# Patient Record
Sex: FEMALE | URBAN - METROPOLITAN AREA
[De-identification: names, ages, dates, MRNs, and addresses within clinical notes are randomized per-mention and may not be internally consistent; named-entity substitution may affect disease eponyms.]

---

## 2017-04-13 ENCOUNTER — INPATIENT (INPATIENT)
Facility: HOSPITAL | Age: 77
LOS: 4 days | Discharge: ANOTHER HOSPITAL | DRG: 291 | End: 2017-04-18
Attending: INTERNAL MEDICINE | Admitting: INTERNAL MEDICINE
Payer: MEDICARE

## 2017-04-13 VITALS — RESPIRATION RATE: 40 BRPM | WEIGHT: 145.95 LBS

## 2017-04-13 DIAGNOSIS — J44.1 CHRONIC OBSTRUCTIVE PULMONARY DISEASE WITH (ACUTE) EXACERBATION: ICD-10-CM

## 2017-04-13 DIAGNOSIS — Z41.8 ENCOUNTER FOR OTHER PROCEDURES FOR PURPOSES OTHER THAN REMEDYING HEALTH STATE: ICD-10-CM

## 2017-04-13 DIAGNOSIS — Z98.89 OTHER SPECIFIED POSTPROCEDURAL STATES: Chronic | ICD-10-CM

## 2017-04-13 DIAGNOSIS — G47.33 OBSTRUCTIVE SLEEP APNEA (ADULT) (PEDIATRIC): ICD-10-CM

## 2017-04-13 DIAGNOSIS — I73.9 PERIPHERAL VASCULAR DISEASE, UNSPECIFIED: ICD-10-CM

## 2017-04-13 DIAGNOSIS — I10 ESSENTIAL (PRIMARY) HYPERTENSION: ICD-10-CM

## 2017-04-13 DIAGNOSIS — R79.89 OTHER SPECIFIED ABNORMAL FINDINGS OF BLOOD CHEMISTRY: ICD-10-CM

## 2017-04-13 DIAGNOSIS — K44.9 DIAPHRAGMATIC HERNIA WITHOUT OBSTRUCTION OR GANGRENE: ICD-10-CM

## 2017-04-13 DIAGNOSIS — M06.9 RHEUMATOID ARTHRITIS, UNSPECIFIED: ICD-10-CM

## 2017-04-13 DIAGNOSIS — I73.9 PERIPHERAL VASCULAR DISEASE, UNSPECIFIED: Chronic | ICD-10-CM

## 2017-04-13 DIAGNOSIS — E78.5 HYPERLIPIDEMIA, UNSPECIFIED: ICD-10-CM

## 2017-04-13 DIAGNOSIS — I38 ENDOCARDITIS, VALVE UNSPECIFIED: ICD-10-CM

## 2017-04-13 DIAGNOSIS — J18.9 PNEUMONIA, UNSPECIFIED ORGANISM: ICD-10-CM

## 2017-04-13 DIAGNOSIS — F41.9 ANXIETY DISORDER, UNSPECIFIED: ICD-10-CM

## 2017-04-13 DIAGNOSIS — K44.9 DIAPHRAGMATIC HERNIA WITHOUT OBSTRUCTION OR GANGRENE: Chronic | ICD-10-CM

## 2017-04-13 LAB
ALBUMIN SERPL ELPH-MCNC: 3.7 G/DL — SIGNIFICANT CHANGE UP (ref 3.3–5)
ALP SERPL-CCNC: 122 U/L — HIGH (ref 40–120)
ALT FLD-CCNC: 93 U/L — HIGH (ref 12–78)
ANION GAP SERPL CALC-SCNC: 13 MMOL/L — SIGNIFICANT CHANGE UP (ref 5–17)
ANION GAP SERPL CALC-SCNC: 8 MMOL/L — SIGNIFICANT CHANGE UP (ref 5–17)
APPEARANCE UR: CLEAR — SIGNIFICANT CHANGE UP
AST SERPL-CCNC: 107 U/L — HIGH (ref 15–37)
BACTERIA # UR AUTO: ABNORMAL
BASE EXCESS BLDA CALC-SCNC: -3.4 MMOL/L — LOW (ref -2–2)
BASOPHILS # BLD AUTO: 0 K/UL — SIGNIFICANT CHANGE UP (ref 0–0.2)
BASOPHILS NFR BLD AUTO: 0.2 % — SIGNIFICANT CHANGE UP (ref 0–2)
BASOPHILS NFR BLD AUTO: 1 % — SIGNIFICANT CHANGE UP (ref 0–2)
BILIRUB SERPL-MCNC: 0.2 MG/DL — SIGNIFICANT CHANGE UP (ref 0.2–1.2)
BILIRUB UR-MCNC: NEGATIVE — SIGNIFICANT CHANGE UP
BLOOD GAS COMMENTS ARTERIAL: SIGNIFICANT CHANGE UP
BUN SERPL-MCNC: 14 MG/DL — SIGNIFICANT CHANGE UP (ref 7–23)
BUN SERPL-MCNC: 15 MG/DL — SIGNIFICANT CHANGE UP (ref 7–23)
CALCIUM SERPL-MCNC: 8.1 MG/DL — LOW (ref 8.5–10.1)
CALCIUM SERPL-MCNC: 8.5 MG/DL — SIGNIFICANT CHANGE UP (ref 8.5–10.1)
CHLORIDE SERPL-SCNC: 102 MMOL/L — SIGNIFICANT CHANGE UP (ref 96–108)
CHLORIDE SERPL-SCNC: 106 MMOL/L — SIGNIFICANT CHANGE UP (ref 96–108)
CK MB BLD-MCNC: 2.6 % — SIGNIFICANT CHANGE UP (ref 0–3.5)
CK MB CFR SERPL CALC: 5.8 NG/ML — HIGH (ref 0–3.6)
CK SERPL-CCNC: 222 U/L — HIGH (ref 26–192)
CO2 SERPL-SCNC: 22 MMOL/L — SIGNIFICANT CHANGE UP (ref 22–31)
CO2 SERPL-SCNC: 25 MMOL/L — SIGNIFICANT CHANGE UP (ref 22–31)
COLOR SPEC: YELLOW — SIGNIFICANT CHANGE UP
CREAT SERPL-MCNC: 0.6 MG/DL — SIGNIFICANT CHANGE UP (ref 0.5–1.3)
CREAT SERPL-MCNC: 0.87 MG/DL — SIGNIFICANT CHANGE UP (ref 0.5–1.3)
CRP SERPL-MCNC: 1.59 MG/DL — HIGH (ref 0–0.4)
DIFF PNL FLD: ABNORMAL
EOSINOPHIL # BLD AUTO: 0 K/UL — SIGNIFICANT CHANGE UP (ref 0–0.5)
EOSINOPHIL NFR BLD AUTO: 0 % — SIGNIFICANT CHANGE UP (ref 0–6)
EPI CELLS # UR: SIGNIFICANT CHANGE UP
GLUCOSE SERPL-MCNC: 197 MG/DL — HIGH (ref 70–99)
GLUCOSE SERPL-MCNC: 289 MG/DL — HIGH (ref 70–99)
GLUCOSE UR QL: 100 MG/DL
HBA1C BLD-MCNC: 6.5 % — HIGH (ref 4–5.6)
HCO3 BLDA-SCNC: 21 MMOL/L — LOW (ref 23–27)
HCO3 BLDA-SCNC: 24 MMOL/L — SIGNIFICANT CHANGE UP (ref 23–27)
HCT VFR BLD CALC: 40.6 % — SIGNIFICANT CHANGE UP (ref 34.5–45)
HCT VFR BLD CALC: 47.4 % — HIGH (ref 34.5–45)
HGB BLD-MCNC: 13.3 G/DL — SIGNIFICANT CHANGE UP (ref 11.5–15.5)
HGB BLD-MCNC: 15 G/DL — SIGNIFICANT CHANGE UP (ref 11.5–15.5)
HOROWITZ INDEX BLDA+IHG-RTO: 50 — SIGNIFICANT CHANGE UP
HOROWITZ INDEX BLDA+IHG-RTO: 50 — SIGNIFICANT CHANGE UP
INR BLD: 0.99 RATIO — SIGNIFICANT CHANGE UP (ref 0.88–1.16)
KETONES UR-MCNC: NEGATIVE — SIGNIFICANT CHANGE UP
LACTATE SERPL-SCNC: 1.9 MMOL/L — SIGNIFICANT CHANGE UP (ref 0.7–2)
LACTATE SERPL-SCNC: 7.3 MMOL/L — CRITICAL HIGH (ref 0.7–2)
LEUKOCYTE ESTERASE UR-ACNC: NEGATIVE — SIGNIFICANT CHANGE UP
LYMPHOCYTES # BLD AUTO: 0.6 K/UL — LOW (ref 1–3.3)
LYMPHOCYTES # BLD AUTO: 35 % — SIGNIFICANT CHANGE UP (ref 13–44)
LYMPHOCYTES # BLD AUTO: 4.2 % — LOW (ref 13–44)
MAGNESIUM SERPL-MCNC: 2.4 MG/DL — SIGNIFICANT CHANGE UP (ref 1.8–2.4)
MCHC RBC-ENTMCNC: 31.2 PG — SIGNIFICANT CHANGE UP (ref 27–34)
MCHC RBC-ENTMCNC: 31.5 GM/DL — LOW (ref 32–36)
MCHC RBC-ENTMCNC: 32.3 PG — SIGNIFICANT CHANGE UP (ref 27–34)
MCHC RBC-ENTMCNC: 32.7 GM/DL — SIGNIFICANT CHANGE UP (ref 32–36)
MCV RBC AUTO: 98.8 FL — SIGNIFICANT CHANGE UP (ref 80–100)
MCV RBC AUTO: 99 FL — SIGNIFICANT CHANGE UP (ref 80–100)
MONOCYTES # BLD AUTO: 0.5 K/UL — SIGNIFICANT CHANGE UP (ref 0–0.9)
MONOCYTES NFR BLD AUTO: 3.8 % — SIGNIFICANT CHANGE UP (ref 1–9)
MONOCYTES NFR BLD AUTO: 8 % — SIGNIFICANT CHANGE UP (ref 1–9)
NEUTROPHILS # BLD AUTO: 12.7 K/UL — HIGH (ref 1.8–7.4)
NEUTROPHILS NFR BLD AUTO: 54 % — SIGNIFICANT CHANGE UP (ref 43–77)
NEUTROPHILS NFR BLD AUTO: 91 % — HIGH (ref 43–77)
NEUTS BAND # BLD: 2 % — SIGNIFICANT CHANGE UP (ref 0–8)
NITRITE UR-MCNC: NEGATIVE — SIGNIFICANT CHANGE UP
NT-PROBNP SERPL-SCNC: 515 PG/ML — HIGH (ref 0–450)
PCO2 BLDA: 44 MMHG — SIGNIFICANT CHANGE UP (ref 32–46)
PCO2 BLDA: 52 MMHG — HIGH (ref 32–46)
PH BLDA: 7.26 — LOW (ref 7.35–7.45)
PH BLDA: 7.37 — SIGNIFICANT CHANGE UP (ref 7.35–7.45)
PH UR: 6.5 — SIGNIFICANT CHANGE UP (ref 4.8–8)
PHOSPHATE SERPL-MCNC: 2.2 MG/DL — LOW (ref 2.5–4.5)
PLAT MORPH BLD: NORMAL — SIGNIFICANT CHANGE UP
PLATELET # BLD AUTO: 213 K/UL — SIGNIFICANT CHANGE UP (ref 150–400)
PLATELET # BLD AUTO: 274 K/UL — SIGNIFICANT CHANGE UP (ref 150–400)
PO2 BLDA: 107 MMHG — SIGNIFICANT CHANGE UP (ref 74–108)
PO2 BLDA: 87 MMHG — SIGNIFICANT CHANGE UP (ref 74–108)
POTASSIUM SERPL-MCNC: 3.8 MMOL/L — SIGNIFICANT CHANGE UP (ref 3.5–5.3)
POTASSIUM SERPL-MCNC: 4.1 MMOL/L — SIGNIFICANT CHANGE UP (ref 3.5–5.3)
POTASSIUM SERPL-SCNC: 3.8 MMOL/L — SIGNIFICANT CHANGE UP (ref 3.5–5.3)
POTASSIUM SERPL-SCNC: 4.1 MMOL/L — SIGNIFICANT CHANGE UP (ref 3.5–5.3)
PROT SERPL-MCNC: 8 G/DL — SIGNIFICANT CHANGE UP (ref 6–8.3)
PROT UR-MCNC: 500 MG/DL
PROTHROM AB SERPL-ACNC: 10.8 SEC — SIGNIFICANT CHANGE UP (ref 9.8–12.7)
RBC # BLD: 4.11 M/UL — SIGNIFICANT CHANGE UP (ref 3.8–5.2)
RBC # BLD: 4.79 M/UL — SIGNIFICANT CHANGE UP (ref 3.8–5.2)
RBC # FLD: 13.2 % — SIGNIFICANT CHANGE UP (ref 10.3–14.5)
RBC # FLD: 13.2 % — SIGNIFICANT CHANGE UP (ref 10.3–14.5)
RBC BLD AUTO: SIGNIFICANT CHANGE UP
RBC CASTS # UR COMP ASSIST: SIGNIFICANT CHANGE UP /HPF (ref 0–4)
SAO2 % BLDA: 94 % — SIGNIFICANT CHANGE UP (ref 92–96)
SAO2 % BLDA: 97 % — HIGH (ref 92–96)
SODIUM SERPL-SCNC: 137 MMOL/L — SIGNIFICANT CHANGE UP (ref 135–145)
SODIUM SERPL-SCNC: 139 MMOL/L — SIGNIFICANT CHANGE UP (ref 135–145)
SP GR SPEC: 1.01 — SIGNIFICANT CHANGE UP (ref 1.01–1.02)
TROPONIN I SERPL-MCNC: 0.03 NG/ML — SIGNIFICANT CHANGE UP (ref 0.01–0.04)
UROBILINOGEN FLD QL: NEGATIVE — SIGNIFICANT CHANGE UP
WBC # BLD: 13.9 K/UL — HIGH (ref 3.8–10.5)
WBC # BLD: 19.5 K/UL — HIGH (ref 3.8–10.5)
WBC # FLD AUTO: 13.9 K/UL — HIGH (ref 3.8–10.5)
WBC # FLD AUTO: 19.5 K/UL — HIGH (ref 3.8–10.5)
WBC UR QL: NEGATIVE — SIGNIFICANT CHANGE UP

## 2017-04-13 PROCEDURE — 71010: CPT | Mod: 26

## 2017-04-13 PROCEDURE — 99223 1ST HOSP IP/OBS HIGH 75: CPT

## 2017-04-13 PROCEDURE — 93010 ELECTROCARDIOGRAM REPORT: CPT

## 2017-04-13 PROCEDURE — 71275 CT ANGIOGRAPHY CHEST: CPT | Mod: 26

## 2017-04-13 PROCEDURE — 99285 EMERGENCY DEPT VISIT HI MDM: CPT

## 2017-04-13 RX ORDER — ASPIRIN/CALCIUM CARB/MAGNESIUM 324 MG
81 TABLET ORAL DAILY
Qty: 0 | Refills: 0 | Status: DISCONTINUED | OUTPATIENT
Start: 2017-04-13 | End: 2017-04-18

## 2017-04-13 RX ORDER — ACETAMINOPHEN 500 MG
650 TABLET ORAL EVERY 6 HOURS
Qty: 0 | Refills: 0 | Status: DISCONTINUED | OUTPATIENT
Start: 2017-04-13 | End: 2017-04-18

## 2017-04-13 RX ORDER — ZOLPIDEM TARTRATE 10 MG/1
5 TABLET ORAL AT BEDTIME
Qty: 0 | Refills: 0 | Status: DISCONTINUED | OUTPATIENT
Start: 2017-04-13 | End: 2017-04-18

## 2017-04-13 RX ORDER — VANCOMYCIN HCL 1 G
1000 VIAL (EA) INTRAVENOUS ONCE
Qty: 0 | Refills: 0 | Status: COMPLETED | OUTPATIENT
Start: 2017-04-13 | End: 2017-04-13

## 2017-04-13 RX ORDER — IPRATROPIUM/ALBUTEROL SULFATE 18-103MCG
3 AEROSOL WITH ADAPTER (GRAM) INHALATION EVERY 6 HOURS
Qty: 0 | Refills: 0 | Status: DISCONTINUED | OUTPATIENT
Start: 2017-04-13 | End: 2017-04-13

## 2017-04-13 RX ORDER — SENNA PLUS 8.6 MG/1
2 TABLET ORAL AT BEDTIME
Qty: 0 | Refills: 0 | Status: DISCONTINUED | OUTPATIENT
Start: 2017-04-13 | End: 2017-04-18

## 2017-04-13 RX ORDER — SIMVASTATIN 20 MG/1
20 TABLET, FILM COATED ORAL AT BEDTIME
Qty: 0 | Refills: 0 | Status: DISCONTINUED | OUTPATIENT
Start: 2017-04-13 | End: 2017-04-18

## 2017-04-13 RX ORDER — SODIUM CHLORIDE 9 MG/ML
1000 INJECTION INTRAMUSCULAR; INTRAVENOUS; SUBCUTANEOUS
Qty: 0 | Refills: 0 | Status: DISCONTINUED | OUTPATIENT
Start: 2017-04-13 | End: 2017-04-15

## 2017-04-13 RX ORDER — MAGNESIUM SULFATE 500 MG/ML
2 VIAL (ML) INJECTION ONCE
Qty: 0 | Refills: 0 | Status: COMPLETED | OUTPATIENT
Start: 2017-04-13 | End: 2017-04-13

## 2017-04-13 RX ORDER — CLONAZEPAM 1 MG
0.25 TABLET ORAL ONCE
Qty: 0 | Refills: 0 | Status: DISCONTINUED | OUTPATIENT
Start: 2017-04-13 | End: 2017-04-13

## 2017-04-13 RX ORDER — VANCOMYCIN HCL 1 G
VIAL (EA) INTRAVENOUS
Qty: 0 | Refills: 0 | Status: DISCONTINUED | OUTPATIENT
Start: 2017-04-13 | End: 2017-04-13

## 2017-04-13 RX ORDER — ALBUTEROL 90 UG/1
2 AEROSOL, METERED ORAL EVERY 6 HOURS
Qty: 0 | Refills: 0 | Status: DISCONTINUED | OUTPATIENT
Start: 2017-04-13 | End: 2017-04-15

## 2017-04-13 RX ORDER — ARIPIPRAZOLE 15 MG/1
2 TABLET ORAL DAILY
Qty: 0 | Refills: 0 | Status: DISCONTINUED | OUTPATIENT
Start: 2017-04-13 | End: 2017-04-15

## 2017-04-13 RX ORDER — LEVALBUTEROL 1.25 MG/.5ML
0.63 SOLUTION, CONCENTRATE RESPIRATORY (INHALATION) EVERY 8 HOURS
Qty: 0 | Refills: 0 | Status: DISCONTINUED | OUTPATIENT
Start: 2017-04-13 | End: 2017-04-15

## 2017-04-13 RX ORDER — SODIUM CHLORIDE 9 MG/ML
1000 INJECTION INTRAMUSCULAR; INTRAVENOUS; SUBCUTANEOUS
Qty: 0 | Refills: 0 | Status: COMPLETED | OUTPATIENT
Start: 2017-04-13 | End: 2017-04-13

## 2017-04-13 RX ORDER — DOCUSATE SODIUM 100 MG
100 CAPSULE ORAL THREE TIMES A DAY
Qty: 0 | Refills: 0 | Status: DISCONTINUED | OUTPATIENT
Start: 2017-04-13 | End: 2017-04-18

## 2017-04-13 RX ORDER — CLONAZEPAM 1 MG
0.25 TABLET ORAL
Qty: 0 | Refills: 0 | Status: DISCONTINUED | OUTPATIENT
Start: 2017-04-13 | End: 2017-04-16

## 2017-04-13 RX ORDER — AZTREONAM 2 G
1000 VIAL (EA) INJECTION ONCE
Qty: 0 | Refills: 0 | Status: COMPLETED | OUTPATIENT
Start: 2017-04-13 | End: 2017-04-13

## 2017-04-13 RX ORDER — AZTREONAM 2 G
VIAL (EA) INJECTION
Qty: 0 | Refills: 0 | Status: DISCONTINUED | OUTPATIENT
Start: 2017-04-13 | End: 2017-04-15

## 2017-04-13 RX ORDER — VENLAFAXINE HCL 75 MG
150 CAPSULE, EXT RELEASE 24 HR ORAL DAILY
Qty: 0 | Refills: 0 | Status: DISCONTINUED | OUTPATIENT
Start: 2017-04-13 | End: 2017-04-13

## 2017-04-13 RX ORDER — VERAPAMIL HCL 240 MG
240 CAPSULE, EXTENDED RELEASE PELLETS 24 HR ORAL DAILY
Qty: 0 | Refills: 0 | Status: DISCONTINUED | OUTPATIENT
Start: 2017-04-13 | End: 2017-04-15

## 2017-04-13 RX ORDER — VENLAFAXINE HCL 75 MG
75 CAPSULE, EXT RELEASE 24 HR ORAL
Qty: 0 | Refills: 0 | Status: DISCONTINUED | OUTPATIENT
Start: 2017-04-13 | End: 2017-04-18

## 2017-04-13 RX ORDER — TIOTROPIUM BROMIDE 18 UG/1
1 CAPSULE ORAL; RESPIRATORY (INHALATION) DAILY
Qty: 0 | Refills: 0 | Status: DISCONTINUED | OUTPATIENT
Start: 2017-04-13 | End: 2017-04-18

## 2017-04-13 RX ORDER — PANTOPRAZOLE SODIUM 20 MG/1
40 TABLET, DELAYED RELEASE ORAL
Qty: 0 | Refills: 0 | Status: DISCONTINUED | OUTPATIENT
Start: 2017-04-13 | End: 2017-04-18

## 2017-04-13 RX ORDER — IPRATROPIUM/ALBUTEROL SULFATE 18-103MCG
3 AEROSOL WITH ADAPTER (GRAM) INHALATION ONCE
Qty: 0 | Refills: 0 | Status: COMPLETED | OUTPATIENT
Start: 2017-04-13 | End: 2017-04-13

## 2017-04-13 RX ORDER — SODIUM,POTASSIUM PHOSPHATES 278-250MG
1 POWDER IN PACKET (EA) ORAL
Qty: 0 | Refills: 0 | Status: COMPLETED | OUTPATIENT
Start: 2017-04-13 | End: 2017-04-14

## 2017-04-13 RX ORDER — FUROSEMIDE 40 MG
40 TABLET ORAL ONCE
Qty: 0 | Refills: 0 | Status: COMPLETED | OUTPATIENT
Start: 2017-04-13 | End: 2017-04-13

## 2017-04-13 RX ORDER — AZTREONAM 2 G
1000 VIAL (EA) INJECTION EVERY 8 HOURS
Qty: 0 | Refills: 0 | Status: DISCONTINUED | OUTPATIENT
Start: 2017-04-13 | End: 2017-04-15

## 2017-04-13 RX ORDER — ENOXAPARIN SODIUM 100 MG/ML
40 INJECTION SUBCUTANEOUS EVERY 24 HOURS
Qty: 0 | Refills: 0 | Status: DISCONTINUED | OUTPATIENT
Start: 2017-04-13 | End: 2017-04-17

## 2017-04-13 RX ORDER — LACTOBACILLUS ACIDOPHILUS 100MM CELL
1 CAPSULE ORAL
Qty: 0 | Refills: 0 | Status: DISCONTINUED | OUTPATIENT
Start: 2017-04-13 | End: 2017-04-18

## 2017-04-13 RX ORDER — VANCOMYCIN HCL 1 G
1000 VIAL (EA) INTRAVENOUS EVERY 12 HOURS
Qty: 0 | Refills: 0 | Status: DISCONTINUED | OUTPATIENT
Start: 2017-04-13 | End: 2017-04-15

## 2017-04-13 RX ADMIN — Medication 100 MILLIGRAM(S): at 06:10

## 2017-04-13 RX ADMIN — Medication 100 MILLIGRAM(S): at 21:57

## 2017-04-13 RX ADMIN — Medication 50 MILLIGRAM(S): at 21:58

## 2017-04-13 RX ADMIN — Medication 1 TABLET(S): at 08:47

## 2017-04-13 RX ADMIN — Medication 50 MILLIGRAM(S): at 07:47

## 2017-04-13 RX ADMIN — Medication 81 MILLIGRAM(S): at 12:00

## 2017-04-13 RX ADMIN — Medication 1 TABLET(S): at 12:00

## 2017-04-13 RX ADMIN — ZOLPIDEM TARTRATE 5 MILLIGRAM(S): 10 TABLET ORAL at 22:00

## 2017-04-13 RX ADMIN — Medication 40 MILLIGRAM(S): at 00:48

## 2017-04-13 RX ADMIN — Medication 50 MILLIGRAM(S): at 14:01

## 2017-04-13 RX ADMIN — Medication 100 MILLIGRAM(S): at 14:00

## 2017-04-13 RX ADMIN — PANTOPRAZOLE SODIUM 40 MILLIGRAM(S): 20 TABLET, DELAYED RELEASE ORAL at 08:47

## 2017-04-13 RX ADMIN — SODIUM CHLORIDE 1000 MILLILITER(S): 9 INJECTION INTRAMUSCULAR; INTRAVENOUS; SUBCUTANEOUS at 03:46

## 2017-04-13 RX ADMIN — SODIUM CHLORIDE 100 MILLILITER(S): 9 INJECTION INTRAMUSCULAR; INTRAVENOUS; SUBCUTANEOUS at 08:48

## 2017-04-13 RX ADMIN — Medication 75 MILLIGRAM(S): at 18:31

## 2017-04-13 RX ADMIN — Medication 250 MILLIGRAM(S): at 08:18

## 2017-04-13 RX ADMIN — Medication 0.25 MILLIGRAM(S): at 09:40

## 2017-04-13 RX ADMIN — SIMVASTATIN 20 MILLIGRAM(S): 20 TABLET, FILM COATED ORAL at 21:57

## 2017-04-13 RX ADMIN — Medication 40 MILLIGRAM(S): at 21:58

## 2017-04-13 RX ADMIN — Medication 50 GRAM(S): at 00:48

## 2017-04-13 RX ADMIN — Medication 40 MILLIGRAM(S): at 06:10

## 2017-04-13 RX ADMIN — Medication 240 MILLIGRAM(S): at 06:10

## 2017-04-13 RX ADMIN — Medication 40 MILLIGRAM(S): at 14:00

## 2017-04-13 RX ADMIN — Medication 30 MILLILITER(S): at 21:58

## 2017-04-13 RX ADMIN — SODIUM CHLORIDE 1000 MILLILITER(S): 9 INJECTION INTRAMUSCULAR; INTRAVENOUS; SUBCUTANEOUS at 01:57

## 2017-04-13 RX ADMIN — Medication 250 MILLIGRAM(S): at 22:13

## 2017-04-13 RX ADMIN — Medication 1 TABLET(S): at 17:48

## 2017-04-13 RX ADMIN — ARIPIPRAZOLE 2 MILLIGRAM(S): 15 TABLET ORAL at 12:07

## 2017-04-13 RX ADMIN — Medication 1 TABLET(S): at 18:31

## 2017-04-13 RX ADMIN — Medication 75 MILLIGRAM(S): at 08:47

## 2017-04-13 RX ADMIN — Medication 0.25 MILLIGRAM(S): at 17:48

## 2017-04-13 RX ADMIN — ENOXAPARIN SODIUM 40 MILLIGRAM(S): 100 INJECTION SUBCUTANEOUS at 06:10

## 2017-04-13 RX ADMIN — Medication 3 MILLILITER(S): at 00:30

## 2017-04-13 NOTE — CONSULT NOTE ADULT - SUBJECTIVE AND OBJECTIVE BOX
[facility] Consultation    CHIEF COMPLAINT: Patient is a 76y old  Female who presents with a chief complaint of SOB (2017 01:22)      HPI:  Patient is a 76-year-old female with a past medical history of COPD, hypertension, hyperlipidemia, diverticulitis, depression, breast cancer, status post lumpectomy and radiation , anxiety, hiatal hernia-status post surgical repair in , obstructive sleep apnea (not on BiPAP at home), PVD, rheumatoid arthritis, thrush, TIA (), TMJ, valvular heart disease, S/P AVR 3 yrs ago, borderline diabetes (not on medications), presents to ED via EMS from home with chief complaint of difficulty breathing. The past three days patient has been feeling increasing shortness of breath. Patient periodically (around monthly) goes to PMD–Dr. Benoit for steroids and antibiotics and symptoms resolve with taking meds.  Pt was seen in office this week and given z pack, steroids. Per patient she has been taking medications, however wheezing and cough have increased, cough is productive with yellowish sputum. Last night pt was diaphoretic and shortness of breath became so bad that daughter called EMS to bring to ED.   Pt denies CP, palpitations, change in vision, decreased PO intake, abd pain, n/v/d, dysuria, HA, numbness, tingling.  Denies sick contact/recent travel.  Similar symptoms in past with COPD exacerbation but none this severe.     Pt saw Dr. Jackson her normal cardiologist about 2 months ago and had an echocardiogram that was stable.    In the ED – patient received levaquin 500mg iv, Solu-Medrol 125 mg IV, NS 1L bolusx1 Combivent nebulizer, was placed on BiPAP 12/6/PEEP5, 50.  ABG-  pH – 7.26, PCO2 – 52, PO2 – 87, bicarb – 21, bases excess: –3.4, FiO2 50, O2 sat 94%   labs significant for – WBC – 19.5, hematocrit – 47.4, glucose – 289, alkaline phosphatase 122, AST – 107, AL T – 93, lactate – 7.3, CK – 222, CKMB – 5.8, pro BNP – 515,   UA – protein – 500, blood – small, bacteria – occasional, glucose – 100    patient still very dyspneic on BiPAP mask and having difficulty giving history because of her shortness of breath.        PAST MEDICAL & SURGICAL HISTORY:  TMJ (temporomandibular joint disorder)  Diverticulitis: hospitalized   SIMÓN (obstructive sleep apnea): category I severe pt does not use c/pap  Thrush: treated x 4 days  1 month ago  restarted medication  3-19-14 clortramazole 5x day  Anxiety  RA (rheumatoid arthritis): diagnosed   oxycodone prn  neck/ lower back  Depression  Borderline diabetes: no meds  COPD (chronic obstructive pulmonary disease): diagnosed   inhaler and nebulizer  HTN (hypertension)  PVD (peripheral vascular disease): left iliac  s/p surgical intervention   unsuccessful  Hiatal hernia: s/p surgical repair   Breast cancer: right s/p lumpectomy and radiation   TIA (transient ischemic attack): 2010  Hyperlipidemia  aortic valve replacement. no known coronary disease.  PVD (peripheral vascular disease): s/p left iliac bypass which was unsuccessful  open repair  S/P carpal tunnel release: right   S/P lumpectomy, right breast:   Hiatal hernia: s/p surgical repair per pt   S/P hernia surgery: left inguinal age 12  S/P rotator cuff surgery: left   S/P appendectomy:   S/P  section: x2 /       SOCIAL HISTORY: former smoker    FAMILY HISTORY: FAMILY HISTORY:  unknown    MEDICATIONS  (STANDING):  enoxaparin Injectable 40milliGRAM(s) SubCutaneous every 24 hours  aspirin enteric coated 81milliGRAM(s) Oral daily  simvastatin 20milliGRAM(s) Oral at bedtime  ARIPiprazole 2milliGRAM(s) Oral daily  docusate sodium 100milliGRAM(s) Oral three times a day  pantoprazole    Tablet 40milliGRAM(s) Oral before breakfast  verapamil SR 240milliGRAM(s) Oral daily  sodium chloride 0.9%. 1000milliLiter(s) IV Continuous <Continuous>  venlafaxine 75milliGRAM(s) Oral two times a day with meals  methylPREDNISolone sodium succinate Injectable 40milliGRAM(s) IV Push every 8 hours  doxycycline hyclate Capsule 100milliGRAM(s) Oral every 12 hours  lactobacillus acidophilus 1Tablet(s) Oral three times a day with meals  aztreonam  IVPB  IV Intermittent   vancomycin  IVPB  IV Intermittent   aztreonam  IVPB 1000milliGRAM(s) IV Intermittent every 8 hours    MEDICATIONS  (PRN):  acetaminophen   Tablet 650milliGRAM(s) Oral every 6 hours PRN For Temp greater than 38 C (100.4 F)  acetaminophen   Tablet. 650milliGRAM(s) Oral every 6 hours PRN Mild Pain (1 - 3)  senna 2Tablet(s) Oral at bedtime PRN Constipation  oxyCODONE  5 mG/acetaminophen 325 mG 2Tablet(s) Oral every 4 hours PRN Severe Pain (7 - 10)  zolpidem 5milliGRAM(s) Oral at bedtime PRN Insomnia  tiotropium 18 MICROgram(s) Capsule 1Capsule(s) Inhalation daily PRN sob  ALBUTerol    90 MICROgram(s) HFA Inhaler 2Puff(s) Inhalation every 6 hours PRN Shortness of Breath and/or Wheezing  levalbuterol Inhalation 0.63milliGRAM(s) Inhalation every 8 hours PRN shortness of breath      Allergies    penicillins (Hives)  quinolines (Other)    Intolerances        REVIEW OF SYSTEMS:    CONSTITUTIONAL: No weakness, fevers or chills  EYES: No visual changes, No diplopia  ENMT: No throat pain , No exudate  NECK: No pain or stiffness  RESPIRATORY: shortness of breath, cough, and wheezing  CARDIOVASCULAR: No chest pain or palpitations  GASTROINTESTINAL: No abdominal pain. No nausea, vomiting, or hematemesis; No diarrhea or constipation. No melena or hematochezia.  GENITOURINARY: No dysuria, frequency or hematuria  NEUROLOGICAL: fatigue. Arousable but lethargic  SKIN: No itching or rash  All other review of systems is negative unless indicated above    VITAL SIGNS:   Vital Signs Last 24 Hrs  T(C): 36.8, Max: 36.8 ( @ 07:25)  T(F): 98.2, Max: 98.2 ( @ 07:25)  HR: 114 (114 - 135)  BP: 114/60 (114/60 - 168/111)  BP(mean): --  RR: 22 (22 - 40)  SpO2: 99% (96% - 100%)    I&O's Summary    I & Os for current day (as of 2017 08:46)  =============================================  IN: 2000 ml / OUT: 1400 ml / NET: 600 ml      PHYSICAL EXAM:    Constitutional: acutely ill  Eyes:  EOMI,  Pupils round, no lesions  ENMT: no exudate or erythema  Pulmonary: labored breathing with bilateral rhonchi, wheezing, and coarse breath  Cardiovascular: PMI not palpable difficult to hear heart sounds or murmur because of respiratory noise  Gastrointestinal: Bowel Sounds present, soft, nontender.   Lymph: No peripheral edema. No cervical lymphadenopathy.  Neurological: lethargic but arousable, no focal deficits  Skin: No rashes. No changes of chronic venous stasis. No cyanosis.  Psych:  not able to evaluate.    LABS: All Labs Reviewed:                        13.3   13.9  )-----------( 213      ( 2017 06:35 )             40.6                         15.0   19.5  )-----------( 274      ( 2017 00:26 )             47.4     2017 06:35    139    |  106    |  14     ----------------------------<  197    3.8     |  25     |  0.60   2017 00:26    137    |  102    |  15     ----------------------------<  289    4.1     |  22     |  0.87     Ca    8.1        2017 06:35  Ca    8.5        2017 00:26  Phos  2.2       2017 06:35  Mg     2.4       2017 06:35    TPro  8.0    /  Alb  3.7    /  TBili  0.2    /  DBili  x      /  AST  107    /  ALT  93     /  AlkPhos  122    2017 00:26    PT/INR - ( 2017 00:26 )   PT: 10.8 sec;   INR: 0.99 ratio           CARDIAC MARKERS ( 2017 00:26 )  .030 ng/mL / x     / 222 U/L / x     / 5.8 ng/mL      Blood Culture:    @ 00:26  Pro Bnp 515        RADIOLOGY/EKG:      EXAM:  PORTABLE CHEST URGENT                            PROCEDURE DATE:  2017        INTERPRETATION:  PORTABLE CHEST URGENT dated 2017 1:05 AM    INDICATION: shortness of breath    COMPARISON: Chest radiograph dated 3/31/2014    FINDING:Single frontal view of the chest demonstrates bilateral   perihilar and lower lobe opacities. Although limited in AP view, the   heart appears mildly enlarged. There is a small right pleural effusion.   Calcification within the aortic knob. Aortic valve replacement   appreciated. Monitor leads overlie and partially obscure the chest. There   is calcification and uncoiling of the aorta. Degenerative change of the   spine. Sternotomy wires are unchanged.    IMPRESSION:   Bilateral lower lobe opacities with enlarged heart suggestive for   avascular congestion. Findings have slightly worsened compared to prior   study  Small right pleural effusion.    ECG sinus tachycardia, left bundle branch block.

## 2017-04-13 NOTE — PROGRESS NOTE ADULT - ASSESSMENT
76F with extensive PMHx as above, BIBA for respiratory distress. Pt found to be in hypercarbic respiratory failure and placed on Bipap support. Pt given Steroids/abx and lasix in ED. Pt seen and evaluated in ED at bedside. Remains tachypneic, mild distress. Pt states breathing improved since placed on Bipap. Pt states she has had yellow productive cough for several days and was treated with z-pack/ster[facility]. Pt admitted for 1. Hypercarbic Respiratory failure due to 2. AECOPD due to 3. bibasilar PNA 4. Lactic Acidosis    -Bipap settings increased to 18/6 50%. Repeat ABg in 1 hour  -Low threshold for intubation  -Continue Steroids/Nebs/Abx  -Pan Culture  -Repeat lactate in 2 hours post IVF resuscitation.  -Place sheriff  -NPO. PPI  -DVT ppx  -Pulm consult in am  -Supportive care

## 2017-04-13 NOTE — CONSULT NOTE ADULT - SUBJECTIVE AND OBJECTIVE BOX
HPI:  Patient is a 76-year-old female with a past medical history of COPD,and mult other med issues presents to ED via EMS from home with chief complaint of difficulty breathing. Patient has been feeling increasing shortness of breath.  Pt was seen in office this week and given z pack, steroids. Per patient she has been taking medications, however wheezing and cough have increased, cough is productive with yellowish sputum. Shortness of breath became so bad that daughter called EMS to bring to ED.    Similar symptoms in past with COPD exacerbation but none this severe.       In the ED – patient received levaquin 500mg iv, Solu-Medrol 125 mg IV, NS 1L bolusx1 Combivent nebulizer, was placed on BiPAP     Pt reports they feel much better but still very anxious and still with sig dyspnea      PAST MEDICAL & SURGICAL HISTORY:  TMJ (temporomandibular joint disorder)  Diverticulitis: hospitalized   SIMÓN (obstructive sleep apnea): category I severe pt does not use c/pap  Thrush: treated x 4 days  1 month ago  restarted medication  3-19-14 clortramazole 5x day  Anxiety  RA (rheumatoid arthritis): diagnosed   oxycodone prn  neck/ lower back  Depression  Borderline diabetes: no meds  COPD (chronic obstructive pulmonary disease): diagnosed   inhaler and nebulizer  HTN (hypertension)  PVD (peripheral vascular disease): left iliac  s/p surgical intervention   unsuccessful  Hiatal hernia: s/p surgical repair   Breast cancer: right s/p lumpectomy and radiation   TIA (transient ischemic attack): 2010  Hyperlipidemia  Valvular heart disease: aortic and mitral  PVD (peripheral vascular disease): s/p left iliac bypass which was unsuccessful  open repair  S/P carpal tunnel release: right 2002  S/P lumpectomy, right breast: 2008  Hiatal hernia: s/p surgical repair per pt   S/P hernia surgery: left inguinal age 12  S/P rotator cuff surgery: left   S/P appendectomy:   S/P  section: x2 /           MEDICATIONS  (STANDING):  enoxaparin Injectable 40milliGRAM(s) SubCutaneous every 24 hours  aspirin enteric coated 81milliGRAM(s) Oral daily  simvastatin 20milliGRAM(s) Oral at bedtime  ARIPiprazole 2milliGRAM(s) Oral daily  docusate sodium 100milliGRAM(s) Oral three times a day  pantoprazole    Tablet 40milliGRAM(s) Oral before breakfast  verapamil SR 240milliGRAM(s) Oral daily  sodium chloride 0.9%. 1000milliLiter(s) IV Continuous <Continuous>  venlafaxine 75milliGRAM(s) Oral two times a day with meals  methylPREDNISolone sodium succinate Injectable 40milliGRAM(s) IV Push every 8 hours  doxycycline hyclate Capsule 100milliGRAM(s) Oral every 12 hours  lactobacillus acidophilus 1Tablet(s) Oral three times a day with meals  aztreonam  IVPB  IV Intermittent   levalbuterol Inhalation 0.63milliGRAM(s) Inhalation every 8 hours  vancomycin  IVPB  IV Intermittent   aztreonam  IVPB 1000milliGRAM(s) IV Intermittent every 8 hours  clonazePAM Tablet 0.25milliGRAM(s) Oral two times a day    MEDICATIONS  (PRN):  acetaminophen   Tablet 650milliGRAM(s) Oral every 6 hours PRN For Temp greater than 38 C (100.4 F)  acetaminophen   Tablet. 650milliGRAM(s) Oral every 6 hours PRN Mild Pain (1 - 3)  senna 2Tablet(s) Oral at bedtime PRN Constipation  oxyCODONE  5 mG/acetaminophen 325 mG 2Tablet(s) Oral every 4 hours PRN Severe Pain (7 - 10)  zolpidem 5milliGRAM(s) Oral at bedtime PRN Insomnia  tiotropium 18 MICROgram(s) Capsule 1Capsule(s) Inhalation daily PRN sob  ALBUTerol    90 MICROgram(s) HFA Inhaler 2Puff(s) Inhalation every 6 hours PRN Shortness of Breath and/or Wheezing      Allergies    penicillins (Hives)  quinolines (Other)    Intolerances        SOCIAL HISTORY:noncontrib    FAMILY HISTORY:  No pertinent family history in first degree relatives      Vital Signs Last 24 Hrs  T(C): 37, Max: 37 (04-13 @ 09:32)  T(F): 98.6, Max: 98.6 (04-13 @ 09:32)  HR: 112 (112 - 135)  BP: 116/62 (114/60 - 168/111)  BP(mean): --  RR: 20 (20 - 40)  SpO2: 99% (96% - 100%)    PE:  WDWN in  distress  HEENT:  NC, PERRL, sclerae anicteric, conjunctivae clear, EOMI.  Sinuses nontender, no nasal exudate.  No buccal or pharyngeal lesions, erythema or exudate, pt with BIPAP on  Neck:  Supple, no adenopathy  Lungs:  Diffuse coarse ronchi, crackles and decreased BS in bases, noted acc muscle use  Cor:  RRR, S1, S2, no murmur appreciated  Abd:  Symmetric, normoactive BS.  Soft, nontender, no masses, guarding or rebound.  Liver and spleen not enlarged  Extrem:  No cyanosis or edema  Skin:  No rashes.  Musc-intact  Neuro-grossly normal    LABS:                        13.3   13.9  )-----------( 213      ( 2017 06:35 )             40.6     04-    139  |  106  |  14  ----------------------------<  197<H>  3.8   |  25  |  0.60    Ca    8.1<L>      2017 06:35  Phos  2.2       Mg     2.4         TPro  8.0  /  Alb  3.7  /  TBili  0.2  /  DBili  x   /  AST  107<H>  /  ALT  93<H>  /  AlkPhos  122<H>      Urinalysis Basic - ( 2017 01:49 )    Color: Yellow / Appearance: Clear / S.015 / pH: x  Gluc: x / Ketone: Negative  / Bili: Negative / Urobili: Negative   Blood: x / Protein: 500 mg/dL / Nitrite: Negative   Leuk Esterase: Negative / RBC: 0-2 /HPF / WBC Negative   Sq Epi: x / Non Sq Epi: Few / Bacteria: Occasional        MICROBIOLOGY:      RADIOLOGY & ADDITIONAL STUDIES:    --  EXAM:  PORTABLE CHEST URGENT                            PROCEDURE DATE:  2017        INTERPRETATION:  PORTABLE CHEST URGENT dated 2017 1:05 AM    INDICATION: shortness of breath    COMPARISON: Chest radiograph dated 3/31/2014    FINDING:Single frontal view of the chest demonstrates bilateral   perihilar and lower lobe opacities. Although limited in AP view, the   heart appears mildly enlarged. There is a small right pleural effusion.   Calcification within the aortic knob. Aortic valve replacement   appreciated. Monitor leads overlie and partially obscure the chest. There   is calcification and uncoiling of the aorta. Degenerative change of the   spine. Sternotomy wires are unchanged.    IMPRESSION:   Bilateral lower lobe opacities with enlarged heart suggestive for   avascular congestion. Findings have slightly worsened compared to prior   study  Small right pleural effusion.              BIJU CAT M.D., ATTENDING RADIOLOGIST  This document has been electronically signed. 2017  8:06AM

## 2017-04-13 NOTE — H&P ADULT - PROBLEM SELECTOR PLAN 3
not on AC  c/w ASA81  c/w verapamil c hold parameters not on AC  c/w ASA81  c/w verapamil with hold parameters, Cardio Dr García consult

## 2017-04-13 NOTE — ED ADULT TRIAGE NOTE - ARRIVAL INFO ADDITIONAL COMMENTS
has an IV #20 on the left antecubital inserted by ems, patient received INj solumedrol 125MG IV and a combivent treatment by ems

## 2017-04-13 NOTE — H&P ADULT - NSHPREVIEWOFSYSTEMS_GEN_ALL_CORE
CONSTITUTIONAL: no fever and no chills. no weakness  CARDIOVASCULAR: no chest pain and no edema.  RESPIRATORY: no chest pain, +SOB, +MOSQUEDA, +cough  GASTROINTESTINAL: no abdominal pain, no nausea, no vomiting, no diarrhea  GENITOURINARY-no dysuria, no hematuria  MUSCULOSKELETAL: no back pain, no musculoskeletal pain, no neck pain  NEURO: no loss of consciousness, no gait abnormality, no headache, no sensory deficits.  PSYCHIATRIC: no known mental health issues. CONSTITUTIONAL: +malaise, weakness, chills  CARDIOVASCULAR: no chest pain and no edema.  RESPIRATORY: no chest pain, +SOB, +MOSQUEDA, +productive cough-yellow sputum  GASTROINTESTINAL: no abdominal pain, no nausea, no vomiting, no diarrhea  GENITOURINARY-no dysuria, no hematuria  MUSCULOSKELETAL: no back pain, no musculoskeletal pain, no neck pain  NEURO: no loss of consciousness, no gait abnormality, no headache, no sensory deficits.  PSYCHIATRIC: no known mental health issues. CONSTITUTIONAL: +malaise, weakness, chills  CARDIOVASCULAR: no chest pain and no edema.  RESPIRATORY: no chest pain, +SOB, +MOSQUEDA, +productive cough-yellow sputum  GASTROINTESTINAL: no abdominal pain, no nausea, no vomiting, no diarrhea,   GENITOURINARY-no dysuria, no hematuria  MUSCULOSKELETAL: no back pain, no musculoskeletal pain, no neck pain  NEURO: no loss of consciousness, no gait abnormality, no headache, no sensory deficits.  PSYCHIATRIC: no known mental health issues.

## 2017-04-13 NOTE — H&P ADULT - PROBLEM SELECTOR PLAN 1
admit to   continue BiPAP  continuous pulse ox  duonebs q6, solumedrol IV  f/u AM CBC, cultures  follow up repeat lactate  Pulm Consult-Miguel BERMEO Consult-Man admit to [facility]  continue BiPAP  continuous pulse ox  duonebs q6, solumedrol IV  f/u AM CBC, cultures  follow up repeat lactate  Pulm Consult-Miguel  ID Consult-Man admit   continue BiPAP  continuous pulse ox  duonebs q6, solumedrol IV  f/u AM CBC, cultures  follow up repeat lactate  Pulm Consult-Miguel BERMEO Consult-Man admit to Tele  continue BiPAP  continuous pulse ox  duonebs, solumedrol IV  f/u AM CBC, cultures  follow up repeat lactate  Pulm Consult-Miguel  ID Consult-Man admit to Tele- with Ac hypercapnic, hypoxic respiratory Failure  continue BiPAP  continuous pulse ox  Duoneb, solumedrol IV  f/u AM CBC, cultures  follow up repeat lacta is Normal now, Leukocytosis  Pulm Consult-Miguel, CT Angio  ID Consult-Man, ON IV Vanco, Azactam

## 2017-04-13 NOTE — ED ADULT NURSE NOTE - PMH
Anxiety    Borderline diabetes  no meds  Breast cancer  right s/p lumpectomy and radiation 2008  COPD (chronic obstructive pulmonary disease)  diagnosed 2009  inhaler and nebulizer  Depression    Diverticulitis  hospitalized 2013  Hiatal hernia  s/p surgical repair 2005  HTN (hypertension)    Hyperlipidemia    SIMÓN (obstructive sleep apnea)  category I severe pt does not use c/pap  PVD (peripheral vascular disease)  left iliac  s/p surgical intervention 7-2013  unsuccessful  RA (rheumatoid arthritis)  diagnosed 2012  oxycodone prn  neck/ lower back  Thrush  treated x 4 days  1 month ago  restarted medication  3-19-14 clortramazole 5x day  TIA (transient ischemic attack)  2010  TMJ (temporomandibular joint disorder)    Valvular heart disease  aortic and mitral

## 2017-04-13 NOTE — H&P ADULT - PROBLEM SELECTOR PLAN 2
7.3 on admission  follow up repeat lactate  given zosyn IV x1 in ED  ID Consult-Man 7.3 on admission  follow up repeat lactate  given Levaquin 500mg IV x1 in ED  ID Consult-Man 7.3 on admission  follow up repeat lactate  given Levaquin 500mg IV x1 in ED  Per pts daughter-due to pts hx of RA, not supposed to receive Quinolone abx due to risk of tendon rupture, pharmacy stated not a contraindication  ID Consult-Man 7.3 on admission  follow up repeat lactate  given Levaquin 500mg IV x1 in ED  Per pts daughter-due to pts hx of RA, not supposed to receive Quinolone abx due to risk of tendon rupture, pharmacy stated not a contraindication  ID Consult-Man, ON IV Vanco & Azactam

## 2017-04-13 NOTE — CONSULT NOTE ADULT - SUBJECTIVE AND OBJECTIVE BOX
PULMONARY CONSULT NOTE      JASMIN HANSEN  MRN-[id number]    Patient is a 76y old  Female who presents with a chief complaint of SOB (2017 01:22)  in bed  on BIPAP  awake and alert  accurate historian  sob for about a week  treated with z randa and steroids by pmd, no improvement  ex smoker  lives alone  has a dtr  no fever, no chills, no chest pain, positive sputum production        HISTORY OF PRESENT ILLNESS:    MEDICATIONS  (STANDING):  enoxaparin Injectable 40milliGRAM(s) SubCutaneous every 24 hours  aspirin enteric coated 81milliGRAM(s) Oral daily  simvastatin 20milliGRAM(s) Oral at bedtime  ARIPiprazole 2milliGRAM(s) Oral daily  docusate sodium 100milliGRAM(s) Oral three times a day  pantoprazole    Tablet 40milliGRAM(s) Oral before breakfast  verapamil SR 240milliGRAM(s) Oral daily  sodium chloride 0.9%. 1000milliLiter(s) IV Continuous <Continuous>  ALBUTerol/ipratropium for Nebulization 3milliLiter(s) Nebulizer every 6 hours  venlafaxine 75milliGRAM(s) Oral two times a day with meals  methylPREDNISolone sodium succinate Injectable 40milliGRAM(s) IV Push every 8 hours  doxycycline hyclate Capsule 100milliGRAM(s) Oral every 12 hours      MEDICATIONS  (PRN):  acetaminophen   Tablet 650milliGRAM(s) Oral every 6 hours PRN For Temp greater than 38 C (100.4 F)  acetaminophen   Tablet. 650milliGRAM(s) Oral every 6 hours PRN Mild Pain (1 - 3)  senna 2Tablet(s) Oral at bedtime PRN Constipation  oxyCODONE  5 mG/acetaminophen 325 mG 2Tablet(s) Oral every 4 hours PRN Severe Pain (7 - 10)  zolpidem 5milliGRAM(s) Oral at bedtime PRN Insomnia  tiotropium 18 MICROgram(s) Capsule 1Capsule(s) Inhalation daily PRN sob  ALBUTerol    90 MICROgram(s) HFA Inhaler 2Puff(s) Inhalation every 6 hours PRN Shortness of Breath and/or Wheezing      Allergies    penicillins (Hives)  quinolines (Other)    Intolerances        PAST MEDICAL & SURGICAL HISTORY:  TMJ (temporomandibular joint disorder)  Diverticulitis: hospitalized   SIMÓN (obstructive sleep apnea): category I severe pt does not use c/pap  Thrush: treated x 4 days  1 month ago  restarted medication  3-19-14 clortramazole 5x day  Anxiety  RA (rheumatoid arthritis): diagnosed   oxycodone prn  neck/ lower back  Depression  Borderline diabetes: no meds  COPD (chronic obstructive pulmonary disease): diagnosed   inhaler and nebulizer  HTN (hypertension)  PVD (peripheral vascular disease): left iliac  s/p surgical intervention   unsuccessful  Hiatal hernia: s/p surgical repair   Breast cancer: right s/p lumpectomy and radiation   TIA (transient ischemic attack): 2010  Hyperlipidemia  Valvular heart disease: aortic and mitral  PVD (peripheral vascular disease): s/p left iliac bypass which was unsuccessful  open repair  S/P carpal tunnel release: right   S/P lumpectomy, right breast:   Hiatal hernia: s/p surgical repair per pt   S/P hernia surgery: left inguinal age 12  S/P rotator cuff surgery: left   S/P appendectomy:   S/P  section: x2 /       FAMILY HISTORY:  No pertinent family history in first degree relatives      SOCIAL HISTORY  Smoking History: ex smoker    REVIEW OF SYSTEMS:    REVIEW OF SYSTEMS      General:	  anxious      Skin/Breast:  	  Ophthalmologic:  	  ENMT:	    Respiratory and Thorax: dyspnea      	  Cardiovascular:	    Gastrointestinal:	 dec appetite      Genitourinary:	    Musculoskeletal:  weakness  	    Neurological:	    Psychiatric:	  anxious    Hematology/Lymphatics:	    Endocrine:	    Allergic/Immunologic:	    Vital Signs Last 24 Hrs  T(C): 36.3, Max: 36.3 ( @ 00:15)  T(F): 97.4, Max: 97.4 ( @ 00:15)  HR: 118 (118 - 135)  BP: 134/78 (134/78 - 168/111)  BP(mean): --  RR: 27 (27 - 40)  SpO2: 100% (96% - 100%)    PHYSICAL EXAMINATION:  PHYSICAL EXAM:      Constitutional: wd wn    Eyes:    ENMT: at    Neck:    Breasts:    Back:    Respiratory: dec BS, occ rhonchi      Cardiovascular: s1s2    Gastrointestinal: soft    Genitourinary:    Rectal:    Extremities: no gross edema      Vascular:    Neurological: cn grossly int      Skin:    Lymph Nodes:    Musculoskeletal:    Psychiatric:          LABS:                        15.0   19.5  )-----------( 274      ( 2017 00:26 )             47.4         137  |  102  |  15  ----------------------------<  289<H>  4.1   |  22  |  0.87    Ca    8.5      2017 00:26    TPro  8.0  /  Alb  3.7  /  TBili  0.2  /  DBili  x   /  AST  107<H>  /  ALT  93<H>  /  AlkPhos  122<H>      PT/INR - ( 2017 00:26 )   PT: 10.8 sec;   INR: 0.99 ratio           Urinalysis Basic - ( 2017 01:49 )    Color: Yellow / Appearance: Clear / S.015 / pH: x  Gluc: x / Ketone: Negative  / Bili: Negative / Urobili: Negative   Blood: x / Protein: 500 mg/dL / Nitrite: Negative   Leuk Esterase: Negative / RBC: 0-2 /HPF / WBC Negative   Sq Epi: x / Non Sq Epi: Few / Bacteria: Occasional      ABG - ( 2017 02:25 )  pH: 7.37  /  pCO2: 44    /  pO2: 107   / HCO3: 24    / Base Excess: x     /  SaO2: 97                CARDIAC MARKERS ( 2017 00:26 )  .030 ng/mL / x     / 222 U/L / x     / 5.8 ng/mL        Serum Pro-Brain Natriuretic Peptide: 515 pg/mL ( @ 00:26)    Lactate, Blood: 7.3 mmol/L ( @ 00:26)        MICROBIOLOGY:    RADIOLOGY & ADDITIONAL STUDIES:

## 2017-04-13 NOTE — PROGRESS NOTE ADULT - PROBLEM SELECTOR PLAN 2
7.3 on admission, repeat lactate normal. Likely secondary to respiratory distress on admission   given Levaquin 500mg IV x1 in ED; now on azacatam and vanco, doxycycline added by pulm   ID Consult-Man  f/u cultures 7.3 on admission, repeat lactate normal. Likely secondary to respiratory distress on admission   given Levaquin 500mg IV x1 in ED; now on Azactam and vanco, doxycycline added by pulm   ID Consult-Man  f/u cultures

## 2017-04-13 NOTE — ED ADULT NURSE REASSESSMENT NOTE - NS ED NURSE REASSESS COMMENT FT1
pt aox4, sob, lungs clear, pulse ox 99% on bi pap, abd soft, + sounds, c m s. tachycardia, awaiting for TEL bed, ivl patent

## 2017-04-13 NOTE — H&P ADULT - NSHPPHYSICALEXAM_GEN_ALL_CORE
CONSTITUTIONAL: Well appearing, well nourished,  AAOx3 and in no apparent distress.   HEENT: PERRLA, EOMI, conjunctival erythema.    CARDIAC: Normal rate, regular rhythm.  Heart sounds S1, S2.  RESPIRATORY: Breath sounds clear and equal bilaterally.  GASTROINTESTINAL: Abdomen soft, non-tender, no guarding, +BS  MUSCULOSKELETAL: Spine appears normal, range of motion is not limited, no muscle or joint tenderness  NEUROLOGICAL: Alert and oriented, no focal deficits, no motor or sensory deficits.  SKIN: warm and dry  EXT: No clubbing, cyanosis, Edema.    PSYCHIATRIC: Normal mood, affect. CONSTITUTIONAL: Well appearing, well nourished,  AAOx3.  moderate distress due to pain, on BiPAP  HEENT: PERRLA, EOMI, conjunctival erythema.    CARDIAC: tachycardic, regular rhythm.  Heart sounds S1, S2.  RESPIRATORY: +wheezes b/l, diminished BS b/l Lower Lung fields  GASTROINTESTINAL: Abdomen soft, non-tender, no guarding, +BS  MUSCULOSKELETAL: Spine appears normal, range of motion is not limited, no muscle or joint tenderness  NEUROLOGICAL: Alert and oriented, no focal deficits, no motor or sensory deficits.  SKIN: warm and dry  EXT: No clubbing, cyanosis, Edema.    PSYCHIATRIC: Normal mood, affect. CONSTITUTIONAL: Well appearing, well nourished,  AAOx3.  moderate distress due to pain, on BiPAP  HEENT: PERRLA, EOMI, No conjunctival erythema.    CARDIAC: tachycardic, regular rhythm.  Heart sounds S1, S2.  RESPIRATORY: +wheezes b/l, diminished BS b/l Lower Lung fields  GASTROINTESTINAL: Abdomen soft, non-tender, no guarding, +BS, Distended  MUSCULOSKELETAL: Spine appears normal, range of motion is not limited, no muscle or joint tenderness  NEUROLOGICAL: Alert and oriented, no focal deficits, no motor or sensory deficits.  SKIN: warm and dry  EXT: No clubbing, cyanosis, Edema.    PSYCHIATRIC: Normal mood, affect.

## 2017-04-13 NOTE — H&P ADULT - NSHPOUTPATIENTPROVIDERS_GEN_ALL_CORE
CLAIRE: Cy  Cardio: Manuel PMD: Cy  Cardio: Manuel  Pulm: Regina PMD: Cy  Cardio: Manuel Wilson  Pulm: Regina

## 2017-04-13 NOTE — H&P ADULT - NEGATIVE GASTROINTESTINAL SYMPTOMS
no constipation/no melena/no nausea/no change in bowel habits/no abdominal pain/no diarrhea/no vomiting

## 2017-04-13 NOTE — PROGRESS NOTE ADULT - PROBLEM SELECTOR PLAN 3
currently tachycardic, per pt baseline hr =100,   not on AC  c/w [id number]  c/w verapamil c hold parameters  monitor hr, uncontrolled likely secondary to copd exacerbation/sob currently tachycardic, per pt baseline hr =100,   Cardio -Dr García d/w  c/w ASA81  c/w verapamil

## 2017-04-13 NOTE — H&P ADULT - ATTENDING COMMENTS
Pt seen,examined in Er, case & care plan d/w residents, pt ,cardio Dr García & Pulmonary Dr Rivera at detail.

## 2017-04-13 NOTE — ED ADULT NURSE NOTE - OBJECTIVE STATEMENT
patient awake but lethargic, oriented x 4 c/o difficulty breathing.  patient cool to the touch and diaphoretic, placed immediately on CPAP, respiratory at bedside upon arrival to ED.  patient tachypneic, and tachycardic, placed on continuous cardiac monitoring.  patient tolerating CPAP well, RN will continue ot monitor patient closely.

## 2017-04-13 NOTE — H&P ADULT - NSHPSOCIALHISTORY_GEN_ALL_CORE
Social History:    Marital Status:  (   )    (   ) Single    (   )    (  )   Occupation:   Lives with: (  ) alone  (  ) children   (  ) spouse   (  ) parents  (  ) other    Substance Use (street drugs): (  ) never used  (  ) other:  Tobacco Usage:  (   ) never smoked   (   ) former smoker   (   ) current smoker  (     ) pack year  (        ) last cigarette date  Alcohol Usage:  Sexual History:     Health Management     For female:   Last Mammo: (     ) No  (    ) Yes  (    ) Normal  (    ) Date   Last Pap: (     ) No  (    ) Yes  (    ) Normal   (    ) Date       Immunization Hx:   (  ) flu shot                               (     ) date   (  ) pneumonia shot               (     ) date  (  ) tetanus                               (     ) date     (     ) Advanced Directives: (     ) None    (      ) DNR    (     ) DNI    (     ) Health Care Proxy: Social History:    Marital Status:  (   )    (   ) Single    (   )    ( x )   Occupation:   Lives with: (  x) alone  (  ) children   (  ) spouse   (  ) parents  (  ) other    Substance Use (street drugs): (x  ) never used  (  ) other:  Tobacco Usage:  (   ) never smoked   ( x  ) former smoker   (   ) current smoker  (  81   ) pack year  (  10 years ago      ) last cigarette date  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      Health Management     For female:   Last Mammo: (     ) No  (  ) Yes  (   ) Normal  (    ) Date   Last Pap: (     ) No  (    ) Yes  (    ) Normal   (    ) Date       Immunization Hx:   (  ) flu shot                               (     ) date   (  ) pneumonia shot               (     ) date  (  ) tetanus                               (     ) date     (     ) Advanced Directives: (   x  ) None    (      ) DNR    (     ) DNI    (     ) Health Care Proxy: Social History:    Marital Status:  (   )    (   ) Single    (   )    ( x )   Occupation:   Lives with: (  x) alone  (  ) children   (  ) spouse   (  ) parents  (  ) other    Substance Use (street drugs): (x  ) never used  (  ) other:  Tobacco Usage:  (   ) never smoked   ( x  ) former smoker   (   ) current smoker  (  81   ) pack year  (  10 years ago      ) last cigarette date  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

## 2017-04-13 NOTE — H&P ADULT - PROBLEM SELECTOR PLAN 8
continue abilify, effexor  hold pts home med-klonopin in setting of hypoxia continue Abilify, Effexor  - home med- Klonopin 2x day

## 2017-04-13 NOTE — PROGRESS NOTE ADULT - SUBJECTIVE AND OBJECTIVE BOX
76F with extensive PMHx as below, BIBA for respiratory distress. Pt found to be in hypercarbic respiratory failure and placed on Bipap support. Pt given Steroids/abx and lasix in ED. Pt seen and evaluated in ED at bedside. Remains tachypneic, mild distress. Pt states breathing improved since placed on Bipap. Pt states she has had yellow productive cough for several days and was [facility] with z-pack[facility]. ICU consulted for further evaluation and possible treatment (2017 01:22)      PAST MEDICAL & SURGICAL HISTORY:  TMJ (temporomandibular joint disorder)  Diverticulitis: hospitalized   SIMÓN (obstructive sleep apnea): category I severe pt does not use c/pap  Thrush: treated x 4 days  1 month ago  restarted medication  3-19-14 clortramazole 5x day  Anxiety  RA (rheumatoid arthritis): diagnosed   oxycodone prn  neck/ lower back  Depression  Borderline diabetes: no meds  COPD (chronic obstructive pulmonary disease): diagnosed   inhaler and nebulizer  HTN (hypertension)  PVD (peripheral vascular disease): left iliac  s/p surgical intervention   unsuccessful  Hiatal hernia: s/p surgical repair   Breast cancer: right s/p lumpectomy and radiation   TIA (transient ischemic attack): 2010  Hyperlipidemia  Valvular heart disease: aortic and mitral  PVD (peripheral vascular disease): s/p left iliac bypass which was unsuccessful  open repair  S/P carpal tunnel release: right 2002  S/P lumpectomy, right breast: 2008  Hiatal hernia: s/p surgical repair per pt   S/P hernia surgery: left inguinal age 12  S/P rotator cuff surgery: left   S/P appendectomy:   S/P  section: x2 /       Review of Systems:  +cough  +SOB  +Fever  No CP  No N/V/D    T(F): 97.4, Max: 97.4 (-13 @ 00:15)  HR: 135 (133 - 135)  BP: 168/111 (168/111 - 168/111)  RR: 36 (36 - 40)  SpO2: 96% (96% - 96%)  Wt(kg): --        CAPILLARY BLOOD GLUCOSE      I&O's Summary      Physical Exam:     Gen: distress  Neuro: A&Ox3, non-focal  HEENT: NC/AT  Resp: Rhonchorus bilateraly  CVS: nl S1/S2, tachy  Abd: soft, nt, nd, +bs  Ext: no edema, +pulses  Skin: well perfused, warm    Meds:  levoFLOXacin IVPB IV Intermittent  sodium chloride 0.9% Bolus IV Bolus  Steroids  Lasix                          15.0   19.5  )-----------( 274      ( 2017 00:26 )             47.4     Bands 2.0        137  |  102  |  15  ----------------------------<  289<H>  4.1   |  22  |  0.87    Ca    8.5      2017 00:26    TPro  8.0  /  Alb  3.7  /  TBili  0.2  /  DBili  x   /  AST  107<H>  /  ALT  93<H>  /  AlkPhos  122<H>      Lactate 7.3            @ 00:26      CARDIAC MARKERS ( 2017 00:26 )  .030 ng/mL / x     / 222 U/L / x     / 5.8 ng/mL      PT/INR - ( 2017 00:26 )   PT: 10.8 sec;   INR: 0.99 ratio                     Radiology: CXR: Hyperinflated lungs bilaterally. Bibasilar patchy infiltrates. No effusions/ptx    TORRES: Yes      GLOBAL ISSUE/BEST PRACTICE:  Analgesia:n/a  Sedation:n/a  HOB elevation: yes  Stress ulcer prophylaxis:yes  VTE prophylaxis:yes  Glycemic control:yes  Nutrition:npo      CODE STATUS: Full Code  GOC discussion: Y  Critical care time spent (mins): 105  (Reviewing data, imaging, discussing with multidisciplinary team, non inclusive of procedures, discussing goals of care with patient/family)

## 2017-04-13 NOTE — H&P ADULT - PROBLEM SELECTOR PLAN 7
continue with pain meds per home routine-hydrocodone home meds-oxycodone 10/325 TID prn home meds-oxycodone 10/325 TID prn hold for sedation/lethargy

## 2017-04-13 NOTE — PROGRESS NOTE ADULT - SUBJECTIVE AND OBJECTIVE BOX
Patient is a 76y old  Female who presents with a chief complaint of SOB (2017 01:22)      INTERVAL HPI:  Pt seen and examined. Pt sitting in bed on BIPAP. Pt admits to improved SOB from presentation to ED. However, pt still has sob on exertion and a dry cough. Denies chest pain, palpitation, headache, dizziness, abdominal pain, n/v/d/c.     OVERNIGHT EVENTS: none    MEDICATIONS  (STANDING):  enoxaparin Injectable 40milliGRAM(s) SubCutaneous every 24 hours  aspirin enteric coated 81milliGRAM(s) Oral daily  simvastatin 20milliGRAM(s) Oral at bedtime  ARIPiprazole 2milliGRAM(s) Oral daily  docusate sodium 100milliGRAM(s) Oral three times a day  pantoprazole    Tablet 40milliGRAM(s) Oral before breakfast  verapamil SR 240milliGRAM(s) Oral daily  sodium chloride 0.9%. 1000milliLiter(s) IV Continuous <Continuous>  venlafaxine 75milliGRAM(s) Oral two times a day with meals  methylPREDNISolone sodium succinate Injectable 40milliGRAM(s) IV Push every 8 hours  doxycycline hyclate Capsule 100milliGRAM(s) Oral every 12 hours  lactobacillus acidophilus 1Tablet(s) Oral three times a day with meals  aztreonam  IVPB  IV Intermittent   vancomycin  IVPB  IV Intermittent   aztreonam  IVPB 1000milliGRAM(s) IV Intermittent every 8 hours    MEDICATIONS  (PRN):  acetaminophen   Tablet 650milliGRAM(s) Oral every 6 hours PRN For Temp greater than 38 C (100.4 F)  acetaminophen   Tablet. 650milliGRAM(s) Oral every 6 hours PRN Mild Pain (1 - 3)  senna 2Tablet(s) Oral at bedtime PRN Constipation  oxyCODONE  5 mG/acetaminophen 325 mG 2Tablet(s) Oral every 4 hours PRN Severe Pain (7 - 10)  zolpidem 5milliGRAM(s) Oral at bedtime PRN Insomnia  tiotropium 18 MICROgram(s) Capsule 1Capsule(s) Inhalation daily PRN sob  ALBUTerol    90 MICROgram(s) HFA Inhaler 2Puff(s) Inhalation every 6 hours PRN Shortness of Breath and/or Wheezing  levalbuterol Inhalation 0.63milliGRAM(s) Inhalation every 8 hours PRN shortness of breath      Allergies    penicillins (Hives)  quinolines (Other)    Intolerances        REVIEW OF SYSTEMS:  CONSTITUTIONAL: No fever, No chills, No fatigue, No myalgia, No Body ache  EYES: No eye pain, visual disturbances, or discharge  ENMT:  No ear pain, No nose bleed, No vertigo; No sinus or throat pain,No Congestion  NECK: No pain, No stiffness  RESPIRATORY: Positive dry cough, and wheezing, and sob on exertion . No  hemoptysis;   CARDIOVASCULAR: No chest pain, palpitations  GASTROINTESTINAL: No abdominal or epigastric pain. No nausea, No vomiting; No diarrhea or constipation.   GENITOURINARY: No dysuria, No frequency, No urgency, No hematuria, or incontinence  NEUROLOGICAL: No headaches, No dizziness, No numbness, No tingling, No tremors, No weakness  EXT: No Swelling, No Pain, No Edema  SKIN:   No itching, burning, rashes, or lesions   MUSCULOSKELETAL: No joint pain or swelling; No muscle pain, No back pain, No extremity pain  PSYCHIATRIC: No depression, anxiety, mood swings, or difficulty sleeping  PAIN SCALE: [ ] None  [ ] Other-  ROS Unable to obtain due to - [ ] Dementia  [ ] Lethargy  REST OF REVIEW Of SYSTEM - [ ] Normal     Vital Signs Last 24 Hrs  T(C): 36.8, Max: 36.8 (- @ 07:25)  T(F): 98.2, Max: 98.2 (- @ 07:25)  HR: 114 (114 - 135)  BP: 114/60 (114/60 - 168/111)  BP(mean): --  RR: 22 (22 - 40)  SpO2: 99% (96% - 100%)    PHYSICAL EXAM:  GENERAL:  [x ]NAD , [x ] well appearing, [ ] Agitated, [ ] Lethargy, [ ] confused   HEAD:  [ x] Normal, [ ] Other  EYES:  [ x] EOMI, [x ] PERRLA, [x ] conjunctiva and sclera clear normal, [ ] Other,  [ ] Pallor,[ ] Discharge  ENMT:  [ ] Normal, [ ] Moist mucous membranes, [ ] Good dentition, [ ] No Thrush  NECK:  [ ] Supple, [ ] No JVD, [ ] Normal thyroid, [ ]LAD  NERVOUS SYSTEM:  [x ] Alert & Oriented X3, [ ]Confusion [ ] Nonfocal [ ] Encephalopathic [ ] Sedated [ ] Other-  CHEST/LUNG:  [ ] Clear to auscultation bilaterally, [x ] No rales,[x ] No rhonchi [x ]  positive wheezing  HEART:  [ ]Regular rate and rhythm [ ] irregular [x ] No murmurs, rubs, or gallops, [ ] PPM in place (Mfr:  ) [x] regular, tachycardic   ABDOMEN:  [x ] Soft, [x ] Nontender, [x ] Nondistended; [x ]No mass, [x ] Bowel sounds present, [ ] obese  EXTREMITIES: [x ] 2+ Peripheral Pulses, No clubbing,no cyanosis, no edema [ ] edema, [ ] PVD stasis skin changes  LYMPH: No lymphadenopathy noted  SKIN:  [x ] No rashes or lesions, [ ] Pressure Ulcers    DIET:     LABS:                        13.3   13.9  )-----------( 213      ( 2017 06:35 )             40.6     2017 06:35    139    |  106    |  14     ----------------------------<  197    3.8     |  25     |  0.60     Ca    8.1        2017 06:35  Phos  2.2       2017 06:35  Mg     2.4       2017 06:35    TPro  8.0    /  Alb  3.7    /  TBili  0.2    /  DBili  x      /  AST  107    /  ALT  93     /  AlkPhos  122    2017 00:26    PT/INR - ( 2017 00:26 )   PT: 10.8 sec;   INR: 0.99 ratio           Urinalysis Basic - ( 2017 01:49 )    Color: Yellow / Appearance: Clear / S.015 / pH: x  Gluc: x / Ketone: Negative  / Bili: Negative / Urobili: Negative   Blood: x / Protein: 500 mg/dL / Nitrite: Negative   Leuk Esterase: Negative / RBC: 0-2 /HPF / WBC Negative   Sq Epi: x / Non Sq Epi: Few / Bacteria: Occasional      CAPILLARY BLOOD GLUCOSE            RECENT CULTURES:        RESPIRATORY CULTURES:      cardiac Enzyme:   @ 00:26    CK:  222    CKMB:  --    CPK Mass Assay:  2.6    troponin i:  .030    BNP: 515        Anemia panel:          RADIOLOGY & ADDITIONAL TESTS:      HEALTH ISSUES - PROBLEM Dx:  Anxiety: Anxiety  Need for prophylactic measure: Need for prophylactic measure  Hiatal hernia: Hiatal hernia  RA (rheumatoid arthritis): RA (rheumatoid arthritis)  PVD (peripheral vascular disease): PVD (peripheral vascular disease)  SIMÓN (obstructive sleep apnea): SIMÓN (obstructive sleep apnea)  Hyperlipidemia: Hyperlipidemia  HTN (hypertension): HTN (hypertension)  Valvular heart disease: Valvular heart disease  Lactate blood increase: Lactate blood increase  COPD exacerbation: COPD exacerbation          Consultant(s) Notes Reviewed:  [x ] YES  [ ] NO    Care Discussed with [X] Consultants  [ ] Patient  [ ] Family  [ ]    [ ] Social Service  [ ] Other; RN  DVT PPX:  Advanced directive: [ ] None, [ ] DNR/DNI  Discussed with pt [ ] Patient is a 76y old  Female who presents with a chief complaint of SOB (2017 01:22)      INTERVAL HPI:Patient is a 76-year-old female with a past medical history of COPD, hypertension, hyperlipidemia, diverticulitis, depression, Rt breast cancer, status post lumpectomy and radiation , anxiety, hiatal hernia-status post surgical repair in , obstructive sleep apnea (not on BiPAP at home), PVD, rheumatoid arthritis, thrush, TIA (), TMJ, valvular heart disease (aortic and mitral),, H/O Tissu AVR , borderline diabetes (not on medications), presents to ED via EMS from home with chief complaint of difficulty breathing. Daughter at bedside providing additional history. Daughter states that for the past three days patient has been feeling increasing shortness of breath. Patient periodically (around monthly) goes to PMD–Dr. Benoit for steroids and antibiotics and symptoms resolve with taking meds.  Pt was seen in office this week and given z pack, steroids. Per patient she has been taking medications, however wheezing and cough have increased, cough is productive with yellowish sputum. Tonight pt was diaphoretic and shortness of breath became so bad that daughter called EMS to bring to ED.  Pt unsure if she had fever.  Pt denies CP, palpitations, change in vision, decreased PO intake, abd pain, n/v/d, dysuria, HA, numbness, tingling.  Denies sick contact/recent travel.  Similar symptoms in past with COPD exacerbation but none this severe. pt on BiPAP, IV Abx, CT Angio -NO PE      Pt seen and examined. Pt sitting in bed on BIPAP. Pt admits to improved SOB from presentation to ED. However, pt still has sob on exertion and a dry cough. Denies chest pain, palpitation, headache, dizziness, abdominal pain, n/v/d/c.     OVERNIGHT EVENTS: none    MEDICATIONS  (STANDING):  enoxaparin Injectable 40milliGRAM(s) SubCutaneous every 24 hours  aspirin enteric coated 81milliGRAM(s) Oral daily  simvastatin 20milliGRAM(s) Oral at bedtime  ARIPiprazole 2milliGRAM(s) Oral daily  docusate sodium 100milliGRAM(s) Oral three times a day  pantoprazole    Tablet 40milliGRAM(s) Oral before breakfast  verapamil SR 240milliGRAM(s) Oral daily  sodium chloride 0.9%. 1000milliLiter(s) IV Continuous <Continuous>  venlafaxine 75milliGRAM(s) Oral two times a day with meals  methylPREDNISolone sodium succinate Injectable 40milliGRAM(s) IV Push every 8 hours  doxycycline hyclate Capsule 100milliGRAM(s) Oral every 12 hours  lactobacillus acidophilus 1Tablet(s) Oral three times a day with meals  aztreonam  IVPB  IV Intermittent   vancomycin  IVPB  IV Intermittent   aztreonam  IVPB 1000milliGRAM(s) IV Intermittent every 8 hours    MEDICATIONS  (PRN):  acetaminophen   Tablet 650milliGRAM(s) Oral every 6 hours PRN For Temp greater than 38 C (100.4 F)  acetaminophen   Tablet. 650milliGRAM(s) Oral every 6 hours PRN Mild Pain (1 - 3)  senna 2Tablet(s) Oral at bedtime PRN Constipation  oxyCODONE  5 mG/acetaminophen 325 mG 2Tablet(s) Oral every 4 hours PRN Severe Pain (7 - 10)  zolpidem 5milliGRAM(s) Oral at bedtime PRN Insomnia  tiotropium 18 MICROgram(s) Capsule 1Capsule(s) Inhalation daily PRN sob  ALBUTerol    90 MICROgram(s) HFA Inhaler 2Puff(s) Inhalation every 6 hours PRN Shortness of Breath and/or Wheezing  levalbuterol Inhalation 0.63milliGRAM(s) Inhalation every 8 hours PRN shortness of breath      Allergies    penicillins (Hives)  quinolines (Other)          REVIEW OF SYSTEMS:  CONSTITUTIONAL: No fever, No chills, No fatigue, No myalgia, No Body ache  EYES: No eye pain, visual disturbances, or discharge  ENMT:  No ear pain, No nose bleed, No vertigo; No sinus or throat pain,No Congestion  NECK: No pain, No stiffness  RESPIRATORY: Positive dry cough, and wheezing, and sob on exertion . No  hemoptysis;   CARDIOVASCULAR: No chest pain, palpitations  GASTROINTESTINAL: No abdominal or epigastric pain. No nausea, No vomiting; No diarrhea or constipation.   GENITOURINARY: No dysuria, No frequency, No urgency, No hematuria, or incontinence  NEUROLOGICAL: No headaches, No dizziness, No numbness, No tingling, No tremors, No weakness  EXT: No Swelling, No Pain, No Edema  SKIN:   No itching, burning, rashes, or lesions   MUSCULOSKELETAL: No joint pain or swelling; No muscle pain, No back pain, No extremity pain  PSYCHIATRIC: No depression, anxiety, mood swings, or difficulty sleeping  PAIN SCALE: [x ] None  [ ] Other-  ROS Unable to obtain due to - [ ] Dementia  [ ] Lethargy  REST OF REVIEW Of SYSTEM - [x ] Normal     Vital Signs Last 24 Hrs  T(C): 36.8, Max: 36.8 ( @ 07:25)  T(F): 98.2, Max: 98.2 ( @ 07:25)  HR: 114 (114 - 135)  BP: 114/60 (114/60 - 168/111)  BP(mean): --  RR: 22 (22 - 40)  SpO2: 99% (96% - 100%)    PHYSICAL EXAM:  GENERAL:  [x ]NAD , [x ] well appearing, [ ] Agitated, [ ] Lethargy, [ ] confused   HEAD:  [ x] Normal, [ ] Other  EYES:  [ x] EOMI, [x ] PERRLA, [x ] conjunctiva and sclera clear normal, [ ] Other,  [ ] Pallor,[ ] Discharge  ENMT:  [x ] Normal, [ ] Moist mucous membranes, [ ] Good dentition, [ ] No Thrush, ON Bi PAP  NECK:  [ ] Supple, [ ] No JVD, [x ] Normal thyroid, [ ]LAD  NERVOUS SYSTEM:  [x ] Alert & Oriented X3, [ ]Confusion [ x] Nonfocal [ ] Encephalopathic [ ] Sedated [ ] Other-  CHEST/LUNG:  [ ] Clear to auscultation bilaterally, [x ] No rales,[x ] No rhonchi [x ]  positive wheezing, BS decreased b/l lungs  HEART:  [x ]Regular rate and rhythm [ ] irregular [x ] No murmurs, rubs, or gallops, [ ] PPM in place (Mfr:  ) [x] regular, tachycardic   ABDOMEN:  [x ] Soft, [x ] Nontender, [x ] Nondistended; [x ]No mass, [x ] Bowel sounds present, [ ] obese, + Distended abdomen  EXTREMITIES: [x ] 2+ Peripheral Pulses, No clubbing,no cyanosis, no edema [ ] edema, [ ] PVD stasis skin changes  LYMPH: No lymphadenopathy noted  SKIN:  [x ] No rashes or lesions, [ ] Pressure Ulcers    DIET: cardiac    LABS:                        13.3   13.9  )-----------( 213      ( 2017 06:35 )             40.6     2017 06:35    139    |  106    |  14     ----------------------------<  197    3.8     |  25     |  0.60     Ca    8.1        2017 06:35  Phos  2.2       2017 06:35  Mg     2.4       2017 06:35    TPro  8.0    /  Alb  3.7    /  TBili  0.2    /  DBili  x      /  AST  107    /  ALT  93     /  AlkPhos  122    2017 00:26    PT/INR - ( 2017 00:26 )   PT: 10.8 sec;   INR: 0.99 ratio           Urinalysis Basic - ( 2017 01:49 )    Color: Yellow / Appearance: Clear / S.015 / pH: x  Gluc: x / Ketone: Negative  / Bili: Negative / Urobili: Negative   Blood: x / Protein: 500 mg/dL / Nitrite: Negative   Leuk Esterase: Negative / RBC: 0-2 /HPF / WBC Negative   Sq Epi: x / Non Sq Epi: Few / Bacteria: Occasional          cardiac Enzyme:   @ 00:26    CK:  222    CKMB:  --    CPK Mass Assay:  2.6    troponin i:  .030    BNP: 515            RADIOLOGY & ADDITIONAL TESTS:CTA chest.    Axial images coronal sagittal reformats, MIP images.  63 cc Omnipaque 350 injected intravenously.  Satisfactory contrast bolus.  No pulmonary emboli. Atherosclerotic thoracic aorta with ectatic   ascending thoracic aorta up to 4.1 cm. Central airways patent. No   mediastinal adenopathy.  A few scattered blebs.  Bilateral predominantly basilar interlobular septal thickening consistent   with interstitial edema. Dependent airspace and groundglass opacities at   the lung bases may represent a combination of aspiration pneumonia and   dependent congestion. Correlate clinically.  Very small bilateral layering pleural effusions. Mild cardiomegaly. No   pericardial effusion. Status post median sternotomy cardiac valve surgery  Reflux of contrast into hepatic veins and IVC may reflect right heart   strain.  Visualized upper abdomen not remarkable.  Right breast calcifications and clips. There is a spiculated density   lateral right breast may represent scar or neoplasm.  Correlate clinically and with dedicated breast imaging.  No acute skeletal abnormality.    Impression:    Negative for pulmonary emboli.  Interstitial edema.  Very small bilateral effusions.  Dependent groundglass and airspace opacities at the lung bases may   represent combination of pneumonia and dependent congestion. Correlate   clinically.  Ectatic ascending thoracic aorta.  Spiculated opacity right breast, scar versus neoplasm. Correlate   clinically and with breast imaging.        HEALTH ISSUES - PROBLEM Dx:  Anxiety: Anxiety  Need for prophylactic measure: Need for prophylactic measure  Hiatal hernia: Hiatal hernia  RA (rheumatoid arthritis): RA (rheumatoid arthritis)  PVD (peripheral vascular disease): PVD (peripheral vascular disease)  SIMÓN (obstructive sleep apnea): SIMÓN (obstructive sleep apnea)  Hyperlipidemia: Hyperlipidemia  HTN (hypertension): HTN (hypertension)  Valvular heart disease: Valvular heart disease  Lactate blood increase: Lactate blood increase  COPD exacerbation: COPD exacerbation          Consultant(s) Notes Reviewed:  [x ] YES  [ ] NO    Care Discussed with [X] Consultants  [x ] Patient  [x ] Family  [ ]    [ ] Social Service  [ x] Other; RN  DVT PPX:As above  Advanced directive: [x ] None, [ ] DNR/DNI  Discussed with pt [ ]

## 2017-04-13 NOTE — H&P ADULT - HISTORY OF PRESENT ILLNESS
75 y/o F PMH Patient is a 76-year-old female with a past medical history of COPD, hypertension, hyperlipidemia, diverticulitis, depression, breast cancer, status post lumpectomy and radiation 2008, anxiety, hiatal hernia-status post surgical repair in 2005, obstructive sleep apnea (not on BiPAP at home), PVD, rheumatoid arthritis, thrush, TIA (2010), TMJ, valvular heart disease (aortic and mitral), borderline diabetes (not on medications), presents to ED via EMS from home with chief complaint of difficulty breathing. Daughter at bedside providing additional history. Daughter states that for the past three days patient has been feeling increasing shortness of breath. Patient periodically (around monthly) goes to PMD–Dr. Benoit for steroids and antibiotics and symptoms resolve with taking meds.  Pt was seen in office this week and given z pack, steroids. Per patient she has been taking medications, however wheezing and cough have increased, cough is productive with yellowish sputum. Tonight pt was diaphoretic and shortness of breath became so bad that daughter called EMS to bring to ED.  Pt unsure if she had fever.  Pt denies CP, palpitations, change in vision, decreased PO intake, abd pain, n/v/d, dysuria, HA, numbness, tingling.  Denies sick contact/recent travel.  Similar symptoms in past with COPD exacerbation but none this severe.       In the ED – patient received levaquin 500mg iv, Solu-Medrol 125 mg IV, NS 1L bolusx1 Combivent nebulizer, was placed on BiPAP 12/6/PEEP5, 50.  ABG-  pH – 7.26, PCO2 – 52, PO2 – 87, bicarb – 21, bases excess: –3.4, FiO2 50, O2 sat 94%   labs significant for – WBC – 19.5, hematocrit – 47.4, glucose – 289, alkaline phosphatase 122, AST – 107, AL T – 93, lactate – 7.3, CK – 222, CKMB – 5.8, pro BNP – 515,   UA – protein – 500, blood – small, bacteria – occasional, glucose – 100    ICU PA was consulted. Patient is a 76-year-old female with a past medical history of COPD, hypertension, hyperlipidemia, diverticulitis, depression, Rt breast cancer, status post lumpectomy and radiation 2008, anxiety, hiatal hernia-status post surgical repair in 2005, obstructive sleep apnea (not on BiPAP at home), PVD, rheumatoid arthritis, thrush, TIA (2010), TMJ, valvular heart disease (aortic and mitral),, H/O AVR, borderline diabetes (not on medications), presents to ED via EMS from home with chief complaint of difficulty breathing. Daughter at bedside providing additional history. Daughter states that for the past three days patient has been feeling increasing shortness of breath. Patient periodically (around monthly) goes to PMD–Dr. Benoit for steroids and antibiotics and symptoms resolve with taking meds.  Pt was seen in office this week and given z pack, steroids. Per patient she has been taking medications, however wheezing and cough have increased, cough is productive with yellowish sputum. Tonight pt was diaphoretic and shortness of breath became so bad that daughter called EMS to bring to ED.  Pt unsure if she had fever.  Pt denies CP, palpitations, change in vision, decreased PO intake, abd pain, n/v/d, dysuria, HA, numbness, tingling.  Denies sick contact/recent travel.  Similar symptoms in past with COPD exacerbation but none this severe.       In the ED – patient received levaquin 500mg iv, Solu-Medrol 125 mg IV, NS 1L bolusx1 Combivent nebulizer, was placed on BiPAP 12/6/PEEP5, 50.  ABG-  pH – 7.26, PCO2 – 52, PO2 – 87, bicarb – 21, bases excess: –3.4, FiO2 50, O2 sat 94% , pt was put on Bi PAP in ER.  labs significant for – WBC – 19.5, hematocrit – 47.4, glucose – 289, alkaline phosphatase 122, AST – 107, AL T – 93, lactate – 7.3, CK – 222, CKMB – 5.8, pro BNP – 515,   UA – protein – 500, blood – small, bacteria – occasional, glucose – 100    ICU PA was consulted who deemed pt stable for TELE.

## 2017-04-13 NOTE — PROGRESS NOTE ADULT - ASSESSMENT
76-year-old female with a past medical history of COPD, hypertension, hyperlipidemia, diverticulitis, depression, breast cancer, status post lumpectomy and radiation 2008, anxiety, hiatal hernia-status post surgical repair in 2005, obstructive sleep apnea (not on BiPAP at home), PVD, rheumatoid arthritis, thrush, TIA (2010), TMJ, valvular heart disease (aortic and mitral), borderline diabetes (not on medications), presents to ED via EMS from home with chief complaint of difficulty breathing admitted for acute on chronic COPD exacerbation, elevated lactate 76-year-old female with a past medical history of COPD, hypertension, hyperlipidemia, diverticulitis, depression, breast cancer, status post lumpectomy and radiation 2008, anxiety, hiatal hernia-status post surgical repair in 2005, obstructive sleep apnea (not on BiPAP at home), PVD, rheumatoid arthritis, thrush, TIA (2010), TMJ, valvular heart disease (aortic and mitral), tis acute Respiratory failure acute on chronic COPD exacerbation, elevated lactate

## 2017-04-13 NOTE — H&P ADULT - NSHPLABSRESULTS_GEN_ALL_CORE
15.0   19.5  )-----------( 274      ( 13 Apr 2017 00:26 )             47.4       04-13    137  |  102  |  15  ----------------------------<  289<H>  4.1   |  22  |  0.87    Ca    8.5      13 Apr 2017 00:26    TPro  8.0  /  Alb  3.7  /  TBili  0.2  /  DBili  x   /  AST  107<H>  /  ALT  93<H>  /  AlkPhos  122<H>  04-13      CAPILLARY BLOOD GLUCOSE      I&O's Summary      PT/INR - ( 13 Apr 2017 00:26 )   PT: 10.8 sec;   INR: 0.99 ratio

## 2017-04-13 NOTE — H&P ADULT - RS GEN PE MLT RESP DETAILS PC
no rales/respirations non-labored/no wheezes/no chest wall tenderness/diminished breath sounds, R/diminished breath sounds, L/no rhonchi

## 2017-04-13 NOTE — ED PROVIDER NOTE - MEDICAL DECISION MAKING DETAILS
76 female in respiratory distress, f/u sepsis labs, cardiac enzymes, ekg, chest xray, start BIBAP, already given steroids, start magnesium, antibitotics

## 2017-04-13 NOTE — CONSULT NOTE ADULT - PROBLEM SELECTOR RECOMMENDATION 9
on BIPAP now  will re assess for night time treatment once copd ex is better
consistent diagnosis with this clinical presentation and with abx intolerances would agree with Vanco/Aztreonam.  Consider additional imaging with bibasilar localization and concerning for confusion with cardiac etiologies I context of associated COPD exac

## 2017-04-13 NOTE — PROGRESS NOTE ADULT - PROBLEM SELECTOR PLAN 1
ABG reviewed - improved on BIPAP  Pulm added Doxy 100 mg po BID for 7 days  f/u CT Chest per pulm, CXR prelim review appears abnormal with bilateral lower lobe infiltrates / airspace disease and possible atelectasis  monitor WBC, current leukocytosis prob a combination of steroids effect and possible lower resp tract infection  continue BiPAP, monitor oxygen and keep >90, continuous pulse ox  xopenex standing, solumedrol IV, spiriva  f/u cultures, crp  ID Consult-Man ABG reviewed - improved on BIPAP, CTA - NEG PE  Pulm added Doxy 100 mg po BID for 7 days  f/u CT Chest per pulm, CXR prelim review appears abnormal with bilateral lower lobe infiltrates / airspace disease and possible atelectasis  monitor WBC, current leukocytosis prob a combination of steroids effect and possible lower resp tract infection  continue BiPAP, monitor oxygen and keep >90, continuous pulse ox  xopenex standing, solumedrol IV, spiriva, IV Steroids 40 mg q 8 hrs  f/u cultures,  ID Consult-Man, continue IV Vanco,Azactam,

## 2017-04-13 NOTE — CONSULT NOTE ADULT - PROBLEM SELECTOR RECOMMENDATION 2
nebs  spiriva  o2 support  keep sat > 90 pct  ABG reviewed - better  check CRP  check Sputum Cx  add Doxy 100 mg po BID for 7 days  check CT Chest, CXR prelim review appears abnormal with bilateral lower lobe infiltrates / airspace disease and possible atelectasis  monitor WBC, current leukocytosis prob a combination of steroids effect and possible lower resp tract infection  ID eval pending  will follow
-bronchodilators, steroids and antibiotics appropriate even if concerns for PNA were not present.       Thank you for consulting us and involving us in the management of this most interesting and challenging case.     We will follow along in the care of this patient.

## 2017-04-13 NOTE — H&P ADULT - PSH
Hiatal hernia  s/p surgical repair per pt   PVD (peripheral vascular disease)  s/p left iliac bypass which was unsuccessful  open repair  S/P appendectomy  2001  S/P carpal tunnel release  right 2002  S/P  section  x2 1965/   S/P hernia surgery  left inguinal age 12  S/P lumpectomy, right breast  2008  S/P rotator cuff surgery  left 2004

## 2017-04-13 NOTE — ED PROVIDER NOTE - OBJECTIVE STATEMENT
76 female presents to ER by ambulance with report of respiratory distress, from home, has been having cough and congestion for the past 3 days, was placed on z-pack, worsening today, increased wheezing and cough, difficulty breathing. Received combivent nebulizer, solumedrol 125mg IV, o2 at 92% on non rebreather.  Cardio- dr. Jackson  PMD- [place]

## 2017-04-13 NOTE — CONSULT NOTE ADULT - ASSESSMENT
76-year-old white female who is status post aortic valve replacement 3 years ago with peripheral vascular disease. She has COPD and presents with an exacerbation with cough, increasing shortness of breath, and probably pneumonia.    no evidence for acute cardiac decompensation.  with negative MB. Troponin 0.03. Pro BNP only 515. Chest x-ray is consistent with biphasic opacities and effusion. ECG shows sinus tachycardia with left bundle branch block. According to patient she always runs of tachycardia.trend cardiac enzymes. Lactate level has normalized.    Try to get records from her cardiologist Dr. Jackson in [place].her cardiologist. Had echocardiogram 2 months ago. He treated with antibiotics, steroids, and pulmonary support    Continue  outpatient medications including low-dose aspirin, Effexor, simvastatin, protonics, simvastatin,and  verapamil,    Will follow patient daily while in hospital

## 2017-04-13 NOTE — H&P ADULT - ASSESSMENT
76-year-old female with a past medical history of COPD, hypertension, hyperlipidemia, diverticulitis, depression, breast cancer, status post lumpectomy and radiation 2008, anxiety, hiatal hernia-status post surgical repair in 2005, obstructive sleep apnea (not on BiPAP at home), PVD, rheumatoid arthritis, thrush, TIA (2010), TMJ, valvular heart disease (aortic and mitral), borderline diabetes (not on medications), presents to ED via EMS from home with chief complaint of difficulty breathing admitted for acute on chronic COPD exacerbation, elevated lactate 76-year-old female with a past medical history of COPD, hypertension, hyperlipidemia, diverticulitis, depression,Rt breast cancer, status post lumpectomy and radiation 2008, anxiety, hiatal hernia-status post surgical repair in 2005, obstructive sleep apnea (not on BiPAP at home), PVD, rheumatoid arthritis, thrush, TIA (2010), TMJ, valvular heart disease (aortic and mitral), borderline diabetes (not on medications), presents to ED via EMS from home with chief complaint of difficulty breathing admitted for acute on chronic COPD exacerbation with Ac hypercapnic, mild hypoxic respiratory failure, elevated lactate likely 2 to respiratory failure. Leukocytosis ?reactive ?/PNA.on IV Abx-

## 2017-04-14 LAB
ANION GAP SERPL CALC-SCNC: 8 MMOL/L — SIGNIFICANT CHANGE UP (ref 5–17)
BASE EXCESS BLDA CALC-SCNC: 0.7 MMOL/L — SIGNIFICANT CHANGE UP (ref -2–2)
BASOPHILS # BLD AUTO: 0 K/UL — SIGNIFICANT CHANGE UP (ref 0–0.2)
BASOPHILS NFR BLD AUTO: 0.3 % — SIGNIFICANT CHANGE UP (ref 0–2)
BLOOD GAS COMMENTS ARTERIAL: SIGNIFICANT CHANGE UP
BUN SERPL-MCNC: 15 MG/DL — SIGNIFICANT CHANGE UP (ref 7–23)
CALCIUM SERPL-MCNC: 8.1 MG/DL — LOW (ref 8.5–10.1)
CHLORIDE SERPL-SCNC: 106 MMOL/L — SIGNIFICANT CHANGE UP (ref 96–108)
CO2 SERPL-SCNC: 30 MMOL/L — SIGNIFICANT CHANGE UP (ref 22–31)
CREAT SERPL-MCNC: 0.61 MG/DL — SIGNIFICANT CHANGE UP (ref 0.5–1.3)
CULTURE RESULTS: NO GROWTH — SIGNIFICANT CHANGE UP
EOSINOPHIL # BLD AUTO: 0 K/UL — SIGNIFICANT CHANGE UP (ref 0–0.5)
EOSINOPHIL NFR BLD AUTO: 0 % — SIGNIFICANT CHANGE UP (ref 0–6)
GLUCOSE SERPL-MCNC: 152 MG/DL — HIGH (ref 70–99)
HCO3 BLDA-SCNC: 24 MMOL/L — SIGNIFICANT CHANGE UP (ref 23–27)
HCT VFR BLD CALC: 39.1 % — SIGNIFICANT CHANGE UP (ref 34.5–45)
HGB BLD-MCNC: 12.9 G/DL — SIGNIFICANT CHANGE UP (ref 11.5–15.5)
HOROWITZ INDEX BLDA+IHG-RTO: 40 — SIGNIFICANT CHANGE UP
LYMPHOCYTES # BLD AUTO: 1.3 K/UL — SIGNIFICANT CHANGE UP (ref 1–3.3)
LYMPHOCYTES # BLD AUTO: 9.1 % — LOW (ref 13–44)
MCHC RBC-ENTMCNC: 32.9 PG — SIGNIFICANT CHANGE UP (ref 27–34)
MCHC RBC-ENTMCNC: 33 GM/DL — SIGNIFICANT CHANGE UP (ref 32–36)
MCV RBC AUTO: 99.4 FL — SIGNIFICANT CHANGE UP (ref 80–100)
MONOCYTES # BLD AUTO: 0.9 K/UL — SIGNIFICANT CHANGE UP (ref 0–0.9)
MONOCYTES NFR BLD AUTO: 6.3 % — SIGNIFICANT CHANGE UP (ref 1–9)
NEUTROPHILS # BLD AUTO: 11.6 K/UL — HIGH (ref 1.8–7.4)
NEUTROPHILS NFR BLD AUTO: 84.2 % — HIGH (ref 43–77)
PCO2 BLDA: 50 MMHG — HIGH (ref 32–46)
PH BLDA: 7.33 — LOW (ref 7.35–7.45)
PHOSPHATE SERPL-MCNC: 2.7 MG/DL — SIGNIFICANT CHANGE UP (ref 2.5–4.5)
PLATELET # BLD AUTO: 223 K/UL — SIGNIFICANT CHANGE UP (ref 150–400)
PO2 BLDA: 69 MMHG — LOW (ref 74–108)
POTASSIUM SERPL-MCNC: 3.8 MMOL/L — SIGNIFICANT CHANGE UP (ref 3.5–5.3)
POTASSIUM SERPL-SCNC: 3.8 MMOL/L — SIGNIFICANT CHANGE UP (ref 3.5–5.3)
RBC # BLD: 3.93 M/UL — SIGNIFICANT CHANGE UP (ref 3.8–5.2)
RBC # FLD: 13.3 % — SIGNIFICANT CHANGE UP (ref 10.3–14.5)
SAO2 % BLDA: 92 % — SIGNIFICANT CHANGE UP (ref 92–96)
SODIUM SERPL-SCNC: 144 MMOL/L — SIGNIFICANT CHANGE UP (ref 135–145)
SPECIMEN SOURCE: SIGNIFICANT CHANGE UP
WBC # BLD: 13.8 K/UL — HIGH (ref 3.8–10.5)
WBC # FLD AUTO: 13.8 K/UL — HIGH (ref 3.8–10.5)

## 2017-04-14 PROCEDURE — 93306 TTE W/DOPPLER COMPLETE: CPT | Mod: 26

## 2017-04-14 PROCEDURE — 99233 SBSQ HOSP IP/OBS HIGH 50: CPT

## 2017-04-14 PROCEDURE — 71010: CPT | Mod: 26

## 2017-04-14 RX ORDER — METRONIDAZOLE 500 MG
500 TABLET ORAL ONCE
Qty: 0 | Refills: 0 | Status: COMPLETED | OUTPATIENT
Start: 2017-04-14 | End: 2017-04-14

## 2017-04-14 RX ORDER — FUROSEMIDE 40 MG
40 TABLET ORAL ONCE
Qty: 0 | Refills: 0 | Status: COMPLETED | OUTPATIENT
Start: 2017-04-14 | End: 2017-04-14

## 2017-04-14 RX ORDER — METRONIDAZOLE 500 MG
500 TABLET ORAL EVERY 8 HOURS
Qty: 0 | Refills: 0 | Status: DISCONTINUED | OUTPATIENT
Start: 2017-04-15 | End: 2017-04-18

## 2017-04-14 RX ORDER — METRONIDAZOLE 500 MG
TABLET ORAL
Qty: 0 | Refills: 0 | Status: DISCONTINUED | OUTPATIENT
Start: 2017-04-14 | End: 2017-04-18

## 2017-04-14 RX ADMIN — Medication 1 TABLET(S): at 09:24

## 2017-04-14 RX ADMIN — Medication 1 TABLET(S): at 09:25

## 2017-04-14 RX ADMIN — Medication 75 MILLIGRAM(S): at 09:24

## 2017-04-14 RX ADMIN — Medication 100 MILLIGRAM(S): at 06:04

## 2017-04-14 RX ADMIN — Medication 81 MILLIGRAM(S): at 12:58

## 2017-04-14 RX ADMIN — Medication 1 TABLET(S): at 12:58

## 2017-04-14 RX ADMIN — Medication 250 MILLIGRAM(S): at 23:27

## 2017-04-14 RX ADMIN — Medication 40 MILLIGRAM(S): at 19:32

## 2017-04-14 RX ADMIN — Medication 0.25 MILLIGRAM(S): at 17:31

## 2017-04-14 RX ADMIN — SIMVASTATIN 20 MILLIGRAM(S): 20 TABLET, FILM COATED ORAL at 21:56

## 2017-04-14 RX ADMIN — Medication 50 MILLIGRAM(S): at 23:29

## 2017-04-14 RX ADMIN — Medication 30 MILLIGRAM(S): at 06:05

## 2017-04-14 RX ADMIN — Medication 75 MILLIGRAM(S): at 22:10

## 2017-04-14 RX ADMIN — Medication 50 MILLIGRAM(S): at 06:04

## 2017-04-14 RX ADMIN — Medication 50 MILLIGRAM(S): at 13:36

## 2017-04-14 RX ADMIN — Medication 100 MILLIGRAM(S): at 21:56

## 2017-04-14 RX ADMIN — LEVALBUTEROL 0.63 MILLIGRAM(S): 1.25 SOLUTION, CONCENTRATE RESPIRATORY (INHALATION) at 07:54

## 2017-04-14 RX ADMIN — Medication 240 MILLIGRAM(S): at 06:04

## 2017-04-14 RX ADMIN — ENOXAPARIN SODIUM 40 MILLIGRAM(S): 100 INJECTION SUBCUTANEOUS at 06:28

## 2017-04-14 RX ADMIN — Medication 100 MILLIGRAM(S): at 13:36

## 2017-04-14 RX ADMIN — Medication 250 MILLIGRAM(S): at 09:53

## 2017-04-14 RX ADMIN — PANTOPRAZOLE SODIUM 40 MILLIGRAM(S): 20 TABLET, DELAYED RELEASE ORAL at 09:24

## 2017-04-14 RX ADMIN — Medication 100 MILLIGRAM(S): at 21:57

## 2017-04-14 RX ADMIN — Medication 30 MILLIGRAM(S): at 17:34

## 2017-04-14 RX ADMIN — Medication 40 MILLIGRAM(S): at 21:56

## 2017-04-14 RX ADMIN — Medication 0.25 MILLIGRAM(S): at 06:09

## 2017-04-14 NOTE — CHART NOTE - NSCHARTNOTEFT_GEN_A_CORE
PGY1 SERVICE NOTE    Was called by CALLI Harper due to patient being severely short of breath. Immediately came to assess the patient who was on BiPap, and the IV in her left arm was completely out. Patients daughter was at the bedside who reported that her mother looked this bad upon admission. The patient was in respiratory distress with accessory muscle use and abdominal breathing. Blood pressure 201/30, pulse 128,  STAT ABG and STAT Chest X-Ray ordered, ICU Attending Dr. Ravi was called and asked to come to assess the patient. Dr. Ravi informed me he was on his way at that time. Dr. Ravi arrived to the room, assessed ABG and STAT chest x ray which looked worse than prior as per Dr. Ravi with increased congestion and overload. Dr. Ravi reported the patient needs to go to ICU for severe respiratory distress.     Plan:   -STAT RVP Panel  -STAT Lasix 40 IV Push x 1  -STAT Solumedrol 40 IV BID  -D/C diet, change to NPO  -D/C IVF  -STAT Flagyl 500 IV [id number] with first dose STAT  -Transfer to ICU  -Dr. JAM Morrell notified PGY1 SERVICE NOTE    Was called by CALLI Harper due to patient being severely short of breath. Immediately came to assess the patient who was on BiPap, and the IV in her left arm was completely out. Patients daughter was at the bedside who reported that her mother looked this bad upon admission. The patient was in respiratory distress with accessory muscle use and abdominal breathing. Blood pressure 201/30, pulse 128,  STAT ABG and STAT Chest X-Ray ordered, ICU Attending Dr. Ravi was called and asked to come to assess the patient. Dr. Ravi informed me he was on his way at that time. Dr. Ravi arrived to the room, assessed ABG and STAT chest x ray which looked worse than prior as per Dr. Ravi with increased congestion and overload. Dr. Ravi reported the patient needs to go to ICU for severe respiratory distress.         Plan:   -STAT RVP Panel  -STAT Lasix 40 IV Push x 1  -STAT Solumedrol 40 IV BID  -D/C diet, change to NPO  -D/C IVF  -STAT Flagyl 500 IV E6enart with first dose STAT  -Transfer to ICU  -Dr. JAM Morrell notified PGY1 SERVICE NOTE    Was called by CALLI Harper due to patient being severely short of breath. Immediately came to assess the patient who was on BiPap, and the IV in her left arm was completely out, patient reported she could not breath well. She denied any headache, dizziness, chest pain, palpitations, abdominal pain, nausea/vomiting/diarrhea. Patients daughter was at the bedside who reported that her mother looked this bad upon admission. The patient was in respiratory distress with accessory muscle use and abdominal breathing. Blood pressure 201/30, pulse 128, Spo2 80.  STAT ABG and STAT Chest X-Ray ordered, ICU Attending Dr. Ravi was called and asked to come to assess the patient. Dr. Ravi informed me he was on his way at that time. Dr. Ravi arrived to the room, assessed ABG and STAT chest x ray which looked worse than prior as per Dr. Ravi with increased congestion and overload. After adjustment of bipap settings by Dr. Ravi, vitals improved to /130, Spo2 93, Hr 100. Dr. Ravi reported the patient needs to go to ICU for severe respiratory distress. Note Dr. Morrell was called as well and reports CTA was completed yesterday which was negative for PE.     PHYSICAL EXAM:  General: Acute respiratory distress, on Bipap  HEENT: NCAT, PERRLA, EOMI bl, moist mucous membranes   Neck: Supple, + accessory muscle use  Neurology: A&Ox3, CN II-XII grossly intact  Respiratory: +rhonchi bilaterally  CV: tachycardic, +S1/S2, no murmurs  Abdominal: Soft, NT, +BS RLQ, abdominal breathing  Extremities: No C/C/E, + peripheral pulses  MSK: Normal ROM, no joint erythema or warmth, no joint swelling   Skin: warm, dry, normal color, no rash or abnormal lesions      Plan:   -STAT RVP Panel  -STAT Lasix 40 IV Push x 1  -STAT Solumedrol 40 IV BID  -D/C diet, change to NPO  -D/C IVF  -STAT Flagyl 500 IV R1yoskg with first dose STAT  -Transfer to ICU  -Dr. JAM Morrell notified

## 2017-04-14 NOTE — PROGRESS NOTE ADULT - ASSESSMENT
76-year-old female with a past medical history of COPD, hypertension, hyperlipidemia, diverticulitis, depression, breast cancer, status post lumpectomy and radiation 2008, anxiety, hiatal hernia-status post surgical repair in 2005, obstructive sleep apnea (not on BiPAP at home), PVD, rheumatoid arthritis, thrush, TIA (2010), TMJ, valvular heart disease (aortic and mitral), admitted with acute Respiratory failure due to acute on chronic COPD exacerbation, and elevated lactate 76-year-old female with a past medical history of COPD, hypertension, hyperlipidemia, diverticulitis, depression, breast cancer, status post lumpectomy and radiation 2008, anxiety, hiatal hernia-status post surgical repair in 2005, obstructive sleep apnea (not on BiPAP at home), PVD, rheumatoid arthritis, thrush, TIA (2010), TMJ, valvular heart disease (aortic and mitral), admitted with acute Respiratory failure due to acute on chronic COPD exacerbation, and elevated lactate which is Normal now. Pt is Off Bi PAP, On O2 NC.

## 2017-04-14 NOTE — PROGRESS NOTE ADULT - SUBJECTIVE AND OBJECTIVE BOX
Patient is a 76y old  Female who presents with a chief complaint of SOB (2017 01:22)      BRIEF HOSPITAL COURSE: 76 year old female PMHx. COPD, HTN, HLD, AVR, Diverticulitis, Depression, Breast Ca (s/p R lumpectomy and RT ), Anxiety, Hatal hernia s/p Mesh Rpr , SIMÓN (not on BiPAP at home), PVD, RA, thrush, TIA (), TMJ, presents to ED via EMS from home with chief complaint of difficulty breathing. RX with COPD / PNA.      Events last 24 hours: Worsening Hypoxia / Respiratory failure requiring Bipap and Off loading with Lasix.     PAST MEDICAL & SURGICAL HISTORY:  TMJ (temporomandibular joint disorder)  Diverticulitis: hospitalized   SIMÓN (obstructive sleep apnea): category I severe pt does not use c/pap  Thrush: treated x 4 days  1 month ago  restarted medication  3-19-14 clortramazole 5x day  Anxiety  RA (rheumatoid arthritis): diagnosed   oxycodone prn  neck/ lower back  Depression  Borderline diabetes: no meds  COPD (chronic obstructive pulmonary disease): diagnosed   inhaler and nebulizer  HTN (hypertension)  PVD (peripheral vascular disease): left iliac  s/p surgical intervention   unsuccessful  Hiatal hernia: s/p surgical repair   Breast cancer: right s/p lumpectomy and radiation   TIA (transient ischemic attack):   Hyperlipidemia  Valvular heart disease: aortic and mitral  PVD (peripheral vascular disease): s/p left iliac bypass which was unsuccessful  open repair  S/P carpal tunnel release: right 2002  S/P lumpectomy, right breast:   Hiatal hernia: s/p surgical repair per pt   S/P hernia surgery: left inguinal age 12  S/P rotator cuff surgery: left   S/P appendectomy:   S/P  section: x2 1965/       Review of Systems:  CONSTITUTIONAL: No fever, chills, or fatigue  EYES: No eye pain, visual disturbances, or discharge  ENMT:  No difficulty hearing, tinnitus, vertigo; No sinus or throat pain  NECK: No pain or stiffness  RESPIRATORY: No cough, wheezing, chills or hemoptysis; (+) shortness of breath  CARDIOVASCULAR: No chest pain, palpitations, dizziness, or leg swelling  GASTROINTESTINAL: No abdominal or epigastric pain. No nausea, vomiting, or hematemesis; No diarrhea or constipation. No melena or hematochezia.  GENITOURINARY: No dysuria, frequency, hematuria, or incontinence  NEUROLOGICAL: No headaches, memory loss, loss of strength, numbness, or tremors  SKIN: No itching, burning, rashes, or lesions   MUSCULOSKELETAL: No joint pain or swelling; No muscle, back, or extremity pain  PSYCHIATRIC: No depression, anxiety, mood swings, or difficulty sleeping      Medications:  aztreonam  IVPB  IV Intermittent   aztreonam  IVPB 1000milliGRAM(s) IV Intermittent every 8 hours  vancomycin  IVPB 1000milliGRAM(s) IV Intermittent every 12 hours  metroNIDAZOLE  IVPB  IV Intermittent   metroNIDAZOLE  IVPB 500milliGRAM(s) IV Intermittent every 8 hours  verapamil SR 240milliGRAM(s) Oral daily  tiotropium 18 MICROgram(s) Capsule 1Capsule(s) Inhalation daily PRN  ALBUTerol    90 MICROgram(s) HFA Inhaler 2Puff(s) Inhalation every 6 hours PRN  levalbuterol Inhalation 0.63milliGRAM(s) Inhalation every 8 hours  acetaminophen   Tablet 650milliGRAM(s) Oral every 6 hours PRN  acetaminophen   Tablet. 650milliGRAM(s) Oral every 6 hours PRN  oxyCODONE  5 mG/acetaminophen 325 mG 2Tablet(s) Oral every 4 hours PRN  ARIPiprazole 2milliGRAM(s) Oral daily  zolpidem 5milliGRAM(s) Oral at bedtime PRN  venlafaxine 75milliGRAM(s) Oral two times a day with meals  clonazePAM Tablet 0.25milliGRAM(s) Oral two times a day  enoxaparin Injectable 40milliGRAM(s) SubCutaneous every 24 hours  aspirin enteric coated 81milliGRAM(s) Oral daily  senna 2Tablet(s) Oral at bedtime PRN  docusate sodium 100milliGRAM(s) Oral three times a day  pantoprazole    Tablet 40milliGRAM(s) Oral before breakfast  simvastatin 20milliGRAM(s) Oral at bedtime  methylPREDNISolone sodium succinate Injectable 40milliGRAM(s) IV Push every 12 hours  sodium chloride 0.9%. 1000milliLiter(s) IV Continuous   lactobacillus acidophilus 1Tablet(s) Oral three times a day with meals          ICU Vital Signs Last 24 Hrs  T(C): 36.6, Max: 36.6 ( @ 16:07)  T(F): 97.8, Max: 97.9 ( @ 16:07)  HR: 96 (90 - 130)  BP: 131/80 (103/60 - 198/138)  BP(mean): 100 (75 - 152)  RR: 38 (21 - 38)  SpO2: 91% (90% - 98%)      ABG - ( 2017 19:07 )  pH: 7.33  /  pCO2: 50    /  pO2: 69    / HCO3: 24    / Base Excess: 0.7   /  SaO2: 92                  I&O's Detail  I & Os for 24h ending 2017 07:00  =============================================  IN:    Oral Fluid: 340 ml    Solution: 250 ml    Solution: 100 ml    Total IN: 690 ml  ---------------------------------------------  OUT:    Total OUT: 0 ml  ---------------------------------------------  Total NET: 690 ml    I & Os for current day (as of 15 Apr 2017 05:19)  =============================================  IN:    Solution: 250 ml    Solution: 200 ml    Solution: 100 ml    Oral Fluid: 60 ml    Total IN: 610 ml  ---------------------------------------------  OUT:    Indwelling Catheter - Urethral: 1115 ml    Total OUT: 1115 ml  ---------------------------------------------  Total NET: -505 ml        LABS:                        12.9   13.8  )-----------( 223      ( 2017 06:38 )             39.1         144  |  106  |  15  ----------------------------<  152<H>  3.8   |  30  |  0.61    Ca    8.1<L>      2017 06:38  Phos  2.7       Mg     2.4                 CAPILLARY BLOOD GLUCOSE        CULTURES:  Culture Results:   No growth ( @ 08:38)  Culture Results:   No growth to date. ( @ 08:33)  Culture Results:   No growth to date. ( @ 08:33)      Physical Examination:    General: Mild Resp Distress, tachypneic Tolerating Bipap well.  Alert, interactive, nonfocal    HEENT: Pupils equal, reactive to light.  Symmetric.    PULM: Coarse Rhonchi Anteriorly with diminished BS bibasilarly, no significant sputum production    CVS: Regular rate and rhythm, (+) murmurs, rubs, or gallops    ABD: Soft, distended, nontender,  normoactive bowel sounds,    EXT: No edema, nontender    SKIN: Mottled LE,  no rashes noted.    RADIOLOGY:    EXAM:  ECHO TTE W/O CON COMP W/DOPPLR         PROCEDURE DATE:  2017        INTERPRETATION:  Ordering Physician: PANCHO VELASQUEZ    Indication: Dyspnea    Technician: JAJA    Study Quality: Poor   A complete echocardiographic study was performed utilizing standard   protocol including spectral and color Doppler in all echocardiographic   windows.    Height: 160 cm  Weight: 66 kg  BSA: 1.69m2  Blood Pressure: 110/66    MEASUREMENTS  IVS: 0.9cm  PWT: 0.8cm  LA: 4.2cm  AO: 3.2cm  LVIDd: 5.0cm  LVIDs: 3.5cm  LVOT:    cm    LVEF: 30%  RVSP: 33mm/Hg  RA Pressure: 10mm/Hg  IVC:    cm  FINDINGS  Left Ventricle: There is moderate to severe segmental left ventricular   systolic dysfunction. There is dyskinesis of the septum and akinesis of   the distal anterior wall and apex  Right Ventricle: Normal  Left Atrium: Mild dilatation  Right Atrium: Normal  Mitral Valve: Mitral annular calcification with mild mitral regurgitation  Aortic Valve: Well seated, normally functioning aortic valve   bioprosthesis with a peak gradient of 19 mm in mean gradient of 9 mm  Tricuspid Valve: Mild tricuspid regurgitation  Pulmonic Valve: Not visualized  Diastolic Function:      Pericardium/Pleura: No significant effusions noted      CONCLUSIONS:  Technically limited study  1. Moderate to severe segmental left ventricular systolic dysfunction  2. Mitral annular calcification with mild mitral regurgitation   3 well seated normally functioning aortic valve bioprosthesis    EXAM:  PORTABLE CHEST URGENT                            PROCEDURE DATE:  2017        INTERPRETATION:  CHEST    Indication: shortness of breath    Technique:  A semi-upright portable chest radiograph is compared to a   previous study of 17.    FINDINGS:    Compared to the previous study, there is increased pulmonary vascular   congestion and interstitial edema.  Bibasilar opacities persists.  No   large pleural effusions.  Cardiac silhouette unchanged.    IMPRESSION:    Increasing interstitial pulmonary edema.  Persistent bibasilar opacities.    CRITICAL CARE TIME SPENT: 40 minutes

## 2017-04-14 NOTE — PROGRESS NOTE ADULT - SUBJECTIVE AND OBJECTIVE BOX
Date/Time Patient Seen:  		  Referring MD:   Data Reviewed	       Patient is a 76y old  Female who presents with a chief complaint of SOB (13 Apr 2017 01:22)  in bed  awake  on BIPAP overnight  ID cx noted  vs and meds reviewed      Subjective/HPI       Medication list         MEDICATIONS  (STANDING):  enoxaparin Injectable 40milliGRAM(s) SubCutaneous every 24 hours  aspirin enteric coated 81milliGRAM(s) Oral daily  simvastatin 20milliGRAM(s) Oral at bedtime  ARIPiprazole 2milliGRAM(s) Oral daily  docusate sodium 100milliGRAM(s) Oral three times a day  pantoprazole    Tablet 40milliGRAM(s) Oral before breakfast  verapamil SR 240milliGRAM(s) Oral daily  sodium chloride 0.9%. 1000milliLiter(s) IV Continuous <Continuous>  venlafaxine 75milliGRAM(s) Oral two times a day with meals  lactobacillus acidophilus 1Tablet(s) Oral three times a day with meals  aztreonam  IVPB  IV Intermittent   levalbuterol Inhalation 0.63milliGRAM(s) Inhalation every 8 hours  aztreonam  IVPB 1000milliGRAM(s) IV Intermittent every 8 hours  clonazePAM Tablet 0.25milliGRAM(s) Oral two times a day  vancomycin  IVPB 1000milliGRAM(s) IV Intermittent every 12 hours  potassium acid phosphate/sodium acid phosphate tablet (K-PHOS No. 2) 1Tablet(s) Oral three times a day with meals  methylPREDNISolone sodium succinate Injectable 30milliGRAM(s) IV Push two times a day    MEDICATIONS  (PRN):  acetaminophen   Tablet 650milliGRAM(s) Oral every 6 hours PRN For Temp greater than 38 C (100.4 F)  acetaminophen   Tablet. 650milliGRAM(s) Oral every 6 hours PRN Mild Pain (1 - 3)  senna 2Tablet(s) Oral at bedtime PRN Constipation  oxyCODONE  5 mG/acetaminophen 325 mG 2Tablet(s) Oral every 4 hours PRN Severe Pain (7 - 10)  zolpidem 5milliGRAM(s) Oral at bedtime PRN Insomnia  tiotropium 18 MICROgram(s) Capsule 1Capsule(s) Inhalation daily PRN sob  ALBUTerol    90 MICROgram(s) HFA Inhaler 2Puff(s) Inhalation every 6 hours PRN Shortness of Breath and/or Wheezing         Vitals log        ICU Vital Signs Last 24 Hrs  T(C): 36.4, Max: 37 (04-13 @ 09:32)  T(F): 97.6, Max: 98.6 (04-13 @ 09:32)  HR: 100 (97 - 123)  BP: 134/86 (112/60 - 154/100)  BP(mean): --  ABP: --  ABP(mean): --  RR: 23 (20 - 29)  SpO2: 99% (94% - 100%)           Input and Output:  I&O's Detail    I & Os for current day (as of 14 Apr 2017 05:40)  =============================================  IN:    Sodium Chloride 0.9% IV Bolus: 2000 ml    Total IN: 2000 ml  ---------------------------------------------  OUT:    Voided: 1400 ml    Total OUT: 1400 ml  ---------------------------------------------  Total NET: 600 ml      Lab Data                        13.3   13.9  )-----------( 213      ( 13 Apr 2017 06:35 )             40.6     04-13    139  |  106  |  14  ----------------------------<  197<H>  3.8   |  25  |  0.60    Ca    8.1<L>      13 Apr 2017 06:35  Phos  2.2     04-13  Mg     2.4     04-13    TPro  8.0  /  Alb  3.7  /  TBili  0.2  /  DBili  x   /  AST  107<H>  /  ALT  93<H>  /  AlkPhos  122<H>  04-13    ABG - ( 13 Apr 2017 02:25 )  pH: 7.37  /  pCO2: 44    /  pO2: 107   / HCO3: 24    / Base Excess: x     /  SaO2: 97                CARDIAC MARKERS ( 13 Apr 2017 00:26 )  .030 ng/mL / x     / 222 U/L / x     / 5.8 ng/mL        Review of Systems	      Objective     Physical Examination  head at  heart - s1s2  cn grossly int  lungs - dec BS, no gross wheezing  moves all extr  abd - soft        Pertinent Lab findings & Imaging      Ruben:  NO   Adequate UO     I&O's Detail    I & Os for current day (as of 14 Apr 2017 05:40)  =============================================  IN:    Sodium Chloride 0.9% IV Bolus: 2000 ml    Total IN: 2000 ml  ---------------------------------------------  OUT:    Voided: 1400 ml    Total OUT: 1400 ml  ---------------------------------------------  Total NET: 600 ml           Discussed with:     Cultures:	        Radiology

## 2017-04-14 NOTE — PROGRESS NOTE ADULT - SUBJECTIVE AND OBJECTIVE BOX
Follow up: SOB, AVR    HPI:  Patient is a 76-year-old female with a past medical history of COPD, hypertension, hyperlipidemia, diverticulitis, depression, Rt breast cancer, status post lumpectomy and radiation , anxiety, hiatal hernia-status post surgical repair in , obstructive sleep apnea (not on BiPAP at home), PVD, rheumatoid arthritis, thrush, TIA (), TMJ, valvular heart disease (aortic and mitral),, H/O AVR, borderline diabetes (not on medications), presents to ED via EMS from home with chief complaint of difficulty breathing.    She is now felt to have probable  COPD exacerbation and pneumonia and is being treated with antibiotics.    PAST MEDICAL & SURGICAL HISTORY:  TMJ (temporomandibular joint disorder)  Diverticulitis: hospitalized   SIMÓN (obstructive sleep apnea): category I severe pt does not use c/pap  Thrush: treated x 4 days  1 month ago  restarted medication  3-19-14 clortramazole 5x day  Anxiety  RA (rheumatoid arthritis): diagnosed   oxycodone prn  neck/ lower back  Depression  Borderline diabetes: no meds  COPD (chronic obstructive pulmonary disease): diagnosed   inhaler and nebulizer  HTN (hypertension)  PVD (peripheral vascular disease): left iliac  s/p surgical intervention   unsuccessful  Hiatal hernia: s/p surgical repair   Breast cancer: right s/p lumpectomy and radiation   TIA (transient ischemic attack):   Hyperlipidemia  Valvular heart disease: aortic and mitral  PVD (peripheral vascular disease): s/p left iliac bypass which was unsuccessful  open repair  S/P carpal tunnel release: right 2002  S/P lumpectomy, right breast:   Hiatal hernia: s/p surgical repair per pt   S/P hernia surgery: left inguinal age 12  S/P rotator cuff surgery: left   S/P appendectomy:   S/P  section: x2 /       MEDICATIONS  (STANDING):  enoxaparin Injectable 40milliGRAM(s) SubCutaneous every 24 hours  aspirin enteric coated 81milliGRAM(s) Oral daily  simvastatin 20milliGRAM(s) Oral at bedtime  ARIPiprazole 2milliGRAM(s) Oral daily  docusate sodium 100milliGRAM(s) Oral three times a day  pantoprazole    Tablet 40milliGRAM(s) Oral before breakfast  verapamil SR 240milliGRAM(s) Oral daily  sodium chloride 0.9%. 1000milliLiter(s) IV Continuous <Continuous>  venlafaxine 75milliGRAM(s) Oral two times a day with meals  lactobacillus acidophilus 1Tablet(s) Oral three times a day with meals  aztreonam  IVPB  IV Intermittent   levalbuterol Inhalation 0.63milliGRAM(s) Inhalation every 8 hours  aztreonam  IVPB 1000milliGRAM(s) IV Intermittent every 8 hours  clonazePAM Tablet 0.25milliGRAM(s) Oral two times a day  vancomycin  IVPB 1000milliGRAM(s) IV Intermittent every 12 hours  methylPREDNISolone sodium succinate Injectable 30milliGRAM(s) IV Push two times a day    MEDICATIONS  (PRN):  acetaminophen   Tablet 650milliGRAM(s) Oral every 6 hours PRN For Temp greater than 38 C (100.4 F)  acetaminophen   Tablet. 650milliGRAM(s) Oral every 6 hours PRN Mild Pain (1 - 3)  senna 2Tablet(s) Oral at bedtime PRN Constipation  oxyCODONE  5 mG/acetaminophen 325 mG 2Tablet(s) Oral every 4 hours PRN Severe Pain (7 - 10)  zolpidem 5milliGRAM(s) Oral at bedtime PRN Insomnia  tiotropium 18 MICROgram(s) Capsule 1Capsule(s) Inhalation daily PRN sob  ALBUTerol    90 MICROgram(s) HFA Inhaler 2Puff(s) Inhalation every 6 hours PRN Shortness of Breath and/or Wheezing      REVIEW OF SYSTEMS:    CONSTITUTIONAL: less weakness, fevers or chills  EYES: No visual changes, No diplopia  ENMT: No throat pain , No exudate  NECK: No pain or stiffness  RESPIRATORY: No cough, wheezing, hemoptysis; No shortness of breath  CARDIOVASCULAR: No chest pain or palpitations  GASTROINTESTINAL: No abdominal pain. No nausea, vomiting, or hematemesis; No diarrhea or constipation. No melena or hematochezia.  GENITOURINARY: No dysuria, frequency or hematuria  NEUROLOGICAL: No numbness or weakness  SKIN: No itching or rash  All other review of systems is negative unless indicated above    Vital Signs Last 24 Hrs  T(C): 36.4, Max: 36.9 (14 @ 00:41)  T(F): 97.6, Max: 98.4 (-14 @ 00:41)  HR: 96 (93 - 123)  BP: 110/66 (110/66 - 154/100)  BP(mean): --  RR: 22 (22 - 29)  SpO2: 97% (94% - 100%)    I&O's Summary    I & Os for current day (as of 2017 13:18)  =============================================  IN: 690 ml / OUT: 0 ml / NET: 690 ml      PHYSICAL EXAM:    Constitutional: NAD, awake and alert, well-developed  Eyes:  EOMI,  Pupils round, no lesions  ENMT: no exudate or erythema  Pulmonary: Non-labored, decreased breath sounds  No wheezing, rales or rhonchi  Cardiovascular: PMI not palpable non-displaced Regular S1 and S2, no murmurs, rubs, gallops or clicks  Gastrointestinal: Bowel Sounds present, soft, nontender.   Lymph: No peripheral edema. No cervical lymphadenopathy.  Neurological: Alert, no focal deficits  Skin: No rashes. No Changes of chronic venous stasis. No cyanosis.  Psych:  Mood & affect appropriate      CARDIAC MARKERS ( 2017 00:26 )  .030 ng/mL / x     / 222 U/L / x     / 5.8 ng/mL                            12.9   13.8  )-----------( 223      ( 2017 06:38 )             39.1     CBC Full  -  ( 2017 06:38 )  WBC Count : 13.8 K/uL  Hemoglobin : 12.9 g/dL  Hematocrit : 39.1 %  Platelet Count - Automated : 223 K/uL  Mean Cell Volume : 99.4 fl  Mean Cell Hemoglobin : 32.9 pg  Mean Cell Hemoglobin Concentration : 33.0 gm/dL  Auto Neutrophil # : 11.6 K/uL  Auto Lymphocyte # : 1.3 K/uL  Auto Monocyte # : 0.9 K/uL  Auto Eosinophil # : 0.0 K/uL  Auto Basophil # : 0.0 K/uL  Auto Neutrophil % : 84.2 %  Auto Lymphocyte % : 9.1 %  Auto Monocyte % : 6.3 %  Auto Eosinophil % : 0.0 %  Auto Basophil % : 0.3 %        144  |  106  |  15  ----------------------------<  152<H>  3.8   |  30  |  0.61    Ca    8.1<L>      2017 06:38  Phos  2.7       Mg     2.4         TPro  8.0  /  Alb  3.7  /  TBili  0.2  /  DBili  x   /  AST  107<H>  /  ALT  93<H>  /  AlkPhos  122<H>      RAD CTA:  EXAM:  CT ANGIO CHEST (W)AW IC                            PROCEDURE DATE:  2017        INTERPRETATION:  History: Short of breath.    CTA chest.    Axial images coronal sagittal reformats, MIP images.  63 cc Omnipaque 350 injected intravenously.  Satisfactory contrast bolus.  No pulmonary emboli. Atherosclerotic thoracic aorta with ectatic   ascending thoracic aorta up to 4.1 cm. Central airways patent. No   mediastinal adenopathy.  A few scattered blebs.  Bilateral predominantly basilar interlobular septal thickening consistent   with interstitial edema. Dependent airspace and groundglass opacities at   the lung bases may represent a combination of aspiration pneumonia and   dependent congestion. Correlate clinically.  Very small bilateral layering pleural effusions. Mild cardiomegaly. No   pericardial effusion. Status post median sternotomy cardiac valve surgery  Reflux of contrast into hepatic veins and IVC may reflect right heart   strain.  Visualized upper abdomen not remarkable.  Right breast calcifications and clips. There is a spiculated density   lateral right breast may represent scar or neoplasm.  Correlate clinically and with dedicated breast imaging.  No acute skeletal abnormality.    Impression:    Negative for pulmonary emboli.  Interstitial edema.  Very small bilateral effusions.  Dependent groundglass and airspace opacities at the lung bases may   represent combination of pneumonia and dependent congestion. Correlate   clinically.  Ectatic ascending thoracic aorta.  Spiculated opacity right breast, scar versus neoplasm. Correlate   clinically and with breast imaging.    ECG:SR, LBBB

## 2017-04-14 NOTE — PROGRESS NOTE ADULT - SUBJECTIVE AND OBJECTIVE BOX
infectious diseases progress note:  JASMIN HANSEN is a 76y y. o.Female patient        Patient reports: feeling better but wanting to get better quickly so she can go home      ROS:    EYES:  Negative  blurry vision or double vision  GASTROINTESTINAL:  Negative for nausea, vomiting, diarrhea  -otherwise negative except for subjective    Allergies    penicillins (Hives)  quinolines (Other)    Intolerances        ANTIBIOTICS/RELEVANT:  antimicrobials  aztreonam  IVPB  IV Intermittent   aztreonam  IVPB 1000milliGRAM(s) IV Intermittent every 8 hours  vancomycin  IVPB 1000milliGRAM(s) IV Intermittent every 12 hours    immunologic:    OTHER:  enoxaparin Injectable 40milliGRAM(s) SubCutaneous every 24 hours  acetaminophen   Tablet 650milliGRAM(s) Oral every 6 hours PRN  acetaminophen   Tablet. 650milliGRAM(s) Oral every 6 hours PRN  senna 2Tablet(s) Oral at bedtime PRN  aspirin enteric coated 81milliGRAM(s) Oral daily  oxyCODONE  5 mG/acetaminophen 325 mG 2Tablet(s) Oral every 4 hours PRN  simvastatin 20milliGRAM(s) Oral at bedtime  ARIPiprazole 2milliGRAM(s) Oral daily  zolpidem 5milliGRAM(s) Oral at bedtime PRN  tiotropium 18 MICROgram(s) Capsule 1Capsule(s) Inhalation daily PRN  ALBUTerol    90 MICROgram(s) HFA Inhaler 2Puff(s) Inhalation every 6 hours PRN  docusate sodium 100milliGRAM(s) Oral three times a day  pantoprazole    Tablet 40milliGRAM(s) Oral before breakfast  verapamil SR 240milliGRAM(s) Oral daily  sodium chloride 0.9%. 1000milliLiter(s) IV Continuous <Continuous>  venlafaxine 75milliGRAM(s) Oral two times a day with meals  lactobacillus acidophilus 1Tablet(s) Oral three times a day with meals  levalbuterol Inhalation 0.63milliGRAM(s) Inhalation every 8 hours  clonazePAM Tablet 0.25milliGRAM(s) Oral two times a day  potassium acid phosphate/sodium acid phosphate tablet (K-PHOS No. 2) 1Tablet(s) Oral three times a day with meals  methylPREDNISolone sodium succinate Injectable 30milliGRAM(s) IV Push two times a day      Objective:  Vital Signs Last 24 Hrs  T(C): 36.4, Max: 37 ( @ 11:13)  T(F): 97.5, Max: 98.6 ( @ 11:13)  HR: 109 (93 - 123)  BP: 129/81 (112/60 - 154/100)  BP(mean): --  RR: 22 (20 - 29)  SpO2: 98% (94% - 100%)    PHYSICAL EXAM:  Constitutional:Well-developed, well nourished  Eyes:PERRLA, EOMI  Ear/Nose/Throat: oropharynx normal	  Neck:no JVD, no lymphadenopathy, supple  Respiratory: decreased accessory muscle use, lung fields bilaterally with diffuse crackles  Cardiovascular:RRR, normal S1, S2 no m/r/g  Gastrointestinal:soft, NT, no HSM, BS-normal  Extremities:no clubbing, no cyanosis, edema absent  Neuro-patient alert, oriented and appropriate  Skin-no sig lesions      LABS:                        12.9   13.8  )-----------( 223      ( 2017 06:38 )             39.1         144  |  106  |  15  ----------------------------<  152<H>  3.8   |  30  |  0.61    Ca    8.1<L>      2017 06:38  Phos  2.7       Mg     2.4         TPro  8.0  /  Alb  3.7  /  TBili  0.2  /  DBili  x   /  AST  107<H>  /  ALT  93<H>  /  AlkPhos  122<H>      PT/INR - ( 2017 00:26 )   PT: 10.8 sec;   INR: 0.99 ratio           Urinalysis Basic - ( 2017 01:49 )    Color: Yellow / Appearance: Clear / S.015 / pH: x  Gluc: x / Ketone: Negative  / Bili: Negative / Urobili: Negative   Blood: x / Protein: 500 mg/dL / Nitrite: Negative   Leuk Esterase: Negative / RBC: 0-2 /HPF / WBC Negative   Sq Epi: x / Non Sq Epi: Few / Bacteria: Occasional          MICROBIOLOGY:        RADIOLOGY & ADDITIONAL STUDIES:

## 2017-04-14 NOTE — PROGRESS NOTE ADULT - PROBLEM SELECTOR PLAN 1
noted arirspace disease on CT angio as well as CXR and still with diffuse crackles.  Would continue current treatment paln. Somewhat limited options with antibiotic intolerances for future PO abx as pt improves.

## 2017-04-14 NOTE — PROGRESS NOTE ADULT - ASSESSMENT
76 year old female COPD prior AVR with Systolic disfunction Rx initially with PNA / COPD exacerbation  Tonight again with Respiratory distress more consistent with CHF exacerbation.

## 2017-04-14 NOTE — PHYSICAL THERAPY INITIAL EVALUATION ADULT - ADDITIONAL COMMENTS
Pt resides alone in private home, 3 steps to enter with B/L rails.  Pt (I) with straight cane and ADLs PTA.

## 2017-04-14 NOTE — PROGRESS NOTE ADULT - ASSESSMENT
Jovana continues to have dyspnea, with sinus tachycardia after returning from the bathroom. She has no evidence for an acute coronary syndrome or other acute cardiac process. Her proBNP level of 515 are used against any significant heart failure. She will undergo echocardiography to evaluate for any underlying structural heart disease and evaluate her aortic valve replacement. She will continue to be treated with aspirin and enoxaparin   for DVT prophylaxis. Antibiotics will be continued per medicine and she will be followed closely by medicine and pulmonology.  We will follow her closely with you

## 2017-04-14 NOTE — PROGRESS NOTE ADULT - SUBJECTIVE AND OBJECTIVE BOX
Patient is a 76y old  Female who presents with a chief complaint of SOB (2017 01:22)      INTERVAL HPI:  Pt seen and examined at bedside. Pt admits to improved sob from admission, however states she still has wheezing. Admits to anxiety making the sob worse. Denies chest pain, palpitation, dizziness, abdominal pain.     OVERNIGHT EVENTS:    MEDICATIONS  (STANDING):  enoxaparin Injectable 40milliGRAM(s) SubCutaneous every 24 hours  aspirin enteric coated 81milliGRAM(s) Oral daily  simvastatin 20milliGRAM(s) Oral at bedtime  ARIPiprazole 2milliGRAM(s) Oral daily  docusate sodium 100milliGRAM(s) Oral three times a day  pantoprazole    Tablet 40milliGRAM(s) Oral before breakfast  verapamil SR 240milliGRAM(s) Oral daily  sodium chloride 0.9%. 1000milliLiter(s) IV Continuous <Continuous>  venlafaxine 75milliGRAM(s) Oral two times a day with meals  lactobacillus acidophilus 1Tablet(s) Oral three times a day with meals  aztreonam  IVPB  IV Intermittent   levalbuterol Inhalation 0.63milliGRAM(s) Inhalation every 8 hours  aztreonam  IVPB 1000milliGRAM(s) IV Intermittent every 8 hours  clonazePAM Tablet 0.25milliGRAM(s) Oral two times a day  vancomycin  IVPB 1000milliGRAM(s) IV Intermittent every 12 hours  potassium acid phosphate/sodium acid phosphate tablet (K-PHOS No. 2) 1Tablet(s) Oral three times a day with meals  methylPREDNISolone sodium succinate Injectable 30milliGRAM(s) IV Push two times a day    MEDICATIONS  (PRN):  acetaminophen   Tablet 650milliGRAM(s) Oral every 6 hours PRN For Temp greater than 38 C (100.4 F)  acetaminophen   Tablet. 650milliGRAM(s) Oral every 6 hours PRN Mild Pain (1 - 3)  senna 2Tablet(s) Oral at bedtime PRN Constipation  oxyCODONE  5 mG/acetaminophen 325 mG 2Tablet(s) Oral every 4 hours PRN Severe Pain (7 - 10)  zolpidem 5milliGRAM(s) Oral at bedtime PRN Insomnia  tiotropium 18 MICROgram(s) Capsule 1Capsule(s) Inhalation daily PRN sob  ALBUTerol    90 MICROgram(s) HFA Inhaler 2Puff(s) Inhalation every 6 hours PRN Shortness of Breath and/or Wheezing      Allergies    penicillins (Hives)  quinolines (Other)    Intolerances        REVIEW OF SYSTEMS:  CONSTITUTIONAL: No fever, No chills, No fatigue, No myalgia, No Body ache  EYES: No eye pain, visual disturbances, or discharge  ENMT:  No ear pain, No nose bleed, No vertigo; No sinus or throat pain,No Congestion  NECK: No pain, No stiffness  RESPIRATORY:positive dry cough, positive wheezing, positive sob ,  No  hemoptysis;   CARDIOVASCULAR: No chest pain, palpitations  GASTROINTESTINAL: No abdominal or epigastric pain. No nausea, No vomiting; No diarrhea or constipation. [ ] BM  GENITOURINARY: No dysuria, No frequency, No urgency, No hematuria, or incontinence  NEUROLOGICAL: No headaches, No dizziness, No numbness, No tingling, No tremors, No weakness  EXT: No Swelling, No Pain, No Edema  SKIN:  [x ] No itching, burning, rashes, or lesions   MUSCULOSKELETAL: No joint pain or swelling; No muscle pain, No back pain, No extremity pain  PSYCHIATRIC: No depression, anxiety, mood swings, or difficulty sleeping  PAIN SCALE: [x ] None  [ ] Other-  ROS Unable to obtain due to - [ ] Dementia  [ ] Lethargy  REST OF REVIEW Of SYSTEM - [x ] Normal     Vital Signs Last 24 Hrs  T(C): 36.4, Max: 37 (-13 @ 09:32)  T(F): 97.6, Max: 98.6 ( @ 09:32)  HR: 93 (93 - 123)  BP: 134/86 (112/60 - 154/100)  BP(mean): --  RR: 23 (20 - 29)  SpO2: 100% (94% - 100%)    PHYSICAL EXAM:  GENERAL:  [ x]NAD , [x ] well appearing, [ ] Agitated, [ ] Lethargy, [ ] confused   HEAD:  [x ] Normal, [ ] Other  EYES:  [x ] EOMI, [ x] PERRLA, [x ] conjunctiva and sclera clear normal, [ ] Other,  [ ] Pallor,[ ] Discharge  ENMT:  [ ] Normal, [ ] Moist mucous membranes, [ ] Good dentition, [ ] No Thrush  NECK:  [ ] Supple, [x ] No JVD, [ ] Normal thyroid, [ ]LAD  NERVOUS SYSTEM:  [x ] Alert & Oriented X3, [ ]Confusion [ ] Nonfocal [ ] Encephalopathic [ ] Sedated [ ] Other-  CHEST/LUNG:  [ ] Clear to auscultation bilaterally, [ ] No rales,[ ] No rhonchi [x ]  scattered wheezing [x] decreased breath sounds b/l  HEART:  [ x]Regular rate and rhythm [ ] irregular [x ] No murmurs, rubs, or gallops, [ ] PPM in place (Mfr:  )  ABDOMEN:  [x ] Soft, [ x] Nontender, [x ] Nondistended; [x ]No mass, [x ] Bowel sounds present, [ ] obese  EXTREMITIES: [x ] 2+ Peripheral Pulses, No clubbing, cyanosis,  [ ] edema, [ ] PVD stasis skin changes  LYMPH: No lymphadenopathy noted  SKIN:  [x ] No rashes or lesions, [ ] Pressure Ulcers    DIET:     LABS:                        12.9   13.8  )-----------( 223      ( 2017 06:38 )             39.1     2017 06:38    144    |  106    |  15     ----------------------------<  152    3.8     |  30     |  0.61     Ca    8.1        2017 06:38      PT/INR - ( 2017 00:26 )   PT: 10.8 sec;   INR: 0.99 ratio           Urinalysis Basic - ( 2017 01:49 )    Color: Yellow / Appearance: Clear / S.015 / pH: x  Gluc: x / Ketone: Negative  / Bili: Negative / Urobili: Negative   Blood: x / Protein: 500 mg/dL / Nitrite: Negative   Leuk Esterase: Negative / RBC: 0-2 /HPF / WBC Negative   Sq Epi: x / Non Sq Epi: Few / Bacteria: Occasional      CAPILLARY BLOOD GLUCOSE            RECENT CULTURES:        RESPIRATORY CULTURES:      cardiac Enzyme:   @ 00:26    CK:  222    CKMB:  --    CPK Mass Assay:  2.6    troponin i:  .030    BNP: 515        Anemia panel:          RADIOLOGY & ADDITIONAL TESTS:      HEALTH ISSUES - PROBLEM Dx:  Pneumonia, unspecified organism: Pneumonia, unspecified organism  Anxiety: Anxiety  Need for prophylactic measure: Need for prophylactic measure  Hiatal hernia: Hiatal hernia  RA (rheumatoid arthritis): RA (rheumatoid arthritis)  PVD (peripheral vascular disease): PVD (peripheral vascular disease)  SIMÓN (obstructive sleep apnea): SIMÓN (obstructive sleep apnea)  Hyperlipidemia: Hyperlipidemia  HTN (hypertension): HTN (hypertension)  Valvular heart disease: Valvular heart disease  Lactate blood increase: Lactate blood increase  COPD exacerbation: COPD exacerbation          Consultant(s) Notes Reviewed:  [x ] YES  [ ] NO    Care Discussed with [X] Consultants  [ ] Patient  [ ] Family  [ ]    [ ] Social Service  [ ] Other; RN  DVT PPX:  Advanced directive: [ ] None, [ ] DNR/DNI  Discussed with pt [ ] Patient is a 76y old  Female who presents with a chief complaint of SOB (2017 01:22)      INTERVAL HPI:  Patient is a 76-year-old female with a past medical history of COPD, hypertension, hyperlipidemia, diverticulitis, depression, Rt breast cancer, status post lumpectomy and radiation , anxiety, hiatal hernia-status post surgical repair in , obstructive sleep apnea (not on BiPAP at home), PVD, rheumatoid arthritis, thrush, TIA (), TMJ, valvular heart disease (aortic and mitral),, H/O Tissu AVR , borderline diabetes (not on medications), presents to ED via EMS from home with chief complaint of difficulty breathing. Daughter at bedside providing additional history. Daughter states that for the past three days patient has been feeling increasing shortness of breath. Patient periodically (around monthly) goes to PMD–Dr. Benoit for steroids and antibiotics and symptoms resolve with taking meds.  Pt was seen in office this week and given z pack, steroids. Per patient she has been taking medications, however wheezing and cough have increased, cough is productive with yellowish sputum. Tonight pt was diaphoretic and shortness of breath became so bad that daughter called EMS to bring to ED.  Pt unsure if she had fever.  Pt denies CP, palpitations, change in vision, decreased PO intake, abd pain, n/v/d, dysuria, HA, numbness, tingling.  Denies sick contact/recent travel.  Similar symptoms in past with COPD exacerbation but none this severe. pt on BiPAP, IV Abx, CT Angio -NO PE    Pt seen and examined at bedside. Pt admits to improved sob from admission, however states she still has wheezing. Admits to anxiety making the sob worse. Denies chest pain, palpitation, dizziness, abdominal pain.     OVERNIGHT EVENTS:    MEDICATIONS  (STANDING):  enoxaparin Injectable 40milliGRAM(s) SubCutaneous every 24 hours  aspirin enteric coated 81milliGRAM(s) Oral daily  simvastatin 20milliGRAM(s) Oral at bedtime  ARIPiprazole 2milliGRAM(s) Oral daily  docusate sodium 100milliGRAM(s) Oral three times a day  pantoprazole    Tablet 40milliGRAM(s) Oral before breakfast  verapamil SR 240milliGRAM(s) Oral daily  sodium chloride 0.9%. 1000milliLiter(s) IV Continuous <Continuous>  venlafaxine 75milliGRAM(s) Oral two times a day with meals  lactobacillus acidophilus 1Tablet(s) Oral three times a day with meals  aztreonam  IVPB  IV Intermittent   levalbuterol Inhalation 0.63milliGRAM(s) Inhalation every 8 hours  aztreonam  IVPB 1000milliGRAM(s) IV Intermittent every 8 hours  clonazePAM Tablet 0.25milliGRAM(s) Oral two times a day  vancomycin  IVPB 1000milliGRAM(s) IV Intermittent every 12 hours  potassium acid phosphate/sodium acid phosphate tablet (K-PHOS No. 2) 1Tablet(s) Oral three times a day with meals  methylPREDNISolone sodium succinate Injectable 30milliGRAM(s) IV Push two times a day    MEDICATIONS  (PRN):  acetaminophen   Tablet 650milliGRAM(s) Oral every 6 hours PRN For Temp greater than 38 C (100.4 F)  acetaminophen   Tablet. 650milliGRAM(s) Oral every 6 hours PRN Mild Pain (1 - 3)  senna 2Tablet(s) Oral at bedtime PRN Constipation  oxyCODONE  5 mG/acetaminophen 325 mG 2Tablet(s) Oral every 4 hours PRN Severe Pain (7 - 10)  zolpidem 5milliGRAM(s) Oral at bedtime PRN Insomnia  tiotropium 18 MICROgram(s) Capsule 1Capsule(s) Inhalation daily PRN sob  ALBUTerol    90 MICROgram(s) HFA Inhaler 2Puff(s) Inhalation every 6 hours PRN Shortness of Breath and/or Wheezing      Allergies    penicillins (Hives)  quinolines (Other)    Intolerances        REVIEW OF SYSTEMS:  CONSTITUTIONAL: No fever, No chills, No fatigue, No myalgia, No Body ache  EYES: No eye pain, visual disturbances, or discharge  ENMT:  No ear pain, No nose bleed, No vertigo; No sinus or throat pain,No Congestion  NECK: No pain, No stiffness  RESPIRATORY:positive dry cough, positive wheezing, positive sob ,  No  hemoptysis;   CARDIOVASCULAR: No chest pain, palpitations  GASTROINTESTINAL: No abdominal or epigastric pain. No nausea, No vomiting; No diarrhea or constipation. [ ] BM  GENITOURINARY: No dysuria, No frequency, No urgency, No hematuria, or incontinence  NEUROLOGICAL: No headaches, No dizziness, No numbness, No tingling, No tremors, No weakness  EXT: No Swelling, No Pain, No Edema  SKIN:  [x ] No itching, burning, rashes, or lesions   MUSCULOSKELETAL: No joint pain or swelling; No muscle pain, No back pain, No extremity pain  PSYCHIATRIC: No depression, anxiety, mood swings, or difficulty sleeping  PAIN SCALE: [x ] None  [ ] Other-  ROS Unable to obtain due to - [ ] Dementia  [ ] Lethargy  REST OF REVIEW Of SYSTEM - [x ] Normal     Vital Signs Last 24 Hrs  T(C): 36.4, Max: 37 (- @ 09:32)  T(F): 97.6, Max: 98.6 ( @ 09:32)  HR: 93 (93 - 123)  BP: 134/86 (112/60 - 154/100)  BP(mean): --  RR: 23 (20 - 29)  SpO2: 100% (94% - 100%)    PHYSICAL EXAM:  GENERAL:  [ x]NAD , [x ] well appearing, [ ] Agitated, [ ] Lethargy, [ ] confused   HEAD:  [x ] Normal, [ ] Other  EYES:  [x ] EOMI, [ x] PERRLA, [x ] conjunctiva and sclera clear normal, [ ] Other,  [ ] Pallor,[ ] Discharge  ENMT:  [ ] Normal, [ ] Moist mucous membranes, [ ] Good dentition, [ ] No Thrush  NECK:  [ ] Supple, [x ] No JVD, [ ] Normal thyroid, [ ]LAD  NERVOUS SYSTEM:  [x ] Alert & Oriented X3, [ ]Confusion [ ] Nonfocal [ ] Encephalopathic [ ] Sedated [ ] Other-  CHEST/LUNG:  [ ] Clear to auscultation bilaterally, [ ] No rales,[ ] No rhonchi [x ]  scattered wheezing [x] decreased breath sounds b/l  HEART:  [ x]Regular rate and rhythm [ ] irregular [x ] No murmurs, rubs, or gallops, [ ] PPM in place (Mfr:  )  ABDOMEN:  [x ] Soft, [ x] Nontender, [x ] Nondistended; [x ]No mass, [x ] Bowel sounds present, [ ] obese  EXTREMITIES: [x ] 2+ Peripheral Pulses, No clubbing, cyanosis,  [ ] edema, [ ] PVD stasis skin changes  LYMPH: No lymphadenopathy noted  SKIN:  [x ] No rashes or lesions, [ ] Pressure Ulcers    DIET:     LABS:                        12.9   13.8  )-----------( 223      ( 2017 06:38 )             39.1     2017 06:38    144    |  106    |  15     ----------------------------<  152    3.8     |  30     |  0.61     Ca    8.1        2017 06:38      PT/INR - ( 2017 00:26 )   PT: 10.8 sec;   INR: 0.99 ratio           Urinalysis Basic - ( 2017 01:49 )    Color: Yellow / Appearance: Clear / S.015 / pH: x  Gluc: x / Ketone: Negative  / Bili: Negative / Urobili: Negative   Blood: x / Protein: 500 mg/dL / Nitrite: Negative   Leuk Esterase: Negative / RBC: 0-2 /HPF / WBC Negative   Sq Epi: x / Non Sq Epi: Few / Bacteria: Occasional      CAPILLARY BLOOD GLUCOSE            RECENT CULTURES:        RESPIRATORY CULTURES:      cardiac Enzyme:   @ 00:26    CK:  222    CKMB:  --    CPK Mass Assay:  2.6    troponin i:  .030    BNP: 515        Anemia panel:          RADIOLOGY & ADDITIONAL TESTS:      HEALTH ISSUES - PROBLEM Dx:  Pneumonia, unspecified organism: Pneumonia, unspecified organism  Anxiety: Anxiety  Need for prophylactic measure: Need for prophylactic measure  Hiatal hernia: Hiatal hernia  RA (rheumatoid arthritis): RA (rheumatoid arthritis)  PVD (peripheral vascular disease): PVD (peripheral vascular disease)  SIMÓN (obstructive sleep apnea): SIMÓN (obstructive sleep apnea)  Hyperlipidemia: Hyperlipidemia  HTN (hypertension): HTN (hypertension)  Valvular heart disease: Valvular heart disease  Lactate blood increase: Lactate blood increase  COPD exacerbation: COPD exacerbation          Consultant(s) Notes Reviewed:  [x ] YES  [ ] NO    Care Discussed with [X] Consultants  [ ] Patient  [ ] Family  [ ]    [ ] Social Service  [ ] Other; RN  DVT PPX:  Advanced directive: [ ] None, [ ] DNR/DNI  Discussed with pt [ ] Patient is a 76y old  Female who presents with a chief complaint of SOB (2017 01:22)      INTERVAL HPI:  Patient is a 76-year-old female with a past medical history of COPD, hypertension, hyperlipidemia, diverticulitis, depression, Rt breast cancer, status post lumpectomy and radiation , anxiety, hiatal hernia-status post surgical repair in , obstructive sleep apnea (not on BiPAP at home), PVD, rheumatoid arthritis, thrush, TIA (), TMJ, valvular heart disease (aortic and mitral),, H/O Tissu AVR , borderline diabetes (not on medications), presents to ED via EMS from home with chief complaint of difficulty breathing. Daughter at bedside providing additional history. Daughter states that for the past three days patient has been feeling increasing shortness of breath. Patient periodically (around monthly) goes to PMD–Dr. Benoit for steroids and antibiotics and symptoms resolve with taking meds.  Pt was seen in office this week and given z pack, steroids. Per patient she has been taking medications, however wheezing and cough have increased, cough is productive with yellowish sputum. Tonight pt was diaphoretic and shortness of breath became so bad that daughter called EMS to bring to ED.  Pt unsure if she had fever.  Pt denies CP, palpitations, change in vision, decreased PO intake, abd pain, n/v/d, dysuria, HA, numbness, tingling.  Denies sick contact/recent travel.  Similar symptoms in past with COPD exacerbation but none this severe. pt on BiPAP, IV Abx, CT Angio -NO PE, + PNA    Pt seen and examined at bedside. Pt admits to improved sob from admission, however states she still has wheezing. Admits to anxiety making the sob worse. Denies chest pain, palpitation, dizziness, abdominal pain. Off Bi PAP    OVERNIGHT EVENTS:NONE    MEDICATIONS  (STANDING):  enoxaparin Injectable 40milliGRAM(s) SubCutaneous every 24 hours  aspirin enteric coated 81milliGRAM(s) Oral daily  simvastatin 20milliGRAM(s) Oral at bedtime  ARIPiprazole 2milliGRAM(s) Oral daily  docusate sodium 100milliGRAM(s) Oral three times a day  pantoprazole    Tablet 40milliGRAM(s) Oral before breakfast  verapamil SR 240milliGRAM(s) Oral daily  sodium chloride 0.9%. 1000milliLiter(s) IV Continuous <Continuous>  venlafaxine 75milliGRAM(s) Oral two times a day with meals  lactobacillus acidophilus 1Tablet(s) Oral three times a day with meals  aztreonam  IVPB  IV Intermittent   levalbuterol Inhalation 0.63milliGRAM(s) Inhalation every 8 hours  aztreonam  IVPB 1000milliGRAM(s) IV Intermittent every 8 hours  clonazePAM Tablet 0.25milliGRAM(s) Oral two times a day  vancomycin  IVPB 1000milliGRAM(s) IV Intermittent every 12 hours  potassium acid phosphate/sodium acid phosphate tablet (K-PHOS No. 2) 1Tablet(s) Oral three times a day with meals  methylPREDNISolone sodium succinate Injectable 30milliGRAM(s) IV Push two times a day    MEDICATIONS  (PRN):  acetaminophen   Tablet 650milliGRAM(s) Oral every 6 hours PRN For Temp greater than 38 C (100.4 F)  acetaminophen   Tablet. 650milliGRAM(s) Oral every 6 hours PRN Mild Pain (1 - 3)  senna 2Tablet(s) Oral at bedtime PRN Constipation  oxyCODONE  5 mG/acetaminophen 325 mG 2Tablet(s) Oral every 4 hours PRN Severe Pain (7 - 10)  zolpidem 5milliGRAM(s) Oral at bedtime PRN Insomnia  tiotropium 18 MICROgram(s) Capsule 1Capsule(s) Inhalation daily PRN sob  ALBUTerol    90 MICROgram(s) HFA Inhaler 2Puff(s) Inhalation every 6 hours PRN Shortness of Breath and/or Wheezing      Allergies    penicillins (Hives)  quinolines (Other)    REVIEW OF SYSTEMS: Anxiety  CONSTITUTIONAL: No fever, No chills, No fatigue, No myalgia, No Body ache  EYES: No eye pain, visual disturbances, or discharge  ENMT:  No ear pain, No nose bleed, No vertigo; No sinus or throat pain,No Congestion  NECK: No pain, No stiffness  RESPIRATORY:positive dry cough, positive wheezing, positive sob ,  No  hemoptysis;   CARDIOVASCULAR: No chest pain, palpitations  GASTROINTESTINAL: No abdominal or epigastric pain. No nausea, No vomiting; No diarrhea or constipation. [ ] BM  GENITOURINARY: No dysuria, No frequency, No urgency, No hematuria, or incontinence  NEUROLOGICAL: No headaches, No dizziness, No numbness, No tingling, No tremors, No weakness  EXT: No Swelling, No Pain, No Edema  SKIN:  [x ] No itching, burning, rashes, or lesions   MUSCULOSKELETAL: No joint pain or swelling; No muscle pain, No back pain, No extremity pain  PSYCHIATRIC: No depression, anxiety, mood swings, or difficulty sleeping  PAIN SCALE: [x ] None  [ ] Other-  ROS Unable to obtain due to - [ ] Dementia  [ ] Lethargy  REST OF REVIEW Of SYSTEM - [x ] Normal     Vital Signs Last 24 Hrs  T(C): 36.4, Max: 37 (- @ 09:32)  T(F): 97.6, Max: 98.6 ( @ 09:32)  HR: 93 (93 - 123)  BP: 134/86 (112/60 - 154/100)  BP(mean): --  RR: 23 (20 - 29)  SpO2: 100% (94% - 100%)    PHYSICAL EXAM:  GENERAL:  [ x]NAD , [x ] well appearing, [ ] Agitated, [ ] Lethargy, [ ] confused   HEAD:  [x ] Normal, [ ] Other  EYES:  [x ] EOMI, [ x] PERRLA, [x ] conjunctiva and sclera clear normal, [ ] Other,  [ ] Pallor,[ ] Discharge  ENMT:  [x ] Normal, [ ] Moist mucous membranes, [ ] Good dentition, [x ] No Thrush, MM-dry  NECK:  [x ] Supple, [x ] No JVD, [x  ] Normal thyroid, [ ]LAD  NERVOUS SYSTEM:  [x ] Alert & Oriented X3, [ ]Confusion [x ] Nonfocal [ ] Encephalopathic [ ] Sedated [ ] Other-  CHEST/LUNG:  [ ] Clear to auscultation bilaterally, [x No rales,[x ] No rhonchi [x ]  scattered wheezing b/l [x] decreased breath sounds b/l  HEART:  [ x]Regular rate and rhythm [ ] irregular [x ] No murmurs, rubs, or gallops, [ ] PPM in place (Mfr:  )  ABDOMEN:  [x ] Soft, [ x] Nontender, [x ] Nondistended; [x ]No mass, [x ] Bowel sounds present, [ ] obese, Distended+  EXTREMITIES: [x ] 2+ Peripheral Pulses, No clubbing, cyanosis,  [ ] edema, [ ] PVD stasis skin changes  LYMPH: No lymphadenopathy noted  SKIN:  [x ] No rashes or lesions, [ ] Pressure Ulcers    DIET: Cardiac    LABS:                        12.9   13.8  )-----------( 223      ( 2017 06:38 )             39.1     2017 06:38    144    |  106    |  15     ----------------------------<  152    3.8     |  30     |  0.61     Ca    8.1        2017 06:38      PT/INR - ( 2017 00:26 )   PT: 10.8 sec;   INR: 0.99 ratio           Urinalysis Basic - ( 2017 01:49 )    Color: Yellow / Appearance: Clear / S.015 / pH: x  Gluc: x / Ketone: Negative  / Bili: Negative / Urobili: Negative   Blood: x / Protein: 500 mg/dL / Nitrite: Negative   Leuk Esterase: Negative / RBC: 0-2 /HPF / WBC Negative   Sq Epi: x / Non Sq Epi: Few / Bacteria: Occasional      cardiac Enzyme:  - @ 00:26    CK:  222    CKMB:  --    CPK Mass Assay:  2.6    troponin i:  .030  ECHO  MEASUREMENTS  IVS: 0.9cm  PWT: 0.8cm  LA: 4.2cm  AO: 3.2cm  LVIDd: 5.0cm  LVIDs: 3.5cm  LVOT:    cm    LVEF: 30%  RVSP: 33mm/Hg  RA Pressure: 10mm/Hg  IVC:    cm    FINDINGS  Left Ventricle: There is moderate to severe segmental left ventricular   systolic dysfunction. There is dyskinesis of the septum and akinesis of   the distal anterior wall and apex  Right Ventricle: Normal  Left Atrium: Mild dilatation  Right Atrium: Normal  Mitral Valve: Mitral annular calcification with mild mitral regurgitation  Aortic Valve: Well seated, normally functioning aortic valve   bioprosthesis with a peak gradient of 19 mm in mean gradient of 9 mm  Tricuspid Valve: Mild tricuspid regurgitation  Pulmonic Valve: Not visualized  Diastolic Function:      Pericardium/Pleura: No significant effusions noted      CONCLUSIONS:  Technically limited study  1. Moderate to severe segmental left ventricular systolic dysfunction  2. Mitral annular calcification with mild mitral regurgitation   3 well seated normally functioning aortic valve bioprosthesis      BNP: 515      HEALTH ISSUES - PROBLEM Dx:  Pneumonia, unspecified organism: Pneumonia, unspecified organism  Anxiety: Anxiety  Need for prophylactic measure: Need for prophylactic measure  Hiatal hernia: Hiatal hernia  RA (rheumatoid arthritis): RA (rheumatoid arthritis)  PVD (peripheral vascular disease): PVD (peripheral vascular disease)  SIMÓN (obstructive sleep apnea): SIMÓN (obstructive sleep apnea)  Hyperlipidemia: Hyperlipidemia  HTN (hypertension): HTN (hypertension)  Valvular heart disease: Valvular heart disease  Lactate blood increase: Lactate blood increase  COPD exacerbation: COPD exacerbation      Consultant(s) Notes Reviewed:  [x ] YES  [ ] NO    Care Discussed with [X] Consultants  [x] Patient  [ ] Family  [ ]    [ ] Social Service  [x ] Other; RN,PT+  DVT PPX:lovenox  Advanced directive: [x ] None, [ ] DNR/DNI  Discussed with pt [x ]

## 2017-04-14 NOTE — PROGRESS NOTE ADULT - PROBLEM SELECTOR PLAN 2
7.3 on admission, repeat lactate normal. Likely secondary to respiratory distress on admission   given Levaquin 500mg IV x1 in ED; doxycycline discontinue; now on Azactam and vanco  ID Consult-Man  f/u cultures

## 2017-04-14 NOTE — PROGRESS NOTE ADULT - PROBLEM SELECTOR PLAN 3
on xopenex, on spiriva, on solumedrol, will taper systemic steroids, monitor FS  monitor sat  monitor resp rate  monitor clinical status  off bipap in am, NC 2 L  pt is 02 dep COPD chronic  cont empiric ABX regimen

## 2017-04-14 NOTE — PROGRESS NOTE ADULT - PROBLEM SELECTOR PLAN 1
on empiric dual abx, broad spectr, cx pending, biomarkers reviewed  ID cx noted  monitor labs, vs, sats, and clinical improvement  may need ECHO to eval pulm edema and LV / RV performance in the setting of advanced COPD

## 2017-04-14 NOTE — PROGRESS NOTE ADULT - PROBLEM SELECTOR PLAN 1
ABG reviewed - improved on BIPAP, CTA - NEG PE  CXR Bilateral lower lobe opacities with enlarged heart suggestive for avascular congestion. Findings have slightly worsened compared to prior study .Small right pleural effusion.  monitor WBC, current leukocytosis prob a combination of steroids effect and possible lower resp tract infection  monitor oxygen and keep >90, continuous pulse ox  xopenex standing, spiriva,   taper IV Steroids 30mg bid  f/u cultures,  ID Consult-Man, continue IV Vanco,Azactam, ABG reviewed - improved on BIPAP, CTA - NEG PE  CXR Bilateral lower lobe opacities with enlarged heart suggestive for vascular congestion. Findings have slightly worsened compared to prior study .Small right pleural effusion.  monitor WBC, current leukocytosis prob a combination of steroids effect and possible lower resp tract infection/ PNA on CT Angio.  monitor oxygen and keep >90, continuous pulse ox  Xopenex standing, Spiriva,   taper IV Steroids 30mg bid  f/u cultures,  ID Consult-Man, continue IV Vanco,Azactam, For PNA

## 2017-04-14 NOTE — PROGRESS NOTE ADULT - PROBLEM SELECTOR PLAN 3
Bipap Overnight and PRN   Off loading with Lasix with good effect   May require further preload Reduction will monitor closely   Discussed with daughter - answered questions.  GI / DVT Ppx

## 2017-04-15 DIAGNOSIS — I50.20 UNSPECIFIED SYSTOLIC (CONGESTIVE) HEART FAILURE: ICD-10-CM

## 2017-04-15 LAB
ANION GAP SERPL CALC-SCNC: 7 MMOL/L — SIGNIFICANT CHANGE UP (ref 5–17)
BASOPHILS # BLD AUTO: 0.1 K/UL — SIGNIFICANT CHANGE UP (ref 0–0.2)
BASOPHILS NFR BLD AUTO: 0.8 % — SIGNIFICANT CHANGE UP (ref 0–2)
BUN SERPL-MCNC: 15 MG/DL — SIGNIFICANT CHANGE UP (ref 7–23)
CALCIUM SERPL-MCNC: 8.5 MG/DL — SIGNIFICANT CHANGE UP (ref 8.5–10.1)
CHLORIDE SERPL-SCNC: 101 MMOL/L — SIGNIFICANT CHANGE UP (ref 96–108)
CO2 SERPL-SCNC: 31 MMOL/L — SIGNIFICANT CHANGE UP (ref 22–31)
CREAT SERPL-MCNC: 0.52 MG/DL — SIGNIFICANT CHANGE UP (ref 0.5–1.3)
EOSINOPHIL # BLD AUTO: 0 K/UL — SIGNIFICANT CHANGE UP (ref 0–0.5)
EOSINOPHIL NFR BLD AUTO: 0 % — SIGNIFICANT CHANGE UP (ref 0–6)
GLUCOSE SERPL-MCNC: 153 MG/DL — HIGH (ref 70–99)
HCT VFR BLD CALC: 41.9 % — SIGNIFICANT CHANGE UP (ref 34.5–45)
HGB BLD-MCNC: 13.6 G/DL — SIGNIFICANT CHANGE UP (ref 11.5–15.5)
LYMPHOCYTES # BLD AUTO: 1.4 K/UL — SIGNIFICANT CHANGE UP (ref 1–3.3)
LYMPHOCYTES # BLD AUTO: 10.2 % — LOW (ref 13–44)
MCHC RBC-ENTMCNC: 31.8 PG — SIGNIFICANT CHANGE UP (ref 27–34)
MCHC RBC-ENTMCNC: 32.5 GM/DL — SIGNIFICANT CHANGE UP (ref 32–36)
MCV RBC AUTO: 98 FL — SIGNIFICANT CHANGE UP (ref 80–100)
MONOCYTES # BLD AUTO: 1 K/UL — HIGH (ref 0–0.9)
MONOCYTES NFR BLD AUTO: 7.4 % — SIGNIFICANT CHANGE UP (ref 1–9)
NEUTROPHILS # BLD AUTO: 11.2 K/UL — HIGH (ref 1.8–7.4)
NEUTROPHILS NFR BLD AUTO: 81.6 % — HIGH (ref 43–77)
PLATELET # BLD AUTO: 231 K/UL — SIGNIFICANT CHANGE UP (ref 150–400)
POTASSIUM SERPL-MCNC: 3.7 MMOL/L — SIGNIFICANT CHANGE UP (ref 3.5–5.3)
POTASSIUM SERPL-SCNC: 3.7 MMOL/L — SIGNIFICANT CHANGE UP (ref 3.5–5.3)
RAPID RVP RESULT: SIGNIFICANT CHANGE UP
RBC # BLD: 4.28 M/UL — SIGNIFICANT CHANGE UP (ref 3.8–5.2)
RBC # FLD: 13.2 % — SIGNIFICANT CHANGE UP (ref 10.3–14.5)
SODIUM SERPL-SCNC: 139 MMOL/L — SIGNIFICANT CHANGE UP (ref 135–145)
TROPONIN I SERPL-MCNC: 0.47 NG/ML — HIGH (ref 0.01–0.04)
TROPONIN I SERPL-MCNC: 0.67 NG/ML — HIGH (ref 0.01–0.04)
VANCOMYCIN TROUGH SERPL-MCNC: 9 UG/ML — LOW (ref 10–20)
WBC # BLD: 13.7 K/UL — HIGH (ref 3.8–10.5)
WBC # FLD AUTO: 13.7 K/UL — HIGH (ref 3.8–10.5)

## 2017-04-15 PROCEDURE — 99291 CRITICAL CARE FIRST HOUR: CPT

## 2017-04-15 RX ORDER — AZTREONAM 2 G
2000 VIAL (EA) INJECTION EVERY 8 HOURS
Qty: 0 | Refills: 0 | Status: DISCONTINUED | OUTPATIENT
Start: 2017-04-15 | End: 2017-04-18

## 2017-04-15 RX ORDER — TIOTROPIUM BROMIDE 18 UG/1
1 CAPSULE ORAL; RESPIRATORY (INHALATION) ONCE
Qty: 0 | Refills: 0 | Status: COMPLETED | OUTPATIENT
Start: 2017-04-15 | End: 2017-04-15

## 2017-04-15 RX ORDER — VANCOMYCIN HCL 1 G
1250 VIAL (EA) INTRAVENOUS EVERY 12 HOURS
Qty: 0 | Refills: 0 | Status: DISCONTINUED | OUTPATIENT
Start: 2017-04-15 | End: 2017-04-16

## 2017-04-15 RX ORDER — BUDESONIDE AND FORMOTEROL FUMARATE DIHYDRATE 160; 4.5 UG/1; UG/1
2 AEROSOL RESPIRATORY (INHALATION)
Qty: 0 | Refills: 0 | Status: DISCONTINUED | OUTPATIENT
Start: 2017-04-15 | End: 2017-04-18

## 2017-04-15 RX ORDER — ARIPIPRAZOLE 15 MG/1
2 TABLET ORAL AT BEDTIME
Qty: 0 | Refills: 0 | Status: DISCONTINUED | OUTPATIENT
Start: 2017-04-15 | End: 2017-04-18

## 2017-04-15 RX ORDER — FUROSEMIDE 40 MG
40 TABLET ORAL
Qty: 0 | Refills: 0 | Status: DISCONTINUED | OUTPATIENT
Start: 2017-04-15 | End: 2017-04-16

## 2017-04-15 RX ORDER — ALBUTEROL 90 UG/1
2.5 AEROSOL, METERED ORAL EVERY 6 HOURS
Qty: 0 | Refills: 0 | Status: DISCONTINUED | OUTPATIENT
Start: 2017-04-15 | End: 2017-04-18

## 2017-04-15 RX ADMIN — Medication 40 MILLIGRAM(S): at 05:48

## 2017-04-15 RX ADMIN — Medication 75 MILLIGRAM(S): at 07:54

## 2017-04-15 RX ADMIN — Medication 81 MILLIGRAM(S): at 11:54

## 2017-04-15 RX ADMIN — Medication 0.25 MILLIGRAM(S): at 05:49

## 2017-04-15 RX ADMIN — Medication 75 MILLIGRAM(S): at 17:08

## 2017-04-15 RX ADMIN — Medication 40 MILLIGRAM(S): at 11:54

## 2017-04-15 RX ADMIN — Medication 100 MILLIGRAM(S): at 14:41

## 2017-04-15 RX ADMIN — TIOTROPIUM BROMIDE 1 CAPSULE(S): 18 CAPSULE ORAL; RESPIRATORY (INHALATION) at 11:55

## 2017-04-15 RX ADMIN — ARIPIPRAZOLE 2 MILLIGRAM(S): 15 TABLET ORAL at 22:02

## 2017-04-15 RX ADMIN — SIMVASTATIN 20 MILLIGRAM(S): 20 TABLET, FILM COATED ORAL at 22:02

## 2017-04-15 RX ADMIN — Medication 100 MILLIGRAM(S): at 22:02

## 2017-04-15 RX ADMIN — Medication 100 MILLIGRAM(S): at 05:48

## 2017-04-15 RX ADMIN — Medication 1 TABLET(S): at 17:08

## 2017-04-15 RX ADMIN — Medication 166.67 MILLIGRAM(S): at 22:01

## 2017-04-15 RX ADMIN — Medication 100 MILLIGRAM(S): at 05:47

## 2017-04-15 RX ADMIN — PANTOPRAZOLE SODIUM 40 MILLIGRAM(S): 20 TABLET, DELAYED RELEASE ORAL at 07:54

## 2017-04-15 RX ADMIN — Medication 1 TABLET(S): at 11:54

## 2017-04-15 RX ADMIN — Medication 100 MILLIGRAM(S): at 16:07

## 2017-04-15 RX ADMIN — Medication 0.25 MILLIGRAM(S): at 17:08

## 2017-04-15 RX ADMIN — Medication 50 MILLIGRAM(S): at 05:47

## 2017-04-15 RX ADMIN — ALBUTEROL 2.5 MILLIGRAM(S): 90 AEROSOL, METERED ORAL at 13:56

## 2017-04-15 RX ADMIN — Medication 100 MILLIGRAM(S): at 22:01

## 2017-04-15 RX ADMIN — ENOXAPARIN SODIUM 40 MILLIGRAM(S): 100 INJECTION SUBCUTANEOUS at 05:48

## 2017-04-15 RX ADMIN — Medication 1 TABLET(S): at 07:54

## 2017-04-15 RX ADMIN — Medication 250 MILLIGRAM(S): at 09:16

## 2017-04-15 RX ADMIN — ALBUTEROL 2.5 MILLIGRAM(S): 90 AEROSOL, METERED ORAL at 20:05

## 2017-04-15 RX ADMIN — ZOLPIDEM TARTRATE 5 MILLIGRAM(S): 10 TABLET ORAL at 02:35

## 2017-04-15 RX ADMIN — LEVALBUTEROL 0.63 MILLIGRAM(S): 1.25 SOLUTION, CONCENTRATE RESPIRATORY (INHALATION) at 08:12

## 2017-04-15 RX ADMIN — Medication 240 MILLIGRAM(S): at 05:49

## 2017-04-15 RX ADMIN — Medication 40 MILLIGRAM(S): at 17:09

## 2017-04-15 NOTE — PROGRESS NOTE ADULT - PROBLEM SELECTOR PLAN 2
7.3 on admission, repeat lactate normal. Likely secondary to respiratory distress on admission   given Levaquin 500mg IV x1 in ED; doxycycline discontinue; now on Azactam and vanco  ID Consult-Man  f/u cultures S/P Ac pulmonary Edema  IV Lasix 40 mg q 12 hrs  I/O, 2D ECHO -EF ~30%  Cardio Dr Henderson

## 2017-04-15 NOTE — PROGRESS NOTE ADULT - ATTENDING COMMENTS
76 F PMHx COPD, HTN, HLD, depression, anxiety, breast ca, SIMÓN (does not use CPAP), PVD, AVR admitted with acute hypoxic resp failure due to acute pulmonary edema and acute systolic heart failure, suspect a component of aspiration pneumonia and 76 F PMHx COPD, HTN, HLD, depression, anxiety, breast ca, SIMÓN (does not use CPAP), PVD, AVR admitted with acute hypoxic resp failure due to acute pulmonary edema and acute systolic heart failure, suspect a component of aspiration pneumonia and COPD exacerbation too.     -improving  -HD stable  -ECHO reviewed with cardiology, will d/c verapamil  -lasix q12, keep I<O, fluid restriction  -continue vancomycin, aztreonam, flagyl, f/u cx  -aspiration precautions  -bipap qhs, prn  -change steroids to po prednisone  -continue spiriva, albuterol  -OOB  -po diet  -d/c jaziel  -will consider adding BB, ACEI over the next 1-2 days  -will eventually need ischemic w/u  -cc 33 mins

## 2017-04-15 NOTE — PROGRESS NOTE ADULT - PROBLEM SELECTOR PLAN 1
would continue current antibiotics.  With abx intolerances currently on broad coverage including gram pos, gram neg and anaerobes

## 2017-04-15 NOTE — PROGRESS NOTE ADULT - ASSESSMENT
76-year-old female with a past medical history of COPD, hypertension, hyperlipidemia, diverticulitis, depression, breast cancer, status post lumpectomy and radiation 2008, anxiety, hiatal hernia-status post surgical repair in 2005, obstructive sleep apnea (not on BiPAP at home), PVD, rheumatoid arthritis, thrush, TIA (2010), TMJ, valvular heart disease (aortic and mitral), admitted with acute Respiratory failure due to acute on chronic COPD exacerbation, and elevated lactate which is Normal now. Pt is Off Bi PAP, On O2 NC. 76-year-old female with a past medical history of COPD, hypertension, hyperlipidemia, diverticulitis, depression, breast cancer, status post lumpectomy and radiation 2008, anxiety, hiatal hernia-status post surgical repair in 2005, obstructive sleep apnea (not on HOME O2, NO BiPAP at home), PVD, rheumatoid arthritis, thrush, TIA (2010), TMJ, valvular heart disease (aortic and mitral),S/P Tissue AVR 2014 admitted with acute Respiratory failure due to acute on chronic COPD exacerbation, and elevated lactate which is Normal now.Pt had acute respiratory distress, 2 to Fluid over load, S/P IV lasix, Off IV Fluid,  Pt is Off Bi PAP, On O2 NC.in ICU stable, RVP -negative

## 2017-04-15 NOTE — PROGRESS NOTE ADULT - SUBJECTIVE AND OBJECTIVE BOX
infectious diseases progress note:  JASMIN HANSEN is a 76y y. o.Female patient        Patient reports: being moved to ICU with increased respiratory difficulty      ROS:    EYES:  Negative  blurry vision or double vision  GASTROINTESTINAL:  Negative for nausea, vomiting, diarrhea  -otherwise negative except for subjective    Allergies    penicillins (Hives)  quinolines (Other)    Intolerances        ANTIBIOTICS/RELEVANT:  antimicrobials  aztreonam  IVPB  IV Intermittent   aztreonam  IVPB 1000milliGRAM(s) IV Intermittent every 8 hours  metroNIDAZOLE  IVPB  IV Intermittent   metroNIDAZOLE  IVPB 500milliGRAM(s) IV Intermittent every 8 hours  vancomycin  IVPB 1250milliGRAM(s) IV Intermittent every 12 hours    immunologic:    OTHER:  enoxaparin Injectable 40milliGRAM(s) SubCutaneous every 24 hours  acetaminophen   Tablet 650milliGRAM(s) Oral every 6 hours PRN  acetaminophen   Tablet. 650milliGRAM(s) Oral every 6 hours PRN  senna 2Tablet(s) Oral at bedtime PRN  aspirin enteric coated 81milliGRAM(s) Oral daily  oxyCODONE  5 mG/acetaminophen 325 mG 2Tablet(s) Oral every 4 hours PRN  simvastatin 20milliGRAM(s) Oral at bedtime  ARIPiprazole 2milliGRAM(s) Oral daily  zolpidem 5milliGRAM(s) Oral at bedtime PRN  tiotropium 18 MICROgram(s) Capsule 1Capsule(s) Inhalation daily PRN  ALBUTerol    90 MICROgram(s) HFA Inhaler 2Puff(s) Inhalation every 6 hours PRN  docusate sodium 100milliGRAM(s) Oral three times a day  pantoprazole    Tablet 40milliGRAM(s) Oral before breakfast  verapamil SR 240milliGRAM(s) Oral daily  venlafaxine 75milliGRAM(s) Oral two times a day with meals  lactobacillus acidophilus 1Tablet(s) Oral three times a day with meals  levalbuterol Inhalation 0.63milliGRAM(s) Inhalation every 8 hours  clonazePAM Tablet 0.25milliGRAM(s) Oral two times a day  methylPREDNISolone sodium succinate Injectable 40milliGRAM(s) IV Push every 12 hours      Objective:  Vital Signs Last 24 Hrs  T(C): 36.8, Max: 36.8 (04-15 @ 07:43)  T(F): 98.2, Max: 98.2 (04-15 @ 07:43)  HR: 101 (90 - 130)  BP: 144/83 (103/60 - 198/138)  BP(mean): 108 (75 - 152)  RR: 28 (21 - 38)  SpO2: 92% (90% - 100%)    PHYSICAL EXAM:  Constitutional:Well-developed, well nourished  Eyes:PERRLA, EOMI  Ear/Nose/Throat: oropharynx normal	  Neck:noted JVD, no lymphadenopathy, supple  Respiratory: no accessory muscle use, lung fields with crackles and some exp ronchi  Cardiovascular:RRR, normal S1, S2 no m/r/g  Gastrointestinal:soft, NT, no HSM, BS-normal  Extremities:no clubbing, no cyanosis, edema absent  Neuro-patient alert, oriented and appropriate  Skin-no sig lesions      LABS:                        13.6   13.7  )-----------( 231      ( 15 Apr 2017 06:06 )             41.9     Complete Blood Count + Automated Diff in AM (04.14.17 @ 06:38)    WBC Count: 13.8 K/uL    Complete Blood Count + Automated Diff (04.13.17 @ 00:26)    WBC Count: 19.5 K/uL        04-15    139  |  101  |  15  ----------------------------<  153<H>  3.7   |  31  |  0.52    Ca    8.5      15 Apr 2017 06:06  Phos  2.7     04-14              MICROBIOLOGY:        RADIOLOGY & ADDITIONAL STUDIES:

## 2017-04-15 NOTE — PROGRESS NOTE ADULT - ATTENDING COMMENTS
Pt seen,examined, case & care plan d/w residents, pt & cardiology at detail. Pt seen,examined, case & care plan d/w Dr Ravi-ICU MD, pt & ID MD at detail.  AM labs.

## 2017-04-15 NOTE — PROGRESS NOTE ADULT - PROBLEM SELECTOR PLAN 1
ABG reviewed - improved on BIPAP, CTA - NEG PE  CXR Bilateral lower lobe opacities with enlarged heart suggestive for vascular congestion. Findings have slightly worsened compared to prior study .Small right pleural effusion.  monitor WBC, current leukocytosis prob a combination of steroids effect and possible lower resp tract infection/ PNA on CT Angio.  monitor oxygen and keep >90, continuous pulse ox  Xopenex standing, Spiriva,   taper IV Steroids 30mg bid  f/u cultures,  ID Consult-Man, continue IV Vanco,Azactam, For PNA S/p acute respiratory distress last night 2 to Ac Pulmonary Edema ,ABG reviewed - -S/P  BIPAP, q HS  CTA - NEG PE, B/L Effusions, PNA  - CXR suggestive for vascular congestion.    WBC, current leukocytosis prob a combination of steroids effect and possible  PNA on CT Angio.  -monitor oxygen and keep >90, continuous pulse ox  -Xopenex standing, Spiriva, Symbicort BID, PRN Albuterol  -On Prednisone 40mg bid  f/u cultures,Pulmonary DR Rivera follow up  ID Consult-Man, continue IV Vanco,Azactam, For PNA, Pt also on IV Flagyl in ICU

## 2017-04-15 NOTE — PROGRESS NOTE ADULT - SUBJECTIVE AND OBJECTIVE BOX
76F with extensive PMHx as below, BIBA for respiratory distress. Pt found to be in hypercarbic respiratory failure and placed on Bipap support. Pt given Steroids/abx and lasix in ED. Pt seen and evaluated in ED at bedside. Remains tachypneic, mild distress. Pt states breathing improved since placed on Bipap. Pt states she has had yellow productive cough for several days and was [facility]. ICU consulted for further evaluation and possible treatment (2017 01:22)    24 hour events: Pt admitted with hypoxemic respiratory failure requiring bipap support, acute systolic chf and Pulm edema responding to lasix. Pt transferred to ICU for respiratory distress. Pt treated with lasix, bipap support.     PAST MEDICAL & SURGICAL HISTORY:  TMJ (temporomandibular joint disorder)  Diverticulitis: hospitalized   SIMÓN (obstructive sleep apnea): category I severe pt does not use c/pap  Thrush: treated x 4 days  1 month ago  restarted medication  3-19-14 clortramazole 5x day  Anxiety  RA (rheumatoid arthritis): diagnosed   oxycodone prn  neck/ lower back  Depression  Borderline diabetes: no meds  COPD (chronic obstructive pulmonary disease): diagnosed   inhaler and nebulizer  HTN (hypertension)  PVD (peripheral vascular disease): left iliac  s/p surgical intervention   unsuccessful  Hiatal hernia: s/p surgical repair   Breast cancer: right s/p lumpectomy and radiation   TIA (transient ischemic attack): 2010  Hyperlipidemia  Valvular heart disease: aortic and mitral  PVD (peripheral vascular disease): s/p left iliac bypass which was unsuccessful  open repair  S/P carpal tunnel release: right 2002  S/P lumpectomy, right breast: 2008  Hiatal hernia: s/p surgical repair per pt   S/P hernia surgery: left inguinal age 12  S/P rotator cuff surgery: left   S/P appendectomy:   S/P  section: x2 /       Review of Systems:  Constitutional: No fever, chills, fatigue  Neuro: No headache, numbness, weakness  Resp: + sob, No cough, wheezing  CVS: No chest pain, palpitations, leg swelling  GI: No abdominal pain, nausea, vomiting, diarrhea   : No dysuria, frequency, incontinence  Skin: No itching, burning, rashes, or lesions   Msk: No joint pain or swelling  Psych: No depression, anxiety, mood swings    T(F): 98.2, Max: 98.2 (04-15 @ 07:43)  HR: 90 (88 - 109)  BP: 109/62 (103/59 - 166/76)  RR: 22 (18 - 38)  SpO2: 96% (91% - 100%)  Wt(kg): --        CAPILLARY BLOOD GLUCOSE      I&O's Summary  I & Os for 24h ending 15 Apr 2017 07:00  =============================================  IN: 730 ml / OUT: 1450 ml / NET: -720 ml    I & Os for current day (as of 15 Apr 2017 20:53)  =============================================  IN: 1075 ml / OUT: 1860 ml / NET: -785 ml      Physical Exam:     Gen:   Neuro: A&Ox3, non-focal  HEENT: NC/AT  Resp: wheezing bilateraly.   CVS: nl S1/S2, tachy  Abd: soft, nt, nd, +bs  Ext: no edema, +pulses  Skin: well perfused, warm    Meds:  metroNIDAZOLE  IVPB IV Intermittent  metroNIDAZOLE  IVPB IV Intermittent  vancomycin  IVPB IV Intermittent  aztreonam  IVPB IV Intermittent  furosemide   Injectable IV Push  simvastatin Oral  predniSONE   Tablet Oral  tiotropium 18 MICROgram(s) Capsule Inhalation  buDESOnide 160 MICROgram(s)/formoterol 4.5 MICROgram(s) Inhaler Inhalation  ALBUTerol    0.083% Nebulizer  oxyCODONE  5 mG/acetaminophen 325 mG Oral PRN  zolpidem Oral PRN  venlafaxine Oral  clonazePAM Tablet Oral  ARIPiprazole Oral  enoxaparin Injectable SubCutaneous  aspirin enteric coated Oral  senna Oral PRN  docusate sodium Oral  pantoprazole    Tablet Oral  lactobacillus acidophilus Oral                            13.6   13.7  )-----------( 231      ( 15 Apr 2017 06:06 )             41.9       04-15    139  |  101  |  15  ----------------------------<  153<H>  3.7   |  31  |  0.52    Ca    8.5      15 Apr 2017 06:06  Phos  2.7     04-14        CARDIAC MARKERS ( 15 Apr 2017 14:48 )  .665 ng/mL / x     / x     / x     / x              .Urine Clean Catch (Midstream)   No growth --  @ 08:38  .Blood Blood-Peripheral   No growth to date. --  @ 08:33      Rapid RVP Result: NotDetec (04-15 @ 08:11)      Bedside lung ultrasound: anterior a-lines with few scattered b-lines, diffuse b-lines bibasilar, no significant effusion    Bedside ECHO: severe LV systolic dysfunction, MR, TR, IVC 2 cm, no pericardial effusion    CENTRAL LINE: N      TORRES: Y                 REMOVE: Y  A-LINE: N    GLOBAL ISSUE/BEST PRACTICE:  Analgesia:yes  Sedation:no  HOB elevation: yes  Stress ulcer prophylaxis:protonix  VTE prophylaxis:lovenox  Glycemic control:yes  Nutrition: DASH diet      CODE STATUS: Full Code  GOC discussion: Y  Critical care time spent (mins): 35  (Reviewing data, imaging, discussing with multidisciplinary team, non inclusive of procedures, discussing goals of care with patient/family)

## 2017-04-15 NOTE — PROGRESS NOTE ADULT - PROBLEM SELECTOR PLAN 3
Cardio -Dr García   c/w ASA 81 mg daily  c/w verapamil H/O Tissue AVR  c/w ASA 81 mg daily  c/w verapamil

## 2017-04-15 NOTE — PROGRESS NOTE ADULT - ASSESSMENT
76F with extensive PMHx as above, including copd, htn, hld, breast ca, myla (no cpap use), AVR, initially admitted with hypoxemic respiratory failure, Pulm edema, systolic CHF exacerbation responsive to bipap/lasix therapies. Pt now transferred to ICU for treatment of acute hypoxemic respiratory failure, COPD exaerbation, acute systolic chf exacerbation and aspiration pna.    -pain management  -Off verapamil. HD's improved with diuresis. F/U echo report. Cardiology following. Start BB/ACE-I when tolerates.  -Bipap over night and prn. chest PT/IS/Nebs/steroids  -DASH diet. Fluid restriction. PPI  -Asp precautions  -Monitor UOP. Ruben ROCK'[place]. Check lytes  -Monitor wbc/temp. Continue abx. f/u cultures  -DVT ppc  -supportive care

## 2017-04-15 NOTE — PROGRESS NOTE ADULT - ASSESSMENT
Jovana has had an episode of acute pulmonary edema with characteristic chest x-ray findings and  a large elevation in BNP from baseline. She is noted to have significant segmental left ventricular systolic dysfunction although no clear episodes of acute coronary syndrome at present. She remains on oxygen support in the ICU.    Plan  Continue close ICU care  Pulmonary F/U  Cardiac enzymes  Aspirin  DVT prophylaxis  Antibiotics per medicine  Discontinue verapamil for now  Will need an ischemia evaluation  Beta blocker and ACE inhibitor therapy once stable    Time spent 35 minutes of critical care medicine. Discussed with ICU team

## 2017-04-15 NOTE — PROGRESS NOTE ADULT - SUBJECTIVE AND OBJECTIVE BOX
Patient is a 76y old  Female who presents with a chief complaint of SOB (13 Apr 2017 01:22)      INTERVAL HPI:Patient is a 76-year-old female with a past medical history of COPD, hypertension, hyperlipidemia, diverticulitis, depression, Rt breast cancer, status post lumpectomy and radiation 2008, anxiety, hiatal hernia-status post surgical repair in 2005, obstructive sleep apnea (not on BiPAP at home), PVD, rheumatoid arthritis, thrush, TIA (2010), TMJ, valvular heart disease (aortic and mitral),, H/O Tissu AVR 2014, borderline diabetes (not on medications), presents to ED via EMS from home with chief complaint of difficulty breathing. Daughter at bedside providing additional history. Daughter states that for the past three days patient has been feeling increasing shortness of breath. Patient periodically (around monthly) goes to PMD–Dr. Benoit for steroids and antibiotics and symptoms resolve with taking meds.  Pt was seen in office this week and given z pack, steroids. Per patient she has been taking medications, however wheezing and cough have increased, cough is productive with yellowish sputum. Tonight pt was diaphoretic and shortness of breath became so bad that daughter called EMS to bring to ED.  Pt unsure if she had fever.  Pt denies CP, palpitations, change in vision, decreased PO intake, abd pain, n/v/d, dysuria, HA, numbness, tingling.  Denies sick contact/recent travel.  Similar symptoms in past with COPD exacerbation but none this severe. pt on BiPAP, IV Abx, CT Angio -NO PE, + PNA    Pt seen and examined at bedside. Pt admits to improved sob from admission, however states she still has wheezing. Admits to anxiety making the sob worse. Denies chest pain, palpitation, dizziness, abdominal pain. Off Bi PAP, pt in ICU now s/p Acute respiratory distress last night      OVERNIGHT EVENTS:S/p acute respiratory distress, BiPAP, pt was transferred to ICU    MEDICATIONS  (STANDING):  enoxaparin Injectable 40milliGRAM(s) SubCutaneous every 24 hours  aspirin enteric coated 81milliGRAM(s) Oral daily  simvastatin 20milliGRAM(s) Oral at bedtime  ARIPiprazole 2milliGRAM(s) Oral daily  docusate sodium 100milliGRAM(s) Oral three times a day  pantoprazole    Tablet 40milliGRAM(s) Oral before breakfast  verapamil SR 240milliGRAM(s) Oral daily  venlafaxine 75milliGRAM(s) Oral two times a day with meals  lactobacillus acidophilus 1Tablet(s) Oral three times a day with meals  aztreonam  IVPB  IV Intermittent   levalbuterol Inhalation 0.63milliGRAM(s) Inhalation every 8 hours  aztreonam  IVPB 1000milliGRAM(s) IV Intermittent every 8 hours  clonazePAM Tablet 0.25milliGRAM(s) Oral two times a day  methylPREDNISolone sodium succinate Injectable 40milliGRAM(s) IV Push every 12 hours  metroNIDAZOLE  IVPB  IV Intermittent   metroNIDAZOLE  IVPB 500milliGRAM(s) IV Intermittent every 8 hours  vancomycin  IVPB 1250milliGRAM(s) IV Intermittent every 12 hours    MEDICATIONS  (PRN):  acetaminophen   Tablet 650milliGRAM(s) Oral every 6 hours PRN For Temp greater than 38 C (100.4 F)  acetaminophen   Tablet. 650milliGRAM(s) Oral every 6 hours PRN Mild Pain (1 - 3)  senna 2Tablet(s) Oral at bedtime PRN Constipation  oxyCODONE  5 mG/acetaminophen 325 mG 2Tablet(s) Oral every 4 hours PRN Severe Pain (7 - 10)  zolpidem 5milliGRAM(s) Oral at bedtime PRN Insomnia  tiotropium 18 MICROgram(s) Capsule 1Capsule(s) Inhalation daily PRN sob  ALBUTerol    90 MICROgram(s) HFA Inhaler 2Puff(s) Inhalation every 6 hours PRN Shortness of Breath and/or Wheezing      Allergies    penicillins (Hives)  quinolines (Other)          REVIEW OF SYSTEMS:  CONSTITUTIONAL: No fever, No chills, No fatigue, No myalgia, No Body ache  EYES: No eye pain, visual disturbances, or discharge  ENMT:  No ear pain, No nose bleed, No vertigo; No sinus or throat pain, No Congestion  NECK: No pain, No stiffness  RESPIRATORY: No cough, wheezing, No  hemoptysis, No shortness of breath  CARDIOVASCULAR: No chest pain, palpitations  GASTROINTESTINAL: No abdominal or epigastric pain. No nausea, No vomiting; No diarrhea or constipation. [  ] BM  GENITOURINARY: No dysuria, No frequency, No urgency, No hematuria, or incontinence  NEUROLOGICAL: No headaches, No dizziness, No numbness, No tingling, No tremors, No weakness  EXT: No Swelling, No Pain, No Edema  SKIN:  [  ] No itching, burning, rashes, or lesions   MUSCULOSKELETAL: No joint pain or swelling; No muscle pain, No back pain, No extremity pain  PSYCHIATRIC: No depression, anxiety, mood swings or difficulty sleeping at night  PAIN SCALE: [  ] None  [  ] Other-  ROS Unable to obtain due to - [  ] Dementia  [  ] Lethargy  [  ] Sedated   REST OF REVIEW Of SYSTEM - [  ] Normal     Vital Signs Last 24 Hrs  T(C): 36.8, Max: 36.8 (04-15 @ 07:43)  T(F): 98.2, Max: 98.2 (04-15 @ 07:43)  HR: 101 (90 - 130)  BP: 144/83 (103/60 - 198/138)  BP(mean): 108 (75 - 152)  RR: 28 (21 - 38)  SpO2: 92% (90% - 100%)  Finger Stick      I & Os for 24h ending 04-15 @ 07:00  =============================================  IN: 730 ml / OUT: 1450 ml / NET: -720 ml    I & Os for current day (as of 04-15 @ 11:10)  =============================================  IN: 350 ml / OUT: 75 ml / NET: 275 ml      PHYSICAL EXAM:  GENERAL:  [  ] NAD , [  ] well appearing, [  ] Agitated, [  ] Lethargy, [  ] confused   HEAD:  [  ] Normal, [  ] Other  EYES:  [  ] EOMI, [  ] PERRLA, [  ] conjunctiva and sclera clear normal, [  ] Other,  [  ] Pallor,[  ] Discharge  ENMT:  [  ] Normal, [  ] Moist mucous membranes, [  ] Good dentition, [  ] No Thrush  NECK:  [  ] Supple, [  ] No JVD, [  ] Normal thyroid, [  ] Lymphadenopathy [  ] Other  NERVOUS SYSTEM:  [  ] Alert & Oriented X3, [  ] Nonfocal   [  ] Confusion  [  ] Encephalopathic [  ] Sedated [  ] Other-  CHEST/LUNG:  [  ] Clear to auscultation bilaterally, [  ] No rales, [  ] No rhonchi  [  ]  No wheezing  HEART:  [  ] Regular rate and rhythm  [  ] irregular  [  ] No murmurs, rubs, or gallops, [  ] PPM in place (Mfr:  )  ABDOMEN:  [  ] Soft, [  ] Nontender, [  ] Nondistended, [  ]No mass, [  ] Bowel sounds present, [  ] obese  EXTREMITIES: [  ] 2+ Peripheral Pulses, No clubbing, cyanosis,  [  ] edema, [  ] PVD stasis skin changes  LYMPH: No lymphadenopathy noted  SKIN:  [  ] No rashes or lesions, [  ] Pressure Ulcers, [  ] echymosis, [  ] Other    DIET:     LABS:                        13.6   13.7  )-----------( 231      ( 15 Apr 2017 06:06 )             41.9     15 Apr 2017 06:06    139    |  101    |  15     ----------------------------<  153    3.7     |  31     |  0.52     Ca    8.5        15 Apr 2017 06:06            Culture Results:   No growth (04-13 @ 08:38)  Culture Results:   No growth to date. (04-13 @ 08:33)  Culture Results:   No growth to date. (04-13 @ 08:33)          RECENT CULTURES:  04-13 @ 08:38 .Urine Clean Catch (Midstream)                No growth    04-13 @ 08:33 .Blood Blood-Peripheral                No growth to date.          RESPIRATORY CULTURES:      cardiac Enzyme:  04-13 @ 00:26    CK:  222    CKMB:  --    CPK Mass Assay:  2.6    troponin i:  .030    BNP: 515        Anemia panel:          RADIOLOGY & ADDITIONAL TESTS:      HEALTH ISSUES - PROBLEM Dx:  Systolic CHF with reduced left ventricular function, NYHA class 2: Systolic CHF with reduced left ventricular function, NYHA class 2  Pneumonia, unspecified organism: Pneumonia, unspecified organism  Anxiety: Anxiety  Need for prophylactic measure: Need for prophylactic measure  Hiatal hernia: Hiatal hernia  RA (rheumatoid arthritis): RA (rheumatoid arthritis)  PVD (peripheral vascular disease): PVD (peripheral vascular disease)  SIMÓN (obstructive sleep apnea): SIMÓN (obstructive sleep apnea)  Hyperlipidemia: Hyperlipidemia  HTN (hypertension): HTN (hypertension)  Valvular heart disease: Valvular heart disease  Lactate blood increase: Lactate blood increase  COPD exacerbation: COPD exacerbation          Consultant(s) Notes Reviewed:  [  ] YES     Care Discussed with [X] Consultants  [  ] Patient  [  ] Family  [  ]   [  ] Social Service  [  ] RN, [  ] Physical Therapy  DVT PPX: [  ] Lovenox, [  ] S C Heparin, [  ] Coumadin, [  ] Xarelto, [  ] Eliquis, [  ] SCD   Advanced directive: [  ] None, [  ] DNR/DNI Patient is a 76y old  Female who presents with a chief complaint of SOB (13 Apr 2017 01:22)      INTERVAL HPI:Patient is a 76-year-old female with a past medical history of COPD, hypertension, hyperlipidemia, diverticulitis, depression, Rt breast cancer, status post lumpectomy and radiation 2008, anxiety, hiatal hernia-status post surgical repair in 2005, obstructive sleep apnea (not on BiPAP at home), PVD, rheumatoid arthritis, thrush, TIA (2010), TMJ, valvular heart disease (aortic and mitral),, H/O Tissu AVR 2014, borderline diabetes (not on medications), presents to ED via EMS from home with chief complaint of difficulty breathing. Daughter at bedside providing additional history. Daughter states that for the past three days patient has been feeling increasing shortness of breath. Patient periodically (around monthly) goes to PMD–Dr. Benoit for steroids and antibiotics and symptoms resolve with taking meds.  Pt was seen in office this week and given z pack, steroids. Per patient she has been taking medications, however wheezing and cough have increased, cough is productive with yellowish sputum. Tonight pt was diaphoretic and shortness of breath became so bad that daughter called EMS to bring to ED.  Pt unsure if she had fever.  Pt denies CP, palpitations, change in vision, decreased PO intake, abd pain, n/v/d, dysuria, HA, numbness, tingling.  Denies sick contact/recent travel.  Similar symptoms in past with COPD exacerbation but none this severe. pt on BiPAP, IV Abx, CT Angio -NO PE, + PNA    Pt seen and examined at bedside. Pt admits to improved sob from admission, however states she still has wheezing. Admits to anxiety making the sob worse. Denies chest pain, palpitation, dizziness, abdominal pain. Off Bi PAP, pt in ICU now s/p Acute respiratory distress last night, S/P IV Lasix give, Miranda cat for I/O. CXR showed Pulmonary congestion. RVP negative       OVERNIGHT EVENTS: S/p acute respiratory distress, BiPAP, pt was transferred to ICU, pt is off BiPAP, on o2 NC.    MEDICATIONS  (STANDING):  enoxaparin Injectable 40milliGRAM(s) SubCutaneous every 24 hours  aspirin enteric coated 81milliGRAM(s) Oral daily  simvastatin 20milliGRAM(s) Oral at bedtime  ARIPiprazole 2milliGRAM(s) Oral daily  docusate sodium 100milliGRAM(s) Oral three times a day  pantoprazole    Tablet 40milliGRAM(s) Oral before breakfast  verapamil SR 240milliGRAM(s) Oral daily  venlafaxine 75milliGRAM(s) Oral two times a day with meals  lactobacillus acidophilus 1Tablet(s) Oral three times a day with meals  aztreonam  IVPB  IV Intermittent   levalbuterol Inhalation 0.63milliGRAM(s) Inhalation every 8 hours  aztreonam  IVPB 1000milliGRAM(s) IV Intermittent every 8 hours  clonazePAM Tablet 0.25milliGRAM(s) Oral two times a day  methylPREDNISolone sodium succinate Injectable 40milliGRAM(s) IV Push every 12 hours  metroNIDAZOLE  IVPB  IV Intermittent   metroNIDAZOLE  IVPB 500milliGRAM(s) IV Intermittent every 8 hours  vancomycin  IVPB 1250milliGRAM(s) IV Intermittent every 12 hours    MEDICATIONS  (PRN):  acetaminophen   Tablet 650milliGRAM(s) Oral every 6 hours PRN For Temp greater than 38 C (100.4 F)  acetaminophen   Tablet. 650milliGRAM(s) Oral every 6 hours PRN Mild Pain (1 - 3)  senna 2Tablet(s) Oral at bedtime PRN Constipation  oxyCODONE  5 mG/acetaminophen 325 mG 2Tablet(s) Oral every 4 hours PRN Severe Pain (7 - 10)  zolpidem 5milliGRAM(s) Oral at bedtime PRN Insomnia  tiotropium 18 MICROgram(s) Capsule 1Capsule(s) Inhalation daily PRN sob  ALBUTerol    90 MICROgram(s) HFA Inhaler 2Puff(s) Inhalation every 6 hours PRN Shortness of Breath and/or Wheezing      Allergies    penicillins (Hives)  quinolines (Other)          REVIEW OF SYSTEMS:  CONSTITUTIONAL: No fever, No chills, No fatigue, No myalgia, No Body ache  EYES: No eye pain, visual disturbances, or discharge  ENMT:  No ear pain, No nose bleed, No vertigo; No sinus or throat pain, No Congestion  NECK: No pain, No stiffness  RESPIRATORY: + cough, No wheezing, No  hemoptysis, + shortness of breath- Improved  CARDIOVASCULAR: No chest pain, palpitations  GASTROINTESTINAL: No abdominal or epigastric pain. No nausea, No vomiting; No diarrhea or constipation. [x  ] BM-Yesterday  GENITOURINARY: No dysuria, No frequency, No urgency, No hematuria, or incontinence  NEUROLOGICAL: No headaches, No dizziness, No numbness, No tingling, No tremors, No weakness  EXT: No Swelling, No Pain, No Edema  SKIN:  [x  ] No itching, burning, rashes, or lesions   MUSCULOSKELETAL: No joint pain or swelling; No muscle pain, No back pain, No extremity pain  PSYCHIATRIC: No depression, anxiety, mood swings or difficulty sleeping at night  PAIN SCALE: [ x ] None  [  ] Other-  ROS Unable to obtain due to - [  ] Dementia  [  ] Lethargy  [  ] Sedated   REST OF REVIEW Of SYSTEM - [x  ] Normal     Vital Signs Last 24 Hrs  T(C): 36.8, Max: 36.8 (04-15 @ 07:43)  T(F): 98.2, Max: 98.2 (04-15 @ 07:43)  HR: 101 (90 - 130)  BP: 144/83 (103/60 - 198/138)  BP(mean): 108 (75 - 152)  RR: 28 (21 - 38)  SpO2: 92% (90% - 100%)  Finger Stick      I & Os for 24h ending 04-15 @ 07:00  =============================================  IN: 730 ml / OUT: 1450 ml / NET: -720 ml    I & Os for current day (as of 04-15 @ 11:10)  =============================================  IN: 350 ml / OUT: 75 ml / NET: 275 ml      PHYSICAL EXAM: pt is OOB to Chair  GENERAL:  [ x ] NAD , [ x ] well appearing, [  ] Agitated, [  ] Lethargy, [  ] confused   HEAD:  [ x ] Normal, [  ] Other  EYES:  [ x ] EOMI, [x  ] PERRLA, [ x ] conjunctiva and sclera clear normal, [  ] Other,  [  ] Pallor,[  ] Discharge  ENMT:  [ x ] Normal, [ x ] Moist mucous membranes--DRY, [  ] Good dentition, [x  ] No Thrush  NECK:  [x  ] Supple, [ x ] No JVD, [ x ] Normal thyroid, [  ] Lymphadenopathy [  ] Other  NERVOUS SYSTEM:  [ x ] Alert & Oriented X3, [x  ] Nonfocal   [  ] Confusion  [  ] Encephalopathic [  ] Sedated [  ] Other-  CHEST/LUNG:  [  ] Clear to auscultation bilaterally, [x  ]  B/L rales, [ x ]  rhonchi B/L [x  ]  No wheezing  HEART:  [ x ] Regular rate and rhythm , tachy [  ] irregular  [  ] No murmurs, rubs, or gallops, [  ] PPM in place (Mfr:  ) + 2/6 SM  ABDOMEN:  [ x ] Soft, [ x ] Nontender, [  ] Nondistended, [ x ]No mass, [x  ] Bowel sounds present, [  ] obese, + distended  EXTREMITIES: [ x ] 2+ Peripheral Pulses, No clubbing, cyanosis,  [  ] edema, [  ] PVD stasis skin changes  LYMPH: No lymphadenopathy noted  SKIN:  [x  ] No rashes or lesions, [  ] Pressure Ulcers, [  ] ecchymosis [  ] Other    DIET: Cardiac    LABS:                        13.6   13.7  )-----------( 231      ( 15 Apr 2017 06:06 )             41.9     15 Apr 2017 06:06    139    |  101    |  15     ----------------------------<  153    3.7     |  31     |  0.52     Ca    8.5        15 Apr 2017 06:06    Culture Results:   No growth (04-13 @ 08:38)  Culture Results:   No growth to date. (04-13 @ 08:33)  Culture Results:   No growth to date. (04-13 @ 08:33)    RECENT CULTURES:  04-13 @ 08:38 .Urine Clean Catch (Midstream)   No growth    04-13 @ 08:33 .Blood Blood-Peripheral    No growth to date.      cardiac Enzyme:  04-13 @ 00:26    CK:  222    CKMB:  --    CPK Mass Assay:  2.6    troponin i:  .030    BNP: 515            RADIOLOGY & ADDITIONAL TESTS:Indication: shortness of breath    Technique:  A semi-upright portable chest radiograph is compared to a   previous study of 4/13/17.    FINDINGS:    Compared to the previous study, there is increased pulmonary vascular   congestion and interstitial edema.  Bibasilar opacities persists.  No   large pleural effusions.  Cardiac silhouette unchanged.    IMPRESSION:    Increasing interstitial pulmonary edema.  Persistent bibasilar opacities.        HEALTH ISSUES - PROBLEM Dx:  Systolic CHF with reduced left ventricular function, NYHA class 2: Systolic CHF with reduced left ventricular function, NYHA class 2  Pneumonia, unspecified organism: Pneumonia, unspecified organism  Anxiety: Anxiety  Need for prophylactic measure: Need for prophylactic measure  Hiatal hernia: Hiatal hernia  RA (rheumatoid arthritis): RA (rheumatoid arthritis)  PVD (peripheral vascular disease): PVD (peripheral vascular disease)  SIMÓN (obstructive sleep apnea): SIMÓN (obstructive sleep apnea)  Hyperlipidemia: Hyperlipidemia  HTN (hypertension): HTN (hypertension)  Valvular heart disease: Valvular heart disease  Lactate blood increase: Lactate blood increase  COPD exacerbation: COPD exacerbation          Consultant(s) Notes Reviewed:  [ x ] YES     Care Discussed with [X] Consultants  [ x ] Patient  [  ] Family  [  ]   [  ] Social Service  [ x ] RN, [  ] Physical Therapy  DVT PPX: [ x ] Lovenox, [  ] S C Heparin, [  ] Coumadin, [  ] Xarelto, [  ]  Eliquis,  [  ] SCD   Advanced directive: [  ] None, [  ] DNR/DNI

## 2017-04-15 NOTE — PROGRESS NOTE ADULT - SUBJECTIVE AND OBJECTIVE BOX
Follow up: SOB, AVR, CM    HPI:  Jovana had an eventful night developing acute respiratory distress on the floor being transferred to the intensive care unit for acute pulmonary edema. Denies shortness of breath although is feeling somewhat tender. She relates her dyspnea to a fullness in her abdomen when bending over.  She remains on oxygen support and reports no chest discomfort.    PAST MEDICAL & SURGICAL HISTORY:  TMJ (temporomandibular joint disorder)  Diverticulitis: hospitalized   SIMÓN (obstructive sleep apnea): category I severe pt does not use c/pap  Thrush: treated x 4 days  1 month ago  restarted medication  3-19-14 clortramazole 5x day  Anxiety  RA (rheumatoid arthritis): diagnosed   oxycodone prn  neck/ lower back  Depression  Borderline diabetes: no meds  COPD (chronic obstructive pulmonary disease): diagnosed   inhaler and nebulizer  HTN (hypertension)  PVD (peripheral vascular disease): left iliac  s/p surgical intervention   unsuccessful  Hiatal hernia: s/p surgical repair   Breast cancer: right s/p lumpectomy and radiation   TIA (transient ischemic attack): 2010  Hyperlipidemia  Valvular heart disease: aortic and mitral  PVD (peripheral vascular disease): s/p left iliac bypass which was unsuccessful  open repair  S/P carpal tunnel release: right   S/P lumpectomy, right breast:   Hiatal hernia: s/p surgical repair per pt   S/P hernia surgery: left inguinal age 12  S/P rotator cuff surgery: left 2004  S/P appendectomy:   S/P  section: x2 /       MEDICATIONS  (STANDING):  enoxaparin Injectable 40milliGRAM(s) SubCutaneous every 24 hours  aspirin enteric coated 81milliGRAM(s) Oral daily  simvastatin 20milliGRAM(s) Oral at bedtime  tiotropium 18 MICROgram(s) Capsule 1Capsule(s) Inhalation daily  docusate sodium 100milliGRAM(s) Oral three times a day  pantoprazole    Tablet 40milliGRAM(s) Oral before breakfast  verapamil SR 240milliGRAM(s) Oral daily  venlafaxine 75milliGRAM(s) Oral two times a day with meals  lactobacillus acidophilus 1Tablet(s) Oral three times a day with meals  clonazePAM Tablet 0.25milliGRAM(s) Oral two times a day  metroNIDAZOLE  IVPB  IV Intermittent   metroNIDAZOLE  IVPB 500milliGRAM(s) IV Intermittent every 8 hours  furosemide   Injectable 40milliGRAM(s) IV Push two times a day  vancomycin  IVPB 1250milliGRAM(s) IV Intermittent every 12 hours  aztreonam  IVPB 2000milliGRAM(s) IV Intermittent every 8 hours  buDESOnide 160 MICROgram(s)/formoterol 4.5 MICROgram(s) Inhaler 2Puff(s) Inhalation two times a day  predniSONE   Tablet 40milliGRAM(s) Oral daily  ALBUTerol    0.083% 2.5milliGRAM(s) Nebulizer every 6 hours  ARIPiprazole 2milliGRAM(s) Oral at bedtime    MEDICATIONS  (PRN):  acetaminophen   Tablet 650milliGRAM(s) Oral every 6 hours PRN For Temp greater than 38 C (100.4 F)  acetaminophen   Tablet. 650milliGRAM(s) Oral every 6 hours PRN Mild Pain (1 - 3)  senna 2Tablet(s) Oral at bedtime PRN Constipation  oxyCODONE  5 mG/acetaminophen 325 mG 2Tablet(s) Oral every 4 hours PRN Severe Pain (7 - 10)  zolpidem 5milliGRAM(s) Oral at bedtime PRN Insomnia      REVIEW OF SYSTEMS:    CONSTITUTIONAL: pos weakness, No fevers or chills  EYES: No visual changes, No diplopia  ENMT: No throat pain , No exudate  NECK: No pain or stiffness  RESPIRATORY: No cough, wheezing, hemoptysis; pos shortness of breath  CARDIOVASCULAR: No chest pain or palpitations  GASTROINTESTINAL: No abdominal pain. No nausea, vomiting, or hematemesis; No diarrhea or constipation. No melena or hematochezia.  GENITOURINARY: No dysuria, frequency or hematuria  NEUROLOGICAL: No numbness or weakness  SKIN: No itching or rash  All other review of systems is negative unless indicated above    Vital Signs Last 24 Hrs  T(C): 36.7, Max: 36.8 (04-15 @ 07:43)  T(F): 98.1, Max: 98.2 (04-15 @ 07:43)  HR: 109 (90 - 130)  BP: 152/70 (103/60 - 198/138)  BP(mean): 101 (75 - 152)  RR: 28 (21 - 38)  SpO2: 97% (90% - 100%)    I&O's Summary  I & Os for 24h ending 15 Apr 2017 07:00  =============================================  IN: 730 ml / OUT: 1450 ml / NET: -720 ml    I & Os for current day (as of 15 Apr 2017 12:19)  =============================================  IN: 425 ml / OUT: 185 ml / NET: 240 ml      PHYSICAL EXAM:    Constitutional: some distress, awake and alert, well-developed  Eyes:  EOMI,  Pupils round, no lesions  ENMT: no exudate or erythema  Pulmonary: somewhat labored, scattered rhonchi, few wheezes  Cardiovascular: PMI not palpable non-displaced Regular S1 and S2, no murmurs, rubs, gallops or clicks  Gastrointestinal: Bowel Sounds present, soft, nontender.   Lymph: No peripheral edema. No cervical lymphadenopathy.  Neurological: Alert, no focal deficits  Skin: No rashes. Changes of chronic venous stasis. No cyanosis.  Psych:  Mood & affect appropriate                                 13.6   13.7  )-----------( 231      ( 15 Apr 2017 06:06 )             41.9     CBC Full  -  ( 15 Apr 2017 06:06 )  WBC Count : 13.7 K/uL  Hemoglobin : 13.6 g/dL  Hematocrit : 41.9 %  Platelet Count - Automated : 231 K/uL  Mean Cell Volume : 98.0 fl  Mean Cell Hemoglobin : 31.8 pg  Mean Cell Hemoglobin Concentration : 32.5 gm/dL  Auto Neutrophil # : 11.2 K/uL  Auto Lymphocyte # : 1.4 K/uL  Auto Monocyte # : 1.0 K/uL  Auto Eosinophil # : 0.0 K/uL  Auto Basophil # : 0.1 K/uL  Auto Neutrophil % : 81.6 %  Auto Lymphocyte % : 10.2 %  Auto Monocyte % : 7.4 %  Auto Eosinophil % : 0.0 %  Auto Basophil % : 0.8 %    04-15    139  |  101  |  15  ----------------------------<  153<H>  3.7   |  31  |  0.52    Ca    8.5      15 Apr 2017 06:06  Phos  2.7     04-14    Pro BNP 13,036 up from 515    CXR:    EXAM:  PORTABLE CHEST URGENT                            PROCEDURE DATE:  2017        INTERPRETATION:  CHEST    Indication: shortness of breath    Technique:  A semi-upright portable chest radiograph is compared to a   previous study of 17.    FINDINGS:    Compared to the previous study, there is increased pulmonary vascular   congestion and interstitial edema.  Bibasilar opacities persists.  No   large pleural effusions.  Cardiac silhouette unchanged.    IMPRESSION:    Increasing interstitial pulmonary edema.  Persistent bibasilar opacities.              ELISE TILLMAN M.D, ATTENDING RADIOLOGIST  This document has been electronically signed. 2017  7:34PM            Echo:       EXAM:  ECHO TTE W/O CON COMP W/DOPPLR         PROCEDURE DATE:  2017        INTERPRETATION:  Ordering Physician: PANCHO VELASQUEZ    Indication: Dyspnea    Technician: JAJA    Study Quality: Poor   A complete echocardiographic study was performed utilizing standard   protocol including spectral and color Doppler in all echocardiographic   windows.    Height: 160 cm  Weight: 66 kg  BSA: 1.69m2  Blood Pressure: 110/66    MEASUREMENTS  IVS: 0.9cm  PWT: 0.8cm  LA: 4.2cm  AO: 3.2cm  LVIDd: 5.0cm  LVIDs: 3.5cm  LVOT:    cm    LVEF: 30%  RVSP: 33mm/Hg  RA Pressure: 10mm/Hg  IVC:    cm    FINDINGS  Left Ventricle: There is moderate to severe segmental left ventricular   systolic dysfunction. There is dyskinesis of the septum and akinesis of   the distal anterior wall and apex  Right Ventricle: Normal  Left Atrium: Mild dilatation  Right Atrium: Normal  Mitral Valve: Mitral annular calcification with mild mitral regurgitation  Aortic Valve: Well seated, normally functioning aortic valve   bioprosthesis with a peak gradient of 19 mm in mean gradient of 9 mm  Tricuspid Valve: Mild tricuspid regurgitation  Pulmonic Valve: Not visualized  Diastolic Function:      Pericardium/Pleura: No significant effusions noted      CONCLUSIONS:  Technically limited study  1. Moderate to severe segmental left ventricular systolic dysfunction  2. Mitral annular calcification with mild mitral regurgitation   3 well seated normally functioning aortic valve bioprosthesis                  FELICIA MALAGON M.D., ATTENDING CARDIOLOGIST  This document has been electronically signed. 2017  3:31PM

## 2017-04-15 NOTE — PROGRESS NOTE ADULT - PROBLEM SELECTOR PLAN 5
on xopenex inhaler  on spiriva  on high dose solumedrol  consider tapering to 30 mg IV BID  monitor leukemoid response  monitor sat, resp rate  rpt ABG this am  keep sat > 88 pct  incentive moreno when off BIPAP  pt is home o2 dependent copd patient  chest PT may be appropriate  add Symbicort BID

## 2017-04-15 NOTE — PROGRESS NOTE ADULT - PROBLEM SELECTOR PLAN 6
continue with BiPAP  continuous pulse ox home meds-oxycodone 10/325 TID prn hold for sedation/lethargy

## 2017-04-15 NOTE — PROGRESS NOTE ADULT - PROBLEM SELECTOR PLAN 7
home meds-oxycodone 10/325 TID prn hold for sedation/lethargy continue Abilify, Effexor, Klonopin PRN

## 2017-04-15 NOTE — PROGRESS NOTE ADULT - SUBJECTIVE AND OBJECTIVE BOX
Date/Time Patient Seen:  		  Referring MD:   Data Reviewed	       Patient is a 76y old  Female who presents with a chief complaint of SOB (13 Apr 2017 01:22)  pt in bed  remains hypoxemic  am ABG pending  on BIPAP  awake and alert  on triple ABX therapy  vs and meds reviewed      Subjective/HPI       Medication list         MEDICATIONS  (STANDING):  enoxaparin Injectable 40milliGRAM(s) SubCutaneous every 24 hours  aspirin enteric coated 81milliGRAM(s) Oral daily  simvastatin 20milliGRAM(s) Oral at bedtime  ARIPiprazole 2milliGRAM(s) Oral daily  docusate sodium 100milliGRAM(s) Oral three times a day  pantoprazole    Tablet 40milliGRAM(s) Oral before breakfast  verapamil SR 240milliGRAM(s) Oral daily  sodium chloride 0.9%. 1000milliLiter(s) IV Continuous <Continuous>  venlafaxine 75milliGRAM(s) Oral two times a day with meals  lactobacillus acidophilus 1Tablet(s) Oral three times a day with meals  aztreonam  IVPB  IV Intermittent   levalbuterol Inhalation 0.63milliGRAM(s) Inhalation every 8 hours  aztreonam  IVPB 1000milliGRAM(s) IV Intermittent every 8 hours  clonazePAM Tablet 0.25milliGRAM(s) Oral two times a day  vancomycin  IVPB 1000milliGRAM(s) IV Intermittent every 12 hours  methylPREDNISolone sodium succinate Injectable 40milliGRAM(s) IV Push every 12 hours  metroNIDAZOLE  IVPB  IV Intermittent   metroNIDAZOLE  IVPB 500milliGRAM(s) IV Intermittent every 8 hours    MEDICATIONS  (PRN):  acetaminophen   Tablet 650milliGRAM(s) Oral every 6 hours PRN For Temp greater than 38 C (100.4 F)  acetaminophen   Tablet. 650milliGRAM(s) Oral every 6 hours PRN Mild Pain (1 - 3)  senna 2Tablet(s) Oral at bedtime PRN Constipation  oxyCODONE  5 mG/acetaminophen 325 mG 2Tablet(s) Oral every 4 hours PRN Severe Pain (7 - 10)  zolpidem 5milliGRAM(s) Oral at bedtime PRN Insomnia  tiotropium 18 MICROgram(s) Capsule 1Capsule(s) Inhalation daily PRN sob  ALBUTerol    90 MICROgram(s) HFA Inhaler 2Puff(s) Inhalation every 6 hours PRN Shortness of Breath and/or Wheezing         Vitals log        ICU Vital Signs Last 24 Hrs  T(C): 36.6, Max: 36.6 (04-14 @ 16:07)  T(F): 97.8, Max: 97.9 (04-14 @ 16:07)  HR: 100 (90 - 130)  BP: 131/80 (103/60 - 198/138)  BP(mean): 100 (75 - 152)  ABP: --  ABP(mean): --  RR: 38 (21 - 38)  SpO2: 99% (90% - 99%)           Input and Output:  I&O's Detail  I & Os for 24h ending 14 Apr 2017 07:00  =============================================  IN:    Oral Fluid: 340 ml    Solution: 250 ml    Solution: 100 ml    Total IN: 690 ml  ---------------------------------------------  OUT:    Total OUT: 0 ml  ---------------------------------------------  Total NET: 690 ml    I & Os for current day (as of 15 Apr 2017 06:02)  =============================================  IN:    Solution: 250 ml    Solution: 200 ml    Solution: 100 ml    Oral Fluid: 60 ml    Total IN: 610 ml  ---------------------------------------------  OUT:    Indwelling Catheter - Urethral: 1115 ml    Total OUT: 1115 ml  ---------------------------------------------  Total NET: -505 ml      Lab Data                        12.9   13.8  )-----------( 223      ( 14 Apr 2017 06:38 )             39.1     04-14    144  |  106  |  15  ----------------------------<  152<H>  3.8   |  30  |  0.61    Ca    8.1<L>      14 Apr 2017 06:38  Phos  2.7     04-14  Mg     2.4     04-13      ABG - ( 14 Apr 2017 19:07 )  pH: 7.33  /  pCO2: 50    /  pO2: 69    / HCO3: 24    / Base Excess: 0.7   /  SaO2: 92                      Review of Systems	      Objective     Physical Examination    extr - no edema  heart - s1s2  lungs - dec BS  occ rhonchi  head at  cn grossly int      Pertinent Lab findings & Imaging      Ruben:  NO   Adequate UO     I&O's Detail  I & Os for 24h ending 14 Apr 2017 07:00  =============================================  IN:    Oral Fluid: 340 ml    Solution: 250 ml    Solution: 100 ml    Total IN: 690 ml  ---------------------------------------------  OUT:    Total OUT: 0 ml  ---------------------------------------------  Total NET: 690 ml    I & Os for current day (as of 15 Apr 2017 06:02)  =============================================  IN:    Solution: 250 ml    Solution: 200 ml    Solution: 100 ml    Oral Fluid: 60 ml    Total IN: 610 ml  ---------------------------------------------  OUT:    Indwelling Catheter - Urethral: 1115 ml    Total OUT: 1115 ml  ---------------------------------------------  Total NET: -505 ml           Discussed with:     Cultures:	        Radiology

## 2017-04-15 NOTE — PROGRESS NOTE ADULT - SUBJECTIVE AND OBJECTIVE BOX
Patient is a 76y old  Female who presents with a chief complaint of SOB (13 Apr 2017 01:22)    24 hour events: ***    Review of Systems:  Constitutional: No fever, chills, fatigue  Neuro: No headache, numbness, weakness  Resp: No cough, wheezing, shortness of breath  CVS: No chest pain, palpitations, leg swelling  GI: No abdominal pain, nausea, vomiting, diarrhea   : No dysuria, frequency, incontinence  Skin: No itching, burning, rashes, or lesions   Msk: No joint pain or swelling  Psych: No depression, anxiety, mood swings    T(F): 98.2, Max: 98.2 (04-15 @ 07:43)  HR: 101 (90 - 130)  BP: 144/83 (103/60 - 198/138)  RR: 28 (21 - 38)  SpO2: 92% (90% - 100%)  Wt(kg): --        CAPILLARY BLOOD GLUCOSE      I&O's Summary  I & Os for 24h ending 15 Apr 2017 07:00  =============================================  IN: 730 ml / OUT: 1450 ml / NET: -720 ml    I & Os for current day (as of 15 Apr 2017 11:21)  =============================================  IN: 350 ml / OUT: 75 ml / NET: 275 ml      Physical Exam:     Gen: elderly female, mild resp distress  Neuro: AAO x 4, nonfocal  HEENT: pupils equal and reactive  Resp: diffuse b/l wheezing, good air entry  CVS: S1S2, regular, tachy, 3/6 SM LLSB  Abd: soft, NT, +BS  Ext: no edema  Skin: well perfused    Meds:  metroNIDAZOLE  IVPB IV Intermittent  metroNIDAZOLE  IVPB IV Intermittent  vancomycin  IVPB IV Intermittent  aztreonam  IVPB IV Intermittent    verapamil SR Oral  furosemide   Injectable IV Push    simvastatin Oral  predniSONE   Tablet Oral    tiotropium 18 MICROgram(s) Capsule Inhalation  tiotropium 18 MICROgram(s) Capsule Inhalation  ALBUTerol    0.083% Nebulizer    acetaminophen   Tablet Oral PRN  acetaminophen   Tablet. Oral PRN  oxyCODONE  5 mG/acetaminophen 325 mG Oral PRN  ARIPiprazole Oral  zolpidem Oral PRN  venlafaxine Oral  clonazePAM Tablet Oral      enoxaparin Injectable SubCutaneous  aspirin enteric coated Oral    senna Oral PRN  docusate sodium Oral  pantoprazole    Tablet Oral            lactobacillus acidophilus Oral                            13.6   13.7  )-----------( 231      ( 15 Apr 2017 06:06 )             41.9       04-15    139  |  101  |  15  ----------------------------<  153<H>  3.7   |  31  |  0.52    Ca    8.5      15 Apr 2017 06:06  Phos  2.7     04-14                .Urine Clean Catch (Midstream)   No growth -- 04-13 @ 08:38  .Blood Blood-Peripheral   No growth to date. -- 04-13 @ 08:33      Rapid RVP Result: NotDetec (04-15 @ 08:11)      Radiology: ***    Bedside lung ultrasound: anterior a-lines with few scattered b-lines, diffuse b-lines bibasilar, no significant effusion    Bedside ECHO: severe LV systolic dysfunction, MR, TR, IVC 2 cm, no pericardial effusion    CENTRAL LINE: Y/N          DATE INSERTED:              REMOVE: Y/N    TORRES: Y/N                        DATE INSERTED:              REMOVE: Y/N    A-LINE: Y/N                       DATE INSERTED:              REMOVE: Y/N    GLOBAL ISSUE/BEST PRACTICE:  Analgesia:  Sedation:  HOB elevation: yes  Stress ulcer prophylaxis:  VTE prophylaxis:  Glycemic control:  Nutrition:      CODE STATUS: ***  [facility] discussion: Y  Critical care time spent (mins): *** Patient is a 76y old  Female who presents with a chief complaint of SOB (13 Apr 2017 01:22)    24 hour events: much improved today, off bipap since am    Review of Systems:  Constitutional: No fever, chills, fatigue  Neuro: No headache, numbness, weakness  Resp: +SOB, worse with exertion, occasional cough, no wheezing  CVS: No chest pain, palpitations, leg swelling  GI: No abdominal pain, nausea, vomiting, diarrhea   : No dysuria, frequency, incontinence  Skin: No itching, burning, rashes, or lesions   Msk: No joint pain or swelling  Psych: No depression, anxiety, mood swings    T(F): 98.2, Max: 98.2 (04-15 @ 07:43)  HR: 101 (90 - 130)  BP: 144/83 (103/60 - 198/138)  RR: 28 (21 - 38)  SpO2: 92% (90% - 100%)    I&O's Summary  I & Os for 24h ending 15 Apr 2017 07:00  =============================================  IN: 730 ml / OUT: 1450 ml / NET: -720 ml    I & Os for current day (as of 15 Apr 2017 11:21)  =============================================  IN: 350 ml / OUT: 75 ml / NET: 275 ml    Physical Exam:     Gen: elderly female, mild resp distress  Neuro: AAO x 4, nonfocal  HEENT: pupils equal and reactive  Resp: diffuse b/l wheezing, good air entry  CVS: S1S2, regular, tachy, 3/6 SM LLSB  Abd: soft, NT, +BS  Ext: no edema  Skin: well perfused    Meds:  metroNIDAZOLE  IVPB IV Intermittent  vancomycin  IVPB IV Intermittent  aztreonam  IVPB IV Intermittent  furosemide   Injectable IV Push  simvastatin Oral  predniSONE   Tablet Oral  tiotropium 18 MICROgram(s) Capsule Inhalation  ALBUTerol    0.083% Nebulizer  oxyCODONE  5 mG/acetaminophen 325 mG Oral PRN  ARIPiprazole Oral  zolpidem Oral PRN  venlafaxine Oral  clonazePAM Tablet Oral  enoxaparin Injectable SubCutaneous  aspirin enteric coated Oral  pantoprazole    Tablet Oral  lactobacillus acidophilus Oral                        13.6   13.7  )-----------( 231      ( 15 Apr 2017 06:06 )             41.9     04-15    139  |  101  |  15  ----------------------------<  153<H>  3.7   |  31  |  0.52    Ca    8.5      15 Apr 2017 06:06  Phos  2.7     04-14    Urine Clean Catch (Midstream)   No growth -- 04-13 @ 08:38    Blood Blood-Peripheral   No growth to date. -- 04-13 @ 08:33    Rapid RVP Result: NotDetec (04-15 @ 08:11)    Bedside lung ultrasound: anterior a-lines with few scattered b-lines, diffuse b-lines bibasilar, no significant effusion    Bedside ECHO: severe LV systolic dysfunction, MR, TR, IVC 2 cm, no pericardial effusion    CENTRAL LINE: N        TORRES: Y                 REMOVE: Y    A-LINE: N    GLOBAL ISSUE/BEST PRACTICE:  Analgesia: Y  Sedation: NA  HOB elevation: yes  Stress ulcer prophylaxis: NA  VTE prophylaxis: Y  Glycemic control: NA  Nutrition: Y    CODE STATUS: full  [facility] discussion: Y  Critical care time spent (mins): 33

## 2017-04-16 DIAGNOSIS — F32.9 MAJOR DEPRESSIVE DISORDER, SINGLE EPISODE, UNSPECIFIED: ICD-10-CM

## 2017-04-16 LAB
ALBUMIN SERPL ELPH-MCNC: 2.8 G/DL — LOW (ref 3.3–5)
ALP SERPL-CCNC: 60 U/L — SIGNIFICANT CHANGE UP (ref 40–120)
ALT FLD-CCNC: 37 U/L — SIGNIFICANT CHANGE UP (ref 12–78)
ANION GAP SERPL CALC-SCNC: 8 MMOL/L — SIGNIFICANT CHANGE UP (ref 5–17)
AST SERPL-CCNC: 18 U/L — SIGNIFICANT CHANGE UP (ref 15–37)
BASOPHILS # BLD AUTO: 0.1 K/UL — SIGNIFICANT CHANGE UP (ref 0–0.2)
BASOPHILS NFR BLD AUTO: 0.5 % — SIGNIFICANT CHANGE UP (ref 0–2)
BILIRUB SERPL-MCNC: 0.4 MG/DL — SIGNIFICANT CHANGE UP (ref 0.2–1.2)
BUN SERPL-MCNC: 20 MG/DL — SIGNIFICANT CHANGE UP (ref 7–23)
CALCIUM SERPL-MCNC: 8.7 MG/DL — SIGNIFICANT CHANGE UP (ref 8.5–10.1)
CHLORIDE SERPL-SCNC: 98 MMOL/L — SIGNIFICANT CHANGE UP (ref 96–108)
CO2 SERPL-SCNC: 33 MMOL/L — HIGH (ref 22–31)
CREAT SERPL-MCNC: 0.58 MG/DL — SIGNIFICANT CHANGE UP (ref 0.5–1.3)
EOSINOPHIL # BLD AUTO: 0.1 K/UL — SIGNIFICANT CHANGE UP (ref 0–0.5)
EOSINOPHIL NFR BLD AUTO: 0.7 % — SIGNIFICANT CHANGE UP (ref 0–6)
GLUCOSE SERPL-MCNC: 102 MG/DL — HIGH (ref 70–99)
HCT VFR BLD CALC: 38.8 % — SIGNIFICANT CHANGE UP (ref 34.5–45)
HGB BLD-MCNC: 12.6 G/DL — SIGNIFICANT CHANGE UP (ref 11.5–15.5)
LYMPHOCYTES # BLD AUTO: 2.8 K/UL — SIGNIFICANT CHANGE UP (ref 1–3.3)
LYMPHOCYTES # BLD AUTO: 23 % — SIGNIFICANT CHANGE UP (ref 13–44)
MAGNESIUM SERPL-MCNC: 2.7 MG/DL — HIGH (ref 1.8–2.4)
MCHC RBC-ENTMCNC: 31.9 PG — SIGNIFICANT CHANGE UP (ref 27–34)
MCHC RBC-ENTMCNC: 32.5 GM/DL — SIGNIFICANT CHANGE UP (ref 32–36)
MCV RBC AUTO: 98.1 FL — SIGNIFICANT CHANGE UP (ref 80–100)
MONOCYTES # BLD AUTO: 1.4 K/UL — HIGH (ref 0–0.9)
MONOCYTES NFR BLD AUTO: 11.7 % — HIGH (ref 1–9)
NEUTROPHILS # BLD AUTO: 7.9 K/UL — HIGH (ref 1.8–7.4)
NEUTROPHILS NFR BLD AUTO: 64.2 % — SIGNIFICANT CHANGE UP (ref 43–77)
PHOSPHATE SERPL-MCNC: 2.5 MG/DL — SIGNIFICANT CHANGE UP (ref 2.5–4.5)
PLATELET # BLD AUTO: 205 K/UL — SIGNIFICANT CHANGE UP (ref 150–400)
POTASSIUM SERPL-MCNC: 3.3 MMOL/L — LOW (ref 3.5–5.3)
POTASSIUM SERPL-SCNC: 3.3 MMOL/L — LOW (ref 3.5–5.3)
PROT SERPL-MCNC: 6.3 G/DL — SIGNIFICANT CHANGE UP (ref 6–8.3)
RBC # BLD: 3.96 M/UL — SIGNIFICANT CHANGE UP (ref 3.8–5.2)
RBC # FLD: 13 % — SIGNIFICANT CHANGE UP (ref 10.3–14.5)
SODIUM SERPL-SCNC: 139 MMOL/L — SIGNIFICANT CHANGE UP (ref 135–145)
TROPONIN I SERPL-MCNC: 0.41 NG/ML — HIGH (ref 0.01–0.04)
WBC # BLD: 12.3 K/UL — HIGH (ref 3.8–10.5)
WBC # FLD AUTO: 12.3 K/UL — HIGH (ref 3.8–10.5)

## 2017-04-16 PROCEDURE — 99291 CRITICAL CARE FIRST HOUR: CPT

## 2017-04-16 PROCEDURE — 99233 SBSQ HOSP IP/OBS HIGH 50: CPT

## 2017-04-16 RX ORDER — POTASSIUM CHLORIDE 20 MEQ
40 PACKET (EA) ORAL ONCE
Qty: 0 | Refills: 0 | Status: COMPLETED | OUTPATIENT
Start: 2017-04-16 | End: 2017-04-16

## 2017-04-16 RX ORDER — SODIUM CHLORIDE 0.65 %
1 AEROSOL, SPRAY (ML) NASAL
Qty: 0 | Refills: 0 | Status: DISCONTINUED | OUTPATIENT
Start: 2017-04-16 | End: 2017-04-18

## 2017-04-16 RX ORDER — FUROSEMIDE 40 MG
40 TABLET ORAL
Qty: 0 | Refills: 0 | Status: DISCONTINUED | OUTPATIENT
Start: 2017-04-16 | End: 2017-04-18

## 2017-04-16 RX ORDER — CLONAZEPAM 1 MG
0.25 TABLET ORAL
Qty: 0 | Refills: 0 | Status: DISCONTINUED | OUTPATIENT
Start: 2017-04-16 | End: 2017-04-18

## 2017-04-16 RX ADMIN — Medication 100 MILLIGRAM(S): at 13:24

## 2017-04-16 RX ADMIN — Medication 100 MILLIGRAM(S): at 06:27

## 2017-04-16 RX ADMIN — Medication 40 MILLIGRAM(S): at 17:20

## 2017-04-16 RX ADMIN — ALBUTEROL 2.5 MILLIGRAM(S): 90 AEROSOL, METERED ORAL at 01:43

## 2017-04-16 RX ADMIN — ALBUTEROL 2.5 MILLIGRAM(S): 90 AEROSOL, METERED ORAL at 13:32

## 2017-04-16 RX ADMIN — Medication 40 MILLIEQUIVALENT(S): at 13:24

## 2017-04-16 RX ADMIN — ENOXAPARIN SODIUM 40 MILLIGRAM(S): 100 INJECTION SUBCUTANEOUS at 06:26

## 2017-04-16 RX ADMIN — ALBUTEROL 2.5 MILLIGRAM(S): 90 AEROSOL, METERED ORAL at 07:42

## 2017-04-16 RX ADMIN — Medication 0.25 MILLIGRAM(S): at 06:26

## 2017-04-16 RX ADMIN — ALBUTEROL 2.5 MILLIGRAM(S): 90 AEROSOL, METERED ORAL at 19:58

## 2017-04-16 RX ADMIN — Medication 100 MILLIGRAM(S): at 13:25

## 2017-04-16 RX ADMIN — Medication 100 MILLIGRAM(S): at 21:57

## 2017-04-16 RX ADMIN — Medication 75 MILLIGRAM(S): at 07:55

## 2017-04-16 RX ADMIN — SIMVASTATIN 20 MILLIGRAM(S): 20 TABLET, FILM COATED ORAL at 21:57

## 2017-04-16 RX ADMIN — Medication 100 MILLIGRAM(S): at 06:26

## 2017-04-16 RX ADMIN — Medication 1 TABLET(S): at 11:15

## 2017-04-16 RX ADMIN — PANTOPRAZOLE SODIUM 40 MILLIGRAM(S): 20 TABLET, DELAYED RELEASE ORAL at 07:55

## 2017-04-16 RX ADMIN — ZOLPIDEM TARTRATE 5 MILLIGRAM(S): 10 TABLET ORAL at 02:57

## 2017-04-16 RX ADMIN — Medication 100 MILLIGRAM(S): at 21:58

## 2017-04-16 RX ADMIN — Medication 1 TABLET(S): at 07:55

## 2017-04-16 RX ADMIN — Medication 0.25 MILLIGRAM(S): at 18:31

## 2017-04-16 RX ADMIN — Medication 1 SPRAY(S): at 17:21

## 2017-04-16 RX ADMIN — Medication 75 MILLIGRAM(S): at 17:20

## 2017-04-16 RX ADMIN — Medication 40 MILLIGRAM(S): at 06:26

## 2017-04-16 RX ADMIN — Medication 100 MILLIGRAM(S): at 14:40

## 2017-04-16 RX ADMIN — Medication 166.67 MILLIGRAM(S): at 08:00

## 2017-04-16 RX ADMIN — Medication 40 MILLIGRAM(S): at 06:27

## 2017-04-16 RX ADMIN — Medication 81 MILLIGRAM(S): at 11:14

## 2017-04-16 RX ADMIN — ARIPIPRAZOLE 2 MILLIGRAM(S): 15 TABLET ORAL at 21:57

## 2017-04-16 RX ADMIN — Medication 1 TABLET(S): at 17:21

## 2017-04-16 RX ADMIN — ZOLPIDEM TARTRATE 5 MILLIGRAM(S): 10 TABLET ORAL at 23:37

## 2017-04-16 NOTE — PROGRESS NOTE ADULT - PROBLEM SELECTOR PLAN 4
nebs  symbicort  spiriva  prednisone, would taper slowly  o2 during the day  keep sat > 88 pct  monitor vs and sat  increase activity

## 2017-04-16 NOTE — PROGRESS NOTE ADULT - SUBJECTIVE AND OBJECTIVE BOX
Follow up: SOB, AVR, CM    HPI:  Patient has had an uneventful night with improvement in her acute respiratory distress since transfer back to the intensive care unit. She was felt to have acute pulmonary edema and has no evidence for new active cardiac issues such as ischemia or dysrhythmias. She relates her episodic dyspnea to a fullness in her abdomen when bending over. She is currently resting comfortably.            PAST MEDICAL & SURGICAL HISTORY:  TMJ (temporomandibular joint disorder)  Diverticulitis: hospitalized   SIMÓN (obstructive sleep apnea): category I severe pt does not use c/pap  Thrush: treated x 4 days  1 month ago  restarted medication  3-19-14 clortramazole 5x day  Anxiety  RA (rheumatoid arthritis): diagnosed   oxycodone prn  neck/ lower back  Depression  Borderline diabetes: no meds  COPD (chronic obstructive pulmonary disease): diagnosed   inhaler and nebulizer  HTN (hypertension)  PVD (peripheral vascular disease): left iliac  s/p surgical intervention   unsuccessful  Hiatal hernia: s/p surgical repair   Breast cancer: right s/p lumpectomy and radiation   TIA (transient ischemic attack): 2010  Hyperlipidemia  Valvular heart disease: aortic and mitral  PVD (peripheral vascular disease): s/p left iliac bypass which was unsuccessful  open repair  S/P carpal tunnel release: right   S/P lumpectomy, right breast: 2008  Hiatal hernia: s/p surgical repair per pt   S/P hernia surgery: left inguinal age 12  S/P rotator cuff surgery: left   S/P appendectomy:   S/P  section: x2 /       MEDICATIONS  (STANDING):  enoxaparin Injectable 40milliGRAM(s) SubCutaneous every 24 hours  aspirin enteric coated 81milliGRAM(s) Oral daily  simvastatin 20milliGRAM(s) Oral at bedtime  tiotropium 18 MICROgram(s) Capsule 1Capsule(s) Inhalation daily  docusate sodium 100milliGRAM(s) Oral three times a day  pantoprazole    Tablet 40milliGRAM(s) Oral before breakfast  venlafaxine 75milliGRAM(s) Oral two times a day with meals  lactobacillus acidophilus 1Tablet(s) Oral three times a day with meals  clonazePAM Tablet 0.25milliGRAM(s) Oral two times a day  metroNIDAZOLE  IVPB  IV Intermittent   metroNIDAZOLE  IVPB 500milliGRAM(s) IV Intermittent every 8 hours  furosemide   Injectable 40milliGRAM(s) IV Push two times a day  vancomycin  IVPB 1250milliGRAM(s) IV Intermittent every 12 hours  aztreonam  IVPB 2000milliGRAM(s) IV Intermittent every 8 hours  buDESOnide 160 MICROgram(s)/formoterol 4.5 MICROgram(s) Inhaler 2Puff(s) Inhalation two times a day  predniSONE   Tablet 40milliGRAM(s) Oral daily  ALBUTerol    0.083% 2.5milliGRAM(s) Nebulizer every 6 hours  ARIPiprazole 2milliGRAM(s) Oral at bedtime    MEDICATIONS  (PRN):  acetaminophen   Tablet 650milliGRAM(s) Oral every 6 hours PRN For Temp greater than 38 C (100.4 F)  acetaminophen   Tablet. 650milliGRAM(s) Oral every 6 hours PRN Mild Pain (1 - 3)  senna 2Tablet(s) Oral at bedtime PRN Constipation  oxyCODONE  5 mG/acetaminophen 325 mG 2Tablet(s) Oral every 4 hours PRN Severe Pain (7 - 10)  zolpidem 5milliGRAM(s) Oral at bedtime PRN Insomnia    Vital Signs Last 24 Hrs  T(C): 36.8, Max: 36.8 (04-15 @ 20:00)  T(F): 98.3, Max: 98.3 (04-16 @ 11:16)  HR: 96 (72 - 104)  BP: 113/63 (92/50 - 128/61)  BP(mean): 82 (66 - 90)  RR: 25 (15 - 40)  SpO2: 98% (94% - 100%)    I&O's Summary  I & Os for 24h ending 2017 07:00  =============================================  IN: 1725 ml / OUT: 2935 ml / NET: -1210 ml    I & Os for current day (as of 2017 12:03)  =============================================  IN: 525 ml / OUT: 400 ml / NET: 125 ml      PHYSICAL EXAM:    Constitutional: less distress, awake and alert, well-developed  Eyes:  EOMI,  Pupils round, no lesions  ENMT: no exudate or erythema  Pulmonary: Scattered rhonchi,  where scattered wheezes  Cardiovascular: PMI not palpable Regular S1 and S2, no murmurs, rubs, gallops or clicks  Gastrointestinal: Bowel Sounds present, soft, nontender.   Lymph: No cervical lymphadenopathy.  Neurological: Alert, no focal deficits  Skin: No rashes. Changes of chronic venous stasis. No cyanosis.  Psych:  Mood & affect appropriate Confused.      CARDIAC MARKERS ( 2017 07:09 )  .415 ng/mL / x     / x     / x     / x      CARDIAC MARKERS ( 15 Apr 2017 20:42 )  .473 ng/mL / x     / x     / x     / x      CARDIAC MARKERS ( 15 Apr 2017 14:48 )  .665 ng/mL / x     / x     / x     / x                                12.6   12.3  )-----------( 205      ( 2017 07:09 )             38.8     CBC Full  -  ( 2017 07:09 )  WBC Count : 12.3 K/uL  Hemoglobin : 12.6 g/dL  Hematocrit : 38.8 %  Platelet Count - Automated : 205 K/uL  Mean Cell Volume : 98.1 fl  Mean Cell Hemoglobin : 31.9 pg  Mean Cell Hemoglobin Concentration : 32.5 gm/dL  Auto Neutrophil # : 7.9 K/uL  Auto Lymphocyte # : 2.8 K/uL  Auto Monocyte # : 1.4 K/uL  Auto Eosinophil # : 0.1 K/uL  Auto Basophil # : 0.1 K/uL  Auto Neutrophil % : 64.2 %  Auto Lymphocyte % : 23.0 %  Auto Monocyte % : 11.7 %  Auto Eosinophil % : 0.7 %  Auto Basophil % : 0.5 %    -16    139  |  98  |  20  ----------------------------<  102<H>  3.3<L>   |  33<H>  |  0.58    Ca    8.7      2017 07:09  Phos  2.5     04-16  Mg     2.7     04-16    TPro  6.3  /  Alb  2.8<L>  /  TBili  0.4  /  DBili  x   /  AST  18  /  ALT  37  /  AlkPhos  60  04-16    rad      EXAM:  PORTABLE CHEST URGENT                            PROCEDURE DATE:  2017        INTERPRETATION:  CHEST    Indication: shortness of breath    Technique:  A semi-upright portable chest radiograph is compared to a   previous study of 17.    FINDINGS:    Compared to the previous study, there is increased pulmonary vascular   congestion and interstitial edema.  Bibasilar opacities persists.  No   large pleural effusions.  Cardiac silhouette unchanged.    IMPRESSION:    Increasing interstitial pulmonary edema.  Persistent bibasilar opacities.              ELISE TILLMAN M.D, ATTENDING RADIOLOGIST  This document has been electronically signed. 2017  7:34PM            CT of the chest on  revealed no evidence of pulmonary emboli, some interstitial edema, ground glass and airspace opacities at the lung bases possibly due to pneumonia    Electrocardiography reveals sinus rhythm with a left bundle branch block    Echocardiography reveals moderate to severe segmental left ventricular systolic dysfunction and a normally functioning aortic valve bioprosthesis

## 2017-04-16 NOTE — PROGRESS NOTE ADULT - PROBLEM SELECTOR PLAN 1
S/p acute respiratory distress2 to Ac Pulmonary Edema, Ac on Chronic Systolic CHF  -S/P  BIPAP, q HS  CTA - NEG PE, B/L Effusions, PNA  - CXR suggestive for vascular congestion.    WBC, current leukocytosis prob a combination of steroids effect and possible  PNA on CT Angio.  -monitor oxygen and keep >90, continuous pulse ox  -Xopenex standing, Spiriva, Symbicort BID, PRN Albuterol  -On Prednisone 40mg daily  - cultures - neg Pulmonary  DR Rivera follow up  ID Consult-Man, continue IV Vanco ,Azactam, For PNA, Pt also on IV Flagyl in ICU S/p acute respiratory distress2 to Ac Pulmonary Edema, Ac on Chronic Systolic CHF  -S/P  BIPAP, q HS  CTA - NEG PE, B/L Effusions, PNA  - CXR suggestive for vascular congestion.    WBC mild leukocytosis 2 to combination of steroids effect and possible  PNA on CT Angio.  -monitor oxygen and keep >90, continuous pulse ox  -Xopenex standing, Spiriva, Symbicort BID, PRN Albuterol  -On Prednisone 40mg daily  - cultures - neg Pulmonary  DR Rivera follow up  ID Consult-Man, continue IV Vanco ,Azactam, For PNA, Pt also on IV Flagyl in ICU

## 2017-04-16 NOTE — PROGRESS NOTE ADULT - SUBJECTIVE AND OBJECTIVE BOX
Patient is a 76y old  Female who presents with a chief complaint of SOB (13 Apr 2017 01:22)      INTERVAL HPI:  :Patient is a 76-year-old female with a past medical history of COPD, hypertension, hyperlipidemia, diverticulitis, depression, Rt breast cancer, status post lumpectomy and radiation 2008, anxiety, hiatal hernia-status post surgical repair in 2005, obstructive sleep apnea (not on BiPAP at home), PVD, rheumatoid arthritis, thrush, TIA (2010), TMJ, valvular heart disease (aortic and mitral),, H/O [place]u AVR 2014, borderline diabetes (not on medications), presents to ED via EMS from home with chief complaint of difficulty breathing. Daughter at bedside providing additional history.  Pt seen and examined at bedside. Pt admits to improved sob from admission, however states she still has wheezing. Admits to anxiety making the sob worse. Denies chest pain, palpitation, dizziness, abdominal pain. Off Bi PAP, pt in ICU now s/p Acute respiratory distress, S/P IV Lasix give, Miranda cat for I/O. CXR showed Pulmonary congestion. RVP negative,On PO steroids .BiPAP at night          OVERNIGHT EVENTS:NONE    MEDICATIONS  (STANDING):  enoxaparin Injectable 40milliGRAM(s) SubCutaneous every 24 hours  aspirin enteric coated 81milliGRAM(s) Oral daily  simvastatin 20milliGRAM(s) Oral at bedtime  tiotropium 18 MICROgram(s) Capsule 1Capsule(s) Inhalation daily  docusate sodium 100milliGRAM(s) Oral three times a day  pantoprazole    Tablet 40milliGRAM(s) Oral before breakfast  venlafaxine 75milliGRAM(s) Oral two times a day with meals  lactobacillus acidophilus 1Tablet(s) Oral three times a day with meals  clonazePAM Tablet 0.25milliGRAM(s) Oral two times a day  metroNIDAZOLE  IVPB  IV Intermittent   metroNIDAZOLE  IVPB 500milliGRAM(s) IV Intermittent every 8 hours  furosemide   Injectable 40milliGRAM(s) IV Push two times a day  vancomycin  IVPB 1250milliGRAM(s) IV Intermittent every 12 hours  aztreonam  IVPB 2000milliGRAM(s) IV Intermittent every 8 hours  buDESOnide 160 MICROgram(s)/formoterol 4.5 MICROgram(s) Inhaler 2Puff(s) Inhalation two times a day  predniSONE   Tablet 40milliGRAM(s) Oral daily  ALBUTerol    0.083% 2.5milliGRAM(s) Nebulizer every 6 hours  ARIPiprazole 2milliGRAM(s) Oral at bedtime    MEDICATIONS  (PRN):  acetaminophen   Tablet 650milliGRAM(s) Oral every 6 hours PRN For Temp greater than 38 C (100.4 F)  acetaminophen   Tablet. 650milliGRAM(s) Oral every 6 hours PRN Mild Pain (1 - 3)  senna 2Tablet(s) Oral at bedtime PRN Constipation  oxyCODONE  5 mG/acetaminophen 325 mG 2Tablet(s) Oral every 4 hours PRN Severe Pain (7 - 10)  zolpidem 5milliGRAM(s) Oral at bedtime PRN Insomnia      Allergies    penicillins (Hives)  quinolines (Other)      REVIEW OF SYSTEMS:  CONSTITUTIONAL: No fever, No chills, No fatigue, No myalgia, No Body ache  EYES: No eye pain, visual disturbances, or discharge  ENMT:  No ear pain, No nose bleed, No vertigo; No sinus or throat pain, No Congestion  NECK: No pain, No stiffness  RESPIRATORY:+ cough, wheezing, No  hemoptysis, No shortness of breath  CARDIOVASCULAR: No chest pain, palpitations  GASTROINTESTINAL: No abdominal or epigastric pain. No nausea, No vomiting; No diarrhea or constipation. [ x ] BM  GENITOURINARY: No dysuria, No frequency, No urgency, No hematuria, or incontinence  NEUROLOGICAL: No headaches, No dizziness, No numbness, No tingling, No tremors, No weakness  EXT: No Swelling, No Pain, No Edema  SKIN:  [x  ] No itching, burning, rashes, or lesions   MUSCULOSKELETAL: No joint pain or swelling; No muscle pain, No back pain, No extremity pain  PSYCHIATRIC: No depression, anxiety, mood swings or difficulty sleeping at night  PAIN SCALE: [x  ] None  [  ] Other-  ROS Unable to obtain due to - [  ] Dementia  [  ] Lethargy  [  ] Sedated   REST OF REVIEW Of SYSTEM - [ x ] Normal     Vital Signs Last 24 Hrs  T(C): 36.7, Max: 36.8 (04-15 @ 20:00)  T(F): 98.1, Max: 98.2 (04-15 @ 20:00)  HR: 96 (72 - 109)  BP: 113/63 (92/50 - 152/70)  BP(mean): 82 (66 - 101)  RR: 25 (15 - 40)  SpO2: 98% (94% - 100%)  Finger Stick      I & Os for 24h ending 04-16 @ 07:00  =============================================  IN: 1725 ml / OUT: 2935 ml / NET: -1210 ml    I & Os for current day (as of 04-16 @ 11:12)  =============================================  IN: 450 ml / OUT: 400 ml / NET: 50 ml      PHYSICAL EXAM:  GENERAL:  [ x ] NAD , [ x ] well appearing, [  ] Agitated, [  ] Lethargy, [  ] confused   HEAD:  [x  ] Normal, [  ] Other  EYES:  [ x ] EOMI, [x  ] PERRLA, [x  ] conjunctiva and sclera clear normal, [  ] Other,  [  ] Pallor,[  ] Discharge  ENMT:  [ x ] Normal, [x  ] Dry mucous membranes, [ x ] Good dentition, [x  ] No Thrush  NECK:  [ x ] Supple, [ x ] No JVD, [ x ] Normal thyroid, [  ] Lymphadenopathy [  ] Other  NERVOUS SYSTEM:  [ x ] Alert & Oriented X3, [ x ] Nonfocal   [  ] Confusion  [  ] Encephalopathic [  ] Sedated [  ] Other-  CHEST/LUNG:  [  ] Clear to auscultation bilaterally, [x  ] + rales B/L decreased, [ x ] No rhonchi  [ x ]  No wheezing  HEART:  [x  ] Regular rate and rhythm  [  ] irregular  [x  ] 2/6 murmurs, NO rubs, or gallops, [  ] PPM in place (Mfr:  )  ABDOMEN:  [ x ] Soft, [ x ] Nontender, [ x ] distended, [x  ]No mass, [ x ] Bowel sounds present, [  ] obese  EXTREMITIES: [ x ] 2+ Peripheral Pulses, No clubbing, cyanosis,  [  ] edema, [  ] PVD stasis skin changes  LYMPH: No lymphadenopathy noted  SKIN:  [ x ] No rashes or lesions, [  ] Pressure Ulcers, [  ] echymosis, [  ] Other    DIET: Cardiac    LABS:                        12.6   12.3  )-----------( 205      ( 16 Apr 2017 07:09 )             38.8     16 Apr 2017 07:09    139    |  98     |  20     ----------------------------<  102    3.3     |  33     |  0.58     Ca    8.7        16 Apr 2017 07:09  Phos  2.5       16 Apr 2017 07:09  Mg     2.7       16 Apr 2017 07:09    TPro  6.3    /  Alb  2.8    /  TBili  0.4    /  DBili  x      /  AST  18     /  ALT  37     /  AlkPhos  60     16 Apr 2017 07:09          Culture Results:   No growth (04-13 @ 08:38)  Culture Results:   No growth to date. (04-13 @ 08:33)  Culture Results:   No growth to date. (04-13 @ 08:33)          RECENT CULTURES:  04-13 @ 08:38 .Urine Clean Catch (Midstream)                No growth    04-13 @ 08:33 .Blood Blood-Peripheral                No growth to date.      cardiac Enzyme:  04-16 @ 07:09    CK:  --    CKMB:  --    CPK Mass Assay:  --    troponin i:  .415    BNP: --  04-15 @ 20:42    CK:  --    CKMB:  --    CPK Mass Assay:  --    troponin i:  .473    BNP: --  04-15 @ 14:48    CK:  --    CKMB:  --    CPK Mass Assay:  --    troponin i:  .665    BNP: --  04-15 @ 06:06    CK:  --    CKMB:  --    CPK Mass Assay:  --    troponin i:  --    BNP: 60612  04-13 @ 00:26    CK:  222    CKMB:  --    CPK Mass Assay:  2.6    troponin i:  .030    BNP: 515    HEALTH ISSUES - PROBLEM Dx:  Systolic CHF with reduced left ventricular function, NYHA class 2: Systolic CHF with reduced left ventricular function, NYHA class 2  Pneumonia, unspecified organism: Pneumonia, unspecified organism  Anxiety: Anxiety  Need for prophylactic measure: Need for prophylactic measure  Hiatal hernia: Hiatal hernia  RA (rheumatoid arthritis): RA (rheumatoid arthritis)  PVD (peripheral vascular disease): PVD (peripheral vascular disease)  SIMÓN (obstructive sleep apnea): SIMÓN (obstructive sleep apnea)  Hyperlipidemia: Hyperlipidemia  HTN (hypertension): HTN (hypertension)  Valvular heart disease: Valvular heart disease  Lactate blood increase: Lactate blood increase  COPD exacerbation: COPD exacerbation          Consultant(s) Notes Reviewed:  [ x ] YES     Care Discussed with [X] Consultants  [ x ] Patient  [ x ] Family  [  ]   [  ] Social Service  [  ] RN, [  ] Physical Therapy  DVT PPX: [x  ] Lovenox, [  ] S C Heparin, [  ] Coumadin, [  ] Xarelto, [  ] Eliquis, [  ] SCD   Advanced directive: [  ] None, [  ] DNR/DNI

## 2017-04-16 NOTE — PROGRESS NOTE ADULT - ATTENDING COMMENTS
76 F PMHx COPD, HTN, HLD, depression, anxiety, breast ca, SIMÓN (does not use CPAP), PVD, AVR admitted with acute hypoxic resp failure due to acute pulmonary edema and acute systolic heart failure, suspect a component of aspiration pneumonia and COPD exacerbation too.     -clinically stable  -lasix 40 mg po q12  -cx neg, d/c vancomycin, continue aztreonam, flagyl, will change to po tomorrow  -aspiration precautions  -bipap qhs, prn  -taper off prednisone  -continue spiriva, albuterol  -OOB  -po diet  -eventual ischemic w/u per cardiology  -transfer to tele tomorrow if stable

## 2017-04-16 NOTE — PROGRESS NOTE ADULT - PROBLEM SELECTOR PLAN 2
S/P Ac pulmonary Edema  IV Lasix 40 mg q 12 hrs, Fluid restrictions  I/O, 2D ECHO -EF ~30%  Cardio Dr Henderson S/P Ac pulmonary Edema with elevated Troponin 2 to Demand ischemia, NO ACS  IV Lasix 40 mg q 12 hrs, Fluid restrictions  I/O, 2D ECHO -EF ~30%  Cardio Dr Henderson  Replace PO JORGE Lang, for hypokalemia

## 2017-04-16 NOTE — PROGRESS NOTE ADULT - SUBJECTIVE AND OBJECTIVE BOX
infectious diseases progress note:  JASMIN HANSEN is a 76y y. o.Female patient        Patient reports: breathing is much better today      ROS:    EYES:  Negative  blurry vision or double vision  GASTROINTESTINAL:  Negative for nausea, vomiting, diarrhea  -otherwise negative except for subjective    Allergies    penicillins (Hives)  quinolines (Other)    Intolerances        ANTIBIOTICS/RELEVANT:  antimicrobials  metroNIDAZOLE  IVPB  IV Intermittent   metroNIDAZOLE  IVPB 500milliGRAM(s) IV Intermittent every 8 hours  vancomycin  IVPB 1250milliGRAM(s) IV Intermittent every 12 hours  aztreonam  IVPB 2000milliGRAM(s) IV Intermittent every 8 hours    immunologic:    OTHER:  enoxaparin Injectable 40milliGRAM(s) SubCutaneous every 24 hours  acetaminophen   Tablet 650milliGRAM(s) Oral every 6 hours PRN  acetaminophen   Tablet. 650milliGRAM(s) Oral every 6 hours PRN  senna 2Tablet(s) Oral at bedtime PRN  aspirin enteric coated 81milliGRAM(s) Oral daily  oxyCODONE  5 mG/acetaminophen 325 mG 2Tablet(s) Oral every 4 hours PRN  simvastatin 20milliGRAM(s) Oral at bedtime  zolpidem 5milliGRAM(s) Oral at bedtime PRN  tiotropium 18 MICROgram(s) Capsule 1Capsule(s) Inhalation daily  docusate sodium 100milliGRAM(s) Oral three times a day  pantoprazole    Tablet 40milliGRAM(s) Oral before breakfast  venlafaxine 75milliGRAM(s) Oral two times a day with meals  lactobacillus acidophilus 1Tablet(s) Oral three times a day with meals  clonazePAM Tablet 0.25milliGRAM(s) Oral two times a day  furosemide   Injectable 40milliGRAM(s) IV Push two times a day  buDESOnide 160 MICROgram(s)/formoterol 4.5 MICROgram(s) Inhaler 2Puff(s) Inhalation two times a day  predniSONE   Tablet 40milliGRAM(s) Oral daily  ALBUTerol    0.083% 2.5milliGRAM(s) Nebulizer every 6 hours  ARIPiprazole 2milliGRAM(s) Oral at bedtime      Objective:  Vital Signs Last 24 Hrs  T(C): 36.7, Max: 36.8 (04-15 @ 20:00)  T(F): 98.1, Max: 98.2 (04-15 @ 20:00)  HR: 97 (72 - 109)  BP: 105/54 (92/50 - 160/88)  BP(mean): 75 (66 - 115)  RR: 21 (15 - 40)  SpO2: 98% (92% - 100%)    PHYSICAL EXAM:  Constitutional:Well-developed, well nourished  Eyes:PERRLA, EOMI  Ear/Nose/Throat: oropharynx normal	  Neck:no JVD, no lymphadenopathy, supple  Respiratory: no accessory muscle use, lung fields bilaterally with scattered crackles, improved air movement, rare ronchi  Cardiovascular:RRR, normal S1, S2 no m/r/g  Gastrointestinal:soft, NT, no HSM, BS-normal  Extremities:no clubbing, no cyanosis, edema trace  Neuro-patient alert, oriented and appropriate  Skin-no sig lesions      LABS:                        12.6   12.3  )-----------( 205      ( 16 Apr 2017 07:09 )             38.8     WBC Count: 13.7 K/uL (04.15.17 @ 06:06)    WBC Count: 19.5 K/uL (04.13.17 @ 00:26)        04-16    139  |  98  |  20  ----------------------------<  102<H>  3.3<L>   |  33<H>  |  0.58    Ca    8.7      16 Apr 2017 07:09  Phos  2.5     04-16  Mg     2.7     04-16    TPro  6.3  /  Alb  2.8<L>  /  TBili  0.4  /  DBili  x   /  AST  18  /  ALT  37  /  AlkPhos  60  04-16            MICROBIOLOGY:        RADIOLOGY & ADDITIONAL STUDIES:

## 2017-04-16 NOTE — PROGRESS NOTE ADULT - ASSESSMENT
76-year-old female with a past medical history of COPD, hypertension, hyperlipidemia, diverticulitis, depression, breast cancer, status post lumpectomy and radiation 2008, anxiety, hiatal hernia-status post surgical repair in 2005, obstructive sleep apnea (not on HOME O2, NO BiPAP at home), PVD, rheumatoid arthritis, thrush, TIA (2010), TMJ, valvular heart disease (aortic and mitral),S/P Tissue AVR 2014 admitted with acute Respiratory failure due to acute on chronic COPD exacerbation, and elevated lactate which is Normal now.Pt had acute respiratory distress, 2 to Fluid over load with Acute on chronic systolic CHF, S/P IV lasix, Off IV Fluid,  Pt is Off Bi PAP, On O2 NC.in ICU stable, RVP -negative .On PO Steroids.

## 2017-04-16 NOTE — PROGRESS NOTE ADULT - SUBJECTIVE AND OBJECTIVE BOX
Date/Time Patient Seen:  		  Referring MD:   Data Reviewed	       Patient is a 76y old  Female who presents with a chief complaint of SOB (13 Apr 2017 01:22)  in bed  on BIPAP overnight  on diuresis and on prednisone and on pulm regimen  doing better overall  alert  vs and meds reviewed      Subjective/HPI       Medication list         MEDICATIONS  (STANDING):  enoxaparin Injectable 40milliGRAM(s) SubCutaneous every 24 hours  aspirin enteric coated 81milliGRAM(s) Oral daily  simvastatin 20milliGRAM(s) Oral at bedtime  tiotropium 18 MICROgram(s) Capsule 1Capsule(s) Inhalation daily  docusate sodium 100milliGRAM(s) Oral three times a day  pantoprazole    Tablet 40milliGRAM(s) Oral before breakfast  venlafaxine 75milliGRAM(s) Oral two times a day with meals  lactobacillus acidophilus 1Tablet(s) Oral three times a day with meals  clonazePAM Tablet 0.25milliGRAM(s) Oral two times a day  metroNIDAZOLE  IVPB  IV Intermittent   metroNIDAZOLE  IVPB 500milliGRAM(s) IV Intermittent every 8 hours  furosemide   Injectable 40milliGRAM(s) IV Push two times a day  vancomycin  IVPB 1250milliGRAM(s) IV Intermittent every 12 hours  aztreonam  IVPB 2000milliGRAM(s) IV Intermittent every 8 hours  buDESOnide 160 MICROgram(s)/formoterol 4.5 MICROgram(s) Inhaler 2Puff(s) Inhalation two times a day  predniSONE   Tablet 40milliGRAM(s) Oral daily  ALBUTerol    0.083% 2.5milliGRAM(s) Nebulizer every 6 hours  ARIPiprazole 2milliGRAM(s) Oral at bedtime    MEDICATIONS  (PRN):  acetaminophen   Tablet 650milliGRAM(s) Oral every 6 hours PRN For Temp greater than 38 C (100.4 F)  acetaminophen   Tablet. 650milliGRAM(s) Oral every 6 hours PRN Mild Pain (1 - 3)  senna 2Tablet(s) Oral at bedtime PRN Constipation  oxyCODONE  5 mG/acetaminophen 325 mG 2Tablet(s) Oral every 4 hours PRN Severe Pain (7 - 10)  zolpidem 5milliGRAM(s) Oral at bedtime PRN Insomnia         Vitals log        ICU Vital Signs Last 24 Hrs  T(C): 36.8, Max: 36.8 (04-15 @ 07:43)  T(F): 98.2, Max: 98.2 (04-15 @ 07:43)  HR: 74 (72 - 109)  BP: 126/64 (92/50 - 166/76)  BP(mean): 89 (66 - 115)  ABP: --  ABP(mean): --  RR: 22 (15 - 40)  SpO2: 95% (92% - 100%)           Input and Output:  I&O's Detail  I & Os for 24h ending 15 Apr 2017 07:00  =============================================  IN:    Solution: 250 ml    Solution: 200 ml    Oral Fluid: 180 ml    Solution: 100 ml    Total IN: 730 ml  ---------------------------------------------  OUT:    Indwelling Catheter - Urethral: 1450 ml    Total OUT: 1450 ml  ---------------------------------------------  Total NET: -720 ml    I & Os for current day (as of 16 Apr 2017 06:21)  =============================================  IN:    Oral Fluid: 625 ml    Solution: 500 ml    Solution: 200 ml    Solution: 200 ml    Total IN: 1525 ml  ---------------------------------------------  OUT:    Indwelling Catheter - Urethral: 1860 ml    Voided: 625 ml    Total OUT: 2485 ml  ---------------------------------------------  Total NET: -960 ml      Lab Data                        13.6   13.7  )-----------( 231      ( 15 Apr 2017 06:06 )             41.9     04-15    139  |  101  |  15  ----------------------------<  153<H>  3.7   |  31  |  0.52    Ca    8.5      15 Apr 2017 06:06  Phos  2.7     04-14      ABG - ( 14 Apr 2017 19:07 )  pH: 7.33  /  pCO2: 50    /  pO2: 69    / HCO3: 24    / Base Excess: 0.7   /  SaO2: 92                CARDIAC MARKERS ( 15 Apr 2017 20:42 )  .473 ng/mL / x     / x     / x     / x      CARDIAC MARKERS ( 15 Apr 2017 14:48 )  .665 ng/mL / x     / x     / x     / x            Review of Systems	      Objective     Physical Examination    head at  heart - s1s2  lung - no wheeze  cn grossly in  moves all extr      Pertinent Lab findings & Imaging      Ruben:  NO   Adequate UO     I&O's Detail  I & Os for 24h ending 15 Apr 2017 07:00  =============================================  IN:    Solution: 250 ml    Solution: 200 ml    Oral Fluid: 180 ml    Solution: 100 ml    Total IN: 730 ml  ---------------------------------------------  OUT:    Indwelling Catheter - Urethral: 1450 ml    Total OUT: 1450 ml  ---------------------------------------------  Total NET: -720 ml    I & Os for current day (as of 16 Apr 2017 06:21)  =============================================  IN:    Oral Fluid: 625 ml    Solution: 500 ml    Solution: 200 ml    Solution: 200 ml    Total IN: 1525 ml  ---------------------------------------------  OUT:    Indwelling Catheter - Urethral: 1860 ml    Voided: 625 ml    Total OUT: 2485 ml  ---------------------------------------------  Total NET: -960 ml           Discussed with:     Cultures:	        Radiology

## 2017-04-16 NOTE — PROGRESS NOTE ADULT - ASSESSMENT
Jovana has had an episode of acute pulmonary edema with characteristic chest x-ray findings and  a large elevation in BNP from baseline. She is noted to have significant segmental left ventricular systolic dysfunction although no clear episodes of acute coronary syndrome at present. She remains on oxygen support in the ICU. Improved today. Cardiac enzymes suggest a small myocardial infarction probably not due to primary acute atherothrombosis.    Plan  Continue close ICU care  Pulmonary F/U  Aspirin  DVT prophylaxis  Antibiotics per medicine  Continue diuretics and keep fluid balance negative  Discontinue verapamil for now  Will need an ischemia evaluation  Beta blocker and ACE inhibitor therapy once stable    Time spent 35 minutes of critical care medicine. Discussed with ICU team

## 2017-04-16 NOTE — PROGRESS NOTE ADULT - SUBJECTIVE AND OBJECTIVE BOX
24 hour events: feels better, no complaints    Review of Systems:  Constitutional: No fever, chills, fatigue  Neuro: No headache, numbness, weakness  Resp: No cough, wheezing, shortness of breath  CVS: No chest pain, palpitations, leg swelling  GI: No abdominal pain, nausea, vomiting, diarrhea   : No dysuria, frequency, incontinence  Skin: No itching, burning, rashes, or lesions   Msk: No joint pain or swelling  Psych: No depression, anxiety, mood swings    T(F): 98.3, Max: 98.3 (04-16 @ 11:16)  HR: 89 (72 - 104)  BP: 127/65 (92/50 - 128/61)  RR: 21 (15 - 40)  SpO2: 97% (94% - 100%)    I&O's Summary  I & Os for 24h ending 16 Apr 2017 07:00  =============================================  IN: 1725 ml / OUT: 2935 ml / NET: -1210 ml    I & Os for current day (as of 16 Apr 2017 12:48)  =============================================  IN: 525 ml / OUT: 400 ml / NET: 125 ml    Physical Exam:     Gen: elderly white female, NAD  Neuro: AAO x 3, nonfocal  HEENT: pupils equal and reactive  Resp: improving b/l crackles, good AE  CVS: S1S2, regular  Abd: soft, NT, ND. +BS  Ext: no edema  Skin: warm, well perfused    Meds:  metroNIDAZOLE  IVPB IV Intermittent  aztreonam  IVPB IV Intermittent  furosemide    Tablet Oral  simvastatin Oral  tiotropium 18 MICROgram(s) Capsule Inhalation  buDESOnide 160 MICROgram(s)/formoterol 4.5 MICROgram(s) Inhaler Inhalation  ALBUTerol    0.083% Nebulizer  oxyCODONE  5 mG/acetaminophen 325 mG Oral PRN  zolpidem Oral PRN  venlafaxine Oral  clonazePAM Tablet Oral  ARIPiprazole Oral  enoxaparin Injectable SubCutaneous  aspirin enteric coated Oral  senna Oral PRN  docusate sodium Oral  pantoprazole    Tablet Oral  potassium chloride    Tablet ER Oral  lactobacillus acidophilus Oral                        12.6   12.3  )-----------( 205      ( 16 Apr 2017 07:09 )             38.8     04-16    139  |  98  |  20  ----------------------------<  102<H>  3.3<L>   |  33<H>  |  0.58    Ca    8.7      16 Apr 2017 07:09  Phos  2.5     04-16  Mg     2.7     04-16    TPro  6.3  /  Alb  2.8<L>  /  TBili  0.4  /  DBili  x   /  AST  18  /  ALT  37  /  AlkPhos  60  04-16    Urine Clean Catch (Midstream)   No growth -- 04-13 @ 08:38    Blood Blood-Peripheral   No growth to date. -- 04-13 @ 08:33    Rapid RVP Result: NotDetec (04-15 @ 08:11)    CENTRAL LINE: N            TORRES: N                          A-LINE: N                    GLOBAL ISSUE/BEST PRACTICE:  Analgesia: NA  Sedation: NA  HOB elevation: yes  Stress ulcer prophylaxis: NA  VTE prophylaxis: Y  Glycemic control: NA  Nutrition: Y    CODE STATUS: full  GOC discussion: Y

## 2017-04-17 ENCOUNTER — TRANSCRIPTION ENCOUNTER (OUTPATIENT)
Age: 77
End: 2017-04-17

## 2017-04-17 LAB
ANION GAP SERPL CALC-SCNC: 7 MMOL/L — SIGNIFICANT CHANGE UP (ref 5–17)
BASOPHILS # BLD AUTO: 0.1 K/UL — SIGNIFICANT CHANGE UP (ref 0–0.2)
BASOPHILS NFR BLD AUTO: 1 % — SIGNIFICANT CHANGE UP (ref 0–2)
BUN SERPL-MCNC: 18 MG/DL — SIGNIFICANT CHANGE UP (ref 7–23)
CALCIUM SERPL-MCNC: 8.3 MG/DL — LOW (ref 8.5–10.1)
CHLORIDE SERPL-SCNC: 103 MMOL/L — SIGNIFICANT CHANGE UP (ref 96–108)
CO2 SERPL-SCNC: 32 MMOL/L — HIGH (ref 22–31)
CREAT SERPL-MCNC: 0.6 MG/DL — SIGNIFICANT CHANGE UP (ref 0.5–1.3)
EOSINOPHIL # BLD AUTO: 0.2 K/UL — SIGNIFICANT CHANGE UP (ref 0–0.5)
EOSINOPHIL NFR BLD AUTO: 2.3 % — SIGNIFICANT CHANGE UP (ref 0–6)
GLUCOSE SERPL-MCNC: 100 MG/DL — HIGH (ref 70–99)
HCT VFR BLD CALC: 39.6 % — SIGNIFICANT CHANGE UP (ref 34.5–45)
HGB BLD-MCNC: 12.9 G/DL — SIGNIFICANT CHANGE UP (ref 11.5–15.5)
LYMPHOCYTES # BLD AUTO: 3.4 K/UL — HIGH (ref 1–3.3)
LYMPHOCYTES # BLD AUTO: 31.8 % — SIGNIFICANT CHANGE UP (ref 13–44)
MAGNESIUM SERPL-MCNC: 2.4 MG/DL — SIGNIFICANT CHANGE UP (ref 1.8–2.4)
MCHC RBC-ENTMCNC: 31.7 PG — SIGNIFICANT CHANGE UP (ref 27–34)
MCHC RBC-ENTMCNC: 32.6 GM/DL — SIGNIFICANT CHANGE UP (ref 32–36)
MCV RBC AUTO: 97.1 FL — SIGNIFICANT CHANGE UP (ref 80–100)
MONOCYTES # BLD AUTO: 1.4 K/UL — HIGH (ref 0–0.9)
MONOCYTES NFR BLD AUTO: 12.7 % — HIGH (ref 1–9)
NEUTROPHILS # BLD AUTO: 5.6 K/UL — SIGNIFICANT CHANGE UP (ref 1.8–7.4)
NEUTROPHILS NFR BLD AUTO: 52.2 % — SIGNIFICANT CHANGE UP (ref 43–77)
PHOSPHATE SERPL-MCNC: 2.9 MG/DL — SIGNIFICANT CHANGE UP (ref 2.5–4.5)
PLATELET # BLD AUTO: 245 K/UL — SIGNIFICANT CHANGE UP (ref 150–400)
POTASSIUM SERPL-MCNC: 3.4 MMOL/L — LOW (ref 3.5–5.3)
POTASSIUM SERPL-SCNC: 3.4 MMOL/L — LOW (ref 3.5–5.3)
RBC # BLD: 4.08 M/UL — SIGNIFICANT CHANGE UP (ref 3.8–5.2)
RBC # FLD: 13 % — SIGNIFICANT CHANGE UP (ref 10.3–14.5)
SODIUM SERPL-SCNC: 142 MMOL/L — SIGNIFICANT CHANGE UP (ref 135–145)
VANCOMYCIN TROUGH SERPL-MCNC: 5.6 UG/ML — LOW (ref 10–20)
WBC # BLD: 10.8 K/UL — HIGH (ref 3.8–10.5)
WBC # FLD AUTO: 10.8 K/UL — HIGH (ref 3.8–10.5)

## 2017-04-17 PROCEDURE — 99233 SBSQ HOSP IP/OBS HIGH 50: CPT | Mod: GC

## 2017-04-17 PROCEDURE — 99291 CRITICAL CARE FIRST HOUR: CPT

## 2017-04-17 RX ORDER — VENLAFAXINE HCL 75 MG
1 CAPSULE, EXT RELEASE 24 HR ORAL
Qty: 0 | Refills: 0 | COMMUNITY

## 2017-04-17 RX ORDER — POTASSIUM CHLORIDE 20 MEQ
40 PACKET (EA) ORAL ONCE
Qty: 0 | Refills: 0 | Status: COMPLETED | OUTPATIENT
Start: 2017-04-17 | End: 2017-04-17

## 2017-04-17 RX ORDER — SENNA PLUS 8.6 MG/1
2 TABLET ORAL
Qty: 0 | Refills: 0 | COMMUNITY
Start: 2017-04-17

## 2017-04-17 RX ORDER — ENOXAPARIN SODIUM 100 MG/ML
40 INJECTION SUBCUTANEOUS DAILY
Qty: 0 | Refills: 0 | Status: CANCELLED | OUTPATIENT
Start: 2017-04-19 | End: 2017-04-18

## 2017-04-17 RX ORDER — ACETAMINOPHEN 500 MG
2 TABLET ORAL
Qty: 0 | Refills: 0 | COMMUNITY
Start: 2017-04-17

## 2017-04-17 RX ORDER — BUDESONIDE AND FORMOTEROL FUMARATE DIHYDRATE 160; 4.5 UG/1; UG/1
2 AEROSOL RESPIRATORY (INHALATION)
Qty: 0 | Refills: 0 | COMMUNITY
Start: 2017-04-17

## 2017-04-17 RX ORDER — CLONAZEPAM 1 MG
0 TABLET ORAL
Qty: 0 | Refills: 0 | COMMUNITY

## 2017-04-17 RX ORDER — METRONIDAZOLE 500 MG
0 TABLET ORAL
Qty: 0 | Refills: 0 | COMMUNITY
Start: 2017-04-17

## 2017-04-17 RX ORDER — VERAPAMIL HCL 240 MG
1 CAPSULE, EXTENDED RELEASE PELLETS 24 HR ORAL
Qty: 0 | Refills: 0 | COMMUNITY

## 2017-04-17 RX ORDER — BUDESONIDE AND FORMOTEROL FUMARATE DIHYDRATE 160; 4.5 UG/1; UG/1
0 AEROSOL RESPIRATORY (INHALATION)
Qty: 0 | Refills: 0 | COMMUNITY
Start: 2017-04-17

## 2017-04-17 RX ORDER — FUROSEMIDE 40 MG
1 TABLET ORAL
Qty: 0 | Refills: 0 | COMMUNITY
Start: 2017-04-17

## 2017-04-17 RX ORDER — METOPROLOL TARTRATE 50 MG
25 TABLET ORAL EVERY 8 HOURS
Qty: 0 | Refills: 0 | Status: DISCONTINUED | OUTPATIENT
Start: 2017-04-17 | End: 2017-04-18

## 2017-04-17 RX ORDER — ENOXAPARIN SODIUM 100 MG/ML
0 INJECTION SUBCUTANEOUS
Qty: 0 | Refills: 0 | COMMUNITY
Start: 2017-04-17

## 2017-04-17 RX ORDER — SODIUM CHLORIDE 0.65 %
0 AEROSOL, SPRAY (ML) NASAL
Qty: 0 | Refills: 0 | COMMUNITY
Start: 2017-04-17

## 2017-04-17 RX ORDER — METOPROLOL TARTRATE 50 MG
1 TABLET ORAL
Qty: 0 | Refills: 0 | COMMUNITY
Start: 2017-04-17

## 2017-04-17 RX ORDER — AZTREONAM 2 G
2000 VIAL (EA) INJECTION
Qty: 0 | Refills: 0 | COMMUNITY
Start: 2017-04-17

## 2017-04-17 RX ORDER — LACTOBACILLUS ACIDOPHILUS 100MM CELL
0 CAPSULE ORAL
Qty: 0 | Refills: 0 | COMMUNITY
Start: 2017-04-17

## 2017-04-17 RX ADMIN — Medication 100 MILLIGRAM(S): at 06:21

## 2017-04-17 RX ADMIN — Medication 100 MILLIGRAM(S): at 22:05

## 2017-04-17 RX ADMIN — Medication 81 MILLIGRAM(S): at 11:54

## 2017-04-17 RX ADMIN — Medication 0.25 MILLIGRAM(S): at 11:52

## 2017-04-17 RX ADMIN — SIMVASTATIN 20 MILLIGRAM(S): 20 TABLET, FILM COATED ORAL at 22:05

## 2017-04-17 RX ADMIN — Medication 25 MILLIGRAM(S): at 22:05

## 2017-04-17 RX ADMIN — BUDESONIDE AND FORMOTEROL FUMARATE DIHYDRATE 2 PUFF(S): 160; 4.5 AEROSOL RESPIRATORY (INHALATION) at 18:49

## 2017-04-17 RX ADMIN — Medication 1 TABLET(S): at 11:54

## 2017-04-17 RX ADMIN — ALBUTEROL 2.5 MILLIGRAM(S): 90 AEROSOL, METERED ORAL at 01:39

## 2017-04-17 RX ADMIN — Medication 100 MILLIGRAM(S): at 13:03

## 2017-04-17 RX ADMIN — ARIPIPRAZOLE 2 MILLIGRAM(S): 15 TABLET ORAL at 22:05

## 2017-04-17 RX ADMIN — Medication 40 MILLIEQUIVALENT(S): at 07:36

## 2017-04-17 RX ADMIN — ALBUTEROL 2.5 MILLIGRAM(S): 90 AEROSOL, METERED ORAL at 08:15

## 2017-04-17 RX ADMIN — Medication 0.25 MILLIGRAM(S): at 22:06

## 2017-04-17 RX ADMIN — Medication 40 MILLIGRAM(S): at 06:21

## 2017-04-17 RX ADMIN — Medication 20 MILLIGRAM(S): at 06:21

## 2017-04-17 RX ADMIN — Medication 75 MILLIGRAM(S): at 17:39

## 2017-04-17 RX ADMIN — Medication 1 TABLET(S): at 07:42

## 2017-04-17 RX ADMIN — Medication 75 MILLIGRAM(S): at 07:36

## 2017-04-17 RX ADMIN — Medication 100 MILLIGRAM(S): at 13:02

## 2017-04-17 RX ADMIN — ALBUTEROL 2.5 MILLIGRAM(S): 90 AEROSOL, METERED ORAL at 13:43

## 2017-04-17 RX ADMIN — PANTOPRAZOLE SODIUM 40 MILLIGRAM(S): 20 TABLET, DELAYED RELEASE ORAL at 07:36

## 2017-04-17 RX ADMIN — Medication 25 MILLIGRAM(S): at 14:50

## 2017-04-17 RX ADMIN — Medication 40 MILLIGRAM(S): at 18:48

## 2017-04-17 RX ADMIN — ZOLPIDEM TARTRATE 5 MILLIGRAM(S): 10 TABLET ORAL at 23:21

## 2017-04-17 RX ADMIN — Medication 1 TABLET(S): at 17:39

## 2017-04-17 RX ADMIN — ALBUTEROL 2.5 MILLIGRAM(S): 90 AEROSOL, METERED ORAL at 19:18

## 2017-04-17 RX ADMIN — ENOXAPARIN SODIUM 40 MILLIGRAM(S): 100 INJECTION SUBCUTANEOUS at 06:21

## 2017-04-17 RX ADMIN — BUDESONIDE AND FORMOTEROL FUMARATE DIHYDRATE 2 PUFF(S): 160; 4.5 AEROSOL RESPIRATORY (INHALATION) at 06:32

## 2017-04-17 RX ADMIN — Medication 1 SPRAY(S): at 06:21

## 2017-04-17 RX ADMIN — TIOTROPIUM BROMIDE 1 CAPSULE(S): 18 CAPSULE ORAL; RESPIRATORY (INHALATION) at 06:32

## 2017-04-17 NOTE — DISCHARGE NOTE ADULT - MEDICATION SUMMARY - MEDICATIONS TO TAKE
I will START or STAY ON the medications listed below when I get home from the hospital:    predniSONE  -- 20 mg po x 2 days and then 10 mg po x 2 days then stop   -- Indication: For Chronic obstructive pulmonary disease with acute exacerbation    metroNIDAZOLE 500 mg/100 mL intravenous solution  --  intravenous every 8 hours  -- Indication: For Pneumonia, unspecified organism    Percocet 10/325 oral tablet  -- 1 tab(s) by mouth 3 times a day, As Needed  -- Indication: For Pain control    aspirin 81 mg oral tablet  -- 1 tab(s) by mouth once a day  -- Indication: For Cad    acetaminophen 325 mg oral tablet  -- 2 tab(s) by mouth every 6 hours, As needed, For Temp greater than 38 C (100.4 F)  -- Indication: For Pneumonia, unspecified organism    acetaminophen 325 mg oral tablet  -- 2 tab(s) by mouth every 6 hours, As needed, Mild Pain (1 - 3)  -- Indication: For Pain    enoxaparin  --   40 unit subq daily for dvt prophy.   -- Indication: For Dvt prophy    clonazepam 0.5 mg oral tablet  -- 0.25 milligram(s) by mouth 2 times a day pt takes 1/2 0.5 mg tablet 2x day  -- Indication: For Anxiety    venlafaxine 150 mg oral capsule, extended release  -- 1 cap(s) by mouth once a day  -- Indication: For Depression    simvastatin 20 mg oral tablet  -- 1 tab(s) by mouth once a day (at bedtime)  -- Indication: For Hld    Abilify 2 mg oral tablet  -- 1 tab(s) by mouth once a day  -- Indication: For Anxiety    zolpidem 5 mg oral tablet  -- 1 tab(s) by mouth once a day (at bedtime)  -- Indication: For insomnia    metoprolol tartrate 25 mg oral tablet  -- 1 tab(s) by mouth every 8 hours  -- Indication: For HTN (hypertension)    budesonide-formoterol 160 mcg-4.5 mcg/inh inhalation aerosol  --  inhaled   -- Indication: For Chronic obstructive pulmonary disease with acute exacerbation    Spiriva 18 mcg inhalation capsule  -- 1 cap(s) inhaled once a day  -- Indication: For COPD exacerbation    ProAir HFA CFC free 90 mcg/inh inhalation aerosol  -- 2 puff(s) inhaled , As Needed for sob  -- Indication: For COPD exacerbation    furosemide 40 mg oral tablet  -- 1 tab(s) by mouth 2 times a day  -- Indication: For HTN (hypertension)    senna oral tablet  -- 2 tab(s) by mouth once a day (at bedtime), As needed, Constipation  -- Indication: For COnstipation    docusate sodium 100 mg oral capsule  -- 1 cap(s) by mouth 3 times a day  -- Indication: For COnstipation    aztreonam  -- 2000 milligram(s) intravenous every 8 hours  -- Indication: For Pneumonia, unspecified organism    sodium chloride 0.65% nasal spray  --  into nose   -- Indication: For Nasal spray    lactobacillus acidophilus oral capsule  --  by mouth 3 times a day  -- Indication: For Pneumonia, unspecified organism    pantoprazole 40 mg oral delayed release tablet  -- 1 tab(s) by mouth once a day (before a meal)  -- Indication: For Ppi

## 2017-04-17 NOTE — DISCHARGE NOTE ADULT - CARE PLAN
Principal Discharge DX:	COPD exacerbation  Goal:	treat  Instructions for follow-up, activity and diet:	continue steroid taper, continue nebs, continue symbicort and oxygen  Secondary Diagnosis:	Anxiety  Instructions for follow-up, activity and diet:	continue clonezepam  Secondary Diagnosis:	Depression  Instructions for follow-up, activity and diet:	continue abilify and effexor  Secondary Diagnosis:	HTN (hypertension)  Instructions for follow-up, activity and diet:	continue metoprolol  Secondary Diagnosis:	Hyperlipidemia  Instructions for follow-up, activity and diet:	continue statin  Secondary Diagnosis:	Pneumonia, unspecified organism  Instructions for follow-up, activity and diet:	continue axactam and flagyl  Secondary Diagnosis:	CHF (congestive heart failure)  Instructions for follow-up, activity and diet:	acute systolic chf with ef of 30 %  continue BB, asa, statin and cardiac cath Principal Discharge DX:	COPD exacerbation  Goal:	treat  Instructions for follow-up, activity and diet:	continue  PO steroid taper, continue nebs, continue Symbicort and oxygen, Bi PAP at Bed time  Secondary Diagnosis:	Anxiety  Instructions for follow-up, activity and diet:	continue Clonazepam  Secondary Diagnosis:	Depression  Instructions for follow-up, activity and diet:	continue Abilify and Effexor  Secondary Diagnosis:	HTN (hypertension)  Instructions for follow-up, activity and diet:	continue metoprolol, ASA  Secondary Diagnosis:	Hyperlipidemia  Instructions for follow-up, activity and diet:	continue statin  Secondary Diagnosis:	Pneumonia, unspecified organism  Instructions for follow-up, activity and diet:	continue Azactam and flagyl, WBC Normal  Secondary Diagnosis:	CHF (congestive heart failure)  Instructions for follow-up, activity and diet:	acute systolic CHF with EF of 30 % with sever systolic dysfunction  continue BB, ASA, statin and cardiac cath tomorrow at [facility] Principal Discharge DX:	COPD exacerbation  Goal:	treat  Instructions for follow-up, activity and diet:	continue  PO steroid taper, continue nebs, continue Symbicort and oxygen, Bi PAP at Bed time  Secondary Diagnosis:	Anxiety  Instructions for follow-up, activity and diet:	continue Clonazepam  Secondary Diagnosis:	Depression  Instructions for follow-up, activity and diet:	continue Abilify and Effexor  Secondary Diagnosis:	HTN (hypertension)  Instructions for follow-up, activity and diet:	continue metoprolol, ASA  Secondary Diagnosis:	Hyperlipidemia  Instructions for follow-up, activity and diet:	continue statin  Secondary Diagnosis:	Pneumonia, unspecified organism  Instructions for follow-up, activity and diet:	continue Azactam and flagyl, WBC Normal  Secondary Diagnosis:	CHF (congestive heart failure)  Instructions for follow-up, activity and diet:	acute systolic CHF with EF of 30 % with sever systolic dysfunction  continue BB, ASA, statin and cardiac cath tomorrow at [facility] Principal Discharge DX:	COPD exacerbation  Goal:	treat  Instructions for follow-up, activity and diet:	continue  PO steroid taper, continue nebs, continue Symbicort and oxygen, Bi PAP at Bed time  Secondary Diagnosis:	Anxiety  Instructions for follow-up, activity and diet:	continue Clonazepam  Secondary Diagnosis:	Depression  Instructions for follow-up, activity and diet:	continue Abilify and Effexor  Secondary Diagnosis:	HTN (hypertension)  Instructions for follow-up, activity and diet:	continue metoprolol, ASA  Secondary Diagnosis:	Hyperlipidemia  Instructions for follow-up, activity and diet:	continue statin  Secondary Diagnosis:	Pneumonia, unspecified organism  Instructions for follow-up, activity and diet:	continue Azactam and flagyl, WBC Normal  Secondary Diagnosis:	CHF (congestive heart failure)  Instructions for follow-up, activity and diet:	acute systolic CHF with EF of 30 % with sever systolic dysfunction  continue BB, ASA, statin and cardiac cath tomorrow at [facility] Principal Discharge DX:	COPD exacerbation  Goal:	treat  Instructions for follow-up, activity and diet:	continue  PO steroid taper, continue nebs, continue Symbicort and oxygen, Bi PAP at Bed time   pt needs HOME O2, Pulmonary Eval at [facility]  Secondary Diagnosis:	Anxiety  Instructions for follow-up, activity and diet:	continue Clonazepam  Secondary Diagnosis:	Depression  Instructions for follow-up, activity and diet:	continue Abilify and Effexor  Secondary Diagnosis:	HTN (hypertension)  Instructions for follow-up, activity and diet:	continue metoprolol, ASA  Secondary Diagnosis:	Hyperlipidemia  Instructions for follow-up, activity and diet:	continue statin  Secondary Diagnosis:	Pneumonia, unspecified organism  Instructions for follow-up, activity and diet:	continue Azactam and flagyl  to be continued until 4/22/17, WBC Normal  Secondary Diagnosis:	CHF (congestive heart failure)  Instructions for follow-up, activity and diet:	acute systolic CHF with EF of 30 % with sever systolic dysfunction  continue BB, ASA, statin and cardiac cath today at [facility]

## 2017-04-17 NOTE — DISCHARGE NOTE ADULT - PLAN OF CARE
treat continue steroid taper, continue nebs, continue symbicort and oxygen continue clonezepam continue abilify and effexor continue metoprolol continue statin continue axactam and flagyl acute systolic chf with ef of 30 %  continue BB, asa, statin and cardiac cath continue  PO steroid taper, continue nebs, continue Symbicort and oxygen, Bi PAP at Bed time continue Clonazepam continue Abilify and Effexor continue metoprolol, ASA continue Azactam and flagyl, WBC Normal acute systolic CHF with EF of 30 % with sever systolic dysfunction  continue BB, ASA, statin and cardiac cath tomorrow at [facility] continue  PO steroid taper, continue nebs, continue Symbicort and oxygen, Bi PAP at Bed time   pt needs HOME O2, Pulmonary Eval at [facility] continue Azactam and flagyl  to be continued until 4/22/17, WBC Normal acute systolic CHF with EF of 30 % with sever systolic dysfunction  continue BB, ASA, statin and cardiac cath today at [facility]

## 2017-04-17 NOTE — DISCHARGE NOTE ADULT - PATIENT PORTAL LINK FT
“You can access the Follow[employer] Patient Portal, offered by [facility], by registering with the following website: [email or web address]”

## 2017-04-17 NOTE — PROGRESS NOTE ADULT - SUBJECTIVE AND OBJECTIVE BOX
[facility]    Tawny García, Beatriz, Mustapha, Trav Kunz      [phone]    CHIEF COMPLAINT: Patient is a 76y old  Female who presents with a chief complaint of SOB (13 Apr 2017 01:22)      Follow Up: COPD exacerbation, ischemic myopathy, status post aortic valve replacement    Interim history:feeling much better. No chest pain. Still with productive cough. Afebrile. Good appetite    MEDICATIONS  (STANDING):  enoxaparin Injectable 40milliGRAM(s) SubCutaneous every 24 hours  aspirin enteric coated 81milliGRAM(s) Oral daily  simvastatin 20milliGRAM(s) Oral at bedtime  tiotropium 18 MICROgram(s) Capsule 1Capsule(s) Inhalation daily  docusate sodium 100milliGRAM(s) Oral three times a day  pantoprazole    Tablet 40milliGRAM(s) Oral before breakfast  venlafaxine 75milliGRAM(s) Oral two times a day with meals  lactobacillus acidophilus 1Tablet(s) Oral three times a day with meals  metroNIDAZOLE  IVPB  IV Intermittent   metroNIDAZOLE  IVPB 500milliGRAM(s) IV Intermittent every 8 hours  aztreonam  IVPB 2000milliGRAM(s) IV Intermittent every 8 hours  buDESOnide 160 MICROgram(s)/formoterol 4.5 MICROgram(s) Inhaler 2Puff(s) Inhalation two times a day  ALBUTerol    0.083% 2.5milliGRAM(s) Nebulizer every 6 hours  ARIPiprazole 2milliGRAM(s) Oral at bedtime  predniSONE   Tablet 20milliGRAM(s) Oral daily  furosemide    Tablet 40milliGRAM(s) Oral two times a day    MEDICATIONS  (PRN):  acetaminophen   Tablet 650milliGRAM(s) Oral every 6 hours PRN For Temp greater than 38 C (100.4 F)  acetaminophen   Tablet. 650milliGRAM(s) Oral every 6 hours PRN Mild Pain (1 - 3)  senna 2Tablet(s) Oral at bedtime PRN Constipation  oxyCODONE  5 mG/acetaminophen 325 mG 2Tablet(s) Oral every 4 hours PRN Severe Pain (7 - 10)  zolpidem 5milliGRAM(s) Oral at bedtime PRN Insomnia  clonazePAM Tablet 0.25milliGRAM(s) Oral two times a day PRN anxiety  sodium chloride 0.65% Nasal 1Spray(s) Both Nostrils four times a day PRN Nasal Congestion      REVIEW OF SYSTEMS:  eye, ent, GI, , allergic, dermatologic, musculoskeletal and neurologic are negative except as described above    Vital Signs Last 24 Hrs  T(C): 36.6, Max: 37.1 (04-16 @ 15:51)  T(F): 97.8, Max: 98.7 (04-16 @ 15:51)  HR: 94 (85 - 108)  BP: 108/58 (99/54 - 141/70)  BP(mean): 77 (71 - 96)  RR: 21 (15 - 39)  SpO2: 100% (93% - 100%)    I&O's Summary  I & Os for 24h ending 16 Apr 2017 07:00  =============================================  IN: 1725 ml / OUT: 2935 ml / NET: -1210 ml    I & Os for current day (as of 17 Apr 2017 06:20)  =============================================  IN: 975 ml / OUT: 1100 ml / NET: -125 ml      Telemetry past 24h:RSR    PHYSICAL EXAM:    Constitutional: well-nourished, well-developed, NAD   HEENT:  MMM, sclerae anicteric, conjunctivae clear, no oral cyanosis.  Pulmonary: Mild labored breath sounds, expiratory wheezing and rhonchi  Cardiovascular: Regular, S1 and S2, RICKY, rubs, gallops or clicks  Gastrointestinal: Bowel Sounds present, soft, nontender.   Lymph: No peripheral edema. No lymphadenopathy.  Neurological: Alert, no focal deficits  Skin: No rashes.  Psych:  Mood & affect appropriate    LABS: All Labs Reviewed:                        12.9   10.8  )-----------( 245      ( 17 Apr 2017 05:33 )             39.6                         12.6   12.3  )-----------( 205      ( 16 Apr 2017 07:09 )             38.8                         13.6   13.7  )-----------( 231      ( 15 Apr 2017 06:06 )             41.9     17 Apr 2017 05:33    x      |  x      |  18     ----------------------------<  100    x       |  32     |  0.60   16 Apr 2017 07:09    139    |  98     |  20     ----------------------------<  102    3.3     |  33     |  0.58   15 Apr 2017 06:06    139    |  101    |  15     ----------------------------<  153    3.7     |  31     |  0.52     Ca    8.3        17 Apr 2017 05:33  Ca    8.7        16 Apr 2017 07:09  Ca    8.5        15 Apr 2017 06:06  Phos  2.5       16 Apr 2017 07:09  Phos  2.7       14 Apr 2017 06:38  Mg     2.7       16 Apr 2017 07:09    TPro  6.3    /  Alb  2.8    /  TBili  0.4    /  DBili  x      /  AST  18     /  ALT  37     /  AlkPhos  60     16 Apr 2017 07:09      CARDIAC MARKERS ( 16 Apr 2017 07:09 )  .415 ng/mL / x     / x     / x     / x      CARDIAC MARKERS ( 15 Apr 2017 20:42 )  .473 ng/mL / x     / x     / x     / x      CARDIAC MARKERS ( 15 Apr 2017 14:48 )  .665 ng/mL / x     / x     / x     / x          Blood Culture: Organism --  Gram Stain Blood -- Gram Stain --  Specimen Source .Urine Clean Catch (Midstream)  Culture-Blood --    Organism --  Gram Stain Blood -- Gram Stain --  Specimen Source .Blood Blood-Peripheral  Culture-Blood --      04-15 @ 06:06  Pro Bnp [id number]        RADIOLOGY:    EKG:

## 2017-04-17 NOTE — PROGRESS NOTE ADULT - SUBJECTIVE AND OBJECTIVE BOX
Patient is a 76y old  Female who presents with a chief complaint of SOB (17 Apr 2017 13:46)      INTERVAL HPI:  :Patient is a 76-year-old female with a past medical history of COPD, hypertension, hyperlipidemia, diverticulitis, depression, Rt breast cancer, status post lumpectomy and radiation 2008, anxiety, hiatal hernia-status post surgical repair in 2005, obstructive sleep apnea (not on BiPAP at home), PVD, rheumatoid arthritis, thrush, TIA (2010), TMJ, valvular heart disease (aortic and mitral),, H/O Tissu AVR 2014, borderline diabetes (not on medications), presents to ED via EMS from home with chief complaint of difficulty breathing. Daughter at bedside providing additional history.  Pt seen and examined at bedside. Pt admits to improved sob from admission, however states she still has wheezing. Admits to anxiety making the sob worse. Denies chest pain, palpitation, dizziness, abdominal pain. Off Bi PAP, pt in ICU now s/p Acute respiratory distress, S/P IV Lasix give, Miranda cat for I/O. CXR showed Pulmonary congestion. RVP negative,On PO steroids taper .BiPAP at night, pt with acute systolic CHF with elevated troponin 2 to demand ischemia, No ACS, pt OOB to chair.        OVERNIGHT EVENTS:NONE    MEDICATIONS  (STANDING):  aspirin enteric coated 81milliGRAM(s) Oral daily  simvastatin 20milliGRAM(s) Oral at bedtime  tiotropium 18 MICROgram(s) Capsule 1Capsule(s) Inhalation daily  docusate sodium 100milliGRAM(s) Oral three times a day  pantoprazole    Tablet 40milliGRAM(s) Oral before breakfast  venlafaxine 75milliGRAM(s) Oral two times a day with meals  lactobacillus acidophilus 1Tablet(s) Oral three times a day with meals  metroNIDAZOLE  IVPB  IV Intermittent   metroNIDAZOLE  IVPB 500milliGRAM(s) IV Intermittent every 8 hours  aztreonam  IVPB 2000milliGRAM(s) IV Intermittent every 8 hours  buDESOnide 160 MICROgram(s)/formoterol 4.5 MICROgram(s) Inhaler 2Puff(s) Inhalation two times a day  ALBUTerol    0.083% 2.5milliGRAM(s) Nebulizer every 6 hours  ARIPiprazole 2milliGRAM(s) Oral at bedtime  predniSONE   Tablet 20milliGRAM(s) Oral daily  furosemide    Tablet 40milliGRAM(s) Oral two times a day  metoprolol 25milliGRAM(s) Oral every 8 hours    MEDICATIONS  (PRN):  acetaminophen   Tablet 650milliGRAM(s) Oral every 6 hours PRN For Temp greater than 38 C (100.4 F)  acetaminophen   Tablet. 650milliGRAM(s) Oral every 6 hours PRN Mild Pain (1 - 3)  senna 2Tablet(s) Oral at bedtime PRN Constipation  oxyCODONE  5 mG/acetaminophen 325 mG 2Tablet(s) Oral every 4 hours PRN Severe Pain (7 - 10)  zolpidem 5milliGRAM(s) Oral at bedtime PRN Insomnia  clonazePAM Tablet 0.25milliGRAM(s) Oral two times a day PRN anxiety  sodium chloride 0.65% Nasal 1Spray(s) Both Nostrils four times a day PRN Nasal Congestion      Allergies    penicillins (Hives)  quinolines (Other)      REVIEW OF SYSTEMS: Feels better  CONSTITUTIONAL: No fever, No chills, No fatigue, No myalgia, No Body ache  EYES: No eye pain, visual disturbances, or discharge  ENMT:  No ear pain, No nose bleed, No vertigo; No sinus or throat pain, No Congestion  NECK: No pain, No stiffness  RESPIRATORY: mild cough, NO wheezing, No  hemoptysis, No shortness of breath  CARDIOVASCULAR: No chest pain, palpitations  GASTROINTESTINAL: No abdominal or epigastric pain. No nausea, No vomiting; No diarrhea or constipation. [x  ] BM  GENITOURINARY: No dysuria, No frequency, No urgency, No hematuria, or incontinence  NEUROLOGICAL: No headaches, No dizziness, No numbness, No tingling, No tremors, No weakness  EXT: No Swelling, No Pain, No Edema  SKIN:  [x  ] No itching, burning, rashes, or lesions   MUSCULOSKELETAL: No joint pain or swelling; No muscle pain, No back pain, No extremity pain  PSYCHIATRIC: No depression, anxiety, mood swings or difficulty sleeping at night  PAIN SCALE: [x  ] None  [  ] Other-  ROS Unable to obtain due to - [  ] Dementia  [  ] Lethargy  [  ] Sedated   REST OF REVIEW Of SYSTEM - [x  ] Normal     Vital Signs Last 24 Hrs  T(C): 36.5, Max: 36.7 (04-17 @ 08:01)  T(F): 97.7, Max: 98 (04-17 @ 08:01)  HR: 77 (77 - 108)  BP: 103/55 (92/52 - 141/70)  BP(mean): 76 (66 - 96)  RR: 16 (15 - 33)  SpO2: 94% (92% - 100%)  Finger Stick      I & Os for 24h ending 04-17 @ 07:00  =============================================  IN: 1275 ml / OUT: 2000 ml / NET: -725 ml    I & Os for current day (as of 04-17 @ 19:08)  =============================================  IN: 1160 ml / OUT: 1450 ml / NET: -290 ml      PHYSICAL EXAM:  GENERAL:  [ x ] NAD , [ x ] well appearing, [  ] Agitated, [  ] Lethargy, [  ] confused   HEAD:  [ x ] Normal, [  ] Other  EYES:  [x  ] EOMI, [x  ] PERRLA, [ x ] conjunctiva and sclera clear normal, [  ] Other,  [  ] Pallor,[  ] Discharge  ENMT:  [ x ] Normal, [ x ]dry mucous membranes, [ x ] Good dentition, [ x ] No Thrush  NECK:  [ x ] Supple, [x  ] No JVD, [ x] Normal thyroid, [  ] Lymphadenopathy [  ] Other  NERVOUS SYSTEM:  [  ] Alert & Oriented X3, [x  ] Nonfocal   [  ] Confusion  [  ] Encephalopathic [  ] Sedated [  ] Other-  CHEST/LUNG:  [  ] Clear to auscultation bilaterally, [x  ]  few b/l basal rales, [ x ] No rhonchi  [ x ]  No wheezing  HEART:  [ x ] Regular rate and rhythm  [  ] irregular  [  ] No murmurs, rubs, or gallops, [  ] PPM in place (Mfr:  )  ABDOMEN:  [x  ] Soft, [ x ] Nontender, [ x ] Nondistended, [ x ]No mass, [ x ] Bowel sounds present, [  ] obese  EXTREMITIES: [x  ] 2+ Peripheral Pulses, No clubbing, cyanosis,  [  ] edema, [  ] PVD stasis skin changes  LYMPH: No lymphadenopathy noted  SKIN:  [ x ] No rashes or lesions, [  ] Pressure Ulcers, [  ] ecchymosis [  ] Other    DIET: Cardiac    LABS:                        12.9   10.8  )-----------( 245      ( 17 Apr 2017 05:33 )             39.6     17 Apr 2017 05:33    142    |  103    |  18     ----------------------------<  100    3.4     |  32     |  0.60     Ca    8.3        17 Apr 2017 05:33  Phos  2.9       17 Apr 2017 05:33  Mg     2.4       17 Apr 2017 05:33            Culture Results:   No growth (04-13 @ 08:38)  Culture Results:   No growth to date. (04-13 @ 08:33)  Culture Results:   No growth to date. (04-13 @ 08:33)          RECENT CULTURES:  04-13 @ 08:38 .Urine Clean Catch (Midstream)                No growth    04-13 @ 08:33 .Blood Blood-Peripheral                No growth to date.          RESPIRATORY CULTURES:      cardiac Enzyme:  04-16 @ 07:09    CK:  --    CKMB:  --    CPK Mass Assay:  --    troponin i:  .415    BNP: --  04-15 @ 20:42    CK:  --    CKMB:  --    CPK Mass Assay:  --    troponin i:  .473    BNP: --  04-15 @ 14:48    CK:  --    CKMB:  --    CPK Mass Assay:  --    troponin i:  .665    BNP: --  04-15 @ 06:06    CK:  --    CKMB:  --    CPK Mass Assay:  --    troponin i:  --    BNP: 05905  04-13 @ 00:26    CK:  222    CKMB:  --    CPK Mass Assay:  2.6    troponin i:  .030    BNP: 515    HEALTH ISSUES - PROBLEM Dx:  Depression: Depression  Systolic CHF with reduced left ventricular function, NYHA class 2: Systolic CHF with reduced left ventricular function, NYHA class 2  Pneumonia, unspecified organism: Pneumonia, unspecified organism  Anxiety: Anxiety  Need for prophylactic measure: Need for prophylactic measure  Hiatal hernia: Hiatal hernia  RA (rheumatoid arthritis): RA (rheumatoid arthritis)  PVD (peripheral vascular disease): PVD (peripheral vascular disease)  SIMÓN (obstructive sleep apnea): SIMÓN (obstructive sleep apnea)  Hyperlipidemia: Hyperlipidemia  HTN (hypertension): HTN (hypertension)  Valvular heart disease: Valvular heart disease  Lactate blood increase: Lactate blood increase  COPD exacerbation: COPD exacerbation          Consultant(s) Notes Reviewed:  [ x ] YES     Care Discussed with [X] Consultants  [x  ] Patient  [x  ] Family  [  ]   [  ] Social Service  [x  ] RN, [  ] Physical Therapy  DVT PPX: [ x ] Lovenox, [  ] S C Heparin, [  ] Coumadin, [  ] Xarelto, [  ] Eliquis, [  ] SCD   Advanced directive: [ x ] None, [  ] DNR/DNI Patient is a 76y old  Female who presents with a chief complaint of SOB (17 Apr 2017 13:46)      INTERVAL HPI:  :Patient is a 76-year-old female with a past medical history of COPD, hypertension, hyperlipidemia, diverticulitis, depression, Rt breast cancer, status post lumpectomy and radiation 2008, anxiety, hiatal hernia-status post surgical repair in 2005, obstructive sleep apnea (not on BiPAP at home), PVD, rheumatoid arthritis, thrush, TIA (2010), TMJ, valvular heart disease (aortic and mitral),, H/O Tissu AVR 2014, borderline diabetes (not on medications), presents to ED via EMS from home with chief complaint of difficulty breathing. Daughter at bedside providing additional history.  Pt seen and examined at bedside. Pt admits to improved sob from admission, however states she still has wheezing. Admits to anxiety making the sob worse. Denies chest pain, palpitation, dizziness, abdominal pain. Off Bi PAP, pt in ICU now s/p Acute respiratory distress, S/P IV Lasix give, Miranda cat for I/O. CXR showed Pulmonary congestion. RVP negative,On PO steroids taper .BiPAP at night, pt with acute systolic CHF with elevated troponin 2 to demand ischemia, No ACS, pt OOB to chair.        OVERNIGHT EVENTS:NONE    MEDICATIONS  (STANDING):  aspirin enteric coated 81milliGRAM(s) Oral daily  simvastatin 20milliGRAM(s) Oral at bedtime  tiotropium 18 MICROgram(s) Capsule 1Capsule(s) Inhalation daily  docusate sodium 100milliGRAM(s) Oral three times a day  pantoprazole    Tablet 40milliGRAM(s) Oral before breakfast  venlafaxine 75milliGRAM(s) Oral two times a day with meals  lactobacillus acidophilus 1Tablet(s) Oral three times a day with meals  metroNIDAZOLE  IVPB  IV Intermittent   metroNIDAZOLE  IVPB 500milliGRAM(s) IV Intermittent every 8 hours  aztreonam  IVPB 2000milliGRAM(s) IV Intermittent every 8 hours  buDESOnide 160 MICROgram(s)/formoterol 4.5 MICROgram(s) Inhaler 2Puff(s) Inhalation two times a day  ALBUTerol    0.083% 2.5milliGRAM(s) Nebulizer every 6 hours  ARIPiprazole 2milliGRAM(s) Oral at bedtime  predniSONE   Tablet 20milliGRAM(s) Oral daily  furosemide    Tablet 40milliGRAM(s) Oral two times a day  metoprolol 25milliGRAM(s) Oral every 8 hours    MEDICATIONS  (PRN):  acetaminophen   Tablet 650milliGRAM(s) Oral every 6 hours PRN For Temp greater than 38 C (100.4 F)  acetaminophen   Tablet. 650milliGRAM(s) Oral every 6 hours PRN Mild Pain (1 - 3)  senna 2Tablet(s) Oral at bedtime PRN Constipation  oxyCODONE  5 mG/acetaminophen 325 mG 2Tablet(s) Oral every 4 hours PRN Severe Pain (7 - 10)  zolpidem 5milliGRAM(s) Oral at bedtime PRN Insomnia  clonazePAM Tablet 0.25milliGRAM(s) Oral two times a day PRN anxiety  sodium chloride 0.65% Nasal 1Spray(s) Both Nostrils four times a day PRN Nasal Congestion      Allergies    penicillins (Hives)  quinolines (Other)      REVIEW OF SYSTEMS: Feels better  CONSTITUTIONAL: No fever, No chills, No fatigue, No myalgia, No Body ache  EYES: No eye pain, visual disturbances, or discharge  ENMT:  No ear pain, No nose bleed, No vertigo; No sinus or throat pain, No Congestion  NECK: No pain, No stiffness  RESPIRATORY: mild cough, NO wheezing, No  hemoptysis, No shortness of breath  CARDIOVASCULAR: No chest pain, palpitations  GASTROINTESTINAL: No abdominal or epigastric pain. No nausea, No vomiting; No diarrhea or constipation. [x  ] BM  GENITOURINARY: No dysuria, No frequency, No urgency, No hematuria, or incontinence  NEUROLOGICAL: No headaches, No dizziness, No numbness, No tingling, No tremors, No weakness  EXT: No Swelling, No Pain, No Edema  SKIN:  [x  ] No itching, burning, rashes, or lesions   MUSCULOSKELETAL: No joint pain or swelling; No muscle pain, No back pain, No extremity pain  PSYCHIATRIC: No depression, anxiety, mood swings or difficulty sleeping at night  PAIN SCALE: [x  ] None  [  ] Other-  ROS Unable to obtain due to - [  ] Dementia  [  ] Lethargy  [  ] Sedated   REST OF REVIEW Of SYSTEM - [x  ] Normal     Vital Signs Last 24 Hrs  T(C): 36.5, Max: 36.7 (04-17 @ 08:01)  T(F): 97.7, Max: 98 (04-17 @ 08:01)  HR: 77 (77 - 108)  BP: 103/55 (92/52 - 141/70)  BP(mean): 76 (66 - 96)  RR: 16 (15 - 33)  SpO2: 94% (92% - 100%)  Finger Stick      I & Os for 24h ending 04-17 @ 07:00  =============================================  IN: 1275 ml / OUT: 2000 ml / NET: -725 ml    I & Os for current day (as of 04-17 @ 19:08)  =============================================  IN: 1160 ml / OUT: 1450 ml / NET: -290 ml      PHYSICAL EXAM:  GENERAL:  [ x ] NAD , [ x ] well appearing, [  ] Agitated, [  ] Lethargy, [  ] confused   HEAD:  [ x ] Normal, [  ] Other  EYES:  [x  ] EOMI, [x  ] PERRLA, [ x ] conjunctiva and sclera clear normal, [  ] Other,  [  ] Pallor,[  ] Discharge  ENMT:  [ x ] Normal, [ x ]dry mucous membranes, [ x ] Good dentition, [ x ] No Thrush  NECK:  [ x ] Supple, [x  ] No JVD, [ x] Normal thyroid, [  ] Lymphadenopathy [  ] Other  NERVOUS SYSTEM:  [  ] Alert & Oriented X3, [x  ] Nonfocal   [  ] Confusion  [  ] Encephalopathic [  ] Sedated [  ] Other-  CHEST/LUNG:  [  ] Clear to auscultation bilaterally, [x  ]  few b/l basal rales, [ x ] No rhonchi  [ x ]  No wheezing  HEART:  [ x ] Regular rate and rhythm  [  ] irregular  [  ] No murmurs, rubs, or gallops, [  ] PPM in place (Mfr:  )  ABDOMEN:  [x  ] Soft, [ x ] Nontender, [ x ] Nondistended, [ x ]No mass, [ x ] Bowel sounds present, [  ] obese  EXTREMITIES: [x  ] 2+ Peripheral Pulses, No clubbing, cyanosis,  [  ] edema, [  ] PVD stasis skin changes  LYMPH: No lymphadenopathy noted  SKIN:  [ x ] No rashes or lesions, [  ] Pressure Ulcers, [  ] ecchymosis [  ] Other    DIET: Cardiac    LABS:                        12.9   10.8  )-----------( 245      ( 17 Apr 2017 05:33 )             39.6     17 Apr 2017 05:33    142    |  103    |  18     ----------------------------<  100    3.4     |  32     |  0.60     Ca    8.3        17 Apr 2017 05:33  Phos  2.9       17 Apr 2017 05:33  Mg     2.4       17 Apr 2017 05:33            Culture Results:   No growth (04-13 @ 08:38)  Culture Results:   No growth to date. (04-13 @ 08:33)  Culture Results:   No growth to date. (04-13 @ 08:33)          RECENT CULTURES:  04-13 @ 08:38 .Urine Clean Catch (Midstream)                No growth    04-13 @ 08:33 .Blood Blood-Peripheral        No growth to date.    cardiac Enzyme:  04-16 @ 07:09    CK:  --    CKMB:  --    CPK Mass Assay:  --    troponin i:  .415    BNP: --  04-15 @ 20:42    CK:  --    CKMB:  --    CPK Mass Assay:  --    troponin i:  .473    BNP: --  04-15 @ 14:48    CK:  --    CKMB:  --    CPK Mass Assay:  --    troponin i:  .665    BNP: --  04-15 @ 06:06    CK:  --    CKMB:  --    CPK Mass Assay:  --    troponin i:  --    BNP: 58066  04-13 @ 00:26    CK:  222    CKMB:  --    CPK Mass Assay:  2.6    troponin i:  .030    BNP: 515    HEALTH ISSUES - PROBLEM Dx:  Depression: Depression  Systolic CHF with reduced left ventricular function, NYHA class 2: Systolic CHF with reduced left ventricular function, NYHA class 2  Pneumonia, unspecified organism: Pneumonia, unspecified organism  Anxiety: Anxiety  Need for prophylactic measure: Need for prophylactic measure  Hiatal hernia: Hiatal hernia  RA (rheumatoid arthritis): RA (rheumatoid arthritis)  PVD (peripheral vascular disease): PVD (peripheral vascular disease)  SIMÓN (obstructive sleep apnea): SIMÓN (obstructive sleep apnea)  Hyperlipidemia: Hyperlipidemia  HTN (hypertension): HTN (hypertension)  Valvular heart disease: Valvular heart disease  Lactate blood increase: Lactate blood increase  COPD exacerbation: COPD exacerbation          Consultant(s) Notes Reviewed:  [ x ] YES     Care Discussed with [X] Consultants  [x  ] Patient  [x  ] Family  [  ]   [  ] Social Service  [x  ] RN, [  ] Physical Therapy  DVT PPX: [ x ] Lovenox, [  ] S C Heparin, [  ] Coumadin, [  ] Xarelto, [  ] Eliquis, [  ] SCD   Advanced directive: [ x ] None, [  ] DNR/DNI

## 2017-04-17 NOTE — PROGRESS NOTE ADULT - PROBLEM SELECTOR PLAN 1
S/p acute respiratory distress2 to Ac Pulmonary Edema, Ac on Chronic Systolic CHF  -S/P  BIPAP, q HS  CTA - NEG PE, B/L Effusions, PNA  - CXR suggestive for vascular congestion.    WBC- mild leukocytosis 2 to combination of steroids effect and possible  PNA on CT Angio.  -monitor oxygen and keep >90, continuous pulse ox  -Xopenex standing, Spiriva, Symbicort BID, PRN Albuterol  -On Prednisone 20 mg daily, taper as per ICU  - cultures - neg Pulmonary  DR Rivera follow up  ID Consult-Man, continue Azactam,Flagyl  in ICU .Off Vanco S/p acute respiratory distress2 to Ac Pulmonary Edema, Ac on Chronic Systolic CHF  -S/P  BIPAP, q HS  CTA - NEG PE, B/L Effusions, PNA  - CXR suggestive for vascular congestion.    WBC- mild leukocytosis 2 to combination of steroids effect and possible  PNA on CT Angio.  -monitor oxygen and keep >90, continuous pulse ox  -Xopenex standing, Spiriva, Symbicort BID, PRN Albuterol  -On PO Prednisone 20 mg daily, taper as per ICU  - cultures - neg Pulmonary  DR Rivera follow up  ID Consult-Man, continue Azactam,Flagyl  in ICU .Off Vanco

## 2017-04-17 NOTE — PROGRESS NOTE ADULT - SUBJECTIVE AND OBJECTIVE BOX
infectious diseases progress note:  JASMIN HANSEN is a 76y y. o.Female patient        Patient reports: feeling much better and maybe going to floor today      ROS:    EYES:  Negative  blurry vision or double vision  GASTROINTESTINAL:  Negative for nausea, vomiting, diarrhea  -otherwise negative except for subjective    Allergies    penicillins (Hives)  quinolines (Other)    Intolerances        ANTIBIOTICS/RELEVANT:  antimicrobials  metroNIDAZOLE  IVPB  IV Intermittent   metroNIDAZOLE  IVPB 500milliGRAM(s) IV Intermittent every 8 hours  aztreonam  IVPB 2000milliGRAM(s) IV Intermittent every 8 hours    immunologic:    OTHER:  enoxaparin Injectable 40milliGRAM(s) SubCutaneous every 24 hours  acetaminophen   Tablet 650milliGRAM(s) Oral every 6 hours PRN  acetaminophen   Tablet. 650milliGRAM(s) Oral every 6 hours PRN  senna 2Tablet(s) Oral at bedtime PRN  aspirin enteric coated 81milliGRAM(s) Oral daily  oxyCODONE  5 mG/acetaminophen 325 mG 2Tablet(s) Oral every 4 hours PRN  simvastatin 20milliGRAM(s) Oral at bedtime  zolpidem 5milliGRAM(s) Oral at bedtime PRN  tiotropium 18 MICROgram(s) Capsule 1Capsule(s) Inhalation daily  docusate sodium 100milliGRAM(s) Oral three times a day  pantoprazole    Tablet 40milliGRAM(s) Oral before breakfast  venlafaxine 75milliGRAM(s) Oral two times a day with meals  lactobacillus acidophilus 1Tablet(s) Oral three times a day with meals  buDESOnide 160 MICROgram(s)/formoterol 4.5 MICROgram(s) Inhaler 2Puff(s) Inhalation two times a day  ALBUTerol    0.083% 2.5milliGRAM(s) Nebulizer every 6 hours  ARIPiprazole 2milliGRAM(s) Oral at bedtime  predniSONE   Tablet 20milliGRAM(s) Oral daily  furosemide    Tablet 40milliGRAM(s) Oral two times a day  clonazePAM Tablet 0.25milliGRAM(s) Oral two times a day PRN  sodium chloride 0.65% Nasal 1Spray(s) Both Nostrils four times a day PRN      Objective:  Vital Signs Last 24 Hrs  T(C): 36.4, Max: 37.1 (04-16 @ 15:51)  T(F): 97.5, Max: 98.7 (04-16 @ 15:51)  HR: 92 (85 - 108)  BP: 117/62 (94/55 - 141/70)  BP(mean): 84 (70 - 96)  RR: 20 (15 - 39)  SpO2: 97% (93% - 100%)    PHYSICAL EXAM:  Constitutional:Well-developed, well nourished  Eyes:PERRLA, EOMI  Ear/Nose/Throat: oropharynx normal	  Neck:no JVD, no lymphadenopathy, supple  Respiratory: no accessory muscle use, lung fields bilaterally with crackles but reduced ronchi  Cardiovascular:RRR, normal S1, S2 no m/r/g  Gastrointestinal:soft, NT, no HSM, BS-normal  Extremities:no clubbing, no cyanosis, edema trace  Neuro-patient alert, oriented and appropriate  Skin-no sig lesions      LABS:                        12.9   10.8  )-----------( 245      ( 17 Apr 2017 05:33 )             39.6     WBC Count: 12.3 K/uL (04.16.17 @ 07:09)    WBC Count: 19.5 K/uL (04.13.17 @ 00:26)        04-17    142  |  103  |  18  ----------------------------<  100<H>  3.4<L>   |  32<H>  |  0.60    Ca    8.3<L>      17 Apr 2017 05:33  Phos  2.9     04-17  Mg     2.4     04-17    TPro  6.3  /  Alb  2.8<L>  /  TBili  0.4  /  DBili  x   /  AST  18  /  ALT  37  /  AlkPhos  60  04-16            MICROBIOLOGY:        RADIOLOGY & ADDITIONAL STUDIES:

## 2017-04-17 NOTE — DISCHARGE NOTE ADULT - PROVIDER TOKENS
TOKEN:'[id number]',FREE:[LAST:[Dr. garner],PHONE:[(   )    -],FAX:[(   )    -],ADDRESS:[[address] [place] [address]  Phone: [phone]  Fax: [phone]]],TOKEN:'[id number]'

## 2017-04-17 NOTE — DISCHARGE NOTE ADULT - CARE PROVIDERS DIRECT ADDRESSES
,DirectAddress_Unknown,DirectAddress_Unknown,[email or web address],DirectAddress_Unknown

## 2017-04-17 NOTE — PROGRESS NOTE ADULT - SUBJECTIVE AND OBJECTIVE BOX
Date/Time Patient Seen:  		  Referring MD:   Data Reviewed	       Patient is a 76y old  Female who presents with a chief complaint of SOB (13 Apr 2017 01:22)  in bed  on BIPAP overnight  vs and meds reviewed      Subjective/HPI       Medication list         MEDICATIONS  (STANDING):  enoxaparin Injectable 40milliGRAM(s) SubCutaneous every 24 hours  aspirin enteric coated 81milliGRAM(s) Oral daily  simvastatin 20milliGRAM(s) Oral at bedtime  tiotropium 18 MICROgram(s) Capsule 1Capsule(s) Inhalation daily  docusate sodium 100milliGRAM(s) Oral three times a day  pantoprazole    Tablet 40milliGRAM(s) Oral before breakfast  venlafaxine 75milliGRAM(s) Oral two times a day with meals  lactobacillus acidophilus 1Tablet(s) Oral three times a day with meals  metroNIDAZOLE  IVPB  IV Intermittent   metroNIDAZOLE  IVPB 500milliGRAM(s) IV Intermittent every 8 hours  aztreonam  IVPB 2000milliGRAM(s) IV Intermittent every 8 hours  buDESOnide 160 MICROgram(s)/formoterol 4.5 MICROgram(s) Inhaler 2Puff(s) Inhalation two times a day  ALBUTerol    0.083% 2.5milliGRAM(s) Nebulizer every 6 hours  ARIPiprazole 2milliGRAM(s) Oral at bedtime  predniSONE   Tablet 20milliGRAM(s) Oral daily  furosemide    Tablet 40milliGRAM(s) Oral two times a day    MEDICATIONS  (PRN):  acetaminophen   Tablet 650milliGRAM(s) Oral every 6 hours PRN For Temp greater than 38 C (100.4 F)  acetaminophen   Tablet. 650milliGRAM(s) Oral every 6 hours PRN Mild Pain (1 - 3)  senna 2Tablet(s) Oral at bedtime PRN Constipation  oxyCODONE  5 mG/acetaminophen 325 mG 2Tablet(s) Oral every 4 hours PRN Severe Pain (7 - 10)  zolpidem 5milliGRAM(s) Oral at bedtime PRN Insomnia  clonazePAM Tablet 0.25milliGRAM(s) Oral two times a day PRN anxiety  sodium chloride 0.65% Nasal 1Spray(s) Both Nostrils four times a day PRN Nasal Congestion         Vitals log        ICU Vital Signs Last 24 Hrs  T(C): 36.6, Max: 37.1 (04-16 @ 15:51)  T(F): 97.8, Max: 98.7 (04-16 @ 15:51)  HR: 94 (85 - 108)  BP: 108/58 (99/54 - 141/70)  BP(mean): 77 (71 - 96)  ABP: --  ABP(mean): --  RR: 21 (15 - 39)  SpO2: 100% (93% - 100%)           Input and Output:  I&O's Detail  I & Os for 24h ending 16 Apr 2017 07:00  =============================================  IN:    Oral Fluid: 625 ml    Solution: 500 ml    Solution: 300 ml    Solution: 300 ml    Total IN: 1725 ml  ---------------------------------------------  OUT:    Indwelling Catheter - Urethral: 1860 ml    Voided: 1075 ml    Total OUT: 2935 ml  ---------------------------------------------  Total NET: -1210 ml    I & Os for current day (as of 17 Apr 2017 06:16)  =============================================  IN:    Oral Fluid: 525 ml    Solution: 250 ml    Solution: 100 ml    Solution: 100 ml    Total IN: 975 ml  ---------------------------------------------  OUT:    Voided: 1100 ml    Total OUT: 1100 ml  ---------------------------------------------  Total NET: -125 ml      Lab Data                        12.9   10.8  )-----------( 245      ( 17 Apr 2017 05:33 )             39.6     04-17    x   |  x   |  18  ----------------------------<  100<H>  x    |  32<H>  |  0.60    Ca    8.3<L>      17 Apr 2017 05:33  Phos  2.5     04-16  Mg     2.7     04-16    TPro  6.3  /  Alb  2.8<L>  /  TBili  0.4  /  DBili  x   /  AST  18  /  ALT  37  /  AlkPhos  60  04-16      CARDIAC MARKERS ( 16 Apr 2017 07:09 )  .415 ng/mL / x     / x     / x     / x      CARDIAC MARKERS ( 15 Apr 2017 20:42 )  .473 ng/mL / x     / x     / x     / x      CARDIAC MARKERS ( 15 Apr 2017 14:48 )  .665 ng/mL / x     / x     / x     / x            Review of Systems	      Objective     Physical Examination      head at  heart - s1s2  lungs - no wheeze  abd - soft  moves all extr  cn grossly int      Pertinent Lab findings & Imaging      Miranda:  NO   Adequate UO     I&O's Detail  I & Os for 24h ending 16 Apr 2017 07:00  =============================================  IN:    Oral Fluid: 625 ml    Solution: 500 ml    Solution: 300 ml    Solution: 300 ml    Total IN: 1725 ml  ---------------------------------------------  OUT:    Indwelling Catheter - Urethral: 1860 ml    Voided: 1075 ml    Total OUT: 2935 ml  ---------------------------------------------  Total NET: -1210 ml    I & Os for current day (as of 17 Apr 2017 06:16)  =============================================  IN:    Oral Fluid: 525 ml    Solution: 250 ml    Solution: 100 ml    Solution: 100 ml    Total IN: 975 ml  ---------------------------------------------  OUT:    Voided: 1100 ml    Total OUT: 1100 ml  ---------------------------------------------  Total NET: -125 ml           Discussed with:     Cultures:	        Radiology

## 2017-04-17 NOTE — PROGRESS NOTE ADULT - ASSESSMENT
Jovana has had an episode of acute pulmonary edema with characteristic chest x-ray findings and  a large elevation in BNP from baseline. She is noted to have significant segmental left ventricular systolic dysfunction mild elevation in troponins.cannot rule out small ischemic event.     Plan  ccardiac status stable for transfer out of the ICU  Pulmonary F/U  Aspirin  DVT prophylaxis  Antibiotics per medicine  Continue diuretics and keep fluid balance negative  Discontinue verapamil for now  Will need an ischemia evaluation  Beta blocker and ACE inhibitor therapy once stable    . Discussed with ICU team Jovana has had an episode of acute pulmonary edema with characteristic chest x-ray findings and  a large elevation in BNP from baseline. She is noted to have significant segmental left ventricular systolic dysfunction mild elevation in troponins.cannot rule out small ischemic event.     Plan  ccardiac status stable for transfer out of the ICU  Pulmonary F/U  Aspirin  DVT prophylaxis  Antibiotics per medicine  Continue diuretics and keep fluid balance negative  Discontinue verapamil for now  Will need an ischemia evaluation-discuss with family tranfer for cardiac cath  Beta blocker and ACE inhibitor therapy once stable    . Discussed with ICU team- Family decided to have pt transferred to [facility] time 35mins

## 2017-04-17 NOTE — DISCHARGE NOTE ADULT - SECONDARY DIAGNOSIS.
Anxiety Depression HTN (hypertension) Hyperlipidemia Pneumonia, unspecified organism CHF (congestive heart failure)

## 2017-04-17 NOTE — PROGRESS NOTE ADULT - SUBJECTIVE AND OBJECTIVE BOX
24 hour events: Feeling better, tolerated BiPap overnight.     Review of Systems:  Resp: No cough, no wheezing, feeling less shortness of breath  CVS: No chest pain, palpitations  GI: No nausea, vomiting     T(F): 97.6, Max: 98.7 (04-16 @ 15:51)  HR: 97 (85 - 108)  BP: 92/52 (92/52 - 141/70)  RR: 21 (15 - 39)  SpO2: 94% (92% - 100%) on RA    I&O's Summary  I & Os for 24h ending 17 Apr 2017 07:00  =============================================  IN: 1275 ml / OUT: 2000 ml / NET: -725 ml    I & Os for current day (as of 17 Apr 2017 14:51)  =============================================  IN: 200 ml / OUT: 750 ml / NET: -550 ml    Physical Exam:   Gen: Sitting comfortably in chair, no acute distress  Neuro: AAOx3  Resp: B/L basilar crackles, no wheezing, no use of accessory muscles of breathing  CVS: +S1S2, RRR  Abd: Soft, non-tender  Ext: No clubbing, cyanosis or edema  Skin: No rashes appreciated    Meds:  metroNIDAZOLE  IVPB IV Intermittent  aztreonam  IVPB IV Intermittent  furosemide    Tablet Oral  metoprolol Oral  simvastatin Oral  predniSONE   Tablet Oral  tiotropium 18 MICROgram(s) Capsule Inhalation  buDESOnide 160 MICROgram(s)/formoterol 4.5 MICROgram(s) Inhaler Inhalation  ALBUTerol    0.083% Nebulizer  acetaminophen   Tablet. Oral PRN  oxyCODONE  5 mG/acetaminophen 325 mG Oral PRN  zolpidem Oral PRN  venlafaxine Oral  ARIPiprazole Oral  clonazePAM Tablet Oral PRN  enoxaparin Injectable SubCutaneous  aspirin enteric coated Oral  senna Oral PRN  docusate sodium Oral  pantoprazole    Tablet Oral  sodium chloride 0.65% Nasal Both Nostrils PRN  lactobacillus acidophilus Oral                         12.9   10.8  )-----------( 245      ( 17 Apr 2017 05:33 )             39.6     04-17    142  |  103  |  18  ----------------------------<  100<H>  3.4<L>   |  32<H>  |  0.60    Ca    8.3<L>      17 Apr 2017 05:33  Phos  2.9     04-17  Mg     2.4     04-17    TPro  6.3  /  Alb  2.8<L>  /  TBili  0.4  /  DBili  x   /  AST  18  /  ALT  37  /  AlkPhos  60  04-16      CARDIAC MARKERS ( 16 Apr 2017 07:09 )  .415 ng/mL / x     / x     / x     / x      CARDIAC MARKERS ( 15 Apr 2017 20:42 )  .473 ng/mL / x     / x     / x     / x        Urine Clean Catch (Midstream) 4/13: No growth  Blood Blood-Peripheral 4/13: No growth to date.     Rapid RVP Result: NotDetec (04-15 @ 08:11)    Radiology: ***  CXR 4/14: Interstitial pulmonary edema.  Persistent bibasilar opacities.  Echo 4/14: EF 30%. Moderate to severe segmental left ventricular systolic dysfunction Mitral annular calcification with mild mitral regurgitation. Well seated normally functioning aortic valve bioprosthesis.     Bedside lung ultrasound: ***    Bedside ECHO: ***    CENTRAL LINE: N           TORRES: N                        A-LINE: N                          GLOBAL ISSUE/BEST PRACTICE:  Analgesia:  Sedation:  HOB elevation: yes  Stress ulcer prophylaxis:  VTE prophylaxis:  Glycemic control:  Nutrition:      CODE STATUS: *** 24 hour events: Feeling better, tolerated BiPap overnight.     Review of Systems:  Resp: No cough, no wheezing, feeling less shortness of breath  CVS: No chest pain, palpitations  GI: No nausea, vomiting     T(F): 97.6, Max: 98.7 (04-16 @ 15:51)  HR: 97 (85 - 108)  BP: 92/52 (92/52 - 141/70)  RR: 21 (15 - 39)  SpO2: 94% (92% - 100%) on RA    I&O's Summary  I & Os for 24h ending 17 Apr 2017 07:00  =============================================  IN: 1275 ml / OUT: 2000 ml / NET: -725 ml    I & Os for current day (as of 17 Apr 2017 14:51)  =============================================  IN: 200 ml / OUT: 750 ml / NET: -550 ml    Physical Exam:   Gen: Sitting comfortably in chair, no acute distress  Neuro: AAOx3  Resp: B/L basilar crackles, no wheezing, no use of accessory muscles of breathing  CVS: +S1S2, RRR  Abd: Soft, non-tender  Ext: No clubbing, cyanosis or edema  Skin: No rashes appreciated    Meds:  metroNIDAZOLE  IVPB IV Intermittent  aztreonam  IVPB IV Intermittent  furosemide    Tablet Oral  metoprolol Oral  simvastatin Oral  predniSONE   Tablet Oral  tiotropium 18 MICROgram(s) Capsule Inhalation  buDESOnide 160 MICROgram(s)/formoterol 4.5 MICROgram(s) Inhaler Inhalation  ALBUTerol    0.083% Nebulizer  acetaminophen   Tablet. Oral PRN  oxyCODONE  5 mG/acetaminophen 325 mG Oral PRN  zolpidem Oral PRN  venlafaxine Oral  ARIPiprazole Oral  clonazePAM Tablet Oral PRN  enoxaparin Injectable SubCutaneous  aspirin enteric coated Oral  senna Oral PRN  docusate sodium Oral  pantoprazole    Tablet Oral  sodium chloride 0.65% Nasal Both Nostrils PRN  lactobacillus acidophilus Oral                         12.9   10.8  )-----------( 245      ( 17 Apr 2017 05:33 )             39.6     04-17    142  |  103  |  18  ----------------------------<  100<H>  3.4<L>   |  32<H>  |  0.60    Ca    8.3<L>      17 Apr 2017 05:33  Phos  2.9     04-17  Mg     2.4     04-17    TPro  6.3  /  Alb  2.8<L>  /  TBili  0.4  /  DBili  x   /  AST  18  /  ALT  37  /  AlkPhos  60  04-16    CARDIAC MARKERS ( 16 Apr 2017 07:09 )  .415 ng/mL / x     / x     / x     / x      CARDIAC MARKERS ( 15 Apr 2017 20:42 )  .473 ng/mL / x     / x     / x     / x        Urine Clean Catch (Midstream) 4/13: No growth  Blood Blood-Peripheral 4/13: No growth to date.     Rapid RVP Result: NotDetec (04-15 @ 08:11)    Radiology: ***  CXR 4/14: Interstitial pulmonary edema.  Persistent bibasilar opacities.  Echo 4/14: EF 30%. Moderate to severe segmental left ventricular systolic dysfunction Mitral annular calcification with mild mitral regurgitation. Well seated normally functioning aortic valve bioprosthesis.     Bedside lung ultrasound: ***    Bedside ECHO: ***    CENTRAL LINE: N           TORRES: N                        A-LINE: N                          GLOBAL ISSUE/BEST PRACTICE:  Analgesia:  Sedation:  HOB elevation: yes  Stress ulcer prophylaxis:  VTE prophylaxis:  Glycemic control:  Nutrition:      CODE STATUS: *** 24 hour events: Feeling better, tolerated BiPap overnight.     Review of Systems:  Resp: No cough, no wheezing, feeling less shortness of breath  CVS: No chest pain, palpitations  GI: No nausea, vomiting     T(F): 97.6, Max: 98.7 (04-16 @ 15:51)  HR: 97 (85 - 108)  BP: 92/52 (92/52 - 141/70)  RR: 21 (15 - 39)  SpO2: 94% (92% - 100%) on RA    I&O's Summary  I & Os for 24h ending 17 Apr 2017 07:00  =============================================  IN: 1275 ml / OUT: 2000 ml / NET: -725 ml    I & Os for current day (as of 17 Apr 2017 14:51)  =============================================  IN: 200 ml / OUT: 750 ml / NET: -550 ml    Physical Exam:   Gen: Sitting comfortably in chair, no acute distress  Neuro: AAOx3  Resp: B/L basilar crackles, no wheezing, no use of accessory muscles of breathing  CVS: +S1S2, RRR  Abd: Soft, non-tender  Ext: No clubbing, cyanosis or edema  Skin: No rashes appreciated    Meds:  metroNIDAZOLE  IVPB IV Intermittent  aztreonam  IVPB IV Intermittent  furosemide    Tablet Oral  metoprolol Oral  simvastatin Oral  predniSONE   Tablet Oral  tiotropium 18 MICROgram(s) Capsule Inhalation  buDESOnide 160 MICROgram(s)/formoterol 4.5 MICROgram(s) Inhaler Inhalation  ALBUTerol    0.083% Nebulizer  acetaminophen   Tablet. Oral PRN  oxyCODONE  5 mG/acetaminophen 325 mG Oral PRN  zolpidem Oral PRN  venlafaxine Oral  ARIPiprazole Oral  clonazePAM Tablet Oral PRN  enoxaparin Injectable SubCutaneous  aspirin enteric coated Oral  senna Oral PRN  docusate sodium Oral  pantoprazole    Tablet Oral  sodium chloride 0.65% Nasal Both Nostrils PRN  lactobacillus acidophilus Oral                         12.9   10.8  )-----------( 245      ( 17 Apr 2017 05:33 )             39.6     04-17    142  |  103  |  18  ----------------------------<  100<H>  3.4<L>   |  32<H>  |  0.60    Ca    8.3<L>      17 Apr 2017 05:33  Phos  2.9     04-17  Mg     2.4     04-17    TPro  6.3  /  Alb  2.8<L>  /  TBili  0.4  /  DBili  x   /  AST  18  /  ALT  37  /  AlkPhos  60  04-16    CARDIAC MARKERS ( 16 Apr 2017 07:09 )  .415 ng/mL / x     / x     / x     / x      CARDIAC MARKERS ( 15 Apr 2017 20:42 )  .473 ng/mL / x     / x     / x     / x        Urine Clean Catch (Midstream) 4/13: No growth  Blood Blood-Peripheral 4/13: No growth to date.     Rapid RVP Result: NotDetec (04-15 @ 08:11)    CXR 4/14: Interstitial pulmonary edema.  Persistent bibasilar opacities.  Echo 4/14: EF 30%. Moderate to severe segmental left ventricular systolic dysfunction Mitral annular calcification with mild mitral regurgitation. Well seated normally functioning aortic valve bioprosthesis.     CENTRAL LINE: N           TORRES: N                        A-LINE: N                          GLOBAL ISSUE/BEST PRACTICE:  Analgesia: n/a  Sedation: n/a  HOB elevation: yes  Stress ulcer prophylaxis: n/a  VTE prophylaxis: y  Glycemic control: y  Nutrition: y      CODE STATUS: full

## 2017-04-17 NOTE — PROGRESS NOTE ADULT - PROBLEM SELECTOR PLAN 1
would continue current antibiotics.  With abx intolerances currently on broad coverage including gram pos, gram neg and anaerobes with likely duration of therapy until 4/22

## 2017-04-17 NOTE — PROGRESS NOTE ADULT - PROBLEM SELECTOR PLAN 2
on dual abx  cx data reviewed  cxr in 1 - 2 weeks unless has resp distress  monitor sat  overall better  increase activity  keep sat > 88 pct

## 2017-04-17 NOTE — PROGRESS NOTE ADULT - ATTENDING COMMENTS
76F PMH COPD, HTN, HLD, depression, anxiety, breast ca s/p lumpectomy/RT, DM (not on medication), SIMÓN (does not use CPAP), PVD, RA, & Bioprosthetic AVR presents with acute hypoxic resp failure due to acute pulmonary edema and acute systolic heart failure, likely superimposed aspiration pneumonia and COPD exacerbation.    - C/w Aripiprazole, venlafaxine, & zolpidem  - HD stable, diuresing well with furosemide 40 mg po q12h  - C/w ASA, statin, start metoprolol 25 q8h  - Transfer to [facility] Cath lab, scheduled for AM tomorrow  - Steroid taper, c/w ICS/LABA + LAMA for COPD, supplemental O2 prn with NC  - BiPAP qhs  - OOB with assistance  - Tolerating diet  - Stable kidney function and lytes  - Day 5/5-7 Aztreonam/Flagyl for possible superimposed aspiration pneumonia  - Transfer to Tele  - No lines or sheriff  - DVT ppx, hold Lovenox in AM pre-Cath

## 2017-04-17 NOTE — PROGRESS NOTE ADULT - ATTENDING COMMENTS
Pt seen,examined, case & care plan d/w Dtr & Pt at detail.D/W Dr Amin- ICU MD & Dr García at detail. Pt to go to [facility] for cardiac cath.in AM  AM labs.

## 2017-04-17 NOTE — PROGRESS NOTE ADULT - PROBLEM SELECTOR PLAN 2
S/P Ac pulmonary Edema with elevated Troponin 2 to Demand ischemia, NO ACS  IV Lasix 40 mg q 12 hrs, Fluid restrictions  I/O, 2D ECHO -EF ~30%  Cardio Dr García , d/w, plan for cardiac cath at [facility] for ischemia work up.on ASA & BB   Replace PO K Dur, for hypokalemia

## 2017-04-17 NOTE — PROGRESS NOTE ADULT - ASSESSMENT
76-year-old female with a past medical history of COPD, hypertension, hyperlipidemia, diverticulitis, depression, breast cancer, status post lumpectomy and radiation 2008, anxiety, hiatal hernia-status post surgical repair in 2005, obstructive sleep apnea (not on HOME O2, NO BiPAP at home), PVD, rheumatoid arthritis, thrush, TIA (2010), TMJ, valvular heart disease (aortic and mitral),S/P Tissue AVR 2014 admitted with acute Respiratory failure due to acute on chronic COPD exacerbation, and elevated lactate which is Normal now.Pt had acute respiratory distress, 2 to Fluid over load with Acute on chronic systolic CHF, S/P IV lasix,   Pt is Off Bi PAP, On O2 NC .in ICU stable, RVP -negative .On PO Steroids taper, plan for cardiac cath for ischemia work up with EF 30 % 76-year-old female with a past medical history of COPD, hypertension, hyperlipidemia, diverticulitis, depression, breast cancer, status post lumpectomy and radiation 2008, anxiety, hiatal hernia-status post surgical repair in 2005, obstructive sleep apnea (not on HOME O2, NO BiPAP at home), PVD, rheumatoid arthritis, thrush, TIA (2010), TMJ, valvular heart disease (aortic and mitral),S/P Tissue AVR 2014 admitted with acute Respiratory failure due to acute on chronic COPD exacerbation, and elevated lactate which is Normal now.Pt had acute respiratory distress, 2 to Fluid over load with Acute on chronic systolic CHF, S/P PO lasix,   Pt is Off Bi PAP, On O2 NC .in ICU stable, RVP -negative .On PO Steroids taper, plan for cardiac cath for ischemia work up with EF 30 %

## 2017-04-17 NOTE — DISCHARGE NOTE ADULT - HOSPITAL COURSE
Patient is a 76-year-old female with a past medical history of COPD, hypertension, hyperlipidemia, diverticulitis, depression, Rt breast cancer, status post lumpectomy and radiation 2008, anxiety, hiatal hernia-status post surgical repair in 2005, obstructive sleep apnea (not on BiPAP at home), PVD, rheumatoid arthritis, thrush, TIA (2010), TMJ, valvular heart disease (aortic and mitral),, H/O AVR, borderline diabetes (not on medications), presents to ED via EMS from home with chief complaint of difficulty breathing. Daughter at bedside providing additional history. Daughter states that for the past three days patient has been feeling increasing shortness of breath. Patient periodically (around monthly) goes to PMD–Dr. Benoit for steroids and antibiotics and symptoms resolve with taking meds.  Pt was seen in office this week and given z pack, steroids. Per patient she has been taking medications, however wheezing and cough have increased, cough is productive with yellowish sputum. Tonight pt was diaphoretic and shortness of breath became so bad that daughter called EMS to bring to ED.  Pt unsure if she had fever.  Pt denies CP, palpitations, change in vision, decreased PO intake, abd pain, n/v/d, dysuria, HA, numbness, tingling.  Denies sick contact/recent travel.  Similar symptoms in past with COPD exacerbation but none this severe.   In the ED – patient received levaquin 500mg iv, Solu-Medrol 125 mg IV, NS 1L bolusx1 Combivent nebulizer, was placed on BiPAP 12/6/PEEP5, 50. ABG-  pH – 7.26, PCO2 – 52, PO2 – 87, bicarb – 21, bases excess: –3.4, FiO2 50, O2 sat 94% , pt was put on Bi PAP in ER. labs significant for – WBC – 19.5, hematocrit – 47.4, glucose – 289, alkaline phosphatase 122, AST – 107, AL T – 93, lactate – 7.3, CK – 222, CKMB – 5.8, pro BNP – 515, UA – protein – 500, blood – small, bacteria – occasional, glucose – 100  ICU PA was consulted who deemed pt stable for TELE.    Admitted to telemetry and was followed by Pulmonology Dr. Rivera, ID Dr. villareal, cardiology Dr. García. Pt was continued on antibiotics and steroids were tapered slowly. Ct- angio performed to r/o PE and was negative. Pt developed respiratory distress on 4/14 and was transferred to the ICU. Pt found to be in acute pulm edema with characteristic chest x-ray findings and a large elevation in BNP from baseline. Stopped IVF and given lasix.  She is noted to have significant segmental left ventricular systolic dysfunction mild elevation in troponin Pt is for transfer for cardiac cath to [place]. Patient is a 76-year-old female with a past medical history of COPD, hypertension, hyperlipidemia, diverticulitis, depression, Rt breast cancer, status post lumpectomy and radiation 2008, anxiety, hiatal hernia-status post surgical repair in 2005, obstructive sleep apnea (not on BiPAP at home), PVD, rheumatoid arthritis, thrush, TIA (2010), TMJ, valvular heart disease (aortic and mitral),, H/O AVR, borderline diabetes (not on medications), presents to ED via EMS from home with chief complaint of difficulty breathing. Daughter at bedside providing additional history. Daughter states that for the past three days patient has been feeling increasing shortness of breath. Patient periodically (around monthly) goes to PMD–Dr. Benoit for steroids and antibiotics and symptoms resolve with taking meds.  Pt was seen in office this week and given z pack, steroids. Per patient she has been taking medications, however wheezing and cough have increased, cough is productive with yellowish sputum. Tonight pt was diaphoretic and shortness of breath became so bad that daughter called EMS to bring to ED.  Pt unsure if she had fever.  Pt denies CP, palpitations, change in vision, decreased PO intake, abd pain, n/v/d, dysuria, HA, numbness, tingling.  Denies sick contact/recent travel.  Similar symptoms in past with COPD exacerbation but none this severe.   In the ED – patient received levaquin 500mg iv, Solu-Medrol 125 mg IV, NS 1L bolusx1 Combivent nebulizer, was placed on BiPAP 12/6/PEEP5, 50. ABG-  pH – 7.26, PCO2 – 52, PO2 – 87, bicarb – 21, bases excess: –3.4, FiO2 50, O2 sat 94% , pt was put on Bi PAP in ER. labs significant for – WBC – 19.5, hematocrit – 47.4, glucose – 289, alkaline phosphatase 122, AST – 107, AL T – 93, lactate – 7.3, CK – 222, CKMB – 5.8, pro BNP – 515, UA – protein – 500, blood – small, bacteria – occasional, glucose – 100  ICU PA was consulted who deemed pt stable for TELE.    Admitted to telemetry and was followed by Pulmonology Dr. Rivera, ID Dr. villareal, cardiology Dr. García. Pt was continued on antibiotics and steroids were tapered slowly. Ct- angio performed to r/o PE and was negative. Pt developed respiratory distress on 4/14 and was transferred to the ICU. Pt found to be in acute pulm edema with characteristic chest x-ray findings and a large elevation in BNP from baseline. Stopped IVF and given lasix and flagyl added .  She is noted to have significant segmental left ventricular systolic dysfunction mild elevation in troponin secondary to demand ischemic with EF of 30 %. Pt is for transfer for cardiac cath to [place]. Patient is a 76-year-old female with a past medical history of COPD, hypertension, hyperlipidemia, diverticulitis, depression, Rt breast cancer, status post lumpectomy and radiation 2008, anxiety, hiatal hernia-status post surgical repair in 2005, obstructive sleep apnea (not on BiPAP at home), PVD, rheumatoid arthritis, thrush, TIA (2010), TMJ, valvular heart disease (aortic and mitral),, H/O AVR, borderline diabetes (not on medications), presents to ED via EMS from home with chief complaint of difficulty breathing. Daughter at bedside providing additional history. Daughter states that for the past three days patient has been feeling increasing shortness of breath. Patient periodically (around monthly) goes to PMD–Dr. Benoit for steroids and antibiotics and symptoms resolve with taking meds.  Pt was seen in office this week and given z pack, steroids. Per patient she has been taking medications, however wheezing and cough have increased, cough is productive with yellowish sputum. Tonight pt was diaphoretic and shortness of breath became so bad that daughter called EMS to bring to ED.  Pt unsure if she had fever.  Pt denies CP, palpitations, change in vision, decreased PO intake, abd pain, n/v/d, dysuria, HA, numbness, tingling.  Denies sick contact/recent travel.  Similar symptoms in past with COPD exacerbation but none this severe.   In the ED – patient received levaquin 500mg iv, Solu-Medrol 125 mg IV, NS 1L bolusx1 Combivent nebulizer, was placed on BiPAP 12/6/PEEP5, 50. ABG-  pH – 7.26, PCO2 – 52, PO2 – 87, bicarb – 21, bases excess: –3.4, FiO2 50, O2 sat 94% , pt was put on Bi PAP in ER. labs significant for – WBC – 19.5, hematocrit – 47.4, glucose – 289, alkaline phosphatase 122, AST – 107, AL T – 93, lactate – 7.3, CK – 222, CKMB – 5.8, pro BNP – 515, UA – protein – 500, blood – small, bacteria – occasional, glucose – 100  ICU PA was consulted who deemed pt stable for TELE.    Pt Admitted to telemetry and was followed by Pulmonology Dr. Rivera fro COPD exacerbation, on IV Steroids , ID Dr. villareal for  possible PNA on CTA, Neg PE, cardiology Dr. García. Pt was continued on IV antibiotics, Leukocytosis 2 to possible COPD and steroids .Ct- angio performed, PE and was negative. Pt  stable, BiPAP at Night. On IV Abx. Pt developed respiratory distress on 4/14 and was transferred to the ICU fro hypoxia. Pt found to be in acute pulm edema with chest x-ray findings of CHF and a elevation in BNP from baseline. Stopped IVF and given IV lasix and IV flagyl added ECHO done,.  She is noted to have significant left ventricular systolic dysfunction mild elevation in troponin secondary to demand ischemic with EF of 30 %.,NO ACS . Pt's WBC improved, on PO steroids taper, On PO Lasix,pt's anxiety meds continued,  Pt is for  transfer for cardiac cath to [place] for Ischemia work up.Pt is stable. Patient is a 76-year-old female with a past medical history of COPD, hypertension, hyperlipidemia, diverticulitis, depression, Rt breast cancer, status post lumpectomy and radiation 2008, anxiety, hiatal hernia-status post surgical repair in 2005, obstructive sleep apnea (not on BiPAP at home), PVD, rheumatoid arthritis, thrush, TIA (2010), TMJ, valvular heart disease (aortic and mitral),, H/O AVR, borderline diabetes (not on medications), presents to ED via EMS from home with chief complaint of difficulty breathing. Daughter at bedside providing additional history. Daughter states that for the past three days patient has been feeling increasing shortness of breath. Patient periodically (around monthly) goes to PMD–Dr. Benoit for steroids and antibiotics and symptoms resolve with taking meds.  Pt was seen in office this week and given z pack, steroids. Per patient she has been taking medications, however wheezing and cough have increased, cough is productive with yellowish sputum. Tonight pt was diaphoretic and shortness of breath became so bad that daughter called EMS to bring to ED.  Pt unsure if she had fever.  Pt denies CP, palpitations, change in vision, decreased PO intake, abd pain, n/v/d, dysuria, HA, numbness, tingling.  Denies sick contact/recent travel.  Similar symptoms in past with COPD exacerbation but none this severe.   In the ED – patient received levaquin 500mg iv, Solu-Medrol 125 mg IV, NS 1L bolusx1 Combivent nebulizer, was placed on BiPAP 12/6/PEEP5, 50. ABG-  pH – 7.26, PCO2 – 52, PO2 – 87, bicarb – 21, bases excess: –3.4, FiO2 50, O2 sat 94% , pt was put on Bi PAP in ER. labs significant for – WBC – 19.5, hematocrit – 47.4, glucose – 289, alkaline phosphatase 122, AST – 107, AL T – 93, lactate – 7.3, CK – 222, CKMB – 5.8, pro BNP – 515, UA – protein – 500, blood – small, bacteria – occasional, glucose – 100  ICU PA was consulted who deemed pt stable for TELE.    Pt Admitted to telemetry and was followed by Pulmonology Dr. Rivera fro COPD exacerbation, on IV Steroids , ID Dr. villareal for  possible PNA on CTA, Neg PE, cardiology Dr. García. Pt was continued on IV antibiotics, Leukocytosis 2 to possible COPD and steroids .Ct- angio performed, PE and was negative. Pt  stable, BiPAP at Night. On IV Abx. Pt developed respiratory distress on 4/14 and was transferred to the ICU fro hypoxia. Pt found to be in acute pulm edema with chest x-ray findings of CHF and a elevation in BNP from baseline. Stopped IVF and given IV lasix and IV flagyl added ECHO done,[place] de of Abx on 4/22/17 as per ID.  She is noted to have significant left ventricular systolic dysfunction mild elevation in troponin secondary to demand ischemic with EF of 30 %.,NO ACS . Pt's WBC improved, on PO steroids taper, On PO Lasix,pt's anxiety meds continued,  Pt is for  transfer for cardiac cath to [place] for Ischemia work up.Pt is stable. Pt will need Home O2.

## 2017-04-17 NOTE — DISCHARGE NOTE ADULT - CARE PROVIDER_API CALL
Guanaco Rivera), Critical Care Medicine; HospicePalliative Medicine; Internal Medicine; Pulmonary Disease  [address] [place] [address]  Phone: [phone]  Fax: [phone]    Dr. garner,   [address] [place] [address]  Phone: [phone]  Fax: [phone]  Phone: (   )    -  Fax: (   )    -    True García), Cardiovascular Disease; Internal Medicine  [address] [place] [address]  Phone: [phone]  Fax: [phone]

## 2017-04-17 NOTE — PROGRESS NOTE ADULT - PROBLEM SELECTOR PLAN 4
nebs atc  spiriva and symbicort  o2 support  sat > 88 pct  increase activity  PT  overall better  manage comorbidities

## 2017-04-17 NOTE — DISCHARGE NOTE ADULT - MEDICATION SUMMARY - MEDICATIONS TO STOP TAKING
I will STOP taking the medications listed below when I get home from the hospital:    albuterol-ipratropium 2.5 mg-0.5 mg/3 mL inhalation solution  -- nebulizer prn    verapamil 240 mg/24 hours oral capsule, extended release  -- 1 cap(s) by mouth once a day

## 2017-04-17 NOTE — PROGRESS NOTE ADULT - ASSESSMENT
76F PMHx COPD, HTN, HLD, depression, anxiety, breast ca, DM (not on medication) SIMÓN (does not use CPAP), PVD, AVR admitted with acute hypoxic resp failure due to acute pulmonary edema and acute systolic heart failure, suspect a component of aspiration pneumonia and COPD exacerbation too.     1. Neuro: Depression and Anxiety, stable, continue Klonopin, Abilify, Effexor and Ambien.  2. Cardio: CHF with EF 30%, net negative fluids, continue Lasix 40 BID. Ischemic event, Troponins trending down, in need of angiogram. Pt prefers [facility], discussed with her primary cardiologist Dr. Shanta Jackson, however pt does not want to see Dr. Jackson's partner in [facility] so will be transferred to [place] for angiogram. Continue aspirin, Zocor. Hemodynamically stable, start metoprolol 25 TID,   3. Resp:   4. ID:   5. Renal:   6. GI:   7. Endo:   8. Heme:   9. Derm:   10. Prophylaxis:  11. Dispo:   -aspiration precautions 76F PMHx COPD, HTN, HLD, depression, anxiety, breast ca, DM (not on medication) SIMÓN (does not use CPAP), PVD, AVR admitted with acute hypoxic resp failure due to acute pulmonary edema and acute systolic heart failure, suspect a component of aspiration pneumonia and COPD exacerbation too.     1. Neuro: Depression and Anxiety, stable, continue Klonopin, Abilify, Effexor and Ambien.  2. Cardio: CHF with EF 30%, net negative fluids, continue Lasix 40 BID. Ischemic event, Troponins trending down, in need of angiogram. Pt prefers [facility], discussed with her primary cardiologist Dr. Shanta Jackson, however pt does not want to see Dr. Jackson's partner in [facility] so will be transferred to [place] for angiogram. Continue aspirin, Zocor. Hemodynamically stable, start metoprolol 25 TID,   3. Resp: COPD, stable, saturating well on RA, continue prednisone taper. Continue Albuterol, Symbicort and Spiriva. SIMÓN (not on CPAP), continue BiPap qhs and PRN.  4. ID: PNA, continue Aztreonam and Flagyl (pt allergic to penicillin and fluoroquinolones). Consider de-escalation of antibiotics per ID.   5. Renal: K+ 3.4, repleted. Follow-up am BMP.   6. GI: Continue bowel regimin with senna, colace, bacid. DASH diet.   7. Endo: DM, WnC2h=5.5, stable off medications  8. Prophylaxis: Continue Lovenox and protonix  9. Dispo: Stable for transfer to telemetry. Transfer to [place] for angiogram.

## 2017-04-18 ENCOUNTER — INPATIENT (INPATIENT)
Facility: HOSPITAL | Age: 77
LOS: 2 days | Discharge: HOME | DRG: 286 | End: 2017-04-21
Attending: INTERNAL MEDICINE | Admitting: INTERNAL MEDICINE
Payer: MEDICARE

## 2017-04-18 VITALS
HEIGHT: 63 IN | DIASTOLIC BLOOD PRESSURE: 70 MMHG | WEIGHT: 141.1 LBS | OXYGEN SATURATION: 98 % | RESPIRATION RATE: 17 BRPM | TEMPERATURE: 98 F | HEART RATE: 97 BPM | SYSTOLIC BLOOD PRESSURE: 128 MMHG

## 2017-04-18 VITALS
HEART RATE: 97 BPM | SYSTOLIC BLOOD PRESSURE: 98 MMHG | RESPIRATION RATE: 19 BRPM | DIASTOLIC BLOOD PRESSURE: 54 MMHG | TEMPERATURE: 98 F | OXYGEN SATURATION: 94 %

## 2017-04-18 DIAGNOSIS — Z95.2 PRESENCE OF PROSTHETIC HEART VALVE: Chronic | ICD-10-CM

## 2017-04-18 DIAGNOSIS — K44.9 DIAPHRAGMATIC HERNIA WITHOUT OBSTRUCTION OR GANGRENE: Chronic | ICD-10-CM

## 2017-04-18 DIAGNOSIS — I50.9 HEART FAILURE, UNSPECIFIED: ICD-10-CM

## 2017-04-18 DIAGNOSIS — I73.9 PERIPHERAL VASCULAR DISEASE, UNSPECIFIED: Chronic | ICD-10-CM

## 2017-04-18 DIAGNOSIS — I50.1 LEFT VENTRICULAR FAILURE, UNSPECIFIED: ICD-10-CM

## 2017-04-18 DIAGNOSIS — Z98.89 OTHER SPECIFIED POSTPROCEDURAL STATES: Chronic | ICD-10-CM

## 2017-04-18 DIAGNOSIS — I50 HEART FAILURE: ICD-10-CM

## 2017-04-18 DIAGNOSIS — Z98.890 OTHER SPECIFIED POSTPROCEDURAL STATES: Chronic | ICD-10-CM

## 2017-04-18 DIAGNOSIS — C50.919 MALIGNANT NEOPLASM OF UNSPECIFIED SITE OF UNSPECIFIED FEMALE BREAST: ICD-10-CM

## 2017-04-18 LAB
ANION GAP SERPL CALC-SCNC: 7 MMOL/L — SIGNIFICANT CHANGE UP (ref 5–17)
APTT BLD: 26.6 SEC — LOW (ref 27.5–37.4)
BASOPHILS # BLD AUTO: 0.1 K/UL — SIGNIFICANT CHANGE UP (ref 0–0.2)
BASOPHILS NFR BLD AUTO: 1.3 % — SIGNIFICANT CHANGE UP (ref 0–2)
BUN SERPL-MCNC: 19 MG/DL — SIGNIFICANT CHANGE UP (ref 7–23)
CALCIUM SERPL-MCNC: 8.8 MG/DL — SIGNIFICANT CHANGE UP (ref 8.5–10.1)
CHLORIDE SERPL-SCNC: 105 MMOL/L — SIGNIFICANT CHANGE UP (ref 96–108)
CO2 SERPL-SCNC: 31 MMOL/L — SIGNIFICANT CHANGE UP (ref 22–31)
CREAT SERPL-MCNC: 0.55 MG/DL — SIGNIFICANT CHANGE UP (ref 0.5–1.3)
CULTURE RESULTS: SIGNIFICANT CHANGE UP
CULTURE RESULTS: SIGNIFICANT CHANGE UP
EOSINOPHIL # BLD AUTO: 0.5 K/UL — SIGNIFICANT CHANGE UP (ref 0–0.5)
EOSINOPHIL NFR BLD AUTO: 4.1 % — SIGNIFICANT CHANGE UP (ref 0–6)
GLUCOSE SERPL-MCNC: 96 MG/DL — SIGNIFICANT CHANGE UP (ref 70–99)
HCT VFR BLD CALC: 42.6 % — SIGNIFICANT CHANGE UP (ref 34.5–45)
HGB BLD-MCNC: 13.8 G/DL — SIGNIFICANT CHANGE UP (ref 11.5–15.5)
INR BLD: 1.16 RATIO — SIGNIFICANT CHANGE UP (ref 0.88–1.16)
LYMPHOCYTES # BLD AUTO: 3.5 K/UL — HIGH (ref 1–3.3)
LYMPHOCYTES # BLD AUTO: 31.5 % — SIGNIFICANT CHANGE UP (ref 13–44)
MAGNESIUM SERPL-MCNC: 2.3 MG/DL — SIGNIFICANT CHANGE UP (ref 1.8–2.4)
MCHC RBC-ENTMCNC: 31.7 PG — SIGNIFICANT CHANGE UP (ref 27–34)
MCHC RBC-ENTMCNC: 32.3 GM/DL — SIGNIFICANT CHANGE UP (ref 32–36)
MCV RBC AUTO: 98 FL — SIGNIFICANT CHANGE UP (ref 80–100)
MONOCYTES # BLD AUTO: 1.3 K/UL — HIGH (ref 0–0.9)
MONOCYTES NFR BLD AUTO: 11.4 % — HIGH (ref 1–9)
NEUTROPHILS # BLD AUTO: 5.7 K/UL — SIGNIFICANT CHANGE UP (ref 1.8–7.4)
NEUTROPHILS NFR BLD AUTO: 51.6 % — SIGNIFICANT CHANGE UP (ref 43–77)
PHOSPHATE SERPL-MCNC: 3.8 MG/DL — SIGNIFICANT CHANGE UP (ref 2.5–4.5)
PLATELET # BLD AUTO: 246 K/UL — SIGNIFICANT CHANGE UP (ref 150–400)
POTASSIUM SERPL-MCNC: 3.9 MMOL/L — SIGNIFICANT CHANGE UP (ref 3.5–5.3)
POTASSIUM SERPL-SCNC: 3.9 MMOL/L — SIGNIFICANT CHANGE UP (ref 3.5–5.3)
PROTHROM AB SERPL-ACNC: 12.7 SEC — SIGNIFICANT CHANGE UP (ref 9.8–12.7)
RBC # BLD: 4.35 M/UL — SIGNIFICANT CHANGE UP (ref 3.8–5.2)
RBC # FLD: 12.7 % — SIGNIFICANT CHANGE UP (ref 10.3–14.5)
SODIUM SERPL-SCNC: 143 MMOL/L — SIGNIFICANT CHANGE UP (ref 135–145)
SPECIMEN SOURCE: SIGNIFICANT CHANGE UP
SPECIMEN SOURCE: SIGNIFICANT CHANGE UP
WBC # BLD: 11 K/UL — HIGH (ref 3.8–10.5)
WBC # FLD AUTO: 11 K/UL — HIGH (ref 3.8–10.5)

## 2017-04-18 PROCEDURE — 94664 DEMO&/EVAL PT USE INHALER: CPT

## 2017-04-18 PROCEDURE — 87581 M.PNEUMON DNA AMP PROBE: CPT

## 2017-04-18 PROCEDURE — 96375 TX/PRO/DX INJ NEW DRUG ADDON: CPT

## 2017-04-18 PROCEDURE — 87086 URINE CULTURE/COLONY COUNT: CPT

## 2017-04-18 PROCEDURE — 93306 TTE W/DOPPLER COMPLETE: CPT

## 2017-04-18 PROCEDURE — 97162 PT EVAL MOD COMPLEX 30 MIN: CPT

## 2017-04-18 PROCEDURE — 87040 BLOOD CULTURE FOR BACTERIA: CPT

## 2017-04-18 PROCEDURE — 99285 EMERGENCY DEPT VISIT HI MDM: CPT | Mod: 25

## 2017-04-18 PROCEDURE — 71275 CT ANGIOGRAPHY CHEST: CPT

## 2017-04-18 PROCEDURE — 87486 CHLMYD PNEUM DNA AMP PROBE: CPT

## 2017-04-18 PROCEDURE — 87798 DETECT AGENT NOS DNA AMP: CPT

## 2017-04-18 PROCEDURE — 94660 CPAP INITIATION&MGMT: CPT

## 2017-04-18 PROCEDURE — 84484 ASSAY OF TROPONIN QUANT: CPT

## 2017-04-18 PROCEDURE — 80053 COMPREHEN METABOLIC PANEL: CPT

## 2017-04-18 PROCEDURE — 81001 URINALYSIS AUTO W/SCOPE: CPT

## 2017-04-18 PROCEDURE — 94640 AIRWAY INHALATION TREATMENT: CPT

## 2017-04-18 PROCEDURE — 94760 N-INVAS EAR/PLS OXIMETRY 1: CPT

## 2017-04-18 PROCEDURE — 82803 BLOOD GASES ANY COMBINATION: CPT

## 2017-04-18 PROCEDURE — 80048 BASIC METABOLIC PNL TOTAL CA: CPT

## 2017-04-18 PROCEDURE — 86140 C-REACTIVE PROTEIN: CPT

## 2017-04-18 PROCEDURE — 82550 ASSAY OF CK (CPK): CPT

## 2017-04-18 PROCEDURE — 93456 R HRT CORONARY ARTERY ANGIO: CPT | Mod: 26,GC

## 2017-04-18 PROCEDURE — 84100 ASSAY OF PHOSPHORUS: CPT

## 2017-04-18 PROCEDURE — 99231 SBSQ HOSP IP/OBS SF/LOW 25: CPT

## 2017-04-18 PROCEDURE — 99291 CRITICAL CARE FIRST HOUR: CPT

## 2017-04-18 PROCEDURE — 85730 THROMBOPLASTIN TIME PARTIAL: CPT

## 2017-04-18 PROCEDURE — 87633 RESP VIRUS 12-25 TARGETS: CPT

## 2017-04-18 PROCEDURE — 93010 ELECTROCARDIOGRAM REPORT: CPT

## 2017-04-18 PROCEDURE — 93005 ELECTROCARDIOGRAM TRACING: CPT

## 2017-04-18 PROCEDURE — 83036 HEMOGLOBIN GLYCOSYLATED A1C: CPT

## 2017-04-18 PROCEDURE — 85610 PROTHROMBIN TIME: CPT

## 2017-04-18 PROCEDURE — 82553 CREATINE MB FRACTION: CPT

## 2017-04-18 PROCEDURE — 80202 ASSAY OF VANCOMYCIN: CPT

## 2017-04-18 PROCEDURE — 36600 WITHDRAWAL OF ARTERIAL BLOOD: CPT

## 2017-04-18 PROCEDURE — 99221 1ST HOSP IP/OBS SF/LOW 40: CPT

## 2017-04-18 PROCEDURE — 83880 ASSAY OF NATRIURETIC PEPTIDE: CPT

## 2017-04-18 PROCEDURE — 85027 COMPLETE CBC AUTOMATED: CPT

## 2017-04-18 PROCEDURE — 71045 X-RAY EXAM CHEST 1 VIEW: CPT

## 2017-04-18 PROCEDURE — 83735 ASSAY OF MAGNESIUM: CPT

## 2017-04-18 PROCEDURE — 96365 THER/PROPH/DIAG IV INF INIT: CPT

## 2017-04-18 PROCEDURE — 83605 ASSAY OF LACTIC ACID: CPT

## 2017-04-18 RX ORDER — ARIPIPRAZOLE 15 MG/1
2 TABLET ORAL DAILY
Qty: 0 | Refills: 0 | Status: DISCONTINUED | OUTPATIENT
Start: 2017-04-18 | End: 2017-04-21

## 2017-04-18 RX ORDER — DEXTROSE 50 % IN WATER 50 %
1 SYRINGE (ML) INTRAVENOUS ONCE
Qty: 0 | Refills: 0 | Status: DISCONTINUED | OUTPATIENT
Start: 2017-04-18 | End: 2017-04-21

## 2017-04-18 RX ORDER — LACTOBACILLUS ACIDOPHILUS 100MM CELL
1 CAPSULE ORAL
Qty: 0 | Refills: 0 | Status: DISCONTINUED | OUTPATIENT
Start: 2017-04-18 | End: 2017-04-21

## 2017-04-18 RX ORDER — FUROSEMIDE 40 MG
40 TABLET ORAL DAILY
Qty: 0 | Refills: 0 | Status: DISCONTINUED | OUTPATIENT
Start: 2017-04-18 | End: 2017-04-21

## 2017-04-18 RX ORDER — METOPROLOL TARTRATE 50 MG
50 TABLET ORAL DAILY
Qty: 0 | Refills: 0 | Status: DISCONTINUED | OUTPATIENT
Start: 2017-04-18 | End: 2017-04-21

## 2017-04-18 RX ORDER — DEXTROSE 50 % IN WATER 50 %
25 SYRINGE (ML) INTRAVENOUS ONCE
Qty: 0 | Refills: 0 | Status: DISCONTINUED | OUTPATIENT
Start: 2017-04-18 | End: 2017-04-21

## 2017-04-18 RX ORDER — TIOTROPIUM BROMIDE 18 UG/1
1 CAPSULE ORAL; RESPIRATORY (INHALATION) DAILY
Qty: 0 | Refills: 0 | Status: DISCONTINUED | OUTPATIENT
Start: 2017-04-18 | End: 2017-04-21

## 2017-04-18 RX ORDER — INSULIN LISPRO 100/ML
VIAL (ML) SUBCUTANEOUS AT BEDTIME
Qty: 0 | Refills: 0 | Status: DISCONTINUED | OUTPATIENT
Start: 2017-04-18 | End: 2017-04-21

## 2017-04-18 RX ORDER — BUDESONIDE AND FORMOTEROL FUMARATE DIHYDRATE 160; 4.5 UG/1; UG/1
1 AEROSOL RESPIRATORY (INHALATION)
Qty: 0 | Refills: 0 | Status: DISCONTINUED | OUTPATIENT
Start: 2017-04-18 | End: 2017-04-21

## 2017-04-18 RX ORDER — INSULIN LISPRO 100/ML
VIAL (ML) SUBCUTANEOUS
Qty: 0 | Refills: 0 | Status: DISCONTINUED | OUTPATIENT
Start: 2017-04-18 | End: 2017-04-21

## 2017-04-18 RX ORDER — ALBUTEROL 90 UG/1
2 AEROSOL, METERED ORAL EVERY 6 HOURS
Qty: 0 | Refills: 0 | Status: DISCONTINUED | OUTPATIENT
Start: 2017-04-18 | End: 2017-04-21

## 2017-04-18 RX ORDER — VENLAFAXINE HCL 75 MG
75 CAPSULE, EXT RELEASE 24 HR ORAL
Qty: 0 | Refills: 0 | Status: DISCONTINUED | OUTPATIENT
Start: 2017-04-18 | End: 2017-04-21

## 2017-04-18 RX ORDER — PANTOPRAZOLE SODIUM 20 MG/1
40 TABLET, DELAYED RELEASE ORAL
Qty: 0 | Refills: 0 | Status: DISCONTINUED | OUTPATIENT
Start: 2017-04-18 | End: 2017-04-21

## 2017-04-18 RX ORDER — ZOLPIDEM TARTRATE 10 MG/1
1 TABLET ORAL
Qty: 0 | Refills: 0 | COMMUNITY

## 2017-04-18 RX ORDER — ZALEPLON 10 MG
5 CAPSULE ORAL ONCE
Qty: 0 | Refills: 0 | Status: DISCONTINUED | OUTPATIENT
Start: 2017-04-18 | End: 2017-04-18

## 2017-04-18 RX ORDER — AZTREONAM 2 G
2000 VIAL (EA) INJECTION EVERY 8 HOURS
Qty: 0 | Refills: 0 | Status: DISCONTINUED | OUTPATIENT
Start: 2017-04-18 | End: 2017-04-21

## 2017-04-18 RX ORDER — SENNA PLUS 8.6 MG/1
2 TABLET ORAL AT BEDTIME
Qty: 0 | Refills: 0 | Status: DISCONTINUED | OUTPATIENT
Start: 2017-04-18 | End: 2017-04-21

## 2017-04-18 RX ORDER — CLONAZEPAM 1 MG
0.25 TABLET ORAL
Qty: 0 | Refills: 0 | Status: DISCONTINUED | OUTPATIENT
Start: 2017-04-18 | End: 2017-04-21

## 2017-04-18 RX ORDER — ALBUTEROL 90 UG/1
3 AEROSOL, METERED ORAL
Qty: 0 | Refills: 0 | COMMUNITY

## 2017-04-18 RX ORDER — SODIUM CHLORIDE 0.65 %
1 AEROSOL, SPRAY (ML) NASAL
Qty: 0 | Refills: 0 | Status: DISCONTINUED | OUTPATIENT
Start: 2017-04-18 | End: 2017-04-21

## 2017-04-18 RX ORDER — SIMVASTATIN 20 MG/1
20 TABLET, FILM COATED ORAL AT BEDTIME
Qty: 0 | Refills: 0 | Status: DISCONTINUED | OUTPATIENT
Start: 2017-04-18 | End: 2017-04-21

## 2017-04-18 RX ORDER — DEXTROSE 50 % IN WATER 50 %
12.5 SYRINGE (ML) INTRAVENOUS ONCE
Qty: 0 | Refills: 0 | Status: DISCONTINUED | OUTPATIENT
Start: 2017-04-18 | End: 2017-04-21

## 2017-04-18 RX ORDER — DOCUSATE SODIUM 100 MG
100 CAPSULE ORAL THREE TIMES A DAY
Qty: 0 | Refills: 0 | Status: DISCONTINUED | OUTPATIENT
Start: 2017-04-18 | End: 2017-04-21

## 2017-04-18 RX ORDER — METRONIDAZOLE 500 MG
500 TABLET ORAL THREE TIMES A DAY
Qty: 0 | Refills: 0 | Status: DISCONTINUED | OUTPATIENT
Start: 2017-04-18 | End: 2017-04-18

## 2017-04-18 RX ORDER — METRONIDAZOLE 500 MG
500 TABLET ORAL EVERY 8 HOURS
Qty: 0 | Refills: 0 | Status: DISCONTINUED | OUTPATIENT
Start: 2017-04-18 | End: 2017-04-19

## 2017-04-18 RX ORDER — METOPROLOL TARTRATE 50 MG
25 TABLET ORAL EVERY 8 HOURS
Qty: 0 | Refills: 0 | Status: DISCONTINUED | OUTPATIENT
Start: 2017-04-18 | End: 2017-04-18

## 2017-04-18 RX ORDER — SODIUM CHLORIDE 9 MG/ML
1000 INJECTION, SOLUTION INTRAVENOUS
Qty: 0 | Refills: 0 | Status: DISCONTINUED | OUTPATIENT
Start: 2017-04-18 | End: 2017-04-21

## 2017-04-18 RX ORDER — ACETAMINOPHEN 500 MG
650 TABLET ORAL EVERY 6 HOURS
Qty: 0 | Refills: 0 | Status: DISCONTINUED | OUTPATIENT
Start: 2017-04-18 | End: 2017-04-21

## 2017-04-18 RX ORDER — FUROSEMIDE 40 MG
40 TABLET ORAL
Qty: 0 | Refills: 0 | Status: DISCONTINUED | OUTPATIENT
Start: 2017-04-18 | End: 2017-04-18

## 2017-04-18 RX ORDER — ASPIRIN/CALCIUM CARB/MAGNESIUM 324 MG
81 TABLET ORAL DAILY
Qty: 0 | Refills: 0 | Status: DISCONTINUED | OUTPATIENT
Start: 2017-04-18 | End: 2017-04-21

## 2017-04-18 RX ORDER — GLUCAGON INJECTION, SOLUTION 0.5 MG/.1ML
1 INJECTION, SOLUTION SUBCUTANEOUS ONCE
Qty: 0 | Refills: 0 | Status: DISCONTINUED | OUTPATIENT
Start: 2017-04-18 | End: 2017-04-21

## 2017-04-18 RX ADMIN — Medication 100 MILLIGRAM(S): at 05:22

## 2017-04-18 RX ADMIN — Medication 25 MILLIGRAM(S): at 05:23

## 2017-04-18 RX ADMIN — Medication 0.25 MILLIGRAM(S): at 21:41

## 2017-04-18 RX ADMIN — SIMVASTATIN 20 MILLIGRAM(S): 20 TABLET, FILM COATED ORAL at 21:22

## 2017-04-18 RX ADMIN — TIOTROPIUM BROMIDE 1 CAPSULE(S): 18 CAPSULE ORAL; RESPIRATORY (INHALATION) at 08:14

## 2017-04-18 RX ADMIN — Medication 50 MILLIGRAM(S): at 18:00

## 2017-04-18 RX ADMIN — SENNA PLUS 2 TABLET(S): 8.6 TABLET ORAL at 21:22

## 2017-04-18 RX ADMIN — Medication 100 MILLIGRAM(S): at 05:23

## 2017-04-18 RX ADMIN — PANTOPRAZOLE SODIUM 40 MILLIGRAM(S): 20 TABLET, DELAYED RELEASE ORAL at 17:02

## 2017-04-18 RX ADMIN — ALBUTEROL 2 PUFF(S): 90 AEROSOL, METERED ORAL at 18:00

## 2017-04-18 RX ADMIN — Medication 100 MILLIGRAM(S): at 22:27

## 2017-04-18 RX ADMIN — BUDESONIDE AND FORMOTEROL FUMARATE DIHYDRATE 1 PUFF(S): 160; 4.5 AEROSOL RESPIRATORY (INHALATION) at 21:23

## 2017-04-18 RX ADMIN — Medication 20 MILLIGRAM(S): at 05:23

## 2017-04-18 RX ADMIN — Medication 1 TABLET(S): at 21:21

## 2017-04-18 RX ADMIN — Medication 75 MILLIGRAM(S): at 08:14

## 2017-04-18 RX ADMIN — ARIPIPRAZOLE 2 MILLIGRAM(S): 15 TABLET ORAL at 17:59

## 2017-04-18 RX ADMIN — Medication 1 TABLET(S): at 08:14

## 2017-04-18 RX ADMIN — Medication 100 MILLIGRAM(S): at 21:22

## 2017-04-18 RX ADMIN — Medication 81 MILLIGRAM(S): at 17:59

## 2017-04-18 RX ADMIN — PANTOPRAZOLE SODIUM 40 MILLIGRAM(S): 20 TABLET, DELAYED RELEASE ORAL at 08:14

## 2017-04-18 RX ADMIN — Medication 5 MILLIGRAM(S): at 23:48

## 2017-04-18 RX ADMIN — ALBUTEROL 2.5 MILLIGRAM(S): 90 AEROSOL, METERED ORAL at 08:11

## 2017-04-18 RX ADMIN — Medication 100 MILLIGRAM(S): at 06:00

## 2017-04-18 RX ADMIN — BUDESONIDE AND FORMOTEROL FUMARATE DIHYDRATE 2 PUFF(S): 160; 4.5 AEROSOL RESPIRATORY (INHALATION) at 08:14

## 2017-04-18 RX ADMIN — Medication 0.25 MILLIGRAM(S): at 08:15

## 2017-04-18 RX ADMIN — Medication 75 MILLIGRAM(S): at 17:02

## 2017-04-18 RX ADMIN — Medication 40 MILLIGRAM(S): at 16:59

## 2017-04-18 RX ADMIN — Medication 40 MILLIGRAM(S): at 05:23

## 2017-04-18 RX ADMIN — TIOTROPIUM BROMIDE 1 CAPSULE(S): 18 CAPSULE ORAL; RESPIRATORY (INHALATION) at 17:59

## 2017-04-18 NOTE — H&P CARDIOLOGY - HISTORY OF PRESENT ILLNESS
Patient is a 76-year-old female with a past medical history of COPD, hypertension, hyperlipidemia, diverticulitis, depression, Rt breast cancer, status post lumpectomy and radiation 2008, anxiety, hiatal hernia-status post surgical repair in 2005, obstructive sleep apnea (not on BiPAP at home), PVD, rheumatoid arthritis, thrush, TIA (2010), TMJ, valvular heart disease (aortic and mitral),, H/O AVR (bovine [id number], borderline diabetes (not on medications), presents to ED on 4/12/17  via EMS from home with chief complaint of difficulty breathing. Daughter at bedside providing additional history. Daughter states that for  three days prior to admission  patient has been feeling increasing shortness of breath. Patient periodically (around monthly) goes to PMD–Dr. Benoit for steroids and antibiotics and symptoms resolve with taking meds.  Pt was seen in office this week and given z pack, steroids. Per patient she has been taking medications, however wheezing and cough have increased, cough is productive with yellowish sputum. on night of admission  pt was diaphoretic and shortness of breath -became so bad that daughter called EMS to bring to ED.  Pt unsure if she had fever.  Pt denies CP, palpitations, change in vision, decreased PO intake, abd pain, n/v/d, dysuria, HA, numbness, tingling.  Denies sick contact/recent travel.  Similar symptoms in past with COPD exacerbation but none this severe.   In the ED – patient received levaquin 500mg iv, Solu-Medrol 125 mg IV, NS 1L bolusx1 Combivent nebulizer, was placed on BiPAP 12/6/PEEP5, 50. ABG-  pH – 7.26, PCO2 – 52, PO2 – 87, bicarb – 21, bases excess: –3.4, FiO2 50, O2 sat 94% , pt was put on Bi PAP in ER. labs significant for – WBC – 19.5, hematocrit – 47.4, glucose – 289, alkaline phosphatase 122, AST – 107, AL T – 93, lactate – 7.3, CK – 222, CKMB – 5.8, pro BNP – 515, UA – protein – 500, blood – small, bacteria – occasional, glucose – 100  ICU PA was consulted who deemed pt stable for TELE.    Pt Admitted to telemetry and was followed by Pulmonology Dr. Rivera fro COPD exacerbation, on IV Steroids , ID Dr. villareal for  possible PNA on CTA, Neg PE, cardiology Dr. García. Pt was continued on IV antibiotics, Leukocytosis 2 to possible COPD and steroids .Ct- angio performed, PE was negative. Pt  stable, BiPAP at Night. On IV Abx. Pt developed respiratory distress on 4/14 and was transferred to the ICU for hypoxia. Pt found to be in acute pulm edema with chest x-ray findings of CHF and a elevation in BNP from baseline. Stopped IVF and given IV lasix and IV flagyl added ECHO done,Last dose of Abx on 4/22/17 as per ID.  She is noted to have significant left ventricular systolic dysfunction mild elevation in troponin secondary to demand ischemic with EF of 30 %.NO ACS . Pt's WBC improved, on PO steroids taper, On PO Lasix,  Pt is for  transfer for cardiac cath to [place] for Ischemia work up. Pt is stable. Pt will need Home O2.    On arrival to [facility], patient is in no acute distress and no chest pain. Patient is a 76-year-old female with a past medical history of COPD, former smoker,  hypertension, hyperlipidemia, diverticulitis, depression, Rt breast cancer, status post lumpectomy and radiation 2008, anxiety, hiatal hernia-status post surgical repair in 2005, obstructive sleep apnea (not on BiPAP at home), PVD- with iliac dissection during attempted intervention (followed by dr Johanna silva), rheumatoid arthritis, thrush, TIA (2010), TMJ, valvular heart disease (aortic and mitral),, H/O AVR (bovine 2014), borderline diabetes (not on medications), presents to ED on 4/12/17  via EMS from home with chief complaint of difficulty breathing. Daughter at bedside providing additional history. Daughter states that for  three days prior to admission  patient has been feeling increasing shortness of breath. Patient periodically (around monthly) goes to PMD–Dr. Benoit for steroids and antibiotics and symptoms resolve with taking meds.  Pt was seen in office this week and given z pack, steroids. Per patient she has been taking medications, however wheezing and cough have increased, cough is productive with yellowish sputum. on night of admission  pt was diaphoretic and shortness of breath -became so bad that daughter called EMS to bring to ED.  Pt unsure if she had fever.  Pt denies CP, palpitations, change in vision, decreased PO intake, abd pain, n/v/d, dysuria, HA, numbness, tingling.  Denies sick contact/recent travel.  Similar symptoms in past with COPD exacerbation but none this severe.   In the ED – patient received levaquin 500mg iv, Solu-Medrol 125 mg IV, NS 1L bolusx1 Combivent nebulizer, was placed on BiPAP 12/6/PEEP5, 50. ABG-  pH – 7.26, PCO2 – 52, PO2 – 87, bicarb – 21, bases excess: –3.4, FiO2 50, O2 sat 94% , pt was put on Bi PAP in ER. labs significant for – WBC – 19.5, hematocrit – 47.4, glucose – 289, alkaline phosphatase 122, AST – 107, AL T – 93, lactate – 7.3, CK – 222, CKMB – 5.8, pro BNP – 515, UA – protein – 500, blood – small, bacteria – occasional, glucose – 100  ICU PA was consulted who deemed pt stable for TELE.    Pt Admitted to telemetry and was followed by Pulmonology Dr. Rivera fro COPD exacerbation, on IV Steroids , ID Dr. villareal for  possible PNA on CTA, Neg PE, cardiology Dr. García. Pt was continued on IV antibiotics, Leukocytosis 2 to possible COPD and steroids .Ct- angio performed, PE was negative. Pt  stable, BiPAP at Night. On IV Abx. Pt developed respiratory distress on 4/14 and was transferred to the ICU for hypoxia. Pt found to be in acute pulm edema with chest x-ray findings of CHF and a elevation in BNP from baseline. Stopped IVF and given IV lasix and IV flagyl added ECHO done,Last dose of Abx on 4/22/17 as per ID.  She is noted to have significant left ventricular systolic dysfunction mild elevation in troponin secondary to demand ischemic with EF of 30 %.NO ACS . Pt's WBC improved, on PO steroids taper, On PO Lasix,  Pt is for  transfer for cardiac cath to [place] for Ischemia work up. Pt is stable. Pt will need Home O2.    On arrival to [facility], patient is in no acute distress and no chest pain.

## 2017-04-18 NOTE — PROGRESS NOTE ADULT - PROBLEM SELECTOR PLAN 1
S/p acute respiratory distress 2 Ac COPD exacerbation , PNA , Ac Pulmonary Edema, Ac on Chronic Systolic CHF  -S/P  BIPAP, q HS  CTA - NEG PE, B/L Effusions, PNA  - CXR suggestive for vascular congestion.    WBC- mild leukocytosis 2 to combination of steroids effect and possible  PNA on CT Angio.  -monitor oxygen and keep >90, continuous pulse ox  -Xopenex standing, Spiriva, Symbicort BID, PRN Albuterol  -On PO Prednisone 20 mg daily, taper as per ICU  - cultures - neg Pulmonary  DR Rivera follow up  ID Consult-Man, continue Azactam, Flagyl  until 4/22/17 as per ID .Off Vanco

## 2017-04-18 NOTE — PROGRESS NOTE ADULT - PROBLEM SELECTOR PROBLEM 3
Valvular heart disease
COPD exacerbation
SIMÓN (obstructive sleep apnea)
Systolic CHF with reduced left ventricular function, NYHA class 2

## 2017-04-18 NOTE — PROGRESS NOTE ADULT - PROBLEM SELECTOR PLAN 2
supportive care and focus on volume status    Thank you for consulting us and involving us in the management of this most interesting and challenging case.     Please Call with any further questions

## 2017-04-18 NOTE — PROGRESS NOTE ADULT - PROBLEM SELECTOR PROBLEM 4
COPD exacerbation
HTN (hypertension)
COPD exacerbation
COPD exacerbation
Valvular heart disease

## 2017-04-18 NOTE — PROGRESS NOTE ADULT - SUBJECTIVE AND OBJECTIVE BOX
infectious diseases progress note:  JASMIN HANSEN is a 76y y. o.Female patient        Patient reports: "I am done with this place and head to [place] today at 10 to get my heart checked."      ROS:    EYES:  Negative  blurry vision or double vision  GASTROINTESTINAL:  Negative for nausea, vomiting, diarrhea  -otherwise negative except for subjective    Allergies    penicillins (Hives)  quinolines (Other)    Intolerances        ANTIBIOTICS/RELEVANT:  antimicrobials  metroNIDAZOLE  IVPB  IV Intermittent   metroNIDAZOLE  IVPB 500milliGRAM(s) IV Intermittent every 8 hours  aztreonam  IVPB 2000milliGRAM(s) IV Intermittent every 8 hours    immunologic:    OTHER:  acetaminophen   Tablet 650milliGRAM(s) Oral every 6 hours PRN  acetaminophen   Tablet. 650milliGRAM(s) Oral every 6 hours PRN  senna 2Tablet(s) Oral at bedtime PRN  aspirin enteric coated 81milliGRAM(s) Oral daily  oxyCODONE  5 mG/acetaminophen 325 mG 2Tablet(s) Oral every 4 hours PRN  simvastatin 20milliGRAM(s) Oral at bedtime  zolpidem 5milliGRAM(s) Oral at bedtime PRN  tiotropium 18 MICROgram(s) Capsule 1Capsule(s) Inhalation daily  docusate sodium 100milliGRAM(s) Oral three times a day  pantoprazole    Tablet 40milliGRAM(s) Oral before breakfast  venlafaxine 75milliGRAM(s) Oral two times a day with meals  lactobacillus acidophilus 1Tablet(s) Oral three times a day with meals  buDESOnide 160 MICROgram(s)/formoterol 4.5 MICROgram(s) Inhaler 2Puff(s) Inhalation two times a day  ALBUTerol    0.083% 2.5milliGRAM(s) Nebulizer every 6 hours  ARIPiprazole 2milliGRAM(s) Oral at bedtime  furosemide    Tablet 40milliGRAM(s) Oral two times a day  clonazePAM Tablet 0.25milliGRAM(s) Oral two times a day PRN  sodium chloride 0.65% Nasal 1Spray(s) Both Nostrils four times a day PRN  metoprolol 25milliGRAM(s) Oral every 8 hours      Objective:  Vital Signs Last 24 Hrs  T(C): 36.7, Max: 37.1 (04-18 @ 04:01)  T(F): 98, Max: 98.7 (04-18 @ 04:01)  HR: 72 (72 - 101)  BP: 114/69 (77/56 - 139/77)  BP(mean): 86 (63 - 102)  RR: 22 (16 - 33)  SpO2: 93% (89% - 98%)    PHYSICAL EXAM:  Constitutional:Well-developed, well nourished  Eyes:PERRLA, EOMI  Ear/Nose/Throat: oropharynx normal	  Neck:no JVD, no lymphadenopathy, supple  Respiratory: no accessory muscle use, lung fields bilaterally clear except for a few ronchi on right  Cardiovascular:RRR, normal S1, S2 no m/r/g  Gastrointestinal:soft, NT, no HSM, BS-normal  Extremities:no clubbing, no cyanosis, edema absent  Neuro-patient alert, oriented and appropriate  Skin-no sig lesions      LABS:                        13.8   11.0  )-----------( 246      ( 18 Apr 2017 06:07 )             42.6     04-18    143  |  105  |  19  ----------------------------<  96  3.9   |  31  |  0.55    Ca    8.8      18 Apr 2017 06:07  Phos  3.8     04-18  Mg     2.3     04-18      PT/INR - ( 18 Apr 2017 06:07 )   PT: 12.7 sec;   INR: 1.16 ratio         PTT - ( 18 Apr 2017 06:07 )  PTT:26.6 sec        MICROBIOLOGY:        RADIOLOGY & ADDITIONAL STUDIES:

## 2017-04-18 NOTE — PROGRESS NOTE ADULT - PROBLEM SELECTOR PLAN 2
on broad spectrum abx regimen  doing better  monitor resp rate and sat and functional status  imaging shows a mixed picture of LRTI and HF  complete course of ABX - at least 7 days

## 2017-04-18 NOTE — PROGRESS NOTE ADULT - ASSESSMENT
76-year-old female with a past medical history of COPD, hypertension, hyperlipidemia, diverticulitis, depression, Rt  breast cancer, status post lumpectomy and radiation 2008, anxiety, hiatal hernia-status post surgical repair in 2005,Diastasis recti, obstructive sleep apnea (not on HOME O2, old BiPAP at home), PVD, rheumatoid arthritis, thrush, TIA (2010), TMJ, valvular heart disease (aortic and mitral),S/P Tissue AVR 2014 admitted with acute Respiratory failure due to acute on chronic COPD exacerbation, and elevated lactate which is Normal now.Pt had acute respiratory distress, 2 to Fluid over load with Acute on chronic systolic CHF, S/P PO Lasix   Pt is on Bi PAP q HS, On O2 NC .in ICU stable, RVP -negative .On PO Steroids taper, plan for cardiac cath for ischemia work up with EF 30 %, stable.

## 2017-04-18 NOTE — PROGRESS NOTE ADULT - PROBLEM SELECTOR PLAN 1
syst HF  valvular heart disease  on LASIX  cvs regimen  monitor BP  for ischemic work up at [place] today  cardio follow up noted

## 2017-04-18 NOTE — PROGRESS NOTE ADULT - PROBLEM SELECTOR PROBLEM 5
Hyperlipidemia
COPD exacerbation

## 2017-04-18 NOTE — PROGRESS NOTE ADULT - SUBJECTIVE AND OBJECTIVE BOX
Patient is a 76y old  Female who presents with a chief complaint of SOB x 1 day (17 Apr 2017 13:46)      INTERVAL HPI:      OVERNIGHT EVENTS:    MEDICATIONS  (STANDING):  aspirin enteric coated 81milliGRAM(s) Oral daily  simvastatin 20milliGRAM(s) Oral at bedtime  tiotropium 18 MICROgram(s) Capsule 1Capsule(s) Inhalation daily  docusate sodium 100milliGRAM(s) Oral three times a day  pantoprazole    Tablet 40milliGRAM(s) Oral before breakfast  venlafaxine 75milliGRAM(s) Oral two times a day with meals  lactobacillus acidophilus 1Tablet(s) Oral three times a day with meals  metroNIDAZOLE  IVPB  IV Intermittent   metroNIDAZOLE  IVPB 500milliGRAM(s) IV Intermittent every 8 hours  aztreonam  IVPB 2000milliGRAM(s) IV Intermittent every 8 hours  buDESOnide 160 MICROgram(s)/formoterol 4.5 MICROgram(s) Inhaler 2Puff(s) Inhalation two times a day  ALBUTerol    0.083% 2.5milliGRAM(s) Nebulizer every 6 hours  ARIPiprazole 2milliGRAM(s) Oral at bedtime  furosemide    Tablet 40milliGRAM(s) Oral two times a day  metoprolol 25milliGRAM(s) Oral every 8 hours    MEDICATIONS  (PRN):  acetaminophen   Tablet 650milliGRAM(s) Oral every 6 hours PRN For Temp greater than 38 C (100.4 F)  acetaminophen   Tablet. 650milliGRAM(s) Oral every 6 hours PRN Mild Pain (1 - 3)  senna 2Tablet(s) Oral at bedtime PRN Constipation  oxyCODONE  5 mG/acetaminophen 325 mG 2Tablet(s) Oral every 4 hours PRN Severe Pain (7 - 10)  zolpidem 5milliGRAM(s) Oral at bedtime PRN Insomnia  clonazePAM Tablet 0.25milliGRAM(s) Oral two times a day PRN anxiety  sodium chloride 0.65% Nasal 1Spray(s) Both Nostrils four times a day PRN Nasal Congestion      Allergies    penicillins (Hives)  quinolines (Other)    Intolerances        REVIEW OF SYSTEMS:  CONSTITUTIONAL: No fever, No chills, No fatigue, No myalgia, No Body ache  EYES: No eye pain, visual disturbances, or discharge  ENMT:  No ear pain, No nose bleed, No vertigo; No sinus or throat pain, No Congestion  NECK: No pain, No stiffness  RESPIRATORY: No cough, wheezing, No  hemoptysis, No shortness of breath  CARDIOVASCULAR: No chest pain, palpitations  GASTROINTESTINAL: No abdominal or epigastric pain. No nausea, No vomiting; No diarrhea or constipation. [  ] BM  GENITOURINARY: No dysuria, No frequency, No urgency, No hematuria, or incontinence  NEUROLOGICAL: No headaches, No dizziness, No numbness, No tingling, No tremors, No weakness  EXT: No Swelling, No Pain, No Edema  SKIN:  [  ] No itching, burning, rashes, or lesions   MUSCULOSKELETAL: No joint pain or swelling; No muscle pain, No back pain, No extremity pain  PSYCHIATRIC: No depression, anxiety, mood swings or difficulty sleeping at night  PAIN SCALE: [  ] None  [  ] Other-  ROS Unable to obtain due to - [  ] Dementia  [  ] Lethargy  [  ] Sedated   REST OF REVIEW Of SYSTEM - [  ] Normal     Vital Signs Last 24 Hrs  T(C): 36.7, Max: 37.1 (04-18 @ 04:01)  T(F): 98, Max: 98.7 (04-18 @ 04:01)  HR: 87 (72 - 101)  BP: 107/61 (77/56 - 139/77)  BP(mean): 78 (63 - 102)  RR: 17 (12 - 33)  SpO2: 94% (89% - 98%)  Finger Stick      I & Os for 24h ending 04-18 @ 07:00  =============================================  IN: 1980 ml / OUT: 3450 ml / NET: -1470 ml    I & Os for current day (as of 04-18 @ 09:46)  =============================================  IN: 240 ml / OUT: 400 ml / NET: -160 ml      PHYSICAL EXAM:  GENERAL:  [  ] NAD , [  ] well appearing, [  ] Agitated, [  ] Lethargy, [  ] confused   HEAD:  [  ] Normal, [  ] Other  EYES:  [  ] EOMI, [  ] PERRLA, [  ] conjunctiva and sclera clear normal, [  ] Other,  [  ] Pallor,[  ] Discharge  ENMT:  [  ] Normal, [  ] Moist mucous membranes, [  ] Good dentition, [  ] No Thrush  NECK:  [  ] Supple, [  ] No JVD, [  ] Normal thyroid, [  ] Lymphadenopathy [  ] Other  NERVOUS SYSTEM:  [  ] Alert & Oriented X3, [  ] Nonfocal   [  ] Confusion  [  ] Encephalopathic [  ] Sedated [  ] Other-  CHEST/LUNG:  [  ] Clear to auscultation bilaterally, [  ] No rales, [  ] No rhonchi  [  ]  No wheezing  HEART:  [  ] Regular rate and rhythm  [  ] irregular  [  ] No murmurs, rubs, or gallops, [  ] PPM in place (Mfr:  )  ABDOMEN:  [  ] Soft, [  ] Nontender, [  ] Nondistended, [  ]No mass, [  ] Bowel sounds present, [  ] obese  EXTREMITIES: [  ] 2+ Peripheral Pulses, No clubbing, cyanosis,  [  ] edema, [  ] PVD stasis skin changes  LYMPH: No lymphadenopathy noted  SKIN:  [  ] No rashes or lesions, [  ] Pressure Ulcers, [  ] echymosis, [  ] Other    DIET:     LABS:                        13.8   11.0  )-----------( 246      ( 18 Apr 2017 06:07 )             42.6     18 Apr 2017 06:07    143    |  105    |  19     ----------------------------<  96     3.9     |  31     |  0.55     Ca    8.8        18 Apr 2017 06:07  Phos  3.8       18 Apr 2017 06:07  Mg     2.3       18 Apr 2017 06:07      PT/INR - ( 18 Apr 2017 06:07 )   PT: 12.7 sec;   INR: 1.16 ratio         PTT - ( 18 Apr 2017 06:07 )  PTT:26.6 sec      Culture Results:   No growth (04-13 @ 08:38)  Culture Results:   No growth at 5 days. (04-13 @ 08:33)  Culture Results:   No growth at 5 days. (04-13 @ 08:33)          RECENT CULTURES:  04-13 @ 08:38 .Urine Clean Catch (Midstream)                No growth    04-13 @ 08:33 .Blood Blood-Peripheral                No growth at 5 days.          RESPIRATORY CULTURES:      cardiac Enzyme:  04-16 @ 07:09    CK:  --    CKMB:  --    CPK Mass Assay:  --    troponin i:  .415    BNP: --  04-15 @ 20:42    CK:  --    CKMB:  --    CPK Mass Assay:  --    troponin i:  .473    BNP: --  04-15 @ 14:48    CK:  --    CKMB:  --    CPK Mass Assay:  --    troponin i:  .665    BNP: --  04-15 @ 06:06    CK:  --    CKMB:  --    CPK Mass Assay:  --    troponin i:  --    BNP: 63482  04-13 @ 00:26    CK:  222    CKMB:  --    CPK Mass Assay:  2.6    troponin i:  .030    BNP: 515        Anemia panel:          RADIOLOGY & ADDITIONAL TESTS:      HEALTH ISSUES - PROBLEM Dx:  CHF (congestive heart failure): CHF (congestive heart failure)  Depression: Depression  Systolic CHF with reduced left ventricular function, NYHA class 2: Systolic CHF with reduced left ventricular function, NYHA class 2  Pneumonia, unspecified organism: Pneumonia, unspecified organism  Anxiety: Anxiety  Need for prophylactic measure: Need for prophylactic measure  Hiatal hernia: Hiatal hernia  RA (rheumatoid arthritis): RA (rheumatoid arthritis)  PVD (peripheral vascular disease): PVD (peripheral vascular disease)  SIMÓN (obstructive sleep apnea): SIMÓN (obstructive sleep apnea)  Hyperlipidemia: Hyperlipidemia  HTN (hypertension): HTN (hypertension)  Valvular heart disease: Valvular heart disease  Lactate blood increase: Lactate blood increase  COPD exacerbation: COPD exacerbation          Consultant(s) Notes Reviewed:  [  ] YES     Care Discussed with [X] Consultants  [  ] Patient  [  ] Family  [  ]   [  ] Social Service  [  ] RN, [  ] Physical Therapy  DVT PPX: [  ] Lovenox, [  ] S C Heparin, [  ] Coumadin, [  ] Xarelto, [  ] Eliquis, [  ] SCD   Advanced directive: [  ] None, [  ] DNR/DNI Patient is a 76y old  Female who presents with a chief complaint of SOB x 1 day (17 Apr 2017 13:46)      INTERVAL HPI:Patient is a 76-year-old female with a past medical history of COPD, hypertension, hyperlipidemia, diverticulitis, depression, Rt breast cancer, status post lumpectomy and radiation 2008, anxiety, hiatal hernia-status post surgical repair in 2005, obstructive sleep apnea (not on BiPAP at home), PVD, rheumatoid arthritis, thrush, TIA (2010), TMJ, valvular heart disease (aortic and mitral),, H/O Tissu AVR 2014 at [facility], borderline diabetes (not on medications), Diastasis recti ,presents to ED via EMS from home with chief complaint of difficulty breathing. Daughter at bedside providing additional history.  Pt seen and examined at bedside. Pt admits to improved sob from admission, however states she still has wheezing. Admits to anxiety making the sob worse. Denies chest pain, palpitation, dizziness, abdominal pain. Off Bi PAP, pt in ICU now s/p Acute respiratory distress, S/P IV Lasix give, Miranda cat for I/O. CXR showed Pulmonary congestion. RVP negative, On PO steroids taper .BiPAP at night, pt with acute systolic CHF with elevated troponin 2 to demand ischemia, No ACS, pt OOB to chair, feeling better, going for cardiac cath to [facility] today for ischemia work up.    OVERNIGHT EVENTS:NONE    MEDICATIONS  (STANDING):  aspirin enteric coated 81milliGRAM(s) Oral daily  simvastatin 20milliGRAM(s) Oral at bedtime  tiotropium 18 MICROgram(s) Capsule 1Capsule(s) Inhalation daily  docusate sodium 100milliGRAM(s) Oral three times a day  pantoprazole    Tablet 40milliGRAM(s) Oral before breakfast  venlafaxine 75milliGRAM(s) Oral two times a day with meals  lactobacillus acidophilus 1Tablet(s) Oral three times a day with meals  metroNIDAZOLE  IVPB  IV Intermittent   metroNIDAZOLE  IVPB 500milliGRAM(s) IV Intermittent every 8 hours  aztreonam  IVPB 2000milliGRAM(s) IV Intermittent every 8 hours  buDESOnide 160 MICROgram(s)/formoterol 4.5 MICROgram(s) Inhaler 2Puff(s) Inhalation two times a day  ALBUTerol    0.083% 2.5milliGRAM(s) Nebulizer every 6 hours  ARIPiprazole 2milliGRAM(s) Oral at bedtime  furosemide    Tablet 40milliGRAM(s) Oral two times a day  metoprolol 25milliGRAM(s) Oral every 8 hours    MEDICATIONS  (PRN):  acetaminophen   Tablet 650milliGRAM(s) Oral every 6 hours PRN For Temp greater than 38 C (100.4 F)  acetaminophen   Tablet. 650milliGRAM(s) Oral every 6 hours PRN Mild Pain (1 - 3)  senna 2Tablet(s) Oral at bedtime PRN Constipation  oxyCODONE  5 mG/acetaminophen 325 mG 2Tablet(s) Oral every 4 hours PRN Severe Pain (7 - 10)  zolpidem 5milliGRAM(s) Oral at bedtime PRN Insomnia  clonazePAM Tablet 0.25milliGRAM(s) Oral two times a day PRN anxiety  sodium chloride 0.65% Nasal 1Spray(s) Both Nostrils four times a day PRN Nasal Congestion      Allergies    penicillins (Hives)  quinolines (Other)        REVIEW OF SYSTEMS:  CONSTITUTIONAL: No fever, No chills, No fatigue, No myalgia, No Body ache  EYES: No eye pain, visual disturbances, or discharge  ENMT:  No ear pain, No nose bleed, No vertigo; No sinus or throat pain, No Congestion  NECK: No pain, No stiffness  RESPIRATORY: Mild cough, NO wheezing, No  hemoptysis, No shortness of breath  CARDIOVASCULAR: No chest pain, palpitations  GASTROINTESTINAL: No abdominal or epigastric pain. No nausea, No vomiting; No diarrhea or constipation. [  ] BM  GENITOURINARY: No dysuria, No frequency, No urgency, No hematuria, or incontinence  NEUROLOGICAL: No headaches, No dizziness, No numbness, No tingling, No tremors, No weakness  EXT: No Swelling, No Pain, No Edema  SKIN:  [ x ] No itching, burning, rashes, or lesions   MUSCULOSKELETAL: No joint pain or swelling; No muscle pain, No back pain, No extremity pain  PSYCHIATRIC: No depression, anxiety, mood swings or difficulty sleeping at night  PAIN SCALE: [ x ] None  [  ] Other-  ROS Unable to obtain due to - [  ] Dementia  [  ] Lethargy  [  ] Sedated   REST OF REVIEW Of SYSTEM - [x  ] Normal     Vital Signs Last 24 Hrs  T(C): 36.7, Max: 37.1 (04-18 @ 04:01)  T(F): 98, Max: 98.7 (04-18 @ 04:01)  HR: 87 (72 - 101)  BP: 107/61 (77/56 - 139/77)  BP(mean): 78 (63 - 102)  RR: 17 (12 - 33)  SpO2: 94% (89% - 98%)  Finger Stick      I & Os for 24h ending 04-18 @ 07:00  =============================================  IN: 1980 ml / OUT: 3450 ml / NET: -1470 ml    I & Os for current day (as of 04-18 @ 09:46)  =============================================  IN: 240 ml / OUT: 400 ml / NET: -160 ml      PHYSICAL EXAM:  GENERAL:  [ x ] NAD , [  x] well appearing, [  ] Agitated, [  ] Lethargy, [  ] confused   HEAD:  [x  ] Normal, [  ] Other  EYES:  [ x ] EOMI, [ x ] PERRLA, [x  ] conjunctiva and sclera clear normal, [  ] Other,  [  ] Pallor,[  ] Discharge  ENMT:  [x  ] Normal, [x  ] dry mucous membranes, [ x ] Good dentition, [ x ] No Thrush  NECK:  [x  ] Supple, [x  ] No JVD, [ x ] Normal thyroid, [  ] Lymphadenopathy [  ] Other  NERVOUS SYSTEM:  [ x ] Alert & Oriented X3, [ x ] Nonfocal   [  ] Confusion  [  ] Encephalopathic [  ] Sedated [  ] Other-  CHEST/LUNG:  [x  ] Clear to auscultation bilaterally, [ x ] No rales, [ x ] No rhonchi  [ x ]  No wheezing  HEART:  [  ] Regular rate and rhythm  [  ] irregular  [  ] No murmurs, rubs, or gallops, [  ] PPM in place (Mfr:  )  ABDOMEN:  [ x ] Soft, [ x ] Nontender, [ x ] distended, [x]No mass, [ x ] Bowel sounds present, [  ] obese  EXTREMITIES: [x  ] 2+ Peripheral Pulses, No clubbing, cyanosis,  [  ] edema, [  ] PVD stasis skin changes  LYMPH: No lymphadenopathy noted  SKIN:  [x  ] No rashes or lesions, [  ] Pressure Ulcers, [  ] ecchymoses [  ] Other    DIET:     LABS:                        13.8   11.0  )-----------( 246      ( 18 Apr 2017 06:07 )             42.6     18 Apr 2017 06:07    143    |  105    |  19     ----------------------------<  96     3.9     |  31     |  0.55     Ca    8.8        18 Apr 2017 06:07  Phos  3.8       18 Apr 2017 06:07  Mg     2.3       18 Apr 2017 06:07      PT/INR - ( 18 Apr 2017 06:07 )   PT: 12.7 sec;   INR: 1.16 ratio         PTT - ( 18 Apr 2017 06:07 )  PTT:26.6 sec      Culture Results:   No growth (04-13 @ 08:38)  Culture Results:   No growth at 5 days. (04-13 @ 08:33)  Culture Results:   No growth at 5 days. (04-13 @ 08:33)          RECENT CULTURES:  04-13 @ 08:38 .Urine Clean Catch (Midstream)                No growth    04-13 @ 08:33 .Blood Blood-Peripheral     No growth at 5 days.    cardiac Enzyme:  04-16 @ 07:09    CK:  --    CKMB:  --    CPK Mass Assay:  --    troponin i:  .415    BNP: --  04-15 @ 20:42    CK:  --    CKMB:  --    CPK Mass Assay:  --    troponin i:  .473    BNP: --  04-15 @ 14:48    CK:  --    CKMB:  --    CPK Mass Assay:  --    troponin i:  .665    BNP: --  04-15 @ 06:06    CK:  --    CKMB:  --    CPK Mass Assay:  --    troponin i:  --    BNP: 05605  04-13 @ 00:26    CK:  222    CKMB:  --    CPK Mass Assay:  2.6    troponin i:  .030    BNP: 515        HEALTH ISSUES - PROBLEM Dx:  CHF (congestive heart failure): CHF (congestive heart failure)  Depression: Depression  Systolic CHF with reduced left ventricular function, NYHA class 2: Systolic CHF with reduced left ventricular function, NYHA class 2  Pneumonia, unspecified organism: Pneumonia, unspecified organism  Anxiety: Anxiety  Need for prophylactic measure: Need for prophylactic measure  Hiatal hernia: Hiatal hernia  RA (rheumatoid arthritis): RA (rheumatoid arthritis)  PVD (peripheral vascular disease): PVD (peripheral vascular disease)  SIMÓN (obstructive sleep apnea): SIMÓN (obstructive sleep apnea)  Hyperlipidemia: Hyperlipidemia  HTN (hypertension): HTN (hypertension)  Valvular heart disease: Valvular heart disease  Lactate blood increase: Lactate blood increase  COPD exacerbation: COPD exacerbation          Consultant(s) Notes Reviewed:  [x  ] YES     Care Discussed with [X] Consultants  [ x ] Patient  [x  ] Family  [  ]   [  ] Social Service  [ x ] RN, [  ] Physical Therapy  DVT PPX: [ x ] Lovenox, [  ] S C Heparin, [  ] Coumadin, [  ] Xarelto, [  ] Eliquis, [  ] SCD   Advanced directive: [ x ] None, [  ] DNR/DNI

## 2017-04-18 NOTE — PROGRESS NOTE ADULT - PROBLEM SELECTOR PROBLEM 1
COPD exacerbation
Pneumonia, unspecified organism
CHF (congestive heart failure)
Pneumonia, unspecified organism
Systolic CHF with reduced left ventricular function, NYHA class 2
Pneumonia, unspecified organism

## 2017-04-18 NOTE — H&P CARDIOLOGY - PSH
Hiatal hernia  s/p surgical repair per pt   PVD (peripheral vascular disease)  s/p left iliac bypass which was unsuccessful  open repair  S/P appendectomy  2001  S/P carpal tunnel release  right 2002  S/P  section  x2 1965/   S/P hernia surgery  left inguinal age 12  S/P lumpectomy, right breast  2008  S/P rotator cuff surgery  left 2004 H/O sinus surgery    Hiatal hernia  s/p surgical repair per pt   PVD (peripheral vascular disease)  s/p left iliac bypass which was unsuccessful  open repair  S/P appendectomy    S/P AVR (aortic valve replacement)    S/P carpal tunnel release  right 2002  S/P  section  x2 /   S/P hernia surgery  left inguinal age 12  S/P lumpectomy, right breast    S/P rotator cuff surgery  left 2004

## 2017-04-18 NOTE — PROGRESS NOTE ADULT - PROBLEM SELECTOR PLAN 2
S/P Ac pulmonary Edema with elevated Troponin 2 to Demand ischemia, NO ACS  IV Lasix 40 mg q 12 hrs, Fluid restrictions  I/O, 2D ECHO -EF ~30%  Cardio Dr García , d/w, plan for cardiac cath at [facility] for ischemia work up.on ASA & BB   BMP stable

## 2017-04-18 NOTE — PROGRESS NOTE ADULT - PROBLEM SELECTOR PLAN 4
nebs  systemic steroid taper  inhalers  out of bed  monitor sat  monitor functional capacity and resp rate  uses o2 at home

## 2017-04-18 NOTE — PROGRESS NOTE ADULT - SUBJECTIVE AND OBJECTIVE BOX
Date/Time Patient Seen:  		  Referring MD:   Data Reviewed	       Patient is a 76y old  Female who presents with a chief complaint of SOB x 1 day (17 Apr 2017 13:46)  in bed  seen and examined  vs and meds reviewed  cardiology follow up appr  for txfer to [facility] today for Cath and ischemic work up      Subjective/HPI       Medication list         MEDICATIONS  (STANDING):  aspirin enteric coated 81milliGRAM(s) Oral daily  simvastatin 20milliGRAM(s) Oral at bedtime  tiotropium 18 MICROgram(s) Capsule 1Capsule(s) Inhalation daily  docusate sodium 100milliGRAM(s) Oral three times a day  pantoprazole    Tablet 40milliGRAM(s) Oral before breakfast  venlafaxine 75milliGRAM(s) Oral two times a day with meals  lactobacillus acidophilus 1Tablet(s) Oral three times a day with meals  metroNIDAZOLE  IVPB  IV Intermittent   metroNIDAZOLE  IVPB 500milliGRAM(s) IV Intermittent every 8 hours  aztreonam  IVPB 2000milliGRAM(s) IV Intermittent every 8 hours  buDESOnide 160 MICROgram(s)/formoterol 4.5 MICROgram(s) Inhaler 2Puff(s) Inhalation two times a day  ALBUTerol    0.083% 2.5milliGRAM(s) Nebulizer every 6 hours  ARIPiprazole 2milliGRAM(s) Oral at bedtime  furosemide    Tablet 40milliGRAM(s) Oral two times a day  metoprolol 25milliGRAM(s) Oral every 8 hours    MEDICATIONS  (PRN):  acetaminophen   Tablet 650milliGRAM(s) Oral every 6 hours PRN For Temp greater than 38 C (100.4 F)  acetaminophen   Tablet. 650milliGRAM(s) Oral every 6 hours PRN Mild Pain (1 - 3)  senna 2Tablet(s) Oral at bedtime PRN Constipation  oxyCODONE  5 mG/acetaminophen 325 mG 2Tablet(s) Oral every 4 hours PRN Severe Pain (7 - 10)  zolpidem 5milliGRAM(s) Oral at bedtime PRN Insomnia  clonazePAM Tablet 0.25milliGRAM(s) Oral two times a day PRN anxiety  sodium chloride 0.65% Nasal 1Spray(s) Both Nostrils four times a day PRN Nasal Congestion         Vitals log        ICU Vital Signs Last 24 Hrs  T(C): 37.1, Max: 37.1 (04-18 @ 04:01)  T(F): 98.7, Max: 98.7 (04-18 @ 04:01)  HR: 81 (74 - 101)  BP: 139/77 (77/56 - 139/77)  BP(mean): 102 (63 - 102)  ABP: --  ABP(mean): --  RR: 21 (16 - 33)  SpO2: 93% (89% - 98%)           Input and Output:  I&O's Detail  I & Os for 24h ending 17 Apr 2017 07:00  =============================================  IN:    Oral Fluid: 525 ml    Solution: 300 ml    Solution: 250 ml    Solution: 200 ml    Total IN: 1275 ml  ---------------------------------------------  OUT:    Voided: 2000 ml    Total OUT: 2000 ml  ---------------------------------------------  Total NET: -725 ml    I & Os for current day (as of 18 Apr 2017 06:55)  =============================================  IN:    Oral Fluid: 1380 ml    Solution: 300 ml    Solution: 300 ml    Total IN: 1980 ml  ---------------------------------------------  OUT:    Voided: 3450 ml    Total OUT: 3450 ml  ---------------------------------------------  Total NET: -1470 ml      Lab Data                        13.8   11.0  )-----------( 246      ( 18 Apr 2017 06:07 )             42.6     04-18    143  |  105  |  19  ----------------------------<  96  3.9   |  31  |  0.55    Ca    8.8      18 Apr 2017 06:07  Phos  3.8     04-18  Mg     2.3     04-18    TPro  6.3  /  Alb  2.8<L>  /  TBili  0.4  /  DBili  x   /  AST  18  /  ALT  37  /  AlkPhos  60  04-16      CARDIAC MARKERS ( 16 Apr 2017 07:09 )  .415 ng/mL / x     / x     / x     / x            Review of Systems	      Objective     Physical Examination    head at, heart - s1s2, lungs - dec BS, abd - soft, moves all extr, cn grossly int      Pertinent Lab findings & Imaging      Ruben:  NO   Adequate UO     I&O's Detail  I & Os for 24h ending 17 Apr 2017 07:00  =============================================  IN:    Oral Fluid: 525 ml    Solution: 300 ml    Solution: 250 ml    Solution: 200 ml    Total IN: 1275 ml  ---------------------------------------------  OUT:    Voided: 2000 ml    Total OUT: 2000 ml  ---------------------------------------------  Total NET: -725 ml    I & Os for current day (as of 18 Apr 2017 06:55)  =============================================  IN:    Oral Fluid: 1380 ml    Solution: 300 ml    Solution: 300 ml    Total IN: 1980 ml  ---------------------------------------------  OUT:    Voided: 3450 ml    Total OUT: 3450 ml  ---------------------------------------------  Total NET: -1470 ml           Discussed with:     Cultures:	        Radiology

## 2017-04-18 NOTE — PROGRESS NOTE ADULT - PROBLEM SELECTOR PLAN 10
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 I recommended follow up with Oncologist upon D/c from [facility] for further care.

## 2017-04-18 NOTE — PROGRESS NOTE ADULT - SUBJECTIVE AND OBJECTIVE BOX
[facility] - Tawny García, Beatriz, Mustapha, Trav Kunz    Patient resting comfortably in bed in NAD.  Laying flat with no respiratory distress.  No complaints of chest pain, dyspnea, palpitations, PND, or orthopnea.    Telemetry:  Sinus rhythm    MEDICATIONS  (STANDING):  aspirin enteric coated 81milliGRAM(s) Oral daily  simvastatin 20milliGRAM(s) Oral at bedtime  tiotropium 18 MICROgram(s) Capsule 1Capsule(s) Inhalation daily  docusate sodium 100milliGRAM(s) Oral three times a day  pantoprazole    Tablet 40milliGRAM(s) Oral before breakfast  venlafaxine 75milliGRAM(s) Oral two times a day with meals  lactobacillus acidophilus 1Tablet(s) Oral three times a day with meals  metroNIDAZOLE  IVPB  IV Intermittent   metroNIDAZOLE  IVPB 500milliGRAM(s) IV Intermittent every 8 hours  aztreonam  IVPB 2000milliGRAM(s) IV Intermittent every 8 hours  buDESOnide 160 MICROgram(s)/formoterol 4.5 MICROgram(s) Inhaler 2Puff(s) Inhalation two times a day  ALBUTerol    0.083% 2.5milliGRAM(s) Nebulizer every 6 hours  ARIPiprazole 2milliGRAM(s) Oral at bedtime  furosemide    Tablet 40milliGRAM(s) Oral two times a day  metoprolol 25milliGRAM(s) Oral every 8 hours    MEDICATIONS  (PRN):  acetaminophen   Tablet 650milliGRAM(s) Oral every 6 hours PRN For Temp greater than 38 C (100.4 F)  acetaminophen   Tablet. 650milliGRAM(s) Oral every 6 hours PRN Mild Pain (1 - 3)  senna 2Tablet(s) Oral at bedtime PRN Constipation  oxyCODONE  5 mG/acetaminophen 325 mG 2Tablet(s) Oral every 4 hours PRN Severe Pain (7 - 10)  zolpidem 5milliGRAM(s) Oral at bedtime PRN Insomnia  clonazePAM Tablet 0.25milliGRAM(s) Oral two times a day PRN anxiety  sodium chloride 0.65% Nasal 1Spray(s) Both Nostrils four times a day PRN Nasal Congestion      Allergies    penicillins (Hives)  quinolines (Other)        Vital Signs Last 24 Hrs  T(C): 37.1, Max: 37.1 (04-18 @ 04:01)  T(F): 98.7, Max: 98.7 (04-18 @ 04:01)  HR: 79 (74 - 101)  BP: 133/65 (77/56 - 133/65)  BP(mean): 92 (63 - 94)  RR: 20 (16 - 33)  SpO2: 92% (89% - 98%)    I&O's Summary  I & Os for 24h ending 17 Apr 2017 07:00  =============================================  IN: 1275 ml / OUT: 2000 ml / NET: -725 ml    I & Os for current day (as of 18 Apr 2017 06:05)  =============================================  IN: 1980 ml / OUT: 3450 ml / NET: -1470 ml      ON EXAM:    General: NAD, awake and alert, oriented x 3  HEENT: Mucous membranes are moist, anicteric  Lungs: Non-labored, breath sounds are clear bilaterally, No rales or rhonchi.  Bilateral end expiratory wheezes  Cardiovascular: Regular, S1 and S2, no rubs, or gallops.  3/6 systolic murmur  Gastrointestinal: Bowel Sounds present, soft, nontender.   Lymph: No peripheral edema. No lymphadenopathy.  Skin: No rashes or ulcers  Psych:  Mood & affect appropriate    LABS: All Labs Reviewed:                        12.9   10.8  )-----------( 245      ( 17 Apr 2017 05:33 )             39.6                         12.6   12.3  )-----------( 205      ( 16 Apr 2017 07:09 )             38.8                         13.6   13.7  )-----------( 231      ( 15 Apr 2017 06:06 )             41.9     17 Apr 2017 05:33    142    |  103    |  18     ----------------------------<  100    3.4     |  32     |  0.60   16 Apr 2017 07:09    139    |  98     |  20     ----------------------------<  102    3.3     |  33     |  0.58   15 Apr 2017 06:06    139    |  101    |  15     ----------------------------<  153    3.7     |  31     |  0.52     Ca    8.3        17 Apr 2017 05:33  Ca    8.7        16 Apr 2017 07:09  Ca    8.5        15 Apr 2017 06:06  Phos  2.9       17 Apr 2017 05:33  Phos  2.5       16 Apr 2017 07:09  Mg     2.4       17 Apr 2017 05:33  Mg     2.7       16 Apr 2017 07:09    TPro  6.3    /  Alb  2.8    /  TBili  0.4    /  DBili  x      /  AST  18     /  ALT  37     /  AlkPhos  60     16 Apr 2017 07:09      CARDIAC MARKERS ( 16 Apr 2017 07:09 )  .415 ng/mL / x     / x     / x     / x          04-15 @ 06:06  Pro Bnp [id number]        Assessment/Plan:  76y Female with COPD, SIMÓN, HTN, s/p bio AVR, new moderate to severe LV dysfunction, resolved acute systolic heart failure:    - Transfer to  today for cath for ischemia evaluation  - Continue aspirin 81 QD  - Continue metoprolol 25 Q8h  - Continue statin  - Lasix 40 PO BID. Maintaining negative balance  - Monitor and replete potassium to greater than 4.0 and magnesium to greater than 2.0  - Supplemental oxygen as needed  - To follow as needed.  Time greater than 35 minutes.

## 2017-04-18 NOTE — PROGRESS NOTE ADULT - ATTENDING COMMENTS
Pt seen ,examined, case & care plan d/w Dtr & Pt at detail. D/W Dr Amin- ICU MD .Pt to go to [facility] for cardiac cath today.  AM labs stable.  NPO..

## 2017-04-19 LAB
ANION GAP SERPL CALC-SCNC: 12 MMOL/L — SIGNIFICANT CHANGE UP (ref 5–17)
BUN SERPL-MCNC: 23 MG/DL — SIGNIFICANT CHANGE UP (ref 7–23)
CALCIUM SERPL-MCNC: 9.3 MG/DL — SIGNIFICANT CHANGE UP (ref 8.4–10.5)
CHLORIDE SERPL-SCNC: 102 MMOL/L — SIGNIFICANT CHANGE UP (ref 96–108)
CO2 SERPL-SCNC: 28 MMOL/L — SIGNIFICANT CHANGE UP (ref 22–31)
CREAT SERPL-MCNC: 0.54 MG/DL — SIGNIFICANT CHANGE UP (ref 0.5–1.3)
GLUCOSE SERPL-MCNC: 111 MG/DL — HIGH (ref 70–99)
HCT VFR BLD CALC: 40.4 % — SIGNIFICANT CHANGE UP (ref 34.5–45)
HGB BLD-MCNC: 13.1 G/DL — SIGNIFICANT CHANGE UP (ref 11.5–15.5)
MCHC RBC-ENTMCNC: 31.2 PG — SIGNIFICANT CHANGE UP (ref 27–34)
MCHC RBC-ENTMCNC: 32.4 GM/DL — SIGNIFICANT CHANGE UP (ref 32–36)
MCV RBC AUTO: 96.2 FL — SIGNIFICANT CHANGE UP (ref 80–100)
PLATELET # BLD AUTO: 234 K/UL — SIGNIFICANT CHANGE UP (ref 150–400)
POTASSIUM SERPL-MCNC: 4.3 MMOL/L — SIGNIFICANT CHANGE UP (ref 3.5–5.3)
POTASSIUM SERPL-SCNC: 4.3 MMOL/L — SIGNIFICANT CHANGE UP (ref 3.5–5.3)
RBC # BLD: 4.2 M/UL — SIGNIFICANT CHANGE UP (ref 3.8–5.2)
RBC # FLD: 13.2 % — SIGNIFICANT CHANGE UP (ref 10.3–14.5)
SODIUM SERPL-SCNC: 142 MMOL/L — SIGNIFICANT CHANGE UP (ref 135–145)
WBC # BLD: 14.8 K/UL — HIGH (ref 3.8–10.5)
WBC # FLD AUTO: 14.8 K/UL — HIGH (ref 3.8–10.5)

## 2017-04-19 PROCEDURE — 99223 1ST HOSP IP/OBS HIGH 75: CPT

## 2017-04-19 RX ORDER — LOSARTAN POTASSIUM 100 MG/1
25 TABLET, FILM COATED ORAL DAILY
Qty: 0 | Refills: 0 | Status: DISCONTINUED | OUTPATIENT
Start: 2017-04-19 | End: 2017-04-21

## 2017-04-19 RX ORDER — VANCOMYCIN HCL 1 G
VIAL (EA) INTRAVENOUS
Qty: 0 | Refills: 0 | Status: DISCONTINUED | OUTPATIENT
Start: 2017-04-19 | End: 2017-04-21

## 2017-04-19 RX ORDER — ZALEPLON 10 MG
5 CAPSULE ORAL ONCE
Qty: 0 | Refills: 0 | Status: DISCONTINUED | OUTPATIENT
Start: 2017-04-19 | End: 2017-04-21

## 2017-04-19 RX ORDER — VANCOMYCIN HCL 1 G
1000 VIAL (EA) INTRAVENOUS ONCE
Qty: 0 | Refills: 0 | Status: COMPLETED | OUTPATIENT
Start: 2017-04-19 | End: 2017-04-19

## 2017-04-19 RX ORDER — VANCOMYCIN HCL 1 G
1000 VIAL (EA) INTRAVENOUS EVERY 24 HOURS
Qty: 0 | Refills: 0 | Status: DISCONTINUED | OUTPATIENT
Start: 2017-04-20 | End: 2017-04-21

## 2017-04-19 RX ADMIN — Medication 1 TABLET(S): at 10:53

## 2017-04-19 RX ADMIN — Medication 100 MILLIGRAM(S): at 15:41

## 2017-04-19 RX ADMIN — Medication 100 MILLIGRAM(S): at 06:00

## 2017-04-19 RX ADMIN — BUDESONIDE AND FORMOTEROL FUMARATE DIHYDRATE 1 PUFF(S): 160; 4.5 AEROSOL RESPIRATORY (INHALATION) at 21:23

## 2017-04-19 RX ADMIN — Medication 10 MILLIGRAM(S): at 05:03

## 2017-04-19 RX ADMIN — ALBUTEROL 2 PUFF(S): 90 AEROSOL, METERED ORAL at 18:04

## 2017-04-19 RX ADMIN — Medication 250 MILLIGRAM(S): at 18:02

## 2017-04-19 RX ADMIN — Medication 1 SPRAY(S): at 11:03

## 2017-04-19 RX ADMIN — Medication 0.25 MILLIGRAM(S): at 11:02

## 2017-04-19 RX ADMIN — Medication 81 MILLIGRAM(S): at 05:03

## 2017-04-19 RX ADMIN — SIMVASTATIN 20 MILLIGRAM(S): 20 TABLET, FILM COATED ORAL at 21:22

## 2017-04-19 RX ADMIN — Medication 1 TABLET(S): at 11:05

## 2017-04-19 RX ADMIN — ARIPIPRAZOLE 2 MILLIGRAM(S): 15 TABLET ORAL at 18:03

## 2017-04-19 RX ADMIN — Medication 40 MILLIGRAM(S): at 05:03

## 2017-04-19 RX ADMIN — TIOTROPIUM BROMIDE 1 CAPSULE(S): 18 CAPSULE ORAL; RESPIRATORY (INHALATION) at 18:04

## 2017-04-19 RX ADMIN — Medication 100 MILLIGRAM(S): at 21:20

## 2017-04-19 RX ADMIN — PANTOPRAZOLE SODIUM 40 MILLIGRAM(S): 20 TABLET, DELAYED RELEASE ORAL at 05:03

## 2017-04-19 RX ADMIN — Medication 100 MILLIGRAM(S): at 05:03

## 2017-04-19 RX ADMIN — Medication 75 MILLIGRAM(S): at 11:03

## 2017-04-19 RX ADMIN — Medication 0.25 MILLIGRAM(S): at 20:31

## 2017-04-19 RX ADMIN — Medication 75 MILLIGRAM(S): at 20:32

## 2017-04-19 RX ADMIN — Medication 100 MILLIGRAM(S): at 05:00

## 2017-04-19 RX ADMIN — Medication 100 MILLIGRAM(S): at 13:12

## 2017-04-19 RX ADMIN — Medication 100 MILLIGRAM(S): at 21:44

## 2017-04-19 RX ADMIN — LOSARTAN POTASSIUM 25 MILLIGRAM(S): 100 TABLET, FILM COATED ORAL at 11:03

## 2017-04-19 RX ADMIN — Medication 1 TABLET(S): at 18:03

## 2017-04-19 RX ADMIN — Medication 50 MILLIGRAM(S): at 05:03

## 2017-04-20 DIAGNOSIS — R73.03 PREDIABETES: ICD-10-CM

## 2017-04-20 DIAGNOSIS — I11.0 HYPERTENSIVE HEART DISEASE WITH HEART FAILURE: ICD-10-CM

## 2017-04-20 DIAGNOSIS — J44.1 CHRONIC OBSTRUCTIVE PULMONARY DISEASE WITH (ACUTE) EXACERBATION: ICD-10-CM

## 2017-04-20 DIAGNOSIS — Z79.82 LONG TERM (CURRENT) USE OF ASPIRIN: ICD-10-CM

## 2017-04-20 DIAGNOSIS — I50.23 ACUTE ON CHRONIC SYSTOLIC (CONGESTIVE) HEART FAILURE: ICD-10-CM

## 2017-04-20 DIAGNOSIS — Z87.891 PERSONAL HISTORY OF NICOTINE DEPENDENCE: ICD-10-CM

## 2017-04-20 DIAGNOSIS — I73.9 PERIPHERAL VASCULAR DISEASE, UNSPECIFIED: ICD-10-CM

## 2017-04-20 DIAGNOSIS — G47.33 OBSTRUCTIVE SLEEP APNEA (ADULT) (PEDIATRIC): ICD-10-CM

## 2017-04-20 DIAGNOSIS — Z86.73 PERSONAL HISTORY OF TRANSIENT ISCHEMIC ATTACK (TIA), AND CEREBRAL INFARCTION WITHOUT RESIDUAL DEFICITS: ICD-10-CM

## 2017-04-20 DIAGNOSIS — K44.9 DIAPHRAGMATIC HERNIA WITHOUT OBSTRUCTION OR GANGRENE: ICD-10-CM

## 2017-04-20 DIAGNOSIS — E78.5 HYPERLIPIDEMIA, UNSPECIFIED: ICD-10-CM

## 2017-04-20 DIAGNOSIS — M26.609 UNSPECIFIED TEMPOROMANDIBULAR JOINT DISORDER, UNSPECIFIED SIDE: ICD-10-CM

## 2017-04-20 DIAGNOSIS — J44.0 CHRONIC OBSTRUCTIVE PULMONARY DISEASE WITH ACUTE LOWER RESPIRATORY INFECTION: ICD-10-CM

## 2017-04-20 DIAGNOSIS — F41.9 ANXIETY DISORDER, UNSPECIFIED: ICD-10-CM

## 2017-04-20 DIAGNOSIS — I24.8 OTHER FORMS OF ACUTE ISCHEMIC HEART DISEASE: ICD-10-CM

## 2017-04-20 DIAGNOSIS — J69.0 PNEUMONITIS DUE TO INHALATION OF FOOD AND VOMIT: ICD-10-CM

## 2017-04-20 DIAGNOSIS — M06.9 RHEUMATOID ARTHRITIS, UNSPECIFIED: ICD-10-CM

## 2017-04-20 DIAGNOSIS — Z95.2 PRESENCE OF PROSTHETIC HEART VALVE: ICD-10-CM

## 2017-04-20 DIAGNOSIS — J96.02 ACUTE RESPIRATORY FAILURE WITH HYPERCAPNIA: ICD-10-CM

## 2017-04-20 DIAGNOSIS — Z85.3 PERSONAL HISTORY OF MALIGNANT NEOPLASM OF BREAST: ICD-10-CM

## 2017-04-20 DIAGNOSIS — E87.2 ACIDOSIS: ICD-10-CM

## 2017-04-20 LAB
ANION GAP SERPL CALC-SCNC: 12 MMOL/L — SIGNIFICANT CHANGE UP (ref 5–17)
BUN SERPL-MCNC: 22 MG/DL — SIGNIFICANT CHANGE UP (ref 7–23)
CALCIUM SERPL-MCNC: 9.2 MG/DL — SIGNIFICANT CHANGE UP (ref 8.4–10.5)
CHLORIDE SERPL-SCNC: 102 MMOL/L — SIGNIFICANT CHANGE UP (ref 96–108)
CO2 SERPL-SCNC: 28 MMOL/L — SIGNIFICANT CHANGE UP (ref 22–31)
CREAT SERPL-MCNC: 0.64 MG/DL — SIGNIFICANT CHANGE UP (ref 0.5–1.3)
GLUCOSE SERPL-MCNC: 102 MG/DL — HIGH (ref 70–99)
HCT VFR BLD CALC: 38 % — SIGNIFICANT CHANGE UP (ref 34.5–45)
HGB BLD-MCNC: 12.8 G/DL — SIGNIFICANT CHANGE UP (ref 11.5–15.5)
MCHC RBC-ENTMCNC: 32.1 PG — SIGNIFICANT CHANGE UP (ref 27–34)
MCHC RBC-ENTMCNC: 33.7 GM/DL — SIGNIFICANT CHANGE UP (ref 32–36)
MCV RBC AUTO: 95.3 FL — SIGNIFICANT CHANGE UP (ref 80–100)
PLATELET # BLD AUTO: 221 K/UL — SIGNIFICANT CHANGE UP (ref 150–400)
POTASSIUM SERPL-MCNC: 4.2 MMOL/L — SIGNIFICANT CHANGE UP (ref 3.5–5.3)
POTASSIUM SERPL-SCNC: 4.2 MMOL/L — SIGNIFICANT CHANGE UP (ref 3.5–5.3)
RBC # BLD: 3.99 M/UL — SIGNIFICANT CHANGE UP (ref 3.8–5.2)
RBC # FLD: 12.9 % — SIGNIFICANT CHANGE UP (ref 10.3–14.5)
SODIUM SERPL-SCNC: 142 MMOL/L — SIGNIFICANT CHANGE UP (ref 135–145)
VANCOMYCIN TROUGH SERPL-MCNC: 6.2 UG/ML — LOW (ref 10–20)
WBC # BLD: 12.3 K/UL — HIGH (ref 3.8–10.5)
WBC # FLD AUTO: 12.3 K/UL — HIGH (ref 3.8–10.5)

## 2017-04-20 PROCEDURE — 99233 SBSQ HOSP IP/OBS HIGH 50: CPT

## 2017-04-20 RX ADMIN — Medication 10 MILLIGRAM(S): at 05:46

## 2017-04-20 RX ADMIN — Medication 100 MILLIGRAM(S): at 05:47

## 2017-04-20 RX ADMIN — Medication 75 MILLIGRAM(S): at 07:30

## 2017-04-20 RX ADMIN — Medication 0.25 MILLIGRAM(S): at 18:54

## 2017-04-20 RX ADMIN — PANTOPRAZOLE SODIUM 40 MILLIGRAM(S): 20 TABLET, DELAYED RELEASE ORAL at 05:46

## 2017-04-20 RX ADMIN — Medication 0.25 MILLIGRAM(S): at 11:32

## 2017-04-20 RX ADMIN — Medication 81 MILLIGRAM(S): at 05:46

## 2017-04-20 RX ADMIN — TIOTROPIUM BROMIDE 1 CAPSULE(S): 18 CAPSULE ORAL; RESPIRATORY (INHALATION) at 11:32

## 2017-04-20 RX ADMIN — Medication 1 TABLET(S): at 11:32

## 2017-04-20 RX ADMIN — Medication 40 MILLIGRAM(S): at 05:46

## 2017-04-20 RX ADMIN — Medication 100 MILLIGRAM(S): at 13:46

## 2017-04-20 RX ADMIN — Medication 75 MILLIGRAM(S): at 17:20

## 2017-04-20 RX ADMIN — ARIPIPRAZOLE 2 MILLIGRAM(S): 15 TABLET ORAL at 11:32

## 2017-04-20 RX ADMIN — Medication 1 TABLET(S): at 07:30

## 2017-04-20 RX ADMIN — SIMVASTATIN 20 MILLIGRAM(S): 20 TABLET, FILM COATED ORAL at 21:21

## 2017-04-20 RX ADMIN — Medication 1: at 17:19

## 2017-04-20 RX ADMIN — SENNA PLUS 2 TABLET(S): 8.6 TABLET ORAL at 21:21

## 2017-04-20 RX ADMIN — Medication 1 TABLET(S): at 17:19

## 2017-04-20 RX ADMIN — BUDESONIDE AND FORMOTEROL FUMARATE DIHYDRATE 1 PUFF(S): 160; 4.5 AEROSOL RESPIRATORY (INHALATION) at 05:47

## 2017-04-20 RX ADMIN — Medication 100 MILLIGRAM(S): at 21:21

## 2017-04-20 RX ADMIN — Medication 250 MILLIGRAM(S): at 15:35

## 2017-04-20 RX ADMIN — Medication 100 MILLIGRAM(S): at 05:46

## 2017-04-20 RX ADMIN — Medication 50 MILLIGRAM(S): at 05:46

## 2017-04-20 RX ADMIN — BUDESONIDE AND FORMOTEROL FUMARATE DIHYDRATE 1 PUFF(S): 160; 4.5 AEROSOL RESPIRATORY (INHALATION) at 17:19

## 2017-04-20 RX ADMIN — LOSARTAN POTASSIUM 25 MILLIGRAM(S): 100 TABLET, FILM COATED ORAL at 05:46

## 2017-04-21 ENCOUNTER — TRANSCRIPTION ENCOUNTER (OUTPATIENT)
Age: 77
End: 2017-04-21

## 2017-04-21 VITALS
RESPIRATION RATE: 17 BRPM | OXYGEN SATURATION: 98 % | HEART RATE: 85 BPM | TEMPERATURE: 98 F | SYSTOLIC BLOOD PRESSURE: 111 MMHG | DIASTOLIC BLOOD PRESSURE: 61 MMHG

## 2017-04-21 LAB
ANION GAP SERPL CALC-SCNC: 13 MMOL/L — SIGNIFICANT CHANGE UP (ref 5–17)
BUN SERPL-MCNC: 19 MG/DL — SIGNIFICANT CHANGE UP (ref 7–23)
CALCIUM SERPL-MCNC: 9.4 MG/DL — SIGNIFICANT CHANGE UP (ref 8.4–10.5)
CHLORIDE SERPL-SCNC: 103 MMOL/L — SIGNIFICANT CHANGE UP (ref 96–108)
CO2 SERPL-SCNC: 27 MMOL/L — SIGNIFICANT CHANGE UP (ref 22–31)
CREAT SERPL-MCNC: 0.65 MG/DL — SIGNIFICANT CHANGE UP (ref 0.5–1.3)
GLUCOSE SERPL-MCNC: 97 MG/DL — SIGNIFICANT CHANGE UP (ref 70–99)
HCT VFR BLD CALC: 38.5 % — SIGNIFICANT CHANGE UP (ref 34.5–45)
HGB BLD-MCNC: 12.8 G/DL — SIGNIFICANT CHANGE UP (ref 11.5–15.5)
MCHC RBC-ENTMCNC: 32.1 PG — SIGNIFICANT CHANGE UP (ref 27–34)
MCHC RBC-ENTMCNC: 33.2 GM/DL — SIGNIFICANT CHANGE UP (ref 32–36)
MCV RBC AUTO: 96.5 FL — SIGNIFICANT CHANGE UP (ref 80–100)
PLATELET # BLD AUTO: 222 K/UL — SIGNIFICANT CHANGE UP (ref 150–400)
POTASSIUM SERPL-MCNC: 4.4 MMOL/L — SIGNIFICANT CHANGE UP (ref 3.5–5.3)
POTASSIUM SERPL-SCNC: 4.4 MMOL/L — SIGNIFICANT CHANGE UP (ref 3.5–5.3)
RBC # BLD: 3.98 M/UL — SIGNIFICANT CHANGE UP (ref 3.8–5.2)
RBC # FLD: 12.6 % — SIGNIFICANT CHANGE UP (ref 10.3–14.5)
SODIUM SERPL-SCNC: 143 MMOL/L — SIGNIFICANT CHANGE UP (ref 135–145)
VANCOMYCIN TROUGH SERPL-MCNC: 5.4 UG/ML — LOW (ref 10–20)
WBC # BLD: 11.6 K/UL — HIGH (ref 3.8–10.5)
WBC # FLD AUTO: 11.6 K/UL — HIGH (ref 3.8–10.5)

## 2017-04-21 PROCEDURE — 93005 ELECTROCARDIOGRAM TRACING: CPT

## 2017-04-21 PROCEDURE — 99233 SBSQ HOSP IP/OBS HIGH 50: CPT

## 2017-04-21 PROCEDURE — 94640 AIRWAY INHALATION TREATMENT: CPT

## 2017-04-21 PROCEDURE — C1769: CPT

## 2017-04-21 PROCEDURE — 80202 ASSAY OF VANCOMYCIN: CPT

## 2017-04-21 PROCEDURE — C1894: CPT

## 2017-04-21 PROCEDURE — 93456 R HRT CORONARY ARTERY ANGIO: CPT

## 2017-04-21 PROCEDURE — C1887: CPT

## 2017-04-21 PROCEDURE — 80048 BASIC METABOLIC PNL TOTAL CA: CPT

## 2017-04-21 PROCEDURE — 85027 COMPLETE CBC AUTOMATED: CPT

## 2017-04-21 RX ORDER — LOSARTAN POTASSIUM 100 MG/1
1 TABLET, FILM COATED ORAL
Qty: 0 | Refills: 1 | DISCHARGE
Start: 2017-04-21 | End: 2017-06-19

## 2017-04-21 RX ORDER — TIOTROPIUM BROMIDE 18 UG/1
1 CAPSULE ORAL; RESPIRATORY (INHALATION)
Qty: 0 | Refills: 0 | COMMUNITY

## 2017-04-21 RX ORDER — METOPROLOL TARTRATE 50 MG
1 TABLET ORAL
Qty: 30 | Refills: 1 | OUTPATIENT
Start: 2017-04-21 | End: 2017-06-19

## 2017-04-21 RX ORDER — CLONAZEPAM 1 MG
0.5 TABLET ORAL
Qty: 0 | Refills: 0 | COMMUNITY

## 2017-04-21 RX ORDER — ZALEPLON 10 MG
1 CAPSULE ORAL
Qty: 30 | Refills: 0 | OUTPATIENT
Start: 2017-04-21 | End: 2017-05-21

## 2017-04-21 RX ORDER — VERAPAMIL HCL 240 MG
1 CAPSULE, EXTENDED RELEASE PELLETS 24 HR ORAL
Qty: 0 | Refills: 0 | COMMUNITY

## 2017-04-21 RX ORDER — ALBUTEROL 90 UG/1
3 AEROSOL, METERED ORAL
Qty: 0 | Refills: 0 | COMMUNITY

## 2017-04-21 RX ORDER — SENNA PLUS 8.6 MG/1
2 TABLET ORAL
Qty: 60 | Refills: 1 | OUTPATIENT
Start: 2017-04-21 | End: 2017-06-19

## 2017-04-21 RX ORDER — ZOLPIDEM TARTRATE 10 MG/1
1 TABLET ORAL
Qty: 0 | Refills: 0 | COMMUNITY

## 2017-04-21 RX ORDER — FUROSEMIDE 40 MG
1 TABLET ORAL
Qty: 0 | Refills: 1 | COMMUNITY
Start: 2017-04-21 | End: 2017-06-19

## 2017-04-21 RX ORDER — VENLAFAXINE HCL 75 MG
1 CAPSULE, EXT RELEASE 24 HR ORAL
Qty: 0 | Refills: 0 | COMMUNITY

## 2017-04-21 RX ORDER — FUROSEMIDE 40 MG
1 TABLET ORAL
Qty: 60 | Refills: 1 | OUTPATIENT
Start: 2017-04-21 | End: 2017-06-19

## 2017-04-21 RX ORDER — LOSARTAN POTASSIUM 100 MG/1
1 TABLET, FILM COATED ORAL
Qty: 30 | Refills: 1 | OUTPATIENT
Start: 2017-04-21 | End: 2017-06-19

## 2017-04-21 RX ORDER — LACTOBACILLUS ACIDOPHILUS 100MM CELL
1 CAPSULE ORAL
Qty: 90 | Refills: 1 | OUTPATIENT
Start: 2017-04-21 | End: 2017-06-19

## 2017-04-21 RX ORDER — TIOTROPIUM BROMIDE 18 UG/1
1 CAPSULE ORAL; RESPIRATORY (INHALATION)
Qty: 30 | Refills: 0 | OUTPATIENT
Start: 2017-04-21 | End: 2017-05-21

## 2017-04-21 RX ORDER — OMEPRAZOLE 10 MG/1
1 CAPSULE, DELAYED RELEASE ORAL
Qty: 0 | Refills: 0 | COMMUNITY

## 2017-04-21 RX ADMIN — Medication 40 MILLIGRAM(S): at 05:05

## 2017-04-21 RX ADMIN — Medication 81 MILLIGRAM(S): at 05:06

## 2017-04-21 RX ADMIN — PANTOPRAZOLE SODIUM 40 MILLIGRAM(S): 20 TABLET, DELAYED RELEASE ORAL at 05:05

## 2017-04-21 RX ADMIN — ARIPIPRAZOLE 2 MILLIGRAM(S): 15 TABLET ORAL at 11:50

## 2017-04-21 RX ADMIN — Medication 100 MILLIGRAM(S): at 11:47

## 2017-04-21 RX ADMIN — Medication 100 MILLIGRAM(S): at 05:06

## 2017-04-21 RX ADMIN — BUDESONIDE AND FORMOTEROL FUMARATE DIHYDRATE 1 PUFF(S): 160; 4.5 AEROSOL RESPIRATORY (INHALATION) at 05:09

## 2017-04-21 RX ADMIN — Medication 50 MILLIGRAM(S): at 05:05

## 2017-04-21 RX ADMIN — Medication 100 MILLIGRAM(S): at 06:36

## 2017-04-21 RX ADMIN — TIOTROPIUM BROMIDE 1 CAPSULE(S): 18 CAPSULE ORAL; RESPIRATORY (INHALATION) at 11:47

## 2017-04-21 RX ADMIN — Medication 0.25 MILLIGRAM(S): at 09:53

## 2017-04-21 RX ADMIN — Medication 100 MILLIGRAM(S): at 13:12

## 2017-04-21 RX ADMIN — Medication 250 MILLIGRAM(S): at 11:48

## 2017-04-21 RX ADMIN — Medication 1 DROP(S): at 11:48

## 2017-04-21 RX ADMIN — LOSARTAN POTASSIUM 25 MILLIGRAM(S): 100 TABLET, FILM COATED ORAL at 05:05

## 2017-04-21 RX ADMIN — Medication 1 TABLET(S): at 07:41

## 2017-04-21 RX ADMIN — Medication 1 TABLET(S): at 11:48

## 2017-04-21 RX ADMIN — Medication 1 SPRAY(S): at 07:43

## 2017-04-21 RX ADMIN — Medication 75 MILLIGRAM(S): at 07:42

## 2017-04-21 NOTE — DISCHARGE NOTE ADULT - CARE PLAN
Principal Discharge DX:	CHF (congestive heart failure)  Goal:	You will not be short of breath.  Instructions for follow-up, activity and diet:	Take your medications as prescribed. Follow a low-salt, low salt, low cholesterol heart healthy diet. Weigh yourself every day and keep a record; call your doctor if you gain 2 pounds over one to two days or 5 pounds over three days. Get to or maintain a healthy weight; ask your heart failure team for referrals to a registered dietitian if needed. Avoid alcohol. Be active (check with your physician or cardiologist first). Find healthy ways to deal with stress, such as deep breathing, meditation, exercise, and doing hobbies that you enjoy. If you smoke, quit. (A resource to help you stop smoking is the [facility] [employer] – phone number [phone].).  Secondary Diagnosis:	HTN (hypertension)  Goal:	Your blood pressure will be controlled.  Instructions for follow-up, activity and diet:	Continue with your blood pressure medications; eat a heart healthy diet with low salt diet; exercise regularly (consult with your physician or cardiologist first); maintain a heart healthy weight; if you smoke - quit (A resource to help you stop smoking is the [facility] [employer] – phone number [phone].); include healthy ways to manage stress. Continue to follow with your primary care physician or cardiologist.  Secondary Diagnosis:	Hyperlipidemia  Goal:	Your LDL cholesterol will be less than 70mg/dL  Instructions for follow-up, activity and diet:	Continue with your cholesterol medications. Eat a heart healthy diet that is low in saturated fats and salt, and includes whole grains, fruits, vegetables and lean protein; exercise regularly (consult with your physician or cardiologist first); maintain a heart healthy weight; if you smoke - quit (A resource to help you stop smoking is the [facility] [employer] – phone number [phone].). Continue to follow with your primary physician or cardiologist.  Secondary Diagnosis:	Borderline diabetes  Goal:	Your hemoglobin A1C will be between 7-8.  Instructions for follow-up, activity and diet:	Continue to follow with your primary care MD or your endocrinologist.  Follow a heart healthy diabetic diet. If you check your fingerstick glucose at home, call your MD if it is greater than 250mg/dL on 2 occasions or less than 100mg/dL on 2 occasions. Know signs of low blood sugar, such as: dizziness, shakiness, sweating, confusion, hunger, nervousness-drink 4 ounces apple juice if occurs and call your doctor. Know early signs of high blood sugar, such as: frequent urination, increased thirst, blurry vision, fatigue, headache - call your doctor if this occurs. Follow with other practitioners to care for your diabetes, such as ophthalmologist and podiatrist. Principal Discharge DX:	CHF (congestive heart failure)  Goal:	You will not be short of breath.  Instructions for follow-up, activity and diet:	Take your medications as prescribed. Follow a low-salt, low salt, low cholesterol heart healthy diet. Weigh yourself every day and keep a record; call your doctor if you gain 2 pounds over one to two days or 5 pounds over three days. Get to or maintain a healthy weight; ask your heart failure team for referrals to a registered dietitian if needed. Avoid alcohol. Be active (check with your physician or cardiologist first). Find healthy ways to deal with stress, such as deep breathing, meditation, exercise, and doing hobbies that you enjoy. If you smoke, quit. (A resource to help you stop smoking is the [facility] [employer] – phone number [phone].).  Secondary Diagnosis:	HTN (hypertension)  Goal:	Your blood pressure will be controlled.  Instructions for follow-up, activity and diet:	Continue with your blood pressure medications; eat a heart healthy diet with low salt diet; exercise regularly (consult with your physician or cardiologist first); maintain a heart healthy weight; if you smoke - quit (A resource to help you stop smoking is the [facility] [employer] – phone number [phone].); include healthy ways to manage stress. Continue to follow with your primary care physician or cardiologist.  Secondary Diagnosis:	Hyperlipidemia  Goal:	Your LDL cholesterol will be less than 70mg/dL  Instructions for follow-up, activity and diet:	Continue with your cholesterol medications. Eat a heart healthy diet that is low in saturated fats and salt, and includes whole grains, fruits, vegetables and lean protein; exercise regularly (consult with your physician or cardiologist first); maintain a heart healthy weight; if you smoke - quit (A resource to help you stop smoking is the [facility] [employer] – phone number [phone].). Continue to follow with your primary physician or cardiologist.  Secondary Diagnosis:	Borderline diabetes  Goal:	Your hemoglobin A1C will be between 7-8.  Instructions for follow-up, activity and diet:	Continue to follow with your primary care MD or your endocrinologist.  Follow a heart healthy diabetic diet. If you check your fingerstick glucose at home, call your MD if it is greater than 250mg/dL on 2 occasions or less than 100mg/dL on 2 occasions. Know signs of low blood sugar, such as: dizziness, shakiness, sweating, confusion, hunger, nervousness-drink 4 ounces apple juice if occurs and call your doctor. Know early signs of high blood sugar, such as: frequent urination, increased thirst, blurry vision, fatigue, headache - call your doctor if this occurs. Follow with other practitioners to care for your diabetes, such as ophthalmologist and podiatrist. Principal Discharge DX:	CHF (congestive heart failure)  Goal:	You will not be short of breath.  Instructions for follow-up, activity and diet:	Take your medications as prescribed. Follow a low-salt, low salt, low cholesterol heart healthy diet. Weigh yourself every day and keep a record; call your doctor if you gain 2 pounds over one to two days or 5 pounds over three days. Get to or maintain a healthy weight; ask your heart failure team for referrals to a registered dietitian if needed. Avoid alcohol. Be active (check with your physician or cardiologist first). Find healthy ways to deal with stress, such as deep breathing, meditation, exercise, and doing hobbies that you enjoy. If you smoke, quit. (A resource to help you stop smoking is the [facility] [employer] – phone number [phone].).  Secondary Diagnosis:	HTN (hypertension)  Goal:	Your blood pressure will be controlled.  Instructions for follow-up, activity and diet:	Continue with your blood pressure medications; eat a heart healthy diet with low salt diet; exercise regularly (consult with your physician or cardiologist first); maintain a heart healthy weight; if you smoke - quit (A resource to help you stop smoking is the [facility] [employer] – phone number [phone].); include healthy ways to manage stress. Continue to follow with your primary care physician or cardiologist.  Secondary Diagnosis:	Hyperlipidemia  Goal:	Your LDL cholesterol will be less than 70mg/dL  Instructions for follow-up, activity and diet:	Continue with your cholesterol medications. Eat a heart healthy diet that is low in saturated fats and salt, and includes whole grains, fruits, vegetables and lean protein; exercise regularly (consult with your physician or cardiologist first); maintain a heart healthy weight; if you smoke - quit (A resource to help you stop smoking is the [facility] [employer] – phone number [phone].). Continue to follow with your primary physician or cardiologist.  Secondary Diagnosis:	Borderline diabetes  Goal:	Your hemoglobin A1C will be between 7-8.  Instructions for follow-up, activity and diet:	Continue to follow with your primary care MD or your endocrinologist.  Follow a heart healthy diabetic diet. If you check your fingerstick glucose at home, call your MD if it is greater than 250mg/dL on 2 occasions or less than 100mg/dL on 2 occasions. Know signs of low blood sugar, such as: dizziness, shakiness, sweating, confusion, hunger, nervousness-drink 4 ounces apple juice if occurs and call your doctor. Know early signs of high blood sugar, such as: frequent urination, increased thirst, blurry vision, fatigue, headache - call your doctor if this occurs. Follow with other practitioners to care for your diabetes, such as ophthalmologist and podiatrist. Principal Discharge DX:	CHF (congestive heart failure)  Goal:	You will not be short of breath.  Instructions for follow-up, activity and diet:	Take your medications as prescribed. Follow a low-salt, low salt, low cholesterol heart healthy diet. Weigh yourself every day and keep a record; call your doctor if you gain 2 pounds over one to two days or 5 pounds over three days. Get to or maintain a healthy weight; ask your heart failure team for referrals to a registered dietitian if needed. Avoid alcohol. Be active (check with your physician or cardiologist first). Find healthy ways to deal with stress, such as deep breathing, meditation, exercise, and doing hobbies that you enjoy. If you smoke, quit. (A resource to help you stop smoking is the [facility] [employer] – phone number [phone].).  Secondary Diagnosis:	HTN (hypertension)  Goal:	Your blood pressure will be controlled.  Instructions for follow-up, activity and diet:	Continue with your blood pressure medications; eat a heart healthy diet with low salt diet; exercise regularly (consult with your physician or cardiologist first); maintain a heart healthy weight; if you smoke - quit (A resource to help you stop smoking is the [facility] [employer] ([employer] – phone number [phone].); include healthy ways to manage stress. Continue to follow with your primary care physician or cardiologist.  Secondary Diagnosis:	Hyperlipidemia  Goal:	Your LDL cholesterol will be less than 70mg/dL  Instructions for follow-up, activity and diet:	Continue with your cholesterol medications. Eat a heart healthy diet that is low in saturated fats and salt, and includes whole grains, fruits, vegetables and lean protein; exercise regularly (consult with your physician or cardiologist first); maintain a heart healthy weight; if you smoke - quit (A resource to help you stop smoking is the [facility] [employer] ([employer] – phone number [phone].). Continue to follow with your primary physician or cardiologist.  Secondary Diagnosis:	Borderline diabetes  Goal:	Your hemoglobin A1C will be between 7-8.  Instructions for follow-up, activity and diet:	Continue to follow with your primary care MD or your endocrinologist.  Follow a heart healthy diabetic diet. If you check your fingerstick glucose at home, call your MD if it is greater than 250mg/dL on 2 occasions or less than 100mg/dL on 2 occasions. Know signs of low blood sugar, such as: dizziness, shakiness, sweating, confusion, hunger, nervousness-drink 4 ounces apple juice if occurs and call your doctor. Know early signs of high blood sugar, such as: frequent urination, increased thirst, blurry vision, fatigue, headache - call your doctor if this occurs. Follow with other practitioners to care for your diabetes, such as ophthalmologist and podiatrist.

## 2017-04-21 NOTE — DISCHARGE NOTE ADULT - PLAN OF CARE
You will not be short of breath. Take your medications as prescribed. Follow a low-salt, low salt, low cholesterol heart healthy diet. Weigh yourself every day and keep a record; call your doctor if you gain 2 pounds over one to two days or 5 pounds over three days. Get to or maintain a healthy weight; ask your heart failure team for referrals to a registered dietitian if needed. Avoid alcohol. Be active (check with your physician or cardiologist first). Find healthy ways to deal with stress, such as deep breathing, meditation, exercise, and doing hobbies that you enjoy. If you smoke, quit. (A resource to help you stop smoking is the [facility] – phone number [phone].). Your blood pressure will be controlled. Continue with your blood pressure medications; eat a heart healthy diet with low salt diet; exercise regularly (consult with your physician or cardiologist first); maintain a heart healthy weight; if you smoke - quit (A resource to help you stop smoking is the [facility] – phone number [phone].); include healthy ways to manage stress. Continue to follow with your primary care physician or cardiologist. Your LDL cholesterol will be less than 70mg/dL Continue with your cholesterol medications. Eat a heart healthy diet that is low in saturated fats and salt, and includes whole grains, fruits, vegetables and lean protein; exercise regularly (consult with your physician or cardiologist first); maintain a heart healthy weight; if you smoke - quit (A resource to help you stop smoking is the [facility] – phone number [phone].). Continue to follow with your primary physician or cardiologist. Your hemoglobin A1C will be between 7-8. Continue to follow with your primary care MD or your endocrinologist.  Follow a heart healthy diabetic diet. If you check your fingerstick glucose at home, call your MD if it is greater than 250mg/dL on 2 occasions or less than 100mg/dL on 2 occasions. Know signs of low blood sugar, such as: dizziness, shakiness, sweating, confusion, hunger, nervousness-drink 4 ounces apple juice if occurs and call your doctor. Know early signs of high blood sugar, such as: frequent urination, increased thirst, blurry vision, fatigue, headache - call your doctor if this occurs. Follow with other practitioners to care for your diabetes, such as ophthalmologist and podiatrist.

## 2017-04-21 NOTE — DISCHARGE NOTE ADULT - CARE PROVIDERS DIRECT ADDRESSES
[email or web address],DirectAddress_Unknown,DirectAddress_Unknown

## 2017-04-21 NOTE — DISCHARGE NOTE ADULT - PROVIDER TOKENS
TOKEN:'[id number]',FREE:[LAST:[Miguel],FIRST:[[place]],PHONE:[phone]:[phone]   [phone]    -],ADDRESS:[[facility] [address]]]

## 2017-04-21 NOTE — DISCHARGE NOTE ADULT - PATIENT PORTAL LINK FT
“You can access the [facility] Patient Portal, offered by [facility], by registering with the following website: [email or web address]”

## 2017-04-21 NOTE — DISCHARGE NOTE ADULT - MEDICATION SUMMARY - MEDICATIONS TO STOP TAKING
I will STOP taking the medications listed below when I get home from the hospital:    predniSONE  -- 20 mg po x 2 days (17TH, 18TH)and then 10 mg po x 2 days (19TH, 20TH) then stop   HOSP    enoxaparin  --   40 unit subq daily for dvt prophy.   LAST DOSE 4/17 06:00    metroNIDAZOLE 500 mg/100 mL intravenous solution  --  intravenous every 8 hours HOSP    aztreonam  -- 2000 milligram(s) intravenous every 8 hours HOSP    verapamil 240 mg/24 hours oral capsule, extended release  -- 1 cap(s) by mouth once a day HOME    PriLOSEC 20 mg oral delayed release capsule  -- 1 cap(s) by mouth 2 times a day HOME    budesonide-formoterol 160 mcg-4.5 mcg/inh inhalation aerosol  -- 2 PUFF inhaled 2 times a day HOSP    KlonoPIN 0.5 mg oral tablet  -- 0.5 tab(s) by mouth 2 times a day, As Needed anxiety  HOSP    albuterol 2.5 mg/3 mL (0.083%) inhalation solution  -- 3 milliliter(s) inhaled every 6 hours HOSP I will STOP taking the medications listed below when I get home from the hospital:    zolpidem 5 mg oral tablet  -- 1 tab(s) by mouth once a day (at bedtime)  HOSP    predniSONE  -- 20 mg po x 2 days (17TH, 18TH)and then 10 mg po x 2 days (19TH, 20TH) then stop   HOSP    enoxaparin  --   40 unit subq daily for dvt prophy.   LAST DOSE 4/17 06:00    metroNIDAZOLE 500 mg/100 mL intravenous solution  --  intravenous every 8 hours HOSP    aztreonam  -- 2000 milligram(s) intravenous every 8 hours HOSP    verapamil 240 mg/24 hours oral capsule, extended release  -- 1 cap(s) by mouth once a day HOME    PriLOSEC 20 mg oral delayed release capsule  -- 1 cap(s) by mouth 2 times a day HOME    budesonide-formoterol 160 mcg-4.5 mcg/inh inhalation aerosol  -- 2 PUFF inhaled 2 times a day HOSP    KlonoPIN 0.5 mg oral tablet  -- 0.5 tab(s) by mouth 2 times a day, As Needed anxiety  HOSP    albuterol 2.5 mg/3 mL (0.083%) inhalation solution  -- 3 milliliter(s) inhaled every 6 hours HOSP

## 2017-04-21 NOTE — DISCHARGE NOTE ADULT - HOSPITAL COURSE
Patient is a 76-year-old female with a past medical history of COPD, former smoker,  hypertension, hyperlipidemia, diverticulitis, depression, Rt breast cancer, status post lumpectomy and radiation 2008, anxiety, hiatal hernia-status post surgical repair in 2005, obstructive sleep apnea (not on BiPAP at home), PVD- with iliac dissection during attempted intervention (followed by dr Johanna silva), rheumatoid arthritis, thrush, TIA (2010), TMJ, valvular heart disease (aortic and mitral),, H/O AVR (bovine 2014), borderline diabetes (not on medications), presents to ED on 4/12/17  via EMS from home with chief complaint of difficulty breathing. Daughter at bedside providing additional history. Daughter states that for  three days prior to admission  patient has been feeling increasing shortness of breath. Patient periodically (around monthly) goes to PMD–Dr. Benoit for steroids and antibiotics and symptoms resolve with taking meds.  Pt was seen in office this week and given z pack, steroids. Per patient she has been taking medications, however wheezing and cough have increased, cough is productive with yellowish sputum. on night of admission  pt was diaphoretic and shortness of breath -became so bad that daughter called EMS to bring to ED.  Pt unsure if she had fever.  Pt denies CP, palpitations, change in vision, decreased PO intake, abd pain, n/v/d, dysuria, HA, numbness, tingling.  Denies sick contact/recent travel.  Similar symptoms in past with COPD exacerbation but none this severe.   In the ED – patient received levaquin 500mg iv, Solu-Medrol 125 mg IV, NS 1L bolusx1 Combivent nebulizer, was placed on BiPAP 12/6/PEEP5, 50. ABG-  pH – 7.26, PCO2 – 52, PO2 – 87, bicarb – 21, bases excess: –3.4, FiO2 50, O2 sat 94% , pt was put on Bi PAP in ER. labs significant for – WBC – 19.5, hematocrit – 47.4, glucose – 289, alkaline phosphatase 122, AST – 107, AL T – 93, lactate – 7.3, CK – 222, CKMB – 5.8, pro BNP – 515, UA – protein – 500, blood – small, bacteria – occasional, glucose – 100  ICU PA was consulted who deemed pt stable for TELE.    Pt Admitted to telemetry and was followed by Pulmonology Dr. Rivera fro COPD exacerbation, on IV Steroids , ID Dr. villareal for  possible PNA on CTA, Neg PE, cardiology Dr. García. Pt was continued on IV antibiotics, Leukocytosis 2 to possible COPD and steroids .Ct- angio performed, PE was negative. Pt  stable, BiPAP at Night. On IV Abx. Pt developed respiratory distress on 4/14 and was transferred to the ICU for hypoxia. Pt found to be in acute pulm edema with chest x-ray findings of CHF and a elevation in BNP from baseline. Stopped IVF and given IV lasix and IV flagyl added ECHO done,Last dose of Abx on 4/22/17 as per ID.  She is noted to have significant left ventricular systolic dysfunction mild elevation in troponin secondary to demand ischemic with EF of 30 %.NO ACS . Pt's WBC improved, on PO steroids taper, On PO Lasix,  Pt is for  transfer for cardiac cath to [place] for Ischemia work up. Pt is stable. Pt will need Home O2.    On arrival to [facility], patient is in no acute distress and no chest pain. Patient is a 76-year-old female with PMHx of COPD, former smoker, HTN, HLD, diverticulitis, depression, Rt breast cancer, s/p lumpectomy and radiation(2008), anxiety, hiatal hernia, SIMÓN(not on BiPAP at home), PVD- with iliac dissection during attempted intervention (followed by dr Johanna silva), rheumatoid arthritis, TIA (2010), TMJ, valvular heart disease (aortic and mitral) with AVR (bovine 2014), borderline diabetes (not on medications), c/o SOB on 4/12/17 went to [facility] ED. Pt has been treated with ABX and prednisone in PCP's(Dr. Benoit) for few months, worsening symptoms with productive cough. Had WBC 19.5, , mildly elevated Troponin, admitted to ICU on BIPAP, treated for acute COPD, CHF, pulm edema, and pneumonia, transferred to [facility] on 4/18 for further cardiac work ups. CTA negative for PE, IV ABX (Azetronam) and Vancomycin.   Echo:  left ventricular systolic dysfunction, EF 30%, Cath showed: minor luminal irregularities with no flow limiting lesions in LCX, LPDA and RCA.   Symptoms were improved with IV ABX, stable to discharge home with ASA, Toprol, Losartan and Lasix, follow up with Pulmonology Dr. Rivera, ID Dr. Conway, and Cardiology Dr. García. Patient is a 76-year-old female with PMHx of COPD, former smoker, HTN, HLD, diverticulitis, depression, Rt breast cancer, s/p lumpectomy and radiation(2008), anxiety, hiatal hernia, SIMÓN(not on BiPAP at home), PVD- with iliac dissection during attempted intervention (followed by dr Johanna silva), rheumatoid arthritis, TIA (2010), TMJ, valvular heart disease (aortic and mitral) with AVR (bovine 2014), borderline diabetes (not on medications), c/o SOB on 4/12/17 went to [facility] ED. Pt has been treated with ABX and prednisone in PCP's(Dr. Benoit) for few months, worsening symptoms with productive cough. Had WBC 19.5, , mildly elevated Troponin, admitted to ICU on BIPAP, treated for acute COPD, CHF, pulm edema, and pneumonia, transferred to [facility] on 4/18 for further cardiac work ups. CTA negative for PE, IV ABX (Azetronam) and Vancomycin.   Echo:  left ventricular systolic dysfunction, EF 30%, Cath showed: minor luminal irregularities with no flow limiting lesions in LCX, LPDA and RCA.   Symptoms were improved with IV ABX, stable to discharge home with ASA, Toprol, Losartan and Lasix, follow up with Pulmonology Dr. Rivera, ID Dr. Conway, and Cardiology Dr. García. .

## 2017-04-21 NOTE — DISCHARGE NOTE ADULT - MEDICATION SUMMARY - MEDICATIONS TO CHANGE
I will SWITCH the dose or number of times a day I take the medications listed below when I get home from the hospital:    metoprolol tartrate 25 mg oral tablet  -- 1 tab(s) by mouth every 8 hours HOSP    oxyCODONE-acetaminophen 5mg-325mg oral tablet  -- 1 tab(s) by mouth every 6 hours, As Needed  HOME

## 2017-04-21 NOTE — DISCHARGE NOTE ADULT - MEDICATION SUMMARY - MEDICATIONS TO TAKE
I will START or STAY ON the medications listed below when I get home from the hospital:    glucometer  -- 1 unit(s) intradermal 2 times a day  -- Indication: For diabetes     aspirin 81 mg oral tablet  -- 1 tab(s) by mouth once a day HOME/HOSP  -- Indication: For Heart    acetaminophen 325 mg oral tablet  -- 2 tab(s) by mouth every 6 hours, As needed, Mild Pain (1 - 3) HOSP  -- Indication: For mild pain    Percocet 10/325 oral tablet  -- 1 tab(s) by mouth every 6 hours, As Needed  HOSP  -- Indication: For mod to severe pain    losartan 25 mg oral tablet  -- 1 tab(s) by mouth once a day  -- Indication: For Left heart failure    clonazepam 0.5 mg oral tablet  -- 0.25 milligram(s) by mouth 2 times a day pt takes 1/2 0.5 mg tablet 2x day HOME  AS NEEDED  -- Indication: For anxiety    venlafaxine 150 mg oral capsule, extended release  -- 1 cap(s) by mouth once a day HOME/  -- Indication: For anxiety/depression    simvastatin 20 mg oral tablet  -- 1 tab(s) by mouth once a day (at bedtime)  HOME/HOSP  -- Indication: For cholesterol    Abilify 2 mg oral tablet  -- 1 tab(s) by mouth once a day HOME/HOSP  -- Indication: For depression    zolpidem 5 mg oral tablet  -- 1 tab(s) by mouth once a day (at bedtime)  HOSP  -- Indication: For insomnia    metoprolol succinate 50 mg oral tablet, extended release  -- 1 tab(s) by mouth once a day  -- Indication: For Left heart failure    Spiriva 18 mcg inhalation capsule  -- 1 cap(s) inhaled once a day HOSP  -- Indication: For COPD    ProAir HFA CFC free 90 mcg/inh inhalation aerosol  -- 2 puff(s) inhaled , As Needed for sob  -- Indication: For COPD    furosemide 40 mg oral tablet  -- 1 tab(s) by mouth 2 times a day HOSP  -- Indication: For CHF    senna oral tablet  -- 2 tab(s) by mouth once a day (at bedtime), As needed, Constipation HOSP  -- Indication: For constipation    docusate sodium 100 mg oral capsule  -- 1 cap(s) by mouth 3 times a day HOSP  -- Indication: For constipation    Ocean  -- 1 spray(s) into nose 4 times a day, As Needed  HOSP  -- Indication: For nasay stuffiness    ocular lubricant ophthalmic solution  -- 1 drop(s) to each affected eye 4 times a day, As needed, Dry Eyes  -- Indication: For dry eye    lactobacillus acidophilus oral capsule  -- 1 cap(s) by mouth 3 times a day HOSP  -- Indication: For Supplement    pantoprazole 40 mg oral delayed release tablet  -- 1 tab(s) by mouth once a day (before a meal)  HOSP  -- Indication: For acid reflux I will START or STAY ON the medications listed below when I get home from the hospital:    glucometer  -- 1 unit(s) intradermal 2 times a day  -- Indication: For diabetes     aspirin 81 mg oral tablet  -- 1 tab(s) by mouth once a day HOME/HOSP  -- Indication: For Heart    acetaminophen 325 mg oral tablet  -- 2 tab(s) by mouth every 6 hours, As needed, Mild Pain (1 - 3) HOSP  -- Indication: For mild pain    Percocet 10/325 oral tablet  -- 1 tab(s) by mouth every 6 hours, As Needed  HOSP  -- Indication: For mod to severe pain    losartan 25 mg oral tablet  -- 1 tab(s) by mouth once a day  -- Indication: For Left heart failure    clonazepam 0.5 mg oral tablet  -- 0.25 milligram(s) by mouth 2 times a day pt takes 1/2 0.5 mg tablet 2x day HOME  AS NEEDED  -- Indication: For anxiety    venlafaxine 150 mg oral capsule, extended release  -- 1 cap(s) by mouth once a day HOME/  -- Indication: For anxiety/depression    simvastatin 20 mg oral tablet  -- 1 tab(s) by mouth once a day (at bedtime)  HOME/HOSP  -- Indication: For cholesterol    Abilify 2 mg oral tablet  -- 1 tab(s) by mouth once a day HOME/HOSP  -- Indication: For depression    Sonata 5 mg oral capsule  -- 1 cap(s) by mouth once a day (at bedtime), As Needed, Insomnia MDD:1 caps  -- Indication: For insomnia    metoprolol succinate 50 mg oral tablet, extended release  -- 1 tab(s) by mouth once a day  -- Indication: For Left heart failure    Spiriva 18 mcg inhalation capsule  -- 1 cap(s) inhaled once a day HOSP  -- Indication: For COPD    ProAir HFA CFC free 90 mcg/inh inhalation aerosol  -- 2 puff(s) inhaled , As Needed for sob  -- Indication: For COPD    furosemide 40 mg oral tablet  -- 1 tab(s) by mouth 2 times a day HOSP  -- Indication: For CHF    senna oral tablet  -- 2 tab(s) by mouth once a day (at bedtime), As needed, Constipation HOSP  -- Indication: For constipation    docusate sodium 100 mg oral capsule  -- 1 cap(s) by mouth 3 times a day HOSP  -- Indication: For constipation    Ocean  -- 1 spray(s) into nose 4 times a day, As Needed  HOSP  -- Indication: For nasay stuffiness    ocular lubricant ophthalmic solution  -- 1 drop(s) to each affected eye 4 times a day, As needed, Dry Eyes  -- Indication: For dry eye    lactobacillus acidophilus oral capsule  -- 1 cap(s) by mouth 3 times a day HOSP  -- Indication: For Supplement    pantoprazole 40 mg oral delayed release tablet  -- 1 tab(s) by mouth once a day (before a meal)  HOSP  -- Indication: For acid reflux

## 2017-05-01 ENCOUNTER — APPOINTMENT (OUTPATIENT)
Dept: RHEUMATOLOGY | Facility: CLINIC | Age: 77
End: 2017-05-01

## 2017-05-01 VITALS
WEIGHT: 138 LBS | BODY MASS INDEX: 24.45 KG/M2 | DIASTOLIC BLOOD PRESSURE: 78 MMHG | HEIGHT: 63 IN | SYSTOLIC BLOOD PRESSURE: 118 MMHG

## 2017-05-19 ENCOUNTER — LABORATORY RESULT (OUTPATIENT)
Age: 77
End: 2017-05-19

## 2017-05-22 LAB
ALBUMIN MFR SERPL ELPH: 57.2 %
ALBUMIN SERPL ELPH-MCNC: 4.4 G/DL
ALBUMIN SERPL-MCNC: 4.1 G/DL
ALBUMIN/GLOB SERPL: 1.3 RATIO
ALP BLD-CCNC: 78 U/L
ALPHA1 GLOB MFR SERPL ELPH: 4.1 %
ALPHA1 GLOB SERPL ELPH-MCNC: 0.3 G/DL
ALPHA2 GLOB MFR SERPL ELPH: 11.9 %
ALPHA2 GLOB SERPL ELPH-MCNC: 0.9 G/DL
ALT SERPL-CCNC: 31 U/L
ANA PAT FLD IF-IMP: ABNORMAL
ANA SER IF-ACNC: ABNORMAL
ANION GAP SERPL CALC-SCNC: 16 MMOL/L
AST SERPL-CCNC: 25 U/L
B-GLOBULIN MFR SERPL ELPH: 12.7 %
B-GLOBULIN SERPL ELPH-MCNC: 0.9 G/DL
BILIRUB SERPL-MCNC: 0.2 MG/DL
BUN SERPL-MCNC: 10 MG/DL
CALCIUM SERPL-MCNC: 9.5 MG/DL
CARDIOLIPIN IGM SER-MCNC: 5 MPL
CARDIOLIPIN IGM SER-MCNC: 8.2 GPL
CCP AB SER IA-ACNC: <8 UNITS
CHLORIDE SERPL-SCNC: 103 MMOL/L
CK SERPL-CCNC: 186 U/L
CO2 SERPL-SCNC: 24 MMOL/L
CREAT SERPL-MCNC: 0.73 MG/DL
CREAT SPEC-SCNC: 28 MG/DL
CREAT/PROT UR: 0.3 RATIO
CRP SERPL-MCNC: 0.5 MG/DL
ENA RNP AB SER IA-ACNC: 0.2 AL
ENA SM AB SER IA-ACNC: <0.2 AL
ENA SS-A AB SER IA-ACNC: <0.2 AL
ENA SS-B AB SER IA-ACNC: <0.2 AL
ERYTHROCYTE [SEDIMENTATION RATE] IN BLOOD BY WESTERGREN METHOD: 26 MM/HR
GAMMA GLOB FLD ELPH-MCNC: 1 G/DL
GAMMA GLOB MFR SERPL ELPH: 14.1 %
GLUCOSE SERPL-MCNC: 165 MG/DL
INTERPRETATION SERPL IEP-IMP: NORMAL
POTASSIUM SERPL-SCNC: 4.2 MMOL/L
PROT SERPL-MCNC: 7.2 G/DL
PROT UR-MCNC: 9 MG/DL
RF+CCP IGG SER-IMP: NEGATIVE
RHEUMATOID FACT SER QL: <7 IU/ML
SODIUM SERPL-SCNC: 143 MMOL/L
THYROGLOB AB SERPL-ACNC: <20 IU/ML
THYROPEROXIDASE AB SERPL IA-ACNC: <10 IU/ML

## 2017-05-23 LAB — DSDNA AB SER-ACNC: 34 IU/ML

## 2017-09-06 ENCOUNTER — APPOINTMENT (OUTPATIENT)
Dept: RHEUMATOLOGY | Facility: CLINIC | Age: 77
End: 2017-09-06
Payer: MEDICARE

## 2017-09-06 VITALS
OXYGEN SATURATION: 97 % | BODY MASS INDEX: 24.63 KG/M2 | HEART RATE: 92 BPM | WEIGHT: 139 LBS | DIASTOLIC BLOOD PRESSURE: 71 MMHG | SYSTOLIC BLOOD PRESSURE: 136 MMHG | HEIGHT: 63 IN

## 2017-09-06 DIAGNOSIS — M35.3 POLYMYALGIA RHEUMATICA: ICD-10-CM

## 2017-09-06 PROCEDURE — 99214 OFFICE O/P EST MOD 30 MIN: CPT

## 2017-09-06 RX ORDER — CLINDAMYCIN HYDROCHLORIDE 150 MG/1
150 CAPSULE ORAL
Qty: 12 | Refills: 0 | Status: DISCONTINUED | COMMUNITY
Start: 2017-07-22 | End: 2017-09-06

## 2017-09-06 RX ORDER — SPIRONOLACTONE 25 MG/1
25 TABLET ORAL
Qty: 30 | Refills: 0 | Status: ACTIVE | COMMUNITY
Start: 2017-06-06

## 2017-09-06 RX ORDER — METRONIDAZOLE 500 MG/1
500 TABLET ORAL
Qty: 30 | Refills: 0 | Status: DISCONTINUED | COMMUNITY
Start: 2017-05-08 | End: 2017-09-06

## 2017-09-06 RX ORDER — VENLAFAXINE HYDROCHLORIDE 150 MG/1
150 CAPSULE, EXTENDED RELEASE ORAL
Qty: 30 | Refills: 0 | Status: ACTIVE | COMMUNITY
Start: 2017-05-04

## 2017-09-06 RX ORDER — LOSARTAN POTASSIUM 25 MG/1
25 TABLET, FILM COATED ORAL
Qty: 30 | Refills: 0 | Status: ACTIVE | COMMUNITY
Start: 2017-04-21

## 2017-09-06 RX ORDER — CIPROFLOXACIN HYDROCHLORIDE 500 MG/1
500 TABLET, FILM COATED ORAL
Qty: 14 | Refills: 0 | Status: DISCONTINUED | COMMUNITY
Start: 2017-05-08 | End: 2017-09-06

## 2017-09-06 RX ORDER — BUDESONIDE AND FORMOTEROL FUMARATE DIHYDRATE 160; 4.5 UG/1; UG/1
160-4.5 AEROSOL RESPIRATORY (INHALATION)
Qty: 10 | Refills: 0 | Status: ACTIVE | COMMUNITY
Start: 2016-11-15

## 2017-09-06 RX ORDER — TIOTROPIUM BROMIDE 18 UG/1
18 CAPSULE ORAL; RESPIRATORY (INHALATION)
Qty: 30 | Refills: 0 | Status: ACTIVE | COMMUNITY
Start: 2016-11-15

## 2017-09-06 RX ORDER — PREDNISONE 5 MG/1
5 TABLET ORAL
Qty: 30 | Refills: 0 | Status: DISCONTINUED | COMMUNITY
Start: 2017-05-23 | End: 2017-09-06

## 2017-09-06 RX ORDER — METFORMIN HYDROCHLORIDE 500 MG/1
500 TABLET, COATED ORAL
Qty: 30 | Refills: 0 | Status: ACTIVE | COMMUNITY
Start: 2017-05-01 | End: 1900-01-01

## 2017-09-06 RX ORDER — PREDNISONE 10 MG/1
10 TABLET ORAL
Qty: 20 | Refills: 0 | Status: DISCONTINUED | COMMUNITY
Start: 2017-07-28 | End: 2017-09-06

## 2017-09-06 RX ORDER — OMEPRAZOLE 20 MG/1
20 CAPSULE, DELAYED RELEASE ORAL
Qty: 60 | Refills: 0 | Status: ACTIVE | COMMUNITY
Start: 2017-01-20

## 2017-09-06 RX ORDER — FUROSEMIDE 40 MG/1
40 TABLET ORAL
Qty: 60 | Refills: 0 | Status: ACTIVE | COMMUNITY
Start: 2017-04-21

## 2017-09-06 RX ORDER — AZITHROMYCIN 250 MG/1
250 TABLET, FILM COATED ORAL
Qty: 6 | Refills: 0 | Status: DISCONTINUED | COMMUNITY
Start: 2017-06-09 | End: 2017-09-06

## 2017-09-06 RX ORDER — SENNOSIDES 8.6 MG
8.6 TABLET ORAL
Qty: 60 | Refills: 0 | Status: ACTIVE | COMMUNITY
Start: 2017-04-21

## 2017-09-06 RX ORDER — CLOTRIMAZOLE 10 MG/1
10 LOZENGE ORAL
Qty: 60 | Refills: 0 | Status: DISCONTINUED | COMMUNITY
Start: 2017-06-09 | End: 2017-09-06

## 2017-09-06 RX ORDER — LATANOPROST/PF 0.005 %
0.01 DROPS OPHTHALMIC (EYE)
Qty: 8 | Refills: 0 | Status: ACTIVE | COMMUNITY
Start: 2017-08-09

## 2017-09-07 PROBLEM — M35.3 POLYMYALGIA RHEUMATICA: Status: ACTIVE | Noted: 2017-05-01

## 2017-12-04 ENCOUNTER — EMERGENCY (EMERGENCY)
Facility: HOSPITAL | Age: 77
LOS: 1 days | Discharge: HOME | End: 2017-12-04
Attending: EMERGENCY MEDICINE | Admitting: EMERGENCY MEDICINE
Payer: MEDICARE

## 2017-12-04 VITALS
HEART RATE: 92 BPM | SYSTOLIC BLOOD PRESSURE: 151 MMHG | TEMPERATURE: 99 F | OXYGEN SATURATION: 98 % | DIASTOLIC BLOOD PRESSURE: 72 MMHG | RESPIRATION RATE: 16 BRPM

## 2017-12-04 VITALS
OXYGEN SATURATION: 98 % | DIASTOLIC BLOOD PRESSURE: 83 MMHG | TEMPERATURE: 98 F | HEART RATE: 88 BPM | RESPIRATION RATE: 16 BRPM | SYSTOLIC BLOOD PRESSURE: 151 MMHG

## 2017-12-04 DIAGNOSIS — Z98.89 OTHER SPECIFIED POSTPROCEDURAL STATES: Chronic | ICD-10-CM

## 2017-12-04 DIAGNOSIS — Z95.2 PRESENCE OF PROSTHETIC HEART VALVE: Chronic | ICD-10-CM

## 2017-12-04 DIAGNOSIS — Z98.890 OTHER SPECIFIED POSTPROCEDURAL STATES: Chronic | ICD-10-CM

## 2017-12-04 DIAGNOSIS — K44.9 DIAPHRAGMATIC HERNIA WITHOUT OBSTRUCTION OR GANGRENE: Chronic | ICD-10-CM

## 2017-12-04 DIAGNOSIS — I73.9 PERIPHERAL VASCULAR DISEASE, UNSPECIFIED: Chronic | ICD-10-CM

## 2017-12-04 PROCEDURE — 99283 EMERGENCY DEPT VISIT LOW MDM: CPT | Mod: 25

## 2017-12-04 PROCEDURE — 93005 ELECTROCARDIOGRAM TRACING: CPT

## 2017-12-04 NOTE — ED ADULT TRIAGE NOTE - CHIEF COMPLAINT QUOTE
patient c/o high blood pressure at home, took home meds at around 6:30 pm and now improved. C/O lightheadedness at time she checked pressure

## 2017-12-04 NOTE — ED PROVIDER NOTE - PROGRESS NOTE DETAILS
pt and daughter declining iv/labs/treatment in er, requesting to be d/c home to f.u as outpt with pmd

## 2017-12-04 NOTE — ED ADULT NURSE NOTE - CHPI ED SYMPTOMS NEG
no fever/no vomiting/no syncope/no shortness of breath/no chest pain/no nausea/no chills/no back pain/no diaphoresis/no cough/no dizziness

## 2017-12-04 NOTE — ED ADULT NURSE NOTE - OBJECTIVE STATEMENT
77 year old female presents to the ED with c/i hypertension. A+O x 4. Daughter at the bedside. States she had a steroid shot in her back on friday ( 4 days ago.) and now she is hypertensive. denies headache, CP, sob, back pain, or palpitations. Pt slightly hypertensive. EKG completed upon admission.

## 2017-12-04 NOTE — ED PROVIDER NOTE - OBJECTIVE STATEMENT
pt c/o feeling dizzy/lightheaded today at home and at work and noting elevated BP this evening. no fevers, chills, ha, cp, palp, sob, abd pain, weakness, numbness, cough, n/v. pt now asymptomatic. patient declining any labs or testing or treatment in er, requesting to be d/c home now to f/u as outpt.  pmd - digna becerril

## 2017-12-04 NOTE — ED PROVIDER NOTE - PMH
Anxiety    Borderline diabetes  no meds  Breast cancer  right s/p lumpectomy and radiation 2008  COPD (chronic obstructive pulmonary disease)  diagnosed 2009  inhaler and nebulizer  Depression    Diverticulitis  hospitalized 2013  Hiatal hernia  s/p surgical repair 2005  HTN (hypertension)    Hyperlipidemia    LBBB (left bundle branch block)    SIMÓN (obstructive sleep apnea)  category I severe pt does not use c/pap  PVD (peripheral vascular disease)  left iliac  s/p surgical intervention 7-2013  unsuccessful  RA (rheumatoid arthritis)  diagnosed 2012  oxycodone prn  neck/ lower back  Thrush  treated x 4 days  1 month ago  restarted medication  3-19-14 clortramazole 5x day  TIA (transient ischemic attack)  2010  TMJ (temporomandibular joint disorder)    Valvular heart disease  aortic and mitral

## 2017-12-04 NOTE — ED PROVIDER NOTE - CHPI ED SYMPTOMS NEG
no fever/no vomiting/no shortness of breath/no chest pain/no nausea/no chills/no diaphoresis/no cough/no syncope

## 2017-12-04 NOTE — ED PROVIDER NOTE - PSH
H/O sinus surgery    Hiatal hernia  s/p surgical repair per pt   PVD (peripheral vascular disease)  s/p left iliac bypass which was unsuccessful  open repair  S/P appendectomy    S/P AVR (aortic valve replacement)    S/P carpal tunnel release  right 2002  S/P  section  x2 /   S/P hernia surgery  left inguinal age 12  S/P lumpectomy, right breast    S/P rotator cuff surgery  left 2004

## 2018-03-23 ENCOUNTER — APPOINTMENT (OUTPATIENT)
Dept: RHEUMATOLOGY | Facility: CLINIC | Age: 78
End: 2018-03-23
Payer: MEDICARE

## 2018-03-23 VITALS
BODY MASS INDEX: 23.74 KG/M2 | HEART RATE: 96 BPM | WEIGHT: 134 LBS | SYSTOLIC BLOOD PRESSURE: 143 MMHG | HEIGHT: 63 IN | DIASTOLIC BLOOD PRESSURE: 84 MMHG | OXYGEN SATURATION: 97 %

## 2018-03-23 PROCEDURE — 99214 OFFICE O/P EST MOD 30 MIN: CPT

## 2018-03-23 RX ORDER — HYDROCODONE BITARTRATE AND ACETAMINOPHEN 10; 325 MG/1; MG/1
10-325 TABLET ORAL
Qty: 90 | Refills: 0 | Status: DISCONTINUED | COMMUNITY
Start: 2017-07-12 | End: 2018-03-23

## 2018-03-23 RX ORDER — CLOBETASOL PROPIONATE 0.5 MG/ML
0.05 SOLUTION TOPICAL
Qty: 50 | Refills: 0 | Status: DISCONTINUED | COMMUNITY
Start: 2017-10-18 | End: 2018-03-23

## 2018-03-23 RX ORDER — GABAPENTIN 300 MG/1
300 CAPSULE ORAL
Qty: 60 | Refills: 0 | Status: DISCONTINUED | COMMUNITY
Start: 2017-10-30 | End: 2018-03-23

## 2018-03-23 RX ORDER — ARIPIPRAZOLE 10 MG/1
10 TABLET ORAL
Qty: 30 | Refills: 0 | Status: DISCONTINUED | COMMUNITY
Start: 2017-09-28 | End: 2018-03-23

## 2018-03-23 RX ORDER — CLARITHROMYCIN 500 MG/1
500 TABLET, FILM COATED ORAL
Qty: 20 | Refills: 0 | Status: DISCONTINUED | COMMUNITY
Start: 2018-03-08 | End: 2018-03-23

## 2018-03-23 RX ORDER — L. ACIDOPHILUS/PECTIN, CITRUS 25MM-100MG
TABLET ORAL
Qty: 90 | Refills: 0 | Status: DISCONTINUED | COMMUNITY
Start: 2017-04-21 | End: 2018-03-23

## 2018-03-23 RX ORDER — CHLORHEXIDINE GLUCONATE, 0.12% ORAL RINSE 1.2 MG/ML
0.12 SOLUTION DENTAL
Qty: 473 | Refills: 0 | Status: DISCONTINUED | COMMUNITY
Start: 2017-08-02 | End: 2018-03-23

## 2018-03-23 RX ORDER — BETAMETHASONE DIPROPIONATE 0.5 MG/G
0.05 CREAM TOPICAL
Qty: 45 | Refills: 0 | Status: DISCONTINUED | COMMUNITY
Start: 2016-10-04 | End: 2018-03-23

## 2018-03-23 RX ORDER — OXYCODONE AND ACETAMINOPHEN 10; 325 MG/1; MG/1
10-325 TABLET ORAL
Qty: 120 | Refills: 0 | Status: DISCONTINUED | COMMUNITY
Start: 2017-05-31 | End: 2018-03-23

## 2018-04-25 LAB — CK SERPL-CCNC: 245 U/L

## 2018-04-27 ENCOUNTER — APPOINTMENT (OUTPATIENT)
Dept: RHEUMATOLOGY | Facility: CLINIC | Age: 78
End: 2018-04-27
Payer: MEDICARE

## 2018-04-27 VITALS
HEIGHT: 63 IN | DIASTOLIC BLOOD PRESSURE: 77 MMHG | SYSTOLIC BLOOD PRESSURE: 153 MMHG | BODY MASS INDEX: 23.74 KG/M2 | WEIGHT: 134 LBS | HEART RATE: 91 BPM | OXYGEN SATURATION: 93 %

## 2018-04-27 DIAGNOSIS — M25.50 PAIN IN UNSPECIFIED JOINT: ICD-10-CM

## 2018-04-27 PROCEDURE — 99214 OFFICE O/P EST MOD 30 MIN: CPT

## 2018-06-07 ENCOUNTER — OUTPATIENT (OUTPATIENT)
Dept: OUTPATIENT SERVICES | Facility: HOSPITAL | Age: 78
LOS: 1 days | Discharge: HOME | End: 2018-06-07
Payer: MEDICARE

## 2018-06-07 VITALS
HEART RATE: 98 BPM | DIASTOLIC BLOOD PRESSURE: 73 MMHG | RESPIRATION RATE: 18 BRPM | OXYGEN SATURATION: 98 % | TEMPERATURE: 98 F | HEIGHT: 62.5 IN | WEIGHT: 134.26 LBS | SYSTOLIC BLOOD PRESSURE: 133 MMHG

## 2018-06-07 DIAGNOSIS — Z98.89 OTHER SPECIFIED POSTPROCEDURAL STATES: Chronic | ICD-10-CM

## 2018-06-07 DIAGNOSIS — Z98.890 OTHER SPECIFIED POSTPROCEDURAL STATES: Chronic | ICD-10-CM

## 2018-06-07 DIAGNOSIS — Z95.2 PRESENCE OF PROSTHETIC HEART VALVE: Chronic | ICD-10-CM

## 2018-06-07 DIAGNOSIS — K44.9 DIAPHRAGMATIC HERNIA WITHOUT OBSTRUCTION OR GANGRENE: Chronic | ICD-10-CM

## 2018-06-07 DIAGNOSIS — I73.9 PERIPHERAL VASCULAR DISEASE, UNSPECIFIED: Chronic | ICD-10-CM

## 2018-06-07 DIAGNOSIS — M54.12 RADICULOPATHY, CERVICAL REGION: ICD-10-CM

## 2018-06-07 DIAGNOSIS — Z01.818 ENCOUNTER FOR OTHER PREPROCEDURAL EXAMINATION: ICD-10-CM

## 2018-06-07 LAB
ANION GAP SERPL CALC-SCNC: 5 MMOL/L — SIGNIFICANT CHANGE UP (ref 5–17)
APTT BLD: 30.1 SEC — SIGNIFICANT CHANGE UP (ref 27.5–37.4)
BASOPHILS # BLD AUTO: 0.04 K/UL — SIGNIFICANT CHANGE UP (ref 0–0.2)
BASOPHILS NFR BLD AUTO: 0.4 % — SIGNIFICANT CHANGE UP (ref 0–2)
BLD GP AB SCN SERPL QL: SIGNIFICANT CHANGE UP
BUN SERPL-MCNC: 20 MG/DL — SIGNIFICANT CHANGE UP (ref 7–23)
CALCIUM SERPL-MCNC: 8.9 MG/DL — SIGNIFICANT CHANGE UP (ref 8.5–10.1)
CHLORIDE SERPL-SCNC: 95 MMOL/L — LOW (ref 96–108)
CO2 SERPL-SCNC: 30 MMOL/L — SIGNIFICANT CHANGE UP (ref 22–31)
CREAT SERPL-MCNC: 0.7 MG/DL — SIGNIFICANT CHANGE UP (ref 0.5–1.3)
EOSINOPHIL # BLD AUTO: 0.11 K/UL — SIGNIFICANT CHANGE UP (ref 0–0.5)
EOSINOPHIL NFR BLD AUTO: 1.2 % — SIGNIFICANT CHANGE UP (ref 0–6)
GLUCOSE SERPL-MCNC: 106 MG/DL — HIGH (ref 70–99)
HCT VFR BLD CALC: 41.6 % — SIGNIFICANT CHANGE UP (ref 34.5–45)
HGB BLD-MCNC: 14.1 G/DL — SIGNIFICANT CHANGE UP (ref 11.5–15.5)
IMM GRANULOCYTES NFR BLD AUTO: 0.7 % — SIGNIFICANT CHANGE UP (ref 0–1.5)
INR BLD: 1.04 RATIO — SIGNIFICANT CHANGE UP (ref 0.88–1.16)
LYMPHOCYTES # BLD AUTO: 1.52 K/UL — SIGNIFICANT CHANGE UP (ref 1–3.3)
LYMPHOCYTES # BLD AUTO: 16.8 % — SIGNIFICANT CHANGE UP (ref 13–44)
MCHC RBC-ENTMCNC: 32.6 PG — SIGNIFICANT CHANGE UP (ref 27–34)
MCHC RBC-ENTMCNC: 33.9 GM/DL — SIGNIFICANT CHANGE UP (ref 32–36)
MCV RBC AUTO: 96.3 FL — SIGNIFICANT CHANGE UP (ref 80–100)
MONOCYTES # BLD AUTO: 1.03 K/UL — HIGH (ref 0–0.9)
MONOCYTES NFR BLD AUTO: 11.4 % — SIGNIFICANT CHANGE UP (ref 2–14)
MRSA PCR RESULT.: SIGNIFICANT CHANGE UP
NEUTROPHILS # BLD AUTO: 6.28 K/UL — SIGNIFICANT CHANGE UP (ref 1.8–7.4)
NEUTROPHILS NFR BLD AUTO: 69.5 % — SIGNIFICANT CHANGE UP (ref 43–77)
NRBC # BLD: 0 /100 WBCS — SIGNIFICANT CHANGE UP (ref 0–0)
PLATELET # BLD AUTO: 221 K/UL — SIGNIFICANT CHANGE UP (ref 150–400)
POTASSIUM SERPL-MCNC: 3.9 MMOL/L — SIGNIFICANT CHANGE UP (ref 3.5–5.3)
POTASSIUM SERPL-SCNC: 3.9 MMOL/L — SIGNIFICANT CHANGE UP (ref 3.5–5.3)
PROTHROM AB SERPL-ACNC: 11.2 SEC — SIGNIFICANT CHANGE UP (ref 9.8–12.7)
RBC # BLD: 4.32 M/UL — SIGNIFICANT CHANGE UP (ref 3.8–5.2)
RBC # FLD: 13.9 % — SIGNIFICANT CHANGE UP (ref 10.3–14.5)
S AUREUS DNA NOSE QL NAA+PROBE: SIGNIFICANT CHANGE UP
SODIUM SERPL-SCNC: 130 MMOL/L — LOW (ref 135–145)
TYPE + AB SCN PNL BLD: SIGNIFICANT CHANGE UP
WBC # BLD: 9.04 K/UL — SIGNIFICANT CHANGE UP (ref 3.8–10.5)
WBC # FLD AUTO: 9.04 K/UL — SIGNIFICANT CHANGE UP (ref 3.8–10.5)

## 2018-06-07 PROCEDURE — 71046 X-RAY EXAM CHEST 2 VIEWS: CPT | Mod: 26

## 2018-06-07 PROCEDURE — 93010 ELECTROCARDIOGRAM REPORT: CPT

## 2018-06-07 RX ORDER — SODIUM CHLORIDE 0.65 %
1 AEROSOL, SPRAY (ML) NASAL
Qty: 0 | Refills: 0 | COMMUNITY

## 2018-06-07 NOTE — H&P PST ADULT - ASSESSMENT
This is a 78 y/o female with spinal stenosis who is scheduled for an insertion of a spinal cord stimulator     Patient instructed on     1. NPO post midnight of surgery  2. On the use of EZ sponges  3. Mupirocin use  4. Aware that she needs medical clearance  5. May take Losartan, Effexor, Verapamil  with a sip of water on day of procedure This is a 78 y/o female with spinal stenosis who is scheduled for an insertion of a spinal cord stimulator     Patient instructed on     1. NPO post midnight of surgery  2. On the use of EZ sponges  3. Mupirocin use  4. Aware that she needs medical clearance (has an appointment with Dr. Benoit later on today (6/7/18)  5. May take Losartan, Effexor, Verapamil, Spiriva and Symbicort with a sip of water on day of procedure

## 2018-06-07 NOTE — H&P PST ADULT - HISTORY OF PRESENT ILLNESS
This is a 78 y/o female with a significant PMH (CHF, HTN, HLD, aorta valve replacement, COPD, emphysema depression, and anxiety who reports lower back pain for "many years." Denies any injury to back. Pain radiates to B/L LEs with Right worse than Left. Pain is described as a stabbing pain and is 10/10 at its worst. She takes Percocet for pain relief and it is not helping her pain relief like it used to. She is scheduled for a spinal cord stimulator.

## 2018-06-07 NOTE — H&P PST ADULT - PMH
Anxiety    Borderline diabetes  no meds  Breast cancer  right s/p lumpectomy and radiation 2008  CHF (congestive heart failure)    COPD (chronic obstructive pulmonary disease)  diagnosed 2009  inhaler and nebulizer  Depression    Diverticulitis  hospitalized 2013  Hiatal hernia  s/p surgical repair 2005  HTN (hypertension)    Hyperlipidemia    LBBB (left bundle branch block)    SIMÓN (obstructive sleep apnea)  category I severe pt does not use c/pap  PVD (peripheral vascular disease)  left iliac  s/p surgical intervention 7-2013  unsuccessful  RA (rheumatoid arthritis)  diagnosed 2012  oxycodone prn  neck/ lower back  Spinal stenosis    Thrush  treated x 4 days  1 month ago  restarted medication  3-19-14 clortramazole 5x day  TIA (transient ischemic attack)  2010  TMJ (temporomandibular joint disorder)    Valvular heart disease  aortic and mitral Anxiety    Borderline diabetes  no meds  Breast cancer  right s/p lumpectomy and radiation 2008  CHF (congestive heart failure)    COPD (chronic obstructive pulmonary disease)  diagnosed 2009  inhaler and nebulizer  Depression    Diverticulitis  hospitalized 2013  Emphysema lung    Hiatal hernia  s/p surgical repair 2005  HTN (hypertension)    Hyperlipidemia    LBBB (left bundle branch block)    SIMÓN (obstructive sleep apnea)  category I severe pt does not use c/pap  PVD (peripheral vascular disease)  left iliac  s/p surgical intervention 7-2013  unsuccessful  RA (rheumatoid arthritis)  diagnosed 2012  oxycodone prn  neck/ lower back  Spinal stenosis    Thrush  treated x 4 days  1 month ago  restarted medication  3-19-14 clortramazole 5x day  TIA (transient ischemic attack)  2010  TMJ (temporomandibular joint disorder)    Valvular heart disease  aortic and mitral

## 2018-06-11 ENCOUNTER — APPOINTMENT (OUTPATIENT)
Dept: RHEUMATOLOGY | Facility: CLINIC | Age: 78
End: 2018-06-11
Payer: MEDICARE

## 2018-06-11 VITALS
HEART RATE: 90 BPM | SYSTOLIC BLOOD PRESSURE: 160 MMHG | WEIGHT: 136 LBS | HEIGHT: 63 IN | OXYGEN SATURATION: 94 % | DIASTOLIC BLOOD PRESSURE: 80 MMHG | BODY MASS INDEX: 24.1 KG/M2

## 2018-06-11 DIAGNOSIS — R74.8 ABNORMAL LEVELS OF OTHER SERUM ENZYMES: ICD-10-CM

## 2018-06-11 PROCEDURE — 99214 OFFICE O/P EST MOD 30 MIN: CPT

## 2018-06-12 PROBLEM — R74.8 ELEVATED CPK: Status: ACTIVE | Noted: 2018-04-27

## 2018-07-03 ENCOUNTER — OUTPATIENT (OUTPATIENT)
Dept: OUTPATIENT SERVICES | Facility: HOSPITAL | Age: 78
LOS: 1 days | End: 2018-07-03
Payer: MEDICARE

## 2018-07-03 ENCOUNTER — APPOINTMENT (OUTPATIENT)
Dept: CV DIAGNOSITCS | Facility: HOSPITAL | Age: 78
End: 2018-07-03

## 2018-07-03 DIAGNOSIS — Z98.89 OTHER SPECIFIED POSTPROCEDURAL STATES: Chronic | ICD-10-CM

## 2018-07-03 DIAGNOSIS — I25.10 ATHEROSCLEROTIC HEART DISEASE OF NATIVE CORONARY ARTERY WITHOUT ANGINA PECTORIS: ICD-10-CM

## 2018-07-03 DIAGNOSIS — Z95.2 PRESENCE OF PROSTHETIC HEART VALVE: Chronic | ICD-10-CM

## 2018-07-03 DIAGNOSIS — K44.9 DIAPHRAGMATIC HERNIA WITHOUT OBSTRUCTION OR GANGRENE: Chronic | ICD-10-CM

## 2018-07-03 DIAGNOSIS — I73.9 PERIPHERAL VASCULAR DISEASE, UNSPECIFIED: Chronic | ICD-10-CM

## 2018-07-03 DIAGNOSIS — Z98.890 OTHER SPECIFIED POSTPROCEDURAL STATES: Chronic | ICD-10-CM

## 2018-07-03 PROCEDURE — 76376 3D RENDER W/INTRP POSTPROCES: CPT

## 2018-07-03 PROCEDURE — 93306 TTE W/DOPPLER COMPLETE: CPT | Mod: 26

## 2018-07-03 PROCEDURE — 93306 TTE W/DOPPLER COMPLETE: CPT

## 2018-07-03 PROCEDURE — 93312 ECHO TRANSESOPHAGEAL: CPT | Mod: 26

## 2018-07-03 PROCEDURE — 93312 ECHO TRANSESOPHAGEAL: CPT

## 2018-07-03 PROCEDURE — 76376 3D RENDER W/INTRP POSTPROCES: CPT | Mod: 26

## 2018-07-17 ENCOUNTER — OUTPATIENT (OUTPATIENT)
Dept: OUTPATIENT SERVICES | Facility: HOSPITAL | Age: 78
LOS: 1 days | Discharge: HOME | End: 2018-07-17

## 2018-07-17 VITALS
WEIGHT: 138.01 LBS | HEIGHT: 62.5 IN | SYSTOLIC BLOOD PRESSURE: 136 MMHG | DIASTOLIC BLOOD PRESSURE: 69 MMHG | HEART RATE: 88 BPM | TEMPERATURE: 97 F | RESPIRATION RATE: 20 BRPM | OXYGEN SATURATION: 97 %

## 2018-07-17 DIAGNOSIS — K44.9 DIAPHRAGMATIC HERNIA WITHOUT OBSTRUCTION OR GANGRENE: Chronic | ICD-10-CM

## 2018-07-17 DIAGNOSIS — Z98.890 OTHER SPECIFIED POSTPROCEDURAL STATES: Chronic | ICD-10-CM

## 2018-07-17 DIAGNOSIS — Z98.89 OTHER SPECIFIED POSTPROCEDURAL STATES: Chronic | ICD-10-CM

## 2018-07-17 DIAGNOSIS — I73.9 PERIPHERAL VASCULAR DISEASE, UNSPECIFIED: Chronic | ICD-10-CM

## 2018-07-17 DIAGNOSIS — Z95.2 PRESENCE OF PROSTHETIC HEART VALVE: Chronic | ICD-10-CM

## 2018-07-17 DIAGNOSIS — Z98.891 HISTORY OF UTERINE SCAR FROM PREVIOUS SURGERY: Chronic | ICD-10-CM

## 2018-07-17 DIAGNOSIS — M54.12 RADICULOPATHY, CERVICAL REGION: ICD-10-CM

## 2018-07-17 LAB
ANION GAP SERPL CALC-SCNC: 7 MMOL/L — SIGNIFICANT CHANGE UP (ref 5–17)
APTT BLD: 32.3 SEC — SIGNIFICANT CHANGE UP (ref 27.5–37.4)
BASOPHILS # BLD AUTO: 0.06 K/UL — SIGNIFICANT CHANGE UP (ref 0–0.2)
BASOPHILS NFR BLD AUTO: 0.9 % — SIGNIFICANT CHANGE UP (ref 0–2)
BLD GP AB SCN SERPL QL: SIGNIFICANT CHANGE UP
BUN SERPL-MCNC: 12 MG/DL — SIGNIFICANT CHANGE UP (ref 7–23)
CALCIUM SERPL-MCNC: 8.6 MG/DL — SIGNIFICANT CHANGE UP (ref 8.5–10.1)
CHLORIDE SERPL-SCNC: 99 MMOL/L — SIGNIFICANT CHANGE UP (ref 96–108)
CO2 SERPL-SCNC: 29 MMOL/L — SIGNIFICANT CHANGE UP (ref 22–31)
CREAT SERPL-MCNC: 0.7 MG/DL — SIGNIFICANT CHANGE UP (ref 0.5–1.3)
EOSINOPHIL # BLD AUTO: 0.24 K/UL — SIGNIFICANT CHANGE UP (ref 0–0.5)
EOSINOPHIL NFR BLD AUTO: 3.8 % — SIGNIFICANT CHANGE UP (ref 0–6)
GLUCOSE SERPL-MCNC: 131 MG/DL — HIGH (ref 70–99)
HCT VFR BLD CALC: 40.1 % — SIGNIFICANT CHANGE UP (ref 34.5–45)
HGB BLD-MCNC: 13.5 G/DL — SIGNIFICANT CHANGE UP (ref 11.5–15.5)
IMM GRANULOCYTES NFR BLD AUTO: 0.5 % — SIGNIFICANT CHANGE UP (ref 0–1.5)
INR BLD: 1.11 RATIO — SIGNIFICANT CHANGE UP (ref 0.88–1.16)
LYMPHOCYTES # BLD AUTO: 1.16 K/UL — SIGNIFICANT CHANGE UP (ref 1–3.3)
LYMPHOCYTES # BLD AUTO: 18.2 % — SIGNIFICANT CHANGE UP (ref 13–44)
MCHC RBC-ENTMCNC: 33.2 PG — SIGNIFICANT CHANGE UP (ref 27–34)
MCHC RBC-ENTMCNC: 33.7 GM/DL — SIGNIFICANT CHANGE UP (ref 32–36)
MCV RBC AUTO: 98.5 FL — SIGNIFICANT CHANGE UP (ref 80–100)
MONOCYTES # BLD AUTO: 0.7 K/UL — SIGNIFICANT CHANGE UP (ref 0–0.9)
MONOCYTES NFR BLD AUTO: 11 % — SIGNIFICANT CHANGE UP (ref 2–14)
MRSA PCR RESULT.: SIGNIFICANT CHANGE UP
NEUTROPHILS # BLD AUTO: 4.17 K/UL — SIGNIFICANT CHANGE UP (ref 1.8–7.4)
NEUTROPHILS NFR BLD AUTO: 65.6 % — SIGNIFICANT CHANGE UP (ref 43–77)
NRBC # BLD: 0 /100 WBCS — SIGNIFICANT CHANGE UP (ref 0–0)
PLATELET # BLD AUTO: 212 K/UL — SIGNIFICANT CHANGE UP (ref 150–400)
POTASSIUM SERPL-MCNC: 3.9 MMOL/L — SIGNIFICANT CHANGE UP (ref 3.5–5.3)
POTASSIUM SERPL-SCNC: 3.9 MMOL/L — SIGNIFICANT CHANGE UP (ref 3.5–5.3)
PROTHROM AB SERPL-ACNC: 12 SEC — SIGNIFICANT CHANGE UP (ref 9.8–12.7)
RBC # BLD: 4.07 M/UL — SIGNIFICANT CHANGE UP (ref 3.8–5.2)
RBC # FLD: 13.1 % — SIGNIFICANT CHANGE UP (ref 10.3–14.5)
S AUREUS DNA NOSE QL NAA+PROBE: SIGNIFICANT CHANGE UP
SODIUM SERPL-SCNC: 135 MMOL/L — SIGNIFICANT CHANGE UP (ref 135–145)
TYPE + AB SCN PNL BLD: SIGNIFICANT CHANGE UP
WBC # BLD: 6.36 K/UL — SIGNIFICANT CHANGE UP (ref 3.8–10.5)
WBC # FLD AUTO: 6.36 K/UL — SIGNIFICANT CHANGE UP (ref 3.8–10.5)

## 2018-07-17 RX ORDER — VENLAFAXINE HCL 75 MG
1 CAPSULE, EXT RELEASE 24 HR ORAL
Qty: 0 | Refills: 0 | COMMUNITY

## 2018-07-17 RX ORDER — METFORMIN HYDROCHLORIDE 850 MG/1
1 TABLET ORAL
Qty: 0 | Refills: 0 | COMMUNITY

## 2018-07-17 NOTE — H&P PST ADULT - PSH
H/O sinus surgery    H/O:  section  2x  Hiatal hernia  s/p surgical repair per pt   PVD (peripheral vascular disease)  s/p left iliac bypass which was unsuccessful  open repair  S/P appendectomy    S/P AVR (aortic valve replacement)  about 5 yrs ago (?)  S/P carpal tunnel release  right 2002  S/P  section  x2 1965/ 1969  S/P hernia surgery  left inguinal age 12  S/P lumpectomy, right breast    S/P rotator cuff surgery  left 2004

## 2018-07-17 NOTE — H&P PST ADULT - ASSESSMENT
76 y/o female with spinal stenosis. Complain of lower back pain for about 4 yrs now. Takes percocet with mild pain relief. Pt has difficulty walking distances due to lower back pain. Scheduled for dorsal column stimulator trial with Dr. Guerra.    Plan  1. Stop all NSAIDS, herbal supplements and vitamins for 7 days.  2. NPO at midnight.  3. Take the following medications ( verapamil, losartan, effexor, prilosec, clonazepam, and use the inhalers ) with small sips of water on the morning of your procedure/surgery.  4. Use EZ sponges as directed  5. Use mupirocin as directed

## 2018-07-17 NOTE — H&P PST ADULT - HISTORY OF PRESENT ILLNESS
78 y/o female with spinal stenosis. Complain of lower back pain for about 4 yrs now. Takes percocet with mild pain relief. Pt has difficulty walking distances due to lower back pain. Here today for PST for dorsal column stimulator trial.

## 2018-07-17 NOTE — H&P PST ADULT - FAMILY HISTORY
Father  Still living? Unknown  Family history of colon cancer in father, Age at diagnosis: Age Unknown     Aunt  Still living? Unknown  Family history of breast cancer, Age at diagnosis: Age Unknown

## 2018-07-17 NOTE — H&P PST ADULT - PMH
Alcoholism  last drink 1988  Anxiety    Aortic valve replaced  with bovine heart valve  Borderline diabetes  no meds  Breast cancer  right s/p lumpectomy and radiation 2008  CHF (congestive heart failure)    COPD (chronic obstructive pulmonary disease)  diagnosed 2009  inhaler and nebulizer  Depression    Diabetes mellitus    Diverticulitis  hospitalized 2013  Dry eyes    Emphysema lung    GERD (gastroesophageal reflux disease)    Glaucoma    Hiatal hernia  s/p surgical repair 2005  HTN (hypertension)    Hyperlipidemia    LBBB (left bundle branch block)    Meniere disease    SIMÓN (obstructive sleep apnea)  category I severe pt does not use c/pap  PVD (peripheral vascular disease)  left iliac  s/p surgical intervention 7-2013  unsuccessful  RA (rheumatoid arthritis)  diagnosed 2012  oxycodone prn  neck/ lower back  Skin cancer  not melanoma  Spinal stenosis    Thrush  treated x 4 days  1 month ago  restarted medication  3-19-14 clortramazole 5x day  TIA (transient ischemic attack)  2010  TMJ (temporomandibular joint disorder)    Valvular heart disease  aortic and mitral

## 2018-07-27 ENCOUNTER — OUTPATIENT (OUTPATIENT)
Dept: OUTPATIENT SERVICES | Facility: HOSPITAL | Age: 78
LOS: 1 days | Discharge: HOME | End: 2018-07-27

## 2018-07-27 VITALS
TEMPERATURE: 97 F | HEART RATE: 92 BPM | SYSTOLIC BLOOD PRESSURE: 116 MMHG | RESPIRATION RATE: 14 BRPM | OXYGEN SATURATION: 98 % | DIASTOLIC BLOOD PRESSURE: 91 MMHG | WEIGHT: 137.35 LBS | HEIGHT: 62 IN

## 2018-07-27 VITALS
OXYGEN SATURATION: 96 % | HEART RATE: 84 BPM | RESPIRATION RATE: 96 BRPM | SYSTOLIC BLOOD PRESSURE: 118 MMHG | DIASTOLIC BLOOD PRESSURE: 64 MMHG

## 2018-07-27 DIAGNOSIS — K44.9 DIAPHRAGMATIC HERNIA WITHOUT OBSTRUCTION OR GANGRENE: Chronic | ICD-10-CM

## 2018-07-27 DIAGNOSIS — Z98.89 OTHER SPECIFIED POSTPROCEDURAL STATES: Chronic | ICD-10-CM

## 2018-07-27 DIAGNOSIS — I73.9 PERIPHERAL VASCULAR DISEASE, UNSPECIFIED: Chronic | ICD-10-CM

## 2018-07-27 DIAGNOSIS — Z95.2 PRESENCE OF PROSTHETIC HEART VALVE: Chronic | ICD-10-CM

## 2018-07-27 DIAGNOSIS — Z98.891 HISTORY OF UTERINE SCAR FROM PREVIOUS SURGERY: Chronic | ICD-10-CM

## 2018-07-27 DIAGNOSIS — Z98.890 OTHER SPECIFIED POSTPROCEDURAL STATES: Chronic | ICD-10-CM

## 2018-07-27 RX ORDER — OXYCODONE HYDROCHLORIDE 5 MG/1
10 TABLET ORAL ONCE
Qty: 0 | Refills: 0 | Status: DISCONTINUED | OUTPATIENT
Start: 2018-07-27 | End: 2018-07-27

## 2018-07-27 RX ORDER — FAMOTIDINE 10 MG/ML
20 INJECTION INTRAVENOUS ONCE
Qty: 0 | Refills: 0 | Status: COMPLETED | OUTPATIENT
Start: 2018-07-27 | End: 2018-07-27

## 2018-07-27 RX ORDER — DIMENHYDRINATE 50 MG
2 TABLET ORAL
Qty: 0 | Refills: 0 | COMMUNITY

## 2018-07-27 RX ORDER — ACETAMINOPHEN 500 MG
975 TABLET ORAL ONCE
Qty: 0 | Refills: 0 | Status: COMPLETED | OUTPATIENT
Start: 2018-07-27 | End: 2018-07-27

## 2018-07-27 RX ADMIN — FAMOTIDINE 20 MILLIGRAM(S): 10 INJECTION INTRAVENOUS at 07:15

## 2018-07-27 RX ADMIN — Medication 975 MILLIGRAM(S): at 07:14

## 2018-07-27 RX ADMIN — OXYCODONE HYDROCHLORIDE 10 MILLIGRAM(S): 5 TABLET ORAL at 07:15

## 2018-07-27 RX ADMIN — Medication 975 MILLIGRAM(S): at 07:15

## 2018-07-27 NOTE — BRIEF OPERATIVE NOTE - PROCEDURE
<<-----Click on this checkbox to enter Procedure Insertion, stimulator, spinal cord, percutaneous  07/27/2018    Active  TAHIR

## 2018-07-27 NOTE — ASU DISCHARGE PLAN (ADULT/PEDIATRIC). - NURSING INSTRUCTIONS
Begin with liquids and light food ( tea, toast, Jello, soups). Advance to what you normally eat. Liquids should taken in adequate amounts today.Refer to multi color post op discharge instruction sheet     CALL the DOCTOR:    -Fever greater than  101F  - Signs  of infection such as : increase pain,swelling,redness,or a bad  smell coming from the wound.  -Excessive amount of bleeding.  - Any pain that appears to be getting worse.  - Vomiting  -  If you have  not urinated 8 hours after surgery or have any difficulty urinating.     A responsible adult should be with you for the rest of the day and night for your safety and to help you if you needed.    Review attached FACT SHEET if applicable. For any problems or concerns,contact your doctor. [facility] patients should call the [facility]. If you cannot reach the doctor or clinic, call [facility] Emergency Department at [phone] or go to your local Emergency Department.  A responsible adult should be with you for the rest of the day and night for your safety and to help you if you needed. Resume your medications as listed on the attached Medication Record. home care info sheet and d/c inst given

## 2018-07-27 NOTE — ASU PREOP CHECKLIST - ALLERGIES REVIEWED
done no loss of consciousness, no gait abnormality, no headache, no sensory deficits, and no weakness.

## 2018-07-31 DIAGNOSIS — I50.9 HEART FAILURE, UNSPECIFIED: ICD-10-CM

## 2018-07-31 DIAGNOSIS — F41.8 OTHER SPECIFIED ANXIETY DISORDERS: ICD-10-CM

## 2018-07-31 DIAGNOSIS — M54.16 RADICULOPATHY, LUMBAR REGION: ICD-10-CM

## 2018-07-31 DIAGNOSIS — M06.9 RHEUMATOID ARTHRITIS, UNSPECIFIED: ICD-10-CM

## 2018-07-31 DIAGNOSIS — I10 ESSENTIAL (PRIMARY) HYPERTENSION: ICD-10-CM

## 2018-07-31 DIAGNOSIS — I73.9 PERIPHERAL VASCULAR DISEASE, UNSPECIFIED: ICD-10-CM

## 2018-07-31 DIAGNOSIS — K21.9 GASTRO-ESOPHAGEAL REFLUX DISEASE WITHOUT ESOPHAGITIS: ICD-10-CM

## 2018-07-31 DIAGNOSIS — Z88.0 ALLERGY STATUS TO PENICILLIN: ICD-10-CM

## 2018-08-06 PROBLEM — E11.9 TYPE 2 DIABETES MELLITUS WITHOUT COMPLICATIONS: Chronic | Status: ACTIVE | Noted: 2018-07-17

## 2018-08-06 PROBLEM — I44.7 LEFT BUNDLE-BRANCH BLOCK, UNSPECIFIED: Chronic | Status: ACTIVE | Noted: 2017-12-04

## 2018-08-06 PROBLEM — H40.9 UNSPECIFIED GLAUCOMA: Chronic | Status: ACTIVE | Noted: 2018-07-17

## 2018-08-06 PROBLEM — J43.9 EMPHYSEMA, UNSPECIFIED: Chronic | Status: ACTIVE | Noted: 2018-06-07

## 2018-08-06 PROBLEM — I50.9 HEART FAILURE, UNSPECIFIED: Chronic | Status: ACTIVE | Noted: 2018-06-07

## 2018-08-06 PROBLEM — K21.9 GASTRO-ESOPHAGEAL REFLUX DISEASE WITHOUT ESOPHAGITIS: Chronic | Status: ACTIVE | Noted: 2018-07-17

## 2018-08-06 PROBLEM — C44.90 UNSPECIFIED MALIGNANT NEOPLASM OF SKIN, UNSPECIFIED: Chronic | Status: ACTIVE | Noted: 2018-07-17

## 2018-08-06 PROBLEM — M48.00 SPINAL STENOSIS, SITE UNSPECIFIED: Chronic | Status: ACTIVE | Noted: 2018-06-07

## 2018-08-06 PROBLEM — H04.123 DRY EYE SYNDROME OF BILATERAL LACRIMAL GLANDS: Chronic | Status: ACTIVE | Noted: 2018-07-17

## 2018-08-06 PROBLEM — F10.20 ALCOHOL DEPENDENCE, UNCOMPLICATED: Chronic | Status: ACTIVE | Noted: 2018-07-17

## 2018-08-06 PROBLEM — Z95.2 PRESENCE OF PROSTHETIC HEART VALVE: Chronic | Status: ACTIVE | Noted: 2018-07-17

## 2018-08-06 PROBLEM — H81.09 MENIERE'S DISEASE, UNSPECIFIED EAR: Chronic | Status: ACTIVE | Noted: 2018-07-17

## 2018-08-17 ENCOUNTER — OUTPATIENT (OUTPATIENT)
Dept: OUTPATIENT SERVICES | Facility: HOSPITAL | Age: 78
LOS: 1 days | Discharge: HOME | End: 2018-08-17

## 2018-08-17 VITALS
SYSTOLIC BLOOD PRESSURE: 131 MMHG | HEART RATE: 87 BPM | RESPIRATION RATE: 16 BRPM | TEMPERATURE: 98 F | OXYGEN SATURATION: 97 % | DIASTOLIC BLOOD PRESSURE: 72 MMHG

## 2018-08-17 VITALS
SYSTOLIC BLOOD PRESSURE: 129 MMHG | DIASTOLIC BLOOD PRESSURE: 74 MMHG | RESPIRATION RATE: 14 BRPM | OXYGEN SATURATION: 95 % | TEMPERATURE: 97 F | HEART RATE: 94 BPM | WEIGHT: 138.01 LBS | HEIGHT: 62 IN

## 2018-08-17 DIAGNOSIS — Z98.89 OTHER SPECIFIED POSTPROCEDURAL STATES: Chronic | ICD-10-CM

## 2018-08-17 DIAGNOSIS — Z95.2 PRESENCE OF PROSTHETIC HEART VALVE: Chronic | ICD-10-CM

## 2018-08-17 DIAGNOSIS — Z98.890 OTHER SPECIFIED POSTPROCEDURAL STATES: Chronic | ICD-10-CM

## 2018-08-17 DIAGNOSIS — K44.9 DIAPHRAGMATIC HERNIA WITHOUT OBSTRUCTION OR GANGRENE: Chronic | ICD-10-CM

## 2018-08-17 DIAGNOSIS — I73.9 PERIPHERAL VASCULAR DISEASE, UNSPECIFIED: Chronic | ICD-10-CM

## 2018-08-17 DIAGNOSIS — Z98.891 HISTORY OF UTERINE SCAR FROM PREVIOUS SURGERY: Chronic | ICD-10-CM

## 2018-08-17 RX ORDER — SODIUM CHLORIDE 9 MG/ML
1000 INJECTION, SOLUTION INTRAVENOUS
Qty: 0 | Refills: 0 | Status: DISCONTINUED | OUTPATIENT
Start: 2018-08-17 | End: 2018-08-17

## 2018-08-17 RX ORDER — ONDANSETRON 8 MG/1
4 TABLET, FILM COATED ORAL ONCE
Qty: 0 | Refills: 0 | Status: DISCONTINUED | OUTPATIENT
Start: 2018-08-17 | End: 2018-08-17

## 2018-08-17 RX ORDER — OXYCODONE HYDROCHLORIDE 5 MG/1
5 TABLET ORAL ONCE
Qty: 0 | Refills: 0 | Status: DISCONTINUED | OUTPATIENT
Start: 2018-08-17 | End: 2018-08-17

## 2018-08-17 RX ORDER — FENTANYL CITRATE 50 UG/ML
5 INJECTION INTRAVENOUS
Qty: 0 | Refills: 0 | COMMUNITY
Start: 2018-08-17

## 2018-08-17 RX ORDER — OXYCODONE HYDROCHLORIDE 5 MG/1
10 TABLET ORAL ONCE
Qty: 0 | Refills: 0 | Status: DISCONTINUED | OUTPATIENT
Start: 2018-08-17 | End: 2018-08-17

## 2018-08-17 RX ORDER — FENTANYL CITRATE 50 UG/ML
50 INJECTION INTRAVENOUS
Qty: 0 | Refills: 0 | Status: DISCONTINUED | OUTPATIENT
Start: 2018-08-17 | End: 2018-08-17

## 2018-08-17 NOTE — BRIEF OPERATIVE NOTE - PROCEDURE
<<-----Click on this checkbox to enter Procedure Insertion, spinal cord stimulator electrode leads and generator  08/17/2018  perc leads x 2  IPG with tunnelling  Active  TAHIR

## 2018-08-17 NOTE — ASU PREOP CHECKLIST - BP NONINVASIVE SYSTOLIC (MM HG)
Final Anesthesia Post-op Assessment    Patient: Martita Queen  Procedure(s) Performed:  SECTION   Anesthesia type: Spinal    Vital Last Value   Temperature 36.6 °C (97.8 °F) (17 1254)   Pulse 73 (17 1806)   Respiratory Rate 18 (17 180)   Non-Invasive   Blood Pressure 106/58 (17 180)   Arterial  Blood Pressure     Pulse Oximetry       Last 24 I/O:   Intake/Output Summary (Last 24 hours) at 17  Last data filed at 17 1246   Gross per 24 hour   Intake             1600 ml   Output              800 ml   Net              800 ml       PATIENT LOCATION: Labor and Delivery  LEVEL OF CONSCIOUSNESS: participates in exam, awake, oriented, answers questions appropriately and alert  RESPIRATORY STATUS: spontaneous ventilation and room air  CARDIOVASCULAR: blood pressure returned to baseline  HYDRATION: euvolemic    PAIN MANAGEMENT: adequately controlled  NAUSEA: None  AIRWAY PATENCY: patent  POST-OP ASSESSMENT: no complications, patient tolerated procedure well with no complications and sufficiently recovered from acute administration of anesthesia effects and able to participate in evaluation  COMPLICATIONS: none  HANDOFF:  Handoff to receiving nurse was performed and questions were answered       129

## 2018-08-17 NOTE — ASU DISCHARGE PLAN (ADULT/PEDIATRIC). - NOTIFY
Fever greater than 101/Numbness, tingling/Unable to Urinate/Bleeding that does not stop/Persistent Nausea and Vomiting

## 2018-08-23 DIAGNOSIS — E78.5 HYPERLIPIDEMIA, UNSPECIFIED: ICD-10-CM

## 2018-08-23 DIAGNOSIS — Z85.3 PERSONAL HISTORY OF MALIGNANT NEOPLASM OF BREAST: ICD-10-CM

## 2018-08-23 DIAGNOSIS — Z86.73 PERSONAL HISTORY OF TRANSIENT ISCHEMIC ATTACK (TIA), AND CEREBRAL INFARCTION WITHOUT RESIDUAL DEFICITS: ICD-10-CM

## 2018-08-23 DIAGNOSIS — I50.9 HEART FAILURE, UNSPECIFIED: ICD-10-CM

## 2018-08-23 DIAGNOSIS — F32.9 MAJOR DEPRESSIVE DISORDER, SINGLE EPISODE, UNSPECIFIED: ICD-10-CM

## 2018-08-23 DIAGNOSIS — H81.09 MENIERE'S DISEASE, UNSPECIFIED EAR: ICD-10-CM

## 2018-08-23 DIAGNOSIS — F41.9 ANXIETY DISORDER, UNSPECIFIED: ICD-10-CM

## 2018-08-23 DIAGNOSIS — M06.9 RHEUMATOID ARTHRITIS, UNSPECIFIED: ICD-10-CM

## 2018-08-23 DIAGNOSIS — E11.9 TYPE 2 DIABETES MELLITUS WITHOUT COMPLICATIONS: ICD-10-CM

## 2018-08-23 DIAGNOSIS — Z80.3 FAMILY HISTORY OF MALIGNANT NEOPLASM OF BREAST: ICD-10-CM

## 2018-08-23 DIAGNOSIS — Z79.82 LONG TERM (CURRENT) USE OF ASPIRIN: ICD-10-CM

## 2018-08-23 DIAGNOSIS — Z79.84 LONG TERM (CURRENT) USE OF ORAL HYPOGLYCEMIC DRUGS: ICD-10-CM

## 2018-08-23 DIAGNOSIS — Z95.2 PRESENCE OF PROSTHETIC HEART VALVE: ICD-10-CM

## 2018-08-23 DIAGNOSIS — K21.9 GASTRO-ESOPHAGEAL REFLUX DISEASE WITHOUT ESOPHAGITIS: ICD-10-CM

## 2018-08-23 DIAGNOSIS — Z80.0 FAMILY HISTORY OF MALIGNANT NEOPLASM OF DIGESTIVE ORGANS: ICD-10-CM

## 2018-08-23 DIAGNOSIS — I10 ESSENTIAL (PRIMARY) HYPERTENSION: ICD-10-CM

## 2018-08-23 DIAGNOSIS — G47.33 OBSTRUCTIVE SLEEP APNEA (ADULT) (PEDIATRIC): ICD-10-CM

## 2018-08-23 DIAGNOSIS — Z79.02 LONG TERM (CURRENT) USE OF ANTITHROMBOTICS/ANTIPLATELETS: ICD-10-CM

## 2018-08-23 DIAGNOSIS — Z92.3 PERSONAL HISTORY OF IRRADIATION: ICD-10-CM

## 2018-08-23 DIAGNOSIS — I73.9 PERIPHERAL VASCULAR DISEASE, UNSPECIFIED: ICD-10-CM

## 2018-08-23 DIAGNOSIS — F10.21 ALCOHOL DEPENDENCE, IN REMISSION: ICD-10-CM

## 2018-08-23 DIAGNOSIS — I08.0 RHEUMATIC DISORDERS OF BOTH MITRAL AND AORTIC VALVES: ICD-10-CM

## 2018-08-23 DIAGNOSIS — J44.9 CHRONIC OBSTRUCTIVE PULMONARY DISEASE, UNSPECIFIED: ICD-10-CM

## 2018-08-23 DIAGNOSIS — M54.16 RADICULOPATHY, LUMBAR REGION: ICD-10-CM

## 2018-08-23 DIAGNOSIS — Z88.0 ALLERGY STATUS TO PENICILLIN: ICD-10-CM

## 2018-08-23 DIAGNOSIS — H04.129 DRY EYE SYNDROME OF UNSPECIFIED LACRIMAL GLAND: ICD-10-CM

## 2018-08-23 DIAGNOSIS — Z85.820 PERSONAL HISTORY OF MALIGNANT MELANOMA OF SKIN: ICD-10-CM

## 2018-08-23 DIAGNOSIS — I44.7 LEFT BUNDLE-BRANCH BLOCK, UNSPECIFIED: ICD-10-CM

## 2018-08-23 DIAGNOSIS — I25.10 ATHEROSCLEROTIC HEART DISEASE OF NATIVE CORONARY ARTERY WITHOUT ANGINA PECTORIS: ICD-10-CM

## 2018-08-23 DIAGNOSIS — Z87.891 PERSONAL HISTORY OF NICOTINE DEPENDENCE: ICD-10-CM

## 2018-10-15 ENCOUNTER — APPOINTMENT (OUTPATIENT)
Dept: RHEUMATOLOGY | Facility: CLINIC | Age: 78
End: 2018-10-15
Payer: MEDICARE

## 2018-10-15 VITALS
HEIGHT: 63 IN | SYSTOLIC BLOOD PRESSURE: 119 MMHG | WEIGHT: 142 LBS | DIASTOLIC BLOOD PRESSURE: 72 MMHG | BODY MASS INDEX: 25.16 KG/M2 | OXYGEN SATURATION: 98 % | HEART RATE: 92 BPM

## 2018-10-15 PROCEDURE — 99214 OFFICE O/P EST MOD 30 MIN: CPT

## 2018-10-15 RX ORDER — SIMVASTATIN 40 MG/1
40 TABLET, FILM COATED ORAL
Qty: 90 | Refills: 0 | Status: DISCONTINUED | COMMUNITY
Start: 2018-03-07 | End: 2018-10-15

## 2018-10-15 RX ORDER — METHYLPREDNISOLONE 4 MG/1
4 TABLET ORAL
Qty: 21 | Refills: 0 | Status: DISCONTINUED | COMMUNITY
Start: 2018-05-14 | End: 2018-10-15

## 2018-10-15 RX ORDER — CYCLOBENZAPRINE HYDROCHLORIDE 5 MG/1
5 TABLET, FILM COATED ORAL
Qty: 30 | Refills: 0 | Status: DISCONTINUED | COMMUNITY
Start: 2018-06-04 | End: 2018-10-15

## 2018-10-15 RX ORDER — BACLOFEN 10 MG/1
10 TABLET ORAL
Qty: 40 | Refills: 0 | Status: DISCONTINUED | COMMUNITY
Start: 2018-05-29 | End: 2018-10-15

## 2018-11-28 ENCOUNTER — INPATIENT (INPATIENT)
Facility: HOSPITAL | Age: 78
LOS: 1 days | Discharge: HOME | DRG: 291 | End: 2018-11-30
Attending: INTERNAL MEDICINE | Admitting: INTERNAL MEDICINE
Payer: MEDICARE

## 2018-11-28 VITALS
HEART RATE: 120 BPM | RESPIRATION RATE: 26 BRPM | OXYGEN SATURATION: 94 % | DIASTOLIC BLOOD PRESSURE: 88 MMHG | SYSTOLIC BLOOD PRESSURE: 140 MMHG

## 2018-11-28 DIAGNOSIS — Z95.2 PRESENCE OF PROSTHETIC HEART VALVE: Chronic | ICD-10-CM

## 2018-11-28 DIAGNOSIS — I38 ENDOCARDITIS, VALVE UNSPECIFIED: ICD-10-CM

## 2018-11-28 DIAGNOSIS — E11.9 TYPE 2 DIABETES MELLITUS WITHOUT COMPLICATIONS: ICD-10-CM

## 2018-11-28 DIAGNOSIS — K44.9 DIAPHRAGMATIC HERNIA WITHOUT OBSTRUCTION OR GANGRENE: Chronic | ICD-10-CM

## 2018-11-28 DIAGNOSIS — Z98.891 HISTORY OF UTERINE SCAR FROM PREVIOUS SURGERY: Chronic | ICD-10-CM

## 2018-11-28 DIAGNOSIS — M06.9 RHEUMATOID ARTHRITIS, UNSPECIFIED: ICD-10-CM

## 2018-11-28 DIAGNOSIS — Z98.89 OTHER SPECIFIED POSTPROCEDURAL STATES: Chronic | ICD-10-CM

## 2018-11-28 DIAGNOSIS — I50.9 HEART FAILURE, UNSPECIFIED: ICD-10-CM

## 2018-11-28 DIAGNOSIS — J44.9 CHRONIC OBSTRUCTIVE PULMONARY DISEASE, UNSPECIFIED: ICD-10-CM

## 2018-11-28 DIAGNOSIS — I73.9 PERIPHERAL VASCULAR DISEASE, UNSPECIFIED: Chronic | ICD-10-CM

## 2018-11-28 DIAGNOSIS — J96.00 ACUTE RESPIRATORY FAILURE, UNSPECIFIED WHETHER WITH HYPOXIA OR HYPERCAPNIA: ICD-10-CM

## 2018-11-28 DIAGNOSIS — J81.0 ACUTE PULMONARY EDEMA: ICD-10-CM

## 2018-11-28 DIAGNOSIS — Z98.890 OTHER SPECIFIED POSTPROCEDURAL STATES: Chronic | ICD-10-CM

## 2018-11-28 DIAGNOSIS — Z29.9 ENCOUNTER FOR PROPHYLACTIC MEASURES, UNSPECIFIED: ICD-10-CM

## 2018-11-28 DIAGNOSIS — R79.89 OTHER SPECIFIED ABNORMAL FINDINGS OF BLOOD CHEMISTRY: ICD-10-CM

## 2018-11-28 DIAGNOSIS — E87.1 HYPO-OSMOLALITY AND HYPONATREMIA: ICD-10-CM

## 2018-11-28 DIAGNOSIS — F41.9 ANXIETY DISORDER, UNSPECIFIED: ICD-10-CM

## 2018-11-28 DIAGNOSIS — I10 ESSENTIAL (PRIMARY) HYPERTENSION: ICD-10-CM

## 2018-11-28 LAB
ALBUMIN SERPL ELPH-MCNC: 3.8 G/DL — SIGNIFICANT CHANGE UP (ref 3.3–5)
ALP SERPL-CCNC: 153 U/L — HIGH (ref 40–120)
ALT FLD-CCNC: 68 U/L — SIGNIFICANT CHANGE UP (ref 12–78)
ANION GAP SERPL CALC-SCNC: 10 MMOL/L — SIGNIFICANT CHANGE UP (ref 5–17)
APTT BLD: 29.2 SEC — SIGNIFICANT CHANGE UP (ref 28.5–37)
AST SERPL-CCNC: 62 U/L — HIGH (ref 15–37)
BASE EXCESS BLDA CALC-SCNC: 0.6 MMOL/L — SIGNIFICANT CHANGE UP (ref -2–2)
BASOPHILS # BLD AUTO: 0.11 K/UL — SIGNIFICANT CHANGE UP (ref 0–0.2)
BASOPHILS NFR BLD AUTO: 1 % — SIGNIFICANT CHANGE UP (ref 0–2)
BILIRUB SERPL-MCNC: 0.3 MG/DL — SIGNIFICANT CHANGE UP (ref 0.2–1.2)
BLOOD GAS COMMENTS ARTERIAL: SIGNIFICANT CHANGE UP
BUN SERPL-MCNC: 15 MG/DL — SIGNIFICANT CHANGE UP (ref 7–23)
CALCIUM SERPL-MCNC: 8.1 MG/DL — LOW (ref 8.5–10.1)
CHLORIDE SERPL-SCNC: 97 MMOL/L — SIGNIFICANT CHANGE UP (ref 96–108)
CK MB BLD-MCNC: 3 % — SIGNIFICANT CHANGE UP (ref 0–3.5)
CK MB CFR SERPL CALC: 6.2 NG/ML — HIGH (ref 0–3.6)
CK SERPL-CCNC: 206 U/L — HIGH (ref 26–192)
CK SERPL-CCNC: 246 U/L — HIGH (ref 26–192)
CO2 SERPL-SCNC: 26 MMOL/L — SIGNIFICANT CHANGE UP (ref 22–31)
CREAT SERPL-MCNC: 0.76 MG/DL — SIGNIFICANT CHANGE UP (ref 0.5–1.3)
EOSINOPHIL # BLD AUTO: 0.4 K/UL — SIGNIFICANT CHANGE UP (ref 0–0.5)
EOSINOPHIL NFR BLD AUTO: 3.5 % — SIGNIFICANT CHANGE UP (ref 0–6)
GLUCOSE SERPL-MCNC: 151 MG/DL — HIGH (ref 70–99)
HCO3 BLDA-SCNC: 25 MMOL/L — SIGNIFICANT CHANGE UP (ref 23–27)
HCT VFR BLD CALC: 39.4 % — SIGNIFICANT CHANGE UP (ref 34.5–45)
HGB BLD-MCNC: 13 G/DL — SIGNIFICANT CHANGE UP (ref 11.5–15.5)
HOROWITZ INDEX BLDA+IHG-RTO: 50 — SIGNIFICANT CHANGE UP
IMM GRANULOCYTES NFR BLD AUTO: 0.4 % — SIGNIFICANT CHANGE UP (ref 0–1.5)
INR BLD: 1.08 RATIO — SIGNIFICANT CHANGE UP (ref 0.88–1.16)
LACTATE SERPL-SCNC: 3.2 MMOL/L — HIGH (ref 0.7–2)
LACTATE SERPL-SCNC: 3.5 MMOL/L — HIGH (ref 0.7–2)
LACTATE SERPL-SCNC: 3.8 MMOL/L — HIGH (ref 0.7–2)
LYMPHOCYTES # BLD AUTO: 1.69 K/UL — SIGNIFICANT CHANGE UP (ref 1–3.3)
LYMPHOCYTES # BLD AUTO: 14.8 % — SIGNIFICANT CHANGE UP (ref 13–44)
MCHC RBC-ENTMCNC: 32.2 PG — SIGNIFICANT CHANGE UP (ref 27–34)
MCHC RBC-ENTMCNC: 33 GM/DL — SIGNIFICANT CHANGE UP (ref 32–36)
MCV RBC AUTO: 97.5 FL — SIGNIFICANT CHANGE UP (ref 80–100)
MONOCYTES # BLD AUTO: 0.86 K/UL — SIGNIFICANT CHANGE UP (ref 0–0.9)
MONOCYTES NFR BLD AUTO: 7.6 % — SIGNIFICANT CHANGE UP (ref 2–14)
NEUTROPHILS # BLD AUTO: 8.28 K/UL — HIGH (ref 1.8–7.4)
NEUTROPHILS NFR BLD AUTO: 72.7 % — SIGNIFICANT CHANGE UP (ref 43–77)
NT-PROBNP SERPL-SCNC: 879 PG/ML — HIGH (ref 0–450)
PCO2 BLDA: 45 MMHG — SIGNIFICANT CHANGE UP (ref 32–46)
PH BLDA: 7.37 — SIGNIFICANT CHANGE UP (ref 7.35–7.45)
PLATELET # BLD AUTO: 252 K/UL — SIGNIFICANT CHANGE UP (ref 150–400)
PO2 BLDA: 97 MMHG — SIGNIFICANT CHANGE UP (ref 74–108)
POTASSIUM SERPL-MCNC: 4.3 MMOL/L — SIGNIFICANT CHANGE UP (ref 3.5–5.3)
POTASSIUM SERPL-SCNC: 4.3 MMOL/L — SIGNIFICANT CHANGE UP (ref 3.5–5.3)
PROT SERPL-MCNC: 7.8 G/DL — SIGNIFICANT CHANGE UP (ref 6–8.3)
PROTHROM AB SERPL-ACNC: 12.3 SEC — SIGNIFICANT CHANGE UP (ref 10–12.9)
RBC # BLD: 4.04 M/UL — SIGNIFICANT CHANGE UP (ref 3.8–5.2)
RBC # FLD: 13.3 % — SIGNIFICANT CHANGE UP (ref 10.3–14.5)
SAO2 % BLDA: 96 % — SIGNIFICANT CHANGE UP (ref 92–96)
SODIUM SERPL-SCNC: 133 MMOL/L — LOW (ref 135–145)
TROPONIN I SERPL-MCNC: 0.11 NG/ML — HIGH (ref 0.01–0.04)
TROPONIN I SERPL-MCNC: 0.2 NG/ML — HIGH (ref 0.01–0.04)
WBC # BLD: 11.39 K/UL — HIGH (ref 3.8–10.5)
WBC # FLD AUTO: 11.39 K/UL — HIGH (ref 3.8–10.5)

## 2018-11-28 PROCEDURE — 71045 X-RAY EXAM CHEST 1 VIEW: CPT | Mod: 26

## 2018-11-28 PROCEDURE — 99291 CRITICAL CARE FIRST HOUR: CPT

## 2018-11-28 PROCEDURE — 99285 EMERGENCY DEPT VISIT HI MDM: CPT

## 2018-11-28 PROCEDURE — 93010 ELECTROCARDIOGRAM REPORT: CPT

## 2018-11-28 PROCEDURE — 99223 1ST HOSP IP/OBS HIGH 75: CPT | Mod: GC

## 2018-11-28 RX ORDER — SPIRONOLACTONE 25 MG/1
1 TABLET, FILM COATED ORAL
Qty: 0 | Refills: 0 | COMMUNITY

## 2018-11-28 RX ORDER — LOSARTAN POTASSIUM 100 MG/1
25 TABLET, FILM COATED ORAL
Qty: 0 | Refills: 0 | Status: DISCONTINUED | OUTPATIENT
Start: 2018-11-28 | End: 2018-11-30

## 2018-11-28 RX ORDER — VENLAFAXINE HCL 75 MG
150 CAPSULE, EXT RELEASE 24 HR ORAL DAILY
Qty: 0 | Refills: 0 | Status: DISCONTINUED | OUTPATIENT
Start: 2018-11-28 | End: 2018-11-30

## 2018-11-28 RX ORDER — DEXTROSE 50 % IN WATER 50 %
12.5 SYRINGE (ML) INTRAVENOUS ONCE
Qty: 0 | Refills: 0 | Status: DISCONTINUED | OUTPATIENT
Start: 2018-11-28 | End: 2018-11-30

## 2018-11-28 RX ORDER — CLOTRIMAZOLE 10 MG
1 TROCHE MUCOUS MEMBRANE EVERY 8 HOURS
Qty: 0 | Refills: 0 | Status: DISCONTINUED | OUTPATIENT
Start: 2018-11-28 | End: 2018-11-30

## 2018-11-28 RX ORDER — OXYCODONE AND ACETAMINOPHEN 5; 325 MG/1; MG/1
1 TABLET ORAL EVERY 6 HOURS
Qty: 0 | Refills: 0 | Status: DISCONTINUED | OUTPATIENT
Start: 2018-11-28 | End: 2018-11-28

## 2018-11-28 RX ORDER — CLONAZEPAM 1 MG
0.25 TABLET ORAL
Qty: 0 | Refills: 0 | Status: DISCONTINUED | OUTPATIENT
Start: 2018-11-28 | End: 2018-11-30

## 2018-11-28 RX ORDER — SODIUM CHLORIDE 0.65 %
2 AEROSOL, SPRAY (ML) NASAL
Qty: 0 | Refills: 0 | COMMUNITY

## 2018-11-28 RX ORDER — PANTOPRAZOLE SODIUM 20 MG/1
40 TABLET, DELAYED RELEASE ORAL
Qty: 0 | Refills: 0 | Status: DISCONTINUED | OUTPATIENT
Start: 2018-11-28 | End: 2018-11-30

## 2018-11-28 RX ORDER — ACETAMINOPHEN 500 MG
1000 TABLET ORAL ONCE
Qty: 0 | Refills: 0 | Status: COMPLETED | OUTPATIENT
Start: 2018-11-28 | End: 2018-11-28

## 2018-11-28 RX ORDER — ALBUTEROL 90 UG/1
2.5 AEROSOL, METERED ORAL EVERY 6 HOURS
Qty: 0 | Refills: 0 | Status: DISCONTINUED | OUTPATIENT
Start: 2018-11-28 | End: 2018-11-30

## 2018-11-28 RX ORDER — TRAMADOL HYDROCHLORIDE 50 MG/1
50 TABLET ORAL EVERY 6 HOURS
Qty: 0 | Refills: 0 | Status: DISCONTINUED | OUTPATIENT
Start: 2018-11-28 | End: 2018-11-28

## 2018-11-28 RX ORDER — ASPIRIN/CALCIUM CARB/MAGNESIUM 324 MG
81 TABLET ORAL DAILY
Qty: 0 | Refills: 0 | Status: DISCONTINUED | OUTPATIENT
Start: 2018-11-28 | End: 2018-11-30

## 2018-11-28 RX ORDER — CLOTRIMAZOLE 10 MG
1 TROCHE MUCOUS MEMBRANE EVERY 6 HOURS
Qty: 0 | Refills: 0 | Status: DISCONTINUED | OUTPATIENT
Start: 2018-11-28 | End: 2018-11-28

## 2018-11-28 RX ORDER — VENLAFAXINE HCL 75 MG
1 CAPSULE, EXT RELEASE 24 HR ORAL
Qty: 0 | Refills: 0 | COMMUNITY

## 2018-11-28 RX ORDER — INSULIN LISPRO 100/ML
VIAL (ML) SUBCUTANEOUS
Qty: 0 | Refills: 0 | Status: DISCONTINUED | OUTPATIENT
Start: 2018-11-28 | End: 2018-11-30

## 2018-11-28 RX ORDER — TIOTROPIUM BROMIDE 18 UG/1
1 CAPSULE ORAL; RESPIRATORY (INHALATION) DAILY
Qty: 0 | Refills: 0 | Status: DISCONTINUED | OUTPATIENT
Start: 2018-11-28 | End: 2018-11-30

## 2018-11-28 RX ORDER — GLUCAGON INJECTION, SOLUTION 0.5 MG/.1ML
1 INJECTION, SOLUTION SUBCUTANEOUS ONCE
Qty: 0 | Refills: 0 | Status: DISCONTINUED | OUTPATIENT
Start: 2018-11-28 | End: 2018-11-30

## 2018-11-28 RX ORDER — ACETAMINOPHEN 500 MG
650 TABLET ORAL EVERY 6 HOURS
Qty: 0 | Refills: 0 | Status: DISCONTINUED | OUTPATIENT
Start: 2018-11-28 | End: 2018-11-28

## 2018-11-28 RX ORDER — DEXTROSE 50 % IN WATER 50 %
25 SYRINGE (ML) INTRAVENOUS ONCE
Qty: 0 | Refills: 0 | Status: DISCONTINUED | OUTPATIENT
Start: 2018-11-28 | End: 2018-11-30

## 2018-11-28 RX ORDER — FUROSEMIDE 40 MG
40 TABLET ORAL ONCE
Qty: 0 | Refills: 0 | Status: COMPLETED | OUTPATIENT
Start: 2018-11-28 | End: 2018-11-28

## 2018-11-28 RX ORDER — DIMENHYDRINATE 50 MG
50 TABLET ORAL DAILY
Qty: 0 | Refills: 0 | Status: DISCONTINUED | OUTPATIENT
Start: 2018-11-28 | End: 2018-11-28

## 2018-11-28 RX ORDER — VERAPAMIL HCL 240 MG
240 CAPSULE, EXTENDED RELEASE PELLETS 24 HR ORAL DAILY
Qty: 0 | Refills: 0 | Status: DISCONTINUED | OUTPATIENT
Start: 2018-11-28 | End: 2018-11-30

## 2018-11-28 RX ORDER — SENNA PLUS 8.6 MG/1
2 TABLET ORAL AT BEDTIME
Qty: 0 | Refills: 0 | Status: DISCONTINUED | OUTPATIENT
Start: 2018-11-28 | End: 2018-11-30

## 2018-11-28 RX ORDER — DOCUSATE SODIUM 100 MG
1 CAPSULE ORAL
Qty: 0 | Refills: 0 | COMMUNITY

## 2018-11-28 RX ORDER — FUROSEMIDE 40 MG
40 TABLET ORAL
Qty: 0 | Refills: 0 | Status: DISCONTINUED | OUTPATIENT
Start: 2018-11-28 | End: 2018-11-28

## 2018-11-28 RX ORDER — ENOXAPARIN SODIUM 100 MG/ML
40 INJECTION SUBCUTANEOUS EVERY 24 HOURS
Qty: 0 | Refills: 0 | Status: DISCONTINUED | OUTPATIENT
Start: 2018-11-28 | End: 2018-11-29

## 2018-11-28 RX ORDER — CLONAZEPAM 1 MG
0.25 TABLET ORAL
Qty: 0 | Refills: 0 | Status: DISCONTINUED | OUTPATIENT
Start: 2018-11-28 | End: 2018-11-28

## 2018-11-28 RX ORDER — SPIRONOLACTONE 25 MG/1
12.5 TABLET, FILM COATED ORAL DAILY
Qty: 0 | Refills: 0 | Status: DISCONTINUED | OUTPATIENT
Start: 2018-11-28 | End: 2018-11-30

## 2018-11-28 RX ORDER — ARIPIPRAZOLE 15 MG/1
2 TABLET ORAL DAILY
Qty: 0 | Refills: 0 | Status: DISCONTINUED | OUTPATIENT
Start: 2018-11-28 | End: 2018-11-30

## 2018-11-28 RX ORDER — LORATADINE 10 MG/1
10 TABLET ORAL DAILY
Qty: 0 | Refills: 0 | Status: DISCONTINUED | OUTPATIENT
Start: 2018-11-28 | End: 2018-11-30

## 2018-11-28 RX ORDER — DEXTROSE 50 % IN WATER 50 %
15 SYRINGE (ML) INTRAVENOUS ONCE
Qty: 0 | Refills: 0 | Status: DISCONTINUED | OUTPATIENT
Start: 2018-11-28 | End: 2018-11-30

## 2018-11-28 RX ORDER — SULFASALAZINE 500 MG
500 TABLET ORAL
Qty: 0 | Refills: 0 | Status: DISCONTINUED | OUTPATIENT
Start: 2018-11-28 | End: 2018-11-28

## 2018-11-28 RX ORDER — LATANOPROST 0.05 MG/ML
1 SOLUTION/ DROPS OPHTHALMIC; TOPICAL AT BEDTIME
Qty: 0 | Refills: 0 | Status: DISCONTINUED | OUTPATIENT
Start: 2018-11-28 | End: 2018-11-30

## 2018-11-28 RX ORDER — POLYETHYLENE GLYCOL 3350 17 G/17G
17 POWDER, FOR SOLUTION ORAL DAILY
Qty: 0 | Refills: 0 | Status: DISCONTINUED | OUTPATIENT
Start: 2018-11-28 | End: 2018-11-30

## 2018-11-28 RX ORDER — LACTOBACILLUS ACIDOPHILUS 100MM CELL
1 CAPSULE ORAL
Qty: 0 | Refills: 0 | COMMUNITY

## 2018-11-28 RX ORDER — LATANOPROST 0.05 MG/ML
1 SOLUTION/ DROPS OPHTHALMIC; TOPICAL
Qty: 0 | Refills: 0 | COMMUNITY

## 2018-11-28 RX ORDER — FUROSEMIDE 40 MG
60 TABLET ORAL
Qty: 0 | Refills: 0 | Status: DISCONTINUED | OUTPATIENT
Start: 2018-11-28 | End: 2018-11-29

## 2018-11-28 RX ORDER — OXYCODONE AND ACETAMINOPHEN 5; 325 MG/1; MG/1
2 TABLET ORAL EVERY 6 HOURS
Qty: 0 | Refills: 0 | Status: DISCONTINUED | OUTPATIENT
Start: 2018-11-28 | End: 2018-11-29

## 2018-11-28 RX ORDER — SIMVASTATIN 20 MG/1
1 TABLET, FILM COATED ORAL
Qty: 0 | Refills: 0 | COMMUNITY

## 2018-11-28 RX ORDER — VERAPAMIL HCL 240 MG
240 CAPSULE, EXTENDED RELEASE PELLETS 24 HR ORAL DAILY
Qty: 0 | Refills: 0 | Status: DISCONTINUED | OUTPATIENT
Start: 2018-11-28 | End: 2018-11-28

## 2018-11-28 RX ORDER — CALCIUM POLYCARBOPHIL 625 MG/1
1 TABLET, FILM COATED ORAL
Qty: 0 | Refills: 0 | COMMUNITY

## 2018-11-28 RX ORDER — SULFASALAZINE 500 MG
500 TABLET ORAL THREE TIMES A DAY
Qty: 0 | Refills: 0 | Status: DISCONTINUED | OUTPATIENT
Start: 2018-11-28 | End: 2018-11-30

## 2018-11-28 RX ORDER — TRAMADOL HYDROCHLORIDE 50 MG/1
50 TABLET ORAL EVERY 6 HOURS
Qty: 0 | Refills: 0 | Status: DISCONTINUED | OUTPATIENT
Start: 2018-11-28 | End: 2018-11-30

## 2018-11-28 RX ORDER — BUDESONIDE AND FORMOTEROL FUMARATE DIHYDRATE 160; 4.5 UG/1; UG/1
2 AEROSOL RESPIRATORY (INHALATION)
Qty: 0 | Refills: 0 | Status: DISCONTINUED | OUTPATIENT
Start: 2018-11-28 | End: 2018-11-30

## 2018-11-28 RX ORDER — DOCUSATE SODIUM 100 MG
100 CAPSULE ORAL THREE TIMES A DAY
Qty: 0 | Refills: 0 | Status: DISCONTINUED | OUTPATIENT
Start: 2018-11-28 | End: 2018-11-30

## 2018-11-28 RX ORDER — BACLOFEN 100 %
1 POWDER (GRAM) MISCELLANEOUS
Qty: 0 | Refills: 0 | COMMUNITY

## 2018-11-28 RX ORDER — CLONAZEPAM 1 MG
0.5 TABLET ORAL
Qty: 0 | Refills: 0 | COMMUNITY

## 2018-11-28 RX ORDER — SODIUM CHLORIDE 9 MG/ML
1000 INJECTION, SOLUTION INTRAVENOUS
Qty: 0 | Refills: 0 | Status: DISCONTINUED | OUTPATIENT
Start: 2018-11-28 | End: 2018-11-30

## 2018-11-28 RX ORDER — SPIRONOLACTONE 25 MG/1
0.5 TABLET, FILM COATED ORAL
Qty: 0 | Refills: 0 | COMMUNITY

## 2018-11-28 RX ADMIN — OXYCODONE AND ACETAMINOPHEN 1 TABLET(S): 5; 325 TABLET ORAL at 16:11

## 2018-11-28 RX ADMIN — OXYCODONE AND ACETAMINOPHEN 1 TABLET(S): 5; 325 TABLET ORAL at 21:21

## 2018-11-28 RX ADMIN — LOSARTAN POTASSIUM 25 MILLIGRAM(S): 100 TABLET, FILM COATED ORAL at 17:48

## 2018-11-28 RX ADMIN — ALBUTEROL 2.5 MILLIGRAM(S): 90 AEROSOL, METERED ORAL at 19:59

## 2018-11-28 RX ADMIN — Medication 2: at 22:17

## 2018-11-28 RX ADMIN — ENOXAPARIN SODIUM 40 MILLIGRAM(S): 100 INJECTION SUBCUTANEOUS at 16:47

## 2018-11-28 RX ADMIN — BUDESONIDE AND FORMOTEROL FUMARATE DIHYDRATE 2 PUFF(S): 160; 4.5 AEROSOL RESPIRATORY (INHALATION) at 22:16

## 2018-11-28 RX ADMIN — Medication 60 MILLIGRAM(S): at 17:48

## 2018-11-28 RX ADMIN — Medication 81 MILLIGRAM(S): at 16:47

## 2018-11-28 RX ADMIN — Medication 100 MILLIGRAM(S): at 22:16

## 2018-11-28 RX ADMIN — Medication 40 MILLIGRAM(S): at 13:48

## 2018-11-28 RX ADMIN — Medication 500 MILLIGRAM(S): at 22:16

## 2018-11-28 RX ADMIN — OXYCODONE AND ACETAMINOPHEN 1 TABLET(S): 5; 325 TABLET ORAL at 14:21

## 2018-11-28 RX ADMIN — LATANOPROST 1 DROP(S): 0.05 SOLUTION/ DROPS OPHTHALMIC; TOPICAL at 22:19

## 2018-11-28 RX ADMIN — Medication 500 MILLIGRAM(S): at 17:48

## 2018-11-28 RX ADMIN — PANTOPRAZOLE SODIUM 40 MILLIGRAM(S): 20 TABLET, DELAYED RELEASE ORAL at 16:47

## 2018-11-28 RX ADMIN — Medication 1 LOZENGE: at 22:18

## 2018-11-28 RX ADMIN — TIOTROPIUM BROMIDE 1 CAPSULE(S): 18 CAPSULE ORAL; RESPIRATORY (INHALATION) at 16:48

## 2018-11-28 RX ADMIN — Medication 40 MILLIGRAM(S): at 12:14

## 2018-11-28 RX ADMIN — Medication 0.25 MILLIGRAM(S): at 17:47

## 2018-11-28 NOTE — CONSULT NOTE ADULT - SUBJECTIVE AND OBJECTIVE BOX
CHIEF COMPLAINT:    HPI:    PAST MEDICAL & SURGICAL HISTORY:  Alcoholism: last drink   Skin cancer: not melanoma  Aortic valve replaced: with bovine heart valve  Meniere disease  Diabetes mellitus  Glaucoma  Dry eyes  GERD (gastroesophageal reflux disease)  Emphysema lung  Spinal stenosis  CHF (congestive heart failure)  LBBB (left bundle branch block)  TMJ (temporomandibular joint disorder)  Diverticulitis: hospitalized   SIMÓN (obstructive sleep apnea): category I severe pt does not use c/pap  Thrush: treated x 4 days  1 month ago  restarted medication  3-19-14 clortramazole 5x day  Anxiety  RA (rheumatoid arthritis): diagnosed   oxycodone prn  neck/ lower back  Depression  Borderline diabetes: no meds  COPD (chronic obstructive pulmonary disease): diagnosed   inhaler and nebulizer  HTN (hypertension)  PVD (peripheral vascular disease): left iliac  s/p surgical intervention   unsuccessful  Hiatal hernia: s/p surgical repair   Breast cancer: right s/p lumpectomy and radiation   TIA (transient ischemic attack): 2010  Hyperlipidemia  Valvular heart disease: aortic and mitral  H/O:  section: 2x  H/O sinus surgery  S/P AVR (aortic valve replacement): about 5 yrs ago (2013?)  PVD (peripheral vascular disease): s/p left iliac bypass which was unsuccessful  open repair  S/P carpal tunnel release: right   S/P lumpectomy, right breast:   Hiatal hernia: s/p surgical repair per pt   S/P hernia surgery: left inguinal age 12  S/P rotator cuff surgery: left   S/P appendectomy:   S/P  section: x2 1965/       FAMILY HISTORY:  Family history of breast cancer (Aunt): aunt  Family history of colon cancer in father: also prostate ca, skin ca,      SOCIAL HISTORY:  Smoking: [ ] Never Smoked [ ] Former Smoker (__ packs x ___ years) [ ] Current Smoker  (__ packs x ___ years)  Substance Use: [ ] Never Used [ ] Used ____  EtOH Use:  Marital Status: [ ] Single [ ]  [ ]  [ ]   Sexual History:   Occupation:  Recent Travel:  Country of Birth:  Advance Directives:    Allergies    penicillins (Hives)  Plavix (Short breath)  quinolines (Other)    Intolerances        HOME MEDICATIONS:    REVIEW OF SYSTEMS:  CONSTITUTIONAL: No weakness, fevers or chills  EYES/ENT: No visual changes;  No vertigo or throat pain   NECK: No pain or stiffness  RESPIRATORY: No cough, wheezing, hemoptysis; No shortness of breath  CARDIOVASCULAR: No chest pain or palpitations  GASTROINTESTINAL: No abdominal or epigastric pain. No nausea, vomiting, or hematemesis; No diarrhea or constipation. No melena or hematochezia.  GENITOURINARY: No dysuria, frequency or hematuria  NEUROLOGICAL: No numbness or weakness  SKIN: No itching, rashes      OBJECTIVE:  ICU Vital Signs Last 24 Hrs  T(C): 37.6 (2018 12:17), Max: 37.6 (2018 12:00)  T(F): 99.7 (2018 12:17), Max: 99.7 (2018 12:00)  HR: 109 (2018 12:24) (101 - 120)  BP: 153/79 (2018 12:24) (96/43 - 153/79)  BP(mean): --  ABP: --  ABP(mean): --  RR: 30 (2018 12:24) (26 - 30)  SpO2: 100% (2018 12:24) (94% - 100%)        CAPILLARY BLOOD GLUCOSE          PHYSICAL EXAM:    PHYSICAL EXAM:  Neuro:  awake alert oriented x 3, speech clear coherent. makes needs known well and appropriately, follows commands well and appropriately. CN intact. TOBAR well 5/5. Pupils 3 mm reactive equal    Pulm:  utilizing 2 liters N/C breath sounds bilat, diminished in lower lung fields bases to 1/4 up. Clear throughout, able to take deep breaths spontaneously and upon command. SPO2 98%.      CV:  cardiac monitor sinus tach without ectopy, s1/s2 I/VI sys murmur appreciated , peripheral pulses palpable with radial 2+ bilat, dp/pt 1+/1+ bilat, digits warm to touch with good cap refill < 3 secs      GI/:  abd  soft  non distended non tender , + hypoactive bowel sounds. sheriff patent to bsd bladder non distended non palpable    Skin:   warm dry intact. without palpable nodes. without JVD appreciated    HOSPITAL MEDICATIONS:  MEDICATIONS  (STANDING):    MEDICATIONS  (PRN):      LABS:                        13.0   11.39 )-----------( 252      ( 2018 11:54 )             39.4     Hgb Trend: 13.0<--      133<L>  |  97  |  15  ----------------------------<  151<H>  4.3   |  26  |  0.76    Ca    8.1<L>      2018 11:54    TPro  7.8  /  Alb  3.8  /  TBili  0.3  /  DBili  x   /  AST  62<H>  /  ALT  68  /  AlkPhos  153<H>      Creatinine Trend: 0.76<--  PT/INR - ( 2018 11:54 )   PT: 12.3 sec;   INR: 1.08 ratio         PTT - ( 2018 11:54 )  PTT:29.2 sec    Arterial Blood Gas:   @ 11:50  7.37/45/97/25/96/0.6  ABG lactate: --        MICROBIOLOGY:     RADIOLOGY:  [ ] Reviewed and interpreted by me    EKG:        Jasmyne ANP-BC (ext. [id number]) CHIEF COMPLAINT:    HPI:    PAST MEDICAL & SURGICAL HISTORY:  Alcoholism: last drink   Skin cancer: not melanoma  Aortic valve replaced: with bovine heart valve  Meniere disease  Diabetes mellitus  Glaucoma  Dry eyes  GERD (gastroesophageal reflux disease)  Emphysema lung  Spinal stenosis  CHF (congestive heart failure)  LBBB (left bundle branch block)  TMJ (temporomandibular joint disorder)  Diverticulitis: hospitalized   SIMÓN (obstructive sleep apnea): category I severe pt does not use c/pap  Thrush: treated x 4 days  1 month ago  restarted medication  3-19-14 clortramazole 5x day  Anxiety  RA (rheumatoid arthritis): diagnosed   oxycodone prn  neck/ lower back  Depression  Borderline diabetes: no meds  COPD (chronic obstructive pulmonary disease): diagnosed   inhaler and nebulizer  HTN (hypertension)  PVD (peripheral vascular disease): left iliac  s/p surgical intervention   unsuccessful  Hiatal hernia: s/p surgical repair   Breast cancer: right s/p lumpectomy and radiation   TIA (transient ischemic attack): 2010  Hyperlipidemia  Valvular heart disease: aortic and mitral  H/O:  section: 2x  H/O sinus surgery  S/P AVR (aortic valve replacement): about 5 yrs ago (2013?)  PVD (peripheral vascular disease): s/p left iliac bypass which was unsuccessful  open repair  S/P carpal tunnel release: right   S/P lumpectomy, right breast:   Hiatal hernia: s/p surgical repair per pt   S/P hernia surgery: left inguinal age 12  S/P rotator cuff surgery: left   S/P appendectomy:   S/P  section: x2 1965/       FAMILY HISTORY:  Family history of breast cancer (Aunt): aunt  Family history of colon cancer in father: also prostate ca, skin ca,      SOCIAL HISTORY:  Smoking: [ ] Never Smoked [ ] Former Smoker (__ packs x ___ years) [ ] Current Smoker  (__ packs x ___ years)  Substance Use: [ ] Never Used [ ] Used ____  EtOH Use:  Marital Status: [ ] Single [ ]  [ ]  [ ]   Sexual History:   Occupation:  Recent Travel:  Country of Birth:  Advance Directives:    Allergies    penicillins (Hives)  Plavix (Short breath)  quinolines (Other)    Intolerances        HOME MEDICATIONS:    REVIEW OF SYSTEMS:  CONSTITUTIONAL: No weakness, fevers or chills  EYES/ENT: No visual changes;  No vertigo or throat pain   NECK: No pain or stiffness  RESPIRATORY: No cough, wheezing, hemoptysis; No shortness of breath  CARDIOVASCULAR: No chest pain or palpitations  GASTROINTESTINAL: No abdominal or epigastric pain. No nausea, vomiting, or hematemesis; No diarrhea or constipation. No melena or hematochezia.  GENITOURINARY: No dysuria, frequency or hematuria  NEUROLOGICAL: No numbness or weakness  SKIN: No itching, rashes      OBJECTIVE:  ICU Vital Signs Last 24 Hrs  T(C): 37.6 (2018 12:17), Max: 37.6 (2018 12:00)  T(F): 99.7 (2018 12:17), Max: 99.7 (2018 12:00)  HR: 109 (2018 12:24) (101 - 120)  BP: 153/79 (2018 12:24) (96/43 - 153/79)  BP(mean): --  ABP: --  ABP(mean): --  RR: 30 (2018 12:24) (26 - 30)  SpO2: 100% (2018 12:24) (94% - 100%)        CAPILLARY BLOOD GLUCOSE          PHYSICAL EXAM:    PHYSICAL EXAM:  Neuro:  awake alert oriented x 3, speech clear coherent. makes needs known well and appropriately, follows commands well and appropriately. CN intact. TOBAR well 5/5. Pupils 3 mm reactive equal    Pulm:  utilizing Bipap therapy 14/5 RR 28 to 32, SpVt 600 to 770, breath sounds bilat, diminished in lower lung fields bases to 1/4 up. crackles in lower lung fields to 1/3 up bilaterally, able to take deep breaths spontaneously and upon command. SPO2 98%.      CV:  cardiac monitor sinus tach without ectopy, s1/s2 II/VI sys murmur appreciated , peripheral pulses palpable with radial 2+ bilat, dp/pt 1+/1+ bilat, digits warm to touch with good cap refill < 3 secs , without peripheral edema appreciated    GI/:  abd  soft  distended non tender , +  bowel sounds. bladder non distended non palpable    Skin:   warm dry intact. without palpable nodes. without JVD appreciated, slight deformities of hands 2/2 to RA. joints without erythema or swelling at present    HOSPITAL MEDICATIONS:  MEDICATIONS  (STANDING):    MEDICATIONS  (PRN):      LABS:                        13.0   11.39 )-----------( 252      ( 2018 11:54 )             39.4     Hgb Trend: 13.0<--      133<L>  |  97  |  15  ----------------------------<  151<H>  4.3   |  26  |  0.76    Ca    8.1<L>      2018 11:54    TPro  7.8  /  Alb  3.8  /  TBili  0.3  /  DBili  x   /  AST  62<H>  /  ALT  68  /  AlkPhos  153<H>      Creatinine Trend: 0.76<--  PT/INR - ( 2018 11:54 )   PT: 12.3 sec;   INR: 1.08 ratio         PTT - ( 2018 11:54 )  PTT:29.2 sec    Arterial Blood Gas:   @ 11:50  7.37/45/97/25/96/0.6  ABG lactate: --        MICROBIOLOGY:     RADIOLOGY:  [ ] Reviewed and interpreted by me    EKG:        Jasmyne ANP-BC (ext. [id number]) CHIEF COMPLAINT:    HPI:    PAST MEDICAL & SURGICAL HISTORY:  Alcoholism: last drink   Skin cancer: not melanoma  Aortic valve replaced: with bovine heart valve  Meniere disease  Diabetes mellitus  Glaucoma  Dry eyes  GERD (gastroesophageal reflux disease)  Emphysema lung  Spinal stenosis  CHF (congestive heart failure)  LBBB (left bundle branch block)  TMJ (temporomandibular joint disorder)  Diverticulitis: hospitalized   SIÓMN (obstructive sleep apnea): category I severe pt does not use c/pap  Thrush: treated x 4 days  1 month ago  restarted medication  3-19-14 clortramazole 5x day  Anxiety  RA (rheumatoid arthritis): diagnosed   oxycodone prn  neck/ lower back  Depression  Borderline diabetes: no meds  COPD (chronic obstructive pulmonary disease): diagnosed   inhaler and nebulizer  HTN (hypertension)  PVD (peripheral vascular disease): left iliac  s/p surgical intervention   unsuccessful  Hiatal hernia: s/p surgical repair   Breast cancer: right s/p lumpectomy and radiation   TIA (transient ischemic attack): 2010  Hyperlipidemia  Valvular heart disease: aortic and mitral  H/O:  section: 2x  H/O sinus surgery  S/P AVR (aortic valve replacement): about 5 yrs ago (2013?)  PVD (peripheral vascular disease): s/p left iliac bypass which was unsuccessful  open repair  S/P carpal tunnel release: right   S/P lumpectomy, right breast:   Hiatal hernia: s/p surgical repair per pt   S/P hernia surgery: left inguinal age 12  S/P rotator cuff surgery: left   S/P appendectomy:   S/P  section: x2 1965/       FAMILY HISTORY:  Family history of breast cancer (Aunt): aunt  Family history of colon cancer in father: also prostate ca, skin ca,      SOCIAL HISTORY:  Smoking: [xxxx ] Former Smoker (_2_ packs x __40_ years)   Substance Use: [xx ] Never Used [ ] Used ____  EtOH Use:  former drinker  Marital Status:  [xxx ]   Sexual History:   Occupation: retired teacher, 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 No nausea, vomiting, or hematemesis; No diarrhea or constipation. No melena or hematochezia.  GENITOURINARY: No dysuria, frequency or hematuria  NEUROLOGICAL: No numbness or weakness  SKIN: No itching, rashes      OBJECTIVE:  ICU Vital Signs Last 24 Hrs  T(C): 37.6 (2018 12:17), Max: 37.6 (2018 12:00)  T(F): 99.7 (2018 12:17), Max: 99.7 (2018 12:00)  HR: 109 (2018 12:24) (101 - 120)  BP: 153/79 (2018 12:24) (96/43 - 153/79)  BP(mean): --  ABP: --  ABP(mean): --  RR: 30 (2018 12:24) (26 - 30)  SpO2: 100% (2018 12:24) (94% - 100%)        CAPILLARY BLOOD GLUCOSE          PHYSICAL EXAM:    PHYSICAL EXAM:  Neuro:  awake alert oriented x 3, speech clear coherent. makes needs known well and appropriately, follows commands well and appropriately. CN intact. TOBAR well /5. Pupils 3 mm reactive equal    Pulm:  utilizing Bipap therapy  RR 28 to 32, SpVt 600 to 770, breath sounds bilat, diminished in lower lung fields bases to 1/4 up. crackles in lower lung fields to 1/3 up bilaterally, able to take deep breaths spontaneously and upon command. SPO2 98%.      CV:  cardiac monitor sinus tach without ectopy, s1/s2 II/VI sys murmur appreciated , peripheral pulses palpable with radial 2+ bilat, dp/pt 1+/1+ bilat, digits warm to touch with good cap refill < 3 secs , without peripheral edema appreciated    GI/:  abd  soft  distended non tender , +  bowel sounds. bladder non distended non palpable    Skin:   warm dry intact. without palpable nodes. without JVD appreciated, slight deformities of hands 2/2 to RA. joints without erythema or swelling at present    HOSPITAL MEDICATIONS:  MEDICATIONS  (STANDING):    MEDICATIONS  (PRN):      LABS:                        13.0   11.39 )-----------( 252      ( 2018 11:54 )             39.4     Hgb Trend: 13.0<--      133<L>  |  97  |  15  ----------------------------<  151<H>  4.3   |  26  |  0.76    Ca    8.1<L>      2018 11:54    TPro  7.8  /  Alb  3.8  /  TBili  0.3  /  DBili  x   /  AST  62<H>  /  ALT  68  /  AlkPhos  153<H>      Creatinine Trend: 0.76<--  PT/INR - ( 2018 11:54 )   PT: 12.3 sec;   INR: 1.08 ratio         PTT - ( 2018 11:54 )  PTT:29.2 sec    Arterial Blood Gas:   @ 11:50  7.37/45/97/25/96/0.6  ABG lactate: --        MICROBIOLOGY:     RADIOLOGY:  [ ] Reviewed and interpreted by me    EKG:        Jasmyne ANP-BC (ext. 1523) CHIEF COMPLAINT:    HPI:  78 yr old female with PMHx of COPD/Emphysema on Home O2 (2-3 liters) qhs, HTN, AoVR (bovine),HFpEF 60% (),Rt breast Ca s/p lumpectomy /Rtx , RA on sulfasalazine, spinal stenosis s/p dorsal column stimulator (2018), DM who presents this morning from home via EMS after experiencing acute sob while ambulating to her car. Denied C/P, lightheadedness/dizziness, N/V. States has chronic sob and was at baseline upon awakening this morning. Pt called EMS who found her to be SOB, SPO2 90% with crackles throughout lung field, E.D. endorsed they also found her to be hypertensive. EMS administered 1 sublingual NTG, placed on CPAP. Pt stated she had similar episode  evening, self treated with nebulizer therapy and as EMS arrived she had improved and refused transfer to E.D.      In E.D. pt found to have SPO2 94% with increased WOB, RR 26 to 30,tachycardic at 126, SBP 96/43- 152/79. Placed on Bipap therapy, received lasix 40 mg IVP. Consult called for ADHF vs COPD exacerbation      PAST MEDICAL & SURGICAL HISTORY:  Alcoholism: last drink   Skin cancer: not melanoma  Aortic valve replaced: with bovine heart valve  Meniere disease  Diabetes mellitus  Glaucoma  Dry eyes  GERD (gastroesophageal reflux disease)  Emphysema lung  Spinal stenosis  CHF (congestive heart failure)  LBBB (left bundle branch block)  TMJ (temporomandibular joint disorder)  Diverticulitis: hospitalized   SIMÓN (obstructive sleep apnea): category I severe pt does not use c/pap  Thrush: treated x 4 days  1 month ago  restarted medication  3-19-14 clortramazole 5x day  Anxiety  RA (rheumatoid arthritis): diagnosed   oxycodone prn  neck/ lower back  Depression  Borderline diabetes: no meds  COPD (chronic obstructive pulmonary disease): diagnosed   inhaler and nebulizer  HTN (hypertension)  PVD (peripheral vascular disease): left iliac  s/p surgical intervention   unsuccessful  Hiatal hernia: s/p surgical repair 2005  Breast cancer: right s/p lumpectomy and radiation   TIA (transient ischemic attack): 2010  Hyperlipidemia  Valvular heart disease: aortic and mitral  H/O:  section: 2x  H/O sinus surgery  S/P AVR (aortic valve replacement): about 5 yrs ago (2013?)  PVD (peripheral vascular disease): s/p left iliac bypass which was unsuccessful  open repair  S/P carpal tunnel release: right   S/P lumpectomy, right breast:   Hiatal hernia: s/p surgical repair per pt   S/P hernia surgery: left inguinal age 12  S/P rotator cuff surgery: left   S/P appendectomy:   S/P  section: x2 /       FAMILY HISTORY:  Family history of breast cancer (Aunt): aunt  Family history of colon cancer in father: also prostate ca, skin ca,      SOCIAL HISTORY:  Smoking: [xxxx ] Former Smoker (_2_ packs x __40_ years)   Substance Use: [xx ] Never Used [ ] Used ____  EtOH Use:  former drinker  Marital Status:  [xxx ]   Sexual History:   Occupation: retired teacher, 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 No nausea, vomiting, or hematemesis; No diarrhea or constipation. No melena or hematochezia.  GENITOURINARY: No dysuria, frequency or hematuria  NEUROLOGICAL: No numbness or weakness  SKIN: No itching, rashes      OBJECTIVE:  ICU Vital Signs Last 24 Hrs  T(C): 37.6 (2018 12:17), Max: 37.6 (2018 12:00)  T(F): 99.7 (2018 12:17), Max: 99.7 (2018 12:00)  HR: 109 (2018 12:24) (101 - 120)  BP: 153/79 (2018 12:24) (96/43 - 153/79)  BP(mean): --  ABP: --  ABP(mean): --  RR: 30 (2018 12:24) (26 - 30)  SpO2: 100% (2018 12:24) (94% - 100%)        CAPILLARY BLOOD GLUCOSE          PHYSICAL EXAM:    PHYSICAL EXAM:  Neuro:  awake alert oriented x 3, speech clear coherent. makes needs known well and appropriately, follows commands well and appropriately. CN intact. TOBAR well 5/5. Pupils 3 mm reactive equal    Pulm:  utilizing Bipap therapy  RR 28 to 32, SpVt 600 to 770, breath sounds bilat, diminished in lower lung fields bases to 1/4 up. crackles in lower lung fields to 1/3 up bilaterally, able to take deep breaths spontaneously and upon command. SPO2 98%.      CV:  cardiac monitor sinus tach without ectopy, s1/s2 II/VI sys murmur appreciated , peripheral pulses palpable with radial 2+ bilat, dp/pt 1+/1+ bilat, digits warm to touch with good cap refill < 3 secs , without peripheral edema appreciated    GI/:  abd  soft  distended non tender , +  bowel sounds. bladder non distended non palpable    Skin:   warm dry intact. without palpable nodes. without JVD appreciated, slight deformities of hands 2/2 to RA. joints without erythema or swelling at present    HOSPITAL MEDICATIONS:  MEDICATIONS  (STANDING):    MEDICATIONS  (PRN):      LABS:                        13.0   11.39 )-----------( 252      ( 2018 11:54 )             39.4     Hgb Trend: 13.0<--      133<L>  |  97  |  15  ----------------------------<  151<H>  4.3   |  26  |  0.76    Ca    8.1<L>      2018 11:54    TPro  7.8  /  Alb  3.8  /  TBili  0.3  /  DBili  x   /  AST  62<H>  /  ALT  68  /  AlkPhos  153<H>      Creatinine Trend: 0.76<--  PT/INR - ( 2018 11:54 )   PT: 12.3 sec;   INR: 1.08 ratio         PTT - ( 2018 11:54 )  PTT:29.2 sec    Arterial Blood Gas:   @ 11:50  7.37/45/97/25/96/0.6  ABG lactate: --        MICROBIOLOGY:     RADIOLOGY:  [ ] Reviewed and interpreted by me    EKG:        Jasmyne ANP-BC (ext. 0950) CHIEF COMPLAINT:    HPI:  78 yr old female with PMHx of COPD/Emphysema on Home O2 (2-3 liters) qhs, HTN, AoVR (bovine),HFpEF 60% (),Rt breast Ca s/p lumpectomy /Rtx , RA on sulfasalazine, spinal stenosis s/p dorsal column stimulator (2018), DM who presents this morning from home via EMS after experiencing acute sob while ambulating to her car. Denied C/P, lightheadedness/dizziness, N/V. States has chronic sob and was at baseline upon awakening this morning. Pt called EMS who found her to be SOB, SPO2 90% with crackles throughout lung field, E.D. endorsed they also found her to be hypertensive. EMS administered 1 sublingual NTG, placed on CPAP. Pt stated she had similar episode  evening, self treated with nebulizer therapy and as EMS arrived she had improved and refused transfer to E.D.      In E.D. pt found to have SPO2 94% with increased WOB, RR 26 to 30,tachycardic at 126, SBP 96/43- 152/79. Placed on Bipap therapy, received lasix 40 mg IVP. Consult called for ADHF vs COPD exacerbation      Denies N/V/F/C/D or any recent sick contacts. Denies recent steroid therapy      PAST MEDICAL & SURGICAL HISTORY:  Alcoholism: last drink   Skin cancer: not melanoma  Aortic valve replaced: with bovine heart valve  Meniere disease  Diabetes mellitus  Glaucoma  Dry eyes  GERD (gastroesophageal reflux disease)  Emphysema lung  Spinal stenosis  CHF (congestive heart failure)  LBBB (left bundle branch block)  TMJ (temporomandibular joint disorder)  Diverticulitis: hospitalized   SIMÓN (obstructive sleep apnea): category I severe pt does not use c/pap  Thrush: treated x 4 days  1 month ago  restarted medication  3-19-14 clortramazole 5x day  Anxiety  RA (rheumatoid arthritis): diagnosed   oxycodone prn  neck/ lower back  Depression  Borderline diabetes: no meds  COPD (chronic obstructive pulmonary disease): diagnosed   inhaler and nebulizer  HTN (hypertension)  PVD (peripheral vascular disease): left iliac  s/p surgical intervention   unsuccessful  Hiatal hernia: s/p surgical repair   Breast cancer: right s/p lumpectomy and radiation   TIA (transient ischemic attack): 2010  Hyperlipidemia  Valvular heart disease: aortic and mitral  H/O:  section: 2x  H/O sinus surgery  S/P AVR (aortic valve replacement): about 5 yrs ago (2013?)  PVD (peripheral vascular disease): s/p left iliac bypass which was unsuccessful - open repair  S/P carpal tunnel release: right   S/P lumpectomy, right breast: 2008  Hiatal hernia: s/p surgical repair per pt 2005  S/P hernia surgery: left inguinal age 12  S/P rotator cuff surgery: left   S/P appendectomy:   S/P  section: x2 /       FAMILY HISTORY:  Family history of breast cancer (Aunt): aunt  Family history of colon cancer in father: also prostate ca, skin ca,      SOCIAL HISTORY:  Smoking: [xxxx ] Former Smoker (_2_ packs x __40_ years)   Substance Use: [xx ] Never Used [ ] Used ____  EtOH Use:  former drinker  Marital Status:  [xxx ]   Sexual History:   Occupation: retired teacher, 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 No nausea, vomiting, or hematemesis; No diarrhea or constipation. No melena or hematochezia.  GENITOURINARY: No dysuria, frequency or hematuria  NEUROLOGICAL: No numbness or weakness  SKIN: No itching, rashes      OBJECTIVE:  ICU Vital Signs Last 24 Hrs  T(C): 37.6 (2018 12:17), Max: 37.6 (2018 12:00)  T(F): 99.7 (2018 12:17), Max: 99.7 (2018 12:00)  HR: 109 (2018 12:24) (101 - 120)  BP: 153/79 (2018 12:24) (96/43 - 153/79)  BP(mean): --  ABP: --  ABP(mean): --  RR: 30 (2018 12:24) (26 - 30)  SpO2: 100% (2018 12:24) (94% - 100%)        CAPILLARY BLOOD GLUCOSE          PHYSICAL EXAM:    PHYSICAL EXAM:  Neuro:  awake alert oriented x 3, speech clear coherent. makes needs known well and appropriately, follows commands well and appropriately. CN intact. TOBAR well 5/5. Pupils 3 mm reactive equal    Pulm:  utilizing Bipap therapy  RR 28 to 32, SpVt 600 to 770, breath sounds bilat, diminished in lower lung fields bases to 1/4 up. crackles in lower lung fields to 1/3 up bilaterally, able to take deep breaths spontaneously and upon command. SPO2 98%.      CV:  cardiac monitor sinus tach without ectopy, s1/s2 II/VI sys murmur appreciated , peripheral pulses palpable with radial 2+ bilat, dp/pt 1+/1+ bilat, digits warm to touch with good cap refill < 3 secs , without peripheral edema appreciated    GI/:  abd  soft  distended non tender , +  bowel sounds. bladder non distended non palpable    Skin:   warm dry intact. without palpable nodes. without JVD appreciated, slight deformities of hands 2/2 to RA. joints without erythema or swelling at present    HOSPITAL MEDICATIONS:  MEDICATIONS  (STANDING):    MEDICATIONS  (PRN):      LABS:                        13.0   11.39 )-----------( 252      ( 2018 11:54 )             39.4     Hgb Trend: 13.0<--      133<L>  |  97  |  15  ----------------------------<  151<H>  4.3   |  26  |  0.76    Ca    8.1<L>      2018 11:54    TPro  7.8  /  Alb  3.8  /  TBili  0.3  /  DBili  x   /  AST  62<H>  /  ALT  68  /  AlkPhos  153<H>      Creatinine Trend: 0.76<--  PT/INR - ( 2018 11:54 )   PT: 12.3 sec;   INR: 1.08 ratio         PTT - ( 2018 11:54 )  PTT:29.2 sec    Arterial Blood Gas:   @ 11:50  7.37/45/97/25/96/0.6  ABG lactate: --        MICROBIOLOGY:     RADIOLOGY:  [ ] Reviewed and interpreted by me    EKG:        Jasmyne [facility]-BC (ext. [id number]) CHIEF COMPLAINT:    HPI:  78 yr old female with PMHx of COPD/Emphysema on Home O2 (2-3 liters) qhs, HTN, AoVR (bovine),HFpEF 60% (),Rt breast Ca s/p lumpectomy /Rtx , RA on sulfasalazine, spinal stenosis s/p dorsal column stimulator (2018), DM who presents this morning from home via EMS after experiencing acute sob while ambulating to her car. Denied C/P, lightheadedness/dizziness, N/V. States has chronic sob and was at baseline upon awakening this morning. Pt called EMS who found her to be SOB, SPO2 90% with crackles throughout lung field, E.D. endorsed they also found her to be hypertensive. EMS administered 1 sublingual NTG, placed on CPAP. Pt stated she had similar episode  evening, self treated with nebulizer therapy and as EMS arrived she had improved and refused transfer to E.D.      In E.D. pt found to have SPO2 94% with increased WOB, RR 26 to 30,tachycardic at 126, SBP 96/43- 152/79. Placed on Bipap therapy, received lasix 40 mg IVP. Consult called for ADHF vs COPD exacerbation      Denies N/V/F/C/D or any recent sick contacts. Denies recent steroid therapy    As pt is currently with stable V/S's, improved resp status on bipap and lasix therapy, pt is currently not candidate for ICU admission at this time. Please call if we can be of further assisstance. Thank you for consult      PAST MEDICAL & SURGICAL HISTORY:  Alcoholism: last drink   Skin cancer: not melanoma  Aortic valve replaced: with bovine heart valve  Meniere disease  Diabetes mellitus  Glaucoma  Dry eyes  GERD (gastroesophageal reflux disease)  Emphysema lung  Spinal stenosis  CHF (congestive heart failure)  LBBB (left bundle branch block)  TMJ (temporomandibular joint disorder)  Diverticulitis: hospitalized   SIMÓN (obstructive sleep apnea): category I severe pt does not use c/pap  Thrush: treated x 4 days  1 month ago  restarted medication  3-19-14 clortramazole 5x day  Anxiety  RA (rheumatoid arthritis): diagnosed   oxycodone prn  neck/ lower back  Depression  Borderline diabetes: no meds  COPD (chronic obstructive pulmonary disease): diagnosed   inhaler and nebulizer  HTN (hypertension)  PVD (peripheral vascular disease): left iliac  s/p surgical intervention -  unsuccessful  Hiatal hernia: s/p surgical repair   Breast cancer: right s/p lumpectomy and radiation   TIA (transient ischemic attack): 2010  Hyperlipidemia  Valvular heart disease: aortic and mitral  H/O:  section: 2x  H/O sinus surgery  S/P AVR (aortic valve replacement): about 5 yrs ago (2013?)  PVD (peripheral vascular disease): s/p left iliac bypass which was unsuccessful  open repair  S/P carpal tunnel release: right   S/P lumpectomy, right breast: 2008  Hiatal hernia: s/p surgical repair per pt   S/P hernia surgery: left inguinal age 12  S/P rotator cuff surgery: left   S/P appendectomy:   S/P  section: x2 /       FAMILY HISTORY:  Family history of breast cancer (Aunt): aunt  Family history of colon cancer in father: also prostate ca, skin ca,      SOCIAL HISTORY:  Smoking: [xxxx ] Former Smoker (_2_ packs x __40_ years)   Substance Use: [xx ] Never Used [ ] Used ____  EtOH Use:  former drinker  Marital Status:  [xxx ]   Sexual History:   Occupation: retired teacher, 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 No nausea, vomiting, or hematemesis; No diarrhea or constipation. No melena or hematochezia.  GENITOURINARY: No dysuria, frequency or hematuria  NEUROLOGICAL: No numbness or weakness  SKIN: No itching, rashes      OBJECTIVE:  ICU Vital Signs Last 24 Hrs  T(C): 37.6 (2018 12:17), Max: 37.6 (2018 12:00)  T(F): 99.7 (2018 12:17), Max: 99.7 (2018 12:00)  HR: 109 (2018 12:24) (101 - 120)  BP: 153/79 (2018 12:24) (96/43 - 153/79)  BP(mean): --  ABP: --  ABP(mean): --  RR: 30 (2018 12:24) (26 - 30)  SpO2: 100% (2018 12:24) (94% - 100%)        CAPILLARY BLOOD GLUCOSE          PHYSICAL EXAM:    PHYSICAL EXAM:  Neuro:  awake alert oriented x 3, speech clear coherent. makes needs known well and appropriately, follows commands well and appropriately. CN intact. TOBAR well . Pupils 3 mm reactive equal    Pulm:  utilizing Bipap therapy  RR 28 to 32, SpVt 600 to 770, breath sounds bilat, diminished in lower lung fields bases to 1/4 up. crackles in lower lung fields to 1/3 up bilaterally, able to take deep breaths spontaneously and upon command. SPO2 98%.      CV:  cardiac monitor sinus tach without ectopy, s1/s2 II/VI sys murmur appreciated , peripheral pulses palpable with radial 2+ bilat, dp/pt 1+/1+ bilat, digits warm to touch with good cap refill < 3 secs , without peripheral edema appreciated    GI/:  abd  soft  distended non tender , +  bowel sounds. bladder non distended non palpable    Skin:   warm dry intact. without palpable nodes. without JVD appreciated, slight deformities of hands 2/2 to RA. joints without erythema or swelling at present    HOSPITAL MEDICATIONS:  MEDICATIONS  (STANDING):    MEDICATIONS  (PRN):      LABS:                        13.0   11.39 )-----------( 252      ( 2018 11:54 )             39.4     Hgb Trend: 13.0<--  11    133<L>  |  97  |  15  ----------------------------<  151<H>  4.3   |  26  |  0.76    Ca    8.1<L>      2018 11:54    TPro  7.8  /  Alb  3.8  /  TBili  0.3  /  DBili  x   /  AST  62<H>  /  ALT  68  /  AlkPhos  153<H>      Creatinine Trend: 0.76<--  PT/INR - ( 2018 11:54 )   PT: 12.3 sec;   INR: 1.08 ratio         PTT - ( 2018 11:54 )  PTT:29.2 sec    Arterial Blood Gas:   @ 11:50  7.37/45/97/25/96/0.6  ABG lactate: --        MICROBIOLOGY:     RADIOLOGY:  [ ] Reviewed and interpreted by me    EKG:        Jasmyne [facility] [place]-BC (ext. [id number])

## 2018-11-28 NOTE — ED ADULT NURSE NOTE - ED STAT RN HANDOFF DETAILS
Pt stable and resting in bed. Pt denies any pain or discomfort as of this time. Pt admitted and handoff report giver to CALLI Montalvo for continuum of care. No sign of distress noted.

## 2018-11-28 NOTE — ED ADULT NURSE NOTE - OBJECTIVE STATEMENT
Pt received in bed alert and oriented with the c/o waking this morning and having call 911 from home because of SOB and difficulty breathing. pt placed on CPAP by EMS and given SL nitro. Pt c/o of shortness of breath, history of COPD and CHF on home O2. As per Md's orders IV cesia placed blood specimen obtained and sent to the lab. Med given and pt placed on BIPAP and tolerated well. Nursing care ongoing and safety maintained.

## 2018-11-28 NOTE — ED PROVIDER NOTE - PROGRESS NOTE DETAILS
Pt doing well , tolerating bipap well. pt with no acute changes. Dw Dr JAM Morrell., will see pt to admit.

## 2018-11-28 NOTE — H&P ADULT - PROBLEM SELECTOR PLAN 1
acute hypoxic resp failure likely 2/2 to flash pulmonary edema due to acute CHF exacerbation  - Admit to tele  - patient sating 90% on EMS arrival  - ABG wnl in ED  - continue BiPAP  - continuous pulse ox, monitor 02 Sat  - Continue albuterol, Symbicort, Spiriva  - Solumedrol 40mg IV qd  - Consult Dr. Rivera, will follow recs acute hypoxic resp failure likely 2/2 to flash pulmonary edema due to acute CHF exacerbation  - Admit to tele  - patient sating 90% on EMS arrival  - ABG wnl in ED  - continue BiPAP  - continuous pulse ox, monitor 02 Sat  - Continue albuterol, Symbicort, Spiriva  - Solumedrol 40mg IV qd  - although no peripheral edema, will do doppler for DVT to r/o PE  - Consult Dr. Rivera, will follow recs

## 2018-11-28 NOTE — H&P ADULT - PROBLEM SELECTOR PLAN 5
Likely 2/2 lasix use  - continue to monitor  - no fluids for now as patient fluid overloaded On home 02 2L NC  - will continue with albuterol, spirivia, symicort  - solumedrol 40mg qd On home 02 2L NC Q HS, Nebs 4x day  - will continue with albuterol, Spiriva, Symbicort  - solumedrol 40mg qd  Pulmonary DR Rivera

## 2018-11-28 NOTE — CONSULT NOTE ADULT - ASSESSMENT
78y F with PMHx of COPD, hypertension, hyperlipidemia, diverticulitis, LBBB, TIA,  depression, Rt breast cancer, status post lumpectomy and radiation 2008, anxiety, hiatal hernia-status post surgical repair in 2005, obstructive sleep apnea (not on BiPAP at home), PVD, rheumatoid arthritis, thrush, TIA (2010), TMJ, valvular heart disease (aortic and mitral), H/O AVR with bioprosthetic,, presents to ED with sudden onset SOB this morning that began while she was bringing her garbage can back from the curb and called EMS at that point. Patient denies any otc medications. Denies any chest pain, fever, chills n/v/d. Patient has had hospitalization for similar symptoms in april 2017. Patient's SOB likely 2/2 COPD and chronic lung disease, No evidence of volume overloaded status.     Recommendations    - Patient is not complaining of any cardiac symptoms at this time.  - Trops mildly elevated at 0.112, elevated at baseline on previous admissions. , serum BNP mildly elevated at 879.   -  Monitor closely for the development of anginal symptoms or clinical signs of ischemia.   - Sinus tach at 106 BPM, LBBB, acute changes on EKG compared to previous.  - No meaningful evidence of volume overload.  - Previous TTE shows (4/2017) Mod-severe LV systolic dysfunction, mitral annular calcification, MR, normally function aortic valve bioprosthesis  - DORIS performed 2018 - bioprosthetic aortic valve, LVOT with freely mobile echodensity attached to aorto-mitral curtain  - BP well controlled, monitor routine hemodynamics.  - Continue Losartan, ASA 81, Verapamil, Spironolactone, Simvastatin  - Monitor and replete lytes, keep K>4, Mg>2.  - Strict I/Os, daily weights.  - Other cardiovascular workup will depend on clinical course.  - All other workup per primary team.  - Will continue to follow. 78y F with PMHx of COPD, hypertension, hyperlipidemia, diverticulitis, LBBB, TIA,  depression, Rt breast cancer, status post lumpectomy and radiation 2008, anxiety, hiatal hernia-status post surgical repair in 2005, obstructive sleep apnea (not on BiPAP at home), PVD, rheumatoid arthritis, thrush, TIA (2010), TMJ, valvular heart disease (aortic and mitral), H/O AVR with bioprosthetic,, presents to ED with sudden onset SOB this morning that began while she was bringing her garbage can back from the curb and called EMS at that point. Patient denies any otc medications. Denies any chest pain, fever, chills n/v/d. Patient has had hospitalization for similar symptoms in april 2017. Patient's SOB likely 2/2 CHF exacerbation given moderate pulmonary edema on CXR    Recommendations    - Trops mildly elevated at 0.112, likely secondary to pulmonary edema, tachycardia. , serum BNP mildly elevated at 879 noted  -  Monitor closely for the development of anginal symptoms or clinical signs of ischemia.   - Sinus tach at 106 BPM, LBBB, acute changes on EKG compared to previous.  - abdominal edema noted although no pedal edema.  - Previous TTE shows (4/2017) Mod-severe LV systolic dysfunction, mitral annular calcification, MR, normally function aortic valve bioprosthesis  - DORIS performed 2018 - bioprosthetic aortic valve, LVOT with freely mobile echodensity attached to aorto-mitral curtain, being monitored, handled conservatively  - BP well controlled, monitor routine hemodynamics.  - Continue Losartan, ASA 81, Verapamil, Spironolactone, Simvastatin.  -Increase lasix to 60mg IV BID, discontinue PO, monitor BMP  - Monitor and replete lytes, keep K>4, Mg>2.  - Strict I/Os, daily weights.  - Other cardiovascular workup will depend on clinical course.  - All other workup per primary team.  - Will continue to follow. 78y F with PMHx of COPD, hypertension, hyperlipidemia, diverticulitis, LBBB, TIA,  depression, Rt breast cancer, status post lumpectomy and radiation 2008, anxiety, hiatal hernia-status post surgical repair in 2005, obstructive sleep apnea (not on BiPAP at home), PVD, rheumatoid arthritis, thrush, TIA (2010), TMJ, valvular heart disease (aortic and mitral), H/O AVR with bioprosthetic,, presents to ED with sudden onset SOB this morning that began while she was bringing her garbage can back from the curb and called EMS at that point. Patient denies any otc medications. Denies any chest pain, fever, chills n/v/d. Patient has had hospitalization for similar symptoms in april 2017. Patient's SOB likely 2/2 CHF exacerbation given moderate pulmonary edema on CXR    Recommendations    - Admit to telemetry  - Patient with acute respiratory failure and requiring BIPAP for respiratory support.  Continue BIPAP and wean as tolerated.  - Trops mildly elevated at 0.112, likely secondary to pulmonary edema, tachycardia. , serum BNP mildly elevated at 879 noted.  Continue to trend cardiac enzymes to rule out aCS  -  Monitor closely for the development of anginal symptoms or clinical signs of ischemia.   - DORIS performed 2018 - bioprosthetic aortic valve, LVOT with freely mobile echodensity attached to aorto-mitral curtain, being monitored, handled conservatively.  This lesion is of unclear etiology, and is being followed for now  - Repeat echocardiogram here  - Continue Losartan, ASA 81, Verapamil, Spironolactone, Simvastatin.  -Increase lasix to 60mg IV BID, discontinue PO  - Monitor creatinine daily  - Monitor and replete lytes, keep K>4, Mg>2.  - Strict I/Os, daily weights.  - Other cardiovascular workup will depend on clinical course.  - All other workup per primary team.  - Will continue to follow.

## 2018-11-28 NOTE — H&P ADULT - NEUROLOGICAL DETAILS
alert and oriented x 3 sensation intact/cranial nerves intact/normal strength/alert and oriented x 3/Normal

## 2018-11-28 NOTE — CONSULT NOTE ADULT - ASSESSMENT
Assessment:    Plan:    #Neuro:  -neuro checks q 4 hrs and prn for changes  -bedrest at present  -pt with history of RA on SSZ - continue 500 mg po q 12 hr  -pt with hx of anxiety - continue effexor  mg po qd, abilify 2mg po qhs  #Pulm:    #CV:    #GI/:    #ID:    #FEN/ENDO/HEME:          Critical Care Time: 40minutes   Reviewing data, imaging, discussing with multidisciplinary team, not inclusive of procedures, discussing goals of care with family Assessment:    Plan:    #Neuro:  -neuro checks q 4 hrs and prn for changes  -bedrest at present  -pt with history of RA on SSZ - continue 500 mg po q 12 hr  -pt with hx of anxiety - continue effexor  mg po qd, abilify 2mg po qhs  -physical therapy consult when stable    #Pulm:  -supplemental O2 to maintain SPO2 > 88  -Bipap therapy to maintain ph 7.35-7.45; PCO2 45-55; SPO2 as above  -duoneb therapy q 6 hrs (pt on spirva as home meds)  -Pulmicort 0.5 mg via Nebulizer q 12 hrs  -chest Pt q 2 hrs    #CV:  -ECG now and qam x 3   -cardiac enzymes now and q 8 hrs x 3  -obtain TTE to eval LVFx/RVFx and assess for progression of dz/ Eval for RV strain  -Pt on home meds of verapamil ER 240mg qhs -continue  -as pt presented with acute SOB with similar episode on sunday - obtain duplex of lower extremities to r/o DVT  -CXR with pulm edema presents with ADHF vs COPD - lasix 40 mg IVP now and qd  -ASA 81 mg po qd    #GI/:  -currently NPO while on bipap therapy  -increase diet as tolerated  -strict I & O's - keep negative 1 liter  -protonix 40 mg IV qd    #ID:  -pan culture  -obtain RVP  -obtain urine for legionella  -as pt is afebrile would hold antibx at present    #FEN/ENDO/HEME:  -obtain cmp/mg++/po--4/coags/cbc with diff now and qam  -maintain K+ 4.0-4.5; Mg++ 1.5-2.5  -Pt with Hx of DM on metformin continue with 500 mg po qhs  -POC glucose q 6 hrs with ISS - maintain glucose 140 - 160          Critical Care Time: 40minutes   Reviewing data, imaging, discussing with multidisciplinary team, not inclusive of procedures, discussing goals of care with family and pt Assessment:  78 yr old female with stated PMHx significant for COPD/Emphysema on Home O2 (2-3 liters) qhs, HTN, AoVR (bovine)'14,HFpEF 60% (7/'18),Rt breast Ca s/p lumpectomy /Rtx '08, RA on sulfasalazine, who presents from home via EMS with acute sob HTN requiring NTG sublingual, CPAP/Bipap therapy. received lasix in E.D. Consult called for ADHF vs COPD exacerbation    Plan:    #Neuro:  -neuro checks q 4 hrs and prn for changes  -bedrest at present  -pt with history of RA on SSZ - continue 500 mg po q 12 hr  -pt with hx of anxiety - continue effexor  mg po qd, abilify 2mg po qhs  -physical therapy consult when stable    #Pulm:  -supplemental O2 to maintain SPO2 > 88  -Bipap therapy to maintain ph 7.35-7.45; PCO2 45-55; SPO2 as above  -duoneb therapy q 6 hrs (pt on spirva as home meds)  -Pulmicort 0.5 mg via Nebulizer q 12 hrs  -chest Pt q 2 hrs    #CV:  -ECG now and qam x 3   -cardiac enzymes now and q 8 hrs x 3  -obtain TTE to eval LVFx/RVFx and assess for progression of dz/ Eval for RV strain  -Pt on home meds of verapamil ER 240mg qhs -continue  -as pt presented with acute SOB with similar episode on sunday - obtain duplex of lower extremities to r/o DVT  -CXR with pulm edema presents with ADHF vs COPD - lasix 40 mg IVP now and qd  -ASA 81 mg po qd    #GI/:  -currently NPO while on bipap therapy  -increase diet as tolerated  -strict I & O's - keep negative 1 liter  -protonix 40 mg IV qd    #ID:  -pan culture  -obtain RVP  -obtain urine for legionella  -as pt is afebrile would hold antibx at present    #FEN/ENDO/HEME:  -obtain cmp/mg++/po--4/coags/cbc with diff now and qam  -maintain K+ 4.0-4.5; Mg++ 1.5-2.5  -Pt with Hx of DM on metformin continue with 500 mg po qhs  -POC glucose q 6 hrs with ISS - maintain glucose 140 - 160          Critical Care Time: 40minutes   Reviewing data, imaging, discussing with multidisciplinary team, not inclusive of procedures, discussing goals of care with family and pt Assessment:  78 yr old female with stated PMHx significant for COPD/Emphysema on Home O2 (2-3 liters) qhs, HTN, AoVR (bovine)'14,HFpEF 60% (7/'18),Rt breast Ca s/p lumpectomy /Rtx '08, RA on sulfasalazine, who presents from home via EMS with acute sob HTN requiring NTG sublingual, CPAP/Bipap therapy. received lasix in E.D. Consult called for ADHF vs COPD exacerbation    Plan:    #Neuro:  -neuro checks q 4 hrs and prn for changes  -bedrest at present  -pt with history of RA on SSZ - continue 500 mg po q 12 hr  -pt with hx of anxiety - continue effexor  mg po qd, abilify 2mg po qhs  -physical therapy consult when stable    #Pulm:  -supplemental O2 to maintain SPO2 > 88  -Bipap therapy to maintain ph 7.35-7.45; PCO2 45-55; SPO2 as above  -duoneb therapy q 6 hrs (pt on spirva as home meds)  -Pulmicort 0.5 mg via Nebulizer q 12 hrs  -chest Pt q 2 hrs    #CV:  -ECG now and qam x 3   -cardiac enzymes now and q 8 hrs x 3  -obtain TTE to eval LVFx/RVFx and assess for progression of dz/ Eval for RV strain  -Pt on home meds of verapamil ER 240mg qhs -continue  -as pt presented with acute SOB with similar episode on sunday - obtain duplex of lower extremities to r/o DVT  -CXR with pulm edema presents with ADHF vs COPD - lasix 40 mg IVP now and qd  -ASA 81 mg po qd    #GI/:  -currently NPO while on bipap therapy  -increase diet as tolerated  -strict I & O's - keep negative 1 liter  -protonix 40 mg IV qd    #ID:  -pan culture  -obtain RVP  -obtain urine for legionella  -as pt is afebrile would hold antibx at present    #FEN/ENDO/HEME:  -obtain cmp/mg++/po--4/coags/cbc with diff now and qam  -maintain K+ 4.0-4.5; Mg++ 1.5-2.5  -Pt with Hx of DM on metformin continue with 500 mg po qhs  -POC glucose q 6 hrs with ISS - maintain glucose 140 - 160      -add: Lactate 3.5 - would d/c metformin as possible cause  -pt with stable vital signs - may be a B type lactate  -trend lactate levele        Critical Care Time: 40minutes   Reviewing data, imaging, discussing with multidisciplinary team, not inclusive of procedures, discussing goals of care with family and pt

## 2018-11-28 NOTE — H&P ADULT - HISTORY OF PRESENT ILLNESS
78y F with pmhx of COPD, hypertension, hyperlipidemia, diverticulitis, depression, Rt breast cancer, status post lumpectomy and radiation 2008, anxiety, hiatal hernia-status post surgical repair in 2005, obstructive sleep apnea (not on BiPAP at home), PVD, rheumatoid arthritis, thrush, TIA (2010), TMJ, valvular heart disease (aortic and mitral),, H/O AVR, borderline diabetes (not on medications), presents to ED    Given nitroglycerin x1 with EMS, started on Bipap    IN PROGRESS    In the ED, vitals are T 99.7, , /88, RR 26 Spo2 94% on supplemental oxygen. Repeat BP 96/43. Labs significant for mild leukocytosis (11.39), hyponatremia (133), hyperglycemia (151), elevated alk phos (153), elevated AST (62). ABG unimpressive, wnl. CXR with moderate pulmonary edema. EKG: Sinus Tachy 106 with LBBB. Received Lasix 40mg IV, blood cultures, lactate pending. 78y F with pmhx of COPD (on home o2, 2L NC), hypertension, hyperlipidemia, diverticulitis, depression, Rt breast cancer, status post lumpectomy and radiation 2008, anxiety, hiatal hernia-status post surgical repair in 2005, obstructive sleep apnea (not on CPAP at home), PVD, rheumatoid arthritis, thrush, TIA (2010), TMJ, valvular heart disease (aortic and mitral), H/O AVR, Type 2 diabetes (on metformin), presents to ED by EMS for SOB. As per patient, she was on her way to work this morning and while walking to her car, stopped to  garbage cans. Suddenly felt short of breath with increased work of breathing. Reports felt similar to previous episodes of COPD exacerbation and immediately called EMS. EMS arrived, noted rhonchi on lung exam and gave patient subinguinal nitroglycerin, started on CPAP. Patient has hx of chronic nonproductive cough, denies worsening of cough over last months. Daughter at bedside reports patient was diaphoretic with EMS. Recently visited pulmonologist and cardiologist last week, was in normal state of health. Patient denies fevers, chills, nausea, vomiting, CP, palpitations, abdominal pain, diarrhea, constipation, peripheral edema.      In the ED, vitals are T 99.7, , /88, RR 26 Spo2 94% on supplemental oxygen. Repeat BP 96/43 after Lasix 40mg IV given. Labs significant for mild leukocytosis (11.39), hyponatremia (133), hyperglycemia (151), elevated alk phos (153), elevated AST (62), elevated lactate (3.2), elevated creatine kinase (246), elevated trop (0.112), elevated proBNP (879). ABG unimpressive, wnl. CXR There is new moderate pulmonary edema. The heart size is at the upper limits of normal. The patient is status post median sternotomy. The patient is status post aortic valve replacement. Spinal stimulator leads are in place. Multilevel degenerative changes are noted within the imaged potions of the spine. Impression: moderate pulmonary edema. EKG: Sinus Tachy 106 with LBBB. Received Lasix 40mg IVx1, blood cultures pending.     ICU examined patient in ED, stable for admission to [facility]. 78y F with pmhx of COPD (on home o2, 2L NC), hypertension, hyperlipidemia, diverticulitis, depression, Rt breast cancer, status post lumpectomy and radiation 2008, anxiety, hiatal hernia-status post surgical repair in 2005, obstructive sleep apnea (not on CPAP at home), PVD, rheumatoid arthritis, thrush, TIA (2010), TMJ, valvular heart disease (aortic and mitral), H/O AVR, Type 2 diabetes (on metformin), presents to ED by EMS for SOB. As per patient, she was on her way to work this morning and while walking to her car, stopped to  garbage cans. Suddenly felt short of breath with increased work of breathing. Reports felt similar to previous episodes of COPD exacerbation and immediately called EMS. EMS arrived, noted rhonchi on lung exam and gave patient subinguinal nitroglycerin, started on CPAP. Patient has hx of chronic nonproductive cough, denies worsening of cough over last months. Daughter at bedside reports patient was diaphoretic with EMS. Recently visited pulmonologist and cardiologist last week, was in normal state of health. Patient denies fevers, chills, nausea, vomiting, CP, palpitations, abdominal pain, diarrhea, constipation, peripheral edema.  Of note, patient admitted in April 2017 for COPD exacerbation.     In the ED, vitals are T 99.7, , /88, RR 26 Spo2 94% on supplemental oxygen. Repeat BP 96/43 after Lasix 40mg IV given. Labs significant for mild leukocytosis (11.39), hyponatremia (133), hyperglycemia (151), elevated alk phos (153), elevated AST (62), elevated lactate (3.2), elevated creatine kinase (246), elevated trop (0.112), elevated proBNP (879). ABG unimpressive, wnl. CXR There is new moderate pulmonary edema. The heart size is at the upper limits of normal. The patient is status post median sternotomy. The patient is status post aortic valve replacement. Spinal stimulator leads are in place. Multilevel degenerative changes are noted within the imaged potions of the spine. Impression: moderate pulmonary edema. EKG: Sinus Tachy 106 with LBBB. Received Lasix 40mg IVx1, blood cultures pending.     ICU examined patient in ED, stable for admission to [facility]. 78y F with pmhx of COPD (on home o2, 2L NC), hypertension, hyperlipidemia, diverticulitis, depression, Rt breast cancer, status post lumpectomy and radiation 2008, anxiety, hiatal hernia-status post surgical repair in 2005, obstructive sleep apnea (not on CPAP at home), PVD, rheumatoid arthritis, thrush, TIA (2010), TMJ, valvular heart disease (aortic and mitral), H/O AVR, Type 2 diabetes (on metformin), presents to ED by EMS for SOB. As per patient, she was on her way to work this morning and while walking to her car, stopped to  garbage cans. Suddenly felt short of breath with increased work of breathing. Reports felt similar to previous episodes of COPD exacerbation and immediately called EMS. EMS arrived, noted rhonchi on lung exam and gave patient subinguinal nitroglycerin, started on CPAP. Patient has hx of chronic nonproductive cough, denies worsening of cough over last months. Daughter at bedside reports patient was diaphoretic with EMS. Recently visited pulmonologist and cardiologist last week, was in normal state of health. Patient denies fevers, chills, nausea, vomiting, CP, palpitations, abdominal pain, diarrhea, constipation, peripheral edema.  Of note, patient admitted in April 2017 for COPD exacerbation.     In the ED, vitals are T 99.7, , /88, RR 26 Spo2 94% on supplemental oxygen. Repeat BP 96/43 after Lasix 40mg IV given. Labs significant for mild leukocytosis (11.39), hyponatremia (133), hyperglycemia (151), elevated alk phos (153), elevated AST (62), elevated lactate (3.2), elevated creatine kinase (246), elevated trop (0.112), elevated proBNP (879). ABG unimpressive, wnl. CXR There is new moderate pulmonary edema. The heart size is at the upper limits of normal. The patient is status post median sternotomy. The patient is status post aortic valve replacement. Spinal stimulator leads are in place. Multilevel degenerative changes are noted within the imaged potions of the spine. Impression: moderate pulmonary edema. EKG: Sinus Tachy 106 with LBBB. Received Lasix 40mg IVx1, blood cultures pending. Pt was placed on BI PAP in ER.  Pt seen by Cardiology DR Kunz in ER, IV Lasix to continue.     ICU eval done, as per ICU pt does NOT need ICU care ,DR Rivera saw patient in ED, stable for admission to tele.

## 2018-11-28 NOTE — H&P ADULT - GASTROINTESTINAL DETAILS
no masses palpable/bowel sounds normal/nontender/soft no bruit/no rebound tenderness/no rigidity/no guarding/soft/nontender/no masses palpable/bowel sounds normal/no organomegaly

## 2018-11-28 NOTE — H&P ADULT - PROBLEM SELECTOR PLAN 10
IMPROVE VTE Individual Risk Assessment  RISK                                                                Points  [  ] Previous VTE                                                  3  [  ] Thrombophilia                                               2  [  ] Lower limb paralysis                                      2        (unable to hold up >15 seconds)    [  ] Current Cancer                                              2         (within 6 months)  [  ] Immobilization > 24 hrs                                1  [  ] ICU/CCU stay > 24 hours                              1  [ x ] Age > 60                                                      1  IMPROVE VTE Score ____1_____    DVT ppx: Lovenox 11 Valvar hx disease: Not on AC  - continue with aspirin, verapamil with hold parameters    IMPROVE VTE Individual Risk Assessment  RISK                                                                Points  [  ] Previous VTE                                                  3  [  ] Thrombophilia                                               2  [  ] Lower limb paralysis                                      2        (unable to hold up >15 seconds)    [  ] Current Cancer                                              2         (within 6 months)  [  ] Immobilization > 24 hrs                                1  [  ] ICU/CCU stay > 24 hours                              1  [ x ] Age > 60                                                      1  IMPROVE VTE Score ____1_____    DVT ppx: Lovenox  GI ppx: continue Protonix as therapeutic interchange of home Prilosec 11 Valvar hx disease: Not on AC  - continue with aspirin, verapamil with hold parameters  12 SIMÓN non compliant with CPAP    IMPROVE VTE Individual Risk Assessment  RISK                                                                Points  [  ] Previous VTE                                                  3  [  ] Thrombophilia                                               2  [  ] Lower limb paralysis                                      2        (unable to hold up >15 seconds)    [  ] Current Cancer                                              2         (within 6 months)  [  ] Immobilization > 24 hrs                                1  [  ] ICU/CCU stay > 24 hours                              1  [ x ] Age > 60                                                      1  IMPROVE VTE Score ____1_____    DVT ppx: Lovenox  GI ppx: continue Protonix as therapeutic interchange of home Prilosec

## 2018-11-28 NOTE — H&P ADULT - PROBLEM SELECTOR PLAN 7
On home metformin 500mgqd  - Will hold metformin  - start low dose sliding scale  - hypoglycemic protocol On home metformin 500mgqd  - Will hold metformin  - start low dose sliding scale Accu check  - hypoglycemic protocol

## 2018-11-28 NOTE — H&P ADULT - PROBLEM SELECTOR PLAN 3
likely 2/2 acute respiratory failure  - 3.2 on arrival  - monitor fluid status as pt with CHF  - f/u repeat lactate likely 2/2 metformin use vs acute respiratory failure  - 3.2 on arrival  - monitor fluid status as pt with CHF  - f/u repeat lactate likely 2/2 metformin use with AC pulmonary edema with  acute respiratory failure  - 3.2 on arrival, follow with repeat lactate  - monitor fluid status as pt with CHF  - f/u repeat lactate  -D/W ICU PA

## 2018-11-28 NOTE — H&P ADULT - NSHPOUTPATIENTPROVIDERS_GEN_ALL_CORE
PMD: Cy  Cardio: Manuel Wilson  Pulm: Regina PMD: Cy  Cardio: Katie Jackson   Pulm: Regina  Psych: Dr. Blancas

## 2018-11-28 NOTE — ED ADULT NURSE NOTE - NSIMPLEMENTINTERV_GEN_ALL_ED
Implemented All Universal Safety Interventions:  [place] to call system. Call bell, personal items and telephone within reach. Instruct patient to call for assistance. Room bathroom lighting operational. Non-slip footwear when patient is off stretcher. Physically safe environment: no spills, clutter or unnecessary equipment. Stretcher in lowest position, wheels locked, appropriate side rails in place.

## 2018-11-28 NOTE — H&P ADULT - PROBLEM SELECTOR PLAN 6
Chronic  - on percocet 10/325 q6hrs prn  - continue sulfasalazine 500mg BID Chronic  - pain scale: mild- APAP, mod- tramadol 50mg q6, severe- percocet 10/325 q6hrs prn  - continue sulfasalazine 500mg BID Chronic  - pain scale: mild- APAP, mod- tramadol 50mg q6, severe- percocet 10/325 q6hrs prn  - continue sulfasalazine 500mg TID

## 2018-11-28 NOTE — CONSULT NOTE ADULT - SUBJECTIVE AND OBJECTIVE BOX
[facility]         Tawny García, Beatriz, Mustapha, Joaquina, Zully Ravi        [phone] (office)    Reason for Consult: SOB, CHF    Interval HPI: Patient seen and examined at bedside. No acute events overnight.     HPI:  78y F with PMHx of COPD, hypertension, hyperlipidemia, diverticulitis, LBBB, TIA,  depression, Rt breast cancer, status post lumpectomy and radiation , anxiety, hiatal hernia-status post surgical repair in , obstructive sleep apnea (not on BiPAP at home), PVD, rheumatoid arthritis, thrush, TIA (), TMJ, valvular heart disease (aortic and mitral), H/O AVR with bioprosthetic,, presents to ED with sudden onset SOB this morning that began while she was bringing her garbage can back from the curb and called EMS at that point. Patient denies any otc medications. Denies any chest pain, fever, chills n/v/d. Patient has had hospitalization for similar symptoms in 2017. Follows with Cardio Dr Katie Jackson, Echo DORIS and TTE in 2018.     Given nitroglycerin x1 with EMS, started on Bipap    In the ED, vitals are T 99.7, , /88, RR 26 Spo2 94% on supplemental oxygen. Repeat BP 96/43. Labs significant for mild leukocytosis (11.39), hyponatremia (133), hyperglycemia (151), elevated alk phos (153), elevated AST (62). ABG unimpressive, wnl. CXR with moderate pulmonary edema. EKG: Sinus Tachy 106 with LBBB. Received Lasix 40mg IV, blood cultures, lactate pending. (2018 12:19)    PAtient seen and examined at the bedside, in some discomfort, BiPAP in place. Patient currently complaining of back pain from her spinal stenosis, requesting narcotic pain medication. Reports her breathing has improved since being started on BiPAP. No fever, chills n/v/d. VS improved, still tachypneic, hemodynamically stable.    PAST MEDICAL & SURGICAL HISTORY:  Alcoholism: last drink   Skin cancer: not melanoma  Aortic valve replaced: with bovine heart valve  Meniere disease  Diabetes mellitus  Glaucoma  Dry eyes  GERD (gastroesophageal reflux disease)  Emphysema lung  Spinal stenosis  CHF (congestive heart failure)  LBBB (left bundle branch block)  TMJ (temporomandibular joint disorder)  Diverticulitis: hospitalized   SIMÓN (obstructive sleep apnea): category I severe pt does not use c/pap  Thrush: treated x 4 days  1 month ago  restarted medication  3-19-14 clortramazole 5x day  Anxiety  RA (rheumatoid arthritis): diagnosed   oxycodone prn  neck/ lower back  Depression  Borderline diabetes: no meds  COPD (chronic obstructive pulmonary disease): diagnosed   inhaler and nebulizer  HTN (hypertension)  PVD (peripheral vascular disease): left iliac  s/p surgical intervention   unsuccessful  Hiatal hernia: s/p surgical repair   Breast cancer: right s/p lumpectomy and radiation   TIA (transient ischemic attack): 2010  Hyperlipidemia  Valvular heart disease: aortic and mitral  H/O:  section: 2x  H/O sinus surgery  S/P AVR (aortic valve replacement): about 5 yrs ago (?)  PVD (peripheral vascular disease): s/p left iliac bypass which was unsuccessful  open repair  S/P carpal tunnel release: right   S/P lumpectomy, right breast: 2008  Hiatal hernia: s/p surgical repair per pt   S/P hernia surgery: left inguinal age 12  S/P rotator cuff surgery: left   S/P appendectomy:   S/P  section: x2 /       SOCIAL HISTORY:   EtOH: hx of alcoholism, last drink   Tobacco: Soumya  Drugs: Soumya  Lives: Alone  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s  Ambulates: indendently without assistive devices    FAMILY HISTORY:  Family history of breast cancer (Aunt): aunt  Family history of colon cancer in father: also prostate ca, skin ca,      Home Medications:  Abilify 2 mg oral tablet: 1 tab(s) orally once a day (at bedtime) (2018 13:40)  aspirin 81 mg oral tablet: 1 tab(s) orally once a day HOME/  follow preop instructions as per surgeon and cardiologist (2018 13:40)  budesonide:  (2018 13:40)  clonazePAM 0.25 mg oral tablet: 1 tab(s) orally 2 times a day (2018 13:40)  clotrimazole: 4 lozenges daily (2018 13:40)  Dramamine:  (2018 13:40)  Effexor  mg oral capsule, extended release: 1 cap(s) orally once a day (2018 13:40)  furosemide 40 mg oral tablet: 1 tab(s) orally once a day (2018 13:40)  losartan 25 mg oral tablet: 1 tab(s) orally 2 times a day (2018 13:40)  metFORMIN 500 mg oral tablet: 1 tab(s) orally once a day in PM (2018 13:40)  ocular lubricant ophthalmic solution: 1 drop(s) to each affected eye 4 times a day, As needed, Dry Eyes (2018 13:40)  Percocet 10/325 oral tablet: 1 tab(s) orally every 6 hours, As Needed  HOSP (2018 13:40)  PriLOSEC 20 mg oral delayed release capsule: 1 cap(s) orally 2 times a day (2018 13:40)  ProAir HFA 90 mcg/inh inhalation aerosol: 2 puff(s) inhaled 4 times a day, As Needed (2018 13:40)  Spiriva Respimat 1.25 mcg/inh inhalation aerosol: 2 puff(s) inhaled once a day (2018 13:40)  spironolactone 25 mg oral tablet: 0.5 tab(s) orally once a day (2018 13:40)  sulfaSALAzine 500 mg oral tablet: 1 tab(s) orally 2 times a day (2018 13:40)  Symbicort 160 mcg-4.5 mcg/inh inhalation aerosol: 2 puff(s) inhaled 2 times a day (2018 13:40)  verapamil 240 mg/24 hours oral capsule, extended release: 1 cap(s) orally once a day (2018 13:40)  ZyrTEC 10 mg oral tablet: 1 tab(s) orally once a day (2018 13:40)      MEDICATIONS  (STANDING):  ALBUTerol    0.083% 2.5 milliGRAM(s) Nebulizer every 6 hours  buDESOnide 160 MICROgram(s)/formoterol 4.5 MICROgram(s) Inhaler 2 Puff(s) Inhalation two times a day  methylPREDNISolone sodium succinate Injectable 40 milliGRAM(s) IV Push every 24 hours  tiotropium 18 MICROgram(s) Capsule 1 Capsule(s) Inhalation daily    MEDICATIONS  (PRN):      Allergies    penicillins (Hives)  Plavix (Short breath)  quinolines (Other)    Intolerances      REVIEW OF SYSTEMS: Negative except as per HPI.    VITAL SIGNS:   Vital Signs Last 24 Hrs  T(C): 37.6 (2018 12:17), Max: 37.6 (2018 12:00)  T(F): 99.7 (2018 12:17), Max: 99.7 (2018 12:00)  HR: 109 (2018 12:24) (101 - 120)  BP: 153/79 (2018 12:24) (96/43 - 153/79)  BP(mean): --  RR: 30 (2018 12:24) (26 - 30)  SpO2: 100% (2018 12:24) (94% - 100%)    I&O's Summary      PHYSICAL EXAM:  Constitutional: elderly  female, bipap in place, mild distress  HEENT NC/AT, moist mucous membranes  Pulmonary: Coarse breath sounds bilaterally, no wheezing, rales or rhonchi  Cardiovascular: +S1, S2, RRR, no murmur  Gastrointestinal: Soft, nontender, nondistended, normoactive bowel sounds  Extremities: No peripheral edema   Neurological: Alert, strength and sensitivity are grossly intact  Skin: No obvious lesions/rashes  Psych: Mood & affect appropriate    LABS: All Labs Reviewed:                        13.0   11.39 )-----------( 252      ( 2018 11:54 )             39.4     2018 11:54    133    |  97     |  15     ----------------------------<  151    4.3     |  26     |  0.76     Ca    8.1        2018 11:54    TPro  7.8    /  Alb  3.8    /  TBili  0.3    /  DBili  x      /  AST  62     /  ALT  68     /  AlkPhos  153    2018 11:54    PT/INR - ( 2018 11:54 )   PT: 12.3 sec;   INR: 1.08 ratio         PTT - ( 2018 11:54 )  PTT:29.2 sec  CARDIAC MARKERS ( 2018 11:59 )  x     / x     / 246 U/L / x     / x      CARDIAC MARKERS ( 2018 11:54 )  .112 ng/mL / x     / x     / x     / x          Blood Culture:    @ 11:54  Pro Bnp 879        EKG: Sinus tachycardia at 106 BPM, LBBB, no significant change from previous    RADIOLOGY:    CXR:  < from: Xray Chest 1 View- PORTABLE-Urgent (18 @ 11:53) >  EXAM:  XR CHEST PORTABLE URGENT 1V                            PROCEDURE DATE:  2018          INTERPRETATION:  Clinical information: Shortness of breath.    Technique: Frontal view of the chest.    Comparison: Prior chest x-ray examination from 2018.    Findings: There is new moderate pulmonary edema. The heart size is at the   upper limits of normal. The patient is status post median sternotomy. The   patient is status post aortic valve replacement. Spinal stimulator leads   are in place. Multilevel degenerative changes are noted within the imaged   potions of the spine.    IMPRESSION: Moderate pulmonary edema.        KATINA THOMAS M.D., ATTENDING RADIOLOGIST  This document has been electronically signed. 2018 12:06PM    < end of copied text > [facility]         Tawny García, Beatriz, Mustapha, Joaquina, Zully Ravi        [phone] (office)    Reason for Consult: SOB, CHF    Interval HPI: Patient seen and examined at bedside. No acute events overnight.     HPI:  78y F with PMHx of COPD, hypertension, hyperlipidemia, diverticulitis, LBBB, TIA,  depression, Rt breast cancer, status post lumpectomy and radiation , anxiety, hiatal hernia-status post surgical repair in , obstructive sleep apnea (not on BiPAP at home), PVD, rheumatoid arthritis, thrush, TIA (), TMJ, valvular heart disease (aortic and mitral), H/O AVR with bioprosthetic,, presents to ED with sudden onset SOB this morning that began while she was bringing her garbage can back from the curb and called EMS at that point. Patient denies any otc medications. Denies any chest pain, fever, chills n/v/d. Patient has had hospitalization for similar symptoms in 2017. Follows with Cardio Dr Katie Jackson, Echo DORIS and TTE in 2018.     Given nitroglycerin x1 with EMS, started on Bipap    In the ED, vitals are T 99.7, , /88, RR 26 Spo2 94% on supplemental oxygen. Repeat BP 96/43. Labs significant for mild leukocytosis (11.39), hyponatremia (133), hyperglycemia (151), elevated alk phos (153), elevated AST (62). ABG unimpressive, wnl. CXR with moderate pulmonary edema. EKG: Sinus Tachy 106 with LBBB. Received Lasix 40mg IV, blood cultures, lactate pending. (2018 12:19)    PAtient seen and examined at the bedside, in some discomfort, BiPAP in place. Patient currently complaining of back pain from her spinal stenosis, requesting narcotic pain medication. Reports her breathing has improved since being started on BiPAP. No fever, chills n/v/d. VS improved, still tachypneic, hemodynamically stable.    PAST MEDICAL & SURGICAL HISTORY:  Alcoholism: last drink   Skin cancer: not melanoma  Aortic valve replaced: with bovine heart valve  Meniere disease  Diabetes mellitus  Glaucoma  Dry eyes  GERD (gastroesophageal reflux disease)  Emphysema lung  Spinal stenosis  CHF (congestive heart failure)  LBBB (left bundle branch block)  TMJ (temporomandibular joint disorder)  Diverticulitis: hospitalized   SIMÓN (obstructive sleep apnea): category I severe pt does not use c/pap  Thrush: treated x 4 days  1 month ago  restarted medication  3-19-14 clortramazole 5x day  Anxiety  RA (rheumatoid arthritis): diagnosed   oxycodone prn  neck/ lower back  Depression  Borderline diabetes: no meds  COPD (chronic obstructive pulmonary disease): diagnosed   inhaler and nebulizer  HTN (hypertension)  PVD (peripheral vascular disease): left iliac  s/p surgical intervention   unsuccessful  Hiatal hernia: s/p surgical repair   Breast cancer: right s/p lumpectomy and radiation   TIA (transient ischemic attack): 2010  Hyperlipidemia  Valvular heart disease: aortic and mitral  H/O:  section: 2x  H/O sinus surgery  S/P AVR (aortic valve replacement): about 5 yrs ago (?)  PVD (peripheral vascular disease): s/p left iliac bypass which was unsuccessful  open repair  S/P carpal tunnel release: right   S/P lumpectomy, right breast: 2008  Hiatal hernia: s/p surgical repair per pt   S/P hernia surgery: left inguinal age 12  S/P rotator cuff surgery: left   S/P appendectomy:   S/P  section: x2 /       SOCIAL HISTORY:   EtOH: hx of alcoholism, last drink   Tobacco: Soumya  Drugs: Soumya  Lives: Alone  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s  Ambulates: indendently without assistive devices    FAMILY HISTORY:  Family history of breast cancer (Aunt): aunt  Family history of colon cancer in father: also prostate ca, skin ca,      Home Medications:  Abilify 2 mg oral tablet: 1 tab(s) orally once a day (at bedtime) (2018 13:40)  aspirin 81 mg oral tablet: 1 tab(s) orally once a day HOME/  follow preop instructions as per surgeon and cardiologist (2018 13:40)  budesonide:  (2018 13:40)  clonazePAM 0.25 mg oral tablet: 1 tab(s) orally 2 times a day (2018 13:40)  clotrimazole: 4 lozenges daily (2018 13:40)  Dramamine:  (2018 13:40)  Effexor  mg oral capsule, extended release: 1 cap(s) orally once a day (2018 13:40)  furosemide 40 mg oral tablet: 1 tab(s) orally once a day (2018 13:40)  losartan 25 mg oral tablet: 1 tab(s) orally 2 times a day (2018 13:40)  metFORMIN 500 mg oral tablet: 1 tab(s) orally once a day in PM (2018 13:40)  ocular lubricant ophthalmic solution: 1 drop(s) to each affected eye 4 times a day, As needed, Dry Eyes (2018 13:40)  Percocet 10/325 oral tablet: 1 tab(s) orally every 6 hours, As Needed  HOSP (2018 13:40)  PriLOSEC 20 mg oral delayed release capsule: 1 cap(s) orally 2 times a day (2018 13:40)  ProAir HFA 90 mcg/inh inhalation aerosol: 2 puff(s) inhaled 4 times a day, As Needed (2018 13:40)  Spiriva Respimat 1.25 mcg/inh inhalation aerosol: 2 puff(s) inhaled once a day (2018 13:40)  spironolactone 25 mg oral tablet: 0.5 tab(s) orally once a day (2018 13:40)  sulfaSALAzine 500 mg oral tablet: 1 tab(s) orally 2 times a day (2018 13:40)  Symbicort 160 mcg-4.5 mcg/inh inhalation aerosol: 2 puff(s) inhaled 2 times a day (2018 13:40)  verapamil 240 mg/24 hours oral capsule, extended release: 1 cap(s) orally once a day (2018 13:40)  ZyrTEC 10 mg oral tablet: 1 tab(s) orally once a day (2018 13:40)      MEDICATIONS  (STANDING):  ALBUTerol    0.083% 2.5 milliGRAM(s) Nebulizer every 6 hours  buDESOnide 160 MICROgram(s)/formoterol 4.5 MICROgram(s) Inhaler 2 Puff(s) Inhalation two times a day  methylPREDNISolone sodium succinate Injectable 40 milliGRAM(s) IV Push every 24 hours  tiotropium 18 MICROgram(s) Capsule 1 Capsule(s) Inhalation daily    MEDICATIONS  (PRN):      Allergies    penicillins (Hives)  Plavix (Short breath)  quinolines (Other)    Intolerances      REVIEW OF SYSTEMS: Negative except as per HPI.    VITAL SIGNS:   Vital Signs Last 24 Hrs  T(C): 37.6 (2018 12:17), Max: 37.6 (2018 12:00)  T(F): 99.7 (2018 12:17), Max: 99.7 (2018 12:00)  HR: 109 (2018 12:24) (101 - 120)  BP: 153/79 (2018 12:24) (96/43 - 153/79)  BP(mean): --  RR: 30 (2018 12:24) (26 - 30)  SpO2: 100% (2018 12:24) (94% - 100%)    I&O's Summary      PHYSICAL EXAM:  Constitutional: elderly  female, bipap in place, mild distress  HEENT NC/AT, moist mucous membranes  Pulmonary: Coarse breath sounds bilaterally, no wheezing or rhonchi  Cardiovascular: +S1, S2, RRR, no murmurs, rubs, gallops  Gastrointestinal: Soft, nontender, moderate swelling of the abdomen  Extremities: No peripheral edema   Neurological: Alert, strength and sensitivity are grossly intact  Skin: No obvious lesions/rashes  Psych: Mood & affect appropriate    LABS: All Labs Reviewed:                        13.0   11.39 )-----------( 252      ( 2018 11:54 )             39.4     2018 11:54    133    |  97     |  15     ----------------------------<  151    4.3     |  26     |  0.76     Ca    8.1        2018 11:54    TPro  7.8    /  Alb  3.8    /  TBili  0.3    /  DBili  x      /  AST  62     /  ALT  68     /  AlkPhos  153    2018 11:54    PT/INR - ( 2018 11:54 )   PT: 12.3 sec;   INR: 1.08 ratio         PTT - ( 2018 11:54 )  PTT:29.2 sec  CARDIAC MARKERS ( 2018 11:59 )  x     / x     / 246 U/L / x     / x      CARDIAC MARKERS ( 2018 11:54 )  .112 ng/mL / x     / x     / x     / x          Blood Culture:    @ 11:54  Pro Bnp 879        EKG: Sinus tachycardia at 106 BPM, LBBB, no significant change from previous    RADIOLOGY:    CXR:  < from: Xray Chest 1 View- PORTABLE-Urgent (18 @ 11:53) >  EXAM:  XR CHEST PORTABLE URGENT 1V                            PROCEDURE DATE:  2018          INTERPRETATION:  Clinical information: Shortness of breath.    Technique: Frontal view of the chest.    Comparison: Prior chest x-ray examination from 2018.    Findings: There is new moderate pulmonary edema. The heart size is at the   upper limits of normal. The patient is status post median sternotomy. The   patient is status post aortic valve replacement. Spinal stimulator leads   are in place. Multilevel degenerative changes are noted within the imaged   potions of the spine.    IMPRESSION: Moderate pulmonary edema.        KATINA THOMAS M.D., ATTENDING RADIOLOGIST  This document has been electronically signed. 2018 12:06PM    < end of copied text >

## 2018-11-28 NOTE — CONSULT NOTE ADULT - REASON FOR ADMISSION
Acute CHF, Acute Respiratory failure

## 2018-11-28 NOTE — ED PROVIDER NOTE - CARE PLAN
Principal Discharge DX:	Acute congestive heart failure, unspecified heart failure type  Secondary Diagnosis:	Acute respiratory failure, unspecified whether with hypoxia or hypercapnia

## 2018-11-28 NOTE — CONSULT NOTE ADULT - PROBLEM SELECTOR RECOMMENDATION 9
resp distress, on NIPPV, cxr shows pulm edema, has hx of valv heart disease, COPD  cxr and labs reviewed  past medical hx reviewed  I and O  admit to tele  NIPPV - assess off / o2 support / night time NIPPV for SIMÓN / keep sat > 88 pct  spiriva, symbicort, nebs and systemic steroids for COPD / resp distress  s/p LASIX in ER / may need daily Diuresis Dose, I and O, pulse ox and card monitoring  cardio eval  will give one dose of Tylenol IV now for pain management  pt is on opioids as outpatient, caution with opioids in pt with simón, copd, resp distress  prognosis guarded - discussed with patient at the bedside  will follow  TTE and CTA chest in the EMR from prior visits

## 2018-11-28 NOTE — ED PROVIDER NOTE - CONSTITUTIONAL, MLM
normal... Well appearing, well nourished, awake, alert, oriented to person, place, time/situation and in no apparent distress. ill appearing, well nourished, awake, alert, oriented to person, place, time/situation and mild distress
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
## 2018-11-28 NOTE — CONSULT NOTE ADULT - ATTENDING COMMENTS
Patient seen and examined, agree with above note    79 y/o female with hx CHF, bioprosthetic AVR, LVOT mobile density, COPD on home O2, RA on sulfasalazine, presents with acute pulmonary edema and resp distress - improved with sublingual NTG, lasix and NIV. Denies CP, palpitations, fever, chills, cough, body aches, N/V/D.     VSS  No resp distress currently  Lung exam with diffuse crackles  S1S2 regular  Abd soft, NT, +BS  Ext w/o edema/cyanosis/mottling    EKG with (old) LBBB    Imp:  Acute resp distress due to pulmonary edema - unclear     Recs:  Increase lasix, maintain I<O  Continue other cardiac meds  Telemonitoring  Trend Vika  Change bipap to prn  No evidence of active infection    No current need for ICU, reconsult prn Patient seen and examined, agree with above note    77 y/o female with hx CHF, bioprosthetic AVR, LVOT mobile density, COPD on home O2, RA on sulfasalazine, presents with acute pulmonary edema and resp distress - improved with sublingual NTG, lasix and NIV. Denies CP, palpitations, fever, chills, cough, body aches, N/V/D.     VSS  No resp distress currently  Lung exam with diffuse crackles  S1S2 regular  Abd soft, NT, +BS  Ext w/o edema/cyanosis/mottling    EKG with (old) LBBB    Imp:  Acute resp distress due to pulmonary edema - unclear trigger    Recs:  Increase lasix, maintain I<O  Continue other cardiac meds  Telemonitoring  Trend Vika  Change bipap to prn  No evidence of active infection    No current need for ICU, reconsult prn

## 2018-11-28 NOTE — ED PROVIDER NOTE - OBJECTIVE STATEMENT
77 yo F p/w co sudden onset SOB this morning. Pt called ems, who found pt with diffuse rales. Pt given NTG x 1, placed on cpap. Pt states she is feeling much improved. No abd pain. No n/v/d. no neck / back pain. No chest pain. no recent illness. no new cough/sputum. no agg/allev factors. no other inj or co.

## 2018-11-28 NOTE — H&P ADULT - PROBLEM SELECTOR PLAN 8
Chronic  - continue losartan, verapamil, lasix with hold parameters Chronic  - continue losartan, verapamil, spironolactone with hold parameters

## 2018-11-28 NOTE — CONSULT NOTE ADULT - SUBJECTIVE AND OBJECTIVE BOX
Date/Time Patient Seen:  		  Referring MD:   Data Reviewed	       Patient is a 78y old  Female who presents with a chief complaint of Acute CHF, Acute Respiratory failure (2018 12:57)      Subjective/HPI      in bed  seen and examined  vs and meds reviewed  H and P reviewed  ER provider note reviewed  labs and imaging reviewed    seen in ER  reviewed records with ICU and ER MD    on BIPAP    old records reviewed - known to me from prior admissions    curr followed by Dr. Davis Pulmonary     H&P Adult [Charted Location: \[facility]\"" ED] [Authored: 2018 12:19]- for Visit: [id number], Incomplete, Entered, Signed w/additional Signatures Pending, General    History and Physical:   Outpatient Providers	PMD: Cy  Cardio: Manuel Wilson  Pulm: Regina       History of Present Illness:  Reason for Admission: Acute CHF, Acute Respiratory failure  History of Present Illness:    78y F with pmhx of COPD, hypertension, hyperlipidemia, diverticulitis, depression, Rt breast cancer, status post lumpectomy and radiation , anxiety, hiatal hernia-status post surgical repair in , obstructive sleep apnea (not on BiPAP at home), PVD, rheumatoid arthritis, thrush, TIA (), TMJ, valvular heart disease (aortic and mitral),, H/O AVR, borderline diabetes (not on medications), presents to ED    Given nitroglycerin x1 with EMS, started on Bipap    IN PROGRESS    In the ED, vitals are T 99.7, , /88, RR 26 Spo2 94% on supplemental oxygen. Repeat BP 96/43. Labs significant for mild leukocytosis (11.39), hyponatremia (133), hyperglycemia (151), elevated alk phos (153), elevated AST (62). ABG unimpressive, wnl. CXR with moderate pulmonary edema. EKG: Sinus Tachy 106 with LBBB. Received Lasix 40mg IV, blood cultures, lactate pending.      PAST MEDICAL & SURGICAL HISTORY:  Alcoholism: last drink   Skin cancer: not melanoma  Aortic valve replaced: with bovine heart valve  Meniere disease  Diabetes mellitus  Glaucoma  Dry eyes  GERD (gastroesophageal reflux disease)  Emphysema lung  Spinal stenosis  CHF (congestive heart failure)  LBBB (left bundle branch block)  TMJ (temporomandibular joint disorder)  Diverticulitis: hospitalized   SIMÓN (obstructive sleep apnea): category I severe pt does not use c/pap  Thrush: treated x 4 days  1 month ago  restarted medication  3-19-14 clortramazole 5x day  Anxiety  RA (rheumatoid arthritis): diagnosed   oxycodone prn  neck/ lower back  Depression  Borderline diabetes: no meds  COPD (chronic obstructive pulmonary disease): diagnosed   inhaler and nebulizer  HTN (hypertension)  PVD (peripheral vascular disease): left iliac  s/p surgical intervention   unsuccessful  Hiatal hernia: s/p surgical repair   Breast cancer: right s/p lumpectomy and radiation   TIA (transient ischemic attack): 2010  Hyperlipidemia  Valvular heart disease: aortic and mitral  H/O:  section: 2x  H/O sinus surgery  S/P AVR (aortic valve replacement): about 5 yrs ago (2013?)  PVD (peripheral vascular disease): s/p left iliac bypass which was unsuccessful  open repair  S/P carpal tunnel release: right   S/P lumpectomy, right breast:   Hiatal hernia: s/p surgical repair per pt   S/P hernia surgery: left inguinal age 12  S/P rotator cuff surgery: left   S/P appendectomy:   S/P  section: x2 /         Medication list         MEDICATIONS  (STANDING):  acetaminophen  IVPB .. 1000 milliGRAM(s) IV Intermittent once    MEDICATIONS  (PRN):         Vitals log        ICU Vital Signs Last 24 Hrs  T(C): 37.6 (2018 12:17), Max: 37.6 (2018 12:00)  T(F): 99.7 (2018 12:17), Max: 99.7 (2018 12:00)  HR: 109 (2018 12:24) (101 - 120)  BP: 153/79 (2018 12:24) (96/43 - 153/79)  BP(mean): --  ABP: --  ABP(mean): --  RR: 30 (2018 12:24) (26 - 30)  SpO2: 100% (2018 12:24) (94% - 100%)           Input and Output:  I&O's Detail      Lab Data                        13.0   11.39 )-----------( 252      ( 2018 11:54 )             39.4         133<L>  |  97  |  15  ----------------------------<  151<H>  4.3   |  26  |  0.76    Ca    8.1<L>      2018 11:54    TPro  7.8  /  Alb  3.8  /  TBili  0.3  /  DBili  x   /  AST  62<H>  /  ALT  68  /  AlkPhos  153<H>      ABG - ( 2018 11:50 )  pH, Arterial: 7.37  pH, Blood: x     /  pCO2: 45    /  pO2: 97    / HCO3: 25    / Base Excess: 0.6   /  SaO2: 96                CARDIAC MARKERS ( 2018 11:59 )  x     / x     / 246 U/L / x     / x      CARDIAC MARKERS ( 2018 11:54 )  .112 ng/mL / x     / x     / x     / x        former smoker  lives at home      Review of Systems	      Objective     Physical Examination    heart s1s2  lung dec BS  abd soft  cn grossly int      Pertinent Lab findings & Imaging      Miranda:  NO   Adequate UO     I&O's Detail           Discussed with:     Cultures:	        Radiology

## 2018-11-28 NOTE — H&P ADULT - RS GEN PE MLT RESP DETAILS PC
rhonchi no rhonchi/normal/rhonchi/breath sounds equal/clear to auscultation bilaterally/rales/wheezes

## 2018-11-28 NOTE — H&P ADULT - CONSTITUTIONAL DETAILS
respiratory distress/well-developed well-groomed/respiratory distress/well-developed/no distress/well-nourished

## 2018-11-28 NOTE — ED ADULT TRIAGE NOTE - CHIEF COMPLAINT QUOTE
brought in by EMS from home on CPAP. complaints of shortness of breath, history of COPD and CHF on home O2. found 90% O2 sat at home. denies chest pain. given 1 nitro by EMS prior to arrival

## 2018-11-28 NOTE — H&P ADULT - ASSESSMENT
78y F with pmhx of COPD (on home o2, 2L NC), hypertension, hyperlipidemia, diverticulitis, depression, Rt breast cancer, status post lumpectomy and radiation 2008, anxiety, hiatal hernia-status post surgical repair in 2005, obstructive sleep apnea (not on CPAP at home), PVD, rheumatoid arthritis, thrush, TIA (2010), TMJ, valvular heart disease (aortic and mitral), H/O AVR, Type 2 diabetes (on metformin), presents to ED by EMS for SOB, admitted to tele for acute hypoxic respiratory failures 2/2 acute on chronic CHF exacerbation in the setting of elevated lactate and hyponatremia.

## 2018-11-28 NOTE — H&P ADULT - NSHPPHYSICALEXAM_GEN_ALL_CORE
Vital Signs Last 24 Hrs  T(C): 37.6 (28 Nov 2018 12:17), Max: 37.6 (28 Nov 2018 12:00)  T(F): 99.7 (28 Nov 2018 12:17), Max: 99.7 (28 Nov 2018 12:00)  HR: 109 (28 Nov 2018 12:24) (101 - 120)  BP: 153/79 (28 Nov 2018 12:24) (96/43 - 153/79)  BP(mean): --  RR: 30 (28 Nov 2018 12:24) (26 - 30)  SpO2: 100% (28 Nov 2018 12:24) (94% - 100%)

## 2018-11-28 NOTE — H&P ADULT - PROBLEM SELECTOR PLAN 4
Not on AC  - continue with asprin Likely 2/2 lasix use  - continue to monitor  - no fluids for now as patient fluid overloaded Likely 2/2 Fluid over load  - continue Lasix 60 mg q 12 hrs   - no fluids for now as patient fluid overloaded  BMP in AM

## 2018-11-29 LAB
ANION GAP SERPL CALC-SCNC: 8 MMOL/L — SIGNIFICANT CHANGE UP (ref 5–17)
APPEARANCE UR: CLEAR — SIGNIFICANT CHANGE UP
BASOPHILS # BLD AUTO: 0.04 K/UL — SIGNIFICANT CHANGE UP (ref 0–0.2)
BASOPHILS NFR BLD AUTO: 0.4 % — SIGNIFICANT CHANGE UP (ref 0–2)
BILIRUB UR-MCNC: NEGATIVE — SIGNIFICANT CHANGE UP
BUN SERPL-MCNC: 15 MG/DL — SIGNIFICANT CHANGE UP (ref 7–23)
CALCIUM SERPL-MCNC: 8.2 MG/DL — LOW (ref 8.5–10.1)
CHLORIDE SERPL-SCNC: 99 MMOL/L — SIGNIFICANT CHANGE UP (ref 96–108)
CK MB BLD-MCNC: 1.6 % — SIGNIFICANT CHANGE UP (ref 0–3.5)
CK MB BLD-MCNC: 1.9 % — SIGNIFICANT CHANGE UP (ref 0–3.5)
CK MB BLD-MCNC: 2.2 % — SIGNIFICANT CHANGE UP (ref 0–3.5)
CK MB BLD-MCNC: 2.6 % — SIGNIFICANT CHANGE UP (ref 0–3.5)
CK MB CFR SERPL CALC: 12.1 NG/ML — HIGH (ref 0–3.6)
CK MB CFR SERPL CALC: 16.3 NG/ML — HIGH (ref 0–3.6)
CK MB CFR SERPL CALC: 17.5 NG/ML — HIGH (ref 0–3.6)
CK MB CFR SERPL CALC: 9.2 NG/ML — HIGH (ref 0–3.6)
CK SERPL-CCNC: 360 U/L — HIGH (ref 26–192)
CK SERPL-CCNC: 741 U/L — HIGH (ref 26–192)
CK SERPL-CCNC: 758 U/L — HIGH (ref 26–192)
CK SERPL-CCNC: 921 U/L — HIGH (ref 26–192)
CO2 SERPL-SCNC: 31 MMOL/L — SIGNIFICANT CHANGE UP (ref 22–31)
COLOR SPEC: YELLOW — SIGNIFICANT CHANGE UP
CREAT SERPL-MCNC: 0.59 MG/DL — SIGNIFICANT CHANGE UP (ref 0.5–1.3)
DIFF PNL FLD: NEGATIVE — SIGNIFICANT CHANGE UP
EOSINOPHIL # BLD AUTO: 0.05 K/UL — SIGNIFICANT CHANGE UP (ref 0–0.5)
EOSINOPHIL NFR BLD AUTO: 0.5 % — SIGNIFICANT CHANGE UP (ref 0–6)
EPI CELLS # UR: SIGNIFICANT CHANGE UP
GLUCOSE SERPL-MCNC: 96 MG/DL — SIGNIFICANT CHANGE UP (ref 70–99)
GLUCOSE UR QL: NEGATIVE — SIGNIFICANT CHANGE UP
HBA1C BLD-MCNC: 5 % — SIGNIFICANT CHANGE UP (ref 4–5.6)
HCT VFR BLD CALC: 32.4 % — LOW (ref 34.5–45)
HGB BLD-MCNC: 10.9 G/DL — LOW (ref 11.5–15.5)
IMM GRANULOCYTES NFR BLD AUTO: 0.4 % — SIGNIFICANT CHANGE UP (ref 0–1.5)
KETONES UR-MCNC: NEGATIVE — SIGNIFICANT CHANGE UP
LACTATE SERPL-SCNC: 1.9 MMOL/L — SIGNIFICANT CHANGE UP (ref 0.7–2)
LEUKOCYTE ESTERASE UR-ACNC: NEGATIVE — SIGNIFICANT CHANGE UP
LYMPHOCYTES # BLD AUTO: 1.83 K/UL — SIGNIFICANT CHANGE UP (ref 1–3.3)
LYMPHOCYTES # BLD AUTO: 18.1 % — SIGNIFICANT CHANGE UP (ref 13–44)
MCHC RBC-ENTMCNC: 32.1 PG — SIGNIFICANT CHANGE UP (ref 27–34)
MCHC RBC-ENTMCNC: 33.6 GM/DL — SIGNIFICANT CHANGE UP (ref 32–36)
MCV RBC AUTO: 95.3 FL — SIGNIFICANT CHANGE UP (ref 80–100)
MONOCYTES # BLD AUTO: 1.03 K/UL — HIGH (ref 0–0.9)
MONOCYTES NFR BLD AUTO: 10.2 % — SIGNIFICANT CHANGE UP (ref 2–14)
NEUTROPHILS # BLD AUTO: 7.1 K/UL — SIGNIFICANT CHANGE UP (ref 1.8–7.4)
NEUTROPHILS NFR BLD AUTO: 70.4 % — SIGNIFICANT CHANGE UP (ref 43–77)
NITRITE UR-MCNC: NEGATIVE — SIGNIFICANT CHANGE UP
PH UR: 7 — SIGNIFICANT CHANGE UP (ref 5–8)
PLATELET # BLD AUTO: 201 K/UL — SIGNIFICANT CHANGE UP (ref 150–400)
POTASSIUM SERPL-MCNC: 3.3 MMOL/L — LOW (ref 3.5–5.3)
POTASSIUM SERPL-SCNC: 3.3 MMOL/L — LOW (ref 3.5–5.3)
PROT UR-MCNC: NEGATIVE — SIGNIFICANT CHANGE UP
RBC # BLD: 3.4 M/UL — LOW (ref 3.8–5.2)
RBC # FLD: 13.3 % — SIGNIFICANT CHANGE UP (ref 10.3–14.5)
SODIUM SERPL-SCNC: 138 MMOL/L — SIGNIFICANT CHANGE UP (ref 135–145)
SP GR SPEC: 1 — LOW (ref 1.01–1.02)
TROPONIN I SERPL-MCNC: 0.09 NG/ML — HIGH (ref 0.01–0.04)
TROPONIN I SERPL-MCNC: 0.17 NG/ML — HIGH (ref 0.01–0.04)
TROPONIN I SERPL-MCNC: 0.18 NG/ML — HIGH (ref 0.01–0.04)
TROPONIN I SERPL-MCNC: 0.21 NG/ML — HIGH (ref 0.01–0.04)
UROBILINOGEN FLD QL: NEGATIVE — SIGNIFICANT CHANGE UP
WBC # BLD: 10.09 K/UL — SIGNIFICANT CHANGE UP (ref 3.8–10.5)
WBC # FLD AUTO: 10.09 K/UL — SIGNIFICANT CHANGE UP (ref 3.8–10.5)

## 2018-11-29 PROCEDURE — 93970 EXTREMITY STUDY: CPT | Mod: 26

## 2018-11-29 PROCEDURE — 93010 ELECTROCARDIOGRAM REPORT: CPT

## 2018-11-29 PROCEDURE — 99232 SBSQ HOSP IP/OBS MODERATE 35: CPT

## 2018-11-29 PROCEDURE — 93306 TTE W/DOPPLER COMPLETE: CPT | Mod: 26

## 2018-11-29 RX ORDER — POTASSIUM CHLORIDE 20 MEQ
40 PACKET (EA) ORAL EVERY 4 HOURS
Qty: 0 | Refills: 0 | Status: COMPLETED | OUTPATIENT
Start: 2018-11-29 | End: 2018-11-29

## 2018-11-29 RX ORDER — OXYCODONE AND ACETAMINOPHEN 5; 325 MG/1; MG/1
2 TABLET ORAL
Qty: 0 | Refills: 0 | Status: DISCONTINUED | OUTPATIENT
Start: 2018-11-29 | End: 2018-11-30

## 2018-11-29 RX ORDER — FUROSEMIDE 40 MG
60 TABLET ORAL DAILY
Qty: 0 | Refills: 0 | Status: DISCONTINUED | OUTPATIENT
Start: 2018-11-29 | End: 2018-11-30

## 2018-11-29 RX ORDER — ENOXAPARIN SODIUM 100 MG/ML
60 INJECTION SUBCUTANEOUS EVERY 12 HOURS
Qty: 0 | Refills: 0 | Status: DISCONTINUED | OUTPATIENT
Start: 2018-11-29 | End: 2018-11-30

## 2018-11-29 RX ADMIN — ALBUTEROL 2.5 MILLIGRAM(S): 90 AEROSOL, METERED ORAL at 14:14

## 2018-11-29 RX ADMIN — OXYCODONE AND ACETAMINOPHEN 2 TABLET(S): 5; 325 TABLET ORAL at 12:49

## 2018-11-29 RX ADMIN — Medication 40 MILLIEQUIVALENT(S): at 09:43

## 2018-11-29 RX ADMIN — Medication 10 MILLIGRAM(S): at 16:39

## 2018-11-29 RX ADMIN — ALBUTEROL 2.5 MILLIGRAM(S): 90 AEROSOL, METERED ORAL at 00:46

## 2018-11-29 RX ADMIN — Medication 1 LOZENGE: at 13:01

## 2018-11-29 RX ADMIN — POLYETHYLENE GLYCOL 3350 17 GRAM(S): 17 POWDER, FOR SOLUTION ORAL at 12:05

## 2018-11-29 RX ADMIN — BUDESONIDE AND FORMOTEROL FUMARATE DIHYDRATE 2 PUFF(S): 160; 4.5 AEROSOL RESPIRATORY (INHALATION) at 06:42

## 2018-11-29 RX ADMIN — Medication 500 MILLIGRAM(S): at 23:43

## 2018-11-29 RX ADMIN — Medication 100 MILLIGRAM(S): at 13:00

## 2018-11-29 RX ADMIN — Medication 1 LOZENGE: at 06:40

## 2018-11-29 RX ADMIN — Medication 0.25 MILLIGRAM(S): at 17:36

## 2018-11-29 RX ADMIN — BUDESONIDE AND FORMOTEROL FUMARATE DIHYDRATE 2 PUFF(S): 160; 4.5 AEROSOL RESPIRATORY (INHALATION) at 17:21

## 2018-11-29 RX ADMIN — OXYCODONE AND ACETAMINOPHEN 2 TABLET(S): 5; 325 TABLET ORAL at 23:43

## 2018-11-29 RX ADMIN — Medication 150 MILLIGRAM(S): at 12:07

## 2018-11-29 RX ADMIN — LATANOPROST 1 DROP(S): 0.05 SOLUTION/ DROPS OPHTHALMIC; TOPICAL at 23:43

## 2018-11-29 RX ADMIN — ARIPIPRAZOLE 2 MILLIGRAM(S): 15 TABLET ORAL at 12:07

## 2018-11-29 RX ADMIN — SPIRONOLACTONE 12.5 MILLIGRAM(S): 25 TABLET, FILM COATED ORAL at 06:40

## 2018-11-29 RX ADMIN — Medication 81 MILLIGRAM(S): at 12:05

## 2018-11-29 RX ADMIN — Medication 100 MILLIGRAM(S): at 06:41

## 2018-11-29 RX ADMIN — LORATADINE 10 MILLIGRAM(S): 10 TABLET ORAL at 12:05

## 2018-11-29 RX ADMIN — LOSARTAN POTASSIUM 25 MILLIGRAM(S): 100 TABLET, FILM COATED ORAL at 17:21

## 2018-11-29 RX ADMIN — Medication 240 MILLIGRAM(S): at 06:40

## 2018-11-29 RX ADMIN — ALBUTEROL 2.5 MILLIGRAM(S): 90 AEROSOL, METERED ORAL at 19:28

## 2018-11-29 RX ADMIN — Medication 1 LOZENGE: at 23:43

## 2018-11-29 RX ADMIN — PANTOPRAZOLE SODIUM 40 MILLIGRAM(S): 20 TABLET, DELAYED RELEASE ORAL at 06:44

## 2018-11-29 RX ADMIN — Medication 500 MILLIGRAM(S): at 13:00

## 2018-11-29 RX ADMIN — Medication 60 MILLIGRAM(S): at 06:39

## 2018-11-29 RX ADMIN — Medication 40 MILLIEQUIVALENT(S): at 06:35

## 2018-11-29 RX ADMIN — ENOXAPARIN SODIUM 60 MILLIGRAM(S): 100 INJECTION SUBCUTANEOUS at 17:21

## 2018-11-29 RX ADMIN — LOSARTAN POTASSIUM 25 MILLIGRAM(S): 100 TABLET, FILM COATED ORAL at 06:40

## 2018-11-29 RX ADMIN — OXYCODONE AND ACETAMINOPHEN 2 TABLET(S): 5; 325 TABLET ORAL at 12:05

## 2018-11-29 RX ADMIN — ALBUTEROL 2.5 MILLIGRAM(S): 90 AEROSOL, METERED ORAL at 07:26

## 2018-11-29 RX ADMIN — Medication 100 MILLIGRAM(S): at 23:43

## 2018-11-29 RX ADMIN — OXYCODONE AND ACETAMINOPHEN 2 TABLET(S): 5; 325 TABLET ORAL at 06:35

## 2018-11-29 RX ADMIN — Medication 40 MILLIGRAM(S): at 13:00

## 2018-11-29 RX ADMIN — TIOTROPIUM BROMIDE 1 CAPSULE(S): 18 CAPSULE ORAL; RESPIRATORY (INHALATION) at 06:40

## 2018-11-29 RX ADMIN — Medication 0.25 MILLIGRAM(S): at 06:39

## 2018-11-29 RX ADMIN — OXYCODONE AND ACETAMINOPHEN 2 TABLET(S): 5; 325 TABLET ORAL at 18:09

## 2018-11-29 RX ADMIN — OXYCODONE AND ACETAMINOPHEN 2 TABLET(S): 5; 325 TABLET ORAL at 17:37

## 2018-11-29 RX ADMIN — Medication 500 MILLIGRAM(S): at 06:35

## 2018-11-29 NOTE — PROGRESS NOTE ADULT - SUBJECTIVE AND OBJECTIVE BOX
Date/Time Patient Seen:  		  Referring MD:   Data Reviewed	       Patient is a 78y old  Female who presents with a chief complaint of Acute CHF, Acute Respiratory failure (2018 13:44)  looks and feels better  on o2 support  vs and meds reviewed        Subjective/HPI     PAST MEDICAL & SURGICAL HISTORY:  Alcoholism: last drink   Skin cancer: not melanoma  Aortic valve replaced: with bovine heart valve  Meniere disease  Diabetes mellitus  Glaucoma  Dry eyes  GERD (gastroesophageal reflux disease)  Emphysema lung  Spinal stenosis  CHF (congestive heart failure)  LBBB (left bundle branch block)  TMJ (temporomandibular joint disorder)  Diverticulitis: hospitalized   SIMÓN (obstructive sleep apnea): category I severe pt does not use c/pap  Thrush: treated x 4 days  1 month ago  restarted medication  3-19-14 clortramazole 5x day  Anxiety  RA (rheumatoid arthritis): diagnosed   oxycodone prn  neck/ lower back  Depression  Borderline diabetes: no meds  COPD (chronic obstructive pulmonary disease): diagnosed   inhaler and nebulizer  HTN (hypertension)  PVD (peripheral vascular disease): left iliac  s/p surgical intervention   unsuccessful  Hiatal hernia: s/p surgical repair 2005  Breast cancer: right s/p lumpectomy and radiation 2008  TIA (transient ischemic attack): 2010  Hyperlipidemia  Valvular heart disease: aortic and mitral  H/O:  section: 2x  H/O sinus surgery  S/P AVR (aortic valve replacement): about 5 yrs ago (?)  PVD (peripheral vascular disease): s/p left iliac bypass which was unsuccessful  open repair  S/P carpal tunnel release: right   S/P lumpectomy, right breast: 2008  Hiatal hernia: s/p surgical repair per pt   S/P hernia surgery: left inguinal age 12  S/P rotator cuff surgery: left   S/P appendectomy:   S/P  section: x2 /         Medication list         MEDICATIONS  (STANDING):  ALBUTerol    0.083% 2.5 milliGRAM(s) Nebulizer every 6 hours  ARIPiprazole 2 milliGRAM(s) Oral daily  aspirin  chewable 81 milliGRAM(s) Oral daily  buDESOnide 160 MICROgram(s)/formoterol 4.5 MICROgram(s) Inhaler 2 Puff(s) Inhalation two times a day  clonazePAM Tablet 0.25 milliGRAM(s) Oral two times a day  clotrimazole Lozenge 1 Lozenge Oral every 8 hours  dextrose 5%. 1000 milliLiter(s) (50 mL/Hr) IV Continuous <Continuous>  dextrose 50% Injectable 12.5 Gram(s) IV Push once  dextrose 50% Injectable 25 Gram(s) IV Push once  dextrose 50% Injectable 25 Gram(s) IV Push once  docusate sodium 100 milliGRAM(s) Oral three times a day  enoxaparin Injectable 40 milliGRAM(s) SubCutaneous every 24 hours  furosemide   Injectable 60 milliGRAM(s) IV Push two times a day  insulin lispro (HumaLOG) corrective regimen sliding scale   SubCutaneous Before meals and at bedtime  latanoprost 0.005% Ophthalmic Solution 1 Drop(s) Both EYES at bedtime  loratadine 10 milliGRAM(s) Oral daily  losartan 25 milliGRAM(s) Oral two times a day  methylPREDNISolone sodium succinate Injectable 40 milliGRAM(s) IV Push every 24 hours  oxyCODONE    5 mG/acetaminophen 325 mG 2 Tablet(s) Oral every 6 hours  pantoprazole    Tablet 40 milliGRAM(s) Oral before breakfast  polyethylene glycol 3350 17 Gram(s) Oral daily  potassium chloride    Tablet ER 40 milliEquivalent(s) Oral every 4 hours  spironolactone 12.5 milliGRAM(s) Oral daily  sulfaSALAzine 500 milliGRAM(s) Oral three times a day  tiotropium 18 MICROgram(s) Capsule 1 Capsule(s) Inhalation daily  venlafaxine XR. 150 milliGRAM(s) Oral daily  verapamil  milliGRAM(s) Oral daily    MEDICATIONS  (PRN):  dextrose 40% Gel 15 Gram(s) Oral once PRN Blood Glucose LESS THAN 70 milliGRAM(s)/deciliter  glucagon  Injectable 1 milliGRAM(s) IntraMuscular once PRN Glucose LESS THAN 70 milligrams/deciliter  senna 2 Tablet(s) Oral at bedtime PRN Constipation  traMADol 50 milliGRAM(s) Oral every 6 hours PRN Moderate Pain (4 - 6)         Vitals log        ICU Vital Signs Last 24 Hrs  T(C): 36.8 (2018 04:09), Max: 37.6 (2018 12:00)  T(F): 98.2 (2018 04:09), Max: 99.7 (2018 12:00)  HR: 102 (2018 04:09) (90 - 120)  BP: 107/65 (2018 04:09) (96/43 - 153/79)  BP(mean): 75 (2018 19:26) (75 - 75)  ABP: --  ABP(mean): --  RR: 18 (2018 04:09) (16 - 30)  SpO2: 97% (2018 04:09) (93% - 100%)           Input and Output:  I&O's Detail      Lab Data                        10.9   10.09 )-----------( 201      ( 2018 05:39 )             32.4         138  |  99  |  15  ----------------------------<  96  3.3<L>   |  31  |  0.59    Ca    8.2<L>      2018 05:39    TPro  7.8  /  Alb  3.8  /  TBili  0.3  /  DBili  x   /  AST  62<H>  /  ALT  68  /  AlkPhos  153<H>  11    ABG - ( 2018 11:50 )  pH, Arterial: 7.37  pH, Blood: x     /  pCO2: 45    /  pO2: 97    / HCO3: 25    / Base Excess: 0.6   /  SaO2: 96                CARDIAC MARKERS ( 2018 05:39 )  .213 ng/mL / x     / 758 U/L / x     / 16.3 ng/mL  CARDIAC MARKERS ( 2018 23:42 )  .171 ng/mL / x     / 360 U/L / x     / 9.2 ng/mL  CARDIAC MARKERS ( 2018 18:53 )  .203 ng/mL / x     / 206 U/L / x     / 6.2 ng/mL  CARDIAC MARKERS ( 2018 11:59 )  x     / x     / 246 U/L / x     / x      CARDIAC MARKERS ( 2018 11:54 )  .112 ng/mL / x     / x     / x     / x            Review of Systems	      Objective     Physical Examination    heart s1s2  lung dec BS  abd soft  cn grossly int      Pertinent Lab findings & Imaging      Miranda:  NO   Adequate UO     I&O's Detail           Discussed with:     Cultures:	        Radiology

## 2018-11-29 NOTE — PROGRESS NOTE ADULT - PROBLEM SELECTOR PLAN 6
Chronic  - pain scale: mild- APAP, mod- tramadol 50mg q6, severe- percocet 10/325 q6hrs prn  - continue sulfasalazine 500mg TID Chronic  - pain scale: mild- APAP, mod- tramadol 50mg q6, severe- percocet 10/325 q6hrs prn  - continue sulfasalazine 500mg TID  - PT consulted, recs home with outpt PT Chronic  - pain scale: mild- APAP, mod- tramadol 50mg q6, severe- percocet 10/325 4 x day  - continue sulfasalazine 500mg TID  - PT consulted, recs home with outpt PT

## 2018-11-29 NOTE — PROGRESS NOTE ADULT - PROBLEM SELECTOR PLAN 8
Chronic  - continue losartan, verapamil, spironolactone with hold parameters Chronic- as per Pt & Dtr NO BB 2/2 COPD  - continue losartan, verapamil, spironolactone, Lasix  with hold parameters

## 2018-11-29 NOTE — PHYSICAL THERAPY INITIAL EVALUATION ADULT - PERTINENT HX OF CURRENT PROBLEM, REHAB EVAL
78y F presents to ED by EMS for SOB. CXR There is new moderate pulmonary edema. Admitted to tele for acute hypoxic respiratory failures 2/2 acute on chronic CHF exacerbation in the setting of elevated lactate and hyponatremia.

## 2018-11-29 NOTE — PROGRESS NOTE ADULT - PROBLEM SELECTOR PLAN 4
Likely 2/2 Fluid over load  - continue Lasix 60 mg q 12 hrs   - no fluids for now as patient fluid overloaded  BMP in AM resolved, Likely 2/2 Fluid over load  - continue Lasix 60 mg q 12 hrs   - no fluids for now as patient fluid overloaded  - f/u AM BMP resolved, Likely 2/2 Fluid over load  - continue Lasix 60 mg q 12 hrs today, change to Lasix 60 mg IV Daily from AM  - f/u AM BMP

## 2018-11-29 NOTE — PROGRESS NOTE ADULT - ASSESSMENT
78y F with PMHx of COPD, hypertension, hyperlipidemia, diverticulitis, LBBB, TIA,  depression, Rt breast cancer, status post lumpectomy and radiation 2008, anxiety, hiatal hernia-status post surgical repair in 2005, obstructive sleep apnea (not on BiPAP at home), PVD, rheumatoid arthritis, thrush, TIA (2010), TMJ, valvular heart disease (aortic and mitral), H/O AVR with bioprosthetic,, presents to ED with sudden onset SOB this morning that began while she was bringing her garbage can back from the curb and called EMS at that point. Patient denies any otc medications. Denies any chest pain, fever, chills n/v/d. Patient has had hospitalization for similar symptoms in april 2017. Patient's SOB likely 2/2 CHF exacerbation given moderate pulmonary edema on CXR. Pulmonary Shalshin following, BiPAP discontinued at present, Satting well on room air. Hemoglobin 13 - 10.9 drop noted.     Recommendations    - Patient admitted with acute respiratory failure and requiring BIPAP for respiratory support.  BiPAP d/c, breathing comfortably on room air, O2 sat in mid 90s  - Trops mildly elevated at 0.112, likely secondary to pulmonary edema, tachycardia. , serum BNP mildly elevated at 879 noted. Small rise in 3rd troponin noted, likely 2/2 the aforementioned and s/p diuresis with lasix  -  Monitor closely for the development of anginal symptoms or clinical signs of ischemia.   - DORIS performed 2018 - bioprosthetic aortic valve, LVOT with freely mobile echodensity attached to aorto-mitral curtain, being monitored, handled conservatively.  This lesion is of unclear etiology, and is being followed for now  - Repeat echocardiogram here  - Continue Losartan, ASA 81, Verapamil, Spironolactone, Simvastatin.  - Continue lasix to 60mg IV BID  - Monitor creatinine daily  - Monitor and replete lytes, keep K>4, Mg>2.  - Strict I/Os, daily weights.  - Other cardiovascular workup will depend on clinical course.  - All other workup per primary team.  - Will continue to follow. 78y F with PMHx of COPD, hypertension, hyperlipidemia, diverticulitis, LBBB, TIA,  depression, Rt breast cancer, status post lumpectomy and radiation 2008, anxiety, hiatal hernia-status post surgical repair in 2005, obstructive sleep apnea (not on BiPAP at home), PVD, rheumatoid arthritis, thrush, TIA (2010), TMJ, valvular heart disease (aortic and mitral), H/O AVR with bioprosthetic,, presents to ED with sudden onset SOB this morning that began while she was bringing her garbage can back from the curb and called EMS at that point. Patient denies any otc medications. Denies any chest pain, fever, chills n/v/d. Patient has had hospitalization for similar symptoms in april 2017. Patient's SOB likely 2/2 CHF exacerbation given moderate pulmonary edema on CXR. Pulmonary Shalshin following, BiPAP discontinued at present, Satting well on room air. Hemoglobin 13 - 10.9 drop noted.     Recommendations    - Patient admitted with acute respiratory failure and requiring BIPAP for respiratory support.  BiPAP d/c, breathing comfortably on room air, O2 sat in mid 90s  - Third troponin of 0.213 trending up along with CK up to 758, cannot r/o NSTEMI, will continue to trend CE  - Will contact office of patient's cardiologist Dr Katie Jackson and attempt to obtain records of her last ischemia workup  -  Monitor closely for the development of anginal symptoms or clinical signs of ischemia.   - DORIS performed 2018 - bioprosthetic aortic valve, LVOT with freely mobile echodensity attached to aorto-mitral curtain, being monitored, handled conservatively.  This lesion is of unclear etiology, and is being followed for now  - Repeat echocardiogram here  - Continue Losartan, ASA 81, Verapamil, Spironolactone, Simvastatin.  - Continue lasix to 60mg IV BID  - Monitor creatinine daily  - Monitor and replete lytes, keep K>4, Mg>2.  - Strict I/Os, daily weights.  - Other cardiovascular workup will depend on clinical course.  - All other workup per primary team.  - Will continue to follow. 78y F with PMHx of COPD, hypertension, hyperlipidemia, diverticulitis, LBBB, TIA,  depression, Rt breast cancer, status post lumpectomy and radiation 2008, anxiety, hiatal hernia-status post surgical repair in 2005, obstructive sleep apnea (not on BiPAP at home), PVD, rheumatoid arthritis, thrush, TIA (2010), TMJ, valvular heart disease (aortic and mitral), H/O AVR with bioprosthetic,, presents to ED with sudden onset SOB this morning that began while she was bringing her garbage can back from the curb and called EMS at that point. Patient denies any otc medications. Denies any chest pain, fever, chills n/v/d. Patient has had hospitalization for similar symptoms in april 2017. Patient's SOB likely 2/2 CHF exacerbation given moderate pulmonary edema on CXR. Pulmonary Shalshin following, BiPAP discontinued at present, Satting well on room air. Hemoglobin 13 - 10.9 drop noted.     Recommendations    - Patient admitted with acute respiratory failure and requiring BIPAP for respiratory support.  BiPAP d/c, breathing comfortably on room air, O2 sat in mid 90s    - Third troponin of 0.213 trending up along with CK up to 758, cannot r/o NSTEMI, will continue to trend CE  - Will contact office of patient's cardiologist Dr Katie Jackson and attempt to obtain records of her last ischemia workup  - Start on full dose lovenox for now.     -  Monitor closely for the development of anginal symptoms or clinical signs of ischemia.   - DORIS performed 2018 - bioprosthetic aortic valve, LVOT with freely mobile echodensity attached to aorto-mitral curtain, being monitored, handled conservatively.  This lesion is of unclear etiology, and is being followed for now  - Repeat echocardiogram here    - Continue Losartan, ASA 81, Verapamil, Spironolactone, Simvastatin.    - Volume status has improved.   - Continue lasix to 60mg IV BID  -  Please continue to maintain strict I/Os, monitor daily weights, Cr, and K.   - Monitor and replete lytes, keep K>4, Mg>2.    - Other cardiovascular workup will depend on clinical course.  - All other workup per primary team.  - Will continue to follow.

## 2018-11-29 NOTE — PROGRESS NOTE ADULT - PROBLEM SELECTOR PLAN 5
On home 02 2L NC Q HS, Nebs 4x day  - will continue with albuterol, Spiriva, Symbicort  - solumedrol 40mg qd  Pulmonary DR Rivera On home 02 2L NC Q HS, Nebs 4x day  - will continue with albuterol, Spiriva, Symbicort  - solumedrol 40mg qd  - Pulmonary Dr. Rivera following On home 02 2L NC Q HS for SIMÓN, Nebs 4x day  - will continue with albuterol, Spiriva, Symbicort  - solumedrol 40mg qd  - Pulmonary Dr. Rivera following

## 2018-11-29 NOTE — PROGRESS NOTE ADULT - PROBLEM SELECTOR PLAN 3
likely 2/2 metformin use with AC pulmonary edema with  acute respiratory failure  - 3.2 on arrival, follow with repeat lactate  - monitor fluid status as pt with CHF  - f/u repeat lactate  -D/W ICU PA resolved, likely 2/2 metformin use with AC pulmonary edema with  acute respiratory failure  - 3.2 on arrival, rpt lactate 1.9 WNL  - monitor fluid status as pt with CHF resolved, likely 2/2 metformin use with AC pulmonary edema with  acute respiratory failure- Resolved  - 3.2 on arrival, rpt lactate 1.9 WNL  - monitor fluid status as pt with Pulmonary Edema

## 2018-11-29 NOTE — PROVIDER CONTACT NOTE (CRITICAL VALUE NOTIFICATION) - ACTION/TREATMENT ORDERED:
CHF, MONITOR
awaiting further orders
"I already spoke with the resident, no further interventions at this time"

## 2018-11-29 NOTE — PROGRESS NOTE ADULT - PROBLEM SELECTOR PLAN 2
Patient coming in on 01/03/2018 for a yearly please refill medication .  Please call patient with any questions    Positive troponin 2/2 Demand ischemia  with Ac Pulmonary edema, Doubt ACS, EKG, On Tele   - Trend CK/MB/Troponin x 3 q 8 hrs  -S/P BiPAP, IV Lasix 60 mg IV q 12 hrs   CXR, PBNP  -I/O daily weight  Cardiology Dr Kunz D/W, On Losartan daily elevated troponin, cannot r/o ACS  - EKG 11/29 showing T wave abnormalities in lateral leads  - cardio following, rec ECHO today, Start on full dose lovenox for now.   - cardio recs continue Losartan, ASA 81, Verapamil, Spironolactone, Simvastatin, however will d/w cardio as pt hypotensive at 92/57  - Trend CK/MB/Troponin x 3 q 8 hrs  - off BiPAP, c/w IV Lasix 60 mg IV q 12 hrs   - strict I/Os, daily weights, keep net negative -elevated troponin, cannot r/o ACS-elevated Troponin/ CK   - EKG 11/29 showing T wave abnormalities in lateral leads  - cardio following, rec ECHO today, Start on full dose Lovenox 60 mg Sc q 12 hrs  as per Dr SHARON osorio  - cardio  continue Losartan, ASA 81, Verapamil, Spironolactone, Simvastatin,   - Trend CK/MB/Troponin x 3 q 8 hrs  - off BiPAP, c/w IV Lasix 60 mg IV q 12 hrs, change to Lasix 60 mg daily IV  - strict I/Os, daily weights, keep net negative

## 2018-11-29 NOTE — PROGRESS NOTE ADULT - PROBLEM SELECTOR PLAN 1
acute hypoxic resp failure likely 2/2 to flash pulmonary edema due to acute CHF exacerbation  - Admit to tele  - patient sating 90% on EMS arrival  - ABG wnl in ED  - continue BiPAP  - continuous pulse ox, monitor 02 Sat  - Continue albuterol, Symbicort, Spiriva  - Solumedrol 40mg IV qd  - although no peripheral edema, will do doppler for DVT to r/o PE  - Consult Dr. Rivera, will follow recs resolved, likely 2/2 to flash pulmonary edema due to acute CHF exacerbation  - pt feels well without SOB today  - patient saturation at 97% RA today off BiPAP  - ABG wnl in ED  - continuous pulse ox, monitor 02 Sat  - continue albuterol, Symbicort, Spiriva  - Solumedrol 40mg IV qd  - US LE neg for DVT  - pulm consulted (Dr. Rivera), recs mobilize pt, check O2 sat on exertion resolved, likely 2/2 to flash pulmonary edema - Resolved , S/P IV Lasix   - Pt has SIMÓN on Home 2 Lit NC at bed time   - pt feels well without O2 , NO SOB today  - patient saturation at 97% RA today off BiPAP  - ABG wnl in ED  - continuous pulse ox, monitor 02 Sat  - continue albuterol, Symbicort, Spiriva  - Solumedrol 40mg IV qd  - US LE neg for DVT  - pulm consulted (Dr. Rivera), recs mobilize pt, check O2 sat on exertion

## 2018-11-29 NOTE — PATIENT PROFILE ADULT - NSPROCHRONICPAINRELIEVE_GEN_A_NUR
repositioning/pt has implanted left hip device to control pain. pt does not know what it is called/medications

## 2018-11-29 NOTE — PROGRESS NOTE ADULT - SUBJECTIVE AND OBJECTIVE BOX
[facility]         Tawny García, Beatriz, Mustapha, Joaquina, Zully Ravi        [phone] (office)      Interval HPI: 78y F with pmhx of COPD (on home o2, 2L NC at night), hypertension, hyperlipidemia, diverticulitis, depression, Rt breast cancer, status post lumpectomy and radiation , anxiety, hiatal hernia-status post surgical repair in , obstructive sleep apnea (not on CPAP at home), PVD, rheumatoid arthritis, thrush, TIA (), TMJ, valvular heart disease (aortic and mitral), H/O bio AVR, Type 2 diabetes (on metformin), presents to ED by EMS for SOB, admitted for flash pulmonary edema. she was started on BiPAP in ED yesterday, diuresed with lasix and weaned off BiPAP onto supplemental O2 which was been stopped this morning. Patient was seen and examined at the bedside, NAD, seen sitting up, pleasant and responding appropriately. She shows significant clinical improvement from previous day, is able to walk, speak in complete sentences. Denies any further SOB at present, denies chest pain, n/v/d, fever, chills, lightheadedness. VS stable, O2 sat mid 90s on room air. Tele NSR 96 BPM, no acute events overnight. Hgb noted to have dropped from 13 to 10.9      PAST MEDICAL & SURGICAL HISTORY:  Alcoholism: last drink   Skin cancer: not melanoma  Aortic valve replaced: with bovine heart valve  Meniere disease  Diabetes mellitus  Glaucoma  Dry eyes  GERD (gastroesophageal reflux disease)  Emphysema lung  Spinal stenosis  CHF (congestive heart failure)  LBBB (left bundle branch block)  TMJ (temporomandibular joint disorder)  Diverticulitis: hospitalized   SIMÓN (obstructive sleep apnea): category I severe pt does not use c/pap  Thrush: treated x 4 days  1 month ago  restarted medication  3-19-14 clortramazole 5x day  Anxiety  RA (rheumatoid arthritis): diagnosed   oxycodone prn  neck/ lower back  Depression  Borderline diabetes: no meds  COPD (chronic obstructive pulmonary disease): diagnosed   inhaler and nebulizer  HTN (hypertension)  PVD (peripheral vascular disease): left iliac  s/p surgical intervention   unsuccessful  Hiatal hernia: s/p surgical repair   Breast cancer: right s/p lumpectomy and radiation   TIA (transient ischemic attack): 2010  Hyperlipidemia  Valvular heart disease: aortic and mitral  H/O:  section: 2x  H/O sinus surgery  S/P AVR (aortic valve replacement): about 5 yrs ago (?)  PVD (peripheral vascular disease): s/p left iliac bypass which was unsuccessful  open repair  S/P carpal tunnel release: right   S/P lumpectomy, right breast:   Hiatal hernia: s/p surgical repair per pt   S/P hernia surgery: left inguinal age 12  S/P rotator cuff surgery: left   S/P appendectomy:   S/P  section: x2 /       SOCIAL HISTORY:   EtOH: hx of alcoholism, last drink   Tobacco: Denies  Drugs: Denies  Lives: Alone  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  Ambulates: independently without assistive devices    FAMILY HISTORY:  Family history of breast cancer (Aunt): aunt  Family history of colon cancer in father: also prostate ca, skin ca,      Home Medications:  Abilify 2 mg oral tablet: 1 tab(s) orally once a day (at bedtime) (2018 13:40)  aspirin 81 mg oral tablet: 1 tab(s) orally once a day HOME/  follow preop instructions as per surgeon and cardiologist (2018 13:40)  budesonide:  (2018 13:40)  clonazePAM 0.25 mg oral tablet: 1 tab(s) orally 2 times a day (2018 13:40)  clotrimazole: 4 lozenges daily (2018 13:40)  Dramamine:  (2018 13:40)  Effexor  mg oral capsule, extended release: 1 cap(s) orally once a day (2018 13:40)  furosemide 40 mg oral tablet: 1 tab(s) orally once a day (2018 13:40)  losartan 25 mg oral tablet: 1 tab(s) orally 2 times a day (2018 13:40)  metFORMIN 500 mg oral tablet: 1 tab(s) orally once a day in PM (2018 13:40)  ocular lubricant ophthalmic solution: 1 drop(s) to each affected eye 4 times a day, As needed, Dry Eyes (2018 13:40)  Percocet 10/325 oral tablet: 1 tab(s) orally every 6 hours, As Needed  HOSP (2018 13:40)  PriLOSEC 20 mg oral delayed release capsule: 1 cap(s) orally 2 times a day (2018 13:40)  ProAir HFA 90 mcg/inh inhalation aerosol: 2 puff(s) inhaled 4 times a day, As Needed (2018 13:40)  Spiriva Respimat 1.25 mcg/inh inhalation aerosol: 2 puff(s) inhaled once a day (2018 13:40)  spironolactone 25 mg oral tablet: 0.5 tab(s) orally once a day (2018 13:40)  sulfaSALAzine 500 mg oral tablet: 1 tab(s) orally 2 times a day (2018 13:40)  Symbicort 160 mcg-4.5 mcg/inh inhalation aerosol: 2 puff(s) inhaled 2 times a day (2018 13:40)  verapamil 240 mg/24 hours oral capsule, extended release: 1 cap(s) orally once a day (2018 13:40)  ZyrTEC 10 mg oral tablet: 1 tab(s) orally once a day (2018 13:40)      MEDICATIONS  (STANDING):  ALBUTerol    0.083% 2.5 milliGRAM(s) Nebulizer every 6 hours  ARIPiprazole 2 milliGRAM(s) Oral daily  aspirin  chewable 81 milliGRAM(s) Oral daily  buDESOnide 160 MICROgram(s)/formoterol 4.5 MICROgram(s) Inhaler 2 Puff(s) Inhalation two times a day  clonazePAM Tablet 0.25 milliGRAM(s) Oral two times a day  clotrimazole Lozenge 1 Lozenge Oral every 8 hours  dextrose 5%. 1000 milliLiter(s) (50 mL/Hr) IV Continuous <Continuous>  dextrose 50% Injectable 12.5 Gram(s) IV Push once  dextrose 50% Injectable 25 Gram(s) IV Push once  dextrose 50% Injectable 25 Gram(s) IV Push once  docusate sodium 100 milliGRAM(s) Oral three times a day  enoxaparin Injectable 40 milliGRAM(s) SubCutaneous every 24 hours  furosemide   Injectable 60 milliGRAM(s) IV Push two times a day  insulin lispro (HumaLOG) corrective regimen sliding scale   SubCutaneous Before meals and at bedtime  latanoprost 0.005% Ophthalmic Solution 1 Drop(s) Both EYES at bedtime  loratadine 10 milliGRAM(s) Oral daily  losartan 25 milliGRAM(s) Oral two times a day  methylPREDNISolone sodium succinate Injectable 40 milliGRAM(s) IV Push every 24 hours  oxyCODONE    5 mG/acetaminophen 325 mG 2 Tablet(s) Oral every 6 hours  pantoprazole    Tablet 40 milliGRAM(s) Oral before breakfast  polyethylene glycol 3350 17 Gram(s) Oral daily  potassium chloride    Tablet ER 40 milliEquivalent(s) Oral every 4 hours  spironolactone 12.5 milliGRAM(s) Oral daily  sulfaSALAzine 500 milliGRAM(s) Oral three times a day  tiotropium 18 MICROgram(s) Capsule 1 Capsule(s) Inhalation daily  venlafaxine XR. 150 milliGRAM(s) Oral daily  verapamil  milliGRAM(s) Oral daily    MEDICATIONS  (PRN):  dextrose 40% Gel 15 Gram(s) Oral once PRN Blood Glucose LESS THAN 70 milliGRAM(s)/deciliter  glucagon  Injectable 1 milliGRAM(s) IntraMuscular once PRN Glucose LESS THAN 70 milligrams/deciliter  senna 2 Tablet(s) Oral at bedtime PRN Constipation  traMADol 50 milliGRAM(s) Oral every 6 hours PRN Moderate Pain (4 - 6)      Allergies    penicillins (Hives)  Plavix (Short breath)  quinolines (Other)    Intolerances        REVIEW OF SYSTEMS: Negative except as per HPI.    VITAL SIGNS:   Vital Signs Last 24 Hrs  T(C): 36.5 (2018 07:36), Max: 37.6 (2018 12:00)  T(F): 97.7 (2018 07:36), Max: 99.7 (2018 12:00)  HR: 107 (2018 07:36) (90 - 120)  BP: 112/70 (2018 07:36) (96/43 - 153/79)  BP(mean): 75 (2018 19:26) (75 - 75)  RR: 18 (2018 07:36) (16 - 30)  SpO2: 94% (2018 07:36) (93% - 100%)    I&O's Summary      PHYSICAL EXAM:  Constitutional: NAD, well-developed elderly  female  HEENT NC/AT, moist mucous membranes  Pulmonary: Non-labored, breath sounds, mildly coarse sounds at b/l bases, no wheezing, rales or rhonchi  Cardiovascular: +S1, S2, RRR, no murmurs, rubs, gallops  Gastrointestinal: Soft, nontender, mildly distended with interval improvement, normoactive bowel sounds  Extremities: No peripheral edema, peripheral pulses intact, extremities warm and well-perfused  Neurological: Alert, strength and sensitivity are grossly intact  Skin: No obvious lesions/rashes  Psych: Mood & affect appropriate    LABS: All Labs Reviewed:                        10.9   10.09 )-----------( 201      ( 2018 05:39 )             32.4                         13.0   11.39 )-----------( 252      ( 2018 11:54 )             39.4     2018 05:39    138    |  99     |  15     ----------------------------<  96     3.3     |  31     |  0.59   2018 11:54    133    |  97     |  15     ----------------------------<  151    4.3     |  26     |  0.76     Ca    8.2        2018 05:39  Ca    8.1        2018 11:54  Phos  3.3       2018 05:39  Mg     2.3       2018 05:39    TPro  7.8    /  Alb  3.8    /  TBili  0.3    /  DBili  x      /  AST  62     /  ALT  68     /  AlkPhos  153    2018 11:54    PT/INR - ( 2018 11:54 )   PT: 12.3 sec;   INR: 1.08 ratio         PTT - ( 2018 11:54 )  PTT:29.2 sec  CARDIAC MARKERS ( 2018 05:39 )  .213 ng/mL / x     / 758 U/L / x     / 16.3 ng/mL  CARDIAC MARKERS ( 2018 23:42 )  .171 ng/mL / x     / 360 U/L / x     / 9.2 ng/mL  CARDIAC MARKERS ( 2018 18:53 )  .203 ng/mL / x     / 206 U/L / x     / 6.2 ng/mL  CARDIAC MARKERS ( 2018 11:59 )  x     / x     / 246 U/L / x     / x      CARDIAC MARKERS ( 2018 11:54 )  .112 ng/mL / x     / x     / x     / x          Blood Culture:    @ 11:54  Pro Bnp 879        EKG: Sinus tachycardia at 106 BPM, LBBB, no significant change from previous    RADIOLOGY:  < from: US Duplex Venous Lower Ext Complete, Bilateral (18 @ 00:01) >    EXAM:  US DPLX LWR EXT VEINS COMPL BI                            PROCEDURE DATE:  2018          INTERPRETATION:  [facility] RADIOLOGIST PRELIMINARY REPORT    EXAM:    US Bilateral Duplex Lower Extremity Veins     EXAM DATE/TIME:    2018 11:40PM     CLINICAL HISTORY:    78 years old, female; Signs and symptoms; Other: SOB     TECHNIQUE:    Real-time duplex ultrasound of the Bilateral Lower Extremities with 2-D   gray   scale, color Doppler flow and spectral waveform analysis. Complete exam   focused   on the bilateral lower extremity veins.     COMPARISON:    No relevant prior studies available.     FINDINGS:    Right deep veins: Unremarkable. The common femoral, femoral and   popliteal   veins are patent without thrombus. Normal compressibility, augmentation   response and Doppler waveforms.    Right superficial veins: Saphenofemoral junction is patent without   thrombus.      Left deep veins: Unremarkable. The common femoral, femoral and popliteal   veins   are patent without thrombus. Normal compressibility, augmentation   response and   Doppler waveforms.    Left superficial veins: Saphenofemoral junction is patent without   thrombus.    Soft tissues:  Rounded right 2 x 0.8 x 1.6 cm popliteal cyst.     IMPRESSION:   1. No evidence of deep venous thrombosis in bilateral lower extremities   on   current study.   2. Rounded right 2 x 0.8 x 1.6 cm popliteal cyst.    Official report:    CLINICAL STATEMENT: Swelling leg.    TECHNIQUE: Ultrasound of bilateral lower extremity deep venous system.    COMPARISON: None.    FINDINGS:  There is color and spectral flow, compression and augmentation of the   common femoral, superficial femoral and popliteal veins.    There is flow in the posterior tibial vein.    Right popliteal cyst noted measuring 2.0 x 0.7 x 1.6 cm.    IMPRESSION:  No evidence of DVT.     Right Baker's cyst      NEETA ALDANA M.D., ATTENDING RADIOLOGIST    < end of copied text >    CXR:  < from: Xray Chest 1 View- PORTABLE-Urgent (18 @ 11:53) >  EXAM:  XR CHEST PORTABLE URGENT 1V                            PROCEDURE DATE:  2018          INTERPRETATION:  Clinical information: Shortness of breath.    Technique: Frontal view of the chest.    Comparison: Prior chest x-ray examination from 2018.    Findings: There is new moderate pulmonary edema. The heart size is at the   upper limits of normal. The patient is status post median sternotomy. The   patient is status post aortic valve replacement. Spinal stimulator leads   are in place. Multilevel degenerative changes are noted within the imaged   potions of the spine.    IMPRESSION: Moderate pulmonary edema.        KATINA THOMAS M.D., ATTENDING RADIOLOGIST  This document has been electronically signed. 2018 12:06PM    < end of copied text > [facility]         Tawny García, Beatriz, Mustapha, Joaquina, Zully Ravi        [phone] (office)      Interval HPI: 78y F with pmhx of COPD (on home o2, 2L NC at night), hypertension, hyperlipidemia, diverticulitis, depression, Rt breast cancer, status post lumpectomy and radiation , anxiety, hiatal hernia-status post surgical repair in , obstructive sleep apnea (not on CPAP at home), PVD, rheumatoid arthritis, thrush, TIA (), TMJ, valvular heart disease (aortic and mitral), H/O bio AVR, Type 2 diabetes (on metformin), presents to ED by EMS for SOB, admitted for flash pulmonary edema. she was started on BiPAP in ED yesterday, diuresed with lasix and weaned off BiPAP onto supplemental O2 which was been stopped this morning. Patient was seen and examined at the bedside, NAD, seen sitting up, pleasant and responding appropriately. She shows significant clinical improvement from previous day, is able to walk, speak in complete sentences. Denies any further SOB at present, denies chest pain, n/v/d, fever, chills, lightheadedness. VS stable, O2 sat mid 90s on room air. Tele NSR 96 BPM, no acute events overnight. Hgb noted to have dropped from 13 to 10.9      PAST MEDICAL & SURGICAL HISTORY:  Alcoholism: last drink   Skin cancer: not melanoma  Aortic valve replaced: with bovine heart valve  Meniere disease  Diabetes mellitus  Glaucoma  Dry eyes  GERD (gastroesophageal reflux disease)  Emphysema lung  Spinal stenosis  CHF (congestive heart failure)  LBBB (left bundle branch block)  TMJ (temporomandibular joint disorder)  Diverticulitis: hospitalized   SIMÓN (obstructive sleep apnea): category I severe pt does not use c/pap  Thrush: treated x 4 days  1 month ago  restarted medication  3-19-14 clortramazole 5x day  Anxiety  RA (rheumatoid arthritis): diagnosed   oxycodone prn  neck/ lower back  Depression  Borderline diabetes: no meds  COPD (chronic obstructive pulmonary disease): diagnosed   inhaler and nebulizer  HTN (hypertension)  PVD (peripheral vascular disease): left iliac  s/p surgical intervention   unsuccessful  Hiatal hernia: s/p surgical repair   Breast cancer: right s/p lumpectomy and radiation   TIA (transient ischemic attack): 2010  Hyperlipidemia  Valvular heart disease: aortic and mitral  H/O:  section: 2x  H/O sinus surgery  S/P AVR (aortic valve replacement): about 5 yrs ago (?)  PVD (peripheral vascular disease): s/p left iliac bypass which was unsuccessful  open repair  S/P carpal tunnel release: right   S/P lumpectomy, right breast:   Hiatal hernia: s/p surgical repair per pt   S/P hernia surgery: left inguinal age 12  S/P rotator cuff surgery: left   S/P appendectomy:   S/P  section: x2 /       SOCIAL HISTORY:   EtOH: hx of alcoholism, last drink   Tobacco: Denies  Drugs: Denies  Lives: Alone  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  Ambulates: independently without assistive devices    FAMILY HISTORY:  Family history of breast cancer (Aunt): aunt  Family history of colon cancer in father: also prostate ca, skin ca,      Home Medications:  Abilify 2 mg oral tablet: 1 tab(s) orally once a day (at bedtime) (2018 13:40)  aspirin 81 mg oral tablet: 1 tab(s) orally once a day HOME/  follow preop instructions as per surgeon and cardiologist (2018 13:40)  budesonide:  (2018 13:40)  clonazePAM 0.25 mg oral tablet: 1 tab(s) orally 2 times a day (2018 13:40)  clotrimazole: 4 lozenges daily (2018 13:40)  Dramamine:  (2018 13:40)  Effexor  mg oral capsule, extended release: 1 cap(s) orally once a day (2018 13:40)  furosemide 40 mg oral tablet: 1 tab(s) orally once a day (2018 13:40)  losartan 25 mg oral tablet: 1 tab(s) orally 2 times a day (2018 13:40)  metFORMIN 500 mg oral tablet: 1 tab(s) orally once a day in PM (2018 13:40)  ocular lubricant ophthalmic solution: 1 drop(s) to each affected eye 4 times a day, As needed, Dry Eyes (2018 13:40)  Percocet 10/325 oral tablet: 1 tab(s) orally every 6 hours, As Needed  HOSP (2018 13:40)  PriLOSEC 20 mg oral delayed release capsule: 1 cap(s) orally 2 times a day (2018 13:40)  ProAir HFA 90 mcg/inh inhalation aerosol: 2 puff(s) inhaled 4 times a day, As Needed (2018 13:40)  Spiriva Respimat 1.25 mcg/inh inhalation aerosol: 2 puff(s) inhaled once a day (2018 13:40)  spironolactone 25 mg oral tablet: 0.5 tab(s) orally once a day (2018 13:40)  sulfaSALAzine 500 mg oral tablet: 1 tab(s) orally 2 times a day (2018 13:40)  Symbicort 160 mcg-4.5 mcg/inh inhalation aerosol: 2 puff(s) inhaled 2 times a day (2018 13:40)  verapamil 240 mg/24 hours oral capsule, extended release: 1 cap(s) orally once a day (2018 13:40)  ZyrTEC 10 mg oral tablet: 1 tab(s) orally once a day (2018 13:40)      MEDICATIONS  (STANDING):  ALBUTerol    0.083% 2.5 milliGRAM(s) Nebulizer every 6 hours  ARIPiprazole 2 milliGRAM(s) Oral daily  aspirin  chewable 81 milliGRAM(s) Oral daily  buDESOnide 160 MICROgram(s)/formoterol 4.5 MICROgram(s) Inhaler 2 Puff(s) Inhalation two times a day  clonazePAM Tablet 0.25 milliGRAM(s) Oral two times a day  clotrimazole Lozenge 1 Lozenge Oral every 8 hours  dextrose 5%. 1000 milliLiter(s) (50 mL/Hr) IV Continuous <Continuous>  dextrose 50% Injectable 12.5 Gram(s) IV Push once  dextrose 50% Injectable 25 Gram(s) IV Push once  dextrose 50% Injectable 25 Gram(s) IV Push once  docusate sodium 100 milliGRAM(s) Oral three times a day  enoxaparin Injectable 40 milliGRAM(s) SubCutaneous every 24 hours  furosemide   Injectable 60 milliGRAM(s) IV Push two times a day  insulin lispro (HumaLOG) corrective regimen sliding scale   SubCutaneous Before meals and at bedtime  latanoprost 0.005% Ophthalmic Solution 1 Drop(s) Both EYES at bedtime  loratadine 10 milliGRAM(s) Oral daily  losartan 25 milliGRAM(s) Oral two times a day  methylPREDNISolone sodium succinate Injectable 40 milliGRAM(s) IV Push every 24 hours  oxyCODONE    5 mG/acetaminophen 325 mG 2 Tablet(s) Oral every 6 hours  pantoprazole    Tablet 40 milliGRAM(s) Oral before breakfast  polyethylene glycol 3350 17 Gram(s) Oral daily  potassium chloride    Tablet ER 40 milliEquivalent(s) Oral every 4 hours  spironolactone 12.5 milliGRAM(s) Oral daily  sulfaSALAzine 500 milliGRAM(s) Oral three times a day  tiotropium 18 MICROgram(s) Capsule 1 Capsule(s) Inhalation daily  venlafaxine XR. 150 milliGRAM(s) Oral daily  verapamil  milliGRAM(s) Oral daily    MEDICATIONS  (PRN):  dextrose 40% Gel 15 Gram(s) Oral once PRN Blood Glucose LESS THAN 70 milliGRAM(s)/deciliter  glucagon  Injectable 1 milliGRAM(s) IntraMuscular once PRN Glucose LESS THAN 70 milligrams/deciliter  senna 2 Tablet(s) Oral at bedtime PRN Constipation  traMADol 50 milliGRAM(s) Oral every 6 hours PRN Moderate Pain (4 - 6)      Allergies    penicillins (Hives)  Plavix (Short breath)  quinolines (Other)    Intolerances        REVIEW OF SYSTEMS: Negative except as per HPI.    VITAL SIGNS:   Vital Signs Last 24 Hrs  T(C): 36.5 (2018 07:36), Max: 37.6 (2018 12:00)  T(F): 97.7 (2018 07:36), Max: 99.7 (2018 12:00)  HR: 107 (2018 07:36) (90 - 120)  BP: 112/70 (2018 07:36) (96/43 - 153/79)  BP(mean): 75 (2018 19:26) (75 - 75)  RR: 18 (2018 07:36) (16 - 30)  SpO2: 94% (2018 07:36) (93% - 100%)    I&O's Summary      PHYSICAL EXAM:  Constitutional: NAD, well-developed    HEENT NC/AT, moist mucous membranes  Pulmonary: Decreased breath sounds b/l. No rales, crackles or wheeze appreciated.   Cardiovascular: +S1, S2, RRR, 2/6 SM  Gastrointestinal: Soft, nontender, mildly distended with interval improvement, normoactive bowel sounds  Extremities: No peripheral edema, peripheral pulses intact, extremities warm and well-perfused  Neurological: Alert, strength and sensitivity are grossly intact  Skin: No obvious lesions/rashes  Psych: Mood & affect appropriate    LABS: All Labs Reviewed:                        10.9   10.09 )-----------( 201      ( 2018 05:39 )             32.4                         13.0   11.39 )-----------( 252      ( 2018 11:54 )             39.4     2018 05:39    138    |  99     |  15     ----------------------------<  96     3.3     |  31     |  0.59   2018 11:54    133    |  97     |  15     ----------------------------<  151    4.3     |  26     |  0.76     Ca    8.2        2018 05:39  Ca    8.1        2018 11:54  Phos  3.3       2018 05:39  Mg     2.3       2018 05:39    TPro  7.8    /  Alb  3.8    /  TBili  0.3    /  DBili  x      /  AST  62     /  ALT  68     /  AlkPhos  153    2018 11:54    PT/INR - ( 2018 11:54 )   PT: 12.3 sec;   INR: 1.08 ratio         PTT - ( 2018 11:54 )  PTT:29.2 sec  CARDIAC MARKERS ( 2018 05:39 )  .213 ng/mL / x     / 758 U/L / x     / 16.3 ng/mL  CARDIAC MARKERS ( 2018 23:42 )  .171 ng/mL / x     / 360 U/L / x     / 9.2 ng/mL  CARDIAC MARKERS ( 2018 18:53 )  .203 ng/mL / x     / 206 U/L / x     / 6.2 ng/mL  CARDIAC MARKERS ( 2018 11:59 )  x     / x     / 246 U/L / x     / x      CARDIAC MARKERS ( 2018 11:54 )  .112 ng/mL / x     / x     / x     / x          Blood Culture:    @ 11:54  Pro Bnp 879        EKG: Sinus tachycardia at 106 BPM, LBBB, no significant change from previous    RADIOLOGY:  < from: US Duplex Venous Lower Ext Complete, Bilateral (18 @ 00:01) >    EXAM:  US DPLX LWR EXT VEINS COMPL BI                            PROCEDURE DATE:  2018          INTERPRETATION:  [facility] RADIOLOGIST PRELIMINARY REPORT    EXAM:    US Bilateral Duplex Lower Extremity Veins     EXAM DATE/TIME:    2018 11:40PM     CLINICAL HISTORY:    78 years old, female; Signs and symptoms; Other: SOB     TECHNIQUE:    Real-time duplex ultrasound of the Bilateral Lower Extremities with 2-D   gray   scale, color Doppler flow and spectral waveform analysis. Complete exam   focused   on the bilateral lower extremity veins.     COMPARISON:    No relevant prior studies available.     FINDINGS:    Right deep veins: Unremarkable. The common femoral, femoral and   popliteal   veins are patent without thrombus. Normal compressibility, augmentation   response and Doppler waveforms.    Right superficial veins: Saphenofemoral junction is patent without   thrombus.      Left deep veins: Unremarkable. The common femoral, femoral and popliteal   veins   are patent without thrombus. Normal compressibility, augmentation   response and   Doppler waveforms.    Left superficial veins: Saphenofemoral junction is patent without   thrombus.    Soft tissues:  Rounded right 2 x 0.8 x 1.6 cm popliteal cyst.     IMPRESSION:   1. No evidence of deep venous thrombosis in bilateral lower extremities   on   current study.   2. Rounded right 2 x 0.8 x 1.6 cm popliteal cyst.    Official report:    CLINICAL STATEMENT: Swelling leg.    TECHNIQUE: Ultrasound of bilateral lower extremity deep venous system.    COMPARISON: None.    FINDINGS:  There is color and spectral flow, compression and augmentation of the   common femoral, superficial femoral and popliteal veins.    There is flow in the posterior tibial vein.    Right popliteal cyst noted measuring 2.0 x 0.7 x 1.6 cm.    IMPRESSION:  No evidence of DVT.     Right Baker's cyst      NEETA ALDANA M.D., ATTENDING RADIOLOGIST    < end of copied text >    CXR:  < from: Xray Chest 1 View- PORTABLE-Urgent (18 @ 11:53) >  EXAM:  XR CHEST PORTABLE URGENT 1V                            PROCEDURE DATE:  2018          INTERPRETATION:  Clinical information: Shortness of breath.    Technique: Frontal view of the chest.    Comparison: Prior chest x-ray examination from 2018.    Findings: There is new moderate pulmonary edema. The heart size is at the   upper limits of normal. The patient is status post median sternotomy. The   patient is status post aortic valve replacement. Spinal stimulator leads   are in place. Multilevel degenerative changes are noted within the imaged   potions of the spine.    IMPRESSION: Moderate pulmonary edema.        KATINA THOMAS M.D., ATTENDING RADIOLOGIST  This document has been electronically signed. 2018 12:06PM    < end of copied text >

## 2018-11-29 NOTE — PROGRESS NOTE ADULT - PROBLEM SELECTOR PLAN 1
resp distress  pulm edema  copd and valv heart disease,   s/p BIPAP, on o2 support now, keep sat > 88 pct  COPD regimen - inhaler and nebs and systemic steroids  on IV lasix at present  education and counseling  imaging and labs reviewed  replete lytes as needed  old TTE and CT in the chart  overall better  mobilize  check sat on exertion  will follow

## 2018-11-29 NOTE — PROGRESS NOTE ADULT - PROBLEM SELECTOR PLAN 7
On home metformin 500mgqd  - Will hold metformin  - start low dose sliding scale Accu check  - hypoglycemic protocol On home metformin 500mgqd  - Will hold metformin now  - start low dose sliding scale Accu check  - hypoglycemic protocol

## 2018-11-29 NOTE — PROGRESS NOTE ADULT - SUBJECTIVE AND OBJECTIVE BOX
Patient is a 78y old  Female who presents with a chief complaint of Acute Respiratory failure (29 Nov 2018 09:28)      INTERVAL HPI:      OVERNIGHT EVENTS:    Home Medications:  Abilify 2 mg oral tablet: 1 tab(s) orally once a day (at bedtime) (28 Nov 2018 13:40)  aspirin 81 mg oral tablet: 1 tab(s) orally once a day HOME/  follow preop instructions as per surgeon and cardiologist (28 Nov 2018 13:40)  budesonide:  (28 Nov 2018 13:40)  clonazePAM 0.25 mg oral tablet: 1 tab(s) orally 2 times a day (28 Nov 2018 13:40)  clotrimazole: 4 lozenges daily (28 Nov 2018 13:40)  Dramamine:  (28 Nov 2018 13:40)  Effexor  mg oral capsule, extended release: 1 cap(s) orally once a day (28 Nov 2018 13:40)  furosemide 40 mg oral tablet: 1 tab(s) orally once a day (28 Nov 2018 13:40)  losartan 25 mg oral tablet: 1 tab(s) orally 2 times a day (28 Nov 2018 13:40)  metFORMIN 500 mg oral tablet: 1 tab(s) orally once a day in PM (28 Nov 2018 13:40)  ocular lubricant ophthalmic solution: 1 drop(s) to each affected eye 4 times a day, As needed, Dry Eyes (28 Nov 2018 13:40)  Percocet 10/325 oral tablet: 1 tab(s) orally every 6 hours, As Needed  HOSP (28 Nov 2018 13:40)  PriLOSEC 20 mg oral delayed release capsule: 1 cap(s) orally 2 times a day (28 Nov 2018 13:40)  ProAir HFA 90 mcg/inh inhalation aerosol: 2 puff(s) inhaled 4 times a day, As Needed (28 Nov 2018 13:40)  Spiriva Respimat 1.25 mcg/inh inhalation aerosol: 2 puff(s) inhaled once a day (28 Nov 2018 13:40)  spironolactone 25 mg oral tablet: 0.5 tab(s) orally once a day (28 Nov 2018 13:40)  sulfaSALAzine 500 mg oral tablet: 1 tab(s) orally 2 times a day (28 Nov 2018 13:40)  Symbicort 160 mcg-4.5 mcg/inh inhalation aerosol: 2 puff(s) inhaled 2 times a day (28 Nov 2018 13:40)  verapamil 240 mg/24 hours oral capsule, extended release: 1 cap(s) orally once a day (28 Nov 2018 13:40)  ZyrTEC 10 mg oral tablet: 1 tab(s) orally once a day (28 Nov 2018 13:40)      MEDICATIONS  (STANDING):  ALBUTerol    0.083% 2.5 milliGRAM(s) Nebulizer every 6 hours  ARIPiprazole 2 milliGRAM(s) Oral daily  aspirin  chewable 81 milliGRAM(s) Oral daily  buDESOnide 160 MICROgram(s)/formoterol 4.5 MICROgram(s) Inhaler 2 Puff(s) Inhalation two times a day  clonazePAM Tablet 0.25 milliGRAM(s) Oral two times a day  clotrimazole Lozenge 1 Lozenge Oral every 8 hours  dextrose 5%. 1000 milliLiter(s) (50 mL/Hr) IV Continuous <Continuous>  dextrose 50% Injectable 12.5 Gram(s) IV Push once  dextrose 50% Injectable 25 Gram(s) IV Push once  dextrose 50% Injectable 25 Gram(s) IV Push once  docusate sodium 100 milliGRAM(s) Oral three times a day  enoxaparin Injectable 40 milliGRAM(s) SubCutaneous every 24 hours  furosemide   Injectable 60 milliGRAM(s) IV Push two times a day  insulin lispro (HumaLOG) corrective regimen sliding scale   SubCutaneous Before meals and at bedtime  latanoprost 0.005% Ophthalmic Solution 1 Drop(s) Both EYES at bedtime  loratadine 10 milliGRAM(s) Oral daily  losartan 25 milliGRAM(s) Oral two times a day  methylPREDNISolone sodium succinate Injectable 40 milliGRAM(s) IV Push every 24 hours  oxyCODONE    5 mG/acetaminophen 325 mG 2 Tablet(s) Oral every 6 hours  pantoprazole    Tablet 40 milliGRAM(s) Oral before breakfast  polyethylene glycol 3350 17 Gram(s) Oral daily  spironolactone 12.5 milliGRAM(s) Oral daily  sulfaSALAzine 500 milliGRAM(s) Oral three times a day  tiotropium 18 MICROgram(s) Capsule 1 Capsule(s) Inhalation daily  venlafaxine XR. 150 milliGRAM(s) Oral daily  verapamil  milliGRAM(s) Oral daily    MEDICATIONS  (PRN):  dextrose 40% Gel 15 Gram(s) Oral once PRN Blood Glucose LESS THAN 70 milliGRAM(s)/deciliter  glucagon  Injectable 1 milliGRAM(s) IntraMuscular once PRN Glucose LESS THAN 70 milligrams/deciliter  senna 2 Tablet(s) Oral at bedtime PRN Constipation  traMADol 50 milliGRAM(s) Oral every 6 hours PRN Moderate Pain (4 - 6)      Allergies    penicillins (Hives)  Plavix (Short breath)  quinolines (Other)    Intolerances        REVIEW OF SYSTEMS:  CONSTITUTIONAL: No fever, No chills, No fatigue, No myalgia, No Body ache, No Weakness  EYES: No eye pain,  No visual disturbances, No discharge, NO Redness  ENMT:  No ear pain, No nose bleed, No vertigo; No sinus or throat pain, No Congestion  NECK: No pain, No stiffness  RESPIRATORY: No cough, wheezing, No  hemoptysis, No shortness of breath  CARDIOVASCULAR: No chest pain, palpitations  GASTROINTESTINAL: No abdominal or epigastric pain. No nausea, No vomiting; No diarrhea or constipation. [  ] BM  GENITOURINARY: No dysuria, No frequency, No urgency, No hematuria, or incontinence  NEUROLOGICAL: No headaches, No dizziness, No numbness, No tingling, No tremors, No weakness  EXT: No Swelling, No Pain, No Edema  SKIN:  [  ] No itching, burning, rashes, or lesions   MUSCULOSKELETAL: No joint pain or swelling; No muscle pain, No back pain, No extremity pain  PSYCHIATRIC: No depression, anxiety, mood swings or difficulty sleeping at night  PAIN SCALE: [  ] None  [  ] Other-  ROS Unable to obtain due to - [  ] Dementia  [  ] Lethargy  [  ] Sedated [  ] non verbal  REST OF REVIEW Of SYSTEM - [  ] Normal     Vital Signs Last 24 Hrs  T(C): 36.5 (29 Nov 2018 07:36), Max: 37.6 (28 Nov 2018 12:00)  T(F): 97.7 (29 Nov 2018 07:36), Max: 99.7 (28 Nov 2018 12:00)  HR: 107 (29 Nov 2018 07:36) (90 - 120)  BP: 112/70 (29 Nov 2018 07:36) (96/43 - 153/79)  BP(mean): 75 (28 Nov 2018 19:26) (75 - 75)  RR: 18 (29 Nov 2018 07:36) (16 - 30)  SpO2: 94% (29 Nov 2018 07:36) (93% - 100%)  Finger Stick          PHYSICAL EXAM:  GENERAL:  [  ] NAD , [  ] well appearing, [  ] Agitated, [  ] Drowsy,  [  ] Lethargy, [  ] confused   HEAD:  [  ] Normal, [  ] Other  EYES:  [  ] EOMI, [  ] PERRLA, [  ] conjunctiva and sclera clear normal, [  ] Other,  [  ] Pallor,[  ] Discharge  ENMT:  [  ] Normal, [  ] Moist mucous membranes, [  ] Good dentition, [  ] No Thrush  NECK:  [  ] Supple, [  ] No JVD, [  ] Normal thyroid, [  ] Lymphadenopathy [  ] Other  CHEST/LUNG:  [  ] Clear to auscultation bilaterally, [  ] Breath Sounds equal OR Decrease,  [  ] No rales, [  ] No rhonchi  [  ]  No wheezing  HEART:  [  ] Regular rate and rhythm, [  ] tachycardia, [  ] Bradycardia,  [  ] irregular  [  ] No murmurs, No rubs, No gallops, [  ] PPM in place (Mfr:  )  ABDOMEN:  [  ] Soft, [  ] Nontender, [  ] Nondistended, [  ] No mass, [  ] Bowel sounds present, [  ] obese  NERVOUS SYSTEM:  [  ] Alert & Oriented X3, [  ] Nonfocal  [  ] Confusion  [  ] Encephalopathic [  ] Sedated [  ] Unable to assess, [  ] Other-  EXTREMITIES: [  ] 2+ Peripheral Pulses, No clubbing, No cyanosis,  [  ] edema B/L lower EXT. [  ] PVD stasis skin changes B/L Lower EXT  LYMPH: No lymphadenopathy noted  SKIN:  [  ] No rashes or lesions, [  ] Pressure Ulcers, [  ] ecchymosis, [  ] Skin Tears, [  ] Other    DIET:     LABS:                        10.9   10.09 )-----------( 201      ( 29 Nov 2018 05:39 )             32.4     29 Nov 2018 05:39    138    |  99     |  15     ----------------------------<  96     3.3     |  31     |  0.59     Ca    8.2        29 Nov 2018 05:39  Phos  3.3       29 Nov 2018 05:39  Mg     2.3       29 Nov 2018 05:39    TPro  7.8    /  Alb  3.8    /  TBili  0.3    /  DBili  x      /  AST  62     /  ALT  68     /  AlkPhos  153    28 Nov 2018 11:54    PT/INR - ( 28 Nov 2018 11:54 )   PT: 12.3 sec;   INR: 1.08 ratio         PTT - ( 28 Nov 2018 11:54 )  PTT:29.2 sec              CARDIAC MARKERS ( 29 Nov 2018 05:39 )  .213 ng/mL / x     / 758 U/L / x     / 16.3 ng/mL  CARDIAC MARKERS ( 28 Nov 2018 23:42 )  .171 ng/mL / x     / 360 U/L / x     / 9.2 ng/mL  CARDIAC MARKERS ( 28 Nov 2018 18:53 )  .203 ng/mL / x     / 206 U/L / x     / 6.2 ng/mL  CARDIAC MARKERS ( 28 Nov 2018 11:59 )  x     / x     / 246 U/L / x     / x      CARDIAC MARKERS ( 28 Nov 2018 11:54 )  .112 ng/mL / x     / x     / x     / x                 Anemia Panel:      Thyroid Panel:            Serum Pro-Brain Natriuretic Peptide: 879 pg/mL (11-28-18 @ 11:54)      RADIOLOGY & ADDITIONAL TESTS:      HEALTH ISSUES - PROBLEM Dx:  Pulmonary edema, acute: Pulmonary edema, acute  COPD (chronic obstructive pulmonary disease): COPD (chronic obstructive pulmonary disease)  Prophylactic measure: Prophylactic measure  Anxiety: Anxiety  HTN (hypertension): HTN (hypertension)  Diabetes mellitus: Diabetes mellitus  RA (rheumatoid arthritis): RA (rheumatoid arthritis)  Hyponatremia: Hyponatremia  Valvular heart disease: Valvular heart disease  Lactate blood increase: Lactate blood increase  Acute congestive heart failure, unspecified heart failure type: Acute congestive heart failure, unspecified heart failure type  Acute respiratory failure, unspecified whether with hypoxia or hypercapnia: Acute respiratory failure, unspecified whether with hypoxia or hypercapnia          Consultant(s) Notes Reviewed:  [  ] YES     Care Discussed with [X] Consultants  [  ] Patient  [  ] Family  [  ]   [  ] Social Service  [  ] RN, [  ] Physical Therapy  DVT PPX: [  ] Lovenox, [  ] S C Heparin, [  ] Coumadin, [  ] Xarelto, [  ] Eliquis, [  ] Pradaxa, [  ] IV Heparin drip, [  ] SCD [  ] Contraindication 2 to GI Bleed,[  ] Ambulation  Advanced directive: [  ] None, [  ] DNR/DNI Patient is a 78y old  Female who presents with a chief complaint of Acute Respiratory failure (29 Nov 2018 09:28)      INTERVAL HPI: Patient was seen and examined this morning at bedside. Patient was sitting in chair, eating her breakfast. Patient has no acute complaints and states "I am feeling much better today". Patient denies headache, SOB, chest pain, palpitations, weakness, numbness, or tingling.       OVERNIGHT EVENTS: None    Home Medications:  Abilify 2 mg oral tablet: 1 tab(s) orally once a day (at bedtime) (28 Nov 2018 13:40)  aspirin 81 mg oral tablet: 1 tab(s) orally once a day HOME/  follow preop instructions as per surgeon and cardiologist (28 Nov 2018 13:40)  budesonide:  (28 Nov 2018 13:40)  clonazePAM 0.25 mg oral tablet: 1 tab(s) orally 2 times a day (28 Nov 2018 13:40)  clotrimazole: 4 lozenges daily (28 Nov 2018 13:40)  Dramamine:  (28 Nov 2018 13:40)  Effexor  mg oral capsule, extended release: 1 cap(s) orally once a day (28 Nov 2018 13:40)  furosemide 40 mg oral tablet: 1 tab(s) orally once a day (28 Nov 2018 13:40)  losartan 25 mg oral tablet: 1 tab(s) orally 2 times a day (28 Nov 2018 13:40)  metFORMIN 500 mg oral tablet: 1 tab(s) orally once a day in PM (28 Nov 2018 13:40)  ocular lubricant ophthalmic solution: 1 drop(s) to each affected eye 4 times a day, As needed, Dry Eyes (28 Nov 2018 13:40)  Percocet 10/325 oral tablet: 1 tab(s) orally every 6 hours, As Needed  HOSP (28 Nov 2018 13:40)  PriLOSEC 20 mg oral delayed release capsule: 1 cap(s) orally 2 times a day (28 Nov 2018 13:40)  ProAir HFA 90 mcg/inh inhalation aerosol: 2 puff(s) inhaled 4 times a day, As Needed (28 Nov 2018 13:40)  Spiriva Respimat 1.25 mcg/inh inhalation aerosol: 2 puff(s) inhaled once a day (28 Nov 2018 13:40)  spironolactone 25 mg oral tablet: 0.5 tab(s) orally once a day (28 Nov 2018 13:40)  sulfaSALAzine 500 mg oral tablet: 1 tab(s) orally 2 times a day (28 Nov 2018 13:40)  Symbicort 160 mcg-4.5 mcg/inh inhalation aerosol: 2 puff(s) inhaled 2 times a day (28 Nov 2018 13:40)  verapamil 240 mg/24 hours oral capsule, extended release: 1 cap(s) orally once a day (28 Nov 2018 13:40)  ZyrTEC 10 mg oral tablet: 1 tab(s) orally once a day (28 Nov 2018 13:40)      MEDICATIONS  (STANDING):  ALBUTerol    0.083% 2.5 milliGRAM(s) Nebulizer every 6 hours  ARIPiprazole 2 milliGRAM(s) Oral daily  aspirin  chewable 81 milliGRAM(s) Oral daily  buDESOnide 160 MICROgram(s)/formoterol 4.5 MICROgram(s) Inhaler 2 Puff(s) Inhalation two times a day  clonazePAM Tablet 0.25 milliGRAM(s) Oral two times a day  clotrimazole Lozenge 1 Lozenge Oral every 8 hours  dextrose 5%. 1000 milliLiter(s) (50 mL/Hr) IV Continuous <Continuous>  dextrose 50% Injectable 12.5 Gram(s) IV Push once  dextrose 50% Injectable 25 Gram(s) IV Push once  dextrose 50% Injectable 25 Gram(s) IV Push once  docusate sodium 100 milliGRAM(s) Oral three times a day  enoxaparin Injectable 40 milliGRAM(s) SubCutaneous every 24 hours  furosemide   Injectable 60 milliGRAM(s) IV Push two times a day  insulin lispro (HumaLOG) corrective regimen sliding scale   SubCutaneous Before meals and at bedtime  latanoprost 0.005% Ophthalmic Solution 1 Drop(s) Both EYES at bedtime  loratadine 10 milliGRAM(s) Oral daily  losartan 25 milliGRAM(s) Oral two times a day  methylPREDNISolone sodium succinate Injectable 40 milliGRAM(s) IV Push every 24 hours  oxyCODONE    5 mG/acetaminophen 325 mG 2 Tablet(s) Oral every 6 hours  pantoprazole    Tablet 40 milliGRAM(s) Oral before breakfast  polyethylene glycol 3350 17 Gram(s) Oral daily  spironolactone 12.5 milliGRAM(s) Oral daily  sulfaSALAzine 500 milliGRAM(s) Oral three times a day  tiotropium 18 MICROgram(s) Capsule 1 Capsule(s) Inhalation daily  venlafaxine XR. 150 milliGRAM(s) Oral daily  verapamil  milliGRAM(s) Oral daily    MEDICATIONS  (PRN):  dextrose 40% Gel 15 Gram(s) Oral once PRN Blood Glucose LESS THAN 70 milliGRAM(s)/deciliter  glucagon  Injectable 1 milliGRAM(s) IntraMuscular once PRN Glucose LESS THAN 70 milligrams/deciliter  senna 2 Tablet(s) Oral at bedtime PRN Constipation  traMADol 50 milliGRAM(s) Oral every 6 hours PRN Moderate Pain (4 - 6)      Allergies    penicillins (Hives)  Plavix (Short breath)  quinolines (Other)    Intolerances        REVIEW OF SYSTEMS: All ROS negative except for that noted in HPI.  CONSTITUTIONAL: No fever, No chills, No fatigue, No myalgia, No Body ache, No Weakness  EYES: No eye pain,  No visual disturbances, No discharge, NO Redness  ENMT:  No ear pain, No nose bleed, No vertigo; No sinus or throat pain, No Congestion  NECK: No pain, No stiffness  RESPIRATORY: No cough, wheezing, No  hemoptysis, No shortness of breath  CARDIOVASCULAR: No chest pain, palpitations  GASTROINTESTINAL: No abdominal or epigastric pain. No nausea, No vomiting; No diarrhea or constipation. [  ] BM  GENITOURINARY: No dysuria, No frequency, No urgency, No hematuria, or incontinence  NEUROLOGICAL: No headaches, No dizziness, No numbness, No tingling, No tremors, No weakness  EXT: No Swelling, No Pain, No Edema  SKIN:  [  ] No itching, burning, rashes, or lesions   MUSCULOSKELETAL: No joint pain or swelling; No muscle pain, No back pain, No extremity pain  PSYCHIATRIC: No depression, anxiety, mood swings or difficulty sleeping at night  PAIN SCALE: [  ] None  [  ] Other-  ROS Unable to obtain due to - [  ] Dementia  [  ] Lethargy  [  ] Sedated [  ] non verbal  REST OF REVIEW Of SYSTEM - [  ] Normal     Vital Signs Last 24 Hrs  T(C): 36.5 (29 Nov 2018 07:36), Max: 37.6 (28 Nov 2018 12:00)  T(F): 97.7 (29 Nov 2018 07:36), Max: 99.7 (28 Nov 2018 12:00)  HR: 107 (29 Nov 2018 07:36) (90 - 120)  BP: 112/70 (29 Nov 2018 07:36) (96/43 - 153/79)  BP(mean): 75 (28 Nov 2018 19:26) (75 - 75)  RR: 18 (29 Nov 2018 07:36) (16 - 30)  SpO2: 94% (29 Nov 2018 07:36) (93% - 100%)  Finger Stick          PHYSICAL EXAM:  GENERAL:  [ X ] NAD , [ X ] well appearing, [  ] Agitated, [  ] Drowsy,  [  ] Lethargy, [  ] confused   HEAD:  [ X ] Normal, [  ] Other  EYES:  [ X ] EOMI, [ X ] PERRLA, [ X ] conjunctiva and sclera clear normal, [  ] Other,  [  ] Pallor,[  ] Discharge  ENMT:  [ X ] Normal, [ X ] Moist mucous membranes, [ X ] Good dentition, [  ] No Thrush  NECK:  [ X ] Supple, [ X ] No JVD, [ X ] Normal thyroid, [  ] Lymphadenopathy [  ] Other  CHEST/LUNG:  [  ] Clear to auscultation bilaterally, [  ] Breath Sounds equal OR Decrease,  [ X ] No rales, [ X ] No rhonchi  [ + ] wheezing in right lower lobe.  HEART:  [ X ] Regular rate and rhythm, [  ] tachycardia, [  ] Bradycardia,  [  ] irregular  [ + ] aortic murmur, No rubs, No gallops, [  ] PPM in place (Mfr:  )  ABDOMEN:  [ X ] Soft, [ X ] Nontender, [ X ] Nondistended, [ X ] No mass, [ X ] Bowel sounds present, [  ] obese  NERVOUS SYSTEM:  [ X ] Alert & Oriented X3, [  ] Nonfocal  [  ] Confusion  [  ] Encephalopathic [  ] Sedated [  ] Unable to assess, [  ] Other-  EXTREMITIES: [ X ] 2+ Peripheral Pulses, No clubbing, No cyanosis,  [  ] edema B/L lower EXT. [  ] PVD stasis skin changes B/L Lower EXT  LYMPH: No lymphadenopathy noted  SKIN:  [ X ] No rashes or lesions, [  ] Pressure Ulcers, [  ] ecchymosis, [  ] Skin Tears, [  ] Other    DIET:     LABS:                        10.9   10.09 )-----------( 201      ( 29 Nov 2018 05:39 )             32.4     29 Nov 2018 05:39    138    |  99     |  15     ----------------------------<  96     3.3     |  31     |  0.59     Ca    8.2        29 Nov 2018 05:39  Phos  3.3       29 Nov 2018 05:39  Mg     2.3       29 Nov 2018 05:39    TPro  7.8    /  Alb  3.8    /  TBili  0.3    /  DBili  x      /  AST  62     /  ALT  68     /  AlkPhos  153    28 Nov 2018 11:54    PT/INR - ( 28 Nov 2018 11:54 )   PT: 12.3 sec;   INR: 1.08 ratio         PTT - ( 28 Nov 2018 11:54 )  PTT:29.2 sec              CARDIAC MARKERS ( 29 Nov 2018 05:39 )  .213 ng/mL / x     / 758 U/L / x     / 16.3 ng/mL  CARDIAC MARKERS ( 28 Nov 2018 23:42 )  .171 ng/mL / x     / 360 U/L / x     / 9.2 ng/mL  CARDIAC MARKERS ( 28 Nov 2018 18:53 )  .203 ng/mL / x     / 206 U/L / x     / 6.2 ng/mL  CARDIAC MARKERS ( 28 Nov 2018 11:59 )  x     / x     / 246 U/L / x     / x      CARDIAC MARKERS ( 28 Nov 2018 11:54 )  .112 ng/mL / x     / x     / x     / x                 Anemia Panel:      Thyroid Panel:            Serum Pro-Brain Natriuretic Peptide: 879 pg/mL (11-28-18 @ 11:54)      RADIOLOGY & ADDITIONAL TESTS:  ECHO Today: 11/29/2018    HEALTH ISSUES - PROBLEM Dx:  Pulmonary edema, acute: Pulmonary edema, acute  COPD (chronic obstructive pulmonary disease): COPD (chronic obstructive pulmonary disease)  Prophylactic measure: Prophylactic measure  Anxiety: Anxiety  HTN (hypertension): HTN (hypertension)  Diabetes mellitus: Diabetes mellitus  RA (rheumatoid arthritis): RA (rheumatoid arthritis)  Hyponatremia: Hyponatremia  Valvular heart disease: Valvular heart disease  Lactate blood increase: Lactate blood increase  Acute congestive heart failure, unspecified heart failure type: Acute congestive heart failure, unspecified heart failure type  Acute respiratory failure, unspecified whether with hypoxia or hypercapnia: Acute respiratory failure, unspecified whether with hypoxia or hypercapnia          Consultant(s) Notes Reviewed:  [  ] YES     Care Discussed with [X] Consultants  [  ] Patient  [  ] Family  [  ]   [  ] Social Service  [  ] RN, [  ] Physical Therapy  DVT PPX: [  ] Lovenox, [  ] S C Heparin, [  ] Coumadin, [  ] Xarelto, [  ] Eliquis, [  ] Pradaxa, [  ] IV Heparin drip, [  ] SCD [  ] Contraindication 2 to GI Bleed,[  ] Ambulation  Advanced directive: [  ] None, [  ] DNR/DNI Patient is a 78y old  Female who presents with a chief complaint of Acute Respiratory failure (29 Nov 2018 09:28)      INTERVAL HPI: 78y F with pmhx of COPD (on home o2, 2L NC), hypertension, hyperlipidemia, diverticulitis, depression, Rt breast cancer, status post lumpectomy and radiation 2008, anxiety, hiatal hernia-status post surgical repair in 2005, obstructive sleep apnea (not on CPAP at home), PVD, rheumatoid arthritis, thrush, TIA (2010), TMJ, valvular heart disease (aortic and mitral), H/O AVR, Type 2 diabetes (on metformin), presents to ED by EMS for SOB. As per patient, she was on her way to work this morning and while walking to her car, stopped to  garbage cans. Suddenly felt short of breath with increased work of breathing. Reports felt similar to previous episodes of COPD exacerbation and immediately called EMS. EMS arrived, noted rhonchi on lung exam and gave patient subinguinal nitroglycerin, started on CPAP. Patient has hx of chronic nonproductive cough, denies worsening of cough over last months. Daughter at bedside reports patient was diaphoretic with EMS. Recently visited pulmonologist and cardiologist last week, was in normal state of health. Patient denies fevers, chills, nausea, vomiting, CP, palpitations, abdominal pain, diarrhea, constipation, peripheral edema.  Of note, patient admitted in April 2017 for COPD exacerbation.     In the ED, vitals are T 99.7, , /88, RR 26 Spo2 94% on supplemental oxygen. Repeat BP 96/43 after Lasix 40mg IV given. Labs significant for mild leukocytosis (11.39), hyponatremia (133), hyperglycemia (151), elevated alk phos (153), elevated AST (62), elevated lactate (3.2), elevated creatine kinase (246), elevated trop (0.112), elevated proBNP (879). ABG unimpressive, wnl. CXR There is new moderate pulmonary edema. The heart size is at the upper limits of normal. The patient is status post median sternotomy. The patient is status post aortic valve replacement. Spinal stimulator leads are in place. Multilevel degenerative changes are noted within the imaged potions of the spine. Impression: moderate pulmonary edema. EKG: Sinus Tachy 106 with LBBB. Received Lasix 40mg IVx1, blood cultures pending. Pt was placed on BI PAP in ER.  Pt seen by Cardiology DR Kunz in ER, IV Lasix to continue.     ICU eval done, as per ICU pt does NOT need ICU care ,Dr. Rivera saw patient in ED, stable for admission to [facility].     11/29/18: Patient was seen and examined this morning at bedside. Patient was sitting in chair, eating her breakfast. Patient has no acute complaints and states "I am feeling much better today". Patient denies headache, SOB, chest pain, palpitations, weakness, numbness, or tingling. Admits 1 BM overnight and is urinating well. US LE neg. Trops resolving, EKG showing T wave changes. ECHO pending.       OVERNIGHT EVENTS: None    Home Medications:  Abilify 2 mg oral tablet: 1 tab(s) orally once a day (at bedtime) (28 Nov 2018 13:40)  aspirin 81 mg oral tablet: 1 tab(s) orally once a day HOME/  follow preop instructions as per surgeon and cardiologist (28 Nov 2018 13:40)  budesonide:  (28 Nov 2018 13:40)  clonazePAM 0.25 mg oral tablet: 1 tab(s) orally 2 times a day (28 Nov 2018 13:40)  clotrimazole: 4 lozenges daily (28 Nov 2018 13:40)  Dramamine:  (28 Nov 2018 13:40)  Effexor  mg oral capsule, extended release: 1 cap(s) orally once a day (28 Nov 2018 13:40)  furosemide 40 mg oral tablet: 1 tab(s) orally once a day (28 Nov 2018 13:40)  losartan 25 mg oral tablet: 1 tab(s) orally 2 times a day (28 Nov 2018 13:40)  metFORMIN 500 mg oral tablet: 1 tab(s) orally once a day in PM (28 Nov 2018 13:40)  ocular lubricant ophthalmic solution: 1 drop(s) to each affected eye 4 times a day, As needed, Dry Eyes (28 Nov 2018 13:40)  Percocet 10/325 oral tablet: 1 tab(s) orally every 6 hours, As Needed  HOSP (28 Nov 2018 13:40)  PriLOSEC 20 mg oral delayed release capsule: 1 cap(s) orally 2 times a day (28 Nov 2018 13:40)  ProAir HFA 90 mcg/inh inhalation aerosol: 2 puff(s) inhaled 4 times a day, As Needed (28 Nov 2018 13:40)  Spiriva Respimat 1.25 mcg/inh inhalation aerosol: 2 puff(s) inhaled once a day (28 Nov 2018 13:40)  spironolactone 25 mg oral tablet: 0.5 tab(s) orally once a day (28 Nov 2018 13:40)  sulfaSALAzine 500 mg oral tablet: 1 tab(s) orally 2 times a day (28 Nov 2018 13:40)  Symbicort 160 mcg-4.5 mcg/inh inhalation aerosol: 2 puff(s) inhaled 2 times a day (28 Nov 2018 13:40)  verapamil 240 mg/24 hours oral capsule, extended release: 1 cap(s) orally once a day (28 Nov 2018 13:40)  ZyrTEC 10 mg oral tablet: 1 tab(s) orally once a day (28 Nov 2018 13:40)      MEDICATIONS  (STANDING):  ALBUTerol    0.083% 2.5 milliGRAM(s) Nebulizer every 6 hours  ARIPiprazole 2 milliGRAM(s) Oral daily  aspirin  chewable 81 milliGRAM(s) Oral daily  buDESOnide 160 MICROgram(s)/formoterol 4.5 MICROgram(s) Inhaler 2 Puff(s) Inhalation two times a day  clonazePAM Tablet 0.25 milliGRAM(s) Oral two times a day  clotrimazole Lozenge 1 Lozenge Oral every 8 hours  dextrose 5%. 1000 milliLiter(s) (50 mL/Hr) IV Continuous <Continuous>  dextrose 50% Injectable 12.5 Gram(s) IV Push once  dextrose 50% Injectable 25 Gram(s) IV Push once  dextrose 50% Injectable 25 Gram(s) IV Push once  docusate sodium 100 milliGRAM(s) Oral three times a day  enoxaparin Injectable 40 milliGRAM(s) SubCutaneous every 24 hours  furosemide   Injectable 60 milliGRAM(s) IV Push two times a day  insulin lispro (HumaLOG) corrective regimen sliding scale   SubCutaneous Before meals and at bedtime  latanoprost 0.005% Ophthalmic Solution 1 Drop(s) Both EYES at bedtime  loratadine 10 milliGRAM(s) Oral daily  losartan 25 milliGRAM(s) Oral two times a day  methylPREDNISolone sodium succinate Injectable 40 milliGRAM(s) IV Push every 24 hours  oxyCODONE    5 mG/acetaminophen 325 mG 2 Tablet(s) Oral every 6 hours  pantoprazole    Tablet 40 milliGRAM(s) Oral before breakfast  polyethylene glycol 3350 17 Gram(s) Oral daily  spironolactone 12.5 milliGRAM(s) Oral daily  sulfaSALAzine 500 milliGRAM(s) Oral three times a day  tiotropium 18 MICROgram(s) Capsule 1 Capsule(s) Inhalation daily  venlafaxine XR. 150 milliGRAM(s) Oral daily  verapamil  milliGRAM(s) Oral daily    MEDICATIONS  (PRN):  dextrose 40% Gel 15 Gram(s) Oral once PRN Blood Glucose LESS THAN 70 milliGRAM(s)/deciliter  glucagon  Injectable 1 milliGRAM(s) IntraMuscular once PRN Glucose LESS THAN 70 milligrams/deciliter  senna 2 Tablet(s) Oral at bedtime PRN Constipation  traMADol 50 milliGRAM(s) Oral every 6 hours PRN Moderate Pain (4 - 6)      Allergies    penicillins (Hives)  Plavix (Short breath)  quinolines (Other)    Intolerances        REVIEW OF SYSTEMS:  CONSTITUTIONAL: No fever, No chills, No fatigue, No myalgia, No Body ache, No Weakness  EYES: No eye pain,  No visual disturbances, No discharge, NO Redness  ENMT:  No ear pain, No nose bleed, No vertigo; No sinus or throat pain, No Congestion  NECK: No pain, No stiffness  RESPIRATORY: No cough, wheezing, No  hemoptysis, No shortness of breath  CARDIOVASCULAR: No chest pain, palpitations  GASTROINTESTINAL: No abdominal or epigastric pain. No nausea, No vomiting; No diarrhea or constipation. [ x ] BM  GENITOURINARY: No dysuria, No frequency, No urgency, No hematuria, or incontinence  NEUROLOGICAL: No headaches, No dizziness, No numbness, No tingling, No tremors, No weakness  EXT: No Swelling, No Pain, No Edema  SKIN:  [ x ] No itching, burning, rashes, or lesions   MUSCULOSKELETAL: No joint pain or swelling; No muscle pain, No back pain, No extremity pain  PSYCHIATRIC: No depression, anxiety, mood swings or difficulty sleeping at night  PAIN SCALE: [ x ] None  [  ] Other-  ROS Unable to obtain due to - [  ] Dementia  [  ] Lethargy  [  ] Sedated [  ] non verbal  REST OF REVIEW Of SYSTEM - [ x ] Normal     Vital Signs Last 24 Hrs  T(C): 36.5 (29 Nov 2018 07:36), Max: 37.6 (28 Nov 2018 12:00)  T(F): 97.7 (29 Nov 2018 07:36), Max: 99.7 (28 Nov 2018 12:00)  HR: 107 (29 Nov 2018 07:36) (90 - 120)  BP: 112/70 (29 Nov 2018 07:36) (96/43 - 153/79)  BP(mean): 75 (28 Nov 2018 19:26) (75 - 75)  RR: 18 (29 Nov 2018 07:36) (16 - 30)  SpO2: 94% (29 Nov 2018 07:36) (93% - 100%)  Finger Stick          PHYSICAL EXAM:  GENERAL:  [ X ] NAD , [ X ] well appearing, [  ] Agitated, [  ] Drowsy,  [  ] Lethargy, [  ] confused   HEAD:  [ X ] Normal, [  ] Other  EYES:  [ X ] EOMI, [ X ] PERRLA, [ X ] conjunctiva and sclera clear normal, [  ] Other,  [  ] Pallor,[  ] Discharge  ENMT:  [ X ] Normal, [ X ] Moist mucous membranes, [  ] Good dentition, [  ] No Thrush  NECK:  [ X ] Supple, [ X ] No JVD, [ X ] Normal thyroid, [  ] Lymphadenopathy [  ] Other  CHEST/LUNG:  [  ] Clear to auscultation bilaterally, [ x ] Breath Sounds equal  [ X ] No rales, [ X ] No rhonchi  [ x ] wheezing in right lower lobe.  HEART:  [ X ] Regular rate and rhythm, [  ] tachycardia, [  ] Bradycardia,  [  ] irregular  [ x ] 3/6 aortic murmur, No rubs, No gallops, [  ] PPM in place (Mfr:  )  ABDOMEN:  [ X ] Soft, [ X ] Nontender, [ X ] Nondistended, [ X ] No mass, [ X ] Bowel sounds present, [  ] obese  NERVOUS SYSTEM:  [ X ] Alert & Oriented X3, [  ] Nonfocal  [  ] Confusion  [  ] Encephalopathic [  ] Sedated [  ] Unable to assess, [  ] Other-  EXTREMITIES: [ X ] 2+ Peripheral Pulses, No clubbing, No cyanosis,  [  ] edema B/L lower EXT. [  ] PVD stasis skin changes B/L Lower EXT  LYMPH: No lymphadenopathy noted  SKIN:  [ X ] No rashes or lesions, [  ] Pressure Ulcers, [  ] ecchymosis, [  ] Skin Tears, [  ] Other    DIET:     LABS:                        10.9   10.09 )-----------( 201      ( 29 Nov 2018 05:39 )             32.4     29 Nov 2018 05:39    138    |  99     |  15     ----------------------------<  96     3.3     |  31     |  0.59     Ca    8.2        29 Nov 2018 05:39  Phos  3.3       29 Nov 2018 05:39  Mg     2.3       29 Nov 2018 05:39    TPro  7.8    /  Alb  3.8    /  TBili  0.3    /  DBili  x      /  AST  62     /  ALT  68     /  AlkPhos  153    28 Nov 2018 11:54    PT/INR - ( 28 Nov 2018 11:54 )   PT: 12.3 sec;   INR: 1.08 ratio         PTT - ( 28 Nov 2018 11:54 )  PTT:29.2 sec              CARDIAC MARKERS ( 29 Nov 2018 05:39 )  .213 ng/mL / x     / 758 U/L / x     / 16.3 ng/mL  CARDIAC MARKERS ( 28 Nov 2018 23:42 )  .171 ng/mL / x     / 360 U/L / x     / 9.2 ng/mL  CARDIAC MARKERS ( 28 Nov 2018 18:53 )  .203 ng/mL / x     / 206 U/L / x     / 6.2 ng/mL  CARDIAC MARKERS ( 28 Nov 2018 11:59 )  x     / x     / 246 U/L / x     / x      CARDIAC MARKERS ( 28 Nov 2018 11:54 )  .112 ng/mL / x     / x     / x     / x                 Anemia Panel:      Thyroid Panel:            Serum Pro-Brain Natriuretic Peptide: 879 pg/mL (11-28-18 @ 11:54)      RADIOLOGY & ADDITIONAL TESTS:  ECHO Today: 11/29/2018    HEALTH ISSUES - PROBLEM Dx:  Pulmonary edema, acute: Pulmonary edema, acute  COPD (chronic obstructive pulmonary disease): COPD (chronic obstructive pulmonary disease)  Prophylactic measure: Prophylactic measure  Anxiety: Anxiety  HTN (hypertension): HTN (hypertension)  Diabetes mellitus: Diabetes mellitus  RA (rheumatoid arthritis): RA (rheumatoid arthritis)  Hyponatremia: Hyponatremia  Valvular heart disease: Valvular heart disease  Lactate blood increase: Lactate blood increase  Acute congestive heart failure, unspecified heart failure type: Acute congestive heart failure, unspecified heart failure type  Acute respiratory failure, unspecified whether with hypoxia or hypercapnia: Acute respiratory failure, unspecified whether with hypoxia or hypercapnia          Consultant(s) Notes Reviewed:  [ x ] YES     Care Discussed with [X] Consultants  [ x ] Patient  [  ] Family  [  ]   [  ] Social Service  [  ] RN, [  ] Physical Therapy  DVT PPX: [ x ] Lovenox, [  ] S C Heparin, [  ] Coumadin, [  ] Xarelto, [  ] Eliquis, [  ] Pradaxa, [  ] IV Heparin drip, [  ] SCD [  ] Contraindication 2 to GI Bleed,[  ] Ambulation  Advanced directive: [  ] None, [  ] DNR/DNI Patient is a 78y old  Female who presents with a chief complaint of Acute Respiratory failure (29 Nov 2018 09:28)      INTERVAL HPI: 78y F with pmhx of COPD (on home o2, 2L NC), hypertension, hyperlipidemia, diverticulitis, depression, Rt breast cancer, status post lumpectomy and radiation 2008, anxiety, hiatal hernia-status post surgical repair in 2005, obstructive sleep apnea (not on CPAP at home), PVD, rheumatoid arthritis, thrush, TIA (2010), TMJ, valvular heart disease (aortic and mitral), H/O AVR, Type 2 diabetes (on metformin), presents to ED by EMS for SOB. As per patient, she was on her way to work this morning and while walking to her car, stopped to  garbage cans. Suddenly felt short of breath with increased work of breathing. Reports felt similar to previous episodes of COPD exacerbation and immediately called EMS. EMS arrived, noted rhonchi on lung exam and gave patient subinguinal nitroglycerin, started on CPAP. Patient has hx of chronic nonproductive cough, denies worsening of cough over last months. Daughter at bedside reports patient was diaphoretic with EMS. Recently visited pulmonologist and cardiologist last week, was in normal state of health. Patient denies fevers, chills, nausea, vomiting, CP, palpitations, abdominal pain, diarrhea, constipation, peripheral edema.  Of note, patient admitted in April 2017 for COPD exacerbation.     In the ED, vitals are T 99.7, , /88, RR 26 Spo2 94% on supplemental oxygen. Repeat BP 96/43 after Lasix 40mg IV given. Labs significant for mild leukocytosis (11.39), hyponatremia (133), hyperglycemia (151), elevated alk phos (153), elevated AST (62), elevated lactate (3.2), elevated creatine kinase (246), elevated trop (0.112), elevated proBNP (879). ABG unimpressive, wnl. CXR There is new moderate pulmonary edema. The heart size is at the upper limits of normal. The patient is status post median sternotomy. The patient is status post aortic valve replacement. Spinal stimulator leads are in place. Multilevel degenerative changes are noted within the imaged potions of the spine. Impression: moderate pulmonary edema. EKG: Sinus Tachy 106 with LBBB. Received Lasix 40mg IVx1, blood cultures pending. Pt was placed on BI PAP in ER.  Pt seen by Cardiology Dr. Kunz in ER, IV Lasix to continue.     ICU eval done, as per ICU pt does NOT need ICU care, Dr. Rivera saw patient in ED, stable for admission to [facility].     11/29/18: Patient was seen and examined this morning at bedside. Patient was sitting in chair, eating her breakfast. Patient has no acute complaints and states "I am feeling much better today". Patient denies headache, SOB, chest pain, palpitations, weakness, numbness, or tingling. Admits 1 BM overnight and is urinating well. US LE neg. Trops resolving, EKG showing T wave changes. ECHO pending.       OVERNIGHT EVENTS: None    Home Medications:  Abilify 2 mg oral tablet: 1 tab(s) orally once a day (at bedtime) (28 Nov 2018 13:40)  aspirin 81 mg oral tablet: 1 tab(s) orally once a day HOME/  follow preop instructions as per surgeon and cardiologist (28 Nov 2018 13:40)  budesonide:  (28 Nov 2018 13:40)  clonazePAM 0.25 mg oral tablet: 1 tab(s) orally 2 times a day (28 Nov 2018 13:40)  clotrimazole: 4 lozenges daily (28 Nov 2018 13:40)  Dramamine:  (28 Nov 2018 13:40)  Effexor  mg oral capsule, extended release: 1 cap(s) orally once a day (28 Nov 2018 13:40)  furosemide 40 mg oral tablet: 1 tab(s) orally once a day (28 Nov 2018 13:40)  losartan 25 mg oral tablet: 1 tab(s) orally 2 times a day (28 Nov 2018 13:40)  metFORMIN 500 mg oral tablet: 1 tab(s) orally once a day in PM (28 Nov 2018 13:40)  ocular lubricant ophthalmic solution: 1 drop(s) to each affected eye 4 times a day, As needed, Dry Eyes (28 Nov 2018 13:40)  Percocet 10/325 oral tablet: 1 tab(s) orally every 6 hours, As Needed  HOSP (28 Nov 2018 13:40)  PriLOSEC 20 mg oral delayed release capsule: 1 cap(s) orally 2 times a day (28 Nov 2018 13:40)  ProAir HFA 90 mcg/inh inhalation aerosol: 2 puff(s) inhaled 4 times a day, As Needed (28 Nov 2018 13:40)  Spiriva Respimat 1.25 mcg/inh inhalation aerosol: 2 puff(s) inhaled once a day (28 Nov 2018 13:40)  spironolactone 25 mg oral tablet: 0.5 tab(s) orally once a day (28 Nov 2018 13:40)  sulfaSALAzine 500 mg oral tablet: 1 tab(s) orally 2 times a day (28 Nov 2018 13:40)  Symbicort 160 mcg-4.5 mcg/inh inhalation aerosol: 2 puff(s) inhaled 2 times a day (28 Nov 2018 13:40)  verapamil 240 mg/24 hours oral capsule, extended release: 1 cap(s) orally once a day (28 Nov 2018 13:40)  ZyrTEC 10 mg oral tablet: 1 tab(s) orally once a day (28 Nov 2018 13:40)      MEDICATIONS  (STANDING):  ALBUTerol    0.083% 2.5 milliGRAM(s) Nebulizer every 6 hours  ARIPiprazole 2 milliGRAM(s) Oral daily  aspirin  chewable 81 milliGRAM(s) Oral daily  buDESOnide 160 MICROgram(s)/formoterol 4.5 MICROgram(s) Inhaler 2 Puff(s) Inhalation two times a day  clonazePAM Tablet 0.25 milliGRAM(s) Oral two times a day  clotrimazole Lozenge 1 Lozenge Oral every 8 hours  dextrose 5%. 1000 milliLiter(s) (50 mL/Hr) IV Continuous <Continuous>  dextrose 50% Injectable 12.5 Gram(s) IV Push once  dextrose 50% Injectable 25 Gram(s) IV Push once  dextrose 50% Injectable 25 Gram(s) IV Push once  docusate sodium 100 milliGRAM(s) Oral three times a day  enoxaparin Injectable 40 milliGRAM(s) SubCutaneous every 24 hours  furosemide   Injectable 60 milliGRAM(s) IV Push two times a day  insulin lispro (HumaLOG) corrective regimen sliding scale   SubCutaneous Before meals and at bedtime  latanoprost 0.005% Ophthalmic Solution 1 Drop(s) Both EYES at bedtime  loratadine 10 milliGRAM(s) Oral daily  losartan 25 milliGRAM(s) Oral two times a day  methylPREDNISolone sodium succinate Injectable 40 milliGRAM(s) IV Push every 24 hours  oxyCODONE    5 mG/acetaminophen 325 mG 2 Tablet(s) Oral every 6 hours  pantoprazole    Tablet 40 milliGRAM(s) Oral before breakfast  polyethylene glycol 3350 17 Gram(s) Oral daily  spironolactone 12.5 milliGRAM(s) Oral daily  sulfaSALAzine 500 milliGRAM(s) Oral three times a day  tiotropium 18 MICROgram(s) Capsule 1 Capsule(s) Inhalation daily  venlafaxine XR. 150 milliGRAM(s) Oral daily  verapamil  milliGRAM(s) Oral daily    MEDICATIONS  (PRN):  dextrose 40% Gel 15 Gram(s) Oral once PRN Blood Glucose LESS THAN 70 milliGRAM(s)/deciliter  glucagon  Injectable 1 milliGRAM(s) IntraMuscular once PRN Glucose LESS THAN 70 milligrams/deciliter  senna 2 Tablet(s) Oral at bedtime PRN Constipation  traMADol 50 milliGRAM(s) Oral every 6 hours PRN Moderate Pain (4 - 6)      Allergies    penicillins (Hives)  Plavix (Short breath)  quinolines (Other)    Intolerances        REVIEW OF SYSTEMS:  CONSTITUTIONAL: No fever, No chills, No fatigue, No myalgia, No Body ache, No Weakness  EYES: No eye pain,  No visual disturbances, No discharge, NO Redness  ENMT:  No ear pain, No nose bleed, No vertigo; No sinus or throat pain, No Congestion  NECK: No pain, No stiffness  RESPIRATORY: No cough, wheezing, No  hemoptysis, No shortness of breath  CARDIOVASCULAR: No chest pain, palpitations  GASTROINTESTINAL: No abdominal or epigastric pain. No nausea, No vomiting; No diarrhea or constipation. [ x ] BM  GENITOURINARY: No dysuria, No frequency, No urgency, No hematuria, or incontinence  NEUROLOGICAL: No headaches, No dizziness, No numbness, No tingling, No tremors, No weakness  EXT: No Swelling, No Pain, No Edema  SKIN:  [ x ] No itching, burning, rashes, or lesions   MUSCULOSKELETAL: No joint pain or swelling; No muscle pain, No back pain, No extremity pain  PSYCHIATRIC: No depression, anxiety, mood swings or difficulty sleeping at night  PAIN SCALE: [ x ] None  [  ] Other-  ROS Unable to obtain due to - [  ] Dementia  [  ] Lethargy  [  ] Sedated [  ] non verbal  REST OF REVIEW Of SYSTEM - [ x ] Normal     Vital Signs Last 24 Hrs  T(C): 36.5 (29 Nov 2018 07:36), Max: 37.6 (28 Nov 2018 12:00)  T(F): 97.7 (29 Nov 2018 07:36), Max: 99.7 (28 Nov 2018 12:00)  HR: 107 (29 Nov 2018 07:36) (90 - 120)  BP: 112/70 (29 Nov 2018 07:36) (96/43 - 153/79)  BP(mean): 75 (28 Nov 2018 19:26) (75 - 75)  RR: 18 (29 Nov 2018 07:36) (16 - 30)  SpO2: 94% (29 Nov 2018 07:36) (93% - 100%)  Finger Stick          PHYSICAL EXAM:  GENERAL:  [ X ] NAD , [ X ] well appearing, [  ] Agitated, [  ] Drowsy,  [  ] Lethargy, [  ] confused   HEAD:  [ X ] Normal, [  ] Other  EYES:  [ X ] EOMI, [ X ] PERRLA, [ X ] conjunctiva and sclera clear normal, [  ] Other,  [  ] Pallor,[  ] Discharge  ENMT:  [ X ] Normal, [ X ] Moist mucous membranes, [  ] Good dentition, [  ] No Thrush  NECK:  [ X ] Supple, [ X ] No JVD, [ X ] Normal thyroid, [  ] Lymphadenopathy [  ] Other  CHEST/LUNG:  [  ] Clear to auscultation bilaterally, [ x ] Breath Sounds equal  [ X ] No rales, [ X ] No rhonchi  [ x ] wheezing in right lower lobe.  HEART:  [ X ] Regular rate and rhythm, [  ] tachycardia, [  ] Bradycardia,  [  ] irregular  [ x ] 3/6 aortic murmur, No rubs, No gallops, [  ] PPM in place (Mfr:  )  ABDOMEN:  [ X ] Soft, [ X ] Nontender, [ X ] Nondistended, [ X ] No mass, [ X ] Bowel sounds present, [  ] obese  NERVOUS SYSTEM:  [ X ] Alert & Oriented X3, [  ] Nonfocal  [  ] Confusion  [  ] Encephalopathic [  ] Sedated [  ] Unable to assess, [  ] Other-  EXTREMITIES: [ X ] 2+ Peripheral Pulses, No clubbing, No cyanosis,  [  ] edema B/L lower EXT. [  ] PVD stasis skin changes B/L Lower EXT  LYMPH: No lymphadenopathy noted  SKIN:  [ X ] No rashes or lesions, [  ] Pressure Ulcers, [  ] ecchymosis, [  ] Skin Tears, [  ] Other    DIET:     LABS:                        10.9   10.09 )-----------( 201      ( 29 Nov 2018 05:39 )             32.4     29 Nov 2018 05:39    138    |  99     |  15     ----------------------------<  96     3.3     |  31     |  0.59     Ca    8.2        29 Nov 2018 05:39  Phos  3.3       29 Nov 2018 05:39  Mg     2.3       29 Nov 2018 05:39    TPro  7.8    /  Alb  3.8    /  TBili  0.3    /  DBili  x      /  AST  62     /  ALT  68     /  AlkPhos  153    28 Nov 2018 11:54    PT/INR - ( 28 Nov 2018 11:54 )   PT: 12.3 sec;   INR: 1.08 ratio         PTT - ( 28 Nov 2018 11:54 )  PTT:29.2 sec              CARDIAC MARKERS ( 29 Nov 2018 05:39 )  .213 ng/mL / x     / 758 U/L / x     / 16.3 ng/mL  CARDIAC MARKERS ( 28 Nov 2018 23:42 )  .171 ng/mL / x     / 360 U/L / x     / 9.2 ng/mL  CARDIAC MARKERS ( 28 Nov 2018 18:53 )  .203 ng/mL / x     / 206 U/L / x     / 6.2 ng/mL  CARDIAC MARKERS ( 28 Nov 2018 11:59 )  x     / x     / 246 U/L / x     / x      CARDIAC MARKERS ( 28 Nov 2018 11:54 )  .112 ng/mL / x     / x     / x     / x                 Anemia Panel:      Thyroid Panel:            Serum Pro-Brain Natriuretic Peptide: 879 pg/mL (11-28-18 @ 11:54)      RADIOLOGY & ADDITIONAL TESTS:  ECHO Today: 11/29/2018    HEALTH ISSUES - PROBLEM Dx:  Pulmonary edema, acute: Pulmonary edema, acute  COPD (chronic obstructive pulmonary disease): COPD (chronic obstructive pulmonary disease)  Prophylactic measure: Prophylactic measure  Anxiety: Anxiety  HTN (hypertension): HTN (hypertension)  Diabetes mellitus: Diabetes mellitus  RA (rheumatoid arthritis): RA (rheumatoid arthritis)  Hyponatremia: Hyponatremia  Valvular heart disease: Valvular heart disease  Lactate blood increase: Lactate blood increase  Acute congestive heart failure, unspecified heart failure type: Acute congestive heart failure, unspecified heart failure type  Acute respiratory failure, unspecified whether with hypoxia or hypercapnia: Acute respiratory failure, unspecified whether with hypoxia or hypercapnia          Consultant(s) Notes Reviewed:  [ x ] YES     Care Discussed with [X] Consultants  [ x ] Patient  [  ] Family  [  ]   [  ] Social Service  [  ] RN, [  ] Physical Therapy  DVT PPX: [ x ] Lovenox, [  ] S C Heparin, [  ] Coumadin, [  ] Xarelto, [  ] Eliquis, [  ] Pradaxa, [  ] IV Heparin drip, [  ] SCD [  ] Contraindication 2 to GI Bleed,[  ] Ambulation  Advanced directive: [  ] None, [  ] DNR/DNI Patient is a 78y old  Female who presents with a chief complaint of Acute Respiratory failure (29 Nov 2018 09:28)      INTERVAL HPI: 78y F with pmhx of COPD (on home o2, 2L NC), hypertension, hyperlipidemia, diverticulitis, depression, Rt breast cancer, status post lumpectomy and radiation 2008, anxiety, hiatal hernia-status post surgical repair in 2005, obstructive sleep apnea (not on CPAP at home), PVD, rheumatoid arthritis, thrush, TIA (2010), TMJ, valvular heart disease (aortic and mitral), H/O AVR, Type 2 diabetes (on metformin), presents to ED by EMS for SOB. As per patient, she was on her way to work this morning and while walking to her car, stopped to  garbage cans. Suddenly felt short of breath with increased work of breathing. Reports felt similar to previous episodes of COPD exacerbation and immediately called EMS. EMS arrived, noted rhonchi on lung exam and gave patient subinguinal nitroglycerin, started on CPAP. Patient has hx of chronic nonproductive cough, denies worsening of cough over last months. Daughter at bedside reports patient was diaphoretic with EMS. Recently visited pulmonologist and cardiologist last week, was in normal state of health. Patient denies fevers, chills, nausea, vomiting, CP, palpitations, abdominal pain, diarrhea, constipation, peripheral edema.  Of note, patient admitted in April 2017 for COPD exacerbation.     In the ED, vitals are T 99.7, , /88, RR 26 Spo2 94% on supplemental oxygen. Repeat BP 96/43 after Lasix 40mg IV given. Labs significant for mild leukocytosis (11.39), hyponatremia (133), hyperglycemia (151), elevated alk phos (153), elevated AST (62), elevated lactate (3.2), elevated creatine kinase (246), elevated trop (0.112), elevated proBNP (879). ABG unimpressive, wnl. CXR There is new moderate pulmonary edema. The heart size is at the upper limits of normal. The patient is status post median sternotomy. The patient is status post aortic valve replacement. Spinal stimulator leads are in place. Multilevel degenerative changes are noted within the imaged potions of the spine. Impression: moderate pulmonary edema. EKG: Sinus Tachy 106 with LBBB. Received Lasix 40mg IVx1, blood cultures pending. Pt was placed on BI PAP in ER.  Pt seen by Cardiology Dr. Kunz in ER, IV Lasix to continue.     ICU eval done, as per ICU pt does NOT need ICU care, Dr. Rivera saw patient in ED, stable for admission to [facility].     11/29/18: Patient was seen and examined this morning at bedside. Patient was sitting in chair, eating her breakfast. Patient has no acute complaints and states "I am feeling much better today". Patient denies headache, SOB, chest pain, palpitations, weakness, numbness, or tingling. Admits 1 BM overnight and is urinating well. US LE neg. Trops resolving, EKG showing T wave changes. ECHO Done, Dr Ravi -Cardiology wants to start Lovenox 60 mg SC q 12 hrs, , Change Lasix 60 mg IV Once a day. Trend CK/troponin q 8 hrs.      OVERNIGHT EVENTS: None    Home Medications:  Abilify 2 mg oral tablet: 1 tab(s) orally once a day (at bedtime) (28 Nov 2018 13:40)  aspirin 81 mg oral tablet: 1 tab(s) orally once a day HOME/  follow preop instructions as per surgeon and cardiologist (28 Nov 2018 13:40)  budesonide:  (28 Nov 2018 13:40)  clonazePAM 0.25 mg oral tablet: 1 tab(s) orally 2 times a day (28 Nov 2018 13:40)  clotrimazole: 4 lozenges daily (28 Nov 2018 13:40)  Dramamine:  (28 Nov 2018 13:40)  Effexor  mg oral capsule, extended release: 1 cap(s) orally once a day (28 Nov 2018 13:40)  furosemide 40 mg oral tablet: 1 tab(s) orally once a day (28 Nov 2018 13:40)  losartan 25 mg oral tablet: 1 tab(s) orally 2 times a day (28 Nov 2018 13:40)  metFORMIN 500 mg oral tablet: 1 tab(s) orally once a day in PM (28 Nov 2018 13:40)  ocular lubricant ophthalmic solution: 1 drop(s) to each affected eye 4 times a day, As needed, Dry Eyes (28 Nov 2018 13:40)  Percocet 10/325 oral tablet: 1 tab(s) orally every 6 hours, As Needed  HOSP (28 Nov 2018 13:40)  PriLOSEC 20 mg oral delayed release capsule: 1 cap(s) orally 2 times a day (28 Nov 2018 13:40)  ProAir HFA 90 mcg/inh inhalation aerosol: 2 puff(s) inhaled 4 times a day, As Needed (28 Nov 2018 13:40)  Spiriva Respimat 1.25 mcg/inh inhalation aerosol: 2 puff(s) inhaled once a day (28 Nov 2018 13:40)  spironolactone 25 mg oral tablet: 0.5 tab(s) orally once a day (28 Nov 2018 13:40)  sulfaSALAzine 500 mg oral tablet: 1 tab(s) orally 2 times a day (28 Nov 2018 13:40)  Symbicort 160 mcg-4.5 mcg/inh inhalation aerosol: 2 puff(s) inhaled 2 times a day (28 Nov 2018 13:40)  verapamil 240 mg/24 hours oral capsule, extended release: 1 cap(s) orally once a day (28 Nov 2018 13:40)  ZyrTEC 10 mg oral tablet: 1 tab(s) orally once a day (28 Nov 2018 13:40)      MEDICATIONS  (STANDING):  ALBUTerol    0.083% 2.5 milliGRAM(s) Nebulizer every 6 hours  ARIPiprazole 2 milliGRAM(s) Oral daily  aspirin  chewable 81 milliGRAM(s) Oral daily  buDESOnide 160 MICROgram(s)/formoterol 4.5 MICROgram(s) Inhaler 2 Puff(s) Inhalation two times a day  clonazePAM Tablet 0.25 milliGRAM(s) Oral two times a day  clotrimazole Lozenge 1 Lozenge Oral every 8 hours  dextrose 5%. 1000 milliLiter(s) (50 mL/Hr) IV Continuous <Continuous>  dextrose 50% Injectable 12.5 Gram(s) IV Push once  dextrose 50% Injectable 25 Gram(s) IV Push once  dextrose 50% Injectable 25 Gram(s) IV Push once  docusate sodium 100 milliGRAM(s) Oral three times a day  enoxaparin Injectable 40 milliGRAM(s) SubCutaneous every 24 hours  furosemide   Injectable 60 milliGRAM(s) IV Push two times a day  insulin lispro (HumaLOG) corrective regimen sliding scale   SubCutaneous Before meals and at bedtime  latanoprost 0.005% Ophthalmic Solution 1 Drop(s) Both EYES at bedtime  loratadine 10 milliGRAM(s) Oral daily  losartan 25 milliGRAM(s) Oral two times a day  methylPREDNISolone sodium succinate Injectable 40 milliGRAM(s) IV Push every 24 hours  oxyCODONE    5 mG/acetaminophen 325 mG 2 Tablet(s) Oral every 6 hours  pantoprazole    Tablet 40 milliGRAM(s) Oral before breakfast  polyethylene glycol 3350 17 Gram(s) Oral daily  spironolactone 12.5 milliGRAM(s) Oral daily  sulfaSALAzine 500 milliGRAM(s) Oral three times a day  tiotropium 18 MICROgram(s) Capsule 1 Capsule(s) Inhalation daily  venlafaxine XR. 150 milliGRAM(s) Oral daily  verapamil  milliGRAM(s) Oral daily    MEDICATIONS  (PRN):  dextrose 40% Gel 15 Gram(s) Oral once PRN Blood Glucose LESS THAN 70 milliGRAM(s)/deciliter  glucagon  Injectable 1 milliGRAM(s) IntraMuscular once PRN Glucose LESS THAN 70 milligrams/deciliter  senna 2 Tablet(s) Oral at bedtime PRN Constipation  traMADol 50 milliGRAM(s) Oral every 6 hours PRN Moderate Pain (4 - 6)      Allergies    penicillins (Hives)  Plavix (Short breath)  quinolines (Other)    Intolerances        REVIEW OF SYSTEMS: i feel fine, wants to go home.  CONSTITUTIONAL: No fever, No chills, No fatigue, No myalgia, No Body ache, No Weakness  EYES: No eye pain,  No visual disturbances, No discharge, NO Redness  ENMT:  No ear pain, No nose bleed, No vertigo; No sinus or throat pain, No Congestion  NECK: No pain, No stiffness  RESPIRATORY: No cough, wheezing, No  hemoptysis, No shortness of breath  CARDIOVASCULAR: No chest pain, palpitations  GASTROINTESTINAL: No abdominal or epigastric pain. No nausea, No vomiting; No diarrhea or constipation. [ x ] BM  GENITOURINARY: No dysuria, No frequency, No urgency, No hematuria, or incontinence  NEUROLOGICAL: No headaches, No dizziness, No numbness, No tingling, No tremors, No weakness  EXT: No Swelling, No Pain, No Edema  SKIN:  [ x ] No itching, burning, rashes, or lesions   MUSCULOSKELETAL: No joint pain or swelling; No muscle pain, No back pain, No extremity pain  PSYCHIATRIC: No depression, anxiety, mood swings or difficulty sleeping at night  PAIN SCALE: [ x ] None  [  ] Other-  ROS Unable to obtain due to - [  ] Dementia  [  ] Lethargy  [  ] Sedated [  ] non verbal  REST OF REVIEW Of SYSTEM - [ x ] Normal     Vital Signs Last 24 Hrs  T(C): 36.5 (29 Nov 2018 07:36), Max: 37.6 (28 Nov 2018 12:00)  T(F): 97.7 (29 Nov 2018 07:36), Max: 99.7 (28 Nov 2018 12:00)  HR: 107 (29 Nov 2018 07:36) (90 - 120)  BP: 112/70 (29 Nov 2018 07:36) (96/43 - 153/79)  BP(mean): 75 (28 Nov 2018 19:26) (75 - 75)  RR: 18 (29 Nov 2018 07:36) (16 - 30)  SpO2: 94% (29 Nov 2018 07:36) (93% - 100%)  Finger Stick          PHYSICAL EXAM:  GENERAL:  [ X ] NAD , [ X ] well appearing, [  ] Agitated, [  ] Drowsy,  [  ] Lethargy, [  ] confused   HEAD:  [ X ] Normal, [  ] Other  EYES:  [ X ] EOMI, [ X ] PERRLA, [ X ] conjunctiva and sclera clear normal, [  ] Other,  [  ] Pallor,[  ] Discharge  ENMT:  [ X ] Normal, [ X ] Moist mucous membranes, [  ] Good dentition, [  ] No Thrush  NECK:  [ X ] Supple, [ X ] No JVD, [ X ] Normal thyroid, [  ] Lymphadenopathy [  ] Other  CHEST/LUNG:  [  ] Clear to auscultation bilaterally, [ x ] Breath Sounds equal  [ X ] No rales, [ X ] No rhonchi  [ x ] wheezing in right lower lobe.  HEART:  [ X ] Regular rate and rhythm, [  ] tachycardia, [  ] Bradycardia,  [  ] irregular  [ x ] 2/6 aortic murmur, No rubs, No gallops, [  ] PPM in place (Mfr:  )  ABDOMEN:  [ X ] Soft, [ X ] Nontender, [ X ] distended, [ X ] No mass, [ X ] Bowel sounds present, [ x] obese  NERVOUS SYSTEM:  [ X ] Alert & Oriented X3, [ x ] Nonfocal  [  ] Confusion  [  ] Encephalopathic [  ] Sedated [  ] Unable to assess, [  ] Other-  EXTREMITIES: [ X ] 2+ Peripheral Pulses, No clubbing, No cyanosis,  [  ] edema B/L lower EXT. [  ] PVD stasis skin changes B/L Lower EXT  LYMPH: No lymphadenopathy noted  SKIN:  [ X ] No rashes or lesions, [  ] Pressure Ulcers, [  ] ecchymosis, [  ] Skin Tears, [  ] Other    DIET: DASH    LABS:                        10.9   10.09 )-----------( 201      ( 29 Nov 2018 05:39 )             32.4     29 Nov 2018 05:39    138    |  99     |  15     ----------------------------<  96     3.3     |  31     |  0.59     Ca    8.2        29 Nov 2018 05:39  Phos  3.3       29 Nov 2018 05:39  Mg     2.3       29 Nov 2018 05:39    TPro  7.8    /  Alb  3.8    /  TBili  0.3    /  DBili  x      /  AST  62     /  ALT  68     /  AlkPhos  153    28 Nov 2018 11:54    PT/INR - ( 28 Nov 2018 11:54 )   PT: 12.3 sec;   INR: 1.08 ratio         PTT - ( 28 Nov 2018 11:54 )  PTT:29.2 sec      CARDIAC MARKERS ( 29 Nov 2018 05:39 )  .213 ng/mL / x     / 758 U/L / x     / 16.3 ng/mL  CARDIAC MARKERS ( 28 Nov 2018 23:42 )  .171 ng/mL / x     / 360 U/L / x     / 9.2 ng/mL  CARDIAC MARKERS ( 28 Nov 2018 18:53 )  .203 ng/mL / x     / 206 U/L / x     / 6.2 ng/mL  CARDIAC MARKERS ( 28 Nov 2018 11:59 )  x     / x     / 246 U/L / x     / x      CARDIAC MARKERS ( 28 Nov 2018 11:54 )  .112 ng/mL / x     / x     / x     / x          Serum Pro-Brain Natriuretic Peptide: 879 pg/mL (11-28-18 @ 11:54)      RADIOLOGY & ADDITIONAL TESTS:  ECHO Today: 11/29/2018    HEALTH ISSUES - PROBLEM Dx:  Pulmonary edema, acute: Pulmonary edema, acute  COPD (chronic obstructive pulmonary disease): COPD (chronic obstructive pulmonary disease)  Prophylactic measure: Prophylactic measure  Anxiety: Anxiety  HTN (hypertension): HTN (hypertension)  Diabetes mellitus: Diabetes mellitus  RA (rheumatoid arthritis): RA (rheumatoid arthritis)  Hyponatremia: Hyponatremia  Valvular heart disease: Valvular heart disease  Lactate blood increase: Lactate blood increase  Acute congestive heart failure, unspecified heart failure type: Acute congestive heart failure, unspecified heart failure type  Acute respiratory failure, unspecified whether with hypoxia or hypercapnia: Acute respiratory failure, unspecified whether with hypoxia or hypercapnia    Consultant(s) Notes Reviewed:  [ x ] YES     Care Discussed with [X] Consultants  [ x ] Patient  [  ] Family  [  ]   [  ] Social Service  [x  ] RN, [  ] Physical Therapy  DVT PPX: [ x ] Lovenox, [  ] S C Heparin, [  ] Coumadin, [  ] Xarelto, [  ] Eliquis, [  ] Pradaxa, [  ] IV Heparin drip, [  ] SCD [  ] Contraindication 2 to GI Bleed,[  ] Ambulation  Advanced directive: [ x ] None, [  ] DNR/DNI

## 2018-11-30 ENCOUNTER — TRANSCRIPTION ENCOUNTER (OUTPATIENT)
Age: 78
End: 2018-11-30

## 2018-11-30 VITALS
OXYGEN SATURATION: 94 % | SYSTOLIC BLOOD PRESSURE: 114 MMHG | HEART RATE: 90 BPM | RESPIRATION RATE: 18 BRPM | DIASTOLIC BLOOD PRESSURE: 71 MMHG | TEMPERATURE: 98 F

## 2018-11-30 DIAGNOSIS — D64.9 ANEMIA, UNSPECIFIED: ICD-10-CM

## 2018-11-30 LAB
ANION GAP SERPL CALC-SCNC: 5 MMOL/L — SIGNIFICANT CHANGE UP (ref 5–17)
BASOPHILS # BLD AUTO: 0.07 K/UL — SIGNIFICANT CHANGE UP (ref 0–0.2)
BASOPHILS NFR BLD AUTO: 0.8 % — SIGNIFICANT CHANGE UP (ref 0–2)
BUN SERPL-MCNC: 14 MG/DL — SIGNIFICANT CHANGE UP (ref 7–23)
CALCIUM SERPL-MCNC: 8.3 MG/DL — LOW (ref 8.5–10.1)
CHLORIDE SERPL-SCNC: 103 MMOL/L — SIGNIFICANT CHANGE UP (ref 96–108)
CK MB BLD-MCNC: 1.7 % — SIGNIFICANT CHANGE UP (ref 0–3.5)
CK MB BLD-MCNC: 1.9 % — SIGNIFICANT CHANGE UP (ref 0–3.5)
CK MB CFR SERPL CALC: 8 NG/ML — HIGH (ref 0–3.6)
CK MB CFR SERPL CALC: 8.7 NG/ML — HIGH (ref 0–3.6)
CK SERPL-CCNC: 411 U/L — HIGH (ref 26–192)
CK SERPL-CCNC: 506 U/L — HIGH (ref 26–192)
CO2 SERPL-SCNC: 30 MMOL/L — SIGNIFICANT CHANGE UP (ref 22–31)
CREAT SERPL-MCNC: 0.54 MG/DL — SIGNIFICANT CHANGE UP (ref 0.5–1.3)
EOSINOPHIL # BLD AUTO: 0.09 K/UL — SIGNIFICANT CHANGE UP (ref 0–0.5)
EOSINOPHIL NFR BLD AUTO: 1 % — SIGNIFICANT CHANGE UP (ref 0–6)
GLUCOSE SERPL-MCNC: 83 MG/DL — SIGNIFICANT CHANGE UP (ref 70–99)
HCT VFR BLD CALC: 32.4 % — LOW (ref 34.5–45)
HGB BLD-MCNC: 10.7 G/DL — LOW (ref 11.5–15.5)
IMM GRANULOCYTES NFR BLD AUTO: 0.2 % — SIGNIFICANT CHANGE UP (ref 0–1.5)
LYMPHOCYTES # BLD AUTO: 2.38 K/UL — SIGNIFICANT CHANGE UP (ref 1–3.3)
LYMPHOCYTES # BLD AUTO: 26.7 % — SIGNIFICANT CHANGE UP (ref 13–44)
MAGNESIUM SERPL-MCNC: 2.4 MG/DL — SIGNIFICANT CHANGE UP (ref 1.6–2.6)
MCHC RBC-ENTMCNC: 32.3 PG — SIGNIFICANT CHANGE UP (ref 27–34)
MCHC RBC-ENTMCNC: 33 GM/DL — SIGNIFICANT CHANGE UP (ref 32–36)
MCV RBC AUTO: 97.9 FL — SIGNIFICANT CHANGE UP (ref 80–100)
MONOCYTES # BLD AUTO: 0.86 K/UL — SIGNIFICANT CHANGE UP (ref 0–0.9)
MONOCYTES NFR BLD AUTO: 9.6 % — SIGNIFICANT CHANGE UP (ref 2–14)
NEUTROPHILS # BLD AUTO: 5.5 K/UL — SIGNIFICANT CHANGE UP (ref 1.8–7.4)
NEUTROPHILS NFR BLD AUTO: 61.7 % — SIGNIFICANT CHANGE UP (ref 43–77)
PHOSPHATE SERPL-MCNC: 2.9 MG/DL — SIGNIFICANT CHANGE UP (ref 2.5–4.5)
PLATELET # BLD AUTO: 205 K/UL — SIGNIFICANT CHANGE UP (ref 150–400)
POTASSIUM SERPL-MCNC: 4 MMOL/L — SIGNIFICANT CHANGE UP (ref 3.5–5.3)
POTASSIUM SERPL-SCNC: 4 MMOL/L — SIGNIFICANT CHANGE UP (ref 3.5–5.3)
RBC # BLD: 3.31 M/UL — LOW (ref 3.8–5.2)
RBC # FLD: 13.4 % — SIGNIFICANT CHANGE UP (ref 10.3–14.5)
SODIUM SERPL-SCNC: 138 MMOL/L — SIGNIFICANT CHANGE UP (ref 135–145)
TROPONIN I SERPL-MCNC: 0.1 NG/ML — HIGH (ref 0.01–0.04)
TROPONIN I SERPL-MCNC: 0.11 NG/ML — HIGH (ref 0.01–0.04)
WBC # BLD: 8.92 K/UL — SIGNIFICANT CHANGE UP (ref 3.8–10.5)
WBC # FLD AUTO: 8.92 K/UL — SIGNIFICANT CHANGE UP (ref 3.8–10.5)

## 2018-11-30 PROCEDURE — 80048 BASIC METABOLIC PNL TOTAL CA: CPT

## 2018-11-30 PROCEDURE — 83880 ASSAY OF NATRIURETIC PEPTIDE: CPT

## 2018-11-30 PROCEDURE — 36600 WITHDRAWAL OF ARTERIAL BLOOD: CPT

## 2018-11-30 PROCEDURE — 99231 SBSQ HOSP IP/OBS SF/LOW 25: CPT

## 2018-11-30 PROCEDURE — 97162 PT EVAL MOD COMPLEX 30 MIN: CPT

## 2018-11-30 PROCEDURE — 84484 ASSAY OF TROPONIN QUANT: CPT

## 2018-11-30 PROCEDURE — 83605 ASSAY OF LACTIC ACID: CPT

## 2018-11-30 PROCEDURE — 82553 CREATINE MB FRACTION: CPT

## 2018-11-30 PROCEDURE — 97116 GAIT TRAINING THERAPY: CPT

## 2018-11-30 PROCEDURE — 97530 THERAPEUTIC ACTIVITIES: CPT

## 2018-11-30 PROCEDURE — 93306 TTE W/DOPPLER COMPLETE: CPT

## 2018-11-30 PROCEDURE — 87040 BLOOD CULTURE FOR BACTERIA: CPT

## 2018-11-30 PROCEDURE — 85027 COMPLETE CBC AUTOMATED: CPT

## 2018-11-30 PROCEDURE — 96374 THER/PROPH/DIAG INJ IV PUSH: CPT

## 2018-11-30 PROCEDURE — 94660 CPAP INITIATION&MGMT: CPT

## 2018-11-30 PROCEDURE — 82962 GLUCOSE BLOOD TEST: CPT

## 2018-11-30 PROCEDURE — 36415 COLL VENOUS BLD VENIPUNCTURE: CPT

## 2018-11-30 PROCEDURE — 93010 ELECTROCARDIOGRAM REPORT: CPT

## 2018-11-30 PROCEDURE — 81001 URINALYSIS AUTO W/SCOPE: CPT

## 2018-11-30 PROCEDURE — 99285 EMERGENCY DEPT VISIT HI MDM: CPT | Mod: 25

## 2018-11-30 PROCEDURE — 94640 AIRWAY INHALATION TREATMENT: CPT

## 2018-11-30 PROCEDURE — 71045 X-RAY EXAM CHEST 1 VIEW: CPT

## 2018-11-30 PROCEDURE — 94760 N-INVAS EAR/PLS OXIMETRY 1: CPT

## 2018-11-30 PROCEDURE — 82550 ASSAY OF CK (CPK): CPT

## 2018-11-30 PROCEDURE — 82803 BLOOD GASES ANY COMBINATION: CPT

## 2018-11-30 PROCEDURE — 99221 1ST HOSP IP/OBS SF/LOW 40: CPT

## 2018-11-30 PROCEDURE — 85730 THROMBOPLASTIN TIME PARTIAL: CPT

## 2018-11-30 PROCEDURE — 84100 ASSAY OF PHOSPHORUS: CPT

## 2018-11-30 PROCEDURE — 99233 SBSQ HOSP IP/OBS HIGH 50: CPT

## 2018-11-30 PROCEDURE — 83735 ASSAY OF MAGNESIUM: CPT

## 2018-11-30 PROCEDURE — 93970 EXTREMITY STUDY: CPT

## 2018-11-30 PROCEDURE — 93005 ELECTROCARDIOGRAM TRACING: CPT

## 2018-11-30 PROCEDURE — 80053 COMPREHEN METABOLIC PANEL: CPT

## 2018-11-30 PROCEDURE — 85610 PROTHROMBIN TIME: CPT

## 2018-11-30 PROCEDURE — 83036 HEMOGLOBIN GLYCOSYLATED A1C: CPT

## 2018-11-30 RX ORDER — DOCUSATE SODIUM 100 MG
100 CAPSULE ORAL
Qty: 0 | Refills: 0 | Status: DISCONTINUED | OUTPATIENT
Start: 2018-11-30 | End: 2018-11-30

## 2018-11-30 RX ORDER — DOCUSATE SODIUM 100 MG
1 CAPSULE ORAL
Qty: 0 | Refills: 0 | COMMUNITY

## 2018-11-30 RX ORDER — SULFASALAZINE 500 MG
1 TABLET ORAL
Qty: 0 | Refills: 0 | COMMUNITY

## 2018-11-30 RX ORDER — IPRATROPIUM/ALBUTEROL SULFATE 18-103MCG
3 AEROSOL WITH ADAPTER (GRAM) INHALATION EVERY 12 HOURS
Qty: 0 | Refills: 0 | Status: DISCONTINUED | OUTPATIENT
Start: 2018-11-30 | End: 2018-11-30

## 2018-11-30 RX ORDER — DOCUSATE SODIUM 100 MG
50 CAPSULE ORAL DAILY
Qty: 0 | Refills: 0 | Status: DISCONTINUED | OUTPATIENT
Start: 2018-11-30 | End: 2018-11-30

## 2018-11-30 RX ORDER — OMEPRAZOLE 10 MG/1
1 CAPSULE, DELAYED RELEASE ORAL
Qty: 0 | Refills: 0 | COMMUNITY

## 2018-11-30 RX ORDER — BUDESONIDE, MICRONIZED 100 %
0 POWDER (GRAM) MISCELLANEOUS
Qty: 0 | Refills: 0 | COMMUNITY

## 2018-11-30 RX ORDER — CLOTRIMAZOLE 10 MG
1 TROCHE MUCOUS MEMBRANE
Qty: 0 | Refills: 0 | COMMUNITY

## 2018-11-30 RX ORDER — FUROSEMIDE 40 MG
1 TABLET ORAL
Qty: 0 | Refills: 0 | COMMUNITY

## 2018-11-30 RX ADMIN — SPIRONOLACTONE 12.5 MILLIGRAM(S): 25 TABLET, FILM COATED ORAL at 07:09

## 2018-11-30 RX ADMIN — Medication 1 LOZENGE: at 07:09

## 2018-11-30 RX ADMIN — LOSARTAN POTASSIUM 25 MILLIGRAM(S): 100 TABLET, FILM COATED ORAL at 07:09

## 2018-11-30 RX ADMIN — ALBUTEROL 2.5 MILLIGRAM(S): 90 AEROSOL, METERED ORAL at 00:47

## 2018-11-30 RX ADMIN — ENOXAPARIN SODIUM 60 MILLIGRAM(S): 100 INJECTION SUBCUTANEOUS at 07:09

## 2018-11-30 RX ADMIN — LORATADINE 10 MILLIGRAM(S): 10 TABLET ORAL at 12:09

## 2018-11-30 RX ADMIN — TIOTROPIUM BROMIDE 1 CAPSULE(S): 18 CAPSULE ORAL; RESPIRATORY (INHALATION) at 12:09

## 2018-11-30 RX ADMIN — Medication 150 MILLIGRAM(S): at 12:10

## 2018-11-30 RX ADMIN — OXYCODONE AND ACETAMINOPHEN 2 TABLET(S): 5; 325 TABLET ORAL at 12:39

## 2018-11-30 RX ADMIN — PANTOPRAZOLE SODIUM 40 MILLIGRAM(S): 20 TABLET, DELAYED RELEASE ORAL at 07:09

## 2018-11-30 RX ADMIN — Medication 40 MILLIGRAM(S): at 07:12

## 2018-11-30 RX ADMIN — OXYCODONE AND ACETAMINOPHEN 2 TABLET(S): 5; 325 TABLET ORAL at 00:32

## 2018-11-30 RX ADMIN — POLYETHYLENE GLYCOL 3350 17 GRAM(S): 17 POWDER, FOR SOLUTION ORAL at 12:10

## 2018-11-30 RX ADMIN — OXYCODONE AND ACETAMINOPHEN 2 TABLET(S): 5; 325 TABLET ORAL at 12:09

## 2018-11-30 RX ADMIN — LOSARTAN POTASSIUM 25 MILLIGRAM(S): 100 TABLET, FILM COATED ORAL at 17:24

## 2018-11-30 RX ADMIN — OXYCODONE AND ACETAMINOPHEN 2 TABLET(S): 5; 325 TABLET ORAL at 17:54

## 2018-11-30 RX ADMIN — OXYCODONE AND ACETAMINOPHEN 2 TABLET(S): 5; 325 TABLET ORAL at 07:09

## 2018-11-30 RX ADMIN — Medication 0.25 MILLIGRAM(S): at 17:23

## 2018-11-30 RX ADMIN — ALBUTEROL 2.5 MILLIGRAM(S): 90 AEROSOL, METERED ORAL at 07:10

## 2018-11-30 RX ADMIN — Medication 100 MILLIGRAM(S): at 07:09

## 2018-11-30 RX ADMIN — Medication 0.25 MILLIGRAM(S): at 07:08

## 2018-11-30 RX ADMIN — Medication 500 MILLIGRAM(S): at 07:09

## 2018-11-30 RX ADMIN — Medication 100 MILLIGRAM(S): at 17:24

## 2018-11-30 RX ADMIN — Medication 81 MILLIGRAM(S): at 12:10

## 2018-11-30 RX ADMIN — ARIPIPRAZOLE 2 MILLIGRAM(S): 15 TABLET ORAL at 12:09

## 2018-11-30 RX ADMIN — Medication 500 MILLIGRAM(S): at 13:13

## 2018-11-30 RX ADMIN — BUDESONIDE AND FORMOTEROL FUMARATE DIHYDRATE 2 PUFF(S): 160; 4.5 AEROSOL RESPIRATORY (INHALATION) at 12:10

## 2018-11-30 RX ADMIN — Medication 240 MILLIGRAM(S): at 07:09

## 2018-11-30 RX ADMIN — Medication 40 MILLIGRAM(S): at 13:13

## 2018-11-30 RX ADMIN — Medication 1 LOZENGE: at 13:13

## 2018-11-30 RX ADMIN — OXYCODONE AND ACETAMINOPHEN 2 TABLET(S): 5; 325 TABLET ORAL at 17:24

## 2018-11-30 NOTE — PROGRESS NOTE ADULT - PROBLEM SELECTOR PLAN 4
On home 02 2L NC Q HS for SIMÓN, Nebs BID  - continue with albuterol, Spiriva, Symbicort  - Pulm following (Dr. Rivera), recs prednisone 40mg PO x 4days from 12/1.  - start Pepcid 20mg qd for GI ppx with steroid use On home 02 2L NC Q HS for SIMÓN, Nebs BID  - continue with albuterol, Spiriva, Symbicort  - Pulm following (Dr. Rivera), recs prednisone 40mg PO daily  x 4days from 12/1.  - start Pepcid 20mg qd for GI ppx with steroid use  - Pt to be compliant with home C PAP

## 2018-11-30 NOTE — DIETITIAN INITIAL EVALUATION ADULT. - PROBLEM SELECTOR PLAN 7
On home metformin 500mgqd  - Will hold metformin  - start low dose sliding scale Accu check  - hypoglycemic protocol

## 2018-11-30 NOTE — PROGRESS NOTE ADULT - PROBLEM SELECTOR PROBLEM 1
Pulmonary edema, acute
Pulmonary edema, acute
Acute respiratory failure, unspecified whether with hypoxia or hypercapnia
Acute respiratory failure, unspecified whether with hypoxia or hypercapnia

## 2018-11-30 NOTE — PROGRESS NOTE ADULT - SUBJECTIVE AND OBJECTIVE BOX
Patient is a 78y old  Female who presents with a chief complaint of Acute CHF, Acute Respiratory failure (2018 12:19)      INTERVAL HPI: 78y F with pmhx of COPD (on home o2, 2L NC), hypertension, hyperlipidemia, diverticulitis, depression, Rt breast cancer, status post lumpectomy and radiation , anxiety, hiatal hernia-status post surgical repair in , obstructive sleep apnea (not on CPAP at home), PVD, rheumatoid arthritis, thrush, TIA (), TMJ, valvular heart disease (aortic and mitral), H/O AVR, Type 2 diabetes (on metformin), presents to ED by EMS for SOB. As per patient, she was on her way to work this morning and while walking to her car, stopped to  garbage cans. Suddenly felt short of breath with increased work of breathing. Reports felt similar to previous episodes of COPD exacerbation and immediately called EMS. EMS arrived, noted rhonchi on lung exam and gave patient subinguinal nitroglycerin, started on CPAP. Patient has hx of chronic nonproductive cough, denies worsening of cough over last months. Daughter at bedside reports patient was diaphoretic with EMS. Recently visited pulmonologist and cardiologist last week, was in normal state of health. Patient denies fevers, chills, nausea, vomiting, CP, palpitations, abdominal pain, diarrhea, constipation, peripheral edema.  Of note, patient admitted in 2017 for COPD exacerbation.     In the ED, vitals are T 99.7, , /88, RR 26 Spo2 94% on supplemental oxygen. Repeat BP 96/43 after Lasix 40mg IV given. Labs significant for mild leukocytosis (11.39), hyponatremia (133), hyperglycemia (151), elevated alk phos (153), elevated AST (62), elevated lactate (3.2), elevated creatine kinase (246), elevated trop (0.112), elevated proBNP (879). ABG unimpressive, wnl. CXR There is new moderate pulmonary edema. The heart size is at the upper limits of normal. The patient is status post median sternotomy. The patient is status post aortic valve replacement. Spinal stimulator leads are in place. Multilevel degenerative changes are noted within the imaged potions of the spine. Impression: moderate pulmonary edema. EKG: Sinus Tachy 106 with LBBB. Received Lasix 40mg IVx1, blood cultures pending. Pt was placed on BI PAP in ER.  Pt seen by Cardiology Dr. Kunz in ER, IV Lasix to continue.     ICU eval done, as per ICU pt does NOT need ICU care, Dr. Rivera saw patient in ED, stable for admission to tele.     18: Patient was seen and examined this morning at bedside. Patient was sitting in chair, eating her breakfast. Patient has no acute complaints and states "I am feeling much better today". Patient denies headache, SOB, chest pain, palpitations, weakness, numbness, or tingling. Admits 1 BM overnight and is urinating well. US LE neg. Trops resolving, EKG showing T wave changes. ECHO Done, Dr Ravi -Cardiology wants to start Lovenox 60 mg SC q 12 hrs, , Change Lasix 60 mg IV Once a day. Trend CK/troponin q 8 hrs.  18: Patient was seen and examined this morning at bedside chair. Pt with no acute complaints and states she wants to leave. Patient denies headache, SOB, chest pain, palpitations, weakness, numbness, or tingling. No BM overnight but is urinating well. Trops resolving, EKG showing signs of LBBB. Lovenox DC'ed as per cardio, likely component of rhabdo. Doubt ACS. ECHO showing Low normal left ventricular systolic function, EF 50%.    OVERNIGHT EVENTS: None    Home Medications:  Abilify 2 mg oral tablet: 1 tab(s) orally once a day (at bedtime) (2018 13:40)  aspirin 81 mg oral tablet: 1 tab(s) orally once a day HOME/  follow preop instructions as per surgeon and cardiologist (2018 13:40)  budesonide:  (2018 13:40)  clonazePAM 0.25 mg oral tablet: 1 tab(s) orally 2 times a day (2018 13:40)  clotrimazole: 10 milligram(s) orally , As Needed (2018 11:04)  Colace 100 mg oral capsule: 1 cap(s) orally once a day (2018 10:52)  Colace 50 mg oral capsule: 1 cap(s) orally once a day (2018 10:53)  Dramamine:  (2018 13:40)  Effexor  mg oral capsule, extended release: 1 cap(s) orally once a day (2018 13:40)  furosemide 40 mg oral tablet: 1 tab(s) orally once a day (2018 13:40)  losartan 25 mg oral tablet: 1 tab(s) orally 2 times a day (2018 13:40)  metFORMIN 500 mg oral tablet: 1 tab(s) orally once a day in PM (2018 13:40)  ocular lubricant ophthalmic solution: 1 drop(s) to each affected eye 4 times a day, As needed, Dry Eyes (2018 13:40)  Percocet 10/325 oral tablet: 1 tab(s) orally every 6 hours, As Needed  HOSP (2018 13:40)  PriLOSEC 20 mg oral delayed release capsule: 1 cap(s) orally once a day (2018 10:49)  ProAir HFA 90 mcg/inh inhalation aerosol: 2 puff(s) inhaled 4 times a day, As Needed (2018 13:40)  Spiriva Respimat 1.25 mcg/inh inhalation aerosol: 2 puff(s) inhaled once a day (2018 13:40)  spironolactone 25 mg oral tablet: 0.5 tab(s) orally once a day (2018 13:40)  sulfaSALAzine 500 mg oral tablet: 1 tab(s) orally 3 times a day (2018 11:02)  Symbicort 160 mcg-4.5 mcg/inh inhalation aerosol: 2 puff(s) inhaled 2 times a day (2018 13:40)  verapamil 240 mg/24 hours oral capsule, extended release: 1 cap(s) orally once a day (2018 13:40)  ZyrTEC 10 mg oral tablet: 1 tab(s) orally once a day (2018 13:40)      MEDICATIONS  (STANDING):  ALBUTerol/ipratropium for Nebulization 3 milliLiter(s) Nebulizer every 12 hours  ARIPiprazole 2 milliGRAM(s) Oral daily  aspirin  chewable 81 milliGRAM(s) Oral daily  buDESOnide 160 MICROgram(s)/formoterol 4.5 MICROgram(s) Inhaler 2 Puff(s) Inhalation two times a day  clonazePAM Tablet 0.25 milliGRAM(s) Oral two times a day  clotrimazole Lozenge 1 Lozenge Oral every 8 hours  dextrose 5%. 1000 milliLiter(s) (50 mL/Hr) IV Continuous <Continuous>  dextrose 50% Injectable 12.5 Gram(s) IV Push once  dextrose 50% Injectable 25 Gram(s) IV Push once  dextrose 50% Injectable 25 Gram(s) IV Push once  docusate sodium 100 milliGRAM(s) Oral two times a day  furosemide   Injectable 60 milliGRAM(s) IV Push daily  insulin lispro (HumaLOG) corrective regimen sliding scale   SubCutaneous Before meals and at bedtime  latanoprost 0.005% Ophthalmic Solution 1 Drop(s) Both EYES at bedtime  loratadine 10 milliGRAM(s) Oral daily  losartan 25 milliGRAM(s) Oral two times a day  methylPREDNISolone sodium succinate Injectable 40 milliGRAM(s) IV Push every 24 hours  oxyCODONE    5 mG/acetaminophen 325 mG 2 Tablet(s) Oral four times a day  pantoprazole    Tablet 40 milliGRAM(s) Oral before breakfast  polyethylene glycol 3350 17 Gram(s) Oral daily  spironolactone 12.5 milliGRAM(s) Oral daily  sulfaSALAzine 500 milliGRAM(s) Oral three times a day  tiotropium 18 MICROgram(s) Capsule 1 Capsule(s) Inhalation daily  venlafaxine XR. 150 milliGRAM(s) Oral daily  verapamil  milliGRAM(s) Oral daily    MEDICATIONS  (PRN):  dextrose 40% Gel 15 Gram(s) Oral once PRN Blood Glucose LESS THAN 70 milliGRAM(s)/deciliter  glucagon  Injectable 1 milliGRAM(s) IntraMuscular once PRN Glucose LESS THAN 70 milligrams/deciliter  senna 2 Tablet(s) Oral at bedtime PRN Constipation  traMADol 50 milliGRAM(s) Oral every 6 hours PRN Moderate Pain (4 - 6)      Allergies    penicillins (Hives)  Plavix (Short breath)  quinolines (Other)    Intolerances        REVIEW OF SYSTEMS:  CONSTITUTIONAL: No fever, No chills, No fatigue, No myalgia, No Body ache, No Weakness  EYES: No eye pain, No visual disturbances, No discharge, NO Redness  ENMT:  No ear pain, No nose bleed, No vertigo; No sinus or throat pain, No Congestion  NECK: No pain, No stiffness  RESPIRATORY: No cough, wheezing, No  hemoptysis, No shortness of breath  CARDIOVASCULAR: No chest pain, palpitations  GASTROINTESTINAL: No abdominal or epigastric pain. No nausea, No vomiting; No diarrhea or constipation. No BM  GENITOURINARY: No dysuria, No frequency, No urgency, No hematuria, or incontinence  NEUROLOGICAL: No headaches, No dizziness, No numbness, No tingling, No tremors, No weakness  EXT: No Swelling, No Pain, No Edema  SKIN:  [ x ] No itching, burning, rashes, or lesions   MUSCULOSKELETAL: No joint pain or swelling; No muscle pain, No back pain, No extremity pain  PSYCHIATRIC: No depression, anxiety, mood swings or difficulty sleeping at night  PAIN SCALE: [ x ] None  [  ] Other-  ROS Unable to obtain due to - [  ] Dementia  [  ] Lethargy  [  ] Sedated [  ] non verbal  REST OF REVIEW Of SYSTEM - [ x ] Normal     Vital Signs Last 24 Hrs  T(C): 36.9 (2018 11:30), Max: 36.9 (2018 20:29)  T(F): 98.5 (2018 11:30), Max: 98.5 (2018 20:29)  HR: 93 (2018 11:30) (62 - 113)  BP: 110/62 (2018 11:30) (102/55 - 157/-)  BP(mean): 91 (2018 15:22) (91 - 91)  RR: 18 (2018 11:30) (17 - 19)  SpO2: 93% (2018 11:30) (93% - 100%)  Finger Stick         @ 07:01  -   @ 13:06  --------------------------------------------------------  IN: 440 mL / OUT: 0 mL / NET: 440 mL        PHYSICAL EXAM:  GENERAL:  [ x ] NAD , [  ] well appearing, [  ] Agitated, [  ] Drowsy,  [  ] Lethargy, [  ] confused   HEAD:  [ x ] Normal, [  ] Other  EYES:  [ x ] EOMI, [ x ] PERRLA, [ x ] conjunctiva and sclera clear normal, [  ] Other,  [  ] Pallor,[  ] Discharge  ENMT:  [ x ] Normal, [ x ] Moist mucous membranes, [  ] Good dentition, [  ] No Thrush  NECK:  [ x ] Supple, [ x ] No JVD, [ x ] Normal thyroid, [  ] Lymphadenopathy [  ] Other  CHEST/LUNG:  [ x ] Clear to auscultation bilaterally, [ x ] Breath Sounds equal  [ x ] No rales, [ x ] No rhonchi  [ x ]  No wheezing  HEART:  [  ] Regular rate and rhythm, [ x ] tachycardia, [  ] Bradycardia,  [  ] irregular  [  ] No murmurs, No rubs, No gallops, [  ] PPM in place (Mfr:  )  ABDOMEN:  [ x ] Soft, [ x ] Nontender, [ x ] Nondistended, [ x ] No mass, [ x ] Bowel sounds present, [  ] obese  NERVOUS SYSTEM:  [ x ] Alert & Oriented X3, [ x ] Nonfocal  [  ] Confusion  [  ] Encephalopathic [  ] Sedated [  ] Unable to assess, [  ] Other-  EXTREMITIES: [ x ] 2+ Peripheral Pulses, No clubbing, No cyanosis,  [  ] edema B/L lower EXT. [  ] PVD stasis skin changes B/L Lower EXT  LYMPH: No lymphadenopathy noted  SKIN:  [ x ] No rashes or lesions, [  ] Pressure Ulcers, [  ] ecchymosis, [  ] Skin Tears, [  ] Other    DIET:     LABS:                        10.7   8.92  )-----------( 205      ( 2018 07:10 )             32.4     2018 07:10    138    |  103    |  14     ----------------------------<  83     4.0     |  30     |  0.54     Ca    8.3        2018 07:10  Phos  2.9       2018 07:10  Mg     2.4       2018 07:10        Urinalysis Basic - ( 2018 14:10 )    Color: Yellow / Appearance: Clear / S.005 / pH: x  Gluc: x / Ketone: Negative  / Bili: Negative / Urobili: Negative   Blood: x / Protein: Negative / Nitrite: Negative   Leuk Esterase: Negative / RBC: x / WBC x   Sq Epi: x / Non Sq Epi: Occasional / Bacteria: x        Culture Results:   No growth to date. ( @ 16:37)  Culture Results:   No growth to date. ( @ 16:37)      culture blood  -- .Blood Blood  @ 16:37    culture urine  --   @ 16:37      CARDIAC MARKERS ( 2018 10:15 )  .095 ng/mL / x     / 411 U/L / x     / 8.0 ng/mL  CARDIAC MARKERS ( 2018 03:28 )  .106 ng/mL / x     / 506 U/L / x     / 8.7 ng/mL  CARDIAC MARKERS ( 2018 20:05 )  .091 ng/mL / x     / 741 U/L / x     / 12.1 ng/mL  CARDIAC MARKERS ( 2018 11:34 )  .179 ng/mL / x     / 921 U/L / x     / 17.5 ng/mL  CARDIAC MARKERS ( 2018 05:39 )  .213 ng/mL / x     / 758 U/L / x     / 16.3 ng/mL  CARDIAC MARKERS ( 2018 23:42 )  .171 ng/mL / x     / 360 U/L / x     / 9.2 ng/mL  CARDIAC MARKERS ( 2018 18:53 )  .203 ng/mL / x     / 206 U/L / x     / 6.2 ng/mL  CARDIAC MARKERS ( 2018 11:59 )  x     / x     / 246 U/L / x     / x      CARDIAC MARKERS ( 2018 11:54 )  .112 ng/mL / x     / x     / x     / x              Culture - Blood (collected 2018 16:37)  Source: .Blood Blood  Preliminary Report (2018 17:01):    No growth to date.    Culture - Blood (collected 2018 16:37)  Source: .Blood Blood  Preliminary Report (2018 17:01):    No growth to date.         Anemia Panel:      Thyroid Panel:            Serum Pro-Brain Natriuretic Peptide: 879 pg/mL (18 @ 11:54)      RADIOLOGY & ADDITIONAL TESTS:      HEALTH ISSUES - PROBLEM Dx:  Pulmonary edema, acute: Pulmonary edema, acute  COPD (chronic obstructive pulmonary disease): COPD (chronic obstructive pulmonary disease)  Prophylactic measure: Prophylactic measure  Anxiety: Anxiety  HTN (hypertension): HTN (hypertension)  Diabetes mellitus: Diabetes mellitus  RA (rheumatoid arthritis): RA (rheumatoid arthritis)  Hyponatremia: Hyponatremia  Valvular heart disease: Valvular heart disease  Lactate blood increase: Lactate blood increase  Acute congestive heart failure, unspecified heart failure type: Acute congestive heart failure, unspecified heart failure type  Acute respiratory failure, unspecified whether with hypoxia or hypercapnia: Acute respiratory failure, unspecified whether with hypoxia or hypercapnia          Consultant(s) Notes Reviewed:  [ x ] YES     Care Discussed with [X] Consultants  [ x ] Patient  [  ] Family  [  ]   [  ] Social Service  [  ] RN, [  ] Physical Therapy  DVT PPX: [  ] Lovenox, [  ] S C Heparin, [  ] Coumadin, [  ] Xarelto, [  ] Eliquis, [  ] Pradaxa, [  ] IV Heparin drip, [  ] SCD [  ] Contraindication 2 to GI Bleed,[  ] Ambulation  Advanced directive: [  ] None, [  ] DNR/DNI Patient is a 78y old  Female who presents with a chief complaint of Acute CHF, Acute Respiratory failure (2018 12:19)      INTERVAL HPI: 78y F with pmhx of COPD (on home o2, 2L NC), hypertension, hyperlipidemia, diverticulitis, depression, Rt breast cancer, status post lumpectomy and radiation , anxiety, hiatal hernia-status post surgical repair in , obstructive sleep apnea (not on CPAP at home), PVD, rheumatoid arthritis, thrush, TIA (), TMJ, valvular heart disease (aortic and mitral), H/O AVR, Type 2 diabetes (on metformin), presents to ED by EMS for SOB. As per patient, she was on her way to work this morning and while walking to her car, stopped to  garbage cans. Suddenly felt short of breath with increased work of breathing. Reports felt similar to previous episodes of COPD exacerbation and immediately called EMS. EMS arrived, noted rhonchi on lung exam and gave patient subinguinal nitroglycerin, started on CPAP. Patient has hx of chronic nonproductive cough, denies worsening of cough over last months. Daughter at bedside reports patient was diaphoretic with EMS. Recently visited pulmonologist and cardiologist last week, was in normal state of health. Patient denies fevers, chills, nausea, vomiting, CP, palpitations, abdominal pain, diarrhea, constipation, peripheral edema.  Of note, patient admitted in 2017 for COPD exacerbation.     In the ED, vitals are T 99.7, , /88, RR 26 Spo2 94% on supplemental oxygen. Repeat BP 96/43 after Lasix 40mg IV given. Labs significant for mild leukocytosis (11.39), hyponatremia (133), hyperglycemia (151), elevated alk phos (153), elevated AST (62), elevated lactate (3.2), elevated creatine kinase (246), elevated trop (0.112), elevated proBNP (879). ABG unimpressive, wnl. CXR There is new moderate pulmonary edema. The heart size is at the upper limits of normal. The patient is status post median sternotomy. The patient is status post aortic valve replacement. Spinal stimulator leads are in place. Multilevel degenerative changes are noted within the imaged potions of the spine. Impression: moderate pulmonary edema. EKG: Sinus Tachy 106 with LBBB. Received Lasix 40mg IVx1, blood cultures pending. Pt was placed on BI PAP in ER.  Pt seen by Cardiology Dr. Kunz in ER, IV Lasix to continue.     ICU eval done, as per ICU pt does NOT need ICU care, Dr. Rivera saw patient in ED, stable for admission to tele.     18: Patient was seen and examined this morning at bedside. Patient was sitting in chair, eating her breakfast. Patient has no acute complaints and states "I am feeling much better today". Patient denies headache, SOB, chest pain, palpitations, weakness, numbness, or tingling. Admits 1 BM overnight and is urinating well. US LE neg. Trops resolving, EKG showing T wave changes. ECHO Done, Dr Ravi -Cardiology wants to start Lovenox 60 mg SC q 12 hrs, , Change Lasix 60 mg IV Once a day. Trend CK/troponin q 8 hrs.  18: Patient was seen and examined this morning at bedside chair. Pt with no acute complaints and states she wants to leave. Patient denies headache, SOB, chest pain, palpitations, weakness, numbness, or tingling. No BM overnight but is urinating well. Trops resolving, EKG showing signs of LBBB. Lovenox DC'ed as per cardio, likely component of rhabdo. Doubt ACS. ECHO showing Low normal left ventricular systolic function, EF 50%.Stable for d/c home on PO lasix, Po Steroids.CK/Troponin Improving, NO ACS.    OVERNIGHT EVENTS: None    Home Medications:  Abilify 2 mg oral tablet: 1 tab(s) orally once a day (at bedtime) (2018 13:40)  aspirin 81 mg oral tablet: 1 tab(s) orally once a day HOME/  follow preop instructions as per surgeon and cardiologist (2018 13:40)  budesonide:  (2018 13:40)  clonazePAM 0.25 mg oral tablet: 1 tab(s) orally 2 times a day (2018 13:40)  clotrimazole: 10 milligram(s) orally , As Needed (2018 11:04)  Colace 100 mg oral capsule: 1 cap(s) orally once a day (2018 10:52)  Colace 50 mg oral capsule: 1 cap(s) orally once a day (2018 10:53)  Dramamine:  (2018 13:40)  Effexor  mg oral capsule, extended release: 1 cap(s) orally once a day (2018 13:40)  furosemide 40 mg oral tablet: 1 tab(s) orally once a day (2018 13:40)  losartan 25 mg oral tablet: 1 tab(s) orally 2 times a day (2018 13:40)  metFORMIN 500 mg oral tablet: 1 tab(s) orally once a day in PM (2018 13:40)  ocular lubricant ophthalmic solution: 1 drop(s) to each affected eye 4 times a day, As needed, Dry Eyes (2018 13:40)  Percocet 10/325 oral tablet: 1 tab(s) orally every 6 hours, As Needed  HOSP (2018 13:40)  PriLOSEC 20 mg oral delayed release capsule: 1 cap(s) orally once a day (2018 10:49)  ProAir HFA 90 mcg/inh inhalation aerosol: 2 puff(s) inhaled 4 times a day, As Needed (2018 13:40)  Spiriva Respimat 1.25 mcg/inh inhalation aerosol: 2 puff(s) inhaled once a day (2018 13:40)  spironolactone 25 mg oral tablet: 0.5 tab(s) orally once a day (2018 13:40)  sulfaSALAzine 500 mg oral tablet: 1 tab(s) orally 3 times a day (2018 11:02)  Symbicort 160 mcg-4.5 mcg/inh inhalation aerosol: 2 puff(s) inhaled 2 times a day (2018 13:40)  verapamil 240 mg/24 hours oral capsule, extended release: 1 cap(s) orally once a day (2018 13:40)  ZyrTEC 10 mg oral tablet: 1 tab(s) orally once a day (2018 13:40)      MEDICATIONS  (STANDING):  ALBUTerol/ipratropium for Nebulization 3 milliLiter(s) Nebulizer every 12 hours  ARIPiprazole 2 milliGRAM(s) Oral daily  aspirin  chewable 81 milliGRAM(s) Oral daily  buDESOnide 160 MICROgram(s)/formoterol 4.5 MICROgram(s) Inhaler 2 Puff(s) Inhalation two times a day  clonazePAM Tablet 0.25 milliGRAM(s) Oral two times a day  clotrimazole Lozenge 1 Lozenge Oral every 8 hours  dextrose 5%. 1000 milliLiter(s) (50 mL/Hr) IV Continuous <Continuous>  dextrose 50% Injectable 12.5 Gram(s) IV Push once  dextrose 50% Injectable 25 Gram(s) IV Push once  dextrose 50% Injectable 25 Gram(s) IV Push once  docusate sodium 100 milliGRAM(s) Oral two times a day  furosemide   Injectable 60 milliGRAM(s) IV Push daily  insulin lispro (HumaLOG) corrective regimen sliding scale   SubCutaneous Before meals and at bedtime  latanoprost 0.005% Ophthalmic Solution 1 Drop(s) Both EYES at bedtime  loratadine 10 milliGRAM(s) Oral daily  losartan 25 milliGRAM(s) Oral two times a day  methylPREDNISolone sodium succinate Injectable 40 milliGRAM(s) IV Push every 24 hours  oxyCODONE    5 mG/acetaminophen 325 mG 2 Tablet(s) Oral four times a day  pantoprazole    Tablet 40 milliGRAM(s) Oral before breakfast  polyethylene glycol 3350 17 Gram(s) Oral daily  spironolactone 12.5 milliGRAM(s) Oral daily  sulfaSALAzine 500 milliGRAM(s) Oral three times a day  tiotropium 18 MICROgram(s) Capsule 1 Capsule(s) Inhalation daily  venlafaxine XR. 150 milliGRAM(s) Oral daily  verapamil  milliGRAM(s) Oral daily    MEDICATIONS  (PRN):  dextrose 40% Gel 15 Gram(s) Oral once PRN Blood Glucose LESS THAN 70 milliGRAM(s)/deciliter  glucagon  Injectable 1 milliGRAM(s) IntraMuscular once PRN Glucose LESS THAN 70 milligrams/deciliter  senna 2 Tablet(s) Oral at bedtime PRN Constipation  traMADol 50 milliGRAM(s) Oral every 6 hours PRN Moderate Pain (4 - 6)      Allergies    penicillins (Hives)  Plavix (Short breath)  quinolines (Other)    Intolerances        REVIEW OF SYSTEMS: I feel fine, No complaints, I Walked  CONSTITUTIONAL: No fever, No chills, No fatigue, No myalgia, No Body ache, No Weakness  EYES: No eye pain, No visual disturbances, No discharge, NO Redness  ENMT:  No ear pain, No nose bleed, No vertigo; No sinus or throat pain, No Congestion  NECK: No pain, No stiffness  RESPIRATORY: No cough, wheezing, No  hemoptysis, No shortness of breath  CARDIOVASCULAR: No chest pain, palpitations  GASTROINTESTINAL: No abdominal or epigastric pain. No nausea, No vomiting; No diarrhea or constipation. No BM  GENITOURINARY: No dysuria, No frequency, No urgency, No hematuria, or incontinence  NEUROLOGICAL: No headaches, No dizziness, No numbness, No tingling, No tremors, No weakness  EXT: No Swelling, No Pain, No Edema  SKIN:  [ x ] No itching, burning, rashes, or lesions   MUSCULOSKELETAL: No joint pain or swelling; No muscle pain, No back pain, No extremity pain  PSYCHIATRIC: No depression, anxiety, mood swings or difficulty sleeping at night  PAIN SCALE: [ x ] None  [  ] Other-  ROS Unable to obtain due to - [  ] Dementia  [  ] Lethargy  [  ] Sedated [  ] non verbal  REST OF REVIEW Of SYSTEM - [ x ] Normal     Vital Signs Last 24 Hrs  T(C): 36.9 (2018 11:30), Max: 36.9 (2018 20:29)  T(F): 98.5 (2018 11:30), Max: 98.5 (2018 20:29)  HR: 93 (2018 11:30) (62 - 113)  BP: 110/62 (2018 11:30) (102/55 - 157/-)  BP(mean): 91 (2018 15:22) (91 - 91)  RR: 18 (2018 11:30) (17 - 19)  SpO2: 93% (2018 11:30) (93% - 100%)  Finger Stick         @ 07:01  -   @ 13:06  --------------------------------------------------------  IN: 440 mL / OUT: 0 mL / NET: 440 mL        PHYSICAL EXAM:  GENERAL:  [ x ] NAD , [  ] well appearing, [  ] Agitated, [  ] Drowsy,  [  ] Lethargy, [  ] confused   HEAD:  [ x ] Normal, [  ] Other  EYES:  [ x ] EOMI, [ x ] PERRLA, [ x ] conjunctiva and sclera clear normal, [  ] Other,  [  ] Pallor,[  ] Discharge  ENMT:  [ x ] Normal, [ x ] Moist mucous membranes, [  ] Good dentition, [  ] No Thrush  NECK:  [ x ] Supple, [ x ] No JVD, [ x ] Normal thyroid, [  ] Lymphadenopathy [  ] Other  CHEST/LUNG:  [ x ] Clear to auscultation bilaterally, [ x ] Breath Sounds equal  [ x ] No rales, [ x ] No rhonchi  [ x ]  No wheezing  HEART:  [  ] Regular rate and rhythm, [ x ] tachycardia, [  ] Bradycardia,  [  ] irregular  [  ] No murmurs, No rubs, No gallops, [  ] PPM in place (Mfr:  )  ABDOMEN:  [ x ] Soft, [ x ] Nontender, [ x ] Nondistended, [ x ] No mass, [ x ] Bowel sounds present, [  ] obese  NERVOUS SYSTEM:  [ x ] Alert & Oriented X3, [ x ] Nonfocal  [  ] Confusion  [  ] Encephalopathic [  ] Sedated [  ] Unable to assess, [  ] Other-  EXTREMITIES: [ x ] 2+ Peripheral Pulses, No clubbing, No cyanosis,  [  ] edema B/L lower EXT. [  ] PVD stasis skin changes B/L Lower EXT  LYMPH: No lymphadenopathy noted  SKIN:  [ x ] No rashes or lesions, [  ] Pressure Ulcers, [  ] ecchymosis, [  ] Skin Tears, [  ] Other    DIET: DASH    LABS:                        10.7   8.92  )-----------( 205      ( 2018 07:10 )             32.4     2018 07:10    138    |  103    |  14     ----------------------------<  83     4.0     |  30     |  0.54     Ca    8.3        2018 07:10  Phos  2.9       2018 07:10  Mg     2.4       2018 07:10        Urinalysis Basic - ( 2018 14:10 )    Color: Yellow / Appearance: Clear / S.005 / pH: x  Gluc: x / Ketone: Negative  / Bili: Negative / Urobili: Negative   Blood: x / Protein: Negative / Nitrite: Negative   Leuk Esterase: Negative / RBC: x / WBC x   Sq Epi: x / Non Sq Epi: Occasional / Bacteria: x        Culture Results:   No growth to date. ( @ 16:37)  Culture Results:   No growth to date. ( @ 16:37)      culture blood  -- .Blood Blood  @ 16:37    culture urine  --   @ 16:37      CARDIAC MARKERS ( 2018 10:15 )  .095 ng/mL / x     / 411 U/L / x     / 8.0 ng/mL  CARDIAC MARKERS ( 2018 03:28 )  .106 ng/mL / x     / 506 U/L / x     / 8.7 ng/mL  CARDIAC MARKERS ( 2018 20:05 )  .091 ng/mL / x     / 741 U/L / x     / 12.1 ng/mL  CARDIAC MARKERS ( 2018 11:34 )  .179 ng/mL / x     / 921 U/L / x     / 17.5 ng/mL  CARDIAC MARKERS ( 2018 05:39 )  .213 ng/mL / x     / 758 U/L / x     / 16.3 ng/mL  CARDIAC MARKERS ( 2018 23:42 )  .171 ng/mL / x     / 360 U/L / x     / 9.2 ng/mL  CARDIAC MARKERS ( 2018 18:53 )  .203 ng/mL / x     / 206 U/L / x     / 6.2 ng/mL  CARDIAC MARKERS ( 2018 11:59 )  x     / x     / 246 U/L / x     / x      CARDIAC MARKERS ( 2018 11:54 )  .112 ng/mL / x     / x     / x     / x              Culture - Blood (collected 2018 16:37)  Source: .Blood Blood  Preliminary Report (2018 17:01):    No growth to date.    Culture - Blood (collected 2018 16:37)  Source: .Blood Blood  Preliminary Report (2018 17:01):    No growth to date.         Serum Pro-Brain Natriuretic Peptide: 879 pg/mL (18 @ 11:54)      RADIOLOGY & ADDITIONAL TESTS:< from: TTE Echo Doppler w/o Cont (18 @ 11:33) >     PROCEDURE DATE:  2018        INTERPRETATION:  Ordering Physician: MANUELA [id number]    Indication: Shortness of breath    Study Quality: Technically difficult   A complete echocardiographic study was performed utilizing standard   protocol including spectral and color Doppler in all echocardiographic   windows.    Height: 158.7 cm  Weight: 63.5 kg  BSA: 1.7 sq m  Blood Pressure: 112/70    MEASUREMENTS  IVS: 1.0cm  PWT: 1.0cm  LA: 4.0cm  AO: 2.2cm  LVIDd: 4.8cm  LVIDs: 2.7cm    LVEF: 50%  RVSP: 31mmHg    FINDINGS  Left Ventricle: The endocardium is not well-visualized. Low normal left   ventricular systolic function. Paradoxical motion of the septum in the   setting of a left bundle branch block.  Aortic Valve: Well seated aortic valve bioprosthesis with mildly elevated   gradients (mean aortic valve gradient 18 mm Hg). There is a mobile   echodensity in the left ventricular outflow tract measuring 0.5 x 1.2 cm.   No significant aortic regurgitation.  Mitral Valve: Thickened and calcified mitral valve leaflets with normal   opening. Mild mitral regurgitation  Tricuspid Valve: Mild tricuspid regurgitation.  Pulmonic Valve: Not well-visualized  Left Atrium: Mild left atrial enlargement  Right Ventricle: Not well-visualized. In limited views, there appears to   be normal RV size and systolic function.  Right Atrium: Grossly normal  Diastolic Function: Doppler evidence of diastolic dysfunction, and   elevated left sided filling pressures  Pericardium/Pleura: No pericardial effusion.  Hemodynamics: The pulmonary artery systolic pressure is estimated to be   31 mmHg, assuming the right atrial pressure is equal to 3 mmHg,   consistent with normal pulmonary pressures.      CONCLUSIONS:  1. Technically difficult and limited study.  2. Low normal left ventricular systolic function. Paradoxical motion of   the septum in the setting of a left bundle branch block.    < end of copied text >  < from: TTE Echo Doppler w/o Cont (29.18 @ 11:33) >  3. Well seated aortic valve bioprosthesis with mildly elevated gradients   (mean aortic valve gradient 18 mm Hg). There is a mobile echodensity in   the left ventricular outflow tract adjacent to the aortic valve measuring   0.5 x 1.2 cm.   4. Thickened and calcified mitral valve leaflets with normal opening.   Mild mitral regurgitation  5. Mild left atrial enlargement  6. Doppler evidence of diastolic dysfunction, and elevated left sided   filling pressures  7. No pericardial effusion.        < end of copied text >        HEALTH ISSUES - PROBLEM Dx:  Pulmonary edema, acute: Pulmonary edema, acute  COPD (chronic obstructive pulmonary disease): COPD (chronic obstructive pulmonary disease)  Prophylactic measure: Prophylactic measure  Anxiety: Anxiety  HTN (hypertension): HTN (hypertension)  Diabetes mellitus: Diabetes mellitus  RA (rheumatoid arthritis): RA (rheumatoid arthritis)  Hyponatremia: Hyponatremia  Valvular heart disease: Valvular heart disease  Lactate blood increase: Lactate blood increase  Acute congestive heart failure, unspecified heart failure type: Acute congestive heart failure, unspecified heart failure type  Acute respiratory failure, unspecified whether with hypoxia or hypercapnia: Acute respiratory failure, unspecified whether with hypoxia or hypercapnia          Consultant(s) Notes Reviewed:  [ x ] YES     Care Discussed with [X] Consultants  [ x ] Patient  [ x ] Family Dtr[  ]   [  ] Social Service  [ x ] RN, [  ] Physical Therapy  DVT PPX: [ x ] Lovenox, [  ] S C Heparin, [  ] Coumadin, [  ] Xarelto, [  ] Eliquis, [  ] Pradaxa, [  ] IV Heparin drip, [  ] SCD [  ] Contraindication 2 to GI Bleed,[  ] Ambulation  Advanced directive: [ x ] None, [  ] DNR/DNI

## 2018-11-30 NOTE — PROGRESS NOTE ADULT - PROBLEM SELECTOR PLAN 3
resolved, likely 2/2 metformin use with AC pulmonary edema with  acute respiratory failure- Resolved  - 3.2 on arrival, rpt lactate 1.9 WNL  - monitor fluid status as pt with Pulmonary Edema

## 2018-11-30 NOTE — PROGRESS NOTE ADULT - PROBLEM SELECTOR PLAN 9
11 Valvar hx disease: Not on AC  - continue with aspirin, verapamil with hold parameters  12 SIMÓN non compliant with CPAP    IMPROVE VTE Individual Risk Assessment  RISK                                                                Points  [  ] Previous VTE                                                  3  [  ] Thrombophilia                                               2  [  ] Lower limb paralysis                                      2        (unable to hold up >15 seconds)    [  ] Current Cancer                                              2         (within 6 months)  [  ] Immobilization > 24 hrs                                1  [  ] ICU/CCU stay > 24 hours                              1  [ x ] Age > 60                                                      1  IMPROVE VTE Score ____1_____    DVT ppx: Lovenox  GI ppx: continue Protonix as therapeutic interchange of home Prilosec Chronic  - continue Abilify, Klonopin and Effexor

## 2018-11-30 NOTE — DIETITIAN INITIAL EVALUATION ADULT. - PROBLEM SELECTOR PLAN 3
likely 2/2 metformin use with AC pulmonary edema with  acute respiratory failure  - 3.2 on arrival, follow with repeat lactate  - monitor fluid status as pt with CHF  - f/u repeat lactate  -D/W ICU PA

## 2018-11-30 NOTE — PROGRESS NOTE ADULT - ASSESSMENT
78y F with PMHx of COPD, hypertension, hyperlipidemia, diverticulitis, LBBB, TIA,  depression, Rt breast cancer, status post lumpectomy and radiation 2008, anxiety, hiatal hernia-status post surgical repair in 2005, obstructive sleep apnea (not on BiPAP at home), PVD, rheumatoid arthritis, thrush, TIA (2010), TMJ, valvular heart disease (aortic and mitral), H/O AVR with bioprosthetic,, presents to ED with sudden onset SOB this morning that began while she was bringing her garbage can back from the curb and called EMS at that point. Patient denies any otc medications. Denies any chest pain, fever, chills n/v/d. Patient has had hospitalization for similar symptoms in april 2017. Patient's SOB likely 2/2 CHF exacerbation given moderate pulmonary edema on CXR. Pulmonary [place] following, BiPAP discontinued at present, Satting well on room air. Hemoglobin 13 - 10.9 drop noted.     Recommendations    - Patient admitted with acute respiratory failure and requiring BIPAP for respiratory support.  BiPAP d/c, breathing comfortably on room air, O2 sat in mid 90s    - Troponin mildly elevated  in setting of diastolic HF 2/2 dietary discretion with component of Rhabdo.    - Will change Lasix 60 IV to PO as pt appears more euvolemic today.       -  Monitor closely for the development of anginal symptoms or clinical signs of ischemia.   - DORIS performed 2018 - bioprosthetic aortic valve, LVOT with freely mobile echodensity attached to aorto-mitral curtain, being monitored, handled conservatively.  This lesion is of unclear etiology, and is being followed for now  - Repeat echocardiogram with LVEF 50% and no change in the echodensity previously noted on TTE.      - Continue Losartan, ASA 81, Verapamil, Spironolactone, Simvastatin.    -- Please continue to maintain strict I/Os, monitor daily weights, Cr, and K.   - Monitor and replete lytes, keep K>4, Mg>2.    - Other cardiovascular workup will depend on clinical course.  - All other workup per primary team.  - Will continue to follow.  - D/C planning with home PT as per Primary team and MAIA Johnson NP-[facility]  Cardiology

## 2018-11-30 NOTE — DISCHARGE NOTE ADULT - ADDITIONAL INSTRUCTIONS
-Please follow up with your primary doctor within one week.  -Please follow up with cardiology and pulmonology outpatient (information below).  -Patient and family to set up follow up appointments.  -Continue taking your medications as directed above.  -If symptoms persist/worsen, please call your PMD or return to the ED. -Please follow up with your primary doctor within one week.  -Please follow up with cardiology and pulmonology outpatient (information below).  -Patient and family to set up follow up appointments. CBC, BMP in 1 week with PMD  -Continue taking your medications as directed above.  -If symptoms persist/worsen, please call your PMD or return to the ED.

## 2018-11-30 NOTE — DIETITIAN INITIAL EVALUATION ADULT. - PROBLEM SELECTOR PLAN 5
On home 02 2L NC Q HS, Nebs 4x day  - will continue with albuterol, Spiriva, Symbicort  - solumedrol 40mg qd  Pulmonary DR Rivera

## 2018-11-30 NOTE — PROGRESS NOTE ADULT - PROBLEM SELECTOR PLAN 7
Chronic- as per Pt & Dtr NO BB 2/2 COPD  - continue losartan, verapamil, spironolactone, Lasix  with hold parameters On home metformin 500mg qd  - Will hold metformin now  - c/w low dose sliding scale Accu check  - hypoglycemic protocol

## 2018-11-30 NOTE — DISCHARGE NOTE ADULT - MEDICATION SUMMARY - MEDICATIONS TO TAKE
I will START or STAY ON the medications listed below when I get home from the hospital:    sulfaSALAzine 500 mg oral tablet  -- 1 tab(s) by mouth 3 times a day  -- Indication: For RA (rheumatoid arthritis)    predniSONE 20 mg oral tablet  -- 2 tab(s) by mouth once a day   -- It is very important that you take or use this exactly as directed.  Do not skip doses or discontinue unless directed by your doctor.  Obtain medical advice before taking any non-prescription drugs as some may affect the action of this medication.  Take with food or milk.    -- Indication: For COPD (chronic obstructive pulmonary disease)    spironolactone 25 mg oral tablet  -- 0.5 tab(s) by mouth once a day  -- Indication: For HTN (hypertension)    aspirin 81 mg oral tablet  -- 1 tab(s) by mouth once a day HOME/  follow preop instructions as per surgeon and cardiologist  -- Indication: For CAD    Percocet 10/325 oral tablet  -- 1 tab(s) by mouth every 6 hours, As Needed  HOSP  -- Indication: For Pain    losartan 25 mg oral tablet  -- 1 tab(s) by mouth 2 times a day  -- Indication: For HTN (hypertension)    verapamil 240 mg/24 hours oral capsule, extended release  -- 1 cap(s) by mouth once a day  -- Indication: For HTN (hypertension)    clonazePAM 0.25 mg oral tablet  -- 1 tab(s) by mouth 2 times a day  -- Indication: For Anxiety    Effexor  mg oral capsule, extended release  -- 1 cap(s) by mouth once a day  -- Indication: For Depression    metFORMIN 500 mg oral tablet  -- 1 tab(s) by mouth once a day in PM  -- Indication: For Diabetes mellitus    Dramamine  -- Indication: For Dizziness    clotrimazole  -- 10 milligram(s) by mouth , As Needed  -- Indication: For thrush    ZyrTEC 10 mg oral tablet  -- 1 tab(s) by mouth once a day  -- Indication: For Allergies    Abilify 2 mg oral tablet  -- 1 tab(s) by mouth once a day (at bedtime)  -- Indication: For Antipsychotic    Symbicort 160 mcg-4.5 mcg/inh inhalation aerosol  -- 2 puff(s) inhaled 2 times a day  -- Indication: For COPD (chronic obstructive pulmonary disease)    ProAir HFA 90 mcg/inh inhalation aerosol  -- 2 puff(s) inhaled 4 times a day, As Needed  -- Indication: For COPD (chronic obstructive pulmonary disease)    Spiriva Respimat 1.25 mcg/inh inhalation aerosol  -- 2 puff(s) inhaled once a day  -- Indication: For COPD (chronic obstructive pulmonary disease)    Lasix 20 mg oral tablet  -- 3 tab(s) by mouth once a day   -- Avoid prolonged or excessive exposure to direct and/or artificial sunlight while taking this medication.  It is very important that you take or use this exactly as directed.  Do not skip doses or discontinue unless directed by your doctor.  It may be advisable to drink a full glass orange juice or eat a banana daily while taking this medication.    -- Indication: For Acute congestive heart failure, unspecified heart failure type    Pepcid 20 mg oral tablet  -- 1 tab(s) by mouth once a day   -- It is very important that you take or use this exactly as directed.  Do not skip doses or discontinue unless directed by your doctor.  Obtain medical advice before taking any non-prescription drugs as some may affect the action of this medication.    -- Indication: For GI ppx    Colace 100 mg oral capsule  -- 1 cap(s) by mouth once a day  -- Indication: For GI ppx    Colace 50 mg oral capsule  -- 1 cap(s) by mouth once a day  -- Indication: For GI ppx    senna oral tablet  -- 2 tab(s) by mouth once a day (at bedtime), As needed, Constipation HOSP  -- Indication: For GI ppx    ocular lubricant ophthalmic solution  -- 1 drop(s) to each affected eye 4 times a day, As needed, Dry Eyes  -- Indication: For Dry eyes    PriLOSEC 20 mg oral delayed release capsule  -- 1 cap(s) by mouth once a day  -- Indication: For GI ppx I will START or STAY ON the medications listed below when I get home from the hospital:    sulfaSALAzine 500 mg oral tablet  -- 1 tab(s) by mouth 3 times a day  -- Indication: For RA (rheumatoid arthritis)    predniSONE 20 mg oral tablet  -- 2 tab(s) by mouth once a day   -- It is very important that you take or use this exactly as directed.  Do not skip doses or discontinue unless directed by your doctor.  Obtain medical advice before taking any non-prescription drugs as some may affect the action of this medication.  Take with food or milk.    -- Indication: For COPD (chronic obstructive pulmonary disease)    spironolactone 25 mg oral tablet  -- 0.5 tab(s) by mouth once a day  -- Indication: For HTN (hypertension)    aspirin 81 mg oral tablet  -- 1 tab(s) by mouth once a day HOME/  follow preop instructions as per surgeon and cardiologist  -- Indication: For CAD    Percocet 10/325 oral tablet  -- 1 tab(s) by mouth every 6 hours, As Needed  HOSP  -- Indication: For RA Pain    losartan 25 mg oral tablet  -- 1 tab(s) by mouth 2 times a day  -- Indication: For HTN (hypertension)    verapamil 240 mg/24 hours oral capsule, extended release  -- 1 cap(s) by mouth once a day  -- Indication: For HTN (hypertension)    clonazePAM 0.25 mg oral tablet  -- 1 tab(s) by mouth 2 times a day  -- Indication: For Anxiety    Effexor  mg oral capsule, extended release  -- 1 cap(s) by mouth once a day  -- Indication: For Depression    metFORMIN 500 mg oral tablet  -- 1 tab(s) by mouth once a day in PM  -- Indication: For Diabetes mellitus    Dramamine  -- Indication: For Dizziness    clotrimazole  -- 10 milligram(s) by mouth , As Needed  -- Indication: For thrush    ZyrTEC 10 mg oral tablet  -- 1 tab(s) by mouth once a day  -- Indication: For Allergies    Abilify 2 mg oral tablet  -- 1 tab(s) by mouth once a day (at bedtime)  -- Indication: For Antipsychotic    Symbicort 160 mcg-4.5 mcg/inh inhalation aerosol  -- 2 puff(s) inhaled 2 times a day  -- Indication: For COPD (chronic obstructive pulmonary disease)    ProAir HFA 90 mcg/inh inhalation aerosol  -- 2 puff(s) inhaled 4 times a day, As Needed  -- Indication: For COPD (chronic obstructive pulmonary disease)    Spiriva Respimat 1.25 mcg/inh inhalation aerosol  -- 2 puff(s) inhaled once a day  -- Indication: For COPD (chronic obstructive pulmonary disease)    Lasix 20 mg oral tablet  -- 3 tab(s) by mouth once a day   -- Avoid prolonged or excessive exposure to direct and/or artificial sunlight while taking this medication.  It is very important that you take or use this exactly as directed.  Do not skip doses or discontinue unless directed by your doctor.  It may be advisable to drink a full glass orange juice or eat a banana daily while taking this medication.    -- Indication: For Acute  diastoliccongestive heart failure, unspecified heart failure type    Colace 100 mg oral capsule  -- 1 cap(s) by mouth 2 times a day  -- Indication: For COnstipation    MiraLax oral powder for reconstitution  -- 17 Gm daily  -- Indication: For COnstipation    senna oral tablet  -- 2 tab(s) by mouth once a day (at bedtime), As needed, Constipation HOSP  -- Indication: For COnstipation    ocular lubricant ophthalmic solution  -- 1 drop(s) to each affected eye 4 times a day, As needed, Dry Eyes  -- Indication: For Dry eyes    PriLOSEC 20 mg oral delayed release capsule  -- 1 cap(s) by mouth once a day  -- Indication: For GI ppx

## 2018-11-30 NOTE — DISCHARGE NOTE ADULT - PATIENT PORTAL LINK FT
You can access the [facility], offered by [facility], by registering with the following website: [email or web address]

## 2018-11-30 NOTE — DISCHARGE NOTE ADULT - PLAN OF CARE
resolution You came in with shortness of breath and difficulty breathing likely due to fluid backup into your lungs from your heart. You were found to be in respiratory distress and were placed on a specialized breathing mask to improve your oxygenation. Be sure to follow up with your pulmonologist as an outpatient. stable You were found to have elevated markers of decreased blood flow to your heart. You were evaluated by our cardiologist who determined this was not likely a heart attack. We performed an ECHO of your heart which showed low to normal heart function. This ECHO also showed a dense lesion on your heart similar to prior ECHOs. Be sure to follow up with your cardiologist as an outpatient for a cardiac MRI. You have chronic obstructive pulmonary disease. Be sure to use your oxygen at home as prescribed and continue using your Spiriva, Symbicort, and albuterol nebulizer as prescribed. Be sure to follow up with your pulmonologist as an outpatient. You have rheumatoid arthritis. Be sure to take your pain medications as prescribed. Continue taking sulfasalazine as prescribed. You have diabetes mellitus. Be sure to eat a carb consistent diet and take your diabetes medication as prescribed. Be sure to follow up with your endocrinologist regularly. You have anxiety. Continue taking your Klonopin as prescribed. You have an aortic valve replacement. Be sure to follow up with your cardiologist as an outpatient regularly. You came in with shortness of breath and difficulty breathing due to fluid backup into your lungs from Ac Diastolic  heart Failure .  - You were found to be in respiratory distress and were placed on a  BIPAP/ specialized breathing mask to improve your oxygenation.  - You Have SIMÓN - on home NC O2 2 lit at bed time daily, please use your C PAP at night  - Be sure to follow up with your pulmonologist as an outpatient in 1 week. -Acute Diastolic Heart Failure- with Acute Pulmonary Edema -Resolved   -You were found to have elevated  cardiac enzymes -Biomarkers   - You were evaluated by our cardiologist who determined this was not  a heart attack.   -You had an ECHO of your heart which showed low to normal heart function. This ECHO also showed a dense lesion on your heart similar to prior ECHOs. Be sure to follow up with your cardiologis DR Jackson as an outpatient for a Cardiac MRI.  -Lasix 60 mg 1 tab daily, fluid restriction- 1.5 lit/24 hrs You have chronic obstructive pulmonary disease. Be sure to use your oxygen at home as prescribed and continue using your Spiriva, Symbicort, and albuterol nebulizer as prescribed. Be sure to follow up with your pulmonologist as an outpatient.  Prednisone 40 mg 1 tab daily x 4 more days. You have rheumatoid arthritis. Be sure to take your pain medications as prescribed.  - Continue taking sulfasalazine 3x day  as prescribed. You have rheumatoid arthritis. Be sure to take your pain medications as prescribed. Percocet 10 mg 4x day  - Continue taking sulfasalazine 3x day  as prescribed. You have diabetes mellitus. Be sure to eat a carb consistent diet and take your diabetes medication Metformin as prescribed. Be sure to follow up with your endocrinologist regularly. You have an aortic valve replacement. Be sure to follow up with your cardiologist as an outpatient regularly.  HTN- Continue Losartan, Spironolactone, Lasix , Verapamil , ASA daily, Follow up with Cardiologist for Cardiac MRI

## 2018-11-30 NOTE — PROGRESS NOTE ADULT - PROBLEM SELECTOR PLAN 1
pulm edema  chf  copd  atelectasis  cont nebs and inhaler regimen  cont current dose of steroids for now  o2 support as needed, keep sat > 88 pct  cvs regimen, diuresis, on AC, Cardio following,   serial CE  I and O  tele monitoring  discussed this am with pt  dietary discretion  education  counseling  pt follows with Dr. Davis in [place] pulmonary medicine

## 2018-11-30 NOTE — DISCHARGE NOTE ADULT - MEDICATION SUMMARY - MEDICATIONS TO STOP TAKING
I will STOP taking the medications listed below when I get home from the hospital:    furosemide 40 mg oral tablet  -- 1 tab(s) by mouth once a day I will STOP taking the medications listed below when I get home from the hospital:  None

## 2018-11-30 NOTE — DISCHARGE NOTE ADULT - HOSPITAL COURSE
78y F with PMHx of COPD (on home o2, 2L NC), hypertension, hyperlipidemia, diverticulitis, depression, Rt breast cancer, status post lumpectomy and radiation 2008, anxiety, hiatal hernia-status post surgical repair in 2005, obstructive sleep apnea (not on CPAP at home), PVD, rheumatoid arthritis, thrush, TIA (2010), TMJ, valvular heart disease (aortic and mitral), H/O AVR, Type 2 diabetes (on metformin), presented to ED by EMS for SOB. As per patient, she was on her way to work this morning and while walking to her car, stopped to  garbage cans. Suddenly felt short of breath with increased work of breathing. Reported that it felt similar to previous episodes of COPD exacerbation and immediately called EMS. EMS arrived, noted rhonchi on lung exam and gave patient subinguinal nitroglycerin, started pt on CPAP. Patient has hx of chronic nonproductive cough, denies worsening of cough over last months. Daughter at bedside reported patient was diaphoretic with EMS. Recently visited pulmonologist and cardiologist last week, was in normal state of health. Patient denied fevers, chills, nausea, vomiting, CP, palpitations, abdominal pain, diarrhea, constipation, peripheral edema.  Of note, patient was admitted in April 2017 for COPD exacerbation. In the ED, vitals are T 99.7, , /88, RR 26 Spo2 94% on supplemental oxygen. Repeat BP 96/43 after Lasix 40mg IV given. Labs were significant for mild leukocytosis (11.39), hyponatremia (133), hyperglycemia (151), elevated alk phos (153), elevated AST (62), elevated lactate (3.2), elevated creatine kinase (246), elevated trop (0.112), elevated proBNP (879). ABG unimpressive, wnl. CXR showed moderate pulmonary edema. EKG: Sinus Tachy 106 with LBBB. Received Lasix 40mg IVx1, blood cultures pending. Pt was placed on BI PAP in ER. Pt seen by Cardiology Dr. Kunz in ER, and IV Lasix was continued. ICU eval done, as per ICU pt did NOT need ICU care. PT was seen by pulmonology in ED, and patient was admitted to telemetry for SOB with ARDs. Pt's breathing improved overnight, and was taken off BiPAP. Pt's troponins began to rise, with T wave changes in EKG, and was placed on full dose Lovenox. Pt was evaluated by cardiology who determined this was not ACS, with repeat EKG WNL. Pt had an ECHO at this time which showed low normal left systolic function, with EF 50% and a dense lesion as seen on previous ECHO. PT was advised to have a cardiac MRI as an outpatient. Pulm continued to follow patient, and recommended 40mg prednisone on DC for 4 days, and continuation of pt's home COPD meds.    Patient improved clinically throughout hospital course. Patient seen and examined on day of discharge.    Vital Signs Last 24 Hrs  T(C): 36.9 (30 Nov 2018 11:30), Max: 36.9 (29 Nov 2018 20:29)  T(F): 98.5 (30 Nov 2018 11:30), Max: 98.5 (29 Nov 2018 20:29)  HR: 93 (30 Nov 2018 11:30) (90 - 113)  BP: 110/62 (30 Nov 2018 11:30) (102/55 - 157/-)  BP(mean): 91 (29 Nov 2018 15:22) (91 - 91)  RR: 18 (30 Nov 2018 11:30) (17 - 19)  SpO2: 93% (30 Nov 2018 11:30) (93% - 100%)    Physical Exam:  General: anxious female in NAD  HEENT: NCAT, PERRLA, EOMI bl, moist mucous membranes   Neck: Supple, nontender, no mass  Neurology: A&Ox3, nonfocal, CN II-XII grossly intact, sensation intact  Respiratory: CTA B/L, No W/R/R  CV: 3/6 murmur present, +S1/S2, no murmurs, rubs or gallops  Abdominal: Soft, NT, ND +BSx4  Extremities: No C/C/E, + peripheral pulses  MSK: Normal ROM, no joint erythema or warmth, no joint swelling   Skin: warm, dry, normal color, no obvious rash or abnormal lesions    Patient is medically stable for discharge to home with outpatient follow up. 78y F with PMHx of COPD (on home o2, 2L NC), hypertension, hyperlipidemia, diverticulitis, depression, Rt breast cancer, status post lumpectomy and radiation 2008, anxiety, hiatal hernia-status post surgical repair in 2005, obstructive sleep apnea (not on CPAP at home), PVD, rheumatoid arthritis, thrush, TIA (2010), TMJ, valvular heart disease (aortic and mitral), H/O AVR, Type 2 diabetes (on metformin), presented to ED by EMS for SOB. As per patient, she was on her way to work this morning and while walking to her car, stopped to  garbage cans. Suddenly felt short of breath with increased work of breathing. Reported that it felt similar to previous episodes of COPD exacerbation and immediately called EMS. EMS arrived, noted rhonchi on lung exam and gave patient subinguinal nitroglycerin, started pt on CPAP. Patient has hx of chronic nonproductive cough, denies worsening of cough over last months. Daughter at bedside reported patient was diaphoretic with EMS. Recently visited pulmonologist and cardiologist last week, was in normal state of health. Patient denied fevers, chills, nausea, vomiting, CP, palpitations, abdominal pain, diarrhea, constipation, peripheral edema.  Of note, patient was admitted in April 2017 for COPD exacerbation. In the ED, vitals are T 99.7, , /88, RR 26 Spo2 94% on supplemental oxygen. Repeat BP 96/43 after Lasix 40mg IV given. Labs were significant for mild leukocytosis (11.39), hyponatremia (133), hyperglycemia (151), elevated alk phos (153), elevated AST (62), elevated lactate (3.2), elevated creatine kinase (246), elevated trop (0.112), elevated proBNP (879). ABG unimpressive, wnl. CXR showed moderate pulmonary edema. EKG: Sinus Tachy 106 with LBBB. Received Lasix 40mg IVx1, blood cultures pending. Pt was placed on BI PAP in ER. Pt seen by Cardiology Dr. Kunz in ER, and IV Lasix was continued. ICU eval done, as per ICU pt did NOT need ICU care. PT was seen by pulmonology in ED, and patient was admitted to telemetry for SOB with ARDs. Pt's breathing improved overnight, and was taken off BiPAP. Pt's troponins began to rise, with T wave changes in EKG, and was placed on full dose Lovenox. Pt was evaluated by cardiology who determined this was not ACS, with repeat EKG WNL. Pt had an ECHO at this time which showed low normal left systolic function, with EF 50% and a dense lesion as seen on previous ECHO. PT was advised to have a cardiac MRI as an outpatient. Pulm continued to follow patient, and recommended 40mg prednisone on DC for 4 days, and continuation of pt's home COPD meds.    Patient improved clinically throughout hospital course. Patient seen and examined on day of discharge.    Patient is medically stable for discharge to home with outpatient follow up.

## 2018-11-30 NOTE — DIETITIAN INITIAL EVALUATION ADULT. - OTHER INFO
Pt reports had been constipated past few days at home- is on a bowel regimen.  Is borderline steroid induced DM per pt. Does not check sugars at home.

## 2018-11-30 NOTE — DIETITIAN INITIAL EVALUATION ADULT. - PROBLEM SELECTOR PLAN 6
Chronic  - pain scale: mild- APAP, mod- tramadol 50mg q6, severe- percocet 10/325 q6hrs prn  - continue sulfasalazine 500mg TID

## 2018-11-30 NOTE — DISCHARGE NOTE ADULT - CARE PROVIDER_API CALL
LAKSHMI Tapia (), Pulmonary Disease; Sleep Medicine  [address] [place] [address]  Phone: [phone]  Fax: [phone]    Katie Jackson), Cardiovascular Disease; Internal Medicine; Nuclear Cardiology  [address] [place] [address]  Phone: [phone]  Fax: [phone]

## 2018-11-30 NOTE — PROGRESS NOTE ADULT - PROBLEM SELECTOR PLAN 10
11 Valvar hx disease: Not on AC  - continue with aspirin, verapamil with hold parameters  12 SIMÓN non compliant with CPAP    IMPROVE VTE Individual Risk Assessment  RISK                                                                Points  [  ] Previous VTE                                                  3  [  ] Thrombophilia                                               2  [  ] Lower limb paralysis                                      2        (unable to hold up >15 seconds)    [  ] Current Cancer                                              2         (within 6 months)  [  ] Immobilization > 24 hrs                                1  [  ] ICU/CCU stay > 24 hours                              1  [ x ] Age > 60                                                      1  IMPROVE VTE Score ____1_____    DVT ppx: Lovenox  GI ppx: continue Protonix as therapeutic interchange of home Prilosec
11 Valvar hx disease: Not on AC  - continue with aspirin, verapamil with hold parameters  12 SIMÓN non compliant with CPAP    IMPROVE VTE Individual Risk Assessment  RISK                                                                Points  [  ] Previous VTE                                                  3  [  ] Thrombophilia                                               2  [  ] Lower limb paralysis                                      2        (unable to hold up >15 seconds)    [  ] Current Cancer                                              2         (within 6 months)  [  ] Immobilization > 24 hrs                                1  [  ] ICU/CCU stay > 24 hours                              1  [ x ] Age > 60                                                      1  IMPROVE VTE Score ____1_____    DVT ppx: Lovenox  GI ppx: continue Protonix as therapeutic interchange of home Prilosec

## 2018-11-30 NOTE — DIETITIAN INITIAL EVALUATION ADULT. - PROBLEM SELECTOR PLAN 10
11 Valvar hx disease: Not on AC  - continue with aspirin, verapamil with hold parameters  12 SIMÓN non compliant with CPAP    IMPROVE VTE Individual Risk Assessment  RISK                                                                Points  [  ] Previous VTE                                                  3  [  ] Thrombophilia                                               2  [  ] Lower limb paralysis                                      2        (unable to hold up >15 seconds)    [  ] Current Cancer                                              2         (within 6 months)  [  ] Immobilization > 24 hrs                                1  [  ] ICU/CCU stay > 24 hours                              1  [ x ] Age > 60                                                      1  IMPROVE VTE Score ____1_____    DVT ppx: Lovenox  GI ppx: continue Protonix as therapeutic interchange of home Prilosec

## 2018-11-30 NOTE — PROGRESS NOTE ADULT - ATTENDING COMMENTS
Seen/examined. Agree with above. Likely diastolic HF in the setting of dietary indiscretion. Echo again with LVOT echodensity, that has been previously evaluated by DORIS earlier this year. She needs very close follow up with her outpatient cardiologist.
I saw and examined the patient personally. Spoke with above provider regarding this case. I reviewed the above findings completely.  I agree with the above history, physical, and plan which I have edited where appropriate.
Pt seen, Examined, case & care plan d/w pt, residents at detail.  - D/W DR Andrea at detail, D/W Dtr Blaire [phone] at detail, Increase Lasix, Po Steroids x 4 days, Cardiac MRI as out pt.  D/C Home today.  Total D/C care time is 50 minutes.
Pt seen, examined, Case & acre plan d/w pt, residents at detail.  D/W cardiologist at detail.

## 2018-11-30 NOTE — DIETITIAN INITIAL EVALUATION ADULT. - PROBLEM SELECTOR PLAN 1
acute hypoxic resp failure likely 2/2 to flash pulmonary edema due to acute CHF exacerbation  - Admit to tele  - patient sating 90% on EMS arrival  - ABG wnl in ED  - continue BiPAP  - continuous pulse ox, monitor 02 Sat  - Continue albuterol, Symbicort, Spiriva  - Solumedrol 40mg IV qd  - although no peripheral edema, will do doppler for DVT to r/o PE  - Consult Dr. Rivera, will follow recs

## 2018-11-30 NOTE — PROGRESS NOTE ADULT - PROBLEM SELECTOR PLAN 8
Chronic  - continue Abilify, Klonopin and Effexor Chronic- as per Pt & Dtr NO BB 2/2 COPD  - continue losartan, verapamil, spironolactone, Lasix  with hold parameters

## 2018-11-30 NOTE — DISCHARGE NOTE ADULT - SECONDARY DIAGNOSIS.
Acute congestive heart failure, unspecified heart failure type COPD (chronic obstructive pulmonary disease) RA (rheumatoid arthritis) Diabetes mellitus Anxiety Valvular heart disease

## 2018-11-30 NOTE — PROGRESS NOTE ADULT - PROBLEM SELECTOR PLAN 5
Chronic  - pain scale: mild- APAP, mod- tramadol 50mg q6, severe- percocet 10/325 4 x day  - continue sulfasalazine 500mg TID  - PT consulted, recs home with outpt PT

## 2018-11-30 NOTE — PROGRESS NOTE ADULT - SUBJECTIVE AND OBJECTIVE BOX
Date/Time Patient Seen:  		  Referring MD:   Data Reviewed	       Patient is a 78y old  Female who presents with a chief complaint of Acute CHF, Acute Respiratory failure (2018 09:52)  in bed  seen and examined  vs and meds reviewed      Subjective/HPI     PAST MEDICAL & SURGICAL HISTORY:  Alcoholism: last drink   Skin cancer: not melanoma  Aortic valve replaced: with bovine heart valve  Meniere disease  Diabetes mellitus  Glaucoma  Dry eyes  GERD (gastroesophageal reflux disease)  Emphysema lung  Spinal stenosis  CHF (congestive heart failure)  LBBB (left bundle branch block)  TMJ (temporomandibular joint disorder)  Diverticulitis: hospitalized   SIMÓN (obstructive sleep apnea): category I severe pt does not use c/pap  Thrush: treated x 4 days  1 month ago  restarted medication  3-19-14 clortramazole 5x day  Anxiety  RA (rheumatoid arthritis): diagnosed   oxycodone prn  neck/ lower back  Depression  Borderline diabetes: no meds  COPD (chronic obstructive pulmonary disease): diagnosed   inhaler and nebulizer  HTN (hypertension)  PVD (peripheral vascular disease): left iliac  s/p surgical intervention   unsuccessful  Hiatal hernia: s/p surgical repair   Breast cancer: right s/p lumpectomy and radiation 2008  TIA (transient ischemic attack): 2010  Hyperlipidemia  Valvular heart disease: aortic and mitral  H/O:  section: 2x  H/O sinus surgery  S/P AVR (aortic valve replacement): about 5 yrs ago (?)  PVD (peripheral vascular disease): s/p left iliac bypass which was unsuccessful  open repair  S/P carpal tunnel release: right   S/P lumpectomy, right breast: 2008  Hiatal hernia: s/p surgical repair per pt   S/P hernia surgery: left inguinal age 12  S/P rotator cuff surgery: left   S/P appendectomy:   S/P  section: x2 /         Medication list         MEDICATIONS  (STANDING):  ALBUTerol    0.083% 2.5 milliGRAM(s) Nebulizer every 6 hours  ARIPiprazole 2 milliGRAM(s) Oral daily  aspirin  chewable 81 milliGRAM(s) Oral daily  buDESOnide 160 MICROgram(s)/formoterol 4.5 MICROgram(s) Inhaler 2 Puff(s) Inhalation two times a day  clonazePAM Tablet 0.25 milliGRAM(s) Oral two times a day  clotrimazole Lozenge 1 Lozenge Oral every 8 hours  dextrose 5%. 1000 milliLiter(s) (50 mL/Hr) IV Continuous <Continuous>  dextrose 50% Injectable 12.5 Gram(s) IV Push once  dextrose 50% Injectable 25 Gram(s) IV Push once  dextrose 50% Injectable 25 Gram(s) IV Push once  docusate sodium 100 milliGRAM(s) Oral three times a day  enoxaparin Injectable 60 milliGRAM(s) SubCutaneous every 12 hours  furosemide   Injectable 60 milliGRAM(s) IV Push daily  insulin lispro (HumaLOG) corrective regimen sliding scale   SubCutaneous Before meals and at bedtime  latanoprost 0.005% Ophthalmic Solution 1 Drop(s) Both EYES at bedtime  loratadine 10 milliGRAM(s) Oral daily  losartan 25 milliGRAM(s) Oral two times a day  methylPREDNISolone sodium succinate Injectable 40 milliGRAM(s) IV Push every 24 hours  oxyCODONE    5 mG/acetaminophen 325 mG 2 Tablet(s) Oral four times a day  pantoprazole    Tablet 40 milliGRAM(s) Oral before breakfast  polyethylene glycol 3350 17 Gram(s) Oral daily  spironolactone 12.5 milliGRAM(s) Oral daily  sulfaSALAzine 500 milliGRAM(s) Oral three times a day  tiotropium 18 MICROgram(s) Capsule 1 Capsule(s) Inhalation daily  venlafaxine XR. 150 milliGRAM(s) Oral daily  verapamil  milliGRAM(s) Oral daily    MEDICATIONS  (PRN):  dextrose 40% Gel 15 Gram(s) Oral once PRN Blood Glucose LESS THAN 70 milliGRAM(s)/deciliter  glucagon  Injectable 1 milliGRAM(s) IntraMuscular once PRN Glucose LESS THAN 70 milligrams/deciliter  senna 2 Tablet(s) Oral at bedtime PRN Constipation  traMADol 50 milliGRAM(s) Oral every 6 hours PRN Moderate Pain (4 - 6)         Vitals log        ICU Vital Signs Last 24 Hrs  T(C): 36.4 (2018 04:36), Max: 36.9 (2018 20:29)  T(F): 97.6 (2018 04:36), Max: 98.5 (2018 20:29)  HR: 90 (2018 07:11) (60 - 113)  BP: 118/75 (2018 04:36) (92/57 - 157/-)  BP(mean): 91 (2018 15:22) (91 - 91)  ABP: --  ABP(mean): --  RR: 17 (2018 04:36) (17 - 19)  SpO2: 94% (2018 07:11) (94% - 100%)           Input and Output:  I&O's Detail      Lab Data                        10.9   10.09 )-----------( 201      ( 2018 05:39 )             32.4         138  |  99  |  15  ----------------------------<  96  3.3<L>   |  31  |  0.59    Ca    8.2<L>      2018 05:39  Phos  3.3       Mg     2.3         TPro  7.8  /  Alb  3.8  /  TBili  0.3  /  DBili  x   /  AST  62<H>  /  ALT  68  /  AlkPhos  153<H>      ABG - ( 2018 11:50 )  pH, Arterial: 7.37  pH, Blood: x     /  pCO2: 45    /  pO2: 97    / HCO3: 25    / Base Excess: 0.6   /  SaO2: 96                CARDIAC MARKERS ( 2018 03:28 )  .106 ng/mL / x     / 506 U/L / x     / 8.7 ng/mL  CARDIAC MARKERS ( 2018 20:05 )  .091 ng/mL / x     / 741 U/L / x     / 12.1 ng/mL  CARDIAC MARKERS ( 2018 11:34 )  .179 ng/mL / x     / 921 U/L / x     / 17.5 ng/mL  CARDIAC MARKERS ( 2018 05:39 )  .213 ng/mL / x     / 758 U/L / x     / 16.3 ng/mL  CARDIAC MARKERS ( 2018 23:42 )  .171 ng/mL / x     / 360 U/L / x     / 9.2 ng/mL  CARDIAC MARKERS ( 2018 18:53 )  .203 ng/mL / x     / 206 U/L / x     / 6.2 ng/mL  CARDIAC MARKERS ( 2018 11:59 )  x     / x     / 246 U/L / x     / x      CARDIAC MARKERS ( 2018 11:54 )  .112 ng/mL / x     / x     / x     / x            Review of Systems	      Objective     Physical Examination    heart s1s2  lung dec BS      Pertinent Lab findings & Imaging      Miranda:  NO   Adequate UO     I&O's Detail           Discussed with:     Cultures:	        Radiology

## 2018-11-30 NOTE — DIETITIAN INITIAL EVALUATION ADULT. - PROBLEM SELECTOR PLAN 2
Positive troponin 2/2 Demand ischemia  with Ac Pulmonary edema, Doubt ACS, EKG, On Tele   - Trend CK/MB/Troponin x 3 q 8 hrs  -S/P BiPAP, IV Lasix 60 mg IV q 12 hrs   CXR, PBNP  -I/O daily weight  Cardiology Dr Kunz D/W, On Losartan daily

## 2018-11-30 NOTE — PROGRESS NOTE ADULT - ASSESSMENT
78y F with pmhx of COPD (on home o2, 2L NC), hypertension, hyperlipidemia, diverticulitis, depression, Rt breast cancer, status post lumpectomy and radiation 2008, anxiety, hiatal hernia-status post surgical repair in 2005, obstructive sleep apnea (not on CPAP at home), PVD, rheumatoid arthritis, thrush, TIA (2010), TMJ, valvular heart disease (aortic and mitral), H/O AVR, Type 2 diabetes (on metformin), presents to ED by EMS for SOB, admitted to tele for acute hypoxic respiratory failures 2/2 acute on chronic CHF exacerbation in the setting of elevated lactate and hyponatremia. 78y F with pmhx of COPD (on home o2, 2L NC), hypertension, hyperlipidemia, diverticulitis, depression, Rt breast cancer, status post lumpectomy and radiation 2008, anxiety, hiatal hernia-status post surgical repair in 2005, obstructive sleep apnea (not on CPAP at home), PVD, rheumatoid arthritis, thrush, TIA (2010), TMJ, valvular heart disease (aortic and mitral), H/O AVR, Type 2 diabetes (on metformin), presents to ED by EMS for SOB, admitted to tele for acute hypoxic respiratory failures 2/2 acute on chronic CHF exacerbation in the setting of elevated lactate and hyponatremia Resolved.

## 2018-11-30 NOTE — PROGRESS NOTE ADULT - REASON FOR ADMISSION
Acute CHF, Acute Respiratory failure
Acute CHF, Acute Respiratory failure
Acute Respiratory failure
Acute CHF, Acute Respiratory failure

## 2018-11-30 NOTE — DISCHARGE NOTE ADULT - NS AS ACTIVITY OBS
Showering allowed/Bathing allowed/No Heavy lifting/straining Showering allowed/Walking-Indoors allowed/Bathing allowed/No Heavy lifting/straining/Walking-Outdoors allowed

## 2018-11-30 NOTE — PROGRESS NOTE ADULT - PROBLEM SELECTOR PLAN 2
troponins resolving, elevated CK resolving, likely 2/2 rhabdomyolysis  - EKG 11/30 showing LBBB, d/w cardio doubt ACS  - ECHO showing Low normal left ventricular systolic function. Paradoxical motion of the septum in the setting of a left bundle branch block. Well seated aortic valve bioprosthesis. There is a mobile echodensity in the left ventricular outflow tract adjacent to the aortic valve measuring 0.5 x 1.2 cm. EF 50%, previosly 52%.  - cardio following (Dr. Andrea), [facility]. Rec cardiac MRI as outpt. no further need to trend CE.  - continue Losartan, ASA 81, Verapamil, Spironolactone, Simvastatin  - changed to Lasix 60 mg PO qd on discharge  - pt to f/u with her cardiologist as outpt, cleared by cardio for DC - 2/2 Ac Diastolic CHF   -troponin resolving, elevated CK resolving, likely 2/2 Demand Ischemia , NO ACS  - EKG 11/30 showing LBBB, d/w cardio doubt ACS  - ECHO showing Low normal left ventricular systolic function. Paradoxical motion of the septum in the setting of a left bundle branch block. Well seated aortic valve bioprosthesis. There is a mobile echodensity in the left ventricular outflow tract adjacent to the aortic valve measuring 0.5 x 1.2 cm. EF 50%, previously 52%.  - cardio following (Dr. Andrea), [facility] DC'ed. Rec cardiac MRI as outpt. no further need to trend CE.  - continue Losartan, ASA 81, Verapamil, Spironolactone, Simvastatin  - changed to Lasix 60 mg PO qd on discharge  - pt to f/u with her cardiologist as outpt, cleared by cardio for DC

## 2018-11-30 NOTE — PROGRESS NOTE ADULT - PROBLEM SELECTOR PLAN 1
resolved, likely 2/2 to flash pulmonary edema - Resolved , S/P IV Lasix   - Pt has SIMÓN on Home 2 Lit NC at bed time   - pt feels well without O2 , NO SOB today  - patient saturation at 94% RA today off BiPAP  - ABG wnl in ED  - continuous pulse ox, monitor 02 Sat  - continue albuterol, Symbicort, Spiriva  - US LE neg for DVT  - pulm consulted (Dr. Rivera), cleared for DC with Prednisone 40mg x 4 days from  12/1.  - pt to f/u with pulm as outpt (Dr. Tapia) resolved, likely 2/2 to flash pulmonary edema - Resolved , S/P IV Lasix   - Pt has SIMÓN on Home 2 Lit NC at bed time   - pt feels well without O2 , NO SOB today  - patient saturation at 94% RA today off BiPAP  - ABG wnl in ED  - continuous pulse ox, monitor 02 Sat  - continue albuterol, Symbicort, Spiriva  - US LE neg for DVT  - pulm consulted (Dr. Rivera), cleared for DC with Prednisone 40mg  daily x 4 days starting  from  12/1.  - pt to f/u with pulm as outpt (Dr. Tapia)

## 2018-11-30 NOTE — DIETITIAN INITIAL EVALUATION ADULT. - PROBLEM SELECTOR PLAN 4
Likely 2/2 Fluid over load  - continue Lasix 60 mg q 12 hrs   - no fluids for now as patient fluid overloaded  BMP in AM

## 2018-11-30 NOTE — PROGRESS NOTE ADULT - SUBJECTIVE AND OBJECTIVE BOX
[facility] -- Tawny García, Mustapha Henderson Pannella, Patel, Savella  Office # [phone]      Follow Up:  SOB CHF    Subjective/Observations: Seen and examined.  Sitting in chair resting with no complaints of cp, sob or palpitations.  Pt is agitated stating that she is leaving today once she gets the results of her echo.  NAD        REVIEW OF SYSTEMS: All other review of systems is negative unless indicated above    PAST MEDICAL & SURGICAL HISTORY:  Alcoholism: last drink   Skin cancer: not melanoma  Aortic valve replaced: with bovine heart valve  Meniere disease  Diabetes mellitus  Glaucoma  Dry eyes  GERD (gastroesophageal reflux disease)  Emphysema lung  Spinal stenosis  CHF (congestive heart failure)  LBBB (left bundle branch block)  TMJ (temporomandibular joint disorder)  Diverticulitis: hospitalized   SIMÓN (obstructive sleep apnea): category I severe pt does not use c/pap  Thrush: treated x 4 days  1 month ago  restarted medication  3-19-14 clortramazole 5x day  Anxiety  RA (rheumatoid arthritis): diagnosed   oxycodone prn  neck/ lower back  Depression  Borderline diabetes: no meds  COPD (chronic obstructive pulmonary disease): diagnosed   inhaler and nebulizer  HTN (hypertension)  PVD (peripheral vascular disease): left iliac  s/p surgical intervention   unsuccessful  Hiatal hernia: s/p surgical repair   Breast cancer: right s/p lumpectomy and radiation   TIA (transient ischemic attack): 2010  Hyperlipidemia  Valvular heart disease: aortic and mitral  H/O:  section: 2x  H/O sinus surgery  S/P AVR (aortic valve replacement): about 5 yrs ago (?)  PVD (peripheral vascular disease): s/p left iliac bypass which was unsuccessful  open repair  S/P carpal tunnel release: right 2002  S/P lumpectomy, right breast: 2008  Hiatal hernia: s/p surgical repair per pt   S/P hernia surgery: left inguinal age 12  S/P rotator cuff surgery: left   S/P appendectomy:   S/P  section: x2 /       MEDICATIONS  (STANDING):  ALBUTerol/ipratropium for Nebulization 3 milliLiter(s) Nebulizer every 12 hours  ARIPiprazole 2 milliGRAM(s) Oral daily  aspirin  chewable 81 milliGRAM(s) Oral daily  buDESOnide 160 MICROgram(s)/formoterol 4.5 MICROgram(s) Inhaler 2 Puff(s) Inhalation two times a day  clonazePAM Tablet 0.25 milliGRAM(s) Oral two times a day  clotrimazole Lozenge 1 Lozenge Oral every 8 hours  dextrose 5%. 1000 milliLiter(s) (50 mL/Hr) IV Continuous <Continuous>  dextrose 50% Injectable 12.5 Gram(s) IV Push once  dextrose 50% Injectable 25 Gram(s) IV Push once  dextrose 50% Injectable 25 Gram(s) IV Push once  docusate sodium 100 milliGRAM(s) Oral two times a day  furosemide   Injectable 60 milliGRAM(s) IV Push daily  insulin lispro (HumaLOG) corrective regimen sliding scale   SubCutaneous Before meals and at bedtime  latanoprost 0.005% Ophthalmic Solution 1 Drop(s) Both EYES at bedtime  loratadine 10 milliGRAM(s) Oral daily  losartan 25 milliGRAM(s) Oral two times a day  methylPREDNISolone sodium succinate Injectable 40 milliGRAM(s) IV Push every 24 hours  oxyCODONE    5 mG/acetaminophen 325 mG 2 Tablet(s) Oral four times a day  pantoprazole    Tablet 40 milliGRAM(s) Oral before breakfast  polyethylene glycol 3350 17 Gram(s) Oral daily  spironolactone 12.5 milliGRAM(s) Oral daily  sulfaSALAzine 500 milliGRAM(s) Oral three times a day  tiotropium 18 MICROgram(s) Capsule 1 Capsule(s) Inhalation daily  venlafaxine XR. 150 milliGRAM(s) Oral daily  verapamil  milliGRAM(s) Oral daily    MEDICATIONS  (PRN):  dextrose 40% Gel 15 Gram(s) Oral once PRN Blood Glucose LESS THAN 70 milliGRAM(s)/deciliter  glucagon  Injectable 1 milliGRAM(s) IntraMuscular once PRN Glucose LESS THAN 70 milligrams/deciliter  senna 2 Tablet(s) Oral at bedtime PRN Constipation  traMADol 50 milliGRAM(s) Oral every 6 hours PRN Moderate Pain (4 - 6)      Allergies    penicillins (Hives)  Plavix (Short breath)  quinolines (Other)    Intolerances            Vital Signs Last 24 Hrs  T(C): 36.9 (2018 11:30), Max: 36.9 (2018 20:29)  T(F): 98.5 (2018 11:30), Max: 98.5 (2018 20:29)  HR: 93 (2018 11:30) (62 - 113)  BP: 110/62 (2018 11:30) (102/55 - 157/-)  BP(mean): 91 (2018 15:22) (91 - 91)  RR: 18 (2018 11:30) (17 - 19)  SpO2: 93% (2018 11:30) (93% - 100%)    I&O's Summary        PHYSICAL EXAM:  TELE: SR 90s  Constitutional: NAD, awake and alert, well-developed, agitated  HEENT: Moist Mucous Membranes, Anicteric  Pulmonary: Non-labored, breath sounds are clear bilaterally, No wheezing, rales or rhonchi  Cardiovascular: Tachy, S1 and S2, 2/6 SM, rubs, gallops or clicks  Gastrointestinal: Bowel Sounds present, soft, nontender.   Lymph: No peripheral edema. No lymphadenopathy.  Skin: No visible rashes or ulcers.  Psych:  Mood & affect agitated    LABS: All Labs Reviewed:                        10.7   8.92  )-----------( 205      ( 2018 07:10 )             32.4                         10.9   10.09 )-----------( 201      ( 2018 05:39 )             32.4                         13.0   11.39 )-----------( 252      ( 2018 11:54 )             39.4     2018 07:10    138    |  103    |  14     ----------------------------<  83     4.0     |  30     |  0.54   2018 05:39    138    |  99     |  15     ----------------------------<  96     3.3     |  31     |  0.59   2018 11:54    133    |  97     |  15     ----------------------------<  151    4.3     |  26     |  0.76     Ca    8.3        2018 07:10  Ca    8.2        2018 05:39  Ca    8.1        2018 11:54  Phos  2.9       2018 07:10  Phos  3.3       2018 05:39  Mg     2.4       2018 07:10  Mg     2.3       2018 05:39    TPro  7.8    /  Alb  3.8    /  TBili  0.3    /  DBili  x      /  AST  62     /  ALT  68     /  AlkPhos  153    2018 11:54      CARDIAC MARKERS ( 2018 10:15 )  .095 ng/mL / x     / 411 U/L / x     / 8.0 ng/mL  CARDIAC MARKERS ( 2018 03:28 )  .106 ng/mL / x     / 506 U/L / x     / 8.7 ng/mL  CARDIAC MARKERS ( 2018 20:05 )  .091 ng/mL / x     / 741 U/L / x     / 12.1 ng/mL  CARDIAC MARKERS ( 2018 11:34 )  .179 ng/mL / x     / 921 U/L / x     / 17.5 ng/mL  CARDIAC MARKERS ( 2018 05:39 )  .213 ng/mL / x     / 758 U/L / x     / 16.3 ng/mL  CARDIAC MARKERS ( 2018 23:42 )  .171 ng/mL / x     / 360 U/L / x     / 9.2 ng/mL  CARDIAC MARKERS ( 2018 18:53 )  .203 ng/mL / x     / 206 U/L / x     / 6.2 ng/mL    < from: 12 Lead ECG (18 @ 11:55) >  Ventricular Rate 97 BPM    Atrial Rate 97 BPM    P-R Interval 184 ms    QRS Duration 130 ms    Q-T Interval 428 ms    QTC Calculation(Bezet) 543 ms    P Axis 57 degrees    R Axis 19 degrees    T Axis 109 degrees    Diagnosis Line Normal sinus rhythm  Left bundle branch block    Confirmed by CHALO CARDOZA ([id number]) on 2018 7:12:21 PM    < end of copied text >    < from: DORIS w/TTE (w/3D Echo) (18 @ 13:58) >    Patient name: JASMIN HANSEN  YOB: 1940   Age: 77 (F)   MR#: [id number]  Study Date: 7/3/2018  Location: O/PSonographer: Amirah Schmitz RDCS  2nd Sonographer: Kj Mclean M.D.  Study quality: Technically difficult  Referring Physician: DWAIN MEDINA MD  Blood Pressure: 164/88 mmHg  Height: 160 cm  Weight: 66 kg  BSA: 1.7 m2  ------------------------------------------------------------------------  PROCEDURE: Transesophageal and transthoracic  echocardiograms with 2-D, M-Mode and complete spectral and  color flow Doppler were performed.  Informed consent was  first obtained for DORIS. The patient was sedated - see  anesthesia record.  The procedure was monitored with  automatic blood pressure monitoring, ECG tracings and pulse  oximetry.  The transesophageal probe was placed in the  esophagus posterior to the heart without complications.  Real-time and reconstructed 3-dimensional imaging was  performed.  Color Doppler analysis was carried out. Patient  was injected with 10 cc's of aerosolized saline. Patient  was injected with 10 cc's of aerosolized saline.  INDICATION: Presence of prosthetic heart valve (Z95.2)  ------------------------------------------------------------------------  Dimensions:    NormalValues:  LA:     3.3    2.0 - 4.0 cm  Ao:     3.1    2.0 - 3.8 cm  SEPTUM: 0.9    0.6 - 1.2 cm  PWT:    0.9    0.6 - 1.1 cm  LVIDd:  4.2    3.0 - 5.6 cm  LVIDs:  3.0    1.8 - 4.0 cm  Derived variables:  LVMI: 70 g/m2  RWT: 0.42  Fractional short: 29 %  EF (Visual Estimate): 60-65 %  Doppler Peak Velocity (m/sec): MV=2.2 AoV=2.6  ------------------------------------------------------------------------  Observations:  Mitral Valve: Mitral annular calcification and calcified  mitral leaflets with decreased diastolic opening. Mild  mitral regurgitation.  Peak mitral valve gradient equals 19  mm Hg, mean transmitral valve gradient equals 6 mm Hg at HR  85 bpm, consistent with mild-moderate mitral stenosis.  Aortic Valve/Aorta: Bioprosthetic aorticvalve replacement  with normal leaflets. Peak transaortic valve gradient  equals 27 mm Hg, mean transaortic valve gradient equals 16  mm Hg, which is probably normal in the presence of a  bioprosthetic aortic valve. No aortic valve regurgitation  seen. Peak left ventricular outflow tract gradient equals 5  mm Hg, mean gradient is equal to 3 mm Hg, LVOT velocity  time integral equals 20 cm. A freely mobile echodensity  seen in the LVOT. The echodensity is approximately 0.8 x  0.5 cm and appears clemencia attached at the aorto-mitral  curtain with movement in the LVOT. The echodensity does not  appear to interfere with the mitral or aortic valves, nor  does it appear to cause any significant flow abnormalities.  Moderate atheroma noted in aortic arch/descending aorta.  Normal aortic root size. (Ao: 3.1 cm at the sinuses of  Valsalva).  Left Atrium: Severely dilated left atrium.  LA volume index  = 49 cc/m2. No left atrial or left atrial appendage  thrombus. Lipomatous hypertrophy of interatrialseptum.  Left Ventricle: Normal left ventricular systolic function.  Paradoxical septal motion consistent with prior cardiac  surgery. Normal left ventricular internal dimensions and  wall thicknesses. Mild diastolic dysfunction (Stage I).  Right Heart: Normal right atrium. The right ventricle is  not well visualized; grossly normal right ventricular  systolic function. Normal tricuspid valve. Mild tricuspid  regurgitation. Pulmonic valve not well visualized, probably  normal.  Pericardium/Pleura: No pericardial effusion seen.  Hemodynamic: Estimated right atrial pressure is 8 mm Hg.  Estimated right ventricular systolic pressure equals 39 mm  Hg, assuming right atrial pressure equals 8 mm Hg,  consistent with borderline pulmonary hypertension. Agitated  saline injection and color flow Doppler demonstrates no  evidence of a patent foramen ovale. Agitated saline  injection revealed few late bubbles in the left heart,  consistent with transpulmonic shunting.  ------------------------------------------------------------------------  Conclusions:  1. Bioprosthetic aortic valve replacement with normal  leaflets. Peak transaortic valve gradient equals 27 mm Hg,  mean transaortic valve gradient equals 16 mm Hg, which is  probably normal in the presence of a bioprosthetic aortic  valve. No aortic valve regurgitation seen.  2. Peak left ventricular outflow tract gradient equals 5 mm  Hg, mean gradient is equal to 3 mm Hg, LVOT velocity time  integral equals 20 cm. A freely mobile echodensity seen in  the LVOT. The echodensity is approximately 0.8 x 0.5 cm and  appears to be attached at the aorto-mitral curtain with  movement in the LVOT. The echodensity does not appear to  interfere with the mitral or aortic valves, nor does it  appearto cause any significant flow abnormalities.  ***No prior echo available for comparison. Consider cardiac  MRI for better tissue characterization, if clinically  indicated.  ------------------------------------------------------------------------  Confirmed on  7/3/2018 - 17:09:14 by Hayes Graves M.D.  ------------------------------------------------------------------------    < end of copied text >    < from: 12 Lead ECG (18 @ 11:55) >  Ventricular Rate 97 BPM    Atrial Rate 97 BPM    P-R Interval 184 ms    QRS Duration 130 ms    Q-T Interval 428 ms    QTC Calculation(Bezet) 543 ms    P Axis 57 degrees    R Axis 19 degrees    T Axis 109 degrees    Diagnosis Line Normal sinus rhythm  Left bundle branch block    Confirmed by CHALO CARDOZA ([id number]) on 2018 7:12:21 PM    < end of copied text >      < from: US Duplex Venous Lower Ext Complete, Bilateral (18 @ 00:01) >    EXAM:  US DPLX LWR EXT VEINS COMPL BI                            PROCEDURE DATE:  2018          INTERPRETATION:  [facility] RADIOLOGIST PRELIMINARY REPORT    EXAM:    US Bilateral Duplex Lower Extremity Veins     EXAM DATE/TIME:    2018 11:40PM     CLINICAL HISTORY:    78 years old, female; Signs and symptoms; Other: SOB     TECHNIQUE:    Real-time duplex ultrasound of the Bilateral Lower Extremities with 2-D   gray   scale, color Doppler flow and spectral waveform analysis. Complete exam   focused   on the bilateral lower extremity veins.     COMPARISON:    No relevant prior studies available.     FINDINGS:    Right deep veins: Unremarkable. The common femoral, femoral and   popliteal   veins are patent without thrombus. Normal compressibility, augmentation   response and Doppler waveforms.    Right superficial veins: Saphenofemoral junction is patent without   thrombus.      Left deep veins: Unremarkable. The common femoral, femoral and popliteal   veins   are patent without thrombus. Normal compressibility, augmentation   response and   Doppler waveforms.    Left superficial veins: Saphenofemoral junction is patent without   thrombus.    Soft tissues:  Rounded right 2 x 0.8 x 1.6 cm popliteal cyst.     IMPRESSION:   1. No evidence of deep venous thrombosis in bilateral lower extremities   on   current study.   2. Rounded right 2 x 0.8 x 1.6 cm popliteal cyst.    Official report:    CLINICAL STATEMENT: Swelling leg.    TECHNIQUE: Ultrasound of bilateral lower extremity deep venous system.    COMPARISON: None.    FINDINGS:  There is color and spectral flow, compression and augmentation of the   common femoral, superficial femoral and popliteal veins.    There is flow in the posterior tibial vein.    Right popliteal cyst noted measuring 2.0 x 0.7 x 1.6 cm.    IMPRESSION:  No evidence of DVT.     Right Baker's cyst                NEETA ALDANA M.D., ATTENDING RADIOLOGIST  This document has been electronically signed. 2018  7:41AM        < end of copied text >     < from: Xray Chest 1 View- PORTABLE-Urgent (18 @ 11:53) >  EXAM:  XR CHEST PORTABLE URGENT 1V                            PROCEDURE DATE:  2018          INTERPRETATION:  Clinical information: Shortness of breath.    Technique: Frontal view of the chest.    Comparison: Prior chest x-ray examination from 2018.    Findings: There is new moderate pulmonary edema. The heart size is at the   upper limits of normal. The patient is status post median sternotomy. The   patient is status post aortic valve replacement. Spinal stimulator leads   are in place. Multilevel degenerative changes are noted within the imaged   potions of the spine.    IMPRESSION: Moderate pulmonary edema.                KATINA THOMAS M.D., ATTENDING RADIOLOGIST  This document has been electronically signed. 2018 12:06PM      < end of copied text >

## 2018-11-30 NOTE — DISCHARGE NOTE ADULT - CARE PLAN
Principal Discharge DX:	Acute respiratory failure, unspecified whether with hypoxia or hypercapnia  Goal:	resolution  Assessment and plan of treatment:	You came in with shortness of breath and difficulty breathing likely due to fluid backup into your lungs from your heart. You were found to be in respiratory distress and were placed on a specialized breathing mask to improve your oxygenation. Be sure to follow up with your pulmonologist as an outpatient.  Secondary Diagnosis:	Acute congestive heart failure, unspecified heart failure type  Goal:	stable  Assessment and plan of treatment:	You were found to have elevated markers of decreased blood flow to your heart. You were evaluated by our cardiologist who determined this was not likely a heart attack. We performed an ECHO of your heart which showed low to normal heart function. This ECHO also showed a dense lesion on your heart similar to prior ECHOs. Be sure to follow up with your cardiologist as an outpatient for a cardiac MRI.  Secondary Diagnosis:	COPD (chronic obstructive pulmonary disease)  Goal:	stable  Assessment and plan of treatment:	You have chronic obstructive pulmonary disease. Be sure to use your oxygen at home as prescribed and continue using your Spiriva, Symbicort, and albuterol nebulizer as prescribed. Be sure to follow up with your pulmonologist as an outpatient.  Secondary Diagnosis:	RA (rheumatoid arthritis)  Goal:	stable  Assessment and plan of treatment:	You have rheumatoid arthritis. Be sure to take your pain medications as prescribed. Continue taking sulfasalazine as prescribed.  Secondary Diagnosis:	Diabetes mellitus  Goal:	stable  Assessment and plan of treatment:	You have diabetes mellitus. Be sure to eat a carb consistent diet and take your diabetes medication as prescribed. Be sure to follow up with your endocrinologist regularly.  Secondary Diagnosis:	Anxiety  Goal:	stable  Assessment and plan of treatment:	You have anxiety. Continue taking your Klonopin as prescribed.  Secondary Diagnosis:	Valvular heart disease  Goal:	stable  Assessment and plan of treatment:	You have an aortic valve replacement. Be sure to follow up with your cardiologist as an outpatient regularly. Principal Discharge DX:	Acute respiratory failure, unspecified whether with hypoxia or hypercapnia  Goal:	resolution  Assessment and plan of treatment:	You came in with shortness of breath and difficulty breathing due to fluid backup into your lungs from Ac Diastolic  heart Failure .  - You were found to be in respiratory distress and were placed on a  BIPAP/ specialized breathing mask to improve your oxygenation.  - You Have SIMÓN - on home NC O2 2 lit at bed time daily, please use your C PAP at night  - Be sure to follow up with your pulmonologist as an outpatient in 1 week.  Secondary Diagnosis:	Acute congestive heart failure, unspecified heart failure type  Goal:	stable  Assessment and plan of treatment:	-Acute Diastolic Heart Failure- with Acute Pulmonary Edema -Resolved   -You were found to have elevated  cardiac enzymes -Biomarkers   - You were evaluated by our cardiologist who determined this was not  a heart attack.   -You had an ECHO of your heart which showed low to normal heart function. This ECHO also showed a dense lesion on your heart similar to prior ECHOs. Be sure to follow up with your cardiologis DR Jackson as an outpatient for a Cardiac MRI.  -Lasix 60 mg 1 tab daily, fluid restriction- 1.5 lit/24 hrs  Secondary Diagnosis:	COPD (chronic obstructive pulmonary disease)  Goal:	stable  Assessment and plan of treatment:	You have chronic obstructive pulmonary disease. Be sure to use your oxygen at home as prescribed and continue using your Spiriva, Symbicort, and albuterol nebulizer as prescribed. Be sure to follow up with your pulmonologist as an outpatient.  Prednisone 40 mg 1 tab daily x 4 more days.  Secondary Diagnosis:	RA (rheumatoid arthritis)  Goal:	stable  Assessment and plan of treatment:	You have rheumatoid arthritis. Be sure to take your pain medications as prescribed.  - Continue taking sulfasalazine 3x day  as prescribed.  Secondary Diagnosis:	Diabetes mellitus  Goal:	stable  Assessment and plan of treatment:	You have diabetes mellitus. Be sure to eat a carb consistent diet and take your diabetes medication as prescribed. Be sure to follow up with your endocrinologist regularly.  Secondary Diagnosis:	Anxiety  Goal:	stable  Assessment and plan of treatment:	You have anxiety. Continue taking your Klonopin as prescribed.  Secondary Diagnosis:	Valvular heart disease  Goal:	stable  Assessment and plan of treatment:	You have an aortic valve replacement. Be sure to follow up with your cardiologist as an outpatient regularly. Principal Discharge DX:	Acute respiratory failure, unspecified whether with hypoxia or hypercapnia  Goal:	resolution  Assessment and plan of treatment:	You came in with shortness of breath and difficulty breathing due to fluid backup into your lungs from Ac Diastolic  heart Failure .  - You were found to be in respiratory distress and were placed on a  BIPAP/ specialized breathing mask to improve your oxygenation.  - You Have SIMÓN - on home NC O2 2 lit at bed time daily, please use your C PAP at night  - Be sure to follow up with your pulmonologist as an outpatient in 1 week.  Secondary Diagnosis:	Acute congestive heart failure, unspecified heart failure type  Goal:	stable  Assessment and plan of treatment:	-Acute Diastolic Heart Failure- with Acute Pulmonary Edema -Resolved   -You were found to have elevated  cardiac enzymes -Biomarkers   - You were evaluated by our cardiologist who determined this was not  a heart attack.   -You had an ECHO of your heart which showed low to normal heart function. This ECHO also showed a dense lesion on your heart similar to prior ECHOs. Be sure to follow up with your cardiologis DR Jackson as an outpatient for a Cardiac MRI.  -Lasix 60 mg 1 tab daily, fluid restriction- 1.5 lit/24 hrs  Secondary Diagnosis:	COPD (chronic obstructive pulmonary disease)  Goal:	stable  Assessment and plan of treatment:	You have chronic obstructive pulmonary disease. Be sure to use your oxygen at home as prescribed and continue using your Spiriva, Symbicort, and albuterol nebulizer as prescribed. Be sure to follow up with your pulmonologist as an outpatient.  Prednisone 40 mg 1 tab daily x 4 more days.  Secondary Diagnosis:	RA (rheumatoid arthritis)  Goal:	stable  Assessment and plan of treatment:	You have rheumatoid arthritis. Be sure to take your pain medications as prescribed. Percocet 10 mg 4x day  - Continue taking sulfasalazine 3x day  as prescribed.  Secondary Diagnosis:	Diabetes mellitus  Goal:	stable  Assessment and plan of treatment:	You have diabetes mellitus. Be sure to eat a carb consistent diet and take your diabetes medication Metformin as prescribed. Be sure to follow up with your endocrinologist regularly.  Secondary Diagnosis:	Anxiety  Goal:	stable  Assessment and plan of treatment:	You have anxiety. Continue taking your Klonopin as prescribed.  Secondary Diagnosis:	Valvular heart disease  Goal:	stable  Assessment and plan of treatment:	You have an aortic valve replacement. Be sure to follow up with your cardiologist as an outpatient regularly.  HTN- Continue Losartan, Spironolactone, Lasix , Verapamil , ASA daily, Follow up with Cardiologist for Cardiac MRI

## 2018-11-30 NOTE — PROGRESS NOTE ADULT - PROBLEM SELECTOR PLAN 6
On home metformin 500mg qd  - Will hold metformin now  - c/w low dose sliding scale Accu check  - hypoglycemic protocol 2/2 multifactorial   -Ac CHF, Chronic RA  Daily labs  CBC as out pt.

## 2018-12-01 RX ORDER — FAMOTIDINE 10 MG/ML
1 INJECTION INTRAVENOUS
Qty: 4 | Refills: 0 | OUTPATIENT
Start: 2018-12-01 | End: 2018-12-04

## 2018-12-01 RX ORDER — FUROSEMIDE 40 MG
3 TABLET ORAL
Qty: 90 | Refills: 0 | OUTPATIENT
Start: 2018-12-01 | End: 2018-12-30

## 2018-12-03 LAB
CULTURE RESULTS: SIGNIFICANT CHANGE UP
CULTURE RESULTS: SIGNIFICANT CHANGE UP
SPECIMEN SOURCE: SIGNIFICANT CHANGE UP
SPECIMEN SOURCE: SIGNIFICANT CHANGE UP

## 2019-01-18 ENCOUNTER — APPOINTMENT (OUTPATIENT)
Dept: RHEUMATOLOGY | Facility: CLINIC | Age: 79
End: 2019-01-18
Payer: MEDICARE

## 2019-01-18 VITALS
OXYGEN SATURATION: 92 % | BODY MASS INDEX: 24.45 KG/M2 | HEIGHT: 63 IN | HEART RATE: 98 BPM | SYSTOLIC BLOOD PRESSURE: 116 MMHG | DIASTOLIC BLOOD PRESSURE: 65 MMHG | WEIGHT: 138 LBS

## 2019-01-18 PROCEDURE — 99214 OFFICE O/P EST MOD 30 MIN: CPT

## 2019-01-18 NOTE — HISTORY OF PRESENT ILLNESS
[FreeTextEntry1] : 78 year old female with a history of depression, COPD, HTN, and aortic valve replacement initially seen to rule out RA .  She states that she was given a diagnosis of of RA in the past. \par She is now on SSZ at 1.5 gm daily, she has noticed an improvement overall. She rates her current joint pain at a 5/10 with associated morning stiffness for 20 minutes. The only joint that bothers her is the 2 nd MCP b/l\par She has more pains, she wonders if it is related to another type of arthritis as well. Her feet hurt, her knees hurt as well. She has a lot of fatigue.\par She denies any adverse effects to the SSZ.\par \par She has a sinus infection today. \par \par She has a good appetite and denies weight loss. She denies fevers chills or night sweats.

## 2019-01-18 NOTE — REVIEW OF SYSTEMS
[Fever] : no fever [Chills] : no chills [Feeling Poorly] : not feeling poorly [Feeling Tired] : feeling tired [Eye Pain] : no eye pain [Dry Eyes] : no dryness of the eyes [Loss Of Hearing] : no hearing loss [Chest Pain] : no chest pain [Palpitations] : no palpitations [Shortness Of Breath] : shortness of breath [Cough] : no cough [SOB on Exertion] : no shortness of breath during exertion [Abdominal Pain] : no abdominal pain [Arthralgias] : arthralgias [Joint Pain] : joint pain [Joint Swelling] : no joint swelling [Joint Stiffness] : no joint stiffness [Skin Lesions] : no skin lesions [Dizziness] : no dizziness [Limb Weakness] : no limb weakness [Difficulty Walking] : no difficulty walking [Anxiety] : no anxiety [Depression] : no depression [Muscle Weakness] : no muscle weakness [Easy Bruising] : no tendency for easy bruising

## 2019-01-18 NOTE — ASSESSMENT
[FreeTextEntry1] : 78 year old female with a history of depression, COPD, HTN, and aortic valve replacement presents to rule out RA today. She was under the care of Dr. Saenz until recently. \par \par OA- \par Prior to seeing me she has seen 2 rheumatologists. One thought she had rheumatoid arthritis and the other one did not seem to think so. With me she has been seronegative so far. Today she seems to have some evidence of inflammatory arthritis. She does have some remnants of fullness over her MCPs.\par \par RA-\par . IN the past she had  a sedimentation rate of 26 which is normal for her age, an BRADLEY of 1:80, with a weakly positive double-stranded DNA and negative rheumatoid serologies. Her muscle enzymes are slightly elevated too. The significance of these labs is unclear. At this time she does not fill criteria for lupus.\par \par Since she has features consistent with inflammatory arthritis affecting the second and third MCP\par She is to have repeat x-rays of both hands.\par -She is to have sonogram of the left third MCP to assess for active inflammatory disease.\par -I had a long discussion with her regarding treatment choices.\par -She has many medical problems, the best option may be to use a low dose of Arava.\par -She is to review the literature, pending labs and imaging and then we will decide as far as treatment options are concerned.\par Risks and benefits of medication use were d/w patient including but not limited to GI toxicity, diarrhea, hair loss, and hepatotoxicity.\par \par \par Elevated CPK-\par Her CPK was mildly elevated on the last few occasions, initially after discussing with the primary care physician we thought it could be the statin which was held for a few weeks but her CPKs have not changed. will repeat again. \par \par \par Followup 3 months or sooner if there is any change in her underlying symptoms.\par

## 2019-02-06 ENCOUNTER — APPOINTMENT (OUTPATIENT)
Dept: RHEUMATOLOGY | Facility: CLINIC | Age: 79
End: 2019-02-06

## 2019-03-21 ENCOUNTER — APPOINTMENT (OUTPATIENT)
Dept: ULTRASOUND IMAGING | Facility: CLINIC | Age: 79
End: 2019-03-21
Payer: MEDICARE

## 2019-03-21 ENCOUNTER — OUTPATIENT (OUTPATIENT)
Dept: OUTPATIENT SERVICES | Facility: HOSPITAL | Age: 79
LOS: 1 days | End: 2019-03-21
Payer: MEDICARE

## 2019-03-21 DIAGNOSIS — K44.9 DIAPHRAGMATIC HERNIA WITHOUT OBSTRUCTION OR GANGRENE: Chronic | ICD-10-CM

## 2019-03-21 DIAGNOSIS — Z98.89 OTHER SPECIFIED POSTPROCEDURAL STATES: Chronic | ICD-10-CM

## 2019-03-21 DIAGNOSIS — Z98.891 HISTORY OF UTERINE SCAR FROM PREVIOUS SURGERY: Chronic | ICD-10-CM

## 2019-03-21 DIAGNOSIS — I73.9 PERIPHERAL VASCULAR DISEASE, UNSPECIFIED: Chronic | ICD-10-CM

## 2019-03-21 DIAGNOSIS — Z00.8 ENCOUNTER FOR OTHER GENERAL EXAMINATION: ICD-10-CM

## 2019-03-21 DIAGNOSIS — Z98.890 OTHER SPECIFIED POSTPROCEDURAL STATES: Chronic | ICD-10-CM

## 2019-03-21 DIAGNOSIS — Z95.2 PRESENCE OF PROSTHETIC HEART VALVE: Chronic | ICD-10-CM

## 2019-03-21 PROCEDURE — 76882 US LMTD JT/FCL EVL NVASC XTR: CPT | Mod: 26,LT

## 2019-03-21 PROCEDURE — 76882 US LMTD JT/FCL EVL NVASC XTR: CPT

## 2019-03-29 LAB
ALBUMIN SERPL ELPH-MCNC: 4.5 G/DL
ALP BLD-CCNC: 100 U/L
ALT SERPL-CCNC: 25 U/L
ANA SER IF-ACNC: NEGATIVE
ANION GAP SERPL CALC-SCNC: 14 MMOL/L
AST SERPL-CCNC: 25 U/L
BASOPHILS # BLD AUTO: 0.08 K/UL
BASOPHILS NFR BLD AUTO: 0.9 %
BILIRUB SERPL-MCNC: 0.2 MG/DL
BUN SERPL-MCNC: 22 MG/DL
CALCIUM SERPL-MCNC: 9.4 MG/DL
CCP AB SER IA-ACNC: <8 UNITS
CHLORIDE SERPL-SCNC: 94 MMOL/L
CO2 SERPL-SCNC: 26 MMOL/L
CREAT SERPL-MCNC: 0.62 MG/DL
CRP SERPL-MCNC: 0.24 MG/DL
ENA SS-A AB SER IA-ACNC: <0.2 AL
ENA SS-B AB SER IA-ACNC: <0.2 AL
EOSINOPHIL # BLD AUTO: 0.25 K/UL
EOSINOPHIL NFR BLD AUTO: 2.7 %
ERYTHROCYTE [SEDIMENTATION RATE] IN BLOOD BY WESTERGREN METHOD: 11 MM/HR
GLUCOSE SERPL-MCNC: 83 MG/DL
HAV IGM SER QL: NONREACTIVE
HBV CORE IGM SER QL: NONREACTIVE
HBV SURFACE AG SER QL: NONREACTIVE
HCT VFR BLD CALC: 36.4 %
HCV AB SER QL: NONREACTIVE
HCV S/CO RATIO: 0.24 S/CO
HGB BLD-MCNC: 11.4 G/DL
IMM GRANULOCYTES NFR BLD AUTO: 0.3 %
LYMPHOCYTES # BLD AUTO: 1.92 K/UL
LYMPHOCYTES NFR BLD AUTO: 20.8 %
MAN DIFF?: NORMAL
MCHC RBC-ENTMCNC: 31.1 PG
MCHC RBC-ENTMCNC: 31.3 GM/DL
MCV RBC AUTO: 99.2 FL
MONOCYTES # BLD AUTO: 0.92 K/UL
MONOCYTES NFR BLD AUTO: 10 %
NEUTROPHILS # BLD AUTO: 6.04 K/UL
NEUTROPHILS NFR BLD AUTO: 65.3 %
PLATELET # BLD AUTO: 255 K/UL
POTASSIUM SERPL-SCNC: 4.6 MMOL/L
PROT SERPL-MCNC: 6.5 G/DL
RBC # BLD: 3.67 M/UL
RBC # FLD: 14.8 %
RF+CCP IGG SER-IMP: NEGATIVE
RHEUMATOID FACT SER QL: <10 IU/ML
SODIUM SERPL-SCNC: 134 MMOL/L
WBC # FLD AUTO: 9.24 K/UL

## 2019-04-22 ENCOUNTER — APPOINTMENT (OUTPATIENT)
Dept: RHEUMATOLOGY | Facility: CLINIC | Age: 79
End: 2019-04-22
Payer: MEDICARE

## 2019-04-22 VITALS
BODY MASS INDEX: 24.58 KG/M2 | HEART RATE: 93 BPM | HEIGHT: 62.5 IN | OXYGEN SATURATION: 95 % | SYSTOLIC BLOOD PRESSURE: 125 MMHG | WEIGHT: 137 LBS | DIASTOLIC BLOOD PRESSURE: 68 MMHG

## 2019-04-22 PROCEDURE — 99214 OFFICE O/P EST MOD 30 MIN: CPT

## 2019-04-22 RX ORDER — ATORVASTATIN CALCIUM 10 MG/1
10 TABLET, FILM COATED ORAL
Qty: 90 | Refills: 0 | Status: ACTIVE | COMMUNITY
Start: 2018-12-21

## 2019-04-22 RX ORDER — MUPIROCIN 20 MG/G
2 OINTMENT TOPICAL
Qty: 22 | Refills: 0 | Status: ACTIVE | COMMUNITY
Start: 2018-11-19

## 2019-04-22 NOTE — PHYSICAL EXAM
[General Appearance - Alert] : alert [General Appearance - Well Developed] : well developed [General Appearance - Well Nourished] : well nourished [Oropharynx] : the oropharynx was normal [Sclera] : the sclera and conjunctiva were normal [Thyroid Diffuse Enlargement] : the thyroid was not enlarged [Neck Appearance] : the appearance of the neck was normal [Auscultation Breath Sounds / Voice Sounds] : lungs were clear to auscultation bilaterally [Full Pulse] : the pedal pulses are present [Heart Sounds] : normal S1 and S2 [Edema] : there was no peripheral edema [Abdomen Tenderness] : non-tender [Cervical Lymph Nodes Enlarged Anterior Bilaterally] : anterior cervical [Supraclavicular Lymph Nodes Enlarged Bilaterally] : supraclavicular [No Spinal Tenderness] : no spinal tenderness [Abnormal Walk] : normal gait [Musculoskeletal - Swelling] : no joint swelling seen [Nail Clubbing] : no clubbing  or cyanosis of the fingernails [Motor Tone] : muscle strength and tone were normal [] : no rash [FreeTextEntry1] : FROM all joints, bony changes hands and feet c/w OA, no synovitis noted, prominent MCP 2,3 b/l, left MCP synovitis has resolved.  [Oriented To Time, Place, And Person] : oriented to person, place, and time [Deep Tendon Reflexes (DTR)] : deep tendon reflexes were 2+ and symmetric [Motor Exam] : the motor exam was normal [Impaired Insight] : insight and judgment were intact

## 2019-04-22 NOTE — REVIEW OF SYSTEMS
[Fever] : no fever [Chills] : no chills [Feeling Tired] : feeling tired [Feeling Poorly] : not feeling poorly [Eye Pain] : no eye pain [Dry Eyes] : no dryness of the eyes [Loss Of Hearing] : no hearing loss [Chest Pain] : no chest pain [Palpitations] : no palpitations [Shortness Of Breath] : shortness of breath [SOB on Exertion] : no shortness of breath during exertion [Cough] : no cough [Arthralgias] : arthralgias [Abdominal Pain] : no abdominal pain [Joint Pain] : joint pain [Joint Swelling] : no joint swelling [Joint Stiffness] : no joint stiffness [Skin Lesions] : no skin lesions [Limb Weakness] : no limb weakness [Dizziness] : no dizziness [Difficulty Walking] : no difficulty walking [Anxiety] : no anxiety [Muscle Weakness] : no muscle weakness [Depression] : no depression [Easy Bruising] : no tendency for easy bruising

## 2019-04-22 NOTE — HISTORY OF PRESENT ILLNESS
[FreeTextEntry1] : 78 year old female with a history of depression, COPD, HTN, and aortic valve replacement initially seen to rule out RA .  She states that she was given a diagnosis of of RA in the past. \par \par Her rheumatoid arthritis has been active for the last several months, on last visit we increased the sulfasalazine to 2 g. I had sent her for a sonogram of the right hand, it shows active synovitis with erosions in the second third and fourth MCPs. She states that she has been using prednisone intermittently. She had some leftover from the pulmonologist.\par \par Despite the prednisone her joints still hurt. She worries about what she can use, she has pulmonary and cardiac issues. She did have a fall and fractured a bone in her foot as well. She has been using prednisone 10 mg daily.\par \par She admits to fatigue as well.\par \par She wants to make sure that I touch base with her cardiologist and pulmonologist prior to prescribing any other medications\par She has a good appetite and denies weight loss. She denies fevers chills or night sweats.

## 2019-04-22 NOTE — ASSESSMENT
[FreeTextEntry1] : 78 year old female with a history of depression, COPD, HTN, and aortic valve replacement presents to rule out RA today. She was under the care of Dr. Saenz until recently. \par \par OA- \par Prior to seeing me she has seen 2 rheumatologists. One thought she had rheumatoid arthritis and the other one did not seem to think so. With me she has been seronegative so far. Today she seems to have some evidence of inflammatory arthritis. She does have some remnants of fullness over her MCPs.\par \par RA-\par . IN the past she had  a sedimentation rate of 26 which is normal for her age, an BRADLEY of 1:80, with a weakly positive double-stranded DNA and negative rheumatoid serologies. Her muscle enzymes are slightly elevated too. The significance of these labs is unclear. At this time she does not fill criteria for lupus.\par \par Since she has features consistent with inflammatory arthritis affecting the second and third MCP\par She has erosive and active RA. \par -I had a long discussion with her regarding treatment choices.\par -the best options at this time may be orencia or xeljanz\par Risks and benefits of orencia were d/w patient including but not limited to reactivation of TB, lymphoma, malignancies, exacerbation of COPD, immunosuppression, and infections.\par Risks and benefits were d/w patient including but not limited to immunosuppression, reactivation of TB,lymphoma, malignancies, GI perforation and elevation of lipid panel and risk of PE. \par - she is not a candidate for MTX or arava due to pul issues. \par \par Elevated CPK-\par Her CPK was mildly elevated on the last few occasions, initially after discussing with the primary care physician we thought it could be the statin which was held for a few weeks but her CPKs have not changed. will repeat again. \par \par Steroid use-\par Risks and benefits of steroids were d/w patient including but not limited to weight gain, diabetes, HTN, avascular necrosis, osteoporosis, glaucoma, cataract, infections and immunosuppression.\par suggested limiting to 5 mg qd\par \par Followup 2 months or sooner if there is any change in her underlying symptoms.\par She will review literature and notify me with her decision.

## 2019-04-27 ENCOUNTER — INPATIENT (INPATIENT)
Facility: HOSPITAL | Age: 79
LOS: 3 days | Discharge: HOME | DRG: 280 | End: 2019-05-01
Attending: FAMILY MEDICINE | Admitting: INTERNAL MEDICINE
Payer: MEDICARE

## 2019-04-27 VITALS
SYSTOLIC BLOOD PRESSURE: 115 MMHG | OXYGEN SATURATION: 97 % | RESPIRATION RATE: 20 BRPM | TEMPERATURE: 98 F | HEART RATE: 115 BPM | DIASTOLIC BLOOD PRESSURE: 82 MMHG

## 2019-04-27 DIAGNOSIS — I73.9 PERIPHERAL VASCULAR DISEASE, UNSPECIFIED: Chronic | ICD-10-CM

## 2019-04-27 DIAGNOSIS — Z95.2 PRESENCE OF PROSTHETIC HEART VALVE: Chronic | ICD-10-CM

## 2019-04-27 DIAGNOSIS — Z98.89 OTHER SPECIFIED POSTPROCEDURAL STATES: Chronic | ICD-10-CM

## 2019-04-27 DIAGNOSIS — Z98.891 HISTORY OF UTERINE SCAR FROM PREVIOUS SURGERY: Chronic | ICD-10-CM

## 2019-04-27 DIAGNOSIS — Z98.890 OTHER SPECIFIED POSTPROCEDURAL STATES: Chronic | ICD-10-CM

## 2019-04-27 DIAGNOSIS — R06.02 SHORTNESS OF BREATH: ICD-10-CM

## 2019-04-27 DIAGNOSIS — K44.9 DIAPHRAGMATIC HERNIA WITHOUT OBSTRUCTION OR GANGRENE: Chronic | ICD-10-CM

## 2019-04-27 LAB
ALBUMIN SERPL ELPH-MCNC: 4 G/DL — SIGNIFICANT CHANGE UP (ref 3.3–5)
ALP SERPL-CCNC: 107 U/L — SIGNIFICANT CHANGE UP (ref 40–120)
ALT FLD-CCNC: 49 U/L — SIGNIFICANT CHANGE UP (ref 12–78)
ANION GAP SERPL CALC-SCNC: 10 MMOL/L — SIGNIFICANT CHANGE UP (ref 5–17)
AST SERPL-CCNC: 35 U/L — SIGNIFICANT CHANGE UP (ref 15–37)
BASE EXCESS BLDA CALC-SCNC: -0.7 MMOL/L — SIGNIFICANT CHANGE UP (ref -2–2)
BASOPHILS # BLD AUTO: 0.09 K/UL — SIGNIFICANT CHANGE UP (ref 0–0.2)
BASOPHILS NFR BLD AUTO: 0.7 % — SIGNIFICANT CHANGE UP (ref 0–2)
BILIRUB SERPL-MCNC: 0.3 MG/DL — SIGNIFICANT CHANGE UP (ref 0.2–1.2)
BLOOD GAS COMMENTS ARTERIAL: SIGNIFICANT CHANGE UP
BUN SERPL-MCNC: 27 MG/DL — HIGH (ref 7–23)
CALCIUM SERPL-MCNC: 8.5 MG/DL — SIGNIFICANT CHANGE UP (ref 8.5–10.1)
CHLORIDE SERPL-SCNC: 98 MMOL/L — SIGNIFICANT CHANGE UP (ref 96–108)
CK MB BLD-MCNC: 2.8 % — SIGNIFICANT CHANGE UP (ref 0–3.5)
CK MB CFR SERPL CALC: 7.4 NG/ML — HIGH (ref 0–3.6)
CK SERPL-CCNC: 266 U/L — HIGH (ref 26–192)
CO2 SERPL-SCNC: 25 MMOL/L — SIGNIFICANT CHANGE UP (ref 22–31)
CREAT SERPL-MCNC: 0.68 MG/DL — SIGNIFICANT CHANGE UP (ref 0.5–1.3)
EOSINOPHIL # BLD AUTO: 0.15 K/UL — SIGNIFICANT CHANGE UP (ref 0–0.5)
EOSINOPHIL NFR BLD AUTO: 1.1 % — SIGNIFICANT CHANGE UP (ref 0–6)
GLUCOSE SERPL-MCNC: 173 MG/DL — HIGH (ref 70–99)
HCO3 BLDA-SCNC: 24 MMOL/L — SIGNIFICANT CHANGE UP (ref 23–27)
HCT VFR BLD CALC: 39.8 % — SIGNIFICANT CHANGE UP (ref 34.5–45)
HGB BLD-MCNC: 13.1 G/DL — SIGNIFICANT CHANGE UP (ref 11.5–15.5)
IMM GRANULOCYTES NFR BLD AUTO: 0.4 % — SIGNIFICANT CHANGE UP (ref 0–1.5)
INR BLD: 1.12 RATIO — SIGNIFICANT CHANGE UP (ref 0.88–1.16)
LACTATE SERPL-SCNC: 2.6 MMOL/L — HIGH (ref 0.7–2)
LYMPHOCYTES # BLD AUTO: 1.51 K/UL — SIGNIFICANT CHANGE UP (ref 1–3.3)
LYMPHOCYTES # BLD AUTO: 11.3 % — LOW (ref 13–44)
MCHC RBC-ENTMCNC: 31.2 PG — SIGNIFICANT CHANGE UP (ref 27–34)
MCHC RBC-ENTMCNC: 32.9 GM/DL — SIGNIFICANT CHANGE UP (ref 32–36)
MCV RBC AUTO: 94.8 FL — SIGNIFICANT CHANGE UP (ref 80–100)
MONOCYTES # BLD AUTO: 0.59 K/UL — SIGNIFICANT CHANGE UP (ref 0–0.9)
MONOCYTES NFR BLD AUTO: 4.4 % — SIGNIFICANT CHANGE UP (ref 2–14)
NEUTROPHILS # BLD AUTO: 10.95 K/UL — HIGH (ref 1.8–7.4)
NEUTROPHILS NFR BLD AUTO: 82.1 % — HIGH (ref 43–77)
NRBC # BLD: 0 /100 WBCS — SIGNIFICANT CHANGE UP (ref 0–0)
NT-PROBNP SERPL-SCNC: 297 PG/ML — SIGNIFICANT CHANGE UP (ref 0–450)
PCO2 BLDA: 38 MMHG — SIGNIFICANT CHANGE UP (ref 32–46)
PH BLDA: 7.41 — SIGNIFICANT CHANGE UP (ref 7.35–7.45)
PLATELET # BLD AUTO: 262 K/UL — SIGNIFICANT CHANGE UP (ref 150–400)
PO2 BLDA: 162 MMHG — HIGH (ref 74–108)
POTASSIUM SERPL-MCNC: 4 MMOL/L — SIGNIFICANT CHANGE UP (ref 3.5–5.3)
POTASSIUM SERPL-SCNC: 4 MMOL/L — SIGNIFICANT CHANGE UP (ref 3.5–5.3)
PROT SERPL-MCNC: 7.7 G/DL — SIGNIFICANT CHANGE UP (ref 6–8.3)
PROTHROM AB SERPL-ACNC: 12.7 SEC — SIGNIFICANT CHANGE UP (ref 10–12.9)
RBC # BLD: 4.2 M/UL — SIGNIFICANT CHANGE UP (ref 3.8–5.2)
RBC # FLD: 13.8 % — SIGNIFICANT CHANGE UP (ref 10.3–14.5)
SAO2 % BLDA: 98 % — HIGH (ref 92–96)
SODIUM SERPL-SCNC: 133 MMOL/L — LOW (ref 135–145)
TROPONIN I SERPL-MCNC: 0.36 NG/ML — HIGH (ref 0.01–0.04)
WBC # BLD: 13.35 K/UL — HIGH (ref 3.8–10.5)
WBC # FLD AUTO: 13.35 K/UL — HIGH (ref 3.8–10.5)

## 2019-04-27 PROCEDURE — 71045 X-RAY EXAM CHEST 1 VIEW: CPT | Mod: 26

## 2019-04-27 PROCEDURE — 93010 ELECTROCARDIOGRAM REPORT: CPT

## 2019-04-27 PROCEDURE — 99291 CRITICAL CARE FIRST HOUR: CPT

## 2019-04-27 PROCEDURE — 99223 1ST HOSP IP/OBS HIGH 75: CPT | Mod: AI

## 2019-04-27 RX ORDER — PANTOPRAZOLE SODIUM 20 MG/1
40 TABLET, DELAYED RELEASE ORAL
Qty: 0 | Refills: 0 | Status: DISCONTINUED | OUTPATIENT
Start: 2019-04-27 | End: 2019-05-01

## 2019-04-27 RX ORDER — AZITHROMYCIN 500 MG/1
500 TABLET, FILM COATED ORAL ONCE
Qty: 0 | Refills: 0 | Status: COMPLETED | OUTPATIENT
Start: 2019-04-27 | End: 2019-04-27

## 2019-04-27 RX ORDER — SPIRONOLACTONE 25 MG/1
12.5 TABLET, FILM COATED ORAL DAILY
Qty: 0 | Refills: 0 | Status: DISCONTINUED | OUTPATIENT
Start: 2019-04-27 | End: 2019-05-01

## 2019-04-27 RX ORDER — LOSARTAN POTASSIUM 100 MG/1
25 TABLET, FILM COATED ORAL DAILY
Qty: 0 | Refills: 0 | Status: DISCONTINUED | OUTPATIENT
Start: 2019-04-27 | End: 2019-05-01

## 2019-04-27 RX ORDER — ASPIRIN/CALCIUM CARB/MAGNESIUM 324 MG
81 TABLET ORAL DAILY
Qty: 0 | Refills: 0 | Status: DISCONTINUED | OUTPATIENT
Start: 2019-04-27 | End: 2019-05-01

## 2019-04-27 RX ORDER — CLONAZEPAM 1 MG
0.25 TABLET ORAL
Qty: 0 | Refills: 0 | Status: DISCONTINUED | OUTPATIENT
Start: 2019-04-27 | End: 2019-05-01

## 2019-04-27 RX ORDER — BUDESONIDE AND FORMOTEROL FUMARATE DIHYDRATE 160; 4.5 UG/1; UG/1
2 AEROSOL RESPIRATORY (INHALATION)
Qty: 0 | Refills: 0 | Status: DISCONTINUED | OUTPATIENT
Start: 2019-04-27 | End: 2019-05-01

## 2019-04-27 RX ORDER — IPRATROPIUM/ALBUTEROL SULFATE 18-103MCG
3 AEROSOL WITH ADAPTER (GRAM) INHALATION ONCE
Qty: 0 | Refills: 0 | Status: COMPLETED | OUTPATIENT
Start: 2019-04-27 | End: 2019-04-27

## 2019-04-27 RX ORDER — FUROSEMIDE 40 MG
20 TABLET ORAL ONCE
Qty: 0 | Refills: 0 | Status: COMPLETED | OUTPATIENT
Start: 2019-04-27 | End: 2019-04-27

## 2019-04-27 RX ORDER — VENLAFAXINE HCL 75 MG
150 CAPSULE, EXT RELEASE 24 HR ORAL DAILY
Qty: 0 | Refills: 0 | Status: DISCONTINUED | OUTPATIENT
Start: 2019-04-28 | End: 2019-05-01

## 2019-04-27 RX ORDER — SULFASALAZINE 500 MG
500 TABLET ORAL THREE TIMES A DAY
Qty: 0 | Refills: 0 | Status: DISCONTINUED | OUTPATIENT
Start: 2019-04-27 | End: 2019-05-01

## 2019-04-27 RX ORDER — VERAPAMIL HCL 240 MG
240 CAPSULE, EXTENDED RELEASE PELLETS 24 HR ORAL DAILY
Qty: 0 | Refills: 0 | Status: DISCONTINUED | OUTPATIENT
Start: 2019-04-27 | End: 2019-05-01

## 2019-04-27 RX ORDER — CLONAZEPAM 1 MG
0.25 TABLET ORAL ONCE
Qty: 0 | Refills: 0 | Status: DISCONTINUED | OUTPATIENT
Start: 2019-04-27 | End: 2019-04-27

## 2019-04-27 RX ORDER — OXYCODONE AND ACETAMINOPHEN 5; 325 MG/1; MG/1
1 TABLET ORAL EVERY 6 HOURS
Qty: 0 | Refills: 0 | Status: DISCONTINUED | OUTPATIENT
Start: 2019-04-27 | End: 2019-05-01

## 2019-04-27 RX ORDER — POLYETHYLENE GLYCOL 3350 17 G/17G
17 POWDER, FOR SOLUTION ORAL DAILY
Qty: 0 | Refills: 0 | Status: DISCONTINUED | OUTPATIENT
Start: 2019-04-27 | End: 2019-05-01

## 2019-04-27 RX ORDER — DOCUSATE SODIUM 100 MG
100 CAPSULE ORAL
Qty: 0 | Refills: 0 | Status: DISCONTINUED | OUTPATIENT
Start: 2019-04-27 | End: 2019-05-01

## 2019-04-27 RX ORDER — ASPIRIN/CALCIUM CARB/MAGNESIUM 324 MG
325 TABLET ORAL ONCE
Qty: 0 | Refills: 0 | Status: COMPLETED | OUTPATIENT
Start: 2019-04-27 | End: 2019-04-27

## 2019-04-27 RX ORDER — AZTREONAM 2 G
1000 VIAL (EA) INJECTION EVERY 12 HOURS
Qty: 0 | Refills: 0 | Status: DISCONTINUED | OUTPATIENT
Start: 2019-04-27 | End: 2019-04-28

## 2019-04-27 RX ORDER — VERAPAMIL HCL 240 MG
240 CAPSULE, EXTENDED RELEASE PELLETS 24 HR ORAL DAILY
Qty: 0 | Refills: 0 | Status: DISCONTINUED | OUTPATIENT
Start: 2019-04-27 | End: 2019-04-27

## 2019-04-27 RX ORDER — SPIRONOLACTONE 25 MG/1
0.5 TABLET, FILM COATED ORAL
Qty: 0 | Refills: 0 | COMMUNITY

## 2019-04-27 RX ORDER — CLONAZEPAM 1 MG
0.25 TABLET ORAL ONCE
Qty: 0 | Refills: 0 | Status: DISCONTINUED | OUTPATIENT
Start: 2019-04-27 | End: 2019-04-28

## 2019-04-27 RX ORDER — ARIPIPRAZOLE 15 MG/1
2 TABLET ORAL AT BEDTIME
Qty: 0 | Refills: 0 | Status: DISCONTINUED | OUTPATIENT
Start: 2019-04-27 | End: 2019-05-01

## 2019-04-27 RX ORDER — METFORMIN HYDROCHLORIDE 850 MG/1
500 TABLET ORAL DAILY
Qty: 0 | Refills: 0 | Status: DISCONTINUED | OUTPATIENT
Start: 2019-04-27 | End: 2019-04-28

## 2019-04-27 RX ADMIN — Medication 3 MILLILITER(S): at 19:58

## 2019-04-27 RX ADMIN — Medication 50 MILLIGRAM(S): at 22:37

## 2019-04-27 RX ADMIN — Medication 325 MILLIGRAM(S): at 21:39

## 2019-04-27 RX ADMIN — Medication 125 MILLIGRAM(S): at 20:55

## 2019-04-27 RX ADMIN — Medication 3 MILLILITER(S): at 20:49

## 2019-04-27 RX ADMIN — Medication 3 MILLILITER(S): at 20:15

## 2019-04-27 NOTE — ED PROVIDER NOTE - OBJECTIVE STATEMENT
Pt is a 77 yo female hx copd, chf, valve disease and diabetes. pt states today her dog got out and pt was chasing dog, trying to catch it.  pt states got sudden sob, that was not relieved with rest. pt denies cp, recent uri, cough, new swelling or any other sx.  pt now with moderate sob.  still denies cp.  pt denies smoking    pmd: digna

## 2019-04-27 NOTE — CONSULT NOTE ADULT - ASSESSMENT
78F with PMHx as above, presents with respiratory distress, COPD exacerabtion, possible chf exacerbation, troponin leak    -Agree with empiric abx for possible CAP  -Check viral panel  -Continue steroids/nebs  -Tolerating bipap, improved respirations on minimal bipap settings  -Agree with lasix. Check TTE  -Extensive cardiac history. Troponin leak may be demand ischemia, continue to trend CE's. Received ASA in ED  -EKG noted  -Cardiology consult  -Given improvement on bipap, No CP currently, improved HD's, No additional benefit of ICU admission at this time. Further work up and management as per PMD. Please reconsult as needed

## 2019-04-27 NOTE — H&P ADULT - NSICDXPASTMEDICALHX_GEN_ALL_CORE_FT
PAST MEDICAL HISTORY:  Alcoholism last drink 1988    Anxiety     Aortic valve replaced with bovine heart valve    Borderline diabetes no meds    Breast cancer right s/p lumpectomy and radiation 2008    CHF (congestive heart failure)     COPD (chronic obstructive pulmonary disease) diagnosed 2009  inhaler and nebulizer    Depression     Diabetes mellitus     Diverticulitis hospitalized 2013    Dry eyes     Emphysema lung     GERD (gastroesophageal reflux disease)     Glaucoma     Hiatal hernia s/p surgical repair 2005    HTN (hypertension)     Hyperlipidemia     LBBB (left bundle branch block)     Meniere disease     SIMÓN (obstructive sleep apnea) category I severe pt does not use c/pap    PVD (peripheral vascular disease) left iliac  s/p surgical intervention 7-2013  unsuccessful    RA (rheumatoid arthritis) diagnosed 2012  oxycodone prn  neck/ lower back    Skin cancer not melanoma    Spinal stenosis     Thrush treated x 4 days  1 month ago  restarted medication  3-19-14 clortramazole 5x day    TIA (transient ischemic attack) 2010    TMJ (temporomandibular joint disorder)     Valvular heart disease aortic and mitral

## 2019-04-27 NOTE — H&P ADULT - NSICDXFAMILYHX_GEN_ALL_CORE_FT
FAMILY HISTORY:  Family history of colon cancer in father, also prostate ca, skin ca,    Aunt  Still living? Unknown  Family history of breast cancer, Age at diagnosis: Age Unknown

## 2019-04-27 NOTE — ED PROVIDER NOTE - CONSTITUTIONAL, MLM
normal... ill appearing, well nourished, awake, alert, oriented to person, place, time/situation and in respiratory distress

## 2019-04-27 NOTE — H&P ADULT - ASSESSMENT
79 y/o female with ASHD, HLD, DM, RA, COPD on nocturnal home O2 2-3 LPM via NC, anxiety, presents with severe dyspnea, wheezing, was in resp distress, initially required BiPAP.  +troponin, NSTEMI   Acute on chronic combined CHF, COPD Exacerbation  ?Underlying pneumonia, ?concern for thromboembolic dse also    Admit - telemetry  Cont O2 3 LPM Via NC to keep O2 sat >92%  Trend trops, ffup Lactate, other labs  Will get echo  Initiate tx dose Lovenox, Cont ASA (pt allergic to Plavix), verapamil  Cont Diuretics  Cont Bronchodilators ATC via Neb  Cont Symbicort, Spiriva  Cont IV steroids for now (for COPD exacerbation ) (pt on Prednisone for her RA, too)  Empiric IV Antibx- Aztreonam, Zithromax pending cultures  Will d/c Metformin, cont to monitor FS, SSisulin coverage  Will get CT Angio Chest to assess lung parenchyma and r/o PE, leg dopplers to r/o DVT  Cardio consult  GI prophylaxis    IMPROVE VTE Individual Risk Assessment  RISK                                                                Points  [  ] Previous VTE                                                  3  [  ] Thrombophilia                                               2  [  ] Lower limb paralysis                                      2        (unable to hold up >15 seconds)    [  ] Current Cancer                                              2         (within 6 months)  [ x ] Immobilization > 24 hrs                                1  [  ] ICU/CCU stay > 24 hours                              1  [ x ] Age > 60                                                      1  IMPROVE VTE Score _________  IMPROVE Score 0-1: Low Risk, No VTE prophylaxis required for most patients, encourage ambulation.   IMPROVE Score 2-3: At risk, pharmacologic VTE prophylaxis is indicated for most patients (in the absence of a contraindication)  IMPROVE Score > or = 4: High Risk, pharmacologic VTE prophylaxis is indicated for most patients (in the absence of a contraindication)    * pt on tx dose Lovenox pending further trops, echo, CT Angio

## 2019-04-27 NOTE — ED PROVIDER NOTE - CARE PLAN
Principal Discharge DX:	Shortness of breath at rest  Secondary Diagnosis:	Troponin I above reference range

## 2019-04-27 NOTE — H&P ADULT - NSHPPHYSICALEXAM_GEN_ALL_CORE
Vital Signs (24 Hrs):  T(C): 36.4 (04-28-19 @ 01:49), Max: 36.7 (04-28-19 @ 00:57)  HR: 118 (04-28-19 @ 01:49) (105 - 118)  BP: 138/85 (04-28-19 @ 01:49) (109/62 - 138/85)  RR: 18 (04-28-19 @ 01:49) (17 - 24)  SpO2: 93% (04-28-19 @ 01:49) (93% - 100%)

## 2019-04-27 NOTE — CONSULT NOTE ADULT - SUBJECTIVE AND OBJECTIVE BOX
Patient is a 78y old  Female who presents with a chief complaint of   24 hour events: Pt seen and examined at bedside. Chart/labs reviewed.   PAST MEDICAL & SURGICAL HISTORY:  Alcoholism: last drink   Skin cancer: not melanoma  Aortic valve replaced: with bovine heart valve  Meniere disease  Diabetes mellitus  Glaucoma  Dry eyes  GERD (gastroesophageal reflux disease)  Emphysema lung  Spinal stenosis  CHF (congestive heart failure)  LBBB (left bundle branch block)  TMJ (temporomandibular joint disorder)  Diverticulitis: hospitalized   SIMÓN (obstructive sleep apnea): category I severe pt does not use c/pap  Thrush: treated x 4 days  1 month ago  restarted medication  3-19-14 clortramazole 5x day  Anxiety  RA (rheumatoid arthritis): diagnosed   oxycodone prn  neck/ lower back  Depression  Borderline diabetes: no meds  COPD (chronic obstructive pulmonary disease): diagnosed   inhaler and nebulizer  HTN (hypertension)  PVD (peripheral vascular disease): left iliac  s/p surgical intervention   unsuccessful  Hiatal hernia: s/p surgical repair   Breast cancer: right s/p lumpectomy and radiation   TIA (transient ischemic attack): 2010  Hyperlipidemia  Valvular heart disease: aortic and mitral  H/O:  section: 2x  H/O sinus surgery  S/P AVR (aortic valve replacement): about 5 yrs ago (2013?)  PVD (peripheral vascular disease): s/p left iliac bypass which was unsuccessful  open repair  S/P carpal tunnel release: right   S/P lumpectomy, right breast: 2008  Hiatal hernia: s/p surgical repair per pt   S/P hernia surgery: left inguinal age 12  S/P rotator cuff surgery: left   S/P appendectomy:   S/P  section: x2 /       Review of Systems:  Constitutional: No fever, chills, fatigue  Neuro: No headache, numbness, weakness  Resp: No cough, wheezing, shortness of breath  CVS: No chest pain, palpitations, leg swelling  GI: No abdominal pain, nausea, vomiting, diarrhea   : No dysuria, frequency, incontinence  Skin: No itching, burning, rashes, or lesions   Msk: No joint pain or swelling  Psych: No depression, anxiety, mood swings    T(F): 97.7 (19 @ 22:37), Max: 97.7 (04-27-19 @ 20:50)  HR: 116 (19 @ 22:37) (105 - 117)  BP: 117/63 (19 @ 22:37) (109/62 - 117/63)  RR: 17 (19 @ 22:37) (17 - 24)  SpO2: 100% (19 @ 22:37) (97% - 100%)  Wt(kg): --        CAPILLARY BLOOD GLUCOSE          I&O's Summary      Physical Exam:     Gen:   Neuro: A&Ox3, non-focal  HEENT: NC/AT  Resp: CTA b/l  CVS: nl S1/S2, RRR  Abd: soft, nt, nd, +bs  Ext: no edema, +pulses  Skin: well perfused, warm    Meds:  azithromycin  IVPB IV Intermittent  aztreonam  IVPB IV Intermittent                                                    13.1   13.35 )-----------( 262      ( 2019 20:44 )             39.8           133<L>  |  98  |  27<H>  ----------------------------<  173<H>  4.0   |  25  |  0.68    Ca    8.5      2019 20:44    TPro  7.7  /  Alb  4.0  /  TBili  0.3  /  DBili  x   /  AST  35  /  ALT  49  /  AlkPhos  107      Lactate 2.6            @ 20:44      CARDIAC MARKERS ( 2019 20:44 )  .356 ng/mL / x     / 266 U/L / x     / 7.4 ng/mL      PT/INR - ( 2019 20:44 )   PT: 12.7 sec;   INR: 1.12 ratio                     CXR:    CTH:    CT Chest:    CT A/P:    TTE:        CENTRAL LINE: yes/no    TORRES: yes/no    A-LINE: yes/no    GLOBAL ISSUE/BEST PRACTICE:  Analgesia:  Sedation:  HOB elevation: yes  Stress ulcer prophylaxis:  VTE prophylaxis:  Glycemic control:  Nutrition:      CODE STATUS: ***  GOC discussion: Y  Critical care time spent (mins): ***  (Reviewing data, imaging, discussing with multidisciplinary team, non inclusive of procedures, discussing goals of care with patient/family) In Brief:  78F with extensive PMHx as below, including CHF, COPD (on nocturnal O2), DM, presented to ED today after episode of weakness and diaphoresis after trying to run after her dog. She then developed sob which escalated to respiratory distress. She called EMS and was brought to ED. In ED, Pt placed on bipap 10/5 40%, given nebs/steroids. ICU consulted for further evaluation.    24 hour events: Pt seen and examined at bedside. Chart/labs reviewed. Pt states presentation is typical of COPD exacerbation. States breathing markedly improved on bipap. Denies CP, Fevers, N/V/D, currently    PAST MEDICAL & SURGICAL HISTORY:  Alcoholism: last drink   Skin cancer: not melanoma  Aortic valve replaced: with bovine heart valve  Meniere disease  Diabetes mellitus  Glaucoma  Dry eyes  GERD (gastroesophageal reflux disease)  Emphysema lung  Spinal stenosis  CHF (congestive heart failure)  LBBB (left bundle branch block)  TMJ (temporomandibular joint disorder)  Diverticulitis: hospitalized   SIMÓN (obstructive sleep apnea): category I severe pt does not use c/pap  Thrush: treated x 4 days  1 month ago  restarted medication  3-19-14 clortramazole 5x day  Anxiety  RA (rheumatoid arthritis): diagnosed   oxycodone prn  neck/ lower back  Depression  Borderline diabetes: no meds  COPD (chronic obstructive pulmonary disease): diagnosed   inhaler and nebulizer  HTN (hypertension)  PVD (peripheral vascular disease): left iliac  s/p surgical intervention -  unsuccessful  Hiatal hernia: s/p surgical repair   Breast cancer: right s/p lumpectomy and radiation   TIA (transient ischemic attack): 2010  Hyperlipidemia  Valvular heart disease: aortic and mitral  H/O:  section: 2x  H/O sinus surgery  S/P AVR (aortic valve replacement): about 5 yrs ago (2013?)  PVD (peripheral vascular disease): s/p left iliac bypass which was unsuccessful - open repair  S/P carpal tunnel release: right   S/P lumpectomy, right breast: 2008  Hiatal hernia: s/p surgical repair per pt   S/P hernia surgery: left inguinal age 12  S/P rotator cuff surgery: left 2004  S/P appendectomy:   S/P  section: x2 1965/       Review of Systems:  Constitutional: No fever, chills, fatigue  Neuro: No headache, numbness, +weakness, + diaphoresis  Resp: No cough, wheezing, +shortness of breath  CVS: No chest pain, palpitations, leg swelling  GI: No abdominal pain, nausea, vomiting, diarrhea   : No dysuria, frequency, incontinence  Skin: No itching, burning, rashes, or lesions   Msk: No joint pain or swelling  Psych: No depression, anxiety, mood swings    T(F): 97.7 (19 @ 22:37), Max: 97.7 (19 @ 20:50)  HR: 116 (19 @ 22:37) (105 - 117)  BP: 117/63 (19 @ 22:37) (109/62 - 117/63)  RR: 17 (19 @ 22:37) (17 - 24)  SpO2: 100% (19 @ 22:37) (97% - 100%)  Wt(kg): --          Physical Exam:     Gen: WD, WG female  Neuro: A&Ox3, non-focal  HEENT: NC/AT  Resp: Rhonchi, wheeze  CVS: nl S1/S2, tachy  Abd: soft, nt, nd, +bs  Ext: no edema, +pulses  Skin: well perfused, warm      Meds:    azithromycin  IVPB IV Intermittent  aztreonam  IVPB IV Intermittent                              13.1   13.35 )-----------( 262      ( 2019 20:44 )             39.8           133<L>  |  98  |  27<H>  ----------------------------<  173<H>  4.0   |  25  |  0.68    Ca    8.5      2019 20:44    TPro  7.7  /  Alb  4.0  /  TBili  0.3  /  DBili  x   /  AST  35  /  ALT  49  /  AlkPhos  107      Lactate 2.6            @ 20:44      CARDIAC MARKERS ( 2019 20:44 )  .356 ng/mL / x     / 266 U/L / x     / 7.4 ng/mL      PT/INR - ( 2019 20:44 )   PT: 12.7 sec;   INR: 1.12 ratio                     CXR:  Pending report        CODE STATUS: Full  GOC discussion: SHITAL

## 2019-04-27 NOTE — ED PROVIDER NOTE - CHPI ED SYMPTOMS NEG
no edema/no hemoptysis/no diaphoresis/no body aches/no cough/no headache/no chest pain/no fever/no chills

## 2019-04-27 NOTE — ED ADULT NURSE REASSESSMENT NOTE - NS ED NURSE REASSESS POST TX BREATHING
breathing much improved post treatment/Pt currently on BIPAP. Vital signs improved, Breathing improved, skin color improved.

## 2019-04-27 NOTE — ED PROVIDER NOTE - FAMILY HISTORY
Family history of colon cancer in father, also prostate ca, skin ca,     Aunt  Still living? Unknown  Family history of breast cancer, Age at diagnosis: Age Unknown

## 2019-04-27 NOTE — ED ADULT NURSE NOTE - OBJECTIVE STATEMENT
Pt received via EMS, from home accompanied by friends. Pt reports fell while walking dog. Pt reports weakness sob, fell on front loan, denies head trauma or LOC. Pt denies dizziness, n/v/d, fever, chills. Pt reports history of falls. Pt denies any use of anticoagulants. + ROM all extremities. No visible injuries. Pt reports on 3 LITERS O2 at home, wasn't responding to O2, sob worsen, developed dyspnea. In ed pt was not responding to non rebreather and placed on BIPAP AT 40% O2.

## 2019-04-27 NOTE — ED PROVIDER NOTE - CLINICAL SUMMARY MEDICAL DECISION MAKING FREE TEXT BOX
pt pw acute resp distress, cough and wheeze and in resp distress.  check labs, cxr, probnp.  lactate, trop,  cxr, nebs, abg, bipap- blood cultures,  ro copd, v chf, v pn

## 2019-04-27 NOTE — H&P ADULT - NSICDXPASTSURGICALHX_GEN_ALL_CORE_FT
PAST SURGICAL HISTORY:  H/O sinus surgery     H/O:  section 2x    Hiatal hernia s/p surgical repair per pt     PVD (peripheral vascular disease) s/p left iliac bypass which was unsuccessful - open repair    S/P appendectomy     S/P AVR (aortic valve replacement) about 5 yrs ago (?)    S/P carpal tunnel release right     S/P  section x2 1965/ 1969    S/P hernia surgery left inguinal age 12    S/P lumpectomy, right breast     S/P rotator cuff surgery left

## 2019-04-27 NOTE — H&P ADULT - HISTORY OF PRESENT ILLNESS
79 y/o female with ASHD, HLD, DM, RA, COPD on nocturnal home O2 2-3 LPM via NC, anxiety, was in her USOH until this afternoon, states that her dog ran out of the house.  She tried to go after the dog, but she really cannot move fast because of her arthritis and COPD, so as she was going after the dog (just walking), she got so dyspneic and exhausted, did not even get too far and she just lied down on the grass.  She was very dyspneid 79 y/o female with ASHD, HLD, DM, RA, COPD on nocturnal home O2 2-3 LPM via NC, anxiety, was in her USOH until this afternoon, states that her dog ran out of the house.  She tried to go after the dog, but she really cannot move fast because of her arthritis and COPD, so as she was going after the dog (just walking), she got so dyspneic and exhausted.   She did not even get too far and she just lied down on the grass because of severe dyspnea.  She denies chest pain, cough, fever, chills. 77 y/o female with ASHD, HLD, DM, RA, COPD on nocturnal home O2 2-3 LPM via NC, anxiety, was in her USOH until this afternoon, states that her dog ran out of the house.  She tried to go after the dog, but she really cannot move fast because of her arthritis and COPD, so as she was going after the dog (just walking), she got so dyspneic and exhausted.   She did not even get too far and she just lied down on the grass because of severe dyspnea and was also wheezing.  She denies chest pain, cough, fever, chills.  She states she has chronic back and hip pains.  She is quite limited with activities.  EMS was called and she was brought to the ED on 100% NRBM.  As per ED MD, pt was still in resp distress, tachypneic, even after neb tx and Solumedrol, so pt was placed on BiPAP.  Cxray with bilat increased markings, right effusion vs infiltrate.  Pt received Aztreonam and Zithromax in the ED.  Troponin came back as 0.3.  Pt also received a dose of Lasix 20 mg IVP in the ED.  Pt was taken off BiPAP afetr a few hours, and placed on 3 LPM via NC, and pt was feeling better.  O2 sat 98%.  EKG Sinus tach with /min

## 2019-04-27 NOTE — H&P ADULT - NEUROLOGICAL DETAILS
alert and oriented x 3/sensation intact/responds to pain/deep reflexes intact/responds to verbal commands

## 2019-04-27 NOTE — ED ADULT NURSE NOTE - NSIMPLEMENTINTERV_GEN_ALL_ED
Implemented All Fall Risk Interventions:  Hasty to call system. Call bell, personal items and telephone within reach. Instruct patient to call for assistance. Room bathroom lighting operational. Non-slip footwear when patient is off stretcher. Physically safe environment: no spills, clutter or unnecessary equipment. Stretcher in lowest position, wheels locked, appropriate side rails in place. Provide visual cue, wrist band, yellow gown, etc. Monitor gait and stability. Monitor for mental status changes and reorient to person, place, and time. Review medications for side effects contributing to fall risk. Reinforce activity limits and safety measures with patient and family.

## 2019-04-28 DIAGNOSIS — R06.02 SHORTNESS OF BREATH: ICD-10-CM

## 2019-04-28 DIAGNOSIS — R09.89 OTHER SPECIFIED SYMPTOMS AND SIGNS INVOLVING THE CIRCULATORY AND RESPIRATORY SYSTEMS: ICD-10-CM

## 2019-04-28 LAB
ANION GAP SERPL CALC-SCNC: 8 MMOL/L — SIGNIFICANT CHANGE UP (ref 5–17)
BASOPHILS # BLD AUTO: 0.02 K/UL — SIGNIFICANT CHANGE UP (ref 0–0.2)
BASOPHILS NFR BLD AUTO: 0.2 % — SIGNIFICANT CHANGE UP (ref 0–2)
BUN SERPL-MCNC: 22 MG/DL — SIGNIFICANT CHANGE UP (ref 7–23)
CALCIUM SERPL-MCNC: 8.2 MG/DL — LOW (ref 8.5–10.1)
CHLORIDE SERPL-SCNC: 99 MMOL/L — SIGNIFICANT CHANGE UP (ref 96–108)
CK MB BLD-MCNC: 5.9 % — HIGH (ref 0–3.5)
CK MB BLD-MCNC: 6.5 % — HIGH (ref 0–3.5)
CK MB CFR SERPL CALC: 19.6 NG/ML — HIGH (ref 0–3.6)
CK MB CFR SERPL CALC: 22.1 NG/ML — HIGH (ref 0–3.6)
CK SERPL-CCNC: 226 U/L — HIGH (ref 26–192)
CK SERPL-CCNC: 333 U/L — HIGH (ref 26–192)
CK SERPL-CCNC: 339 U/L — HIGH (ref 26–192)
CO2 SERPL-SCNC: 29 MMOL/L — SIGNIFICANT CHANGE UP (ref 22–31)
CREAT SERPL-MCNC: 0.73 MG/DL — SIGNIFICANT CHANGE UP (ref 0.5–1.3)
EOSINOPHIL # BLD AUTO: 0 K/UL — SIGNIFICANT CHANGE UP (ref 0–0.5)
EOSINOPHIL NFR BLD AUTO: 0 % — SIGNIFICANT CHANGE UP (ref 0–6)
GLUCOSE SERPL-MCNC: 144 MG/DL — HIGH (ref 70–99)
HBA1C BLD-MCNC: 5.6 % — SIGNIFICANT CHANGE UP (ref 4–5.6)
HCT VFR BLD CALC: 40.4 % — SIGNIFICANT CHANGE UP (ref 34.5–45)
HGB BLD-MCNC: 13.1 G/DL — SIGNIFICANT CHANGE UP (ref 11.5–15.5)
IMM GRANULOCYTES NFR BLD AUTO: 0.3 % — SIGNIFICANT CHANGE UP (ref 0–1.5)
LACTATE SERPL-SCNC: 2.4 MMOL/L — HIGH (ref 0.7–2)
LACTATE SERPL-SCNC: 3.3 MMOL/L — HIGH (ref 0.7–2)
LYMPHOCYTES # BLD AUTO: 0.66 K/UL — LOW (ref 1–3.3)
LYMPHOCYTES # BLD AUTO: 6.5 % — LOW (ref 13–44)
MCHC RBC-ENTMCNC: 31 PG — SIGNIFICANT CHANGE UP (ref 27–34)
MCHC RBC-ENTMCNC: 32.4 GM/DL — SIGNIFICANT CHANGE UP (ref 32–36)
MCV RBC AUTO: 95.7 FL — SIGNIFICANT CHANGE UP (ref 80–100)
MONOCYTES # BLD AUTO: 0.41 K/UL — SIGNIFICANT CHANGE UP (ref 0–0.9)
MONOCYTES NFR BLD AUTO: 4 % — SIGNIFICANT CHANGE UP (ref 2–14)
NEUTROPHILS # BLD AUTO: 9.11 K/UL — HIGH (ref 1.8–7.4)
NEUTROPHILS NFR BLD AUTO: 89 % — HIGH (ref 43–77)
NRBC # BLD: 0 /100 WBCS — SIGNIFICANT CHANGE UP (ref 0–0)
PLATELET # BLD AUTO: 262 K/UL — SIGNIFICANT CHANGE UP (ref 150–400)
POTASSIUM SERPL-MCNC: 4.5 MMOL/L — SIGNIFICANT CHANGE UP (ref 3.5–5.3)
POTASSIUM SERPL-SCNC: 4.5 MMOL/L — SIGNIFICANT CHANGE UP (ref 3.5–5.3)
PROCALCITONIN SERPL-MCNC: <0.05 — SIGNIFICANT CHANGE UP (ref 0–0.04)
RBC # BLD: 4.22 M/UL — SIGNIFICANT CHANGE UP (ref 3.8–5.2)
RBC # FLD: 13.9 % — SIGNIFICANT CHANGE UP (ref 10.3–14.5)
SODIUM SERPL-SCNC: 136 MMOL/L — SIGNIFICANT CHANGE UP (ref 135–145)
TROPONIN I SERPL-MCNC: 1.34 NG/ML — HIGH (ref 0.01–0.04)
TROPONIN I SERPL-MCNC: 2.44 NG/ML — HIGH (ref 0.01–0.04)
TROPONIN I SERPL-MCNC: 4.53 NG/ML — HIGH (ref 0.01–0.04)
TROPONIN I SERPL-MCNC: 4.69 NG/ML — HIGH (ref 0.01–0.04)
WBC # BLD: 10.23 K/UL — SIGNIFICANT CHANGE UP (ref 3.8–10.5)
WBC # FLD AUTO: 10.23 K/UL — SIGNIFICANT CHANGE UP (ref 3.8–10.5)

## 2019-04-28 PROCEDURE — 99223 1ST HOSP IP/OBS HIGH 75: CPT

## 2019-04-28 PROCEDURE — 99232 SBSQ HOSP IP/OBS MODERATE 35: CPT

## 2019-04-28 PROCEDURE — 71275 CT ANGIOGRAPHY CHEST: CPT | Mod: 26

## 2019-04-28 PROCEDURE — 93970 EXTREMITY STUDY: CPT | Mod: 26

## 2019-04-28 PROCEDURE — 93306 TTE W/DOPPLER COMPLETE: CPT | Mod: 26

## 2019-04-28 RX ORDER — FUROSEMIDE 40 MG
20 TABLET ORAL DAILY
Qty: 0 | Refills: 0 | Status: DISCONTINUED | OUTPATIENT
Start: 2019-04-28 | End: 2019-04-28

## 2019-04-28 RX ORDER — OXYCODONE AND ACETAMINOPHEN 5; 325 MG/1; MG/1
2 TABLET ORAL EVERY 6 HOURS
Qty: 0 | Refills: 0 | Status: DISCONTINUED | OUTPATIENT
Start: 2019-04-28 | End: 2019-05-01

## 2019-04-28 RX ORDER — TICAGRELOR 90 MG/1
90 TABLET ORAL
Qty: 0 | Refills: 0 | Status: DISCONTINUED | OUTPATIENT
Start: 2019-04-29 | End: 2019-04-29

## 2019-04-28 RX ORDER — AZITHROMYCIN 500 MG/1
500 TABLET, FILM COATED ORAL DAILY
Qty: 0 | Refills: 0 | Status: DISCONTINUED | OUTPATIENT
Start: 2019-04-28 | End: 2019-04-28

## 2019-04-28 RX ORDER — DEXTROSE 50 % IN WATER 50 %
15 SYRINGE (ML) INTRAVENOUS ONCE
Qty: 0 | Refills: 0 | Status: DISCONTINUED | OUTPATIENT
Start: 2019-04-28 | End: 2019-05-01

## 2019-04-28 RX ORDER — INSULIN LISPRO 100/ML
VIAL (ML) SUBCUTANEOUS AT BEDTIME
Qty: 0 | Refills: 0 | Status: DISCONTINUED | OUTPATIENT
Start: 2019-04-28 | End: 2019-05-01

## 2019-04-28 RX ORDER — INSULIN LISPRO 100/ML
VIAL (ML) SUBCUTANEOUS
Qty: 0 | Refills: 0 | Status: DISCONTINUED | OUTPATIENT
Start: 2019-04-28 | End: 2019-05-01

## 2019-04-28 RX ORDER — DEXTROSE 50 % IN WATER 50 %
25 SYRINGE (ML) INTRAVENOUS ONCE
Qty: 0 | Refills: 0 | Status: DISCONTINUED | OUTPATIENT
Start: 2019-04-28 | End: 2019-05-01

## 2019-04-28 RX ORDER — LACTOBACILLUS ACIDOPHILUS 100MM CELL
1 CAPSULE ORAL
Qty: 0 | Refills: 0 | Status: DISCONTINUED | OUTPATIENT
Start: 2019-04-28 | End: 2019-05-01

## 2019-04-28 RX ORDER — DEXTROSE 50 % IN WATER 50 %
12.5 SYRINGE (ML) INTRAVENOUS ONCE
Qty: 0 | Refills: 0 | Status: DISCONTINUED | OUTPATIENT
Start: 2019-04-28 | End: 2019-05-01

## 2019-04-28 RX ORDER — ENOXAPARIN SODIUM 100 MG/ML
65 INJECTION SUBCUTANEOUS EVERY 12 HOURS
Qty: 0 | Refills: 0 | Status: DISCONTINUED | OUTPATIENT
Start: 2019-04-28 | End: 2019-04-30

## 2019-04-28 RX ORDER — FUROSEMIDE 40 MG
40 TABLET ORAL DAILY
Qty: 0 | Refills: 0 | Status: DISCONTINUED | OUTPATIENT
Start: 2019-04-29 | End: 2019-05-01

## 2019-04-28 RX ORDER — IPRATROPIUM/ALBUTEROL SULFATE 18-103MCG
3 AEROSOL WITH ADAPTER (GRAM) INHALATION EVERY 6 HOURS
Qty: 0 | Refills: 0 | Status: DISCONTINUED | OUTPATIENT
Start: 2019-04-28 | End: 2019-05-01

## 2019-04-28 RX ORDER — GLUCAGON INJECTION, SOLUTION 0.5 MG/.1ML
1 INJECTION, SOLUTION SUBCUTANEOUS ONCE
Qty: 0 | Refills: 0 | Status: DISCONTINUED | OUTPATIENT
Start: 2019-04-28 | End: 2019-05-01

## 2019-04-28 RX ORDER — TIOTROPIUM BROMIDE 18 UG/1
1 CAPSULE ORAL; RESPIRATORY (INHALATION) DAILY
Qty: 0 | Refills: 0 | Status: DISCONTINUED | OUTPATIENT
Start: 2019-04-28 | End: 2019-05-01

## 2019-04-28 RX ORDER — ENOXAPARIN SODIUM 100 MG/ML
25 INJECTION SUBCUTANEOUS ONCE
Qty: 0 | Refills: 0 | Status: COMPLETED | OUTPATIENT
Start: 2019-04-28 | End: 2019-04-28

## 2019-04-28 RX ORDER — ATORVASTATIN CALCIUM 80 MG/1
80 TABLET, FILM COATED ORAL AT BEDTIME
Qty: 0 | Refills: 0 | Status: DISCONTINUED | OUTPATIENT
Start: 2019-04-28 | End: 2019-05-01

## 2019-04-28 RX ORDER — SODIUM CHLORIDE 9 MG/ML
500 INJECTION INTRAMUSCULAR; INTRAVENOUS; SUBCUTANEOUS
Qty: 0 | Refills: 0 | Status: COMPLETED | OUTPATIENT
Start: 2019-04-28 | End: 2019-04-28

## 2019-04-28 RX ORDER — ENOXAPARIN SODIUM 100 MG/ML
40 INJECTION SUBCUTANEOUS DAILY
Qty: 0 | Refills: 0 | Status: DISCONTINUED | OUTPATIENT
Start: 2019-04-28 | End: 2019-04-28

## 2019-04-28 RX ORDER — TICAGRELOR 90 MG/1
180 TABLET ORAL ONCE
Qty: 0 | Refills: 0 | Status: COMPLETED | OUTPATIENT
Start: 2019-04-28 | End: 2019-04-28

## 2019-04-28 RX ORDER — SODIUM CHLORIDE 9 MG/ML
1000 INJECTION, SOLUTION INTRAVENOUS
Qty: 0 | Refills: 0 | Status: DISCONTINUED | OUTPATIENT
Start: 2019-04-28 | End: 2019-05-01

## 2019-04-28 RX ORDER — ACETAMINOPHEN 500 MG
650 TABLET ORAL EVERY 6 HOURS
Qty: 0 | Refills: 0 | Status: DISCONTINUED | OUTPATIENT
Start: 2019-04-28 | End: 2019-05-01

## 2019-04-28 RX ADMIN — ATORVASTATIN CALCIUM 80 MILLIGRAM(S): 80 TABLET, FILM COATED ORAL at 21:17

## 2019-04-28 RX ADMIN — TIOTROPIUM BROMIDE 1 CAPSULE(S): 18 CAPSULE ORAL; RESPIRATORY (INHALATION) at 08:43

## 2019-04-28 RX ADMIN — OXYCODONE AND ACETAMINOPHEN 1 TABLET(S): 5; 325 TABLET ORAL at 02:07

## 2019-04-28 RX ADMIN — Medication 100 MILLIGRAM(S): at 17:42

## 2019-04-28 RX ADMIN — Medication 100 MILLIGRAM(S): at 05:46

## 2019-04-28 RX ADMIN — Medication 0.25 MILLIGRAM(S): at 17:12

## 2019-04-28 RX ADMIN — Medication 1 TABLET(S): at 17:42

## 2019-04-28 RX ADMIN — OXYCODONE AND ACETAMINOPHEN 1 TABLET(S): 5; 325 TABLET ORAL at 03:03

## 2019-04-28 RX ADMIN — Medication 20 MILLIGRAM(S): at 05:45

## 2019-04-28 RX ADMIN — PANTOPRAZOLE SODIUM 40 MILLIGRAM(S): 20 TABLET, DELAYED RELEASE ORAL at 05:46

## 2019-04-28 RX ADMIN — Medication 500 MILLIGRAM(S): at 05:46

## 2019-04-28 RX ADMIN — Medication 3 MILLILITER(S): at 00:51

## 2019-04-28 RX ADMIN — AZITHROMYCIN 255 MILLIGRAM(S): 500 TABLET, FILM COATED ORAL at 00:18

## 2019-04-28 RX ADMIN — Medication 150 MILLIGRAM(S): at 12:42

## 2019-04-28 RX ADMIN — OXYCODONE AND ACETAMINOPHEN 1 TABLET(S): 5; 325 TABLET ORAL at 14:44

## 2019-04-28 RX ADMIN — Medication 20 MILLIGRAM(S): at 00:18

## 2019-04-28 RX ADMIN — BUDESONIDE AND FORMOTEROL FUMARATE DIHYDRATE 2 PUFF(S): 160; 4.5 AEROSOL RESPIRATORY (INHALATION) at 05:58

## 2019-04-28 RX ADMIN — Medication 81 MILLIGRAM(S): at 12:42

## 2019-04-28 RX ADMIN — Medication 500 MILLIGRAM(S): at 14:08

## 2019-04-28 RX ADMIN — ENOXAPARIN SODIUM 40 MILLIGRAM(S): 100 INJECTION SUBCUTANEOUS at 14:22

## 2019-04-28 RX ADMIN — Medication 3 MILLILITER(S): at 19:02

## 2019-04-28 RX ADMIN — Medication 3 MILLILITER(S): at 13:02

## 2019-04-28 RX ADMIN — Medication 500 MILLIGRAM(S): at 21:17

## 2019-04-28 RX ADMIN — Medication 0.25 MILLIGRAM(S): at 05:55

## 2019-04-28 RX ADMIN — ENOXAPARIN SODIUM 25 MILLIGRAM(S): 100 INJECTION SUBCUTANEOUS at 15:29

## 2019-04-28 RX ADMIN — OXYCODONE AND ACETAMINOPHEN 2 TABLET(S): 5; 325 TABLET ORAL at 20:46

## 2019-04-28 RX ADMIN — BUDESONIDE AND FORMOTEROL FUMARATE DIHYDRATE 2 PUFF(S): 160; 4.5 AEROSOL RESPIRATORY (INHALATION) at 20:05

## 2019-04-28 RX ADMIN — Medication 3 MILLILITER(S): at 07:31

## 2019-04-28 RX ADMIN — Medication 50 MILLIGRAM(S): at 05:51

## 2019-04-28 RX ADMIN — ARIPIPRAZOLE 2 MILLIGRAM(S): 15 TABLET ORAL at 21:17

## 2019-04-28 RX ADMIN — TICAGRELOR 180 MILLIGRAM(S): 90 TABLET ORAL at 15:40

## 2019-04-28 RX ADMIN — OXYCODONE AND ACETAMINOPHEN 2 TABLET(S): 5; 325 TABLET ORAL at 19:59

## 2019-04-28 RX ADMIN — SPIRONOLACTONE 12.5 MILLIGRAM(S): 25 TABLET, FILM COATED ORAL at 05:46

## 2019-04-28 RX ADMIN — OXYCODONE AND ACETAMINOPHEN 1 TABLET(S): 5; 325 TABLET ORAL at 15:40

## 2019-04-28 RX ADMIN — SODIUM CHLORIDE 100 MILLILITER(S): 9 INJECTION INTRAMUSCULAR; INTRAVENOUS; SUBCUTANEOUS at 03:02

## 2019-04-28 RX ADMIN — Medication 40 MILLIGRAM(S): at 17:42

## 2019-04-28 RX ADMIN — Medication 40 MILLIGRAM(S): at 05:45

## 2019-04-28 NOTE — CONSULT NOTE ADULT - ASSESSMENT
79 y/o female with a history of COPD on nocturnal oxygen, bio AVR, mobile echodensity on aortomitral curtain that is of unclear significant and being followed conservatively, HTN, HLD, DM, RA, LBBB,  anxiety, admitted with increased dyspnea and respiratory distress.  Troponin mildly positive, uptrending, flat CK mostly consistent with demand ischemia:    - Admit to telemetry  - Trend cardiac enzymes, trop, CK, and CK MB.  I suspect this to be related to demand ischemia  - EKG with LBBB  - She does not see volume overloaded to me.  Got a dose of IV Lasix yesterday and today.  I would change back to PO Lasix  - For repeat echocardiogram.   She has a mobile echodensity associated with the aortomitral curtain that is known and followed conservatively .    - She is for CT PA to rule out PE, she can go off monitor  - Continue aspirin 81 QD  - Continue losartan 25 QD  - Continue Aldactone 12.5 QD  - Continue verapamil at current dose  - Monitor and replete lytes  - Pulmonary consult  - To follow 77 y/o female with a history of COPD on nocturnal oxygen, bio AVR, mobile echodensity on aortomitral curtain that is of unclear significant and being followed conservatively, HTN, HLD, DM, RA, LBBB,  anxiety, admitted with increased dyspnea and respiratory distress.  Troponin mildly positive, uptrending, flat CK mostly consistent with demand ischemia:    - Admit to telemetry  - Trend cardiac enzymes, trop, CK, and CK MB.  I suspect this to be related to demand ischemia  - EKG with LBBB  - She does not see volume overloaded to me.  Got a dose of IV Lasix yesterday and today.  I would change back to PO Lasix  - For repeat echocardiogram.   She has a mobile echodensity associated with the aortomitral curtain that is known and followed conservatively .    - She is for CT PA to rule out PE, she can go off monitor  - Continue aspirin 81 QD  - Continue losartan 25 QD  - Continue Aldactone 12.5 QD  - Continue verapamil at current dose  - Monitor and replete lytes  - Pulmonary consult  - To follow    Cardiology Addendum 4/28/2019, 3 PM    Cardiac enzymes are uptrending, and becoming consistent with NSTEMI.  Need to continue trending, and to check echocardiogram.  IN the meantime, she is going to be fully anticoagulated with Lovenox 1mg/kg bid and get a Plavix load of 300 STAT, followed by 75 QD.   Would also start a statin drug.  To follow closely.

## 2019-04-28 NOTE — CONSULT NOTE ADULT - SUBJECTIVE AND OBJECTIVE BOX
[facility] - Tawny García, Beatriz, Mustapha, Joaquina, Zully Ravi  Office Number: [phone]    Initial Consult Note    CHIEF COMPLAINT: Patient is a 78y old  Female who presents with a chief complaint of dyspnea (2019 23:30)      HPI:  77 y/o female with a history of COPD on nocturnal oxygen, bio AVR, mobile echodensity on aortomitral curtain that is of unclear significant and being followed conservatively, HTN, HLD, DM, RA, LBBB,  anxiety, was in her USOH until this afternoon, states that her dog ran out of the house.  She tried to go after the dog, but she really cannot move fast because of her arthritis and COPD, so as she was going after the dog (just walking), she got so dyspneic and exhausted.   She did not even get too far and she just lied down on the grass because of severe dyspnea and was also wheezing.  She denies chest pain, cough, fever, chills.  She states she has chronic back and hip pains.  She is quite limited with activities.  EMS was called and she was brought to the ED on 100% NRBM.  As per ED MD, pt was still in resp distress, tachypneic, even after neb tx and Solumedrol, so pt was placed on BiPAP.  Cxray with bilat increased markings, right effusion vs infiltrate.  Pt received Aztreonam and Zithromax in the ED.  Her troponin was mildly elevated, and is trending up.    Pt also received a dose of Lasix 20 mg IVP in the ED.  Pt was taken off BiPAP after a few hours, and placed on 3 LPM via NC, and pt was feeling better.  O2 sat 98%.  EKG Sinus tach with LBBB 112/min (2019 23:30)    She denies chest pain.  She is feeling better from a respiratory standpoint.  She is scheduled for a TTE and CT PA.      PAST MEDICAL & SURGICAL HISTORY:  Alcoholism: last drink   Skin cancer: not melanoma  Aortic valve replaced: with bovine heart valve  Meniere disease  Diabetes mellitus  Glaucoma  Dry eyes  GERD (gastroesophageal reflux disease)  Emphysema lung  Spinal stenosis  CHF (congestive heart failure)  LBBB (left bundle branch block)  TMJ (temporomandibular joint disorder)  Diverticulitis: hospitalized   SIMÓN (obstructive sleep apnea): category I severe pt does not use c/pap  Thrush: treated x 4 days  1 month ago  restarted medication  3-19-14 clortramazole 5x day  Anxiety  RA (rheumatoid arthritis): diagnosed   oxycodone prn  neck/ lower back  Depression  Borderline diabetes: no meds  COPD (chronic obstructive pulmonary disease): diagnosed   inhaler and nebulizer  HTN (hypertension)  PVD (peripheral vascular disease): left iliac  s/p surgical intervention   unsuccessful  Hiatal hernia: s/p surgical repair   Breast cancer: right s/p lumpectomy and radiation   TIA (transient ischemic attack): 2010  Hyperlipidemia  Valvular heart disease: aortic and mitral  H/O:  section: 2x  H/O sinus surgery  S/P AVR (aortic valve replacement): about 5 yrs ago (?)  PVD (peripheral vascular disease): s/p left iliac bypass which was unsuccessful  open repair  S/P carpal tunnel release: right   S/P lumpectomy, right breast:   Hiatal hernia: s/p surgical repair per pt   S/P hernia surgery: left inguinal age 12  S/P rotator cuff surgery: left   S/P appendectomy:   S/P  section: x2 /       SOCIAL HISTORY:  No tobacco, ethanol, or drug abuse.    FAMILY HISTORY:  Family history of breast cancer (Aunt): aunt  Family history of colon cancer in father: also prostate ca, skin ca,  No family history of acute MI or sudden cardiac death.    MEDICATIONS  (STANDING):  ALBUTerol/ipratropium for Nebulization 3 milliLiter(s) Nebulizer every 6 hours  ARIPiprazole 2 milliGRAM(s) Oral at bedtime  aspirin  chewable 81 milliGRAM(s) Oral daily  azithromycin   Tablet 500 milliGRAM(s) Oral daily  aztreonam  IVPB 1000 milliGRAM(s) IV Intermittent every 12 hours  buDESOnide 160 MICROgram(s)/formoterol 4.5 MICROgram(s) Inhaler 2 Puff(s) Inhalation two times a day  clonazePAM Tablet 0.25 milliGRAM(s) Oral two times a day  dextrose 5%. 1000 milliLiter(s) (50 mL/Hr) IV Continuous <Continuous>  dextrose 50% Injectable 12.5 Gram(s) IV Push once  dextrose 50% Injectable 25 Gram(s) IV Push once  dextrose 50% Injectable 25 Gram(s) IV Push once  docusate sodium 100 milliGRAM(s) Oral two times a day  furosemide   Injectable 20 milliGRAM(s) IV Push daily  insulin lispro (HumaLOG) corrective regimen sliding scale   SubCutaneous three times a day before meals  insulin lispro (HumaLOG) corrective regimen sliding scale   SubCutaneous at bedtime  losartan 25 milliGRAM(s) Oral daily  methylPREDNISolone sodium succinate Injectable 40 milliGRAM(s) IV Push every 8 hours  pantoprazole    Tablet 40 milliGRAM(s) Oral before breakfast  spironolactone 12.5 milliGRAM(s) Oral daily  sulfaSALAzine 500 milliGRAM(s) Oral three times a day  tiotropium 18 MICROgram(s) Capsule 1 Capsule(s) Inhalation daily  venlafaxine XR. 150 milliGRAM(s) Oral daily  verapamil  milliGRAM(s) Oral daily    MEDICATIONS  (PRN):  acetaminophen   Tablet .. 650 milliGRAM(s) Oral every 6 hours PRN Temp greater or equal to 38C (100.4F), Mild Pain (1 - 3)  artificial tears (preservative free) Ophthalmic Solution 1 Drop(s) Both EYES four times a day PRN Dry Eyes  dextrose 40% Gel 15 Gram(s) Oral once PRN Blood Glucose LESS THAN 70 milliGRAM(s)/deciliter  glucagon  Injectable 1 milliGRAM(s) IntraMuscular once PRN Glucose LESS THAN 70 milligrams/deciliter  oxyCODONE    5 mG/acetaminophen 325 mG 1 Tablet(s) Oral every 6 hours PRN Moderate Pain (4 - 6)  polyethylene glycol 3350 17 Gram(s) Oral daily PRN Constipation      Allergies    penicillins (Hives)  Plavix (Short breath)  quinolines (Other)            REVIEW OF SYSTEMS:    All other review of systems is negative unless indicated above    VITAL SIGNS:   Vital Signs Last 24 Hrs  T(C): 36.7 (2019 04:49), Max: 36.7 (2019 00:57)  T(F): 98.1 (2019 04:49), Max: 98.1 (2019 04:49)  HR: 110 (2019 07:33) (105 - 118)  BP: 100/67 (2019 04:49) (100/67 - 138/85)  BP(mean): --  RR: 18 (2019 04:49) (17 - 24)  SpO2: 97% (2019 04:49) (93% - 100%)    I&O's Summary    2019 07:01  -  2019 07:00  --------------------------------------------------------  IN: 670 mL / OUT: 150 mL / NET: 520 mL        On Exam:    Constitutional: NAD, alert and oriented x 3  Lungs:  Non-labored, breath sounds are clear bilaterally, No wheezing, rales or rhonchi.  Decreased bs b/l.  Cardiovascular: RRR.  S1 and S2 positive.  1/6 systolic murmur  Gastrointestinal: Bowel Sounds present, soft, nontender.   Lymph: No peripheral edema. No cervical lymphadenopathy.  Neurological: Alert, no focal deficits  Skin: No rashes or ulcers   Psych:  Mood & affect appropriate.    LABS: All Labs Reviewed:                        13.1   10.23 )-----------( 262      ( 2019 05:40 )             40.4                         13.1   13.35 )-----------( 262      ( 2019 20:44 )             39.8     2019 05:40    136    |  99     |  22     ----------------------------<  144    4.5     |  29     |  0.73   2019 20:44    133    |  98     |  27     ----------------------------<  173    4.0     |  25     |  0.68     Ca    8.2        2019 05:40  Ca    8.5        2019 20:44    TPro  7.7    /  Alb  4.0    /  TBili  0.3    /  DBili  x      /  AST  35     /  ALT  49     /  AlkPhos  107    2019 20:44    PT/INR - ( 2019 20:44 )   PT: 12.7 sec;   INR: 1.12 ratio           CARDIAC MARKERS ( 2019 05:40 )  2.440 ng/mL / x     / x     / x     / x      CARDIAC MARKERS ( 2019 01:25 )  1.340 ng/mL / x     / 226 U/L / x     / x      CARDIAC MARKERS ( 2019 20:44 )  .356 ng/mL / x     / 266 U/L / x     / 7.4 ng/mL      Blood Culture:    @ 20:44  Pro Bnp 297        RADIOLOGY:    EKG:  Sinus with a LBBB

## 2019-04-28 NOTE — PROVIDER CONTACT NOTE (CRITICAL VALUE NOTIFICATION) - BACKGROUND
Patient admitted for sob and elevated troponin level Patient admitted for sob and elevated troponin level  Patient received Aspirin 81mg chewable daily and Lovenox 40mg at [facility] noon

## 2019-04-28 NOTE — CHART NOTE - NSCHARTNOTEFT_GEN_A_CORE
Patient's cardiac biomarkers trending up.  Will treat as NTEMI.  Would like to load with Plavix 300 mg, however, patient claimed she has very bad reaction to it.  This allergy was clarified with her daughter, Blaire, who claimed patient has severe SOB when it was given to her in the past.  She vehemently refused Plavix for her mother.  I offered her Brilinta and she agreed.  Will load with 180 mg followed by 90 mg bid starting tomorrow.  Will monitor closely.    Crista Mann NP  Cardiology

## 2019-04-28 NOTE — PROGRESS NOTE ADULT - SUBJECTIVE AND OBJECTIVE BOX
CHIEF COMPLAINT/INTERVAL HISTORY:  Pt. seen and evaluated for acute respiratory distress.  Pt. reports feeling better today.  Less SOB.   Denies having any chest pain.  Tolerating IV antibiotics and steroids.      REVIEW OF SYSTEMS:  No fever, CP, or abdominal pain.     Vital Signs Last 24 Hrs  T(C): 36.8 (28 Apr 2019 12:12), Max: 36.8 (28 Apr 2019 12:12)  T(F): 98.2 (28 Apr 2019 12:12), Max: 98.2 (28 Apr 2019 12:12)  HR: 84 (28 Apr 2019 13:04) (84 - 118)  BP: 102/66 (28 Apr 2019 12:12) (100/65 - 138/85)  BP(mean): --  RR: 16 (28 Apr 2019 12:12) (16 - 24)  SpO2: 97% (28 Apr 2019 12:12) (93% - 100%)    PHYSICAL EXAM:  GENERAL: NAD  HEENT: EOMI, hearing normal, conjunctiva and sclera clear  Chest: CTA bilaterally, no wheezing  CV: S1S2, RRR,   GI: soft, +BS, NT/ND  Musculoskeletal: no edema  Psychiatric: anxious  Skin: warm and dry    LABS:                        13.1   10.23 )-----------( 262      ( 28 Apr 2019 05:40 )             40.4     04-28    136  |  99  |  22  ----------------------------<  144<H>  4.5   |  29  |  0.73    Ca    8.2<L>      28 Apr 2019 05:40    TPro  7.7  /  Alb  4.0  /  TBili  0.3  /  DBili  x   /  AST  35  /  ALT  49  /  AlkPhos  107  04-27    PT/INR - ( 27 Apr 2019 20:44 )   PT: 12.7 sec;   INR: 1.12 ratio        Assessment and Plan:  -Acute respiratory distress and acute COPD exacerbation:  change Solumedrol 40mg IV to Q12h.  Continue empiric antibiotics, Duoneb tx Q6h, spiriva, symbicort, and O2 support.  Check CTA chest, LE dopplers, and Echo.  Pulmonary consult.    -Elevated troponin:  Pt. is chest pain free.  Continue ASA 81mg PO daily.  check fasting lipid profile and echo.  Trending cardiac enzymes.   Cardiology f/u  -Chronic Diastolic CHF:  s/p IV lasix.  Resume lasix 40mg PO daily and continue aldactone 12.5mg PO daily  -HTN:  continue losartan 25mg PO daily and Verapamil SR 240mg PO daily  -RA:  Pt. on prednisone 5mg PO BID at home.  Continue IV steroids as above.   -Type 2 DM:  continue humalog sliding scale  -VTE ppx:  Lovenox 40mg sQ daily

## 2019-04-28 NOTE — PROVIDER CONTACT NOTE (CRITICAL VALUE NOTIFICATION) - ASSESSMENT
Pt asymptomatic, denies sob, denies chest pain/discomfort, resting in bed comfortably, no complaints voiced.
pt stable, vss, denies chest pain/discomfort, no distress noted, pt asymptomatic, resting in bed comfortably.
Vitals stable and patient denies any chest pain, sob, on oxygen therapy at 3L/min, No acute distress noted and patient verbalizes no complaints.

## 2019-04-28 NOTE — CONSULT NOTE ADULT - SUBJECTIVE AND OBJECTIVE BOX
Date/Time Patient Seen:  		  Referring MD:   Data Reviewed	       Patient is a 78y old  Female who presents with a chief complaint of dyspnea (2019 13:13)      Subjective/HPI  seen and examined  vs and meds reviewed  labs reviewed  H and P reviewed  ER provider note reviewed  pt has established COPD dx and CHF - follows with Pulm Dr. Tapia - in [place]     Consult Note Adult-Cardiology Attending [Charted Location: [facility] [place] [facility] D1] [Authored: 2019 08:59]- for Visit: [id number], Complete, Revised, Signed in Full, General    Consult Note:   · Provider Specialty	Cardiology    Referral/Consultation:    Initial Consult:  · Requested by Name:	Dr. Sawyer  · Date/Time:	2019 09:00  · Reason for Referral/Consultation:	Dyspnea, positive troponin  · Reason for Admission	dyspnea      · Subjective and Objective:   [facility] - Tawny García Grossman, [place], Pannella, Zully Ravi  Office Number: [phone]    Initial Consult Note    CHIEF COMPLAINT: Patient is a 78y old  Female who presents with a chief complaint of dyspnea (2019 23:30)      HPI:  79 y/o female with a history of COPD on nocturnal oxygen, bio AVR, mobile echodensity on aortomitral curtain that is of unclear significant and being followed conservatively, HTN, HLD, DM, RA, LBBB,  anxiety, was in her USOH until this afternoon, states that her dog ran out of the house.  She tried to go after the dog, but she really cannot move fast because of her arthritis and COPD, so as she was going after the dog (just walking), she got so dyspneic and exhausted.   She did not even get too far and she just lied down on the grass because of severe dyspnea and was also wheezing.  She denies chest pain, cough, fever, chills.  She states she has chronic back and hip pains.  She is quite limited with activities.  EMS was called and she was brought to the ED on 100% NRBM.  As per ED MD, pt was still in resp distress, tachypneic, even after neb tx and Solumedrol, so pt was placed on BiPAP.  Cxray with bilat increased markings, right effusion vs infiltrate.  Pt received Aztreonam and Zithromax in the ED.  Her troponin was mildly elevated, and is trending up.    Pt also received a dose of Lasix 20 mg IVP in the ED.  Pt was taken off BiPAP after a few hours, and placed on 3 LPM via NC, and pt was feeling better.  O2 sat 98%.  EKG Sinus tach with LBBB 112/min (2019 23:30)    She denies chest pain.  She is feeling better from a respiratory standpoint.  She is scheduled for a TTE and CT PA.       PAST MEDICAL & SURGICAL HISTORY:  Alcoholism: last drink   Skin cancer: not melanoma  Aortic valve replaced: with bovine heart valve  Meniere disease  Diabetes mellitus  Glaucoma  Dry eyes  GERD (gastroesophageal reflux disease)  Emphysema lung  Spinal stenosis  CHF (congestive heart failure)  LBBB (left bundle branch block)  TMJ (temporomandibular joint disorder)  Diverticulitis: hospitalized   SIMÓN (obstructive sleep apnea): category I severe pt does not use c/pap  Thrush: treated x 4 days  1 month ago  restarted medication  3-19-14 clortramazole 5x day  Anxiety  RA (rheumatoid arthritis): diagnosed   oxycodone prn  neck/ lower back  Depression  Borderline diabetes: no meds  COPD (chronic obstructive pulmonary disease): diagnosed   inhaler and nebulizer  HTN (hypertension)  PVD (peripheral vascular disease): left iliac  s/p surgical intervention   unsuccessful  Hiatal hernia: s/p surgical repair   Breast cancer: right s/p lumpectomy and radiation   TIA (transient ischemic attack): 2010  Hyperlipidemia  Valvular heart disease: aortic and mitral  H/O:  section: 2x  H/O sinus surgery  S/P AVR (aortic valve replacement): about 5 yrs ago (?)  PVD (peripheral vascular disease): s/p left iliac bypass which was unsuccessful - open repair  S/P carpal tunnel release: right   S/P lumpectomy, right breast:   Hiatal hernia: s/p surgical repair per pt   S/P hernia surgery: left inguinal age 12  S/P rotator cuff surgery: left   S/P appendectomy:   S/P  section: x2 /         Medication list         MEDICATIONS  (STANDING):  ALBUTerol/ipratropium for Nebulization 3 milliLiter(s) Nebulizer every 6 hours  ARIPiprazole 2 milliGRAM(s) Oral at bedtime  aspirin  chewable 81 milliGRAM(s) Oral daily  azithromycin   Tablet 500 milliGRAM(s) Oral daily  aztreonam  IVPB 1000 milliGRAM(s) IV Intermittent every 12 hours  buDESOnide 160 MICROgram(s)/formoterol 4.5 MICROgram(s) Inhaler 2 Puff(s) Inhalation two times a day  clonazePAM Tablet 0.25 milliGRAM(s) Oral two times a day  dextrose 5%. 1000 milliLiter(s) (50 mL/Hr) IV Continuous <Continuous>  dextrose 50% Injectable 12.5 Gram(s) IV Push once  dextrose 50% Injectable 25 Gram(s) IV Push once  dextrose 50% Injectable 25 Gram(s) IV Push once  docusate sodium 100 milliGRAM(s) Oral two times a day  enoxaparin Injectable 40 milliGRAM(s) SubCutaneous daily  insulin lispro (HumaLOG) corrective regimen sliding scale   SubCutaneous three times a day before meals  insulin lispro (HumaLOG) corrective regimen sliding scale   SubCutaneous at bedtime  lactobacillus acidophilus 1 Tablet(s) Oral two times a day with meals  losartan 25 milliGRAM(s) Oral daily  methylPREDNISolone sodium succinate Injectable 40 milliGRAM(s) IV Push every 12 hours  pantoprazole    Tablet 40 milliGRAM(s) Oral before breakfast  spironolactone 12.5 milliGRAM(s) Oral daily  sulfaSALAzine 500 milliGRAM(s) Oral three times a day  tiotropium 18 MICROgram(s) Capsule 1 Capsule(s) Inhalation daily  venlafaxine XR. 150 milliGRAM(s) Oral daily  verapamil  milliGRAM(s) Oral daily    MEDICATIONS  (PRN):  acetaminophen   Tablet .. 650 milliGRAM(s) Oral every 6 hours PRN Temp greater or equal to 38C (100.4F), Mild Pain (1 - 3)  artificial tears (preservative free) Ophthalmic Solution 1 Drop(s) Both EYES four times a day PRN Dry Eyes  dextrose 40% Gel 15 Gram(s) Oral once PRN Blood Glucose LESS THAN 70 milliGRAM(s)/deciliter  glucagon  Injectable 1 milliGRAM(s) IntraMuscular once PRN Glucose LESS THAN 70 milligrams/deciliter  oxyCODONE    5 mG/acetaminophen 325 mG 2 Tablet(s) Oral every 6 hours PRN Severe Pain (7 - 10)  oxyCODONE    5 mG/acetaminophen 325 mG 1 Tablet(s) Oral every 6 hours PRN Moderate Pain (4 - 6)  polyethylene glycol 3350 17 Gram(s) Oral daily PRN Constipation         Vitals log        ICU Vital Signs Last 24 Hrs  T(C): 36.8 (2019 12:12), Max: 36.8 (2019 12:12)  T(F): 98.2 (2019 12:12), Max: 98.2 (2019 12:12)  HR: 84 (2019 13:04) (84 - 118)  BP: 102/66 (2019 12:12) (100/65 - 138/85)  BP(mean): --  ABP: --  ABP(mean): --  RR: 16 (2019 12:12) (16 - 24)  SpO2: 97% (2019 12:12) (93% - 100%)           Input and Output:  I&O's Detail    2019 07:01  -  2019 07:00  --------------------------------------------------------  IN:    Oral Fluid: 120 mL    sodium chloride 0.9%: 500 mL    Solution: 50 mL  Total IN: 670 mL    OUT:    Voided: 150 mL  Total OUT: 150 mL    Total NET: 520 mL          Lab Data                        13.1   10.23 )-----------( 262      ( 2019 05:40 )             40.4         136  |  99  |  22  ----------------------------<  144<H>  4.5   |  29  |  0.73    Ca    8.2<L>      2019 05:40    TPro  7.7  /  Alb  4.0  /  TBili  0.3  /  DBili  x   /  AST  35  /  ALT  49  /  AlkPhos  107  -27    ABG - ( 2019 20:37 )  pH, Arterial: 7.41  pH, Blood: x     /  pCO2: 38    /  pO2: 162   / HCO3: 24    / Base Excess: -0.7  /  SaO2: 98                CARDIAC MARKERS ( 2019 05:40 )  2.440 ng/mL / x     / x     / x     / x      CARDIAC MARKERS ( 2019 01:25 )  1.340 ng/mL / x     / 226 U/L / x     / x      CARDIAC MARKERS ( 2019 20:44 )  .356 ng/mL / x     / 266 U/L / x     / 7.4 ng/mL        Review of Systems	      Objective     Physical Examination    heart s1s2  lung dec BS  abd soft      Pertinent Lab findings & Imaging      Miranda:  NO   Adequate UO     I&O's Detail    2019 07:01  -  2019 07:00  --------------------------------------------------------  IN:    Oral Fluid: 120 mL    sodium chloride 0.9%: 500 mL    Solution: 50 mL  Total IN: 670 mL    OUT:    Voided: 150 mL  Total OUT: 150 mL    Total NET: 520 mL               Discussed with:     Cultures:	        Radiology          EXAM:  XR CHEST PORTABLE IMMED 1V                            PROCEDURE DATE:  2019          INTERPRETATION:  AP semierect chest on 2019 at 8:53 PM. Patient   is short of breath.    Heart is magnified by technique.    Sternotomy, prosthetic heart valve, and spinal device coming from below   ending in the mid thoracic area again noted.    On 2018 there were significant congestive like findings in   the mid lower lung fields right greater than left.    Present film shows a slight interstitial prominence which is roughly   symmetric in the mid lower lung fields. There may be a slight state of   CHF.    IMPRESSION: As above.                OUMAR BRADLEY M.D., ATTENDING RADIOLOGIST  This document has been electronically signed. 2019 11:03AM

## 2019-04-28 NOTE — PROVIDER CONTACT NOTE (CRITICAL VALUE NOTIFICATION) - SITUATION
lactate 2.6 Troponin 0.356
critical lab called in by DEIRDRE Rice, pt troponin is 1.340 and lactate 3.3
critical lab value called in by Joy, lactate is 2.4 and troponin 2.44
Elevated Troponin 4.530- Lovenox 25mg Subcutaneous stat, Brilinta 180mg oral stat

## 2019-04-28 NOTE — PROVIDER CONTACT NOTE (CRITICAL VALUE NOTIFICATION) - ACTION/TREATMENT ORDERED:
Elevated Troponin 4.530- Lovenox 25mg Subcutaneous stat, Brilinta 180mg oral stat Lovenox 25mg Subcutaneous stat, Brilinta 180mg oral stat

## 2019-04-28 NOTE — PROVIDER CONTACT NOTE (CRITICAL VALUE NOTIFICATION) - ACTION/TREATMENT ORDERED:
MD made aware, no new orders at this time. Will continue to monitor. MD made aware, NS 500cc at 100cc/hr x1 ordered and repeat lactate at 0600. Will continue to monitor.

## 2019-04-29 ENCOUNTER — TRANSCRIPTION ENCOUNTER (OUTPATIENT)
Age: 79
End: 2019-04-29

## 2019-04-29 DIAGNOSIS — I10 ESSENTIAL (PRIMARY) HYPERTENSION: ICD-10-CM

## 2019-04-29 DIAGNOSIS — R06.03 ACUTE RESPIRATORY DISTRESS: ICD-10-CM

## 2019-04-29 DIAGNOSIS — I50.32 CHRONIC DIASTOLIC (CONGESTIVE) HEART FAILURE: ICD-10-CM

## 2019-04-29 DIAGNOSIS — Z29.9 ENCOUNTER FOR PROPHYLACTIC MEASURES, UNSPECIFIED: ICD-10-CM

## 2019-04-29 DIAGNOSIS — M06.9 RHEUMATOID ARTHRITIS, UNSPECIFIED: ICD-10-CM

## 2019-04-29 DIAGNOSIS — E11.9 TYPE 2 DIABETES MELLITUS WITHOUT COMPLICATIONS: ICD-10-CM

## 2019-04-29 LAB
ANION GAP SERPL CALC-SCNC: 5 MMOL/L — SIGNIFICANT CHANGE UP (ref 5–17)
BUN SERPL-MCNC: 17 MG/DL — SIGNIFICANT CHANGE UP (ref 7–23)
CALCIUM SERPL-MCNC: 8.1 MG/DL — LOW (ref 8.5–10.1)
CHLORIDE SERPL-SCNC: 98 MMOL/L — SIGNIFICANT CHANGE UP (ref 96–108)
CHOLEST SERPL-MCNC: 138 MG/DL — SIGNIFICANT CHANGE UP (ref 10–199)
CK MB BLD-MCNC: 6.6 % — HIGH (ref 0–3.5)
CK MB CFR SERPL CALC: 14.5 NG/ML — HIGH (ref 0–3.6)
CK SERPL-CCNC: 221 U/L — HIGH (ref 26–192)
CO2 SERPL-SCNC: 32 MMOL/L — HIGH (ref 22–31)
CREAT SERPL-MCNC: 0.61 MG/DL — SIGNIFICANT CHANGE UP (ref 0.5–1.3)
CRP SERPL-MCNC: 0.19 MG/DL — SIGNIFICANT CHANGE UP (ref 0–0.4)
GLUCOSE SERPL-MCNC: 150 MG/DL — HIGH (ref 70–99)
HCT VFR BLD CALC: 38.1 % — SIGNIFICANT CHANGE UP (ref 34.5–45)
HDLC SERPL-MCNC: 44 MG/DL — LOW
HGB BLD-MCNC: 12.4 G/DL — SIGNIFICANT CHANGE UP (ref 11.5–15.5)
LIPID PNL WITH DIRECT LDL SERPL: 69 MG/DL — SIGNIFICANT CHANGE UP
MAGNESIUM SERPL-MCNC: 2.4 MG/DL — SIGNIFICANT CHANGE UP (ref 1.6–2.6)
MCHC RBC-ENTMCNC: 31.5 PG — SIGNIFICANT CHANGE UP (ref 27–34)
MCHC RBC-ENTMCNC: 32.5 GM/DL — SIGNIFICANT CHANGE UP (ref 32–36)
MCV RBC AUTO: 96.7 FL — SIGNIFICANT CHANGE UP (ref 80–100)
NRBC # BLD: 0 /100 WBCS — SIGNIFICANT CHANGE UP (ref 0–0)
PLATELET # BLD AUTO: 235 K/UL — SIGNIFICANT CHANGE UP (ref 150–400)
POTASSIUM SERPL-MCNC: 3.7 MMOL/L — SIGNIFICANT CHANGE UP (ref 3.5–5.3)
POTASSIUM SERPL-SCNC: 3.7 MMOL/L — SIGNIFICANT CHANGE UP (ref 3.5–5.3)
RBC # BLD: 3.94 M/UL — SIGNIFICANT CHANGE UP (ref 3.8–5.2)
RBC # FLD: 14.3 % — SIGNIFICANT CHANGE UP (ref 10.3–14.5)
SODIUM SERPL-SCNC: 135 MMOL/L — SIGNIFICANT CHANGE UP (ref 135–145)
TOTAL CHOLESTEROL/HDL RATIO MEASUREMENT: 3.1 RATIO — LOW (ref 3.3–7.1)
TRIGL SERPL-MCNC: 126 MG/DL — SIGNIFICANT CHANGE UP (ref 10–149)
TROPONIN I SERPL-MCNC: 3.31 NG/ML — HIGH (ref 0.01–0.04)
WBC # BLD: 16.09 K/UL — HIGH (ref 3.8–10.5)
WBC # FLD AUTO: 16.09 K/UL — HIGH (ref 3.8–10.5)

## 2019-04-29 PROCEDURE — 99232 SBSQ HOSP IP/OBS MODERATE 35: CPT

## 2019-04-29 PROCEDURE — 99232 SBSQ HOSP IP/OBS MODERATE 35: CPT | Mod: GC

## 2019-04-29 RX ORDER — LORATADINE 10 MG/1
10 TABLET ORAL DAILY
Qty: 0 | Refills: 0 | Status: DISCONTINUED | OUTPATIENT
Start: 2019-04-29 | End: 2019-05-01

## 2019-04-29 RX ORDER — POTASSIUM CHLORIDE 20 MEQ
40 PACKET (EA) ORAL ONCE
Qty: 0 | Refills: 0 | Status: COMPLETED | OUTPATIENT
Start: 2019-04-29 | End: 2019-04-29

## 2019-04-29 RX ADMIN — Medication 0.25 MILLIGRAM(S): at 05:12

## 2019-04-29 RX ADMIN — Medication 3 MILLILITER(S): at 21:05

## 2019-04-29 RX ADMIN — Medication 40 MILLIGRAM(S): at 05:11

## 2019-04-29 RX ADMIN — Medication 500 MILLIGRAM(S): at 13:05

## 2019-04-29 RX ADMIN — Medication 3 MILLILITER(S): at 07:33

## 2019-04-29 RX ADMIN — ATORVASTATIN CALCIUM 80 MILLIGRAM(S): 80 TABLET, FILM COATED ORAL at 21:54

## 2019-04-29 RX ADMIN — Medication 150 MILLIGRAM(S): at 13:05

## 2019-04-29 RX ADMIN — Medication 3 MILLILITER(S): at 13:18

## 2019-04-29 RX ADMIN — Medication 100 MILLIGRAM(S): at 17:19

## 2019-04-29 RX ADMIN — OXYCODONE AND ACETAMINOPHEN 1 TABLET(S): 5; 325 TABLET ORAL at 19:30

## 2019-04-29 RX ADMIN — POLYETHYLENE GLYCOL 3350 17 GRAM(S): 17 POWDER, FOR SOLUTION ORAL at 17:21

## 2019-04-29 RX ADMIN — Medication 1 TABLET(S): at 17:19

## 2019-04-29 RX ADMIN — Medication 0.25 MILLIGRAM(S): at 17:19

## 2019-04-29 RX ADMIN — PANTOPRAZOLE SODIUM 40 MILLIGRAM(S): 20 TABLET, DELAYED RELEASE ORAL at 05:11

## 2019-04-29 RX ADMIN — OXYCODONE AND ACETAMINOPHEN 1 TABLET(S): 5; 325 TABLET ORAL at 18:28

## 2019-04-29 RX ADMIN — ENOXAPARIN SODIUM 65 MILLIGRAM(S): 100 INJECTION SUBCUTANEOUS at 05:14

## 2019-04-29 RX ADMIN — Medication 1 TABLET(S): at 10:00

## 2019-04-29 RX ADMIN — LOSARTAN POTASSIUM 25 MILLIGRAM(S): 100 TABLET, FILM COATED ORAL at 05:11

## 2019-04-29 RX ADMIN — BUDESONIDE AND FORMOTEROL FUMARATE DIHYDRATE 2 PUFF(S): 160; 4.5 AEROSOL RESPIRATORY (INHALATION) at 18:37

## 2019-04-29 RX ADMIN — ENOXAPARIN SODIUM 65 MILLIGRAM(S): 100 INJECTION SUBCUTANEOUS at 17:19

## 2019-04-29 RX ADMIN — SPIRONOLACTONE 12.5 MILLIGRAM(S): 25 TABLET, FILM COATED ORAL at 05:11

## 2019-04-29 RX ADMIN — OXYCODONE AND ACETAMINOPHEN 1 TABLET(S): 5; 325 TABLET ORAL at 14:00

## 2019-04-29 RX ADMIN — Medication 240 MILLIGRAM(S): at 05:11

## 2019-04-29 RX ADMIN — Medication 100 MILLIGRAM(S): at 05:12

## 2019-04-29 RX ADMIN — Medication 40 MILLIEQUIVALENT(S): at 10:00

## 2019-04-29 RX ADMIN — Medication 500 MILLIGRAM(S): at 05:11

## 2019-04-29 RX ADMIN — BUDESONIDE AND FORMOTEROL FUMARATE DIHYDRATE 2 PUFF(S): 160; 4.5 AEROSOL RESPIRATORY (INHALATION) at 05:46

## 2019-04-29 RX ADMIN — Medication 81 MILLIGRAM(S): at 13:05

## 2019-04-29 RX ADMIN — LORATADINE 10 MILLIGRAM(S): 10 TABLET ORAL at 13:05

## 2019-04-29 RX ADMIN — TIOTROPIUM BROMIDE 1 CAPSULE(S): 18 CAPSULE ORAL; RESPIRATORY (INHALATION) at 13:05

## 2019-04-29 RX ADMIN — OXYCODONE AND ACETAMINOPHEN 1 TABLET(S): 5; 325 TABLET ORAL at 13:13

## 2019-04-29 RX ADMIN — ARIPIPRAZOLE 2 MILLIGRAM(S): 15 TABLET ORAL at 21:54

## 2019-04-29 NOTE — PROGRESS NOTE ADULT - SUBJECTIVE AND OBJECTIVE BOX
HPI:  77 y/o female with ASHD, HLD, DM, RA, COPD on nocturnal home O2 2-3 LPM via NC, anxiety, was in her USOH until this afternoon, states that her dog ran out of the house and developed episode of dyspnea with wheezing.     INTERVAL/OVERNIGHT EVENTS: Overnight patient endorsed episodes of dyspnea with wheezing after taking brilinta. At time of interview, patient denied chest pain/palpitations, shortness of breath, wheezing, cough, nausea/vomiting, diaphoresis.     REVIEW OF SYSTEMS:    CONSTITUTIONAL: No weakness, fevers or chills  RESPIRATORY: No cough, wheezing, hemoptysis; No shortness of breath  CARDIOVASCULAR: No chest pain or palpitations  GASTROINTESTINAL: No abdominal pain, nausea, vomiting, or hematemesis; No diarrhea or constipation. No melena or hematochezia.  GENITOURINARY: No dysuria, frequency or hematuria  NEUROLOGICAL: No dizziness, numbness, or weakness  SKIN: No itching, burning, rashes, or lesions   All other review of systems is negative unless indicated above.    T(C): 36.4 (04-29-19 @ 07:23), Max: 37.2 (04-28-19 @ 21:10)  HR: 100 (04-29-19 @ 07:36) (82 - 110)  BP: 107/69 (04-29-19 @ 07:23) (95/65 - 121/71)  RR: 19 (04-29-19 @ 07:23) (18 - 20)  SpO2: 99% (04-29-19 @ 07:36) (95% - 99%)    PHYSICAL EXAM:     GENERAL: no acute distress, on nasal cannula oxygen  HEENT: NC/AT, EOMI  RESPIRATORY: Decreased breath sounds in bases bilaterally with mild wheezing  CARDIOVASCULAR: RRR, no murmurs, gallops, rubs  ABDOMINAL: soft, non-tender, non-distended, positive bowel sounds   EXTREMITIES: no clubbing, cyanosis, or edema  NEUROLOGICAL: alert and oriented x 3, non-focal  SKIN: no rashes or lesions                           12.4   16.09 )-----------( 235      ( 29 Apr 2019 07:44 )             38.1     04-29    135  |  98  |  17  ----------------------------<  150<H>  3.7   |  32<H>  |  0.61    Ca    8.1<L>      29 Apr 2019 08:08  Mg     2.4     04-29    TPro  7.7  /  Alb  4.0  /  TBili  0.3  /  DBili  x   /  AST  35  /  ALT  49  /  AlkPhos  107  04-27      CAPILLARY BLOOD GLUCOSE      POCT Blood Glucose.: 131 mg/dL (29 Apr 2019 12:23)  POCT Blood Glucose.: 138 mg/dL (29 Apr 2019 07:51)  POCT Blood Glucose.: 156 mg/dL (28 Apr 2019 21:23)  POCT Blood Glucose.: 107 mg/dL (28 Apr 2019 17:03)      MEDICATIONS  (STANDING):  ALBUTerol/ipratropium for Nebulization 3 milliLiter(s) Nebulizer every 6 hours  ARIPiprazole 2 milliGRAM(s) Oral at bedtime  aspirin  chewable 81 milliGRAM(s) Oral daily  atorvastatin 80 milliGRAM(s) Oral at bedtime  buDESOnide 160 MICROgram(s)/formoterol 4.5 MICROgram(s) Inhaler 2 Puff(s) Inhalation two times a day  clonazePAM Tablet 0.25 milliGRAM(s) Oral two times a day  dextrose 5%. 1000 milliLiter(s) (50 mL/Hr) IV Continuous <Continuous>  dextrose 50% Injectable 12.5 Gram(s) IV Push once  dextrose 50% Injectable 25 Gram(s) IV Push once  dextrose 50% Injectable 25 Gram(s) IV Push once  docusate sodium 100 milliGRAM(s) Oral two times a day  enoxaparin Injectable 65 milliGRAM(s) SubCutaneous every 12 hours  furosemide    Tablet 40 milliGRAM(s) Oral daily  insulin lispro (HumaLOG) corrective regimen sliding scale   SubCutaneous three times a day before meals  insulin lispro (HumaLOG) corrective regimen sliding scale   SubCutaneous at bedtime  lactobacillus acidophilus 1 Tablet(s) Oral two times a day with meals  loratadine 10 milliGRAM(s) Oral daily  losartan 25 milliGRAM(s) Oral daily  pantoprazole    Tablet 40 milliGRAM(s) Oral before breakfast  predniSONE   Tablet 20 milliGRAM(s) Oral daily  spironolactone 12.5 milliGRAM(s) Oral daily  sulfaSALAzine 500 milliGRAM(s) Oral three times a day  tiotropium 18 MICROgram(s) Capsule 1 Capsule(s) Inhalation daily  venlafaxine XR. 150 milliGRAM(s) Oral daily  verapamil  milliGRAM(s) Oral daily

## 2019-04-29 NOTE — PROGRESS NOTE ADULT - PROBLEM SELECTOR PLAN 6
- Patient on full dose lovenox  IMPROVE VTE Individual Risk Assessment          RISK                                                          Points  [  ] Previous VTE                                                3  [  ] Thrombophilia                                             2  [  ] Lower limb paralysis                                   2        (unable to hold up >15 seconds)    [  ] Current Cancer                                            2         (within 6 months)  [  ] Immobilization > 24 hrs                              1  [  ] ICU/CCU stay > 24 hours                            1  [ x ] Age > 60                                                    1  IMPROVE VTE Score __1____

## 2019-04-29 NOTE — PROGRESS NOTE ADULT - SUBJECTIVE AND OBJECTIVE BOX
Date/Time Patient Seen:  		  Referring MD:   Data Reviewed	       Patient is a 78y old  Female who presents with a chief complaint of dyspnea (2019 14:45)      Subjective/HPI     PAST MEDICAL & SURGICAL HISTORY:  Alcoholism: last drink   Skin cancer: not melanoma  Aortic valve replaced: with bovine heart valve  Meniere disease  Diabetes mellitus  Glaucoma  Dry eyes  GERD (gastroesophageal reflux disease)  Emphysema lung  Spinal stenosis  CHF (congestive heart failure)  LBBB (left bundle branch block)  TMJ (temporomandibular joint disorder)  Diverticulitis: hospitalized   SIMÓN (obstructive sleep apnea): category I severe pt does not use c/pap  Thrush: treated x 4 days  1 month ago  restarted medication  3-19-14 clortramazole 5x day  Anxiety  RA (rheumatoid arthritis): diagnosed   oxycodone prn  neck/ lower back  Depression  Borderline diabetes: no meds  COPD (chronic obstructive pulmonary disease): diagnosed   inhaler and nebulizer  HTN (hypertension)  PVD (peripheral vascular disease): left iliac  s/p surgical intervention   unsuccessful  Hiatal hernia: s/p surgical repair   Breast cancer: right s/p lumpectomy and radiation 2008  TIA (transient ischemic attack): 2010  Hyperlipidemia  Valvular heart disease: aortic and mitral  H/O:  section: 2x  H/O sinus surgery  S/P AVR (aortic valve replacement): about 5 yrs ago (2013?)  PVD (peripheral vascular disease): s/p left iliac bypass which was unsuccessful - open repair  S/P carpal tunnel release: right 2002  S/P lumpectomy, right breast: 2008  Hiatal hernia: s/p surgical repair per pt   S/P hernia surgery: left inguinal age 12  S/P rotator cuff surgery: left   S/P appendectomy:   S/P  section: x2 /         Medication list         MEDICATIONS  (STANDING):  ALBUTerol/ipratropium for Nebulization 3 milliLiter(s) Nebulizer every 6 hours  ARIPiprazole 2 milliGRAM(s) Oral at bedtime  aspirin  chewable 81 milliGRAM(s) Oral daily  atorvastatin 80 milliGRAM(s) Oral at bedtime  buDESOnide 160 MICROgram(s)/formoterol 4.5 MICROgram(s) Inhaler 2 Puff(s) Inhalation two times a day  clonazePAM Tablet 0.25 milliGRAM(s) Oral two times a day  dextrose 5%. 1000 milliLiter(s) (50 mL/Hr) IV Continuous <Continuous>  dextrose 50% Injectable 12.5 Gram(s) IV Push once  dextrose 50% Injectable 25 Gram(s) IV Push once  dextrose 50% Injectable 25 Gram(s) IV Push once  docusate sodium 100 milliGRAM(s) Oral two times a day  enoxaparin Injectable 65 milliGRAM(s) SubCutaneous every 12 hours  furosemide    Tablet 40 milliGRAM(s) Oral daily  insulin lispro (HumaLOG) corrective regimen sliding scale   SubCutaneous three times a day before meals  insulin lispro (HumaLOG) corrective regimen sliding scale   SubCutaneous at bedtime  lactobacillus acidophilus 1 Tablet(s) Oral two times a day with meals  losartan 25 milliGRAM(s) Oral daily  pantoprazole    Tablet 40 milliGRAM(s) Oral before breakfast  predniSONE   Tablet 20 milliGRAM(s) Oral daily  spironolactone 12.5 milliGRAM(s) Oral daily  sulfaSALAzine 500 milliGRAM(s) Oral three times a day  ticagrelor 90 milliGRAM(s) Oral two times a day  tiotropium 18 MICROgram(s) Capsule 1 Capsule(s) Inhalation daily  venlafaxine XR. 150 milliGRAM(s) Oral daily  verapamil  milliGRAM(s) Oral daily    MEDICATIONS  (PRN):  acetaminophen   Tablet .. 650 milliGRAM(s) Oral every 6 hours PRN Temp greater or equal to 38C (100.4F), Mild Pain (1 - 3)  artificial tears (preservative free) Ophthalmic Solution 1 Drop(s) Both EYES four times a day PRN Dry Eyes  dextrose 40% Gel 15 Gram(s) Oral once PRN Blood Glucose LESS THAN 70 milliGRAM(s)/deciliter  glucagon  Injectable 1 milliGRAM(s) IntraMuscular once PRN Glucose LESS THAN 70 milligrams/deciliter  oxyCODONE    5 mG/acetaminophen 325 mG 2 Tablet(s) Oral every 6 hours PRN Severe Pain (7 - 10)  oxyCODONE    5 mG/acetaminophen 325 mG 1 Tablet(s) Oral every 6 hours PRN Moderate Pain (4 - 6)  polyethylene glycol 3350 17 Gram(s) Oral daily PRN Constipation         Vitals log        ICU Vital Signs Last 24 Hrs  T(C): 36.7 (2019 04:48), Max: 37.2 (2019 21:10)  T(F): 98.1 (2019 04:48), Max: 98.9 (2019 21:10)  HR: 106 (2019 04:48) (82 - 114)  BP: 121/71 (2019 04:48) (95/65 - 121/71)  BP(mean): --  ABP: --  ABP(mean): --  RR: 20 (2019 04:48) (16 - 20)  SpO2: 99% (2019 04:48) (95% - 99%)           Input and Output:  I&O's Detail    2019 07:01  -  2019 07:00  --------------------------------------------------------  IN:    Oral Fluid: 240 mL  Total IN: 240 mL    OUT:    Voided: 450 mL  Total OUT: 450 mL    Total NET: -210 mL          Lab Data                        13.1   10 )-----------( 262      ( 2019 05:40 )             40.4     04-28    136  |  99  |  22  ----------------------------<  144<H>  4.5   |  29  |  0.73    Ca    8.2<L>      2019 05:40    TPro  7.7  /  Alb  4.0  /  TBili  0.3  /  DBili  x   /  AST  35  /  ALT  49  /  AlkPhos  107  04-27    ABG - ( 2019 20:37 )  pH, Arterial: 7.41  pH, Blood: x     /  pCO2: 38    /  pO2: 162   / HCO3: 24    / Base Excess: -0.7  /  SaO2: 98                CARDIAC MARKERS ( 2019 19:30 )  4.690 ng/mL / x     / 333 U/L / x     / 19.6 ng/mL  CARDIAC MARKERS ( 2019 13:33 )  4.530 ng/mL / x     / 339 U/L / x     / 22.1 ng/mL  CARDIAC MARKERS ( 2019 05:40 )  2.440 ng/mL / x     / x     / x     / x      CARDIAC MARKERS ( 2019 01:25 )  1.340 ng/mL / x     / 226 U/L / x     / x      CARDIAC MARKERS ( 2019 20:44 )  .356 ng/mL / x     / 266 U/L / x     / 7.4 ng/mL        Review of Systems	      Objective     Physical Examination    on o2 support  head at  heart s1s2  lung dec BS  abd soft  cn grossly int      Pertinent Lab findings & Imaging      Ruben:  NO   Adequate UO     I&O's Detail    2019 07:01  -  2019 07:00  --------------------------------------------------------  IN:    Oral Fluid: 240 mL  Total IN: 240 mL    OUT:    Voided: 450 mL  Total OUT: 450 mL    Total NET: -210 mL               Discussed with:     Cultures:	        Radiology

## 2019-04-29 NOTE — PROVIDER CONTACT NOTE (OTHER) - ASSESSMENT
Pt is AxOx4, stable and anxious at times. No acute distress noted, denies chest pain/discomfort. Pt stated she will not take Brilinta. Risks and benefits explained but pt still refused.

## 2019-04-29 NOTE — PROVIDER CONTACT NOTE (OTHER) - SITUATION
Pt refusing to take Brilinta stating "it has side effects makes me short of breath just like plavix had in the past".

## 2019-04-29 NOTE — PROGRESS NOTE ADULT - ASSESSMENT
79 y/o female with a history of COPD on nocturnal oxygen, bio AVR, mobile echodensity on aortomitral curtain that is of unclear significant and being followed conservatively, HTN, HLD, DM, RA, LBBB,  anxiety, admitted with increased dyspnea and respiratory distress.  Troponin mildly positive, uptrending, flat CK mostly consistent with demand ischemia:    - Cardiac enzymes trended up and peaked.  Continue to monitor until they downtrend  - No arrhythmias on tele.  EKG with LBBB  - No anginal symptoms.  Continue to monitor  - She is being medically managed for a possible NSTEMI, though, it could be a demand ischemia.  - She was loaded with ASA and Brilinta yesterday (She claimed to be have had a bad respiratory reaction from Plavix in the past). However, overnight, she claimed to have had the same reaction with Brilinta and asked to have it discontinued, stating, she will never take it again.  - Continue ASA and statin  - Will hold ARB for now and start low dose BB for rate control, though, tachycardia maybe reactive  - We will need to address the possibility of an ischemic eval when her acute issues are resolved.  If not inpatient, could be done as outpatient.    - No evidence of volume overload (pro-BHY=189).  s/p IV Lasix.  Continue PO Lasix  - For repeat echocardiogram.   She has a mobile echodensity associated with the aortomitral curtain that is known and followed conservatively .    - Hold Aldactone for now.  Borderline hypotensive and she appears on the dry side  - Continue verapamil at current dose  - Monitor and replete lytes    - CTA chest negative of PE, possible pulmonary edema, small B/L effusions  - Follow Pulm recs  - DVT ppx  - Further cardiac workup pending clinical course  - Other workup per Primary  - To follow    Crista Mann NP  Cardiology

## 2019-04-29 NOTE — DISCHARGE NOTE PROVIDER - NSDCFUADDAPPT_GEN_ALL_CORE_FT
pt will follow up with her PMD pulmonary - Dr. Tapia in [place]  pt will need repeat CT chest with her Pmd Pulmonary   will need eval for ILD related to CTD (RA) with her outpatient doctors pt will follow up with her PMD pulmonary - Dr. Tapia in [place]  pt will need repeat CT chest with her Pmd Pulmonary   will need eval for ILD related to CTD (RA) with her outpatient doctors. [facility] cardio  dr herrera with in 1wk .

## 2019-04-29 NOTE — DISCHARGE NOTE PROVIDER - CARE PROVIDER_API CALL
LAKSHMI Tapia ()  Pulmonary Disease; Sleep Medicine  [address] [place] [address]  Phone: [phone]  Fax: [phone]  Follow Up Time:     Presley Kunz)  Internal Medicine  [address] [place] [address]  Phone: [phone]  Fax: [phone]  Follow Up Time:

## 2019-04-29 NOTE — DISCHARGE NOTE PROVIDER - NSDCCPCAREPLAN_GEN_ALL_CORE_FT
PRINCIPAL DISCHARGE DIAGNOSIS  Diagnosis: Shortness of breath at rest  Assessment and Plan of Treatment: During your stay your shortness of breath was thought to be possibly due to a heart attack. The cardiology team deferred from catheterization because should you require a stent, you would require either plavix, brilina or effient however you would not like to use these medications. We started you on a medication called lipitor. Please continue to take this as prescribed as well as baby aspirin daily and follow up with your cardiologist and pulmonologist within a week after discharge for further management.      SECONDARY DISCHARGE DIAGNOSES  Diagnosis: Advanced COPD  Assessment and Plan of Treatment: Please continue prednisone as prescribed and followup with your pulmonologist following discharge.    Diagnosis: Hypertension  Assessment and Plan of Treatment: Please continue losartan and verapamil as prescribed and followup with your cardiologist following discharge.    Diagnosis: Chronic diastolic (congestive) heart failure  Assessment and Plan of Treatment: Please continue your lasix and aldactone as prescribed and followup with your cardiologist within a week of discharge. PRINCIPAL DISCHARGE DIAGNOSIS  Diagnosis: Shortness of breath at rest  Assessment and Plan of Treatment: The cardiology team deferred from catheterization for your shrotness of breath because should you require a stent, you would require either plavix, brilina or effient however you would not like to use these medications. We started you on a medication called lipitor. Please continue to take this as prescribed as well as baby aspirin daily and follow up with your cardiologist and pulmonologist within a week after discharge for further management.      SECONDARY DISCHARGE DIAGNOSES  Diagnosis: Advanced COPD  Assessment and Plan of Treatment: Please continue prednisone as prescribed (40mg for 3 days, 30 mg for 3 days, 20 mg for 3 days, 10 mg for 3 days) and take pantoprazole as prescribed to prevent ulcer formation with prednisone, and followup with your pulmonologist following discharge.    Diagnosis: Hypertension  Assessment and Plan of Treatment: Please continue losartan and verapamil as prescribed and followup with your cardiologist following discharge.    Diagnosis: Chronic diastolic (congestive) heart failure  Assessment and Plan of Treatment: Please continue your lasix and aldactone as prescribed and followup with your cardiologist within a week of discharge. PRINCIPAL DISCHARGE DIAGNOSIS  Diagnosis: Shortness of breath at rest  Assessment and Plan of Treatment: The cardiology team deferred from catheterization for your shrotness of breath because should you require a stent, you would require either plavix, brilina or effient however you would not like to use these medications. We started you on a medication called lipitor. Please continue to take this as prescribed as well as baby aspirin daily and follow up with your cardiologist and pulmonologist within a week after discharge for further management.      SECONDARY DISCHARGE DIAGNOSES  Diagnosis: Advanced COPD  Assessment and Plan of Treatment: Please continue prednisone as prescribed (40mg for 3 days, 30 mg for 3 days, 20 mg for 3 days, 10 mg for 3 days) and take with your prilosec over the counter to prevent ulcer formation with prednisone, and followup with your pulmonologist following discharge.    Diagnosis: Hypertension  Assessment and Plan of Treatment: Please continue losartan and verapamil as prescribed and followup with your cardiologist following discharge.    Diagnosis: Chronic diastolic (congestive) heart failure  Assessment and Plan of Treatment: Please continue your lasix and aldactone as prescribed and followup with your cardiologist within a week of discharge.

## 2019-04-29 NOTE — DISCHARGE NOTE PROVIDER - CARE PROVIDERS DIRECT ADDRESSES
,[email or web address],[email or web address]

## 2019-04-29 NOTE — PROGRESS NOTE ADULT - SUBJECTIVE AND OBJECTIVE BOX
[facility] -- Tawny García, Mustapha Henderson Pannella, Patel, Savella  Office # [phone]    Follow Up:  SOB, elevated cardiac enzymes    Subjective/Observations: Seen awake and alert, nasal cannula on.  Not in any respiratory discomfort.  However, c/o SOB/difficulty breathing several times attributing it to the Brilinta.  Denies CP or palpitations    REVIEW OF SYSTEMS: All other review of systems is negative unless indicated above    PAST MEDICAL & SURGICAL HISTORY:  Alcoholism: last drink   Skin cancer: not melanoma  Aortic valve replaced: with bovine heart valve  Meniere disease  Diabetes mellitus  Glaucoma  Dry eyes  GERD (gastroesophageal reflux disease)  Emphysema lung  Spinal stenosis  CHF (congestive heart failure)  LBBB (left bundle branch block)  TMJ (temporomandibular joint disorder)  Diverticulitis: hospitalized   SIMÓN (obstructive sleep apnea): category I severe pt does not use c/pap  Thrush: treated x 4 days  1 month ago  restarted medication  3-19-14 clortramazole 5x day  Anxiety  RA (rheumatoid arthritis): diagnosed   oxycodone prn  neck/ lower back  Depression  Borderline diabetes: no meds  COPD (chronic obstructive pulmonary disease): diagnosed   inhaler and nebulizer  HTN (hypertension)  PVD (peripheral vascular disease): left iliac  s/p surgical intervention   unsuccessful  Hiatal hernia: s/p surgical repair   Breast cancer: right s/p lumpectomy and radiation   TIA (transient ischemic attack): 2010  Hyperlipidemia  Valvular heart disease: aortic and mitral  H/O:  section: 2x  H/O sinus surgery  S/P AVR (aortic valve replacement): about 5 yrs ago (?)  PVD (peripheral vascular disease): s/p left iliac bypass which was unsuccessful  open repair  S/P carpal tunnel release: right 2002  S/P lumpectomy, right breast: 2008  Hiatal hernia: s/p surgical repair per pt   S/P hernia surgery: left inguinal age 12  S/P rotator cuff surgery: left   S/P appendectomy:   S/P  section: x2 /     MEDICATIONS  (STANDING):  ALBUTerol/ipratropium for Nebulization 3 milliLiter(s) Nebulizer every 6 hours  ARIPiprazole 2 milliGRAM(s) Oral at bedtime  aspirin  chewable 81 milliGRAM(s) Oral daily  atorvastatin 80 milliGRAM(s) Oral at bedtime  buDESOnide 160 MICROgram(s)/formoterol 4.5 MICROgram(s) Inhaler 2 Puff(s) Inhalation two times a day  clonazePAM Tablet 0.25 milliGRAM(s) Oral two times a day  dextrose 5%. 1000 milliLiter(s) (50 mL/Hr) IV Continuous <Continuous>  dextrose 50% Injectable 12.5 Gram(s) IV Push once  dextrose 50% Injectable 25 Gram(s) IV Push once  dextrose 50% Injectable 25 Gram(s) IV Push once  docusate sodium 100 milliGRAM(s) Oral two times a day  enoxaparin Injectable 65 milliGRAM(s) SubCutaneous every 12 hours  furosemide    Tablet 40 milliGRAM(s) Oral daily  insulin lispro (HumaLOG) corrective regimen sliding scale   SubCutaneous three times a day before meals  insulin lispro (HumaLOG) corrective regimen sliding scale   SubCutaneous at bedtime  lactobacillus acidophilus 1 Tablet(s) Oral two times a day with meals  losartan 25 milliGRAM(s) Oral daily  pantoprazole    Tablet 40 milliGRAM(s) Oral before breakfast  predniSONE   Tablet 20 milliGRAM(s) Oral daily  spironolactone 12.5 milliGRAM(s) Oral daily  sulfaSALAzine 500 milliGRAM(s) Oral three times a day  tiotropium 18 MICROgram(s) Capsule 1 Capsule(s) Inhalation daily  venlafaxine XR. 150 milliGRAM(s) Oral daily  verapamil  milliGRAM(s) Oral daily    MEDICATIONS  (PRN):  acetaminophen   Tablet .. 650 milliGRAM(s) Oral every 6 hours PRN Temp greater or equal to 38C (100.4F), Mild Pain (1 - 3)  artificial tears (preservative free) Ophthalmic Solution 1 Drop(s) Both EYES four times a day PRN Dry Eyes  dextrose 40% Gel 15 Gram(s) Oral once PRN Blood Glucose LESS THAN 70 milliGRAM(s)/deciliter  glucagon  Injectable 1 milliGRAM(s) IntraMuscular once PRN Glucose LESS THAN 70 milligrams/deciliter  oxyCODONE    5 mG/acetaminophen 325 mG 2 Tablet(s) Oral every 6 hours PRN Severe Pain (7 - 10)  oxyCODONE    5 mG/acetaminophen 325 mG 1 Tablet(s) Oral every 6 hours PRN Moderate Pain (4 - 6)  polyethylene glycol 3350 17 Gram(s) Oral daily PRN Constipation    Allergies    penicillins (Hives)  Plavix (Short breath)  quinolines (Other)    Intolerances    Vital Signs Last 24 Hrs  T(C): 36.7 (2019 04:48), Max: 37.2 (2019 21:10)  T(F): 98.1 (2019 04:48), Max: 98.9 (2019 21:10)  HR: 106 (2019 04:48) (82 - 114)  BP: 121/71 (2019 04:48) (95/65 - 121/71)  BP(mean): --  RR: 20 (2019 04:48) (16 - 20)  SpO2: 99% (2019 04:48) (95% - 99%)    I&O's Summary    2019 07:01  -  2019 07:00  --------------------------------------------------------  IN: 240 mL / OUT: 450 mL / NET: -210 mL      Weight (kg): 64.7 (- @ 13:45)    PHYSICAL EXAM:  [facility]: -  Constitutional: NAD, awake and alert, well-developed  HEENT: Moist Mucous Membranes, Anicteric  Pulmonary: Non-labored, breath sounds are diminished bilaterally, + mild wheezing. No rales or rhonchi  Cardiovascular: Regular, S1 and S2, No murmurs, rubs, gallops or clicks  Gastrointestinal: Bowel Sounds present, soft, nontender.   Lymph: No peripheral edema. No lymphadenopathy.  Skin: No visible rashes or ulcers.  Psych:  Mood & affect appropriate    LABS: All Labs Reviewed:                        13.1   10.23 )-----------( 262      ( 2019 05:40 )             40.4                         13.1   13.35 )-----------( 262      ( 2019 20:44 )             39.8     2019 05:40    136    |  99     |  22     ----------------------------<  144    4.5     |  29     |  0.73   2019 20:44    133    |  98     |  27     ----------------------------<  173    4.0     |  25     |  0.68     Ca    8.2        2019 05:40  Ca    8.5        2019 20:44    TPro  7.7    /  Alb  4.0    /  TBili  0.3    /  DBili  x      /  AST  35     /  ALT  49     /  AlkPhos  107    2019 20:44    PT/INR - ( 2019 20:44 )   PT: 12.7 sec;   INR: 1.12 ratio      CARDIAC MARKERS ( 2019 19:30 )  4.690 ng/mL / x     / 333 U/L / x     / 19.6 ng/mL  CARDIAC MARKERS ( 2019 13:33 )  4.530 ng/mL / x     / 339 U/L / x     / 22.1 ng/mL  CARDIAC MARKERS ( 2019 05:40 )  2.440 ng/mL / x     / x     / x     / x      CARDIAC MARKERS ( 2019 01:25 )  1.340 ng/mL / x     / 226 U/L / x     / x      CARDIAC MARKERS ( 2019 20:44 )  .356 ng/mL / x     / 266 U/L / x     / 7.4 ng/mL     < from: TTE Echo Doppler w/o Cont (11.29.18 @ 11:33) >     EXAM:  ECHO TTE WO CON COMP W DOPPLR         PROCEDURE DATE:  2018        INTERPRETATION:  Ordering Physician: MANUELA HAYS [id number]    Indication: Shortness of breath    Study Quality: Technically difficult   A complete echocardiographic study was performed utilizing standard   protocol including spectral and color Doppler in all echocardiographic   windows.    Height: 158.7 cm  Weight: 63.5 kg  BSA: 1.7 sq m  Blood Pressure: 112/70    MEASUREMENTS  IVS: 1.0cm  PWT: 1.0cm  LA: 4.0cm  AO: 2.2cm  LVIDd: 4.8cm  LVIDs: 2.7cm    LVEF: 50%  RVSP: 31mmHg    FINDINGS  Left Ventricle: The endocardium is not well-visualized. Low normal left   ventricular systolic function. Paradoxical motion of the septum in the   setting of a left bundle branch block.  Aortic Valve: Well seated aortic valve bioprosthesis with mildly elevated   gradients (mean aortic valve gradient 18 mm Hg). There is a mobile   echodensity in the left ventricular outflow tract measuring 0.5 x 1.2 cm.   No significant aortic regurgitation.  Mitral Valve: Thickened and calcified mitral valve leaflets with normal   opening. Mild mitral regurgitation  Tricuspid Valve: Mild tricuspid regurgitation.  Pulmonic Valve: Not well-visualized  Left Atrium: Mild left atrial enlargement  Right Ventricle: Not well-visualized. In limited views, there appears to   be normal RV size and systolic function.  Right Atrium: Grossly normal  Diastolic Function: Doppler evidence of diastolic dysfunction, and   elevated left sided filling pressures  Pericardium/Pleura: No pericardial effusion.  Hemodynamics: The pulmonary artery systolic pressure is estimated to be   31 mmHg, assuming the right atrial pressure is equal to 3 mmHg,   consistent with normal pulmonary pressures.      CONCLUSIONS:  1. Technically difficult and limited study.  2. Low normal left ventricular systolic function. Paradoxical motion of   the septum in the setting of a left bundle branch block.  3. Well seated aortic valve bioprosthesis with mildly elevated gradients   (mean aortic valve gradient 18 mm Hg). There is a mobile echodensity in   the left ventricular outflow tract adjacent to the aortic valve measuring   0.5 x 1.2 cm.   4. Thickened and calcified mitral valve leaflets with normal opening.   Mild mitral regurgitation  5. Mild left atrial enlargement  6. Doppler evidence of diastolic dysfunction, and elevated left sided   filling pressures  7. No pericardial effusion.      OUMAR CRISOSTOMO   This document has been electronically signed. 2018 12:20PM      < end of copied text >    < from: CT Angio Chest w/ IV Cont (. @ 12:25) >    EXAM:  CT ANGIO CHEST (W)AW IC                            PROCEDURE DATE:  2019          INTERPRETATION:  CLINICAL INFORMATION: Dyspnea. Evaluate for pulmonary   embolism.    COMPARISON: CT chest 2017    PROCEDURE:   CT Angiography of the Chest.  49 ml of Omnipaque 350 was injected intravenously.   Sagittal and coronal reformats were performed as well as 3D (MIP)   reconstructions.      FINDINGS:    CHEST:     LUNGS AND LARGE AIRWAYS: Patent central airways.  Patchy bilateral   groundglass opacities, greater in both lower lobes. Mild interlobular   septal thickening, also greater in the lower lobes mild emphysematous   changes. Right lower lobe opacity along the pleural effusion, likely   atelectasis.  PLEURA: Small bilateral pleural effusions.  VESSELS: Evaluation of the pulmonary arteries demonstrates no pulmonary   embolism. Thoracic aorta upper limits of normal for caliber with mild   calcified plaque.  HEART: Enlarged. No pericardial effusion. Aortic valve replacement.  MEDIASTINUM AND BARBARA: No interval development of lymphadenopathy. Stable   medial segment lymph nodes measuring up to 9 mm short axis in the   pretracheal region.  CHEST WALL AND LOWER NECK: No enlarged axillary lymph nodes. Right breast   skin thickening and a density at the lateral aspect of the right breast   are again noted.  VISUALIZED UPPER ABDOMEN: Stable left adrenal gland thickening.  BONES: Status post sternotomy. Partially imaged spinal stimulator.    IMPRESSION:     No pulmonary embolism.    Bilateral groundglass opacities and interlobular septal thickening,   greater in the lower lobes which may reflect pulmonary edema.    Small bilateral pleural effusions.    Right breast skin thickening and density at the lateral aspect of the   right breast, unchanged; correlation is recommended with mammography and   ultrasound.      JANN LAZAR   This document has been electronically signed. 2019  2:56PM      < end of copied text >

## 2019-04-29 NOTE — PROGRESS NOTE ADULT - PROBLEM SELECTOR PLAN 2
- ECHO shows EF of 40% with diastolic dysfunction.   - Continue lasix 40mg PO daily and aldactone 12.5mg PO daily.

## 2019-04-29 NOTE — PROGRESS NOTE ADULT - PROBLEM SELECTOR PLAN 4
- Patient takes 5mg PO BID at home   - Continue 20mg prednisone oral daily for possible COPD exacerbation.

## 2019-04-29 NOTE — DISCHARGE NOTE PROVIDER - HOSPITAL COURSE
77 y/o female with ASHD, HLD, DM, RA, COPD on nocturnal home O2 2-3 LPM via NC, anxiety, presented with severe dyspnea, wheezing admitted for acute hypoxic respiratory distress.    Chest X-ray showed bilateral increased markings, right effusion vs infiltrate. Patient's symptoms persisted s/p solumedrol and duonebs in ED, patient was started on BIPAP with mild improvement and prescribed daily IV steroids for possible COPD exacerbation. Patient was also started on azithromycin for possible PNA. Cardiology (Dr. Kunz) and Pulmonology (Dr. Rivera) were consulted. CTA showed no evidence of PE, bilateral groundglass opacities and interlobular septal thickening, and small bilateral pleural effusions. Bilateral venous dopplers revealed no DVT. Blood cultures showed no growth, empiric antibiotic treatment was stopped and patient continued with IV steroid treatment. Troponins remained elevated and patient was started on full dose lovenox and brilinta for NSTEMI. Patient had self reported episodes of dyspnea with wheezing after receiving loading dose of brilinta and refused to continue taking brilinta daily. ECHO showed: _______________________________________. 77 y/o female with ASHD, HLD, DM, RA, COPD on nocturnal home O2 2-3 LPM via NC, anxiety, presented with severe dyspnea, wheezing admitted for acute hypoxic respiratory distress.    Chest X-ray showed bilateral increased markings, right effusion vs infiltrate. Patient's symptoms persisted s/p solumedrol and duonebs in ED, patient was started on BIPAP with mild improvement and prescribed daily IV steroids for possible COPD exacerbation. Patient was also started on azithromycin for possible PNA. Cardiology (Dr. Kunz) and Pulmonology (Dr. Rivera) were consulted. CTA showed no evidence of PE, bilateral groundglass opacities and interlobular septal thickening, and small bilateral pleural effusions. Bilateral venous dopplers revealed no DVT. Blood cultures showed no growth, empiric antibiotic treatment was stopped and patient continued with IV steroid treatment. Troponins remained elevated and patient was started on full dose lovenox and brilinta for NSTEMI. Patient had self reported episodes of dyspnea with wheezing after receiving loading dose of brilinta and refused to continue taking brilinta daily. ECHO showed EF 40% with diastolic dysfunction. Cardiology deferred from cardiac catheterization as patient would require dual antiplatelet therapy if requiring a stent and patient refused to try effient or try plavix or brilinta again. Risks versus benefits of not complying with DAPT after a stent were discussed in detail with patient and patient's family by cardiology team. Full dose lovenox was discontinued. Patient was informed to followup with her home cardiologist and pulmonologist on discharge.    Patient improved clinically throughout hospital course. Patient seen and examined on day of discharge.        Vital Signs Last 24 Hrs    T(C): 36.7 (30 Apr 2019 09:12), Max: 36.9 (29 Apr 2019 16:23)    T(F): 98 (30 Apr 2019 09:12), Max: 98.5 (29 Apr 2019 16:23)    HR: 111 (30 Apr 2019 09:12) (90 - 111)    BP: 105/64 (30 Apr 2019 09:12) (93/59 - 129/73)    BP(mean): --    RR: 19 (30 Apr 2019 09:12) (18 - 20)    SpO2: 98% (30 Apr 2019 09:12) (94% - 99%)        Physical Exam:    General: NAD    HEENT: NCAT, PERRLA, EOMI bl, dry mucous membranes     Neck: Supple, nontender, no mass    Neurology: A&Ox3, nonfocal     Respiratory: diffuse wheezing, no rales or rhonchi    CV: RRR, S1/S2 present, no murmurs, rubs, or gallops    Abdominal: Soft, nontender, non-distended, normoactive bowel sounds    Extremities: No C/C/E, peripheral pulses present    MSK: Normal ROM, no joint erythema or warmth, no joint swelling     Skin: warm, dry, normal color, no obvious rash or abnormal lesions        Patient is medically stable for discharge home with outpatient follow up. 77 y/o female with ASHD, HLD, DM, RA, COPD on nocturnal home O2 2-3 LPM via NC, anxiety, presented with severe dyspnea, wheezing admitted for acute hypoxic respiratory distress.    Chest X-ray showed bilateral increased markings, right effusion vs infiltrate. Patient's symptoms persisted s/p solumedrol and duonebs in ED, patient was started on BIPAP with mild improvement and prescribed daily IV steroids for possible COPD exacerbation. Patient was also started on azithromycin for possible PNA. Cardiology (Dr. Kunz) and Pulmonology (Dr. Rivera) were consulted. CTA showed no evidence of PE, bilateral groundglass opacities and interlobular septal thickening, and small bilateral pleural effusions. Bilateral venous dopplers revealed no DVT. Blood cultures showed no growth, empiric antibiotic treatment was stopped and patient continued with IV steroid treatment. Troponins remained elevated and patient was started on full dose lovenox and brilinta for NSTEMI. Patient had self reported episodes of dyspnea with wheezing after receiving loading dose of brilinta and refused to continue taking brilinta daily. ECHO showed EF 40% with diastolic dysfunction. Cardiology deferred from cardiac catheterization as patient would require dual antiplatelet therapy if requiring a stent and patient refused to try effient or try plavix or brilinta again. Risks versus benefits of not complying with DAPT after a stent were discussed in detail with patient and patient's family by cardiology team. Full dose lovenox was discontinued and patient was discharged on a taper of steroids 40mg x 3 days, 30mg x 3 days, 20mg x 3 days and 10mg x 3 days. Patient was informed to followup with her home cardiologist and pulmonologist on discharge. Patient improved clinically throughout hospital course. Patient seen and examined on day of discharge.        Vital Signs Last 24 Hrs    T(C): 36.8 (01 May 2019 08:00), Max: 36.8 (30 Apr 2019 20:27)    T(F): 98.2 (01 May 2019 08:00), Max: 98.3 (30 Apr 2019 20:27)    HR: 98 (01 May 2019 08:15) (88 - 105)    BP: 110/67 (01 May 2019 08:00) (99/60 - 120/72)    BP(mean): --    RR: 17 (01 May 2019 08:00) (17 - 18)    SpO2: 95% (01 May 2019 08:15) (94% - 99%)        Physical Exam:    General: NAD    HEENT: NCAT, PERRLA, EOMI bl, dry mucous membranes     Neck: Supple, nontender, no mass    Neurology: A&Ox3, nonfocal     Respiratory: diffuse wheezing, no rales or rhonchi    CV: RRR, S1/S2 present, no murmurs, rubs, or gallops    Abdominal: Soft, nontender, non-distended, normoactive bowel sounds    Extremities: No C/C/E, peripheral pulses present    MSK: Normal ROM, no joint erythema or warmth, no joint swelling     Skin: warm, dry, normal color, no obvious rash or abnormal lesions        Patient is medically stable for discharge home with outpatient follow up. 77 y/o female with ASHD, HLD, DM, RA, COPD on nocturnal home O2 2-3 LPM via NC, anxiety, presented with severe dyspnea, wheezing admitted for acute hypoxic respiratory distress.    Chest X-ray showed bilateral increased markings, right effusion vs infiltrate. Patient's symptoms persisted s/p solumedrol and duonebs in ED, patient was started on BIPAP with mild improvement and prescribed daily IV steroids for possible COPD exacerbation. Patient was also started on azithromycin for possible PNA. Cardiology (Dr. Kunz) and Pulmonology (Dr. Rivera) were consulted.     CTA showed no evidence of PE,  no pna    as per pulmonary   dr rivera  this time . for copd exacerbation continue iv solumedrol taper to     change  po  steroids  slowely  - pt appears clinically better    cont NEBS and Inhaler regimen - Spiriva and Symbicort    CT chest reviewed    pt will  need to follow up with her PMD pulmonary - Dr. Tapia in [place],    pt  seen by cardio  dr rebolledo   echo done   -Technically difficult study    Mitral Valve: Mitral annular calcification is present with thickened     mitral valve leaflets, moderate mitral regurgitation     Aortic Valve/Aorta: Well-seated aortic valve bioprosthesis with mildly     elevated gradients. Peak aortic valve gradient is 17.8 mmHg with a mean     aortic gradient of 9.5 mmHg.    Tricuspid Valve: normal with trace TR.    Pulmonic Valve: Not well visualized    Left Atrium: Mildly dilated    Right Atrium: Not well visualized    Left Ventricle: endocardium not well visualized. Ejection fraction     appears to be about 40%. Cannot comment on segmental LV dysfunction     although there appears to be apical hypokinesis. Mild left ventricular     hypertrophy is present    Right Ventricle: Not well visualized but systolic function appears mildly     decreased    Pericardium/Pleura: normal, no significant pericardial effusion.    Pulmonary/RV Pressure: estimated PA systolic pressure of 28.3 mmHg     LV diastolic dysfunction is present     Troponin mildly positive, uptrending, flat CK mostly consistent with demand ischemia:        - NSTEMI: Cardiac enzymes trended up and peaked and downtrended    - No arrhythmias on tele.  EKG with LBBB    - No anginal symptoms.     pt clear by cardio no acute chf this time only chr diastolic chf cont po lasix .     pt will need repeat CT chest with her Pmd Pulmonary     will need eval for ILD related to CTD (RA) with her outpatient doctors     bilateral groundglass opacities and interlobular septal thickening, and small bilateral pleural effusions. Bilateral venous dopplers revealed no DVT. Blood cultures showed no growth, empiric antibiotic treatment was stopped and patient continued with IV steroid treatment. Troponins remained elevated and patient was started on full dose lovenox and brilinta for NSTEMI. Patient had self reported episodes of dyspnea with wheezing after receiving loading dose of brilinta and refused to continue taking brilinta daily. ECHO showed EF 40% with diastolic dysfunction. Cardiology deferred from cardiac catheterization as patient would require dual antiplatelet therapy if requiring a stent and patient refused to try effient or try plavix or brilinta again. Risks versus benefits of not complying with DAPT after a stent were discussed in detail with patient and patient's family by cardiology team. Full dose lovenox was discontinued and patient was discharged on a taper of steroids 40mg x 3 days, 30mg x 3 days, 20mg x 3 days and 10mg x 3 days. Patient was informed to followup with her home cardiologist and pulmonologist on discharge. Patient improved clinically throughout hospital course. Patient seen and examined on day of discharge. physical day of dc         Vital Signs Last 24 Hrs    T(C): 36.8 (01 May 2019 08:00), Max: 36.8 (30 Apr 2019 20:27)    T(F): 98.2 (01 May 2019 08:00), Max: 98.3 (30 Apr 2019 20:27)    HR: 98 (01 May 2019 08:15) (88 - 105)    BP: 110/67 (01 May 2019 08:00) (99/60 - 120/72)    BP(mean): --    RR: 17 (01 May 2019 08:00) (17 - 18)    SpO2: 95% (01 May 2019 08:15) (94% - 99%)    5-1-19     Physical Exam:    General: NAD    HEENT: NCAT, PERRLA, EOMI bl, dry mucous membranes     Neck: Supple, nontender, no mass    Neurology: A&Ox3, nonfocal deficit     Respiratory: exp diffuse wheezing, no rales or rhonchi    CV: RRR, S1/S2 present, no murmurs, mild tachy     Abdominal: Soft, nontender, non-distended, normoactive bowel sounds    Extremities: No C/C/E, peripheral pulses present    MSK: Normal ROM, no joint erythema or warmth, no joint swelling     Skin: warm, dry, normal color, no obvious rash or abnormal lesions        Patient is medically stable for discharge home with outpatient follow up your pmd and pulmonary out pt. 77 y/o female with ASHD, HLD, DM, RA, COPD on nocturnal home O2 2-3 LPM via NC, anxiety, presented with severe dyspnea, wheezing admitted for acute hypoxic respiratory distress.    Chest X-ray showed bilateral increased markings, right effusion vs infiltrate. Patient's symptoms persisted s/p solumedrol and duonebs in ED, patient was started on BIPAP with mild improvement and prescribed daily IV steroids for possible COPD exacerbation. Patient was also started on azithromycin for possible PNA. Cardiology (Dr. Kunz) and Pulmonology (Dr. Rivera) were consulted.     CTA showed no evidence of PE,  no pna    as per pulmonary   dr rivera  this time . for copd exacerbation  cause  of acute hypoxic resp failure  continue iv solumedrol taper to     change  po  steroids  slowely  - pt appears clinically better    cont NEBS and Inhaler regimen - Spiriva and Symbicort    CT chest reviewed    pt will  need to follow up with her PMD pulmonary - Dr. Tapia in [place],    pt  seen by cardio  dr rebolledo   echo done   -Technically difficult study    Mitral Valve: Mitral annular calcification is present with thickened     mitral valve leaflets, moderate mitral regurgitation     Aortic Valve/Aorta: Well-seated aortic valve bioprosthesis with mildly     elevated gradients. Peak aortic valve gradient is 17.8 mmHg with a mean     aortic gradient of 9.5 mmHg.    Tricuspid Valve: normal with trace TR.    Pulmonic Valve: Not well visualized    Left Atrium: Mildly dilated    Right Atrium: Not well visualized    Left Ventricle: endocardium not well visualized. Ejection fraction     appears to be about 40%. Cannot comment on segmental LV dysfunction     although there appears to be apical hypokinesis. Mild left ventricular     hypertrophy is present    Right Ventricle: Not well visualized but systolic function appears mildly     decreased    Pericardium/Pleura: normal, no significant pericardial effusion.    Pulmonary/RV Pressure: estimated PA systolic pressure of 28.3 mmHg     LV diastolic dysfunction is present     Troponin mildly positive, uptrending, flat CK mostly consistent with demand ischemia:        - NSTEMI: Cardiac enzymes trended up and peaked and downtrended    - No arrhythmias on tele.  EKG with LBBB    - No anginal symptoms.     pt clear by cardio no acute chf  or acute pulmonary edema this time,  only chr diastolic chf cont po lasix .     pt will need repeat CT chest with her Pmd Pulmonary     will need eval for ILD related to CTD (RA) with her outpatient doctors     bilateral groundglass opacities and interlobular septal thickening, and small bilateral pleural effusions. Bilateral venous dopplers revealed no DVT. Blood cultures showed no growth, empiric antibiotic treatment was stopped and patient continued with IV steroid treatment. Troponins remained elevated and patient was started on full dose lovenox and brilinta for NSTEMI. Patient had self reported episodes of dyspnea with wheezing after receiving loading dose of brilinta and refused to continue taking brilinta daily. ECHO showed EF 40% with diastolic dysfunction. Cardiology deferred from cardiac catheterization as patient would require dual antiplatelet therapy if requiring a stent and patient refused to try effient or try plavix or brilinta again. Risks versus benefits of not complying with DAPT after a stent were discussed in detail with patient and patient's family by cardiology team. Full dose lovenox was discontinued and patient was discharged on a taper of steroids 40mg x 3 days, 30mg x 3 days, 20mg x 3 days and 10mg x 3 days. Patient was informed to followup with her home cardiologist and pulmonologist on discharge. Patient improved clinically throughout hospital course. Patient seen and examined on day of discharge. physical day of dc         Vital Signs Last 24 Hrs    T(C): 36.8 (01 May 2019 08:00), Max: 36.8 (30 Apr 2019 20:27)    T(F): 98.2 (01 May 2019 08:00), Max: 98.3 (30 Apr 2019 20:27)    HR: 98 (01 May 2019 08:15) (88 - 105)    BP: 110/67 (01 May 2019 08:00) (99/60 - 120/72)    BP(mean): --    RR: 17 (01 May 2019 08:00) (17 - 18)    SpO2: 95% (01 May 2019 08:15) (94% - 99%)    5-1-19     Physical Exam:    General: NAD    HEENT: NCAT, PERRLA, EOMI bl, dry mucous membranes     Neck: Supple, nontender, no mass    Neurology: A&Ox3, nonfocal deficit     Respiratory: exp diffuse wheezing, no rales or rhonchi    CV: RRR, S1/S2 present, no murmurs, mild tachy     Abdominal: Soft, nontender, non-distended, normoactive bowel sounds    Extremities: No C/C/E, peripheral pulses present    MSK: Normal ROM, no joint erythema or warmth, no joint swelling     Skin: warm, dry, normal color, no obvious rash or abnormal lesions        Patient is medically stable for discharge home with outpatient follow up your pmd and pulmonary out pt. 77 y/o female with ASHD, HLD, DM, RA, COPD on nocturnal home O2 2-3 LPM via NC, anxiety, presented with severe dyspnea, wheezing admitted for acute hypoxic respiratory distress.    Chest X-ray showed bilateral increased markings, right effusion vs infiltrate. Patient's symptoms persisted s/p solumedrol and duonebs in ED, patient was started on BIPAP with mild improvement and prescribed daily IV steroids for possible COPD exacerbation. Patient was also started on azithromycin for possible PNA. Cardiology (Dr. Kunz) and Pulmonology (Dr. Rivera) were consulted.     CTA showed no evidence of PE,  no pna    as per pulmonary   dr rivera  this time . for copd exacerbation  cause  of acute hypoxic resp failure  continue iv solumedrol taper to     change  po  steroids  slowely  - pt appears clinically better    cont NEBS and Inhaler regimen - Spiriva and Symbicort    CT chest reviewed    pt will  need to follow up with her PMD pulmonary - Dr. Tapia in [place],    pt  seen by cardio  dr rebolledo   echo done   -Technically difficult study    Mitral Valve: Mitral annular calcification is present with thickened     mitral valve leaflets, moderate mitral regurgitation     Aortic Valve/Aorta: Well-seated aortic valve bioprosthesis with mildly     elevated gradients. Peak aortic valve gradient is 17.8 mmHg with a mean     aortic gradient of 9.5 mmHg.    Tricuspid Valve: normal with trace TR.    Pulmonic Valve: Not well visualized    Left Atrium: Mildly dilated    Right Atrium: Not well visualized    Left Ventricle: endocardium not well visualized. Ejection fraction     appears to be about 40%. Cannot comment on segmental LV dysfunction     although there appears to be apical hypokinesis. Mild left ventricular     hypertrophy is present    Right Ventricle: Not well visualized but systolic function appears mildly     decreased    Pericardium/Pleura: normal, no significant pericardial effusion.    Pulmonary/RV Pressure: estimated PA systolic pressure of 28.3 mmHg     LV diastolic dysfunction is present     Troponin mildly positive, uptrending, flat CK mostly consistent with demand ischemia:        - NSTEMI: Cardiac enzymes trended up and peaked and downtrended    - No arrhythmias on tele.  EKG with LBBB    - No anginal symptoms.     pt clear by cardio no acute chf  s/p iv lasix  for  acute pulmonary edema this time  resolved ,  only chr diastolic chf cont po lasix .     pt will need repeat CT chest with her Pmd Pulmonary     will need eval for ILD related to CTD (RA) with her outpatient doctors     bilateral groundglass opacities and interlobular septal thickening, and small bilateral pleural effusions. Bilateral venous dopplers revealed no DVT. Blood cultures showed no growth, empiric antibiotic treatment was stopped and patient continued with IV steroid treatment. Troponins remained elevated and patient was started on full dose lovenox and brilinta for NSTEMI. Patient had self reported episodes of dyspnea with wheezing after receiving loading dose of brilinta and refused to continue taking brilinta daily. ECHO showed EF 40% with diastolic dysfunction. Cardiology deferred from cardiac catheterization as patient would require dual antiplatelet therapy if requiring a stent and patient refused to try effient or try plavix or brilinta again. Risks versus benefits of not complying with DAPT after a stent were discussed in detail with patient and patient's family by cardiology team. Full dose lovenox was discontinued and patient was discharged on a taper of steroids 40mg x 3 days, 30mg x 3 days, 20mg x 3 days and 10mg x 3 days. Patient was informed to followup with her home cardiologist and pulmonologist on discharge. Patient improved clinically throughout hospital course. Patient seen and examined on day of discharge. physical day of dc         Vital Signs Last 24 Hrs    T(C): 36.8 (01 May 2019 08:00), Max: 36.8 (30 Apr 2019 20:27)    T(F): 98.2 (01 May 2019 08:00), Max: 98.3 (30 Apr 2019 20:27)    HR: 98 (01 May 2019 08:15) (88 - 105)    BP: 110/67 (01 May 2019 08:00) (99/60 - 120/72)    BP(mean): --    RR: 17 (01 May 2019 08:00) (17 - 18)    SpO2: 95% (01 May 2019 08:15) (94% - 99%)    5-1-19     Physical Exam:    General: NAD    HEENT: NCAT, PERRLA, EOMI bl, dry mucous membranes     Neck: Supple, nontender, no mass    Neurology: A&Ox3, nonfocal deficit     Respiratory: exp diffuse wheezing, no rales or rhonchi    CV: RRR, S1/S2 present, no murmurs, mild tachy     Abdominal: Soft, nontender, non-distended, normoactive bowel sounds    Extremities: No C/C/E, peripheral pulses present    MSK: Normal ROM, no joint erythema or warmth, no joint swelling     Skin: warm, dry, normal color, no obvious rash or abnormal lesions        Patient is medically stable for discharge home with outpatient follow up your pmd and pulmonary out pt.

## 2019-04-29 NOTE — PROGRESS NOTE ADULT - ASSESSMENT
77 y/o female with ASHD, HLD, DM, RA, COPD on nocturnal home O2 2-3 LPM via NC, anxiety, presented with severe dyspnea, wheezing admitted for acute hypoxic respiratory distress.

## 2019-04-30 DIAGNOSIS — R74.8 ABNORMAL LEVELS OF OTHER SERUM ENZYMES: ICD-10-CM

## 2019-04-30 LAB
ANION GAP SERPL CALC-SCNC: 4 MMOL/L — LOW (ref 5–17)
BUN SERPL-MCNC: 14 MG/DL — SIGNIFICANT CHANGE UP (ref 7–23)
CALCIUM SERPL-MCNC: 8.1 MG/DL — LOW (ref 8.5–10.1)
CHLORIDE SERPL-SCNC: 97 MMOL/L — SIGNIFICANT CHANGE UP (ref 96–108)
CO2 SERPL-SCNC: 33 MMOL/L — HIGH (ref 22–31)
CREAT SERPL-MCNC: 0.51 MG/DL — SIGNIFICANT CHANGE UP (ref 0.5–1.3)
GLUCOSE SERPL-MCNC: 103 MG/DL — HIGH (ref 70–99)
HCT VFR BLD CALC: 32.1 % — LOW (ref 34.5–45)
HGB BLD-MCNC: 10.3 G/DL — LOW (ref 11.5–15.5)
MCHC RBC-ENTMCNC: 31.2 PG — SIGNIFICANT CHANGE UP (ref 27–34)
MCHC RBC-ENTMCNC: 32.1 GM/DL — SIGNIFICANT CHANGE UP (ref 32–36)
MCV RBC AUTO: 97.3 FL — SIGNIFICANT CHANGE UP (ref 80–100)
NRBC # BLD: 0 /100 WBCS — SIGNIFICANT CHANGE UP (ref 0–0)
PLATELET # BLD AUTO: 179 K/UL — SIGNIFICANT CHANGE UP (ref 150–400)
POTASSIUM SERPL-MCNC: 4.9 MMOL/L — SIGNIFICANT CHANGE UP (ref 3.5–5.3)
POTASSIUM SERPL-SCNC: 4.9 MMOL/L — SIGNIFICANT CHANGE UP (ref 3.5–5.3)
RBC # BLD: 3.3 M/UL — LOW (ref 3.8–5.2)
RBC # FLD: 14.2 % — SIGNIFICANT CHANGE UP (ref 10.3–14.5)
SODIUM SERPL-SCNC: 134 MMOL/L — LOW (ref 135–145)
WBC # BLD: 12.33 K/UL — HIGH (ref 3.8–10.5)
WBC # FLD AUTO: 12.33 K/UL — HIGH (ref 3.8–10.5)

## 2019-04-30 PROCEDURE — 74018 RADEX ABDOMEN 1 VIEW: CPT | Mod: 26

## 2019-04-30 PROCEDURE — 99233 SBSQ HOSP IP/OBS HIGH 50: CPT

## 2019-04-30 PROCEDURE — 99232 SBSQ HOSP IP/OBS MODERATE 35: CPT | Mod: GC

## 2019-04-30 RX ORDER — ENOXAPARIN SODIUM 100 MG/ML
40 INJECTION SUBCUTANEOUS DAILY
Qty: 0 | Refills: 0 | Status: DISCONTINUED | OUTPATIENT
Start: 2019-05-01 | End: 2019-05-01

## 2019-04-30 RX ORDER — SODIUM CHLORIDE 0.65 %
1 AEROSOL, SPRAY (ML) NASAL EVERY 4 HOURS
Qty: 0 | Refills: 0 | Status: DISCONTINUED | OUTPATIENT
Start: 2019-04-30 | End: 2019-05-01

## 2019-04-30 RX ORDER — ATORVASTATIN CALCIUM 80 MG/1
1 TABLET, FILM COATED ORAL
Qty: 30 | Refills: 0
Start: 2019-04-30 | End: 2019-05-29

## 2019-04-30 RX ADMIN — Medication 1 TABLET(S): at 08:40

## 2019-04-30 RX ADMIN — Medication 150 MILLIGRAM(S): at 08:40

## 2019-04-30 RX ADMIN — BUDESONIDE AND FORMOTEROL FUMARATE DIHYDRATE 2 PUFF(S): 160; 4.5 AEROSOL RESPIRATORY (INHALATION) at 21:19

## 2019-04-30 RX ADMIN — ENOXAPARIN SODIUM 65 MILLIGRAM(S): 100 INJECTION SUBCUTANEOUS at 05:46

## 2019-04-30 RX ADMIN — Medication 0.25 MILLIGRAM(S): at 17:52

## 2019-04-30 RX ADMIN — LORATADINE 10 MILLIGRAM(S): 10 TABLET ORAL at 12:11

## 2019-04-30 RX ADMIN — PANTOPRAZOLE SODIUM 40 MILLIGRAM(S): 20 TABLET, DELAYED RELEASE ORAL at 05:45

## 2019-04-30 RX ADMIN — Medication 30 MILLIGRAM(S): at 17:53

## 2019-04-30 RX ADMIN — Medication 0.25 MILLIGRAM(S): at 05:43

## 2019-04-30 RX ADMIN — Medication 20 MILLIGRAM(S): at 05:44

## 2019-04-30 RX ADMIN — SPIRONOLACTONE 12.5 MILLIGRAM(S): 25 TABLET, FILM COATED ORAL at 05:44

## 2019-04-30 RX ADMIN — Medication 500 MILLIGRAM(S): at 21:19

## 2019-04-30 RX ADMIN — ARIPIPRAZOLE 2 MILLIGRAM(S): 15 TABLET ORAL at 21:19

## 2019-04-30 RX ADMIN — Medication 500 MILLIGRAM(S): at 05:45

## 2019-04-30 RX ADMIN — Medication 100 MILLIGRAM(S): at 17:52

## 2019-04-30 RX ADMIN — Medication 1: at 12:12

## 2019-04-30 RX ADMIN — Medication 1 TABLET(S): at 17:52

## 2019-04-30 RX ADMIN — POLYETHYLENE GLYCOL 3350 17 GRAM(S): 17 POWDER, FOR SOLUTION ORAL at 15:04

## 2019-04-30 RX ADMIN — BUDESONIDE AND FORMOTEROL FUMARATE DIHYDRATE 2 PUFF(S): 160; 4.5 AEROSOL RESPIRATORY (INHALATION) at 05:46

## 2019-04-30 RX ADMIN — Medication 81 MILLIGRAM(S): at 12:11

## 2019-04-30 RX ADMIN — OXYCODONE AND ACETAMINOPHEN 2 TABLET(S): 5; 325 TABLET ORAL at 10:05

## 2019-04-30 RX ADMIN — Medication 3 MILLILITER(S): at 19:17

## 2019-04-30 RX ADMIN — OXYCODONE AND ACETAMINOPHEN 1 TABLET(S): 5; 325 TABLET ORAL at 18:26

## 2019-04-30 RX ADMIN — Medication 40 MILLIGRAM(S): at 05:45

## 2019-04-30 RX ADMIN — Medication 100 MILLIGRAM(S): at 05:46

## 2019-04-30 RX ADMIN — ATORVASTATIN CALCIUM 80 MILLIGRAM(S): 80 TABLET, FILM COATED ORAL at 21:19

## 2019-04-30 RX ADMIN — Medication 500 MILLIGRAM(S): at 15:03

## 2019-04-30 RX ADMIN — Medication 3 MILLILITER(S): at 07:47

## 2019-04-30 RX ADMIN — TIOTROPIUM BROMIDE 1 CAPSULE(S): 18 CAPSULE ORAL; RESPIRATORY (INHALATION) at 05:46

## 2019-04-30 RX ADMIN — OXYCODONE AND ACETAMINOPHEN 2 TABLET(S): 5; 325 TABLET ORAL at 11:00

## 2019-04-30 RX ADMIN — OXYCODONE AND ACETAMINOPHEN 1 TABLET(S): 5; 325 TABLET ORAL at 23:23

## 2019-04-30 RX ADMIN — Medication 3 MILLILITER(S): at 14:16

## 2019-04-30 RX ADMIN — OXYCODONE AND ACETAMINOPHEN 1 TABLET(S): 5; 325 TABLET ORAL at 17:52

## 2019-04-30 NOTE — PROGRESS NOTE ADULT - PROBLEM SELECTOR PLAN 4
- Patient takes 5mg PO BID at home   - Prednisone switched from 20mg oral daily to 30 IV solumedrol BID.

## 2019-04-30 NOTE — PROGRESS NOTE ADULT - SUBJECTIVE AND OBJECTIVE BOX
Date/Time Patient Seen:  		  Referring MD:   Data Reviewed	       Patient is a 78y old  Female who presents with a chief complaint of dyspnea (2019 08:42)      Subjective/HPI     PAST MEDICAL & SURGICAL HISTORY:  Alcoholism: last drink   Skin cancer: not melanoma  Aortic valve replaced: with bovine heart valve  Meniere disease  Diabetes mellitus  Glaucoma  Dry eyes  GERD (gastroesophageal reflux disease)  Emphysema lung  Spinal stenosis  CHF (congestive heart failure)  LBBB (left bundle branch block)  TMJ (temporomandibular joint disorder)  Diverticulitis: hospitalized   SIMÓN (obstructive sleep apnea): category I severe pt does not use c/pap  Thrush: treated x 4 days  1 month ago  restarted medication  3-19-14 clortramazole 5x day  Anxiety  RA (rheumatoid arthritis): diagnosed   oxycodone prn  neck/ lower back  Depression  Borderline diabetes: no meds  COPD (chronic obstructive pulmonary disease): diagnosed   inhaler and nebulizer  HTN (hypertension)  PVD (peripheral vascular disease): left iliac  s/p surgical intervention   unsuccessful  Hiatal hernia: s/p surgical repair   Breast cancer: right s/p lumpectomy and radiation 2008  TIA (transient ischemic attack): 2010  Hyperlipidemia  Valvular heart disease: aortic and mitral  H/O:  section: 2x  H/O sinus surgery  S/P AVR (aortic valve replacement): about 5 yrs ago (2013?)  PVD (peripheral vascular disease): s/p left iliac bypass which was unsuccessful - open repair  S/P carpal tunnel release: right 2002  S/P lumpectomy, right breast: 2008  Hiatal hernia: s/p surgical repair per pt   S/P hernia surgery: left inguinal age 12  S/P rotator cuff surgery: left   S/P appendectomy:   S/P  section: x2 /         Medication list         MEDICATIONS  (STANDING):  ALBUTerol/ipratropium for Nebulization 3 milliLiter(s) Nebulizer every 6 hours  ARIPiprazole 2 milliGRAM(s) Oral at bedtime  aspirin  chewable 81 milliGRAM(s) Oral daily  atorvastatin 80 milliGRAM(s) Oral at bedtime  buDESOnide 160 MICROgram(s)/formoterol 4.5 MICROgram(s) Inhaler 2 Puff(s) Inhalation two times a day  clonazePAM Tablet 0.25 milliGRAM(s) Oral two times a day  dextrose 5%. 1000 milliLiter(s) (50 mL/Hr) IV Continuous <Continuous>  dextrose 50% Injectable 12.5 Gram(s) IV Push once  dextrose 50% Injectable 25 Gram(s) IV Push once  dextrose 50% Injectable 25 Gram(s) IV Push once  docusate sodium 100 milliGRAM(s) Oral two times a day  enoxaparin Injectable 65 milliGRAM(s) SubCutaneous every 12 hours  furosemide    Tablet 40 milliGRAM(s) Oral daily  insulin lispro (HumaLOG) corrective regimen sliding scale   SubCutaneous three times a day before meals  insulin lispro (HumaLOG) corrective regimen sliding scale   SubCutaneous at bedtime  lactobacillus acidophilus 1 Tablet(s) Oral two times a day with meals  loratadine 10 milliGRAM(s) Oral daily  losartan 25 milliGRAM(s) Oral daily  methylPREDNISolone sodium succinate Injectable 30 milliGRAM(s) IV Push two times a day  pantoprazole    Tablet 40 milliGRAM(s) Oral before breakfast  spironolactone 12.5 milliGRAM(s) Oral daily  sulfaSALAzine 500 milliGRAM(s) Oral three times a day  tiotropium 18 MICROgram(s) Capsule 1 Capsule(s) Inhalation daily  venlafaxine XR. 150 milliGRAM(s) Oral daily  verapamil  milliGRAM(s) Oral daily    MEDICATIONS  (PRN):  acetaminophen   Tablet .. 650 milliGRAM(s) Oral every 6 hours PRN Temp greater or equal to 38C (100.4F), Mild Pain (1 - 3)  artificial tears (preservative free) Ophthalmic Solution 1 Drop(s) Both EYES four times a day PRN Dry Eyes  dextrose 40% Gel 15 Gram(s) Oral once PRN Blood Glucose LESS THAN 70 milliGRAM(s)/deciliter  glucagon  Injectable 1 milliGRAM(s) IntraMuscular once PRN Glucose LESS THAN 70 milligrams/deciliter  oxyCODONE    5 mG/acetaminophen 325 mG 2 Tablet(s) Oral every 6 hours PRN Severe Pain (7 - 10)  oxyCODONE    5 mG/acetaminophen 325 mG 1 Tablet(s) Oral every 6 hours PRN Moderate Pain (4 - 6)  polyethylene glycol 3350 17 Gram(s) Oral daily PRN Constipation         Vitals log        ICU Vital Signs Last 24 Hrs  T(C): 36.5 (2019 04:31), Max: 36.9 (2019 16:23)  T(F): 97.7 (2019 04:31), Max: 98.5 (2019 16:23)  HR: 90 (2019 07:47) (90 - 104)  BP: 109/72 (2019 04:31) (93/59 - 129/73)  BP(mean): --  ABP: --  ABP(mean): --  RR: 19 (2019 04:31) (18 - 20)  SpO2: 97% (2019 07:47) (94% - 99%)           Input and Output:  I&O's Detail      Lab Data                        10.3   12.33 )-----------( 179      ( 2019 07:04 )             32.1     04-30    134<L>  |  97  |  14  ----------------------------<  103<H>  4.9   |  33<H>  |  0.51    Ca    8.1<L>      2019 07:04  Mg     2.4     04-29        CARDIAC MARKERS ( 2019 08:08 )  3.310 ng/mL / x     / 221 U/L / x     / 14.5 ng/mL  CARDIAC MARKERS ( 2019 19:30 )  4.690 ng/mL / x     / 333 U/L / x     / 19.6 ng/mL  CARDIAC MARKERS ( 2019 13:33 )  4.530 ng/mL / x     / 339 U/L / x     / 22.1 ng/mL        Review of Systems	      Objective     Physical Examination    abd dist.   head at  heart s1s2  lung dec BS  occ wheeze      Pertinent Lab findings & Imaging      Miranda:  NO   Adequate UO     I&O's Detail           Discussed with:     Cultures:	        Radiology

## 2019-04-30 NOTE — PROGRESS NOTE ADULT - SUBJECTIVE AND OBJECTIVE BOX
[facility] -- Tawny García, Mustapha Henderson Pannella, Patel, Savella  Office # [phone]    Follow Up:      Subjective/Observations:       REVIEW OF SYSTEMS: All other review of systems is negative unless indicated above    PAST MEDICAL & SURGICAL HISTORY:  Alcoholism: last drink   Skin cancer: not melanoma  Aortic valve replaced: with bovine heart valve  Meniere disease  Diabetes mellitus  Glaucoma  Dry eyes  GERD (gastroesophageal reflux disease)  Emphysema lung  Spinal stenosis  CHF (congestive heart failure)  LBBB (left bundle branch block)  TMJ (temporomandibular joint disorder)  Diverticulitis: hospitalized   SIMÓN (obstructive sleep apnea): category I severe pt does not use c/pap  Thrush: treated x 4 days  1 month ago  restarted medication  3-19-14 clortramazole 5x day  Anxiety  RA (rheumatoid arthritis): diagnosed   oxycodone prn  neck/ lower back  Depression  Borderline diabetes: no meds  COPD (chronic obstructive pulmonary disease): diagnosed   inhaler and nebulizer  HTN (hypertension)  PVD (peripheral vascular disease): left iliac  s/p surgical intervention   unsuccessful  Hiatal hernia: s/p surgical repair   Breast cancer: right s/p lumpectomy and radiation 2008  TIA (transient ischemic attack): 2010  Hyperlipidemia  Valvular heart disease: aortic and mitral  H/O:  section: 2x  H/O sinus surgery  S/P AVR (aortic valve replacement): about 5 yrs ago (?)  PVD (peripheral vascular disease): s/p left iliac bypass which was unsuccessful  open repair  S/P carpal tunnel release: right 2002  S/P lumpectomy, right breast: 2008  Hiatal hernia: s/p surgical repair per pt 2005  S/P hernia surgery: left inguinal age 12  S/P rotator cuff surgery: left 2004  S/P appendectomy:   S/P  section: x2 /       MEDICATIONS  (STANDING):  ALBUTerol/ipratropium for Nebulization 3 milliLiter(s) Nebulizer every 6 hours  ARIPiprazole 2 milliGRAM(s) Oral at bedtime  aspirin  chewable 81 milliGRAM(s) Oral daily  atorvastatin 80 milliGRAM(s) Oral at bedtime  buDESOnide 160 MICROgram(s)/formoterol 4.5 MICROgram(s) Inhaler 2 Puff(s) Inhalation two times a day  clonazePAM Tablet 0.25 milliGRAM(s) Oral two times a day  dextrose 5%. 1000 milliLiter(s) (50 mL/Hr) IV Continuous <Continuous>  dextrose 50% Injectable 12.5 Gram(s) IV Push once  dextrose 50% Injectable 25 Gram(s) IV Push once  dextrose 50% Injectable 25 Gram(s) IV Push once  docusate sodium 100 milliGRAM(s) Oral two times a day  enoxaparin Injectable 65 milliGRAM(s) SubCutaneous every 12 hours  furosemide    Tablet 40 milliGRAM(s) Oral daily  insulin lispro (HumaLOG) corrective regimen sliding scale   SubCutaneous three times a day before meals  insulin lispro (HumaLOG) corrective regimen sliding scale   SubCutaneous at bedtime  lactobacillus acidophilus 1 Tablet(s) Oral two times a day with meals  loratadine 10 milliGRAM(s) Oral daily  losartan 25 milliGRAM(s) Oral daily  pantoprazole    Tablet 40 milliGRAM(s) Oral before breakfast  predniSONE   Tablet 20 milliGRAM(s) Oral daily  spironolactone 12.5 milliGRAM(s) Oral daily  sulfaSALAzine 500 milliGRAM(s) Oral three times a day  tiotropium 18 MICROgram(s) Capsule 1 Capsule(s) Inhalation daily  venlafaxine XR. 150 milliGRAM(s) Oral daily  verapamil  milliGRAM(s) Oral daily    MEDICATIONS  (PRN):  acetaminophen   Tablet .. 650 milliGRAM(s) Oral every 6 hours PRN Temp greater or equal to 38C (100.4F), Mild Pain (1 - 3)  artificial tears (preservative free) Ophthalmic Solution 1 Drop(s) Both EYES four times a day PRN Dry Eyes  dextrose 40% Gel 15 Gram(s) Oral once PRN Blood Glucose LESS THAN 70 milliGRAM(s)/deciliter  glucagon  Injectable 1 milliGRAM(s) IntraMuscular once PRN Glucose LESS THAN 70 milligrams/deciliter  oxyCODONE    5 mG/acetaminophen 325 mG 2 Tablet(s) Oral every 6 hours PRN Severe Pain (7 - 10)  oxyCODONE    5 mG/acetaminophen 325 mG 1 Tablet(s) Oral every 6 hours PRN Moderate Pain (4 - 6)  polyethylene glycol 3350 17 Gram(s) Oral daily PRN Constipation      Allergies    penicillins (Hives)  Plavix (Short breath)  quinolines (Other)    Intolerances        Vital Signs Last 24 Hrs  T(C): 36.5 (2019 04:31), Max: 36.9 (2019 16:23)  T(F): 97.7 (2019 04:31), Max: 98.5 (2019 16:23)  HR: 90 (2019 07:47) (90 - 104)  BP: 109/72 (2019 04:31) (93/59 - 129/73)  BP(mean): --  RR: 19 (2019 04:31) (18 - 20)  SpO2: 97% (2019 07:47) (94% - 99%)    I&O's Summary        PHYSICAL EXAM:  TELE:   Constitutional: NAD, awake and alert, well-developed  HEENT: Moist Mucous Membranes, Anicteric  Pulmonary: Non-labored, breath sounds are clear bilaterally, No wheezing, rales or rhonchi  Cardiovascular: Regular, S1 and S2, No murmurs, rubs, gallops or clicks  Gastrointestinal: Bowel Sounds present, soft, nontender.   Lymph: No peripheral edema. No lymphadenopathy.  Skin: No visible rashes or ulcers.  Psych:  Mood & affect appropriate    LABS: All Labs Reviewed:                        10.3   12.33 )-----------( 179      ( 2019 07:04 )             32.1                         12.4   16.09 )-----------( 235      ( 2019 07:44 )             38.1                         13.1   10.23 )-----------( 262      ( 2019 05:40 )             40.4     2019 07:04    134    |  97     |  14     ----------------------------<  103    4.9     |  33     |  0.51   2019 08:08    135    |  98     |  17     ----------------------------<  150    3.7     |  32     |  0.61   2019 05:40    136    |  99     |  22     ----------------------------<  144    4.5     |  29     |  0.73     Ca    8.1        2019 07:04  Ca    8.1        2019 08:08  Ca    8.2        2019 05:40  Mg     2.4       2019 08:08    TPro  7.7    /  Alb  4.0    /  TBili  0.3    /  DBili  x      /  AST  35     /  ALT  49     /  AlkPhos  107    2019 20:44    CARDIAC MARKERS ( 2019 08:08 )  3.310 ng/mL / x     / 221 U/L / x     / 14.5 ng/mL  CARDIAC MARKERS ( 2019 19:30 )  4.690 ng/mL / x     / 333 U/L / x     / 19.6 ng/mL  CARDIAC MARKERS ( 2019 13:33 )  4.530 ng/mL / x     / 339 U/L / x     / 22.1 ng/mL           Crista Mann NP  Cardiology [facility] -- Tawny García, Mustapha Henderson Pannella, Patel, Savella  Office # [phone]    Follow Up:  SOB, elevated cardiac enzymes, NSTEMI?    Subjective/Observations: Awake and alert, seen laying flat with nasal cannula.  No respiratory discomfort. However, became very anxious during conversation, insisting she has to go to [place] for cardiac cath today.  Claimed to have severe respiratory discomfort again overnight.  Denies CP or palpitation    REVIEW OF SYSTEMS: All other review of systems is negative unless indicated above    PAST MEDICAL & SURGICAL HISTORY:  Alcoholism: last drink   Skin cancer: not melanoma  Aortic valve replaced: with bovine heart valve  Meniere disease  Diabetes mellitus  Glaucoma  Dry eyes  GERD (gastroesophageal reflux disease)  Emphysema lung  Spinal stenosis  CHF (congestive heart failure)  LBBB (left bundle branch block)  TMJ (temporomandibular joint disorder)  Diverticulitis: hospitalized   SIMÓN (obstructive sleep apnea): category I severe pt does not use c/pap  Thrush: treated x 4 days  1 month ago  restarted medication  3-19-14 clortramazole 5x day  Anxiety  RA (rheumatoid arthritis): diagnosed   oxycodone prn  neck/ lower back  Depression  Borderline diabetes: no meds  COPD (chronic obstructive pulmonary disease): diagnosed   inhaler and nebulizer  HTN (hypertension)  PVD (peripheral vascular disease): left iliac  s/p surgical intervention   unsuccessful  Hiatal hernia: s/p surgical repair   Breast cancer: right s/p lumpectomy and radiation   TIA (transient ischemic attack): 2010  Hyperlipidemia  Valvular heart disease: aortic and mitral  H/O:  section: 2x  H/O sinus surgery  S/P AVR (aortic valve replacement): about 5 yrs ago (2013?)  PVD (peripheral vascular disease): s/p left iliac bypass which was unsuccessful - open repair  S/P carpal tunnel release: right   S/P lumpectomy, right breast: 2008  Hiatal hernia: s/p surgical repair per pt   S/P hernia surgery: left inguinal age 12  S/P rotator cuff surgery: left 2004  S/P appendectomy:   S/P  section: x2 1965/     MEDICATIONS  (STANDING):  ALBUTerol/ipratropium for Nebulization 3 milliLiter(s) Nebulizer every 6 hours  ARIPiprazole 2 milliGRAM(s) Oral at bedtime  aspirin  chewable 81 milliGRAM(s) Oral daily  atorvastatin 80 milliGRAM(s) Oral at bedtime  buDESOnide 160 MICROgram(s)/formoterol 4.5 MICROgram(s) Inhaler 2 Puff(s) Inhalation two times a day  clonazePAM Tablet 0.25 milliGRAM(s) Oral two times a day  dextrose 5%. 1000 milliLiter(s) (50 mL/Hr) IV Continuous <Continuous>  dextrose 50% Injectable 12.5 Gram(s) IV Push once  dextrose 50% Injectable 25 Gram(s) IV Push once  dextrose 50% Injectable 25 Gram(s) IV Push once  docusate sodium 100 milliGRAM(s) Oral two times a day  enoxaparin Injectable 65 milliGRAM(s) SubCutaneous every 12 hours  furosemide    Tablet 40 milliGRAM(s) Oral daily  insulin lispro (HumaLOG) corrective regimen sliding scale   SubCutaneous three times a day before meals  insulin lispro (HumaLOG) corrective regimen sliding scale   SubCutaneous at bedtime  lactobacillus acidophilus 1 Tablet(s) Oral two times a day with meals  loratadine 10 milliGRAM(s) Oral daily  losartan 25 milliGRAM(s) Oral daily  pantoprazole    Tablet 40 milliGRAM(s) Oral before breakfast  predniSONE   Tablet 20 milliGRAM(s) Oral daily  spironolactone 12.5 milliGRAM(s) Oral daily  sulfaSALAzine 500 milliGRAM(s) Oral three times a day  tiotropium 18 MICROgram(s) Capsule 1 Capsule(s) Inhalation daily  venlafaxine XR. 150 milliGRAM(s) Oral daily  verapamil  milliGRAM(s) Oral daily    MEDICATIONS  (PRN):  acetaminophen   Tablet .. 650 milliGRAM(s) Oral every 6 hours PRN Temp greater or equal to 38C (100.4F), Mild Pain (1 - 3)  artificial tears (preservative free) Ophthalmic Solution 1 Drop(s) Both EYES four times a day PRN Dry Eyes  dextrose 40% Gel 15 Gram(s) Oral once PRN Blood Glucose LESS THAN 70 milliGRAM(s)/deciliter  glucagon  Injectable 1 milliGRAM(s) IntraMuscular once PRN Glucose LESS THAN 70 milligrams/deciliter  oxyCODONE    5 mG/acetaminophen 325 mG 2 Tablet(s) Oral every 6 hours PRN Severe Pain (7 - 10)  oxyCODONE    5 mG/acetaminophen 325 mG 1 Tablet(s) Oral every 6 hours PRN Moderate Pain (4 - 6)  polyethylene glycol 3350 17 Gram(s) Oral daily PRN Constipation      Allergies    penicillins (Hives)  Plavix (Short breath)  quinolines (Other)    Intolerances    Vital Signs Last 24 Hrs  T(C): 36.5 (2019 04:31), Max: 36.9 (2019 16:23)  T(F): 97.7 (2019 04:31), Max: 98.5 (2019 16:23)  HR: 90 (2019 07:47) (90 - 104)  BP: 109/72 (2019 04:31) (93/59 - 129/73)  BP(mean): --  RR: 19 (2019 04:31) (18 - 20)  SpO2: 97% (2019 07:47) (94% - 99%)    I&O's Summary      PHYSICAL EXAM:  TELE: SR-ST  Constitutional: NAD, awake and alert, well-developed  HEENT: Moist Mucous Membranes, Anicteric  Pulmonary: Non-labored, breath sounds are diminished bilaterally, + wheezing.  No rales or rhonchi  Cardiovascular: Regular, S1 and S2, No murmurs, rubs, gallops or clicks  Gastrointestinal: Bowel Sounds present, soft, nontender.   Lymph: No peripheral edema. No lymphadenopathy.  Skin: No visible rashes or ulcers.  Psych:  Mood & affect:  Very anxious    LABS: All Labs Reviewed:                        10.3   12.33 )-----------( 179      ( 2019 07:04 )             32.1                         12.4   16.09 )-----------( 235      ( 2019 07:44 )             38.1                         13.1   10.23 )-----------( 262      ( 2019 05:40 )             40.4     2019 07:04    134    |  97     |  14     ----------------------------<  103    4.9     |  33     |  0.51   2019 08:08    135    |  98     |  17     ----------------------------<  150    3.7     |  32     |  0.61   2019 05:40    136    |  99     |  22     ----------------------------<  144    4.5     |  29     |  0.73     Ca    8.1        2019 07:04  Ca    8.1        2019 08:08  Ca    8.2        2019 05:40  Mg     2.4       2019 08:08    TPro  7.7    /  Alb  4.0    /  TBili  0.3    /  DBili  x      /  AST  35     /  ALT  49     /  AlkPhos  107    2019 20:44    CARDIAC MARKERS ( 2019 08:08 )  3.310 ng/mL / x     / 221 U/L / x     / 14.5 ng/mL  CARDIAC MARKERS ( 2019 19:30 )  4.690 ng/mL / x     / 333 U/L / x     / 19.6 ng/mL  CARDIAC MARKERS ( 2019 13:33 )  4.530 ng/mL / x     / 339 U/L / x     / 22.1 ng/mL    < from: TTE Echo Doppler w/o Cont (29.18 @ 11:33) >     EXAM:  ECHO TTE WO CON COMP W DOPPLR         PROCEDURE DATE:  2018        INTERPRETATION:  Ordering Physician: MANUELA HAYS [id number]    Indication: Shortness of breath    Study Quality: Technically difficult   A complete echocardiographic study was performed utilizing standard   protocol including spectral and color Doppler in all echocardiographic   windows.    Height: 158.7 cm  Weight: 63.5 kg  BSA: 1.7 sq m  Blood Pressure: 112/70    MEASUREMENTS  IVS: 1.0cm  PWT: 1.0cm  LA: 4.0cm  AO: 2.2cm  LVIDd: 4.8cm  LVIDs: 2.7cm    LVEF: 50%  RVSP: 31mmHg    FINDINGS  Left Ventricle: The endocardium is not well-visualized. Low normal left   ventricular systolic function. Paradoxical motion of the septum in the   setting of a left bundle branch block.  Aortic Valve: Well seated aortic valve bioprosthesis with mildly elevated   gradients (mean aortic valve gradient 18 mm Hg). There is a mobile   echodensity in the left ventricular outflow tract measuring 0.5 x 1.2 cm.   No significant aortic regurgitation.  Mitral Valve: Thickened and calcified mitral valve leaflets with normal   opening. Mild mitral regurgitation  Tricuspid Valve: Mild tricuspid regurgitation.  Pulmonic Valve: Not well-visualized  Left Atrium: Mild left atrial enlargement  Right Ventricle: Not well-visualized. In limited views, there appears to   be normal RV size and systolic function.  Right Atrium: Grossly normal  Diastolic Function: Doppler evidence of diastolic dysfunction, and   elevated left sided filling pressures  Pericardium/Pleura: No pericardial effusion.  Hemodynamics: The pulmonary artery systolic pressure is estimated to be   31 mmHg, assuming the right atrial pressure is equal to 3 mmHg,   consistent with normal pulmonary pressures.      CONCLUSIONS:  1. Technically difficult and limited study.  2. Low normal left ventricular systolic function. Paradoxical motion of   the septum in the setting of a left bundle branch block.  3. Well seated aortic valve bioprosthesis with mildly elevated gradients   (mean aortic valve gradient 18 mm Hg). There is a mobile echodensity in   the left ventricular outflow tract adjacent to the aortic valve measuring   0.5 x 1.2 cm.   4. Thickened and calcified mitral valve leaflets with normal opening.   Mild mitral regurgitation  5. Mild left atrial enlargement  6. Doppler evidence of diastolic dysfunction, and elevated left sided   filling pressures  7. No pericardial effusion.      OUMAR CRISOSTOMO   This document has been electronically signed. 2018 12:20PM      < end of copied text >    < from: CT Angio Chest w/ IV Cont (19 @ 12:25) >    EXAM:  CT ANGIO CHEST (W)AW IC                            PROCEDURE DATE:  2019          INTERPRETATION:  CLINICAL INFORMATION: Dyspnea. Evaluate for pulmonary   embolism.    COMPARISON: CT chest 2017    PROCEDURE:   CT Angiography of the Chest.  49 ml of Omnipaque 350 was injected intravenously.   Sagittal and coronal reformats were performed as well as 3D (MIP)   reconstructions.      FINDINGS:    CHEST:     LUNGS AND LARGE AIRWAYS: Patent central airways.  Patchy bilateral   groundglass opacities, greater in both lower lobes. Mild interlobular   septal thickening, also greater in the lower lobes mild emphysematous   changes. Right lower lobe opacity along the pleural effusion, likely   atelectasis.  PLEURA: Small bilateral pleural effusions.  VESSELS: Evaluation of the pulmonary arteries demonstrates no pulmonary   embolism. Thoracic aorta upper limits of normal for caliber with mild   calcified plaque.  HEART: Enlarged. No pericardial effusion. Aortic valve replacement.  MEDIASTINUM AND BARBARA: No interval development of lymphadenopathy. Stable   medial segment lymph nodes measuring up to 9 mm short axis in the   pretracheal region.  CHEST WALL AND LOWER NECK: No enlarged axillary lymph nodes. Right breast   skin thickening and a density at the lateral aspect of the right breast   are again noted.  VISUALIZED UPPER ABDOMEN: Stable left adrenal gland thickening.  BONES: Status post sternotomy. Partially imaged spinal stimulator.    IMPRESSION:     No pulmonary embolism.    Bilateral groundglass opacities and interlobular septal thickening,   greater in the lower lobes which may reflect pulmonary edema.    Small bilateral pleural effusions.    Right breast skin thickening and density at the lateral aspect of the   right breast, unchanged; correlation is recommended with mammography and   ultrasound.      JANN LAZAR   This document has been electronically signed. 2019  2:56PM      < end of copied text >      < from: TTE Echo Doppler w/o Cont (19 @ 10:36) >     EXAM:  ECHO TTE WO CON COMP W DOPPLR         PROCEDURE DATE:  2019        INTERPRETATION:  INDICATION: Dyspnea    Blood Pressure 100/67    Height 159 cm     Weight 63 kg       BSA 1.65 sq   m    Dimensions:    LA 3.7       Normal Values: 2.0- 4.0 cm    Ao 3.3        Normal Values: 2.0 - 3.8 cm  SEPTUM 1.3       Normal Values: 0.6 - 1.2 cm  PWT 1.3       Normal Values: 0.6 - 1.1 cm  LVIDd 4.2         Normal Values: 3.0 - 5.6 cm  LVIDs 3.2         Normal Values: 1.8 - 4.0 cm      OBSERVATIONS:    Technically difficult study  Mitral Valve: Mitral annular calcification is present with thickened   mitral valve leaflets, moderate mitral regurgitation   Aortic Valve/Aorta: Well-seated aortic valve bioprosthesis with mildly   elevated gradients. Peak aortic valve gradient is 17.8 mmHg with a mean   aortic gradient of 9.5 mmHg.  Tricuspid Valve: normal with trace TR.  Pulmonic Valve: Not well visualized  Left Atrium: Mildly dilated  Right Atrium: Not well visualized  Left Ventricle: endocardium not well visualized. Ejection fraction   appears to be about 40%. Cannot comment on segmental LV dysfunction   although there appears to be apical hypokinesis. Mild left ventricular   hypertrophy is present  Right Ventricle: Not well visualized but systolic function appears mildly   decreased  Pericardium/Pleura: normal, no significant pericardial effusion.  Pulmonary/RV Pressure: estimated PA systolic pressure of 28.3 mmHg   LV diastolic dysfunction is present    Conclusion:  Technically difficult study  Left ventricular endocardium not well visualized. Ejection fraction   appears to be about 40%. Cannot comment on segmental LV dysfunction   although there appears to be apical hypokinesis. Mild left ventricular   hypertrophy is present  Well-seated aortic valve bioprosthesis with mildly elevated gradients.   Peak aortic valve gradient is 17.8 mmHg with a mean aortic gradient of   9.5 mmHg.  Mitral annular calcification is present with thickened mitral valve   leaflets, moderate mitral regurgitation is present      JOSÉ MANUEL DE LOS SANTOS   This document has been electronically signed. 2019  8:18AM      < end of copied text > [facility] -- Tawny García, Mustapha Henderson Pannella, Patel, Savella  Office # [phone]    Follow Up:  SOB, elevated cardiac enzymes, NSTEMI?    Subjective/Observations: Awake and alert, seen laying flat with nasal cannula.  No respiratory discomfort. However, became very anxious during conversation, insisting she has to go to [place] for cardiac cath today.  Claimed to have severe respiratory discomfort again overnight.  Denies CP or palpitation    REVIEW OF SYSTEMS: All other review of systems is negative unless indicated above    PAST MEDICAL & SURGICAL HISTORY:  Alcoholism: last drink   Skin cancer: not melanoma  Aortic valve replaced: with bovine heart valve  Meniere disease  Diabetes mellitus  Glaucoma  Dry eyes  GERD (gastroesophageal reflux disease)  Emphysema lung  Spinal stenosis  CHF (congestive heart failure)  LBBB (left bundle branch block)  TMJ (temporomandibular joint disorder)  Diverticulitis: hospitalized   SIMÓN (obstructive sleep apnea): category I severe pt does not use c/pap  Thrush: treated x 4 days  1 month ago  restarted medication  3-19-14 clortramazole 5x day  Anxiety  RA (rheumatoid arthritis): diagnosed   oxycodone prn  neck/ lower back  Depression  Borderline diabetes: no meds  COPD (chronic obstructive pulmonary disease): diagnosed   inhaler and nebulizer  HTN (hypertension)  PVD (peripheral vascular disease): left iliac  s/p surgical intervention   unsuccessful  Hiatal hernia: s/p surgical repair   Breast cancer: right s/p lumpectomy and radiation   TIA (transient ischemic attack): 2010  Hyperlipidemia  Valvular heart disease: aortic and mitral  H/O:  section: 2x  H/O sinus surgery  S/P AVR (aortic valve replacement): about 5 yrs ago (2013?)  PVD (peripheral vascular disease): s/p left iliac bypass which was unsuccessful - open repair  S/P carpal tunnel release: right   S/P lumpectomy, right breast: 2008  Hiatal hernia: s/p surgical repair per pt   S/P hernia surgery: left inguinal age 12  S/P rotator cuff surgery: left 2004  S/P appendectomy:   S/P  section: x2 1965/     MEDICATIONS  (STANDING):  ALBUTerol/ipratropium for Nebulization 3 milliLiter(s) Nebulizer every 6 hours  ARIPiprazole 2 milliGRAM(s) Oral at bedtime  aspirin  chewable 81 milliGRAM(s) Oral daily  atorvastatin 80 milliGRAM(s) Oral at bedtime  buDESOnide 160 MICROgram(s)/formoterol 4.5 MICROgram(s) Inhaler 2 Puff(s) Inhalation two times a day  clonazePAM Tablet 0.25 milliGRAM(s) Oral two times a day  dextrose 5%. 1000 milliLiter(s) (50 mL/Hr) IV Continuous <Continuous>  dextrose 50% Injectable 12.5 Gram(s) IV Push once  dextrose 50% Injectable 25 Gram(s) IV Push once  dextrose 50% Injectable 25 Gram(s) IV Push once  docusate sodium 100 milliGRAM(s) Oral two times a day  enoxaparin Injectable 65 milliGRAM(s) SubCutaneous every 12 hours  furosemide    Tablet 40 milliGRAM(s) Oral daily  insulin lispro (HumaLOG) corrective regimen sliding scale   SubCutaneous three times a day before meals  insulin lispro (HumaLOG) corrective regimen sliding scale   SubCutaneous at bedtime  lactobacillus acidophilus 1 Tablet(s) Oral two times a day with meals  loratadine 10 milliGRAM(s) Oral daily  losartan 25 milliGRAM(s) Oral daily  pantoprazole    Tablet 40 milliGRAM(s) Oral before breakfast  predniSONE   Tablet 20 milliGRAM(s) Oral daily  spironolactone 12.5 milliGRAM(s) Oral daily  sulfaSALAzine 500 milliGRAM(s) Oral three times a day  tiotropium 18 MICROgram(s) Capsule 1 Capsule(s) Inhalation daily  venlafaxine XR. 150 milliGRAM(s) Oral daily  verapamil  milliGRAM(s) Oral daily    MEDICATIONS  (PRN):  acetaminophen   Tablet .. 650 milliGRAM(s) Oral every 6 hours PRN Temp greater or equal to 38C (100.4F), Mild Pain (1 - 3)  artificial tears (preservative free) Ophthalmic Solution 1 Drop(s) Both EYES four times a day PRN Dry Eyes  dextrose 40% Gel 15 Gram(s) Oral once PRN Blood Glucose LESS THAN 70 milliGRAM(s)/deciliter  glucagon  Injectable 1 milliGRAM(s) IntraMuscular once PRN Glucose LESS THAN 70 milligrams/deciliter  oxyCODONE    5 mG/acetaminophen 325 mG 2 Tablet(s) Oral every 6 hours PRN Severe Pain (7 - 10)  oxyCODONE    5 mG/acetaminophen 325 mG 1 Tablet(s) Oral every 6 hours PRN Moderate Pain (4 - 6)  polyethylene glycol 3350 17 Gram(s) Oral daily PRN Constipation      Allergies    penicillins (Hives)  Plavix (Short breath)  quinolines (Other)    Intolerances    Vital Signs Last 24 Hrs  T(C): 36.5 (2019 04:31), Max: 36.9 (2019 16:23)  T(F): 97.7 (2019 04:31), Max: 98.5 (2019 16:23)  HR: 90 (2019 07:47) (90 - 104)  BP: 109/72 (2019 04:31) (93/59 - 129/73)  BP(mean): --  RR: 19 (2019 04:31) (18 - 20)  SpO2: 97% (2019 07:47) (94% - 99%)    I&O's Summary      PHYSICAL EXAM:  TELE: SR-ST  Constitutional: NAD, awake and alert, well-developed  HEENT: Moist Mucous Membranes, Anicteric  Pulmonary: Non-labored, breath sounds are diminished bilaterally, + wheezing.  No rales or rhonchi  Cardiovascular: Regular, S1 and S2, +murmurs.  No rubs, gallops or clicks  Gastrointestinal: Bowel Sounds present, soft, nontender.   Lymph: No peripheral edema. No lymphadenopathy.  Skin: No visible rashes or ulcers.  Psych:  Mood & affect:  Very anxious    LABS: All Labs Reviewed:                        10.3   12.33 )-----------( 179      ( 2019 07:04 )             32.1                         12.4   16.09 )-----------( 235      ( 2019 07:44 )             38.1                         13.1   10.23 )-----------( 262      ( 2019 05:40 )             40.4     2019 07:04    134    |  97     |  14     ----------------------------<  103    4.9     |  33     |  0.51   2019 08:08    135    |  98     |  17     ----------------------------<  150    3.7     |  32     |  0.61   2019 05:40    136    |  99     |  22     ----------------------------<  144    4.5     |  29     |  0.73     Ca    8.1        2019 07:04  Ca    8.1        2019 08:08  Ca    8.2        2019 05:40  Mg     2.4       2019 08:08    TPro  7.7    /  Alb  4.0    /  TBili  0.3    /  DBili  x      /  AST  35     /  ALT  49     /  AlkPhos  107    2019 20:44    CARDIAC MARKERS ( 2019 08:08 )  3.310 ng/mL / x     / 221 U/L / x     / 14.5 ng/mL  CARDIAC MARKERS ( 2019 19:30 )  4.690 ng/mL / x     / 333 U/L / x     / 19.6 ng/mL  CARDIAC MARKERS ( 2019 13:33 )  4.530 ng/mL / x     / 339 U/L / x     / 22.1 ng/mL    < from: TTE Echo Doppler w/o Cont (11.29.18 @ 11:33) >     EXAM:  ECHO TTE WO CON COMP W DOPPLR         PROCEDURE DATE:  2018        INTERPRETATION:  Ordering Physician: MANUELA HAYS [id number]    Indication: Shortness of breath    Study Quality: Technically difficult   A complete echocardiographic study was performed utilizing standard   protocol including spectral and color Doppler in all echocardiographic   windows.    Height: 158.7 cm  Weight: 63.5 kg  BSA: 1.7 sq m  Blood Pressure: 112/70    MEASUREMENTS  IVS: 1.0cm  PWT: 1.0cm  LA: 4.0cm  AO: 2.2cm  LVIDd: 4.8cm  LVIDs: 2.7cm    LVEF: 50%  RVSP: 31mmHg    FINDINGS  Left Ventricle: The endocardium is not well-visualized. Low normal left   ventricular systolic function. Paradoxical motion of the septum in the   setting of a left bundle branch block.  Aortic Valve: Well seated aortic valve bioprosthesis with mildly elevated   gradients (mean aortic valve gradient 18 mm Hg). There is a mobile   echodensity in the left ventricular outflow tract measuring 0.5 x 1.2 cm.   No significant aortic regurgitation.  Mitral Valve: Thickened and calcified mitral valve leaflets with normal   opening. Mild mitral regurgitation  Tricuspid Valve: Mild tricuspid regurgitation.  Pulmonic Valve: Not well-visualized  Left Atrium: Mild left atrial enlargement  Right Ventricle: Not well-visualized. In limited views, there appears to   be normal RV size and systolic function.  Right Atrium: Grossly normal  Diastolic Function: Doppler evidence of diastolic dysfunction, and   elevated left sided filling pressures  Pericardium/Pleura: No pericardial effusion.  Hemodynamics: The pulmonary artery systolic pressure is estimated to be   31 mmHg, assuming the right atrial pressure is equal to 3 mmHg,   consistent with normal pulmonary pressures.      CONCLUSIONS:  1. Technically difficult and limited study.  2. Low normal left ventricular systolic function. Paradoxical motion of   the septum in the setting of a left bundle branch block.  3. Well seated aortic valve bioprosthesis with mildly elevated gradients   (mean aortic valve gradient 18 mm Hg). There is a mobile echodensity in   the left ventricular outflow tract adjacent to the aortic valve measuring   0.5 x 1.2 cm.   4. Thickened and calcified mitral valve leaflets with normal opening.   Mild mitral regurgitation  5. Mild left atrial enlargement  6. Doppler evidence of diastolic dysfunction, and elevated left sided   filling pressures  7. No pericardial effusion.      OUMAR CRISOSTOMO   This document has been electronically signed. 2018 12:20PM      < end of copied text >    < from: CT Angio Chest w/ IV Cont (19 @ 12:25) >    EXAM:  CT ANGIO CHEST (W)AW IC                            PROCEDURE DATE:  2019          INTERPRETATION:  CLINICAL INFORMATION: Dyspnea. Evaluate for pulmonary   embolism.    COMPARISON: CT chest 2017    PROCEDURE:   CT Angiography of the Chest.  49 ml of Omnipaque 350 was injected intravenously.   Sagittal and coronal reformats were performed as well as 3D (MIP)   reconstructions.      FINDINGS:    CHEST:     LUNGS AND LARGE AIRWAYS: Patent central airways.  Patchy bilateral   groundglass opacities, greater in both lower lobes. Mild interlobular   septal thickening, also greater in the lower lobes mild emphysematous   changes. Right lower lobe opacity along the pleural effusion, likely   atelectasis.  PLEURA: Small bilateral pleural effusions.  VESSELS: Evaluation of the pulmonary arteries demonstrates no pulmonary   embolism. Thoracic aorta upper limits of normal for caliber with mild   calcified plaque.  HEART: Enlarged. No pericardial effusion. Aortic valve replacement.  MEDIASTINUM AND BARBARA: No interval development of lymphadenopathy. Stable   medial segment lymph nodes measuring up to 9 mm short axis in the   pretracheal region.  CHEST WALL AND LOWER NECK: No enlarged axillary lymph nodes. Right breast   skin thickening and a density at the lateral aspect of the right breast   are again noted.  VISUALIZED UPPER ABDOMEN: Stable left adrenal gland thickening.  BONES: Status post sternotomy. Partially imaged spinal stimulator.    IMPRESSION:     No pulmonary embolism.    Bilateral groundglass opacities and interlobular septal thickening,   greater in the lower lobes which may reflect pulmonary edema.    Small bilateral pleural effusions.    Right breast skin thickening and density at the lateral aspect of the   right breast, unchanged; correlation is recommended with mammography and   ultrasound.      JANN LAZAR   This document has been electronically signed. 2019  2:56PM      < end of copied text >      < from: TTE Echo Doppler w/o Cont (19 @ 10:36) >     EXAM:  ECHO TTE WO CON COMP W DOPPLR         PROCEDURE DATE:  2019        INTERPRETATION:  INDICATION: Dyspnea    Blood Pressure 100/67    Height 159 cm     Weight 63 kg       BSA 1.65 sq   m    Dimensions:    LA 3.7       Normal Values: 2.0- 4.0 cm    Ao 3.3        Normal Values: 2.0 - 3.8 cm  SEPTUM 1.3       Normal Values: 0.6 - 1.2 cm  PWT 1.3       Normal Values: 0.6 - 1.1 cm  LVIDd 4.2         Normal Values: 3.0 - 5.6 cm  LVIDs 3.2         Normal Values: 1.8 - 4.0 cm      OBSERVATIONS:    Technically difficult study  Mitral Valve: Mitral annular calcification is present with thickened   mitral valve leaflets, moderate mitral regurgitation   Aortic Valve/Aorta: Well-seated aortic valve bioprosthesis with mildly   elevated gradients. Peak aortic valve gradient is 17.8 mmHg with a mean   aortic gradient of 9.5 mmHg.  Tricuspid Valve: normal with trace TR.  Pulmonic Valve: Not well visualized  Left Atrium: Mildly dilated  Right Atrium: Not well visualized  Left Ventricle: endocardium not well visualized. Ejection fraction   appears to be about 40%. Cannot comment on segmental LV dysfunction   although there appears to be apical hypokinesis. Mild left ventricular   hypertrophy is present  Right Ventricle: Not well visualized but systolic function appears mildly   decreased  Pericardium/Pleura: normal, no significant pericardial effusion.  Pulmonary/RV Pressure: estimated PA systolic pressure of 28.3 mmHg   LV diastolic dysfunction is present    Conclusion:  Technically difficult study  Left ventricular endocardium not well visualized. Ejection fraction   appears to be about 40%. Cannot comment on segmental LV dysfunction   although there appears to be apical hypokinesis. Mild left ventricular   hypertrophy is present  Well-seated aortic valve bioprosthesis with mildly elevated gradients.   Peak aortic valve gradient is 17.8 mmHg with a mean aortic gradient of   9.5 mmHg.  Mitral annular calcification is present with thickened mitral valve   leaflets, moderate mitral regurgitation is present      JOSÉ MANUEL DE LOS SANTOS   This document has been electronically signed. 2019  8:18AM      < end of copied text >

## 2019-04-30 NOTE — PROGRESS NOTE ADULT - ASSESSMENT
79 y/o female with ASHD, HLD, DM, RA, COPD on nocturnal home O2 2-3 LPM via NC, anxiety, presented with severe dyspnea, wheezing admitted for acute hypoxic respiratory distress.

## 2019-04-30 NOTE — PROGRESS NOTE ADULT - PROBLEM SELECTOR PLAN 6
- Patient switched from full dose lovenox to 40mg daily.  IMPROVE VTE Individual Risk Assessment          RISK                                                          Points  [  ] Previous VTE                                                3  [  ] Thrombophilia                                             2  [  ] Lower limb paralysis                                   2        (unable to hold up >15 seconds)    [  ] Current Cancer                                            2         (within 6 months)  [  ] Immobilization > 24 hrs                              1  [  ] ICU/CCU stay > 24 hours                            1  [ x ] Age > 60                                                    1  IMPROVE VTE Score __1____

## 2019-04-30 NOTE — PROGRESS NOTE ADULT - ASSESSMENT
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  Troponin mildly positive, uptrending, flat CK mostly consistent with demand ischemia:    - NSTEMI: Cardiac enzymes trended up and peaked and downtrended  - No arrhythmias on tele.  EKG with LBBB  - No anginal symptoms.  Continue to monitor  - She is being medically managed for a possible NSTEMI, though, it could be a demand ischemia.  - s/p ASA and Brilinta load (She claimed to be have had a bad respiratory reaction from Plavix in the past). However, she claimed to have had the same reaction with Brilinta and asked to have it discontinued, stating, she will never take it again.  - Today, she demanded to be transferred to [place] for cardiac cath.  T  - Continue ASA and statin  - Will hold ARB for now and start low dose BB for rate control, though, tachycardia maybe reactive  - We will need to address the possibility of an ischemic eval when her acute issues are resolved.  If not inpatient, could be done as outpatient.    - No evidence of volume overload (pro-XQG=985).  s/p IV Lasix.  Continue PO Lasix  - For repeat echocardiogram.   She has a mobile echodensity associated with the aortomitral curtain that is known and followed conservatively .    - Hold Aldactone for now.  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  Troponin mildly positive, uptrending, flat CK mostly consistent with demand ischemia:    - NSTEMI: Cardiac enzymes trended up and peaked and downtrended  - No arrhythmias on tele.  EKG with LBBB  - No anginal symptoms.  Continue to monitor  - She is being medically managed for a possible NSTEMI, though, it could be a demand ischemia.  - s/p ASA and Brilinta load (She claimed to be have had a bad respiratory reaction from Plavix in the past). However, she claimed to have had the same reaction with Brilinta and asked to have it discontinued, stating, she will never take it again.  - Today, she demanded to be transferred to [place] for cardiac cath.  The topic on cardiac catheterization was discussed with her and her daughter this morning.  It was emphasized to them that a positive cardiac cath will require a stent which will necessitate a non-interrupted DAPT for a year.  Effient was also offered to the patient since she is not able to tolerate either Plavix or Brilinta, however, she is not willing to try it.  Risks vs benefits of not complying with DAPT after a stent explained to both patient and daughter.  - Continue ASA and statin  - Will hold ARB for now and start low dose BB for rate control, though, tachycardia maybe reactive  - Discontinue full dose Lovenox    - HFrEF: TTE showed EF 40% with evidence of apical hypokinesis, with moderate MR.  She had a previous TTE that showed a mobile echodensity associated with the aortomitral curtain that is known and followed conservatively .    - No evidence of volume overload (pro-LUO=337).  s/p IV Lasix.  Continue PO Lasix  - Hold Aldactone for now.  Borderline hypotensive and she appears on the dry side  - Continue verapamil at current dose  - Monitor and replete lytes    - CTA chest negative of PE, possible pulmonary edema, small B/L effusions  - Continue steroid and bronchodilators.  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  Troponin mildly positive, uptrending, flat CK mostly consistent with demand ischemia:    - NSTEMI: Cardiac enzymes trended up and peaked and downtrended  - No arrhythmias on tele.  EKG with LBBB  - No anginal symptoms.  Continue to monitor  - She is being medically managed for a possible NSTEMI, though, it could be a demand ischemia.  - s/p ASA and Brilinta load (She claimed to be have had a bad respiratory reaction from Plavix in the past). However, she claimed to have had the same reaction with Brilinta and asked to have it discontinued, stating, she will never take it again.  - Today, she demanded to be transferred to [place] for cardiac cath.  The topic on cardiac catheterization was discussed with her and her daughter this morning.  It was emphasized to them that a positive cardiac cath will require either that we limit ourselves to ptca, or to perform pci which will necessitate a non-interrupted DAPT for a year.  Effient was offered to the patient since she is not able to tolerate either Plavix or Brilinta, however, she is not willing to try it.  Risks vs benefits of not complying with DAPT after a stent explained to both patient and daughter.  - Continue ASA and statin  - Will hold ARB for now and start low dose BB for rate control, though, tachycardia maybe reactive  - Discontinue full dose Lovenox    - HFrEF: TTE showed EF 40% with evidence of apical hypokinesis, with moderate MR.  She had a previous TTE that showed a mobile echodensity associated with the aortomitral curtain that is known and followed conservatively .    - No evidence of volume overload (pro-VMO=237).  s/p IV Lasix.  Continue PO Lasix  - Hold Aldactone for now.  Borderline hypotensive and she appears on the dry side  - Continue verapamil at current dose  - Monitor and replete lytes    - CTA chest negative of PE, possible pulmonary edema, small B/L effusions  - Continue steroid and bronchodilators.  Follow Pulm recs  - DVT ppx    - Further cardiac workup pending clinical course  - Other workup per Primary  - To follow    Crista Winslow NP  Cardiology

## 2019-04-30 NOTE — PROGRESS NOTE ADULT - SUBJECTIVE AND OBJECTIVE BOX
HPI:  79 y/o female with ASHD, HLD, DM, RA, COPD on nocturnal home O2 2-3 LPM via NC, anxiety, presenting with chief complaint of dyspnea.    INTERVAL/OVERNIGHT EVENTS: Overnight patient endorsed another episode of dyspnea. Endorses wheezing and chest tightness with nonproductive cough. States she feels like she still requires the nasal cannula during the daytime - at home only uses oxygen at night. Denies chest pain/palpitations.     REVIEW OF SYSTEMS:    CONSTITUTIONAL: No weakness, fevers or chills  RESPIRATORY: +nonproductive cough, + wheezing, no hemoptysis; + shortness of breath  CARDIOVASCULAR: No chest pain or palpitations  GASTROINTESTINAL: No abdominal pain, nausea, vomiting, or hematemesis; No diarrhea or constipation. No melena or hematochezia.  GENITOURINARY: No dysuria, frequency or hematuria  NEUROLOGICAL: No dizziness, numbness, or weakness  SKIN: No itching, burning, rashes, or lesions   All other review of systems is negative unless indicated above.    T(C): 36.7 (04-30-19 @ 09:12), Max: 36.9 (04-29-19 @ 16:23)  HR: 111 (04-30-19 @ 09:12) (90 - 111)  BP: 105/64 (04-30-19 @ 09:12) (93/59 - 129/73)  RR: 19 (04-30-19 @ 09:12) (18 - 20)  SpO2: 98% (04-30-19 @ 09:12) (94% - 99%)    PHYSICAL EXAM:     GENERAL: no acute distress, on nasal cannula   HEENT: NC/AT, EOMI  RESPIRATORY: diffuse wheezing bilateral lungs  CARDIOVASCULAR: RRR, no murmurs, gallops, rubs  ABDOMINAL: soft, non-tender, non-distended, positive bowel sounds   EXTREMITIES: no clubbing, cyanosis, or edema  NEUROLOGICAL: alert and oriented x 3, non-focal  SKIN: no rashes or lesions                           10.3   12.33 )-----------( 179      ( 30 Apr 2019 07:04 )             32.1     04-30    134<L>  |  97  |  14  ----------------------------<  103<H>  4.9   |  33<H>  |  0.51    Ca    8.1<L>      30 Apr 2019 07:04  Mg     2.4     04-29        CAPILLARY BLOOD GLUCOSE      POCT Blood Glucose.: 140 mg/dL (30 Apr 2019 07:56)  POCT Blood Glucose.: 132 mg/dL (29 Apr 2019 21:12)  POCT Blood Glucose.: 133 mg/dL (29 Apr 2019 16:51)  POCT Blood Glucose.: 131 mg/dL (29 Apr 2019 12:23)      MEDICATIONS  (STANDING):  ALBUTerol/ipratropium for Nebulization 3 milliLiter(s) Nebulizer every 6 hours  ARIPiprazole 2 milliGRAM(s) Oral at bedtime  aspirin  chewable 81 milliGRAM(s) Oral daily  atorvastatin 80 milliGRAM(s) Oral at bedtime  buDESOnide 160 MICROgram(s)/formoterol 4.5 MICROgram(s) Inhaler 2 Puff(s) Inhalation two times a day  clonazePAM Tablet 0.25 milliGRAM(s) Oral two times a day  dextrose 5%. 1000 milliLiter(s) (50 mL/Hr) IV Continuous <Continuous>  dextrose 50% Injectable 12.5 Gram(s) IV Push once  dextrose 50% Injectable 25 Gram(s) IV Push once  dextrose 50% Injectable 25 Gram(s) IV Push once  docusate sodium 100 milliGRAM(s) Oral two times a day  furosemide    Tablet 40 milliGRAM(s) Oral daily  insulin lispro (HumaLOG) corrective regimen sliding scale   SubCutaneous three times a day before meals  insulin lispro (HumaLOG) corrective regimen sliding scale   SubCutaneous at bedtime  lactobacillus acidophilus 1 Tablet(s) Oral two times a day with meals  loratadine 10 milliGRAM(s) Oral daily  losartan 25 milliGRAM(s) Oral daily  methylPREDNISolone sodium succinate Injectable 30 milliGRAM(s) IV Push two times a day  pantoprazole    Tablet 40 milliGRAM(s) Oral before breakfast  spironolactone 12.5 milliGRAM(s) Oral daily  sulfaSALAzine 500 milliGRAM(s) Oral three times a day  tiotropium 18 MICROgram(s) Capsule 1 Capsule(s) Inhalation daily  venlafaxine XR. 150 milliGRAM(s) Oral daily  verapamil  milliGRAM(s) Oral daily

## 2019-05-01 ENCOUNTER — TRANSCRIPTION ENCOUNTER (OUTPATIENT)
Age: 79
End: 2019-05-01

## 2019-05-01 VITALS
RESPIRATION RATE: 16 BRPM | SYSTOLIC BLOOD PRESSURE: 125 MMHG | DIASTOLIC BLOOD PRESSURE: 73 MMHG | OXYGEN SATURATION: 95 % | HEART RATE: 103 BPM | TEMPERATURE: 98 F

## 2019-05-01 LAB
ANION GAP SERPL CALC-SCNC: 7 MMOL/L — SIGNIFICANT CHANGE UP (ref 5–17)
BUN SERPL-MCNC: 16 MG/DL — SIGNIFICANT CHANGE UP (ref 7–23)
CALCIUM SERPL-MCNC: 8.6 MG/DL — SIGNIFICANT CHANGE UP (ref 8.5–10.1)
CHLORIDE SERPL-SCNC: 99 MMOL/L — SIGNIFICANT CHANGE UP (ref 96–108)
CO2 SERPL-SCNC: 31 MMOL/L — SIGNIFICANT CHANGE UP (ref 22–31)
CREAT SERPL-MCNC: 0.55 MG/DL — SIGNIFICANT CHANGE UP (ref 0.5–1.3)
GLUCOSE SERPL-MCNC: 107 MG/DL — HIGH (ref 70–99)
HCT VFR BLD CALC: 33.2 % — LOW (ref 34.5–45)
HGB BLD-MCNC: 10.7 G/DL — LOW (ref 11.5–15.5)
MCHC RBC-ENTMCNC: 30.9 PG — SIGNIFICANT CHANGE UP (ref 27–34)
MCHC RBC-ENTMCNC: 32.2 GM/DL — SIGNIFICANT CHANGE UP (ref 32–36)
MCV RBC AUTO: 96 FL — SIGNIFICANT CHANGE UP (ref 80–100)
NRBC # BLD: 0 /100 WBCS — SIGNIFICANT CHANGE UP (ref 0–0)
PLATELET # BLD AUTO: 202 K/UL — SIGNIFICANT CHANGE UP (ref 150–400)
POTASSIUM SERPL-MCNC: 4 MMOL/L — SIGNIFICANT CHANGE UP (ref 3.5–5.3)
POTASSIUM SERPL-SCNC: 4 MMOL/L — SIGNIFICANT CHANGE UP (ref 3.5–5.3)
RBC # BLD: 3.46 M/UL — LOW (ref 3.8–5.2)
RBC # FLD: 13.9 % — SIGNIFICANT CHANGE UP (ref 10.3–14.5)
SODIUM SERPL-SCNC: 137 MMOL/L — SIGNIFICANT CHANGE UP (ref 135–145)
WBC # BLD: 9.27 K/UL — SIGNIFICANT CHANGE UP (ref 3.8–10.5)
WBC # FLD AUTO: 9.27 K/UL — SIGNIFICANT CHANGE UP (ref 3.8–10.5)

## 2019-05-01 PROCEDURE — 93306 TTE W/DOPPLER COMPLETE: CPT

## 2019-05-01 PROCEDURE — 83605 ASSAY OF LACTIC ACID: CPT

## 2019-05-01 PROCEDURE — 80048 BASIC METABOLIC PNL TOTAL CA: CPT

## 2019-05-01 PROCEDURE — 83735 ASSAY OF MAGNESIUM: CPT

## 2019-05-01 PROCEDURE — 71275 CT ANGIOGRAPHY CHEST: CPT

## 2019-05-01 PROCEDURE — 80053 COMPREHEN METABOLIC PANEL: CPT

## 2019-05-01 PROCEDURE — 85610 PROTHROMBIN TIME: CPT

## 2019-05-01 PROCEDURE — 83036 HEMOGLOBIN GLYCOSYLATED A1C: CPT

## 2019-05-01 PROCEDURE — 85027 COMPLETE CBC AUTOMATED: CPT

## 2019-05-01 PROCEDURE — 86140 C-REACTIVE PROTEIN: CPT

## 2019-05-01 PROCEDURE — 96365 THER/PROPH/DIAG IV INF INIT: CPT

## 2019-05-01 PROCEDURE — 99238 HOSP IP/OBS DSCHRG MGMT 30/<: CPT

## 2019-05-01 PROCEDURE — 94660 CPAP INITIATION&MGMT: CPT

## 2019-05-01 PROCEDURE — 94760 N-INVAS EAR/PLS OXIMETRY 1: CPT

## 2019-05-01 PROCEDURE — 93005 ELECTROCARDIOGRAM TRACING: CPT

## 2019-05-01 PROCEDURE — 82550 ASSAY OF CK (CPK): CPT

## 2019-05-01 PROCEDURE — 80061 LIPID PANEL: CPT

## 2019-05-01 PROCEDURE — 84484 ASSAY OF TROPONIN QUANT: CPT

## 2019-05-01 PROCEDURE — 94640 AIRWAY INHALATION TREATMENT: CPT

## 2019-05-01 PROCEDURE — 96375 TX/PRO/DX INJ NEW DRUG ADDON: CPT

## 2019-05-01 PROCEDURE — 36415 COLL VENOUS BLD VENIPUNCTURE: CPT

## 2019-05-01 PROCEDURE — 93970 EXTREMITY STUDY: CPT

## 2019-05-01 PROCEDURE — 74018 RADEX ABDOMEN 1 VIEW: CPT

## 2019-05-01 PROCEDURE — 87040 BLOOD CULTURE FOR BACTERIA: CPT

## 2019-05-01 PROCEDURE — 99232 SBSQ HOSP IP/OBS MODERATE 35: CPT

## 2019-05-01 PROCEDURE — 99291 CRITICAL CARE FIRST HOUR: CPT

## 2019-05-01 PROCEDURE — 82553 CREATINE MB FRACTION: CPT

## 2019-05-01 PROCEDURE — 71045 X-RAY EXAM CHEST 1 VIEW: CPT

## 2019-05-01 PROCEDURE — 83880 ASSAY OF NATRIURETIC PEPTIDE: CPT

## 2019-05-01 PROCEDURE — 82962 GLUCOSE BLOOD TEST: CPT

## 2019-05-01 PROCEDURE — 82803 BLOOD GASES ANY COMBINATION: CPT

## 2019-05-01 PROCEDURE — 84145 PROCALCITONIN (PCT): CPT

## 2019-05-01 RX ORDER — OMEPRAZOLE 10 MG/1
1 CAPSULE, DELAYED RELEASE ORAL
Qty: 0 | Refills: 0 | COMMUNITY

## 2019-05-01 RX ORDER — DIMENHYDRINATE 50 MG
0 TABLET ORAL
Qty: 0 | Refills: 0 | COMMUNITY

## 2019-05-01 RX ORDER — PANTOPRAZOLE SODIUM 20 MG/1
1 TABLET, DELAYED RELEASE ORAL
Qty: 30 | Refills: 0 | OUTPATIENT
Start: 2019-05-01 | End: 2019-05-30

## 2019-05-01 RX ADMIN — TIOTROPIUM BROMIDE 1 CAPSULE(S): 18 CAPSULE ORAL; RESPIRATORY (INHALATION) at 05:24

## 2019-05-01 RX ADMIN — Medication 40 MILLIGRAM(S): at 05:23

## 2019-05-01 RX ADMIN — LORATADINE 10 MILLIGRAM(S): 10 TABLET ORAL at 11:32

## 2019-05-01 RX ADMIN — Medication 650 MILLIGRAM(S): at 14:00

## 2019-05-01 RX ADMIN — Medication 81 MILLIGRAM(S): at 11:32

## 2019-05-01 RX ADMIN — OXYCODONE AND ACETAMINOPHEN 1 TABLET(S): 5; 325 TABLET ORAL at 09:30

## 2019-05-01 RX ADMIN — OXYCODONE AND ACETAMINOPHEN 1 TABLET(S): 5; 325 TABLET ORAL at 00:20

## 2019-05-01 RX ADMIN — Medication 0.25 MILLIGRAM(S): at 05:22

## 2019-05-01 RX ADMIN — PANTOPRAZOLE SODIUM 40 MILLIGRAM(S): 20 TABLET, DELAYED RELEASE ORAL at 05:24

## 2019-05-01 RX ADMIN — Medication 1 SPRAY(S): at 11:32

## 2019-05-01 RX ADMIN — SPIRONOLACTONE 12.5 MILLIGRAM(S): 25 TABLET, FILM COATED ORAL at 05:23

## 2019-05-01 RX ADMIN — Medication 650 MILLIGRAM(S): at 12:56

## 2019-05-01 RX ADMIN — ENOXAPARIN SODIUM 40 MILLIGRAM(S): 100 INJECTION SUBCUTANEOUS at 11:32

## 2019-05-01 RX ADMIN — LOSARTAN POTASSIUM 25 MILLIGRAM(S): 100 TABLET, FILM COATED ORAL at 05:29

## 2019-05-01 RX ADMIN — Medication 40 MILLIGRAM(S): at 11:32

## 2019-05-01 RX ADMIN — OXYCODONE AND ACETAMINOPHEN 1 TABLET(S): 5; 325 TABLET ORAL at 08:31

## 2019-05-01 RX ADMIN — Medication 500 MILLIGRAM(S): at 05:23

## 2019-05-01 RX ADMIN — Medication 100 MILLIGRAM(S): at 05:23

## 2019-05-01 RX ADMIN — Medication 240 MILLIGRAM(S): at 05:23

## 2019-05-01 RX ADMIN — Medication 1 TABLET(S): at 08:30

## 2019-05-01 RX ADMIN — Medication 3 MILLILITER(S): at 08:11

## 2019-05-01 RX ADMIN — BUDESONIDE AND FORMOTEROL FUMARATE DIHYDRATE 2 PUFF(S): 160; 4.5 AEROSOL RESPIRATORY (INHALATION) at 05:24

## 2019-05-01 RX ADMIN — Medication 150 MILLIGRAM(S): at 11:32

## 2019-05-01 NOTE — PROGRESS NOTE ADULT - ASSESSMENT
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  Troponin mildly positive, uptrending, flat CK mostly consistent with demand ischemia:    - NSTEMI: Cardiac enzymes trended up and peaked and downtrended  - No arrhythmias on tele.  EKG with LBBB  - No anginal symptoms.  Can discontinue telemetry  - She is being medically managed for a possible NSTEMI, though, it could be a demand ischemia.  - s/p ASA and Brilinta load (She claimed to be have had a bad respiratory reaction from Plavix in the past). However, she claimed to have had the same reaction with Brilinta and asked to have it discontinued, stating, she will never take it again.  - The topic on cardiac catheterization was discussed at length with her and her daughter.  It was emphasized to them that a positive cardiac cath will require either that we limit ourselves to ptca, or to perform pci which will necessitate a non-interrupted DAPT for a year.  Effient was offered to the patient since she is not able to tolerate either Plavix or Brilinta, however, she is not willing to try it.  Risks vs benefits of not complying with DAPT after a stent explained to both patient and daughter.  - Continue ASA and statin  - Will hold ARB for now and start low dose BB for rate control.  She has maintained NSR  - Off full dose Lovenox    - HFrEF: TTE showed EF 40% with evidence of apical hypokinesis, with moderate MR.  She had a previous TTE that showed a mobile echodensity associated with the aortomitral curtain that is known and followed conservatively .    - No evidence of volume overload (pro-EDO=653).  s/p IV Lasix.  Continue PO Lasix  - Hold Aldactone for now.  Borderline hypotensive and she appears on the dry side.  May resume upon discharge if appropriate  - Continue verapamil at current dose  - Monitor and replete lytes    - CTA chest negative of PE, possible pulmonary edema, small B/L effusions  - Continue steroid and bronchodilators.  Follow Pulm recs  - DVT ppx    - She follows up with her [facility], Dr. Raj Jackson  - Other workup per Primary  - Will continue to follow while inpatient    Crista Mann NP  Cardiology

## 2019-05-01 NOTE — PROGRESS NOTE ADULT - ATTENDING COMMENTS
Chart reviewed    Patient seen and examined    Agree with plan as outlined
I personally saw and examined the patient in detail.  I have spoken to the above provider regarding the assessment and plan of care.  I reviewed the above assessment and plan of care, and agree.  I have made changes in the body of the note where appropriate.
The patient was personally seen and examined, in addition to being examined and evaluated by NP.  All elements of the note were edited where appropriate. 45 minutes spent personally, discussing with patient, and then with daughter on the phone, the issues related to cath. She did not tolerate plavix and brillinta, and is unwilling to try effient.  if she cannot/will not take dapt, it is not reasonable to pursue invasive testing.  she will be managed conservatively for now in terms of elevated ce.  lack of sig cad at time of avr makes severe multivessel cad very unlikely. risks and benefits explained in detail.
Pt. seen and evaluated for acute respiratory distress and NSTEMI.  Pt. reported adverse response to Brilinta last night and now refusing to take any further.  Tolerating full dose anticoagulation.  No gross bleeding.  Tolerating steroids.  Physical examination as above.      Plan:  -Acute respiratory distress and acute COPD exacerbation:  CTA chest No pulmonary embolism.  Bilateral groundglass opacities and interlobular septal thickening, greater in the lower lobes which may reflect pulmonary edema.  Small bilateral pleural effusions.  Continue Prednisone 20mg PO daily, Duoneb tx Q6h, spiriva, symbicort, and O2 support.  Pulmonary f/u  -Elevated troponin/NSTEMI:  Pt. is chest pain free.  Had adverse response to Brilinta.  Pt. also intolerant to Plavix.  Continue ASA 81mg PO daily, Lovenox 65mg SQ Q12h, and Lipitor 80mg PO QHS.  Cardiology f/u  -Acute on Chronic Diastolic CHF:  s/p IV lasix.  Continue lasix 40mg PO daily and aldactone 12.5mg PO daily  -HTN:  continue losartan 25mg PO daily and Verapamil SR 240mg PO daily  -RA:  Pt. on prednisone 5mg PO BID at home.  Continue Prednisone 20mg PO daily as above and Sulfasalazine 500mg PO TID  -Type 2 DM:  continue humalog sliding scale  -VTE ppx:  Lovenox 40mg as above.
Pt. seen and evaluated for possible NSTEMI and acute COPD exacerbation.  Pt. is in no distress.  Reported having SOB last night.  Wheezing today.  Denies any CP.  Physical examination as above.      Plan:  -Acute respiratory distress and acute COPD exacerbation:  CTA chest No pulmonary embolism.  Bilateral groundglass opacities and interlobular septal thickening, greater in the lower lobes which may reflect pulmonary edema.  Small bilateral pleural effusions.  Switched to Solu-medrol 30mg IV BID.  Continue Duoneb tx Q6h, spiriva, symbicort, and O2 support.  Pulmonary f/u  -Elevated troponin/NSTEMI:  Pt. is chest pain free.  Had adverse response to Brilinta.  Pt. also intolerant to Plavix with SOB.  Continue ASA 81mg PO daily, , and Lipitor 80mg PO QHS.  Cardiology f/u  -Acute on Chronic Diastolic and systolic CHF exacerbation:  s/p IV lasix.  Echo with EF 40%  Continue lasix 40mg PO daily, aldactone 12.5mg PO daily, and Losartan 25mg PO daily  -HTN:  continue losartan 25mg PO daily and Verapamil SR 240mg PO daily  -RA:  Pt. on prednisone 5mg PO BID at home.  Continue Solumedrol as above and Sulfasalazine 500mg PO TID  -Type 2 DM:  continue humalog sliding scale  -VTE ppx:  Lovenox 40mg as above

## 2019-05-01 NOTE — PROGRESS NOTE ADULT - SUBJECTIVE AND OBJECTIVE BOX
[facility] -- Tawny García, Mustapha Henderson Pannella, Patel, Savella  Office # [phone]    Follow Up:  SOB, elevated cardiac enzymes, possiblle  NSTEMI    Subjective/Observations: Sitting on the chair, on RA.  Not in any respiratory discomfort.  Admits to have had a good night last night.  Denies CP or palpitations    REVIEW OF SYSTEMS: All other review of systems is negative unless indicated above    PAST MEDICAL & SURGICAL HISTORY:  Alcoholism: last drink   Skin cancer: not melanoma  Aortic valve replaced: with bovine heart valve  Meniere disease  Diabetes mellitus  Glaucoma  Dry eyes  GERD (gastroesophageal reflux disease)  Emphysema lung  Spinal stenosis  CHF (congestive heart failure)  LBBB (left bundle branch block)  TMJ (temporomandibular joint disorder)  Diverticulitis: hospitalized   SIMÓN (obstructive sleep apnea): category I severe pt does not use c/pap  Thrush: treated x 4 days  1 month ago  restarted medication  3-19-14 clortramazole 5x day  Anxiety  RA (rheumatoid arthritis): diagnosed   oxycodone prn  neck/ lower back  Depression  Borderline diabetes: no meds  COPD (chronic obstructive pulmonary disease): diagnosed   inhaler and nebulizer  HTN (hypertension)  PVD (peripheral vascular disease): left iliac  s/p surgical intervention   unsuccessful  Hiatal hernia: s/p surgical repair   Breast cancer: right s/p lumpectomy and radiation   TIA (transient ischemic attack): 2010  Hyperlipidemia  Valvular heart disease: aortic and mitral  H/O:  section: 2x  H/O sinus surgery  S/P AVR (aortic valve replacement): about 5 yrs ago (2013?)  PVD (peripheral vascular disease): s/p left iliac bypass which was unsuccessful  open repair  S/P carpal tunnel release: right 2002  S/P lumpectomy, right breast: 2008  Hiatal hernia: s/p surgical repair per pt   S/P hernia surgery: left inguinal age 12  S/P rotator cuff surgery: left   S/P appendectomy:   S/P  section: x2 /     MEDICATIONS  (STANDING):  ALBUTerol/ipratropium for Nebulization 3 milliLiter(s) Nebulizer every 6 hours  ARIPiprazole 2 milliGRAM(s) Oral at bedtime  aspirin  chewable 81 milliGRAM(s) Oral daily  atorvastatin 80 milliGRAM(s) Oral at bedtime  buDESOnide 160 MICROgram(s)/formoterol 4.5 MICROgram(s) Inhaler 2 Puff(s) Inhalation two times a day  clonazePAM Tablet 0.25 milliGRAM(s) Oral two times a day  dextrose 5%. 1000 milliLiter(s) (50 mL/Hr) IV Continuous <Continuous>  dextrose 50% Injectable 12.5 Gram(s) IV Push once  dextrose 50% Injectable 25 Gram(s) IV Push once  dextrose 50% Injectable 25 Gram(s) IV Push once  docusate sodium 100 milliGRAM(s) Oral two times a day  enoxaparin Injectable 40 milliGRAM(s) SubCutaneous daily  furosemide    Tablet 40 milliGRAM(s) Oral daily  insulin lispro (HumaLOG) corrective regimen sliding scale   SubCutaneous three times a day before meals  insulin lispro (HumaLOG) corrective regimen sliding scale   SubCutaneous at bedtime  lactobacillus acidophilus 1 Tablet(s) Oral two times a day with meals  loratadine 10 milliGRAM(s) Oral daily  losartan 25 milliGRAM(s) Oral daily  pantoprazole    Tablet 40 milliGRAM(s) Oral before breakfast  predniSONE   Tablet 40 milliGRAM(s) Oral daily  spironolactone 12.5 milliGRAM(s) Oral daily  sulfaSALAzine 500 milliGRAM(s) Oral three times a day  tiotropium 18 MICROgram(s) Capsule 1 Capsule(s) Inhalation daily  venlafaxine XR. 150 milliGRAM(s) Oral daily  verapamil  milliGRAM(s) Oral daily    MEDICATIONS  (PRN):  acetaminophen   Tablet .. 650 milliGRAM(s) Oral every 6 hours PRN Temp greater or equal to 38C (100.4F), Mild Pain (1 - 3)  artificial tears (preservative free) Ophthalmic Solution 1 Drop(s) Both EYES four times a day PRN Dry Eyes  dextrose 40% Gel 15 Gram(s) Oral once PRN Blood Glucose LESS THAN 70 milliGRAM(s)/deciliter  glucagon  Injectable 1 milliGRAM(s) IntraMuscular once PRN Glucose LESS THAN 70 milligrams/deciliter  oxyCODONE    5 mG/acetaminophen 325 mG 2 Tablet(s) Oral every 6 hours PRN Severe Pain (7 - 10)  oxyCODONE    5 mG/acetaminophen 325 mG 1 Tablet(s) Oral every 6 hours PRN Moderate Pain (4 - 6)  polyethylene glycol 3350 17 Gram(s) Oral daily PRN Constipation  sodium chloride 0.65% Nasal 1 Spray(s) Both Nostrils every 4 hours PRN Nasal Congestion    Allergies    penicillins (Hives)  Plavix (Short breath)  quinolines (Other)    Intolerances    Vital Signs Last 24 Hrs  T(C): 36.8 (01 May 2019 08:00), Max: 36.8 (2019 20:27)  T(F): 98.2 (01 May 2019 08:00), Max: 98.3 (2019 20:27)  HR: 98 (01 May 2019 08:15) (88 - 105)  BP: 110/67 (01 May 2019 08:00) (99/60 - 120/72)  BP(mean): --  RR: 17 (01 May 2019 08:00) (17 - 18)  SpO2: 95% (01 May 2019 08:15) (94% - 99%)    I&O's Summary      PHYSICAL EXAM:  TELE: NSR  Constitutional: NAD, awake and alert, well-developed  HEENT: Moist Mucous Membranes, Anicteric  Pulmonary: Non-labored, breath sounds are diminished bilaterally, No wheezing, rales or rhonchi  Cardiovascular: Regular, S1 and S2, No murmurs, rubs, gallops or clicks  Gastrointestinal: Bowel Sounds present, soft, nontender.   Lymph: No peripheral edema. No lymphadenopathy.  Skin: No visible rashes or ulcers.  Psych:  Mood & affect: Anxious    LABS: All Labs Reviewed:                        10.7   9.27  )-----------( 202      ( 01 May 2019 06:53 )             33.2                         10.3   12.33 )-----------( 179      ( 2019 07:04 )             32.1                         12.4   16.09 )-----------( 235      ( 2019 07:44 )             38.1     01 May 2019 06:53    137    |  99     |  16     ----------------------------<  107    4.0     |  31     |  0.55   2019 07:04    134    |  97     |  14     ----------------------------<  103    4.9     |  33     |  0.51   2019 08:08    135    |  98     |  17     ----------------------------<  150    3.7     |  32     |  0.61     Ca    8.6        01 May 2019 06:53  Ca    8.1        2019 07:04  Ca    8.1        2019 08:08  Mg     2.4       2019 08:08    < from: TTE Echo Doppler w/o Cont (18 @ 11:33) >     EXAM:  ECHO TTE WO CON COMP W DOPPLR         PROCEDURE DATE:  2018        INTERPRETATION:  Ordering Physician: MANUELA HAYS [id number]    Indication: Shortness of breath    Study Quality: Technically difficult   A complete echocardiographic study was performed utilizing standard   protocol including spectral and color Doppler in all echocardiographic   windows.    Height: 158.7 cm  Weight: 63.5 kg  BSA: 1.7 sq m  Blood Pressure: 112/70    MEASUREMENTS  IVS: 1.0cm  PWT: 1.0cm  LA: 4.0cm  AO: 2.2cm  LVIDd: 4.8cm  LVIDs: 2.7cm    LVEF: 50%  RVSP: 31mmHg    FINDINGS  Left Ventricle: The endocardium is not well-visualized. Low normal left   ventricular systolic function. Paradoxical motion of the septum in the   setting of a left bundle branch block.  Aortic Valve: Well seated aortic valve bioprosthesis with mildly elevated   gradients (mean aortic valve gradient 18 mm Hg). There is a mobile   echodensity in the left ventricular outflow tract measuring 0.5 x 1.2 cm.   No significant aortic regurgitation.  Mitral Valve: Thickened and calcified mitral valve leaflets with normal   opening. Mild mitral regurgitation  Tricuspid Valve: Mild tricuspid regurgitation.  Pulmonic Valve: Not well-visualized  Left Atrium: Mild left atrial enlargement  Right Ventricle: Not well-visualized. In limited views, there appears to   be normal RV size and systolic function.  Right Atrium: Grossly normal  Diastolic Function: Doppler evidence of diastolic dysfunction, and   elevated left sided filling pressures  Pericardium/Pleura: No pericardial effusion.  Hemodynamics: The pulmonary artery systolic pressure is estimated to be   31 mmHg, assuming the right atrial pressure is equal to 3 mmHg,   consistent with normal pulmonary pressures.      CONCLUSIONS:  1. Technically difficult and limited study.  2. Low normal left ventricular systolic function. Paradoxical motion of   the septum in the setting of a left bundle branch block.  3. Well seated aortic valve bioprosthesis with mildly elevated gradients   (mean aortic valve gradient 18 mm Hg). There is a mobile echodensity in   the left ventricular outflow tract adjacent to the aortic valve measuring   0.5 x 1.2 cm.   4. Thickened and calcified mitral valve leaflets with normal opening.   Mild mitral regurgitation  5. Mild left atrial enlargement  6. Doppler evidence of diastolic dysfunction, and elevated left sided   filling pressures  7. No pericardial effusion.      OUMAR CRISOSTOMO   This document has been electronically signed. 2018 12:20PM      < end of copied text >    < from: CT Angio Chest w/ IV Cont (19 @ 12:25) >    EXAM:  CT ANGIO CHEST (W)AW IC                            PROCEDURE DATE:  2019          INTERPRETATION:  CLINICAL INFORMATION: Dyspnea. Evaluate for pulmonary   embolism.    COMPARISON: CT chest 2017    PROCEDURE:   CT Angiography of the Chest.  49 ml of Omnipaque 350 was injected intravenously.   Sagittal and coronal reformats were performed as well as 3D (MIP)   reconstructions.      FINDINGS:    CHEST:     LUNGS AND LARGE AIRWAYS: Patent central airways.  Patchy bilateral   groundglass opacities, greater in both lower lobes. Mild interlobular   septal thickening, also greater in the lower lobes mild emphysematous   changes. Right lower lobe opacity along the pleural effusion, likely   atelectasis.  PLEURA: Small bilateral pleural effusions.  VESSELS: Evaluation of the pulmonary arteries demonstrates no pulmonary   embolism. Thoracic aorta upper limits of normal for caliber with mild   calcified plaque.  HEART: Enlarged. No pericardial effusion. Aortic valve replacement.  MEDIASTINUM AND BARBARA: No interval development of lymphadenopathy. Stable   medial segment lymph nodes measuring up to 9 mm short axis in the   pretracheal region.  CHEST WALL AND LOWER NECK: No enlarged axillary lymph nodes. Right breast   skin thickening and a density at the lateral aspect of the right breast   are again noted.  VISUALIZED UPPER ABDOMEN: Stable left adrenal gland thickening.  BONES: Status post sternotomy. Partially imaged spinal stimulator.    IMPRESSION:     No pulmonary embolism.    Bilateral groundglass opacities and interlobular septal thickening,   greater in the lower lobes which may reflect pulmonary edema.    Small bilateral pleural effusions.    Right breast skin thickening and density at the lateral aspect of the   right breast, unchanged; correlation is recommended with mammography and   ultrasound.      JANN LAZAR   This document has been electronically signed. 2019  2:56PM      < end of copied text >      < from: TTE Echo Doppler w/o Cont (19 @ 10:36) >     EXAM:  ECHO TTE WO CON COMP W DOPPLR         PROCEDURE DATE:  2019        INTERPRETATION:  INDICATION: Dyspnea    Blood Pressure 100/67    Height 159 cm     Weight 63 kg       BSA 1.65 sq   m    Dimensions:    LA 3.7       Normal Values: 2.0- 4.0 cm    Ao 3.3        Normal Values: 2.0 - 3.8 cm  SEPTUM 1.3       Normal Values: 0.6 - 1.2 cm  PWT 1.3       Normal Values: 0.6 - 1.1 cm  LVIDd 4.2         Normal Values: 3.0 - 5.6 cm  LVIDs 3.2         Normal Values: 1.8 - 4.0 cm      OBSERVATIONS:    Technically difficult study  Mitral Valve: Mitral annular calcification is present with thickened   mitral valve leaflets, moderate mitral regurgitation   Aortic Valve/Aorta: Well-seated aortic valve bioprosthesis with mildly   elevated gradients. Peak aortic valve gradient is 17.8 mmHg with a mean   aortic gradient of 9.5 mmHg.  Tricuspid Valve: normal with trace TR.  Pulmonic Valve: Not well visualized  Left Atrium: Mildly dilated  Right Atrium: Not well visualized  Left Ventricle: endocardium not well visualized. Ejection fraction   appears to be about 40%. Cannot comment on segmental LV dysfunction   although there appears to be apical hypokinesis. Mild left ventricular   hypertrophy is present  Right Ventricle: Not well visualized but systolic function appears mildly   decreased  Pericardium/Pleura: normal, no significant pericardial effusion.  Pulmonary/RV Pressure: estimated PA systolic pressure of 28.3 mmHg   LV diastolic dysfunction is present    Conclusion:  Technically difficult study  Left ventricular endocardium not well visualized. Ejection fraction   appears to be about 40%. Cannot comment on segmental LV dysfunction   although there appears to be apical hypokinesis. Mild left ventricular   hypertrophy is present  Well-seated aortic valve bioprosthesis with mildly elevated gradients.   Peak aortic valve gradient is 17.8 mmHg with a mean aortic gradient of   9.5 mmHg.  Mitral annular calcification is present with thickened mitral valve   leaflets, moderate mitral regurgitation is present      JOSÉ MANUEL DE LOS SANTOS   This document has been electronically signed. 2019  8:18AM      < end of copied text >

## 2019-05-01 NOTE — DISCHARGE NOTE NURSING/CASE MANAGEMENT/SOCIAL WORK - NSDCDPATPORTLINK_GEN_ALL_CORE
You can access the [facility] Patient Portal, offered by [facility], by registering with the following website: [email or web address]

## 2019-05-01 NOTE — PROGRESS NOTE ADULT - PROBLEM SELECTOR PROBLEM 1
Shortness of breath at rest
Acute respiratory distress
Acute respiratory distress

## 2019-05-01 NOTE — PROGRESS NOTE ADULT - PROVIDER SPECIALTY LIST ADULT
Cardiology
Hospitalist
Pulmonology
Hospitalist
Hospitalist

## 2019-05-01 NOTE — PROGRESS NOTE ADULT - PROBLEM SELECTOR PLAN 1
acs  nstemi  chf  copd  atelectasis  ct chest reviewed  TTE reviewed - ef 40, valv heart disease,   on PO lasix - may need more aggressive diuresis  cont nebs and inhalers, will increase steroid dose - to 30 mg BID IV  monitor vs and HD and Sat  will order KUB to eval abd dist.   I moreno  supportive measures  cont tele monitoring  ce noted  cont clinical assessment, serial labs,
HF and COPD  pulm edema  atelectasis  anxiety  on nocturnal o2 support at home  will taper steroids this am - pt req tapering in dose and appears clinically better  cont LASIX for heart disease, HF, cardio following  cont NEBS and Inhaler regimen - Spiriva and Symbicort  CT chest reviewed  pt will follow up with her PMD pulmonary - Dr. Tapia in [place]  pt will need repeat CT chest with her [facility] Pulmonary   will need eval for ILD related to CTD (RA) with her outpatient doctors  dc planning  reassurance  emotional support
sob and herman - pt has ACS and NSTEMI and CHF -   doubt COPD is at the exacerbation point at present, will lower Steroids and change to PO - will also help with FS  cont NEBS and Inhaler regimen  cont CVS regimen - antiplatelet regimen - cardio follow up - BP control and monitoring, tele monitoring, CE noted -   I and O  monitor vs and HD and Sat  keep sat > 88 pct  pt uses night time o2 support -   pt follows with Pulm in [place] - Dr. Tapia   am labs pending, Echo pending,   Ct chest reviewed - shows Pulm Edema   pt has SIMÓN - non compliant - refuses CPAP
- Possibly secondary to COPD exacerbation vs NSTEMI.  - CTA chest showed no PE, bilateral groundglass opacities and interlobular septal thickening, and small bilateral pleural effusions.  - Prednisone switched from 20mg oral daily to 30 IV solumedrol BID.   - Azithromycin discontinued.  - Continue standing duonebs, spiriva, symbicort.  - Full dose lovenox discontinued as per cardiology recs, will defer from catheterization because if patient requires stent, would not be willing to use plavix, brilinta or effient one of which would be required for DAPT.  - Continue baby aspirin and high dose statin  - Patient was given loading dose of brilinta, however given reaction and patient refusal to continue will discontinue daily dosage.   - Cardiology ([facility]) consulted, recs appreciated.  - Pulmonology (Dr. Rivera) consulted, recs appreciated.
- Possibly secondary to COPD exacerbation vs NSTEMI.  - CTA chest showed no PE, bilateral groundglass opacities and interlobular septal thickening, and small bilateral pleural effusions.  - Prednisone switched to 20mg oral daily.  - Azithromycin discontinued.  - Continue standing duonebs, spiriva, symbicort.  - Continue full dose lovenox and baby aspirin for possible NSTEMI vs demand ischemia  - Patient was given loading dose of brilinta, however given reaction and patient refusal to continue will discontinue daily dosage.   - Cardiology ([facility]) consulted, recs appreciated.  - Pulmonology (Dr. Rivera) consulted, recs appreciated.

## 2019-05-01 NOTE — PROGRESS NOTE ADULT - SUBJECTIVE AND OBJECTIVE BOX
Date/Time Patient Seen:  		  Referring MD:   Data Reviewed	       Patient is a 78y old  Female who presents with a chief complaint of dyspnea (2019 11:12)      Subjective/HPI     PAST MEDICAL & SURGICAL HISTORY:  Alcoholism: last drink   Skin cancer: not melanoma  Aortic valve replaced: with bovine heart valve  Meniere disease  Diabetes mellitus  Glaucoma  Dry eyes  GERD (gastroesophageal reflux disease)  Emphysema lung  Spinal stenosis  CHF (congestive heart failure)  LBBB (left bundle branch block)  TMJ (temporomandibular joint disorder)  Diverticulitis: hospitalized   SIMÓN (obstructive sleep apnea): category I severe pt does not use c/pap  Thrush: treated x 4 days  1 month ago  restarted medication  3-19-14 clortramazole 5x day  Anxiety  RA (rheumatoid arthritis): diagnosed   oxycodone prn  neck/ lower back  Depression  Borderline diabetes: no meds  COPD (chronic obstructive pulmonary disease): diagnosed   inhaler and nebulizer  HTN (hypertension)  PVD (peripheral vascular disease): left iliac  s/p surgical intervention   unsuccessful  Hiatal hernia: s/p surgical repair   Breast cancer: right s/p lumpectomy and radiation 2008  TIA (transient ischemic attack): 2010  Hyperlipidemia  Valvular heart disease: aortic and mitral  H/O:  section: 2x  H/O sinus surgery  S/P AVR (aortic valve replacement): about 5 yrs ago (2013?)  PVD (peripheral vascular disease): s/p left iliac bypass which was unsuccessful - open repair  S/P carpal tunnel release: right 2002  S/P lumpectomy, right breast: 2008  Hiatal hernia: s/p surgical repair per pt   S/P hernia surgery: left inguinal age 12  S/P rotator cuff surgery: left   S/P appendectomy:   S/P  section: x2 /         Medication list         MEDICATIONS  (STANDING):  ALBUTerol/ipratropium for Nebulization 3 milliLiter(s) Nebulizer every 6 hours  ARIPiprazole 2 milliGRAM(s) Oral at bedtime  aspirin  chewable 81 milliGRAM(s) Oral daily  atorvastatin 80 milliGRAM(s) Oral at bedtime  buDESOnide 160 MICROgram(s)/formoterol 4.5 MICROgram(s) Inhaler 2 Puff(s) Inhalation two times a day  clonazePAM Tablet 0.25 milliGRAM(s) Oral two times a day  dextrose 5%. 1000 milliLiter(s) (50 mL/Hr) IV Continuous <Continuous>  dextrose 50% Injectable 12.5 Gram(s) IV Push once  dextrose 50% Injectable 25 Gram(s) IV Push once  dextrose 50% Injectable 25 Gram(s) IV Push once  docusate sodium 100 milliGRAM(s) Oral two times a day  enoxaparin Injectable 40 milliGRAM(s) SubCutaneous daily  furosemide    Tablet 40 milliGRAM(s) Oral daily  insulin lispro (HumaLOG) corrective regimen sliding scale   SubCutaneous three times a day before meals  insulin lispro (HumaLOG) corrective regimen sliding scale   SubCutaneous at bedtime  lactobacillus acidophilus 1 Tablet(s) Oral two times a day with meals  loratadine 10 milliGRAM(s) Oral daily  losartan 25 milliGRAM(s) Oral daily  pantoprazole    Tablet 40 milliGRAM(s) Oral before breakfast  predniSONE   Tablet 40 milliGRAM(s) Oral daily  spironolactone 12.5 milliGRAM(s) Oral daily  sulfaSALAzine 500 milliGRAM(s) Oral three times a day  tiotropium 18 MICROgram(s) Capsule 1 Capsule(s) Inhalation daily  venlafaxine XR. 150 milliGRAM(s) Oral daily  verapamil  milliGRAM(s) Oral daily    MEDICATIONS  (PRN):  acetaminophen   Tablet .. 650 milliGRAM(s) Oral every 6 hours PRN Temp greater or equal to 38C (100.4F), Mild Pain (1 - 3)  artificial tears (preservative free) Ophthalmic Solution 1 Drop(s) Both EYES four times a day PRN Dry Eyes  dextrose 40% Gel 15 Gram(s) Oral once PRN Blood Glucose LESS THAN 70 milliGRAM(s)/deciliter  glucagon  Injectable 1 milliGRAM(s) IntraMuscular once PRN Glucose LESS THAN 70 milligrams/deciliter  oxyCODONE    5 mG/acetaminophen 325 mG 2 Tablet(s) Oral every 6 hours PRN Severe Pain (7 - 10)  oxyCODONE    5 mG/acetaminophen 325 mG 1 Tablet(s) Oral every 6 hours PRN Moderate Pain (4 - 6)  polyethylene glycol 3350 17 Gram(s) Oral daily PRN Constipation  sodium chloride 0.65% Nasal 1 Spray(s) Both Nostrils every 4 hours PRN Nasal Congestion         Vitals log        ICU Vital Signs Last 24 Hrs  T(C): 36.7 (01 May 2019 04:07), Max: 36.8 (2019 20:27)  T(F): 98 (01 May 2019 04:07), Max: 98.3 (2019 20:27)  HR: 98 (01 May 2019 08:15) (88 - 111)  BP: 119/72 (01 May 2019 05:31) (99/60 - 120/72)  BP(mean): --  ABP: --  ABP(mean): --  RR: 18 (01 May 2019 04:07) (17 - 19)  SpO2: 95% (01 May 2019 08:15) (94% - 99%)           Input and Output:  I&O's Detail      Lab Data                        10.7   9.27  )-----------( 202      ( 01 May 2019 06:53 )             33.2     05-01    137  |  99  |  16  ----------------------------<  107<H>  4.0   |  31  |  0.55    Ca    8.6      01 May 2019 06:53              Review of Systems	      Objective     Physical Examination    heart s1s2  lung dec BS  abd soft  on room air in am  on o2 support night time  verbal      Pertinent Lab findings & Imaging      Ruben:  NO   Adequate UO     I&O's Detail           Discussed with:     Cultures:	        Radiology

## 2019-05-01 NOTE — DISCHARGE NOTE NURSING/CASE MANAGEMENT/SOCIAL WORK - NSDCFUADDAPPT_GEN_ALL_CORE_FT
pt will follow up with her PMD pulmonary - Dr. Tapia in [place]  pt will need repeat CT chest with her Pmd Pulmonary   will need eval for ILD related to CTD (RA) with her outpatient doctors. [facility] cardio  dr herrera with in 1wk .

## 2019-05-07 ENCOUNTER — TRANSCRIPTION ENCOUNTER (OUTPATIENT)
Age: 79
End: 2019-05-07

## 2019-05-20 ENCOUNTER — EMERGENCY (EMERGENCY)
Facility: HOSPITAL | Age: 79
LOS: 0 days | Discharge: HOME | End: 2019-05-20
Attending: EMERGENCY MEDICINE | Admitting: EMERGENCY MEDICINE
Payer: MEDICARE

## 2019-05-20 VITALS
HEART RATE: 91 BPM | SYSTOLIC BLOOD PRESSURE: 135 MMHG | DIASTOLIC BLOOD PRESSURE: 67 MMHG | RESPIRATION RATE: 16 BRPM | TEMPERATURE: 99 F | HEIGHT: 62 IN | OXYGEN SATURATION: 99 % | WEIGHT: 136.91 LBS

## 2019-05-20 DIAGNOSIS — I73.9 PERIPHERAL VASCULAR DISEASE, UNSPECIFIED: Chronic | ICD-10-CM

## 2019-05-20 DIAGNOSIS — Z98.89 OTHER SPECIFIED POSTPROCEDURAL STATES: Chronic | ICD-10-CM

## 2019-05-20 DIAGNOSIS — R10.30 LOWER ABDOMINAL PAIN, UNSPECIFIED: ICD-10-CM

## 2019-05-20 DIAGNOSIS — I10 ESSENTIAL (PRIMARY) HYPERTENSION: ICD-10-CM

## 2019-05-20 DIAGNOSIS — Z85.828 PERSONAL HISTORY OF OTHER MALIGNANT NEOPLASM OF SKIN: ICD-10-CM

## 2019-05-20 DIAGNOSIS — Z98.891 HISTORY OF UTERINE SCAR FROM PREVIOUS SURGERY: Chronic | ICD-10-CM

## 2019-05-20 DIAGNOSIS — Z79.84 LONG TERM (CURRENT) USE OF ORAL HYPOGLYCEMIC DRUGS: ICD-10-CM

## 2019-05-20 DIAGNOSIS — K44.9 DIAPHRAGMATIC HERNIA WITHOUT OBSTRUCTION OR GANGRENE: Chronic | ICD-10-CM

## 2019-05-20 DIAGNOSIS — I50.9 HEART FAILURE, UNSPECIFIED: ICD-10-CM

## 2019-05-20 DIAGNOSIS — Z88.0 ALLERGY STATUS TO PENICILLIN: ICD-10-CM

## 2019-05-20 DIAGNOSIS — J43.9 EMPHYSEMA, UNSPECIFIED: ICD-10-CM

## 2019-05-20 DIAGNOSIS — E11.9 TYPE 2 DIABETES MELLITUS WITHOUT COMPLICATIONS: ICD-10-CM

## 2019-05-20 DIAGNOSIS — M06.9 RHEUMATOID ARTHRITIS, UNSPECIFIED: ICD-10-CM

## 2019-05-20 DIAGNOSIS — Z95.2 PRESENCE OF PROSTHETIC HEART VALVE: Chronic | ICD-10-CM

## 2019-05-20 DIAGNOSIS — Z98.890 OTHER SPECIFIED POSTPROCEDURAL STATES: Chronic | ICD-10-CM

## 2019-05-20 LAB
ALBUMIN SERPL ELPH-MCNC: 4.3 G/DL — SIGNIFICANT CHANGE UP (ref 3.3–5)
ALP SERPL-CCNC: 91 U/L — SIGNIFICANT CHANGE UP (ref 40–120)
ALT FLD-CCNC: 34 U/L — SIGNIFICANT CHANGE UP (ref 12–78)
ANION GAP SERPL CALC-SCNC: 4 MMOL/L — LOW (ref 5–17)
APTT BLD: 30 SEC — SIGNIFICANT CHANGE UP (ref 27.5–36.3)
AST SERPL-CCNC: 24 U/L — SIGNIFICANT CHANGE UP (ref 15–37)
BASOPHILS # BLD AUTO: 0.04 K/UL — SIGNIFICANT CHANGE UP (ref 0–0.2)
BASOPHILS NFR BLD AUTO: 0.5 % — SIGNIFICANT CHANGE UP (ref 0–2)
BILIRUB SERPL-MCNC: 0.3 MG/DL — SIGNIFICANT CHANGE UP (ref 0.2–1.2)
BLD GP AB SCN SERPL QL: SIGNIFICANT CHANGE UP
BUN SERPL-MCNC: 14 MG/DL — SIGNIFICANT CHANGE UP (ref 7–23)
CALCIUM SERPL-MCNC: 9 MG/DL — SIGNIFICANT CHANGE UP (ref 8.5–10.1)
CHLORIDE SERPL-SCNC: 99 MMOL/L — SIGNIFICANT CHANGE UP (ref 96–108)
CO2 SERPL-SCNC: 30 MMOL/L — SIGNIFICANT CHANGE UP (ref 22–31)
CREAT SERPL-MCNC: 0.66 MG/DL — SIGNIFICANT CHANGE UP (ref 0.5–1.3)
EOSINOPHIL # BLD AUTO: 0.07 K/UL — SIGNIFICANT CHANGE UP (ref 0–0.5)
EOSINOPHIL NFR BLD AUTO: 0.9 % — SIGNIFICANT CHANGE UP (ref 0–6)
GLUCOSE SERPL-MCNC: 106 MG/DL — HIGH (ref 70–99)
HCT VFR BLD CALC: 37.1 % — SIGNIFICANT CHANGE UP (ref 34.5–45)
HGB BLD-MCNC: 12 G/DL — SIGNIFICANT CHANGE UP (ref 11.5–15.5)
IMM GRANULOCYTES NFR BLD AUTO: 0.4 % — SIGNIFICANT CHANGE UP (ref 0–1.5)
INR BLD: 1.03 RATIO — SIGNIFICANT CHANGE UP (ref 0.88–1.16)
LACTATE SERPL-SCNC: 1.2 MMOL/L — SIGNIFICANT CHANGE UP (ref 0.7–2)
LYMPHOCYTES # BLD AUTO: 0.87 K/UL — LOW (ref 1–3.3)
LYMPHOCYTES # BLD AUTO: 11.1 % — LOW (ref 13–44)
MCHC RBC-ENTMCNC: 32.3 GM/DL — SIGNIFICANT CHANGE UP (ref 32–36)
MCHC RBC-ENTMCNC: 32.8 PG — SIGNIFICANT CHANGE UP (ref 27–34)
MCV RBC AUTO: 101.4 FL — HIGH (ref 80–100)
MONOCYTES # BLD AUTO: 0.61 K/UL — SIGNIFICANT CHANGE UP (ref 0–0.9)
MONOCYTES NFR BLD AUTO: 7.8 % — SIGNIFICANT CHANGE UP (ref 2–14)
NEUTROPHILS # BLD AUTO: 6.25 K/UL — SIGNIFICANT CHANGE UP (ref 1.8–7.4)
NEUTROPHILS NFR BLD AUTO: 79.3 % — HIGH (ref 43–77)
PLATELET # BLD AUTO: 181 K/UL — SIGNIFICANT CHANGE UP (ref 150–400)
POTASSIUM SERPL-MCNC: 4.2 MMOL/L — SIGNIFICANT CHANGE UP (ref 3.5–5.3)
POTASSIUM SERPL-SCNC: 4.2 MMOL/L — SIGNIFICANT CHANGE UP (ref 3.5–5.3)
PROT SERPL-MCNC: 7.8 GM/DL — SIGNIFICANT CHANGE UP (ref 6–8.3)
PROTHROM AB SERPL-ACNC: 11.4 SEC — SIGNIFICANT CHANGE UP (ref 10–12.9)
RBC # BLD: 3.66 M/UL — LOW (ref 3.8–5.2)
RBC # FLD: 14.5 % — SIGNIFICANT CHANGE UP (ref 10.3–14.5)
SODIUM SERPL-SCNC: 133 MMOL/L — LOW (ref 135–145)
TYPE + AB SCN PNL BLD: SIGNIFICANT CHANGE UP
WBC # BLD: 7.87 K/UL — SIGNIFICANT CHANGE UP (ref 3.8–10.5)
WBC # FLD AUTO: 7.87 K/UL — SIGNIFICANT CHANGE UP (ref 3.8–10.5)

## 2019-05-20 PROCEDURE — 74177 CT ABD & PELVIS W/CONTRAST: CPT | Mod: 26

## 2019-05-20 PROCEDURE — 99284 EMERGENCY DEPT VISIT MOD MDM: CPT | Mod: 25

## 2019-05-20 RX ORDER — SODIUM CHLORIDE 9 MG/ML
1000 INJECTION INTRAMUSCULAR; INTRAVENOUS; SUBCUTANEOUS ONCE
Refills: 0 | Status: COMPLETED | OUTPATIENT
Start: 2019-05-20 | End: 2019-05-20

## 2019-05-20 RX ORDER — MORPHINE SULFATE 50 MG/1
2 CAPSULE, EXTENDED RELEASE ORAL ONCE
Refills: 0 | Status: DISCONTINUED | OUTPATIENT
Start: 2019-05-20 | End: 2019-05-20

## 2019-05-20 RX ADMIN — SODIUM CHLORIDE 2000 MILLILITER(S): 9 INJECTION INTRAMUSCULAR; INTRAVENOUS; SUBCUTANEOUS at 11:49

## 2019-05-20 RX ADMIN — MORPHINE SULFATE 2 MILLIGRAM(S): 50 CAPSULE, EXTENDED RELEASE ORAL at 13:53

## 2019-05-20 NOTE — ED CLERICAL - NS ED CLERK NOTE PRE-ARRIVAL INFORMATION; ADDITIONAL PRE-ARRIVAL INFORMATION
This patient is enrolled in the readmission program and has active care navigation. This patient can be followed up by the care navigation team within 24 hours. To arrange close follow-up or to obtain additional clinical information about this patient, please call the contact number above. Please call the hospitalist as needed to collaborate on further medical management ([phone])

## 2019-05-20 NOTE — ED ADULT NURSE NOTE - NSIMPLEMENTINTERV_GEN_ALL_ED
Implemented All Universal Safety Interventions:  [place] to call system. Call bell, personal items and telephone within reach. Instruct patient to call for assistance. Room bathroom lighting operational. Non-slip footwear when patient is off stretcher. Physically safe environment: no spills, clutter or unnecessary equipment. Stretcher in lowest position, wheels locked, appropriate side rails in place.

## 2019-05-20 NOTE — ED STATDOCS - CLINICAL SUMMARY MEDICAL DECISION MAKING FREE TEXT BOX
Plan for labs and CT. Plan for labs and CT.  CT a/p with no acute findings, labs WNL, pt feeling better plan to d/c home with outpt PMD f/u

## 2019-05-20 NOTE — ED ADULT TRIAGE NOTE - CHIEF COMPLAINT QUOTE
Patient presents with abdominal pain reports she felt a pop this morning and was sent in for rule out incarcerated hernia by Dr Sellers

## 2019-05-20 NOTE — ED STATDOCS - PROGRESS NOTE DETAILS
CT a/p shows Colonic diverticulosis without definite evidence of acute diverticulitis. No bowel obstruction or gross bowel wall thickening.  Pt states she is aware of diverticulosis and has been treated for diverticulitis in the past.  Labs WNL.  PT feeling better, pain currently resolved.  Plan to d/c home with outpt f/u with PMD, pt agreeable to d/c and plan of care, all questions answered, return precautions given  Chey Bosch PA-C

## 2019-05-20 NOTE — ED STATDOCS - ATTENDING CONTRIBUTION TO CARE
I, Ganga Art MD,  performed the initial face to face bedside interview with this patient regarding history of present illness, review of symptoms and relevant past medical, social and family history.  I completed an independent physical examination.  I was the initial provider who evaluated this patient. I have signed out the follow up of any pending tests (i.e. labs, radiological studies) to the ACP.  I have communicated the patient’s plan of care and disposition with the ACP.  The history, relevant review of systems, past medical and surgical history, medical decision making, and physical examination was documented by the scribe in my presence and I attest to the accuracy of the documentation.

## 2019-05-20 NOTE — ED STATDOCS - GASTROINTESTINAL, MLM
abdomen soft, and non-distended. Bowel sounds present. +Left lower abd incision scar, 1x2cm hernia not reducible.

## 2019-05-20 NOTE — ED STATDOCS - OBJECTIVE STATEMENT
77 y/o female with a PMHx of Alcoholism, Anxiety, Aortic valve replaced, CHF, COPD, Depression, DM, Diverticulitis, HTN, hiatal and inguinal hernia, LBBB, Meniere disease, SIMÓN, PVD, RA, Skin CA, Breast CA, spinal stenosis, TMJ, TIA, valvular heart disease, h/o , AVR, hernia surgery, lumpectomy right breast, appendectomy presents to the ED c/o abd and groin pain for the past few days. States she noticed a "bump" on the left side of her belly button. Pt states she has an abd incarcerated hernia that " will not go back in". Sent in to  by Dr. Moreno. Took pain medication PTA. Recently in [place] for COPD exacerbation two weeks ago. Allergic to penicillin, quinolines.

## 2019-06-01 ENCOUNTER — LABORATORY RESULT (OUTPATIENT)
Age: 79
End: 2019-06-01

## 2019-06-28 ENCOUNTER — APPOINTMENT (OUTPATIENT)
Dept: RHEUMATOLOGY | Facility: CLINIC | Age: 79
End: 2019-06-28
Payer: MEDICARE

## 2019-06-28 VITALS
HEIGHT: 62.5 IN | OXYGEN SATURATION: 97 % | BODY MASS INDEX: 25.12 KG/M2 | WEIGHT: 140 LBS | HEART RATE: 85 BPM | DIASTOLIC BLOOD PRESSURE: 69 MMHG | SYSTOLIC BLOOD PRESSURE: 120 MMHG

## 2019-06-28 PROCEDURE — 99214 OFFICE O/P EST MOD 30 MIN: CPT

## 2019-06-28 RX ORDER — FLUTICASONE PROPIONATE 50 UG/1
50 SPRAY, METERED NASAL
Qty: 16 | Refills: 0 | Status: DISCONTINUED | COMMUNITY
Start: 2018-04-26 | End: 2019-06-28

## 2019-06-28 RX ORDER — FAMOTIDINE 20 MG/1
20 TABLET, FILM COATED ORAL
Qty: 4 | Refills: 0 | Status: DISCONTINUED | COMMUNITY
Start: 2018-11-30 | End: 2019-06-28

## 2019-06-28 RX ORDER — FUROSEMIDE 20 MG/1
20 TABLET ORAL
Qty: 90 | Refills: 0 | Status: DISCONTINUED | COMMUNITY
Start: 2018-11-30 | End: 2019-06-28

## 2019-06-29 NOTE — ASSESSMENT
[FreeTextEntry1] : 78 year old female with a history of depression, COPD, HTN, and aortic valve replacement presents to rule out RA today. She was under the care of Dr. Saenz until recently. \par \par \par RA-\par . IN the past she had  a sedimentation rate of 26 which is normal for her age, an BRADLEY of 1:80, with a weakly positive double-stranded DNA and negative rheumatoid serologies. Her muscle enzymes are slightly elevated too. The significance of these labs is unclear. At this time she does not fill criteria for lupus.\par \par Since she has features consistent with inflammatory arthritis affecting the second and third MCP\par She has erosive and active RA. \par -I had a long discussion with her regarding treatment choices.\par -the best options at this time may be orencia or xeljanz\par - she wants to try xeljanz, sample provided to start at 5 mg qd\par Risks and benefits were d/w patient including but not limited to immunosuppression, reactivation of TB,lymphoma, malignancies, GI perforation and elevation of lipid panel and risk of PE. \par - she is not a candidate for MTX or arava due to pul issues. \par - to continue SSZ at 1 .5 gms daily\par \par Steroid use-\par Risks and benefits of steroids were d/w patient including but not limited to weight gain, diabetes, HTN, avascular necrosis, osteoporosis, glaucoma, cataract, infections and immunosuppression.\par - to d/c prednisone over next week\par - daughter is concerned about prednisone is exacerbating her manic sx\par \par Followup 2 months or sooner if there is any change in her underlying symptoms.\par

## 2019-06-29 NOTE — PHYSICAL EXAM
[General Appearance - Alert] : alert [General Appearance - Well Nourished] : well nourished [General Appearance - Well Developed] : well developed [Sclera] : the sclera and conjunctiva were normal [Oropharynx] : the oropharynx was normal [Neck Appearance] : the appearance of the neck was normal [Thyroid Diffuse Enlargement] : the thyroid was not enlarged [Auscultation Breath Sounds / Voice Sounds] : lungs were clear to auscultation bilaterally [Heart Sounds] : normal S1 and S2 [Full Pulse] : the pedal pulses are present [Edema] : there was no peripheral edema [Abdomen Tenderness] : non-tender [Cervical Lymph Nodes Enlarged Anterior Bilaterally] : anterior cervical [Supraclavicular Lymph Nodes Enlarged Bilaterally] : supraclavicular [No Spinal Tenderness] : no spinal tenderness [Abnormal Walk] : normal gait [Nail Clubbing] : no clubbing  or cyanosis of the fingernails [Musculoskeletal - Swelling] : no joint swelling seen [Motor Tone] : muscle strength and tone were normal [FreeTextEntry1] : FROM all joints, bony changes hands and feet c/w OA, no synovitis noted, prominent MCP 2,3 b/l, left MCP synovitis has resolved.  [] : no rash [Deep Tendon Reflexes (DTR)] : deep tendon reflexes were 2+ and symmetric [Motor Exam] : the motor exam was normal [Oriented To Time, Place, And Person] : oriented to person, place, and time [Impaired Insight] : insight and judgment were intact

## 2019-06-29 NOTE — HISTORY OF PRESENT ILLNESS
[FreeTextEntry1] : 78 year old female with a history of depression, COPD, HTN, and aortic valve replacement initially seen to rule out RA .  She states that she was given a diagnosis of of RA in the past. \par Her daughter is with her today\par \par Her rheumatoid arthritis has been active for the last several months, on last visit we increased the sulfasalazine to 1.5 g. I had sent her for a sonogram of the right hand, it shows active synovitis with erosions in the second third and fourth MCPs. She states that she has been using prednisone intermittently. She had some leftover from the pulmonologist.\par She is currently using prednisone 5 mg qd\par Despite the prednisone her joints still hurt. She worries about what she can use, she has pulmonary and cardiac issues. \par She admits to fatigue as well.\par \par She wants to make sure that I touch base with her cardiologist and pulmonologist prior to prescribing any other medications\par She has a good appetite and denies weight loss. She denies fevers chills or night sweats.

## 2019-07-18 ENCOUNTER — RX RENEWAL (OUTPATIENT)
Age: 79
End: 2019-07-18

## 2019-08-01 LAB
ALBUMIN SERPL ELPH-MCNC: 4.4 G/DL
ALP BLD-CCNC: 80 U/L
ALT SERPL-CCNC: 27 U/L
ANION GAP SERPL CALC-SCNC: 11 MMOL/L
AST SERPL-CCNC: 26 U/L
BASOPHILS # BLD AUTO: 0.07 K/UL
BASOPHILS NFR BLD AUTO: 1 %
BILIRUB SERPL-MCNC: 0.3 MG/DL
BUN SERPL-MCNC: 18 MG/DL
CALCIUM SERPL-MCNC: 9.1 MG/DL
CHLORIDE SERPL-SCNC: 94 MMOL/L
CHOLEST SERPL-MCNC: 127 MG/DL
CHOLEST/HDLC SERPL: 2.5 RATIO
CO2 SERPL-SCNC: 27 MMOL/L
CREAT SERPL-MCNC: 0.58 MG/DL
CRP SERPL-MCNC: 0.1 MG/DL
EOSINOPHIL # BLD AUTO: 0.34 K/UL
EOSINOPHIL NFR BLD AUTO: 4.9 %
ERYTHROCYTE [SEDIMENTATION RATE] IN BLOOD BY WESTERGREN METHOD: 4 MM/HR
GLUCOSE SERPL-MCNC: 108 MG/DL
HCT VFR BLD CALC: 35.9 %
HDLC SERPL-MCNC: 50 MG/DL
HGB BLD-MCNC: 11.6 G/DL
IMM GRANULOCYTES NFR BLD AUTO: 0.1 %
LDLC SERPL CALC-MCNC: 65 MG/DL
LYMPHOCYTES # BLD AUTO: 1.7 K/UL
LYMPHOCYTES NFR BLD AUTO: 24.3 %
MAN DIFF?: NORMAL
MCHC RBC-ENTMCNC: 32.2 PG
MCHC RBC-ENTMCNC: 32.3 GM/DL
MCV RBC AUTO: 99.7 FL
MONOCYTES # BLD AUTO: 0.82 K/UL
MONOCYTES NFR BLD AUTO: 11.7 %
NEUTROPHILS # BLD AUTO: 4.07 K/UL
NEUTROPHILS NFR BLD AUTO: 58 %
PLATELET # BLD AUTO: 198 K/UL
POTASSIUM SERPL-SCNC: 4.6 MMOL/L
PROT SERPL-MCNC: 6.7 G/DL
RBC # BLD: 3.6 M/UL
RBC # FLD: 14 %
SODIUM SERPL-SCNC: 132 MMOL/L
TRIGL SERPL-MCNC: 61 MG/DL
WBC # FLD AUTO: 7.01 K/UL

## 2019-08-06 ENCOUNTER — APPOINTMENT (OUTPATIENT)
Dept: RHEUMATOLOGY | Facility: CLINIC | Age: 79
End: 2019-08-06
Payer: MEDICARE

## 2019-08-06 VITALS
HEIGHT: 62 IN | HEART RATE: 82 BPM | DIASTOLIC BLOOD PRESSURE: 72 MMHG | OXYGEN SATURATION: 96 % | WEIGHT: 141 LBS | SYSTOLIC BLOOD PRESSURE: 128 MMHG | BODY MASS INDEX: 25.95 KG/M2

## 2019-08-06 DIAGNOSIS — R76.8 OTHER SPECIFIED ABNORMAL IMMUNOLOGICAL FINDINGS IN SERUM: ICD-10-CM

## 2019-08-06 PROCEDURE — 99214 OFFICE O/P EST MOD 30 MIN: CPT

## 2019-08-06 RX ORDER — PREDNISONE 5 MG/1
5 TABLET ORAL
Qty: 60 | Refills: 0 | Status: DISCONTINUED | COMMUNITY
Start: 2019-04-22 | End: 2019-08-06

## 2019-08-06 RX ORDER — PREDNISONE 20 MG/1
20 TABLET ORAL
Qty: 8 | Refills: 0 | Status: DISCONTINUED | COMMUNITY
Start: 2018-11-30 | End: 2019-08-06

## 2019-08-06 NOTE — HISTORY OF PRESENT ILLNESS
[FreeTextEntry1] : Followup rheumatoid arthritis\par Jovana returns for a followup today. She is currently using sulfasalazine at 1.5 g, she is off the steroids. She is using xeljanz, , she started with a 5 mg tablet and then increased it to b.i.d. Recent labs are within normal limits.\par \par So far she is tolerating the medication well. She denies any adverse effects. She has noticed a dramatic improvement in her joint pain. She rates her joint pain is a 3/10. She denies prolonged morning stiffness, about 15 minutes. Her hands and feet both feel much better.\par \par She is happy to be off the steroids. She denies any recent infections. She denies fevers or chills or night sweats.

## 2019-08-06 NOTE — PHYSICAL EXAM
[General Appearance - Alert] : alert [General Appearance - Well Nourished] : well nourished [General Appearance - Well Developed] : well developed [Sclera] : the sclera and conjunctiva were normal [Oropharynx] : the oropharynx was normal [Thyroid Diffuse Enlargement] : the thyroid was not enlarged [Neck Appearance] : the appearance of the neck was normal [Auscultation Breath Sounds / Voice Sounds] : lungs were clear to auscultation bilaterally [Heart Sounds] : normal S1 and S2 [Full Pulse] : the pedal pulses are present [Edema] : there was no peripheral edema [Abdomen Tenderness] : non-tender [Cervical Lymph Nodes Enlarged Anterior Bilaterally] : anterior cervical [Supraclavicular Lymph Nodes Enlarged Bilaterally] : supraclavicular [No Spinal Tenderness] : no spinal tenderness [Abnormal Walk] : normal gait [Nail Clubbing] : no clubbing  or cyanosis of the fingernails [Musculoskeletal - Swelling] : no joint swelling seen [Motor Tone] : muscle strength and tone were normal [FreeTextEntry1] : FROM all joints, bony changes hands and feet c/w OA, no synovitis noted, prominent MCP 2,3 b/l,  [] : no rash [Motor Exam] : the motor exam was normal [Deep Tendon Reflexes (DTR)] : deep tendon reflexes were 2+ and symmetric [Oriented To Time, Place, And Person] : oriented to person, place, and time [Impaired Insight] : insight and judgment were intact

## 2019-08-06 NOTE — ASSESSMENT
[FreeTextEntry1] : 78 year old female with a history of depression, COPD, HTN, and aortic valve replacement presents to rule out RA today. She was under the care of Dr. Saenz until recently. \par \par \par RA-\par . IN the past she had  a sedimentation rate of 26 which is normal for her age, an BRADLEY of 1:80, with a weakly positive double-stranded DNA and negative rheumatoid serologies. Her muscle enzymes are slightly elevated too. The significance of these labs is unclear. At this time she does not fill criteria for lupus.\par \par Since she has features consistent with inflammatory arthritis affecting the second and third MCP\par She has erosive and active RA. \par -she is now xeljanz samples and improved\par - to continue with 11 mg qd dose\par - recent labs are WNL\par - she is not a candidate for MTX or arava due to pul issues. \par - to continue SSZ at 1 .5 gms daily\par \par Steroid use-\par she is now off steroids. \par \par Followup 2 months or sooner if there is any change in her underlying symptoms. She is aware to hold meds while on abx\par

## 2019-08-06 NOTE — REVIEW OF SYSTEMS
[Fever] : no fever [Chills] : no chills [Feeling Poorly] : not feeling poorly [Feeling Tired] : not feeling tired [Eye Pain] : no eye pain [Dry Eyes] : no dryness of the eyes [Loss Of Hearing] : no hearing loss [Palpitations] : no palpitations [Chest Pain] : no chest pain [Shortness Of Breath] : shortness of breath [Cough] : no cough [SOB on Exertion] : no shortness of breath during exertion [Arthralgias] : arthralgias [Abdominal Pain] : no abdominal pain [Joint Pain] : joint pain [Joint Stiffness] : no joint stiffness [Joint Swelling] : no joint swelling [Skin Lesions] : no skin lesions [Dizziness] : no dizziness [Limb Weakness] : no limb weakness [Difficulty Walking] : no difficulty walking [Depression] : no depression [Anxiety] : no anxiety [Muscle Weakness] : no muscle weakness [Easy Bruising] : no tendency for easy bruising

## 2019-09-06 NOTE — ED ADULT NURSE NOTE - TEMPERATURE IN CELSIUS (DEGREES C)
[facility]: MED RECONCILIATION    Comments/Recommendations:     Patient able to confirm name, , allergies and preferred pharmacy    Medications added:     pantoprazole    Medications removed:    Prevacid  Naproxen  niacin    Medications adjusted:    none    Information obtained from: Patient    Allergies: Patient has no known allergies. Prior to Admission Medications:     Prior to Admission Medications   Prescriptions Last Dose Informant Patient Reported? Taking?   aspirin 81 mg tablet 2019 at Unknown time Self Yes Yes   Sig: Take 81 mg by mouth. fish oil-omega-3 fatty acids 300-500 mg Cap 2019 at Unknown time Self Yes Yes   Sig: Take 1,200 mg by mouth two (2) times a day. multivitamin, stress formula (STRESS TAB) tablet 2019 at Unknown time Self Yes Yes   Sig: Take 1 Tab by mouth daily. pantoprazole (PROTONIX) 40 mg tablet 2019 at Unknown time Self Yes Yes   Sig: Take 40 mg by mouth daily. simvastatin (ZOCOR) 40 mg tablet 2019 at Unknown time Self Yes Yes   Sig: Take 40 mg by mouth nightly. Facility-Administered Medications: None           [employer] ANA    Clinical Staff Pharmacist  [address] [place] [address]  [phone] 36.3

## 2019-09-20 ENCOUNTER — RX RENEWAL (OUTPATIENT)
Age: 79
End: 2019-09-20

## 2019-09-27 ENCOUNTER — APPOINTMENT (OUTPATIENT)
Dept: RHEUMATOLOGY | Facility: CLINIC | Age: 79
End: 2019-09-27

## 2019-09-27 ENCOUNTER — APPOINTMENT (OUTPATIENT)
Dept: RHEUMATOLOGY | Facility: CLINIC | Age: 79
End: 2019-09-27
Payer: MEDICARE

## 2019-09-27 VITALS
DIASTOLIC BLOOD PRESSURE: 78 MMHG | OXYGEN SATURATION: 98 % | SYSTOLIC BLOOD PRESSURE: 158 MMHG | HEART RATE: 100 BPM | HEIGHT: 62 IN

## 2019-09-27 DIAGNOSIS — M25.50 PAIN IN UNSPECIFIED JOINT: ICD-10-CM

## 2019-09-27 PROCEDURE — 99214 OFFICE O/P EST MOD 30 MIN: CPT

## 2019-09-28 PROBLEM — M25.50 POLYARTHRALGIA: Status: ACTIVE | Noted: 2018-04-27

## 2019-09-28 NOTE — ASSESSMENT
[FreeTextEntry1] : 78 year old female with a history of depression, COPD, HTN, and aortic valve replacement presents to rule out RA today. She was under the care of Dr. Saenz until recently. \par \par \par RA-\par . IN the past she had  a sedimentation rate of 26 which is normal for her age, an BRADLEY of 1:80, with a weakly positive double-stranded DNA and negative rheumatoid serologies. Her muscle enzymes are slightly elevated too. The significance of these labs is unclear. At this time she does not fill criteria for lupus.\par \par Since she has features consistent with inflammatory arthritis affecting the second and third MCP\par She has erosive and active RA. \par -she is now on xeljanz\par - she has been able to tolerate only 5 mg qd\par She developed muscle spasms with higher dose\par - she is asking if she can use prednisone sparingly\par to use only 5 mg prn\par - recent labs are WNL\par - to continue SSZ at 1 .5 gms daily\par \par Steroid use-\par Risks and benefits of steroids were d/w patient including but not limited to weight gain, diabetes, HTN, avascular necrosis, osteoporosis, glaucoma, cataract, infections and immunosuppression.\par  \par \par Followup 2 months or sooner if there is any change in her underlying symptoms. She is aware to hold meds while on abx\par

## 2019-09-28 NOTE — HISTORY OF PRESENT ILLNESS
[FreeTextEntry1] : Followup rheumatoid arthritis\par Jovana returns for a followup today. She is currently using xeljanz, she had to lower to 5mg qd as she was getting muscle spasms. \par She wants to know if she can use prednisone intermittently. She feels that it does help, she is nervous about using pain meds. \par \par So far she is tolerating the medication well. She denies any adverse effects. She has noticed a dramatic improvement in her joint pain. She rates her joint pain is a 3/10. She denies prolonged morning stiffness, about 15 minutes. Her hands and feet both feel much better.\par \par She denies any recent infections. She denies fevers or chills or night sweats.

## 2019-09-28 NOTE — REVIEW OF SYSTEMS
[Fever] : no fever [Chills] : no chills [Feeling Poorly] : not feeling poorly [Feeling Tired] : not feeling tired [Eye Pain] : no eye pain [Dry Eyes] : no dryness of the eyes [Loss Of Hearing] : no hearing loss [Chest Pain] : no chest pain [Palpitations] : no palpitations [Shortness Of Breath] : shortness of breath [Cough] : no cough [SOB on Exertion] : no shortness of breath during exertion [Abdominal Pain] : no abdominal pain [Arthralgias] : arthralgias [Joint Pain] : joint pain [Joint Swelling] : no joint swelling [Joint Stiffness] : no joint stiffness [Skin Lesions] : no skin lesions [Dizziness] : no dizziness [Limb Weakness] : no limb weakness [Difficulty Walking] : no difficulty walking [Anxiety] : no anxiety [Depression] : no depression [Muscle Weakness] : no muscle weakness [Easy Bruising] : no tendency for easy bruising

## 2019-09-28 NOTE — PHYSICAL EXAM
[General Appearance - Alert] : alert [General Appearance - Well Nourished] : well nourished [General Appearance - Well Developed] : well developed [Sclera] : the sclera and conjunctiva were normal [Oropharynx] : the oropharynx was normal [Neck Appearance] : the appearance of the neck was normal [Thyroid Diffuse Enlargement] : the thyroid was not enlarged [Auscultation Breath Sounds / Voice Sounds] : lungs were clear to auscultation bilaterally [Heart Sounds] : normal S1 and S2 [Full Pulse] : the pedal pulses are present [Edema] : there was no peripheral edema [Abdomen Tenderness] : non-tender [Cervical Lymph Nodes Enlarged Anterior Bilaterally] : anterior cervical [Supraclavicular Lymph Nodes Enlarged Bilaterally] : supraclavicular [No Spinal Tenderness] : no spinal tenderness [Abnormal Walk] : normal gait [Nail Clubbing] : no clubbing  or cyanosis of the fingernails [Musculoskeletal - Swelling] : no joint swelling seen [Motor Tone] : muscle strength and tone were normal [FreeTextEntry1] : FROM all joints, bony changes hands and feet c/w OA, no synovitis noted, prominent MCP 2,3 b/l,  [] : no rash [Deep Tendon Reflexes (DTR)] : deep tendon reflexes were 2+ and symmetric [Motor Exam] : the motor exam was normal [Oriented To Time, Place, And Person] : oriented to person, place, and time [Impaired Insight] : insight and judgment were intact

## 2019-09-30 ENCOUNTER — MOBILE ON CALL (OUTPATIENT)
Age: 79
End: 2019-09-30

## 2019-10-20 NOTE — ED PROVIDER NOTE - ENMT, MLM
Airway patent, Nasal mucosa clear. Mouth with normal mucosa. Throat has no vesicles, no oropharyngeal exudates and uvula is midline. MM Moist. neck supple. no meningeal signs. None

## 2019-11-07 RX ORDER — TOFACITINIB 11 MG/1
11 TABLET, FILM COATED, EXTENDED RELEASE ORAL
Qty: 90 | Refills: 1 | Status: DISCONTINUED | COMMUNITY
Start: 2019-08-06 | End: 2019-11-07

## 2019-11-13 LAB
25(OH)D3 SERPL-MCNC: 52.7 NG/ML
ALBUMIN SERPL ELPH-MCNC: 4.2 G/DL
ALP BLD-CCNC: 56 U/L
ALT SERPL-CCNC: 30 U/L
ANION GAP SERPL CALC-SCNC: 18 MMOL/L
AST SERPL-CCNC: 27 U/L
BASOPHILS # BLD AUTO: 0.06 K/UL
BASOPHILS NFR BLD AUTO: 0.7 %
BILIRUB SERPL-MCNC: 0.3 MG/DL
BUN SERPL-MCNC: 12 MG/DL
CALCIUM SERPL-MCNC: 9 MG/DL
CHLORIDE SERPL-SCNC: 96 MMOL/L
CHOLEST SERPL-MCNC: 141 MG/DL
CHOLEST/HDLC SERPL: 2.4 RATIO
CO2 SERPL-SCNC: 22 MMOL/L
CREAT SERPL-MCNC: 0.6 MG/DL
CRP SERPL-MCNC: 2.28 MG/DL
EOSINOPHIL # BLD AUTO: 0.13 K/UL
EOSINOPHIL NFR BLD AUTO: 1.5 %
ERYTHROCYTE [SEDIMENTATION RATE] IN BLOOD BY WESTERGREN METHOD: 54 MM/HR
GLUCOSE SERPL-MCNC: 122 MG/DL
HCT VFR BLD CALC: 36.9 %
HDLC SERPL-MCNC: 59 MG/DL
HGB BLD-MCNC: 12 G/DL
IMM GRANULOCYTES NFR BLD AUTO: 0.6 %
LDLC SERPL CALC-MCNC: 72 MG/DL
LYMPHOCYTES # BLD AUTO: 2.09 K/UL
LYMPHOCYTES NFR BLD AUTO: 24.9 %
MAN DIFF?: NORMAL
MCHC RBC-ENTMCNC: 32.5 GM/DL
MCHC RBC-ENTMCNC: 34.1 PG
MCV RBC AUTO: 104.8 FL
MONOCYTES # BLD AUTO: 0.85 K/UL
MONOCYTES NFR BLD AUTO: 10.1 %
NEUTROPHILS # BLD AUTO: 5.23 K/UL
NEUTROPHILS NFR BLD AUTO: 62.2 %
PLATELET # BLD AUTO: 237 K/UL
POTASSIUM SERPL-SCNC: 3.9 MMOL/L
PROT SERPL-MCNC: 6.5 G/DL
RBC # BLD: 3.52 M/UL
RBC # FLD: 13.4 %
SODIUM SERPL-SCNC: 136 MMOL/L
TRIGL SERPL-MCNC: 52 MG/DL
WBC # FLD AUTO: 8.41 K/UL

## 2019-11-29 NOTE — H&P CARDIOLOGY - WEIGHT IN KG
[Dear  ___] : Dear  [unfilled], [Please see my note below.] : Please see my note below. [Courtesy Letter:] : I had the pleasure of seeing your patient, [unfilled], in my office today. [Consult Closing:] : Thank you very much for allowing me to participate in the care of this patient.  If you have any questions, please do not hesitate to contact me. [Sincerely,] : Sincerely, [FreeTextEntry3] : Roni\par \par Roni Vail MD\par Chief, Pediatric Urology\par Professor of Urology and Pediatrics\par [employer]\par  65

## 2019-12-02 ENCOUNTER — RX RENEWAL (OUTPATIENT)
Age: 79
End: 2019-12-02

## 2019-12-06 ENCOUNTER — APPOINTMENT (OUTPATIENT)
Dept: RHEUMATOLOGY | Facility: CLINIC | Age: 79
End: 2019-12-06
Payer: MEDICARE

## 2019-12-06 VITALS
BODY MASS INDEX: 26.7 KG/M2 | SYSTOLIC BLOOD PRESSURE: 130 MMHG | DIASTOLIC BLOOD PRESSURE: 62 MMHG | WEIGHT: 146 LBS | OXYGEN SATURATION: 96 % | TEMPERATURE: 97.1 F | HEART RATE: 102 BPM

## 2019-12-06 DIAGNOSIS — Z79.52 LONG TERM (CURRENT) USE OF SYSTEMIC STEROIDS: ICD-10-CM

## 2019-12-06 PROCEDURE — 99214 OFFICE O/P EST MOD 30 MIN: CPT

## 2019-12-07 PROBLEM — Z79.52 CURRENT USE OF STEROID MEDICATION: Status: ACTIVE | Noted: 2019-04-22

## 2019-12-07 NOTE — REVIEW OF SYSTEMS
[Fever] : no fever [Chills] : no chills [Feeling Poorly] : not feeling poorly [Feeling Tired] : not feeling tired [Eye Pain] : no eye pain [Dry Eyes] : no dryness of the eyes [Loss Of Hearing] : no hearing loss [Chest Pain] : no chest pain [Palpitations] : no palpitations [Cough] : no cough [Shortness Of Breath] : shortness of breath [Abdominal Pain] : no abdominal pain [SOB on Exertion] : no shortness of breath during exertion [Arthralgias] : arthralgias [Joint Pain] : joint pain [Joint Stiffness] : no joint stiffness [Joint Swelling] : no joint swelling [Limb Weakness] : no limb weakness [Skin Lesions] : no skin lesions [Dizziness] : no dizziness [Difficulty Walking] : no difficulty walking [Anxiety] : no anxiety [Muscle Weakness] : no muscle weakness [Depression] : no depression [Easy Bruising] : no tendency for easy bruising

## 2019-12-07 NOTE — PHYSICAL EXAM
[General Appearance - Well Nourished] : well nourished [General Appearance - Alert] : alert [General Appearance - Well Developed] : well developed [Oropharynx] : the oropharynx was normal [Sclera] : the sclera and conjunctiva were normal [Neck Appearance] : the appearance of the neck was normal [Thyroid Diffuse Enlargement] : the thyroid was not enlarged [Auscultation Breath Sounds / Voice Sounds] : lungs were clear to auscultation bilaterally [Heart Sounds] : normal S1 and S2 [Full Pulse] : the pedal pulses are present [Edema] : there was no peripheral edema [Abdomen Tenderness] : non-tender [Cervical Lymph Nodes Enlarged Anterior Bilaterally] : anterior cervical [Supraclavicular Lymph Nodes Enlarged Bilaterally] : supraclavicular [Nail Clubbing] : no clubbing  or cyanosis of the fingernails [Abnormal Walk] : normal gait [No Spinal Tenderness] : no spinal tenderness [Motor Tone] : muscle strength and tone were normal [Musculoskeletal - Swelling] : no joint swelling seen [Deep Tendon Reflexes (DTR)] : deep tendon reflexes were 2+ and symmetric [] : no rash [FreeTextEntry1] : FROM all joints, bony changes hands and feet c/w OA, no synovitis noted, prominent MCP 2,3 b/l,  [Motor Exam] : the motor exam was normal [Impaired Insight] : insight and judgment were intact [Oriented To Time, Place, And Person] : oriented to person, place, and time

## 2019-12-07 NOTE — HISTORY OF PRESENT ILLNESS
[FreeTextEntry1] : Followup visit\par Jovana comes in for a followup visit for her rheumatoid arthritis. Been feeling well, she has had 2 back-to-back upper respiratory tract infections. Additionally she has had a bout of diverticulitis. We did increase the dose of the medication, she feels that it is not helping.\par \par The issue is that she has been dealing with chronic back pain, it is significant and limits her daily activities. She does see pain management. Her tolerance for pain medication is high. She has tried medical marijuana as well with minimal relief. She wants to know what she can do to help with the pain related to her rheumatoid arthritis as well as the osteoarthritis.\par \par She has not been using prednisone either. She states that occasionally she will take a pill here and there.\par \par She rates her pain today as a 10 out of 10. She rates her fatigue her on the same level. She feels very frustrated with her health.

## 2019-12-07 NOTE — ASSESSMENT
[FreeTextEntry1] : 79 year old female with a history of depression, COPD, HTN, and aortic valve replacement presents to rule out RA today. She was under the care of Dr. Saenz in the past\par \par RA-\par . IN the past she had  a sedimentation rate of 26 which is normal for her age, an BRADLEY of 1:80, with a weakly positive double-stranded DNA and negative rheumatoid serologies. Her muscle enzymes are slightly elevated too. The significance of these labs is unclear. At this time she does not fill criteria for lupus.\par \par Since she has features consistent with inflammatory arthritis affecting the second and third MCP\par She has erosive and active RA. \par -she is now on xeljanz but has had a bout of diverticulitis, will d/c it given risk of GI perforation\par - will consider orencia\par Risks and benefits were d/w patient including but not limited to reactivation of TB, lymphoma, malignancies, exacerbation of COPD, immunosuppression, and infections.\par she is willing to proceed pending labs\par - to continue SSZ at 1 .5 gms daily\par \par Steroid use-\par Risks and benefits of steroids were d/w patient including but not limited to weight gain, diabetes, HTN, avascular necrosis, osteoporosis, glaucoma, cataract, infections and immunosuppression.\par to d/c prednisone \par \par Osteoarthritis-\par -the other issue is that she does have polyarticular osteoarthritis especially in her lower back\par -She states that she is not a candidate for NSAIDs but does not recall why\par -She is seeing pain management, she has tried epidurals, she is also using medical marijuana with minimal relief.\par -I did just physical therapy as well.\par \par COPD-\par -current U RI, she is under the care of pulmonary\par -We'll obtain records\par Followup 2 months or sooner if there is any change in her underlying symptoms. She is aware to hold meds while on abx\par

## 2019-12-13 ENCOUNTER — EMERGENCY (EMERGENCY)
Facility: HOSPITAL | Age: 79
LOS: 0 days | Discharge: LEFT BEFORE DISCHARGE | End: 2019-12-13
Attending: EMERGENCY MEDICINE
Payer: MEDICARE

## 2019-12-13 VITALS
RESPIRATION RATE: 20 BRPM | TEMPERATURE: 98 F | HEART RATE: 100 BPM | OXYGEN SATURATION: 97 % | DIASTOLIC BLOOD PRESSURE: 92 MMHG | SYSTOLIC BLOOD PRESSURE: 112 MMHG

## 2019-12-13 VITALS — WEIGHT: 145.06 LBS | HEIGHT: 63 IN

## 2019-12-13 DIAGNOSIS — Z85.828 PERSONAL HISTORY OF OTHER MALIGNANT NEOPLASM OF SKIN: ICD-10-CM

## 2019-12-13 DIAGNOSIS — Z88.8 ALLERGY STATUS TO OTHER DRUGS, MEDICAMENTS AND BIOLOGICAL SUBSTANCES STATUS: ICD-10-CM

## 2019-12-13 DIAGNOSIS — I50.9 HEART FAILURE, UNSPECIFIED: ICD-10-CM

## 2019-12-13 DIAGNOSIS — Z98.891 HISTORY OF UTERINE SCAR FROM PREVIOUS SURGERY: Chronic | ICD-10-CM

## 2019-12-13 DIAGNOSIS — E11.9 TYPE 2 DIABETES MELLITUS WITHOUT COMPLICATIONS: ICD-10-CM

## 2019-12-13 DIAGNOSIS — Z98.890 OTHER SPECIFIED POSTPROCEDURAL STATES: Chronic | ICD-10-CM

## 2019-12-13 DIAGNOSIS — K44.9 DIAPHRAGMATIC HERNIA WITHOUT OBSTRUCTION OR GANGRENE: Chronic | ICD-10-CM

## 2019-12-13 DIAGNOSIS — Z98.89 OTHER SPECIFIED POSTPROCEDURAL STATES: Chronic | ICD-10-CM

## 2019-12-13 DIAGNOSIS — J44.9 CHRONIC OBSTRUCTIVE PULMONARY DISEASE, UNSPECIFIED: ICD-10-CM

## 2019-12-13 DIAGNOSIS — Z87.19 PERSONAL HISTORY OF OTHER DISEASES OF THE DIGESTIVE SYSTEM: ICD-10-CM

## 2019-12-13 DIAGNOSIS — R06.09 OTHER FORMS OF DYSPNEA: ICD-10-CM

## 2019-12-13 DIAGNOSIS — Z88.0 ALLERGY STATUS TO PENICILLIN: ICD-10-CM

## 2019-12-13 DIAGNOSIS — Z95.2 PRESENCE OF PROSTHETIC HEART VALVE: ICD-10-CM

## 2019-12-13 DIAGNOSIS — Z79.82 LONG TERM (CURRENT) USE OF ASPIRIN: ICD-10-CM

## 2019-12-13 DIAGNOSIS — M48.00 SPINAL STENOSIS, SITE UNSPECIFIED: ICD-10-CM

## 2019-12-13 DIAGNOSIS — Z79.51 LONG TERM (CURRENT) USE OF INHALED STEROIDS: ICD-10-CM

## 2019-12-13 DIAGNOSIS — R06.02 SHORTNESS OF BREATH: ICD-10-CM

## 2019-12-13 DIAGNOSIS — Z95.2 PRESENCE OF PROSTHETIC HEART VALVE: Chronic | ICD-10-CM

## 2019-12-13 DIAGNOSIS — I73.9 PERIPHERAL VASCULAR DISEASE, UNSPECIFIED: Chronic | ICD-10-CM

## 2019-12-13 DIAGNOSIS — R00.0 TACHYCARDIA, UNSPECIFIED: ICD-10-CM

## 2019-12-13 DIAGNOSIS — M54.9 DORSALGIA, UNSPECIFIED: ICD-10-CM

## 2019-12-13 DIAGNOSIS — E78.5 HYPERLIPIDEMIA, UNSPECIFIED: ICD-10-CM

## 2019-12-13 DIAGNOSIS — Z99.81 DEPENDENCE ON SUPPLEMENTAL OXYGEN: ICD-10-CM

## 2019-12-13 DIAGNOSIS — I10 ESSENTIAL (PRIMARY) HYPERTENSION: ICD-10-CM

## 2019-12-13 DIAGNOSIS — I73.9 PERIPHERAL VASCULAR DISEASE, UNSPECIFIED: ICD-10-CM

## 2019-12-13 DIAGNOSIS — Z79.84 LONG TERM (CURRENT) USE OF ORAL HYPOGLYCEMIC DRUGS: ICD-10-CM

## 2019-12-13 LAB
ALBUMIN SERPL ELPH-MCNC: 3.7 G/DL — SIGNIFICANT CHANGE UP (ref 3.3–5)
ALP SERPL-CCNC: 55 U/L — SIGNIFICANT CHANGE UP (ref 40–120)
ALT FLD-CCNC: 39 U/L — SIGNIFICANT CHANGE UP (ref 12–78)
ANION GAP SERPL CALC-SCNC: 5 MMOL/L — SIGNIFICANT CHANGE UP (ref 5–17)
APPEARANCE UR: CLEAR — SIGNIFICANT CHANGE UP
APTT BLD: 30.8 SEC — SIGNIFICANT CHANGE UP (ref 27.5–36.3)
AST SERPL-CCNC: 23 U/L — SIGNIFICANT CHANGE UP (ref 15–37)
BASOPHILS # BLD AUTO: 0.08 K/UL — SIGNIFICANT CHANGE UP (ref 0–0.2)
BASOPHILS NFR BLD AUTO: 0.8 % — SIGNIFICANT CHANGE UP (ref 0–2)
BILIRUB SERPL-MCNC: 0.4 MG/DL — SIGNIFICANT CHANGE UP (ref 0.2–1.2)
BILIRUB UR-MCNC: NEGATIVE — SIGNIFICANT CHANGE UP
BUN SERPL-MCNC: 9 MG/DL — SIGNIFICANT CHANGE UP (ref 7–23)
CALCIUM SERPL-MCNC: 9.4 MG/DL — SIGNIFICANT CHANGE UP (ref 8.5–10.1)
CHLORIDE SERPL-SCNC: 99 MMOL/L — SIGNIFICANT CHANGE UP (ref 96–108)
CO2 SERPL-SCNC: 29 MMOL/L — SIGNIFICANT CHANGE UP (ref 22–31)
COLOR SPEC: YELLOW — SIGNIFICANT CHANGE UP
CREAT SERPL-MCNC: 0.74 MG/DL — SIGNIFICANT CHANGE UP (ref 0.5–1.3)
DIFF PNL FLD: NEGATIVE — SIGNIFICANT CHANGE UP
EOSINOPHIL # BLD AUTO: 0.08 K/UL — SIGNIFICANT CHANGE UP (ref 0–0.5)
EOSINOPHIL NFR BLD AUTO: 0.8 % — SIGNIFICANT CHANGE UP (ref 0–6)
GLUCOSE SERPL-MCNC: 96 MG/DL — SIGNIFICANT CHANGE UP (ref 70–99)
GLUCOSE UR QL: NEGATIVE MG/DL — SIGNIFICANT CHANGE UP
HCT VFR BLD CALC: 39.2 % — SIGNIFICANT CHANGE UP (ref 34.5–45)
HGB BLD-MCNC: 12.8 G/DL — SIGNIFICANT CHANGE UP (ref 11.5–15.5)
IMM GRANULOCYTES NFR BLD AUTO: 0.6 % — SIGNIFICANT CHANGE UP (ref 0–1.5)
INR BLD: 1.1 RATIO — SIGNIFICANT CHANGE UP (ref 0.88–1.16)
KETONES UR-MCNC: NEGATIVE — SIGNIFICANT CHANGE UP
LACTATE SERPL-SCNC: 1.5 MMOL/L — SIGNIFICANT CHANGE UP (ref 0.7–2)
LEUKOCYTE ESTERASE UR-ACNC: NEGATIVE — SIGNIFICANT CHANGE UP
LYMPHOCYTES # BLD AUTO: 1.7 K/UL — SIGNIFICANT CHANGE UP (ref 1–3.3)
LYMPHOCYTES # BLD AUTO: 17.9 % — SIGNIFICANT CHANGE UP (ref 13–44)
MCHC RBC-ENTMCNC: 32.7 GM/DL — SIGNIFICANT CHANGE UP (ref 32–36)
MCHC RBC-ENTMCNC: 33.1 PG — SIGNIFICANT CHANGE UP (ref 27–34)
MCV RBC AUTO: 101.3 FL — HIGH (ref 80–100)
MONOCYTES # BLD AUTO: 0.89 K/UL — SIGNIFICANT CHANGE UP (ref 0–0.9)
MONOCYTES NFR BLD AUTO: 9.4 % — SIGNIFICANT CHANGE UP (ref 2–14)
NEUTROPHILS # BLD AUTO: 6.69 K/UL — SIGNIFICANT CHANGE UP (ref 1.8–7.4)
NEUTROPHILS NFR BLD AUTO: 70.5 % — SIGNIFICANT CHANGE UP (ref 43–77)
NITRITE UR-MCNC: NEGATIVE — SIGNIFICANT CHANGE UP
NT-PROBNP SERPL-SCNC: 181 PG/ML — SIGNIFICANT CHANGE UP (ref 0–450)
PH UR: 7 — SIGNIFICANT CHANGE UP (ref 5–8)
PLATELET # BLD AUTO: 273 K/UL — SIGNIFICANT CHANGE UP (ref 150–400)
POTASSIUM SERPL-MCNC: 3.6 MMOL/L — SIGNIFICANT CHANGE UP (ref 3.5–5.3)
POTASSIUM SERPL-SCNC: 3.6 MMOL/L — SIGNIFICANT CHANGE UP (ref 3.5–5.3)
PROT SERPL-MCNC: 7.4 GM/DL — SIGNIFICANT CHANGE UP (ref 6–8.3)
PROT UR-MCNC: NEGATIVE MG/DL — SIGNIFICANT CHANGE UP
PROTHROM AB SERPL-ACNC: 12.3 SEC — SIGNIFICANT CHANGE UP (ref 10–12.9)
RBC # BLD: 3.87 M/UL — SIGNIFICANT CHANGE UP (ref 3.8–5.2)
RBC # FLD: 14.5 % — SIGNIFICANT CHANGE UP (ref 10.3–14.5)
SODIUM SERPL-SCNC: 133 MMOL/L — LOW (ref 135–145)
SP GR SPEC: 1 — LOW (ref 1.01–1.02)
TROPONIN I SERPL-MCNC: 0.05 NG/ML — HIGH (ref 0.01–0.04)
TROPONIN I SERPL-MCNC: 0.05 NG/ML — HIGH (ref 0.01–0.04)
UROBILINOGEN FLD QL: NEGATIVE MG/DL — SIGNIFICANT CHANGE UP
WBC # BLD: 9.5 K/UL — SIGNIFICANT CHANGE UP (ref 3.8–10.5)
WBC # FLD AUTO: 9.5 K/UL — SIGNIFICANT CHANGE UP (ref 3.8–10.5)

## 2019-12-13 PROCEDURE — 83880 ASSAY OF NATRIURETIC PEPTIDE: CPT

## 2019-12-13 PROCEDURE — 85025 COMPLETE CBC W/AUTO DIFF WBC: CPT

## 2019-12-13 PROCEDURE — 83605 ASSAY OF LACTIC ACID: CPT

## 2019-12-13 PROCEDURE — 80053 COMPREHEN METABOLIC PANEL: CPT

## 2019-12-13 PROCEDURE — 85610 PROTHROMBIN TIME: CPT

## 2019-12-13 PROCEDURE — 71275 CT ANGIOGRAPHY CHEST: CPT | Mod: 26

## 2019-12-13 PROCEDURE — 36415 COLL VENOUS BLD VENIPUNCTURE: CPT

## 2019-12-13 PROCEDURE — 87086 URINE CULTURE/COLONY COUNT: CPT

## 2019-12-13 PROCEDURE — 93005 ELECTROCARDIOGRAM TRACING: CPT

## 2019-12-13 PROCEDURE — 99284 EMERGENCY DEPT VISIT MOD MDM: CPT | Mod: 25

## 2019-12-13 PROCEDURE — 71275 CT ANGIOGRAPHY CHEST: CPT

## 2019-12-13 PROCEDURE — 99285 EMERGENCY DEPT VISIT HI MDM: CPT

## 2019-12-13 PROCEDURE — 85730 THROMBOPLASTIN TIME PARTIAL: CPT

## 2019-12-13 PROCEDURE — 81003 URINALYSIS AUTO W/O SCOPE: CPT

## 2019-12-13 PROCEDURE — 84484 ASSAY OF TROPONIN QUANT: CPT

## 2019-12-13 PROCEDURE — 93010 ELECTROCARDIOGRAM REPORT: CPT

## 2019-12-13 PROCEDURE — 87040 BLOOD CULTURE FOR BACTERIA: CPT

## 2019-12-13 RX ORDER — OXYCODONE AND ACETAMINOPHEN 5; 325 MG/1; MG/1
1 TABLET ORAL ONCE
Refills: 0 | Status: DISCONTINUED | OUTPATIENT
Start: 2019-12-13 | End: 2019-12-13

## 2019-12-13 RX ADMIN — OXYCODONE AND ACETAMINOPHEN 1 TABLET(S): 5; 325 TABLET ORAL at 14:59

## 2019-12-13 RX ADMIN — OXYCODONE AND ACETAMINOPHEN 1 TABLET(S): 5; 325 TABLET ORAL at 15:29

## 2019-12-13 NOTE — ED PROVIDER NOTE - SKIN, MLM
Skin normal color for race, warm, dry and intact. No evidence of rash. +spinal stimulator in L gluteal area

## 2019-12-13 NOTE — ED PROVIDER NOTE - OBJECTIVE STATEMENT
78 y/o F with a PMHx of skin CA, aortiv valve replacement, diverticulitis, DM, Meniere disease, emphysema, CHF on 2L home O2 at night, LBBB, SIMÓN, RA, COPD, HTN, PVD, breast CA, TIA, HLD, spinal stenosis s/p implanted stimulator, valvular heart disease presents to the ED sent from MD regarding dyspnea and tachycardia and to r/o PE. Pt also c/o chronic back pain and reports an episode of dizziness over the past few days. Pt states symptoms have been present for the past 3 weeks. Pt notes that she has been on the couch for the past 2/3 weeks as she was recovering from a URI. Pt was taking Doxycycline but experienced diarrhea and rectal bleeding so she stopped abx yesterday. Pt saw pulmonologist this morning who sent pt to ED with concerns of a PE. Denies leg pain, or abd pain. Pulmonologist: Dr. Tapia. Cardiologist: Dr. Jackson.

## 2019-12-13 NOTE — ED PROVIDER NOTE - PROGRESS NOTE DETAILS
Giorgio Francois MD: pt to f/u outpt w/ her cardiologist, leaving against medical advice despite rising troponin, has f/u appt for monday understands risk of MI or worsening cardiac disease/dealth, no e/o PE on CT

## 2019-12-13 NOTE — ED PROVIDER NOTE - NSFOLLOWUPINSTRUCTIONS_ED_ALL_ED_FT
Please understand you are leaving against medical advice .     Shortness of Breath, Adult  Shortness of breath is when a person has trouble breathing enough air or when a person feels like she or he is having trouble breathing in enough air. Shortness of breath could be a sign of a medical problem.  Follow these instructions at home:     Pay attention to any changes in your symptoms.Do not use any products that contain nicotine or tobacco, such as cigarettes, e-cigarettes, and chewing tobacco. Do not smoke. Smoking is a common cause of shortness of breath. If you need help quitting, ask your health care provider.Avoid things that can irritate your airways, such as:  Mold.Dust.Air pollution.Chemical fumes.Things that can cause allergy symptoms (allergens), if you have allergies.Keep your living space clean and free of mold and dust.Rest as needed. Slowly return to your usual activities.Take over-the-counter and prescription medicines only as told by your health care provider. This includes oxygen therapy and inhaled medicines.Keep all follow-up visits as told by your health care provider. This is important.Contact a health care provider if:  Your condition does not improve as soon as expected.You have a hard time doing your normal activities, even after you rest.You have new symptoms.Get help right away if:  Your shortness of breath gets worse.You have shortness of breath when you are resting.You feel light-headed or you faint.You have a cough that is not controlled with medicines.You cough up blood.You have pain with breathing.You have pain in your chest, arms, shoulders, or abdomen.You have a fever.You cannot walk up stairs or exercise the way that you normally do.These symptoms may represent a serious problem that is an emergency. Do not wait to see if the symptoms will go away. Get medical help right away. Call your local emergency services (911 in the [place] Do not drive yourself to the hospital.   Summary  Shortness of breath is when a person has trouble breathing enough air. It can be a sign of a medical problem.Avoid things that irritate your lungs, such as smoking, pollution, mold, and dust.Pay attention to changes in your symptoms and contact your health care provider if you have a hard time completing daily activities because of shortness of breath.This information is not intended to replace advice given to you by your health care provider. Make sure you discuss any questions you have with your health care provider.    Document Released: 09/12/2002 Document Revised: 05/20/2019 Document Reviewed: 05/20/2019  Elsevier Interactive Patient Education © 2019 [employer]

## 2019-12-13 NOTE — ED ADULT NURSE NOTE - DOES PATIENT HAVE ADVANCE DIRECTIVE
Health Maintenance Due   Topic Date Due   • DTaP/Tdap/Td Vaccine (1 - Tdap) 06/12/1975             Unaddressed Risk Adjusted HCC Categories and Diagnoses  HCC 55 - Drug/Alcohol Dependence   Unaddressed Dx:Alcoholic Intoxication With Complication (Cms/Hcc)       No

## 2019-12-13 NOTE — ED ADULT NURSE NOTE - NSIMPLEMENTINTERV_GEN_ALL_ED
Implemented All Fall with Harm Risk Interventions:  [place] to call system. Call bell, personal items and telephone within reach. Instruct patient to call for assistance. Room bathroom lighting operational. Non-slip footwear when patient is off stretcher. Physically safe environment: no spills, clutter or unnecessary equipment. Stretcher in lowest position, wheels locked, appropriate side rails in place. Provide visual cue, wrist band, yellow gown, etc. Monitor gait and stability. Monitor for mental status changes and reorient to person, place, and time. Review medications for side effects contributing to fall risk. Reinforce activity limits and safety measures with patient and family. Provide visual clues: red socks.

## 2019-12-13 NOTE — ED PROVIDER NOTE - PATIENT PORTAL LINK FT
You can access the FollowMyHealth Patient Portal offered by [facility] by registering at the following website: [email or web address]. By joining [facility]’s FollowMyHealth portal, you will also be able to view your health information using other applications (apps) compatible with our system.

## 2019-12-14 LAB
CULTURE RESULTS: SIGNIFICANT CHANGE UP
SPECIMEN SOURCE: SIGNIFICANT CHANGE UP

## 2019-12-18 LAB
CULTURE RESULTS: SIGNIFICANT CHANGE UP
SPECIMEN SOURCE: SIGNIFICANT CHANGE UP

## 2020-01-10 ENCOUNTER — OUTPATIENT (OUTPATIENT)
Dept: OUTPATIENT SERVICES | Facility: HOSPITAL | Age: 80
LOS: 1 days | End: 2020-01-10
Payer: MEDICARE

## 2020-01-10 VITALS
HEIGHT: 63 IN | WEIGHT: 145.06 LBS | RESPIRATION RATE: 16 BRPM | OXYGEN SATURATION: 93 % | HEART RATE: 96 BPM | SYSTOLIC BLOOD PRESSURE: 122 MMHG | DIASTOLIC BLOOD PRESSURE: 63 MMHG | TEMPERATURE: 98 F

## 2020-01-10 DIAGNOSIS — Z98.89 OTHER SPECIFIED POSTPROCEDURAL STATES: Chronic | ICD-10-CM

## 2020-01-10 DIAGNOSIS — K44.9 DIAPHRAGMATIC HERNIA WITHOUT OBSTRUCTION OR GANGRENE: Chronic | ICD-10-CM

## 2020-01-10 DIAGNOSIS — I73.9 PERIPHERAL VASCULAR DISEASE, UNSPECIFIED: Chronic | ICD-10-CM

## 2020-01-10 DIAGNOSIS — I50.22 CHRONIC SYSTOLIC (CONGESTIVE) HEART FAILURE: ICD-10-CM

## 2020-01-10 DIAGNOSIS — Z95.2 PRESENCE OF PROSTHETIC HEART VALVE: Chronic | ICD-10-CM

## 2020-01-10 DIAGNOSIS — Z98.890 OTHER SPECIFIED POSTPROCEDURAL STATES: Chronic | ICD-10-CM

## 2020-01-10 DIAGNOSIS — Z98.891 HISTORY OF UTERINE SCAR FROM PREVIOUS SURGERY: Chronic | ICD-10-CM

## 2020-01-10 LAB
ALBUMIN SERPL ELPH-MCNC: 4.2 G/DL — SIGNIFICANT CHANGE UP (ref 3.3–5)
ALP SERPL-CCNC: 62 U/L — SIGNIFICANT CHANGE UP (ref 40–120)
ALT FLD-CCNC: 27 U/L — SIGNIFICANT CHANGE UP (ref 10–45)
ANION GAP SERPL CALC-SCNC: 12 MMOL/L — SIGNIFICANT CHANGE UP (ref 5–17)
AST SERPL-CCNC: 25 U/L — SIGNIFICANT CHANGE UP (ref 10–40)
BILIRUB SERPL-MCNC: 0.3 MG/DL — SIGNIFICANT CHANGE UP (ref 0.2–1.2)
BUN SERPL-MCNC: 17 MG/DL — SIGNIFICANT CHANGE UP (ref 7–23)
CALCIUM SERPL-MCNC: 9.3 MG/DL — SIGNIFICANT CHANGE UP (ref 8.4–10.5)
CHLORIDE SERPL-SCNC: 93 MMOL/L — LOW (ref 96–108)
CO2 SERPL-SCNC: 31 MMOL/L — SIGNIFICANT CHANGE UP (ref 22–31)
CREAT SERPL-MCNC: 0.63 MG/DL — SIGNIFICANT CHANGE UP (ref 0.5–1.3)
GLUCOSE SERPL-MCNC: 102 MG/DL — HIGH (ref 70–99)
HCT VFR BLD CALC: 35.5 % — SIGNIFICANT CHANGE UP (ref 34.5–45)
HGB BLD-MCNC: 11.2 G/DL — LOW (ref 11.5–15.5)
MCHC RBC-ENTMCNC: 31.5 GM/DL — LOW (ref 32–36)
MCHC RBC-ENTMCNC: 32.4 PG — SIGNIFICANT CHANGE UP (ref 27–34)
MCV RBC AUTO: 102.6 FL — HIGH (ref 80–100)
NRBC # BLD: 0 /100 WBCS — SIGNIFICANT CHANGE UP (ref 0–0)
PLATELET # BLD AUTO: 196 K/UL — SIGNIFICANT CHANGE UP (ref 150–400)
POTASSIUM SERPL-MCNC: 4.3 MMOL/L — SIGNIFICANT CHANGE UP (ref 3.5–5.3)
POTASSIUM SERPL-SCNC: 4.3 MMOL/L — SIGNIFICANT CHANGE UP (ref 3.5–5.3)
PROT SERPL-MCNC: 6.8 G/DL — SIGNIFICANT CHANGE UP (ref 6–8.3)
RBC # BLD: 3.46 M/UL — LOW (ref 3.8–5.2)
RBC # FLD: 14 % — SIGNIFICANT CHANGE UP (ref 10.3–14.5)
SODIUM SERPL-SCNC: 136 MMOL/L — SIGNIFICANT CHANGE UP (ref 135–145)
WBC # BLD: 7.79 K/UL — SIGNIFICANT CHANGE UP (ref 3.8–10.5)
WBC # FLD AUTO: 7.79 K/UL — SIGNIFICANT CHANGE UP (ref 3.8–10.5)

## 2020-01-10 PROCEDURE — 93005 ELECTROCARDIOGRAM TRACING: CPT

## 2020-01-10 PROCEDURE — 93456 R HRT CORONARY ARTERY ANGIO: CPT | Mod: 26

## 2020-01-10 PROCEDURE — 99152 MOD SED SAME PHYS/QHP 5/>YRS: CPT

## 2020-01-10 PROCEDURE — 93010 ELECTROCARDIOGRAM REPORT: CPT

## 2020-01-10 PROCEDURE — 93456 R HRT CORONARY ARTERY ANGIO: CPT

## 2020-01-10 PROCEDURE — 85027 COMPLETE CBC AUTOMATED: CPT

## 2020-01-10 PROCEDURE — C1887: CPT

## 2020-01-10 PROCEDURE — C1894: CPT

## 2020-01-10 PROCEDURE — 80053 COMPREHEN METABOLIC PANEL: CPT

## 2020-01-10 PROCEDURE — C1769: CPT

## 2020-01-10 RX ORDER — CLONAZEPAM 1 MG
1 TABLET ORAL
Qty: 0 | Refills: 0 | DISCHARGE

## 2020-01-10 RX ORDER — ALBUTEROL 90 UG/1
2 AEROSOL, METERED ORAL
Qty: 0 | Refills: 0 | DISCHARGE

## 2020-01-10 RX ORDER — LOSARTAN POTASSIUM 100 MG/1
1 TABLET, FILM COATED ORAL
Qty: 0 | Refills: 0 | DISCHARGE

## 2020-01-10 RX ORDER — METFORMIN HYDROCHLORIDE 850 MG/1
1 TABLET ORAL
Qty: 0 | Refills: 0 | DISCHARGE

## 2020-01-10 RX ORDER — DOCUSATE SODIUM 100 MG
1 CAPSULE ORAL
Qty: 0 | Refills: 0 | DISCHARGE

## 2020-01-10 NOTE — H&P CARDIOLOGY - PMH
Alcoholism  last drink 1988  Anxiety    Aortic valve replaced  with bovine heart valve  Borderline diabetes  no meds  Breast cancer  right s/p lumpectomy and radiation 2008  CHF (congestive heart failure)    COPD (chronic obstructive pulmonary disease)  diagnosed 2009  inhaler and nebulizer  Depression    Diabetes mellitus    Diverticulitis  hospitalized 2013  Dry eyes    Emphysema lung    GERD (gastroesophageal reflux disease)    Glaucoma    Hiatal hernia  s/p surgical repair 2005  HTN (hypertension)    Hyperlipidemia    LBBB (left bundle branch block)    Meniere disease    SIMÓN (obstructive sleep apnea)  category I severe pt does not use c/pap  PVD (peripheral vascular disease)  left iliac  s/p surgical intervention 7-2013  unsuccessful  RA (rheumatoid arthritis)  diagnosed 2012  oxycodone prn  neck/ lower back  Skin cancer  not melanoma  Spinal stenosis    Thrush  treated x 4 days  1 month ago  restarted medication  3-19-14 clortramazole 5x day  TIA (transient ischemic attack)  2010  TMJ (temporomandibular joint disorder)    Valvular heart disease  aortic and mitral Alcoholism  last drink 1988  Anxiety    Aortic valve replaced  with bovine heart valve  Borderline diabetes  no meds  Breast cancer  right s/p lumpectomy and radiation 2008  CHF (congestive heart failure)    COPD (chronic obstructive pulmonary disease)  diagnosed 2009  inhaler and nebulizer  Depression    Diabetes mellitus    Diverticulitis  hospitalized 2013  Dry eyes    Emphysema lung    GERD (gastroesophageal reflux disease)    Glaucoma    Hiatal hernia  s/p surgical repair 2005  HTN (hypertension)    Hyperlipidemia    Incontinence    LBBB (left bundle branch block)    Meniere disease    SIMÓN (obstructive sleep apnea)  category I severe pt does not use c/pap  PVD (peripheral vascular disease)  left iliac  s/p surgical intervention 7-2013  unsuccessful  RA (rheumatoid arthritis)  diagnosed 2012  oxycodone prn  neck/ lower back  Skin cancer  not melanoma  Spinal stenosis    Thrush  treated x 4 days  1 month ago  restarted medication  3-19-14 clortramazole 5x day  TIA (transient ischemic attack)  2010  TMJ (temporomandibular joint disorder)    Valvular heart disease  aortic and mitral

## 2020-01-10 NOTE — H&P CARDIOLOGY - HISTORY OF PRESENT ILLNESS
This is a 80 y/o  female, no implantable cardiac device/s,  with PMH of ASHD, HLD, HTN, pre Diabetes, RA dx in 2012 , anxiety, skin CA, AS s/p aortic valve replacement on 3/26/2014 ( CTS Jakub Funez), mild MR/ mild TR, carotid atherosclerosis, diverticulitis, Meniere disease, COPD/emphysema, SIMÓN ( not on CPAP), CHF on 2L home O2 at night ( with  LVEF of 40% on ECHO from 04/2019), LBBB,  PVD/PAD, breast CA s/p right lumpectomy, TIA in 2010 ( w/o residuals),  spinal stenosis s/p implanted stimulator, recent ER visit on 12/13/2019 for c/c of dyspnea and tachycardia but patient left against medical advice, at that time,  despite rising troponins.  Presents here today for RHC/ LHC for further evaluations of symptoms.     Pulmonologist: Dr. Tapia.   Cardiologist: Dr. Jackson.  PCP Sangeeta Lee   Rheum Rosalio Boucher      NST 05/18/2018  normal, no evidence of stress induced ischemia  < from: TTE Echo Doppler w/o Cont (04.28.19 @ 10:36) >    OBSERVATIONS:    Technically difficult study  Mitral Valve: Mitral annular calcification is present with thickened   mitral valve leaflets, moderate mitral regurgitation   Aortic Valve/Aorta: Well-seated aortic valve bioprosthesis with mildly   elevated gradients. Peak aortic valve gradient is 17.8 mmHg with a mean   aortic gradient of 9.5 mmHg.  Tricuspid Valve: normal with trace TR.  Pulmonic Valve: Not well visualized  Left Atrium: Mildly dilated  Right Atrium: Not well visualized  Left Ventricle: endocardium not well visualized. Ejection fraction   appears to be about 40%. Cannot comment on segmental LV dysfunction   although there appears to be apical hypokinesis. Mild left ventricular   hypertrophy is present  Right Ventricle: Not well visualized but systolic function appears mildly   decreased  Pericardium/Pleura: normal, no significant pericardial effusion.  Pulmonary/RV Pressure: estimated PA systolic pressure of 28.3 mmHg   LV diastolic dysfunction is present    Conclusion:  Technically difficult study  Left ventricular endocardium not well visualized. Ejection fraction   appears to be about 40%. Cannot comment on segmental LV dysfunction   although there appears to be apical hypokinesis. Mild left ventricular   hypertrophy is present  Well-seated aortic valve bioprosthesis with mildly elevated gradients.   Peak aortic valve gradient is 17.8 mmHg with a mean aortic gradient of   9.5 mmHg.  Mitral annular calcification is present with thickened mitral valve   leaflets, moderate mitral regurgitation is present        < end of copied text >  < from: Cardiac Cath Lab - Adult (04.18.17 @ 14:01) >  CORONARY VESSELS: The coronary circulation is left dominant.  LM:   --  LM: Normal.  LAD:   --  LAD: Normal.  CX:   --  Circumflex: Angiography showed minor luminal irregularities with  no flow limiting lesions.  --  LPDA: Angiography showed minor luminal irregularities with no flow  limiting lesions.  RCA:   --  RCA: Angiography showed minor luminal irregularities with no  flow limiting lesions.  COMPLICATIONS: There were no complications.  DIAGNOSTIC IMPRESSIONS: Minor CAD. Normal PA pressure and wedge.  DIAGNOSTIC RECOMMENDATIONS: Medical therapy. F/u with Dr. Jackson  Prepared and signed by  Micheal Greco M.D.    < end of copied text > This is a 78 y/o  female, no implantable cardiac device/s,  with PMH of ASHD, HLD, HTN, pre Diabetes, RA dx in 2012 , anxiety, skin CA, AS s/p aortic valve replacement on 3/26/2014 ( CTS Jakub Funez), mild MR/ mild TR, carotid atherosclerosis, diverticulitis, Meniere disease, chronic back pain on Percocet and medical Marijuana ( as per patient),   COPD/emphysema, SIMÓN ( not on CPAP), CHF on 2L home O2 at night ( with  LVEF of 40% on ECHO from 04/2019), LBBB,  PVD/PAD, breast CA s/p right lumpectomy, TIA in 2010 ( w/o residuals),  spinal stenosis s/p implanted stimulator, recent ER visit on 12/13/2019 for c/c of dyspnea and tachycardia but patient left against medical advice, at that time,  despite rising troponins.  Pt with worsening dyspnea on exertion for the past 5 months, she reports dyspnea with taking a "few steps" and even with performing ADLs, along with "dizziness"; pt denies further associated symptoms such as: chest pain, palpitations, N&V, HA. Presents here today for RHC/ LHC for further evaluations of symptoms.     Pulmonologist: Dr. Tapia.   Cardiologist: Dr. Jackson.  PCP Sangeeta Lee   Rheum Rosalio Boucher      NST 05/18/2018  normal, no evidence of stress induced ischemia  < from: TTE Echo Doppler w/o Cont (04.28.19 @ 10:36) >    OBSERVATIONS:    Technically difficult study  Mitral Valve: Mitral annular calcification is present with thickened   mitral valve leaflets, moderate mitral regurgitation   Aortic Valve/Aorta: Well-seated aortic valve bioprosthesis with mildly   elevated gradients. Peak aortic valve gradient is 17.8 mmHg with a mean   aortic gradient of 9.5 mmHg.  Tricuspid Valve: normal with trace TR.  Pulmonic Valve: Not well visualized  Left Atrium: Mildly dilated  Right Atrium: Not well visualized  Left Ventricle: endocardium not well visualized. Ejection fraction   appears to be about 40%. Cannot comment on segmental LV dysfunction   although there appears to be apical hypokinesis. Mild left ventricular   hypertrophy is present  Right Ventricle: Not well visualized but systolic function appears mildly   decreased  Pericardium/Pleura: normal, no significant pericardial effusion.  Pulmonary/RV Pressure: estimated PA systolic pressure of 28.3 mmHg   LV diastolic dysfunction is present    Conclusion:  Technically difficult study  Left ventricular endocardium not well visualized. Ejection fraction   appears to be about 40%. Cannot comment on segmental LV dysfunction   although there appears to be apical hypokinesis. Mild left ventricular   hypertrophy is present  Well-seated aortic valve bioprosthesis with mildly elevated gradients.   Peak aortic valve gradient is 17.8 mmHg with a mean aortic gradient of   9.5 mmHg.  Mitral annular calcification is present with thickened mitral valve   leaflets, moderate mitral regurgitation is present        < end of copied text >  < from: Cardiac Cath Lab - Adult (04.18.17 @ 14:01) >  CORONARY VESSELS: The coronary circulation is left dominant.  LM:   --  LM: Normal.  LAD:   --  LAD: Normal.  CX:   --  Circumflex: Angiography showed minor luminal irregularities with  no flow limiting lesions.  --  LPDA: Angiography showed minor luminal irregularities with no flow  limiting lesions.  RCA:   --  RCA: Angiography showed minor luminal irregularities with no  flow limiting lesions.  COMPLICATIONS: There were no complications.  DIAGNOSTIC IMPRESSIONS: Minor CAD. Normal PA pressure and wedge.  DIAGNOSTIC RECOMMENDATIONS: Medical therapy. F/u with Dr. Jackson  Prepared and signed by  Micheal Greco M.D.    < end of copied text >    Narrative:     Symptoms:        Angina (Class): IV       Ischemic Symptoms: dyspnea on exertion with any level of exertion     Heart Failure: YES        Systolic/Diastolic/Combined: combined        NYHA Class (within 2 weeks): III    Assessment of LVEF:       EF: 40%       Assessed by: ECHO       Date: 4/28/2019    Prior Cardiac Interventions:       PCI's: no       CABG: no, + AVR    Noninvasive Testing:   Stress Test: Date: see above        Protocol:        Duration of Exercise:        Symptoms:        EKG Changes:        DTS:        Myocardial Imaging:        Risk Assessment:     Echo: see above    Antianginal Therapies:        Beta Blockers:         Calcium Channel Blockers: Verapamil        Long Acting Nitrates:        Ranexa:     Associated Risk Factors:        Cerebrovascular Disease: No       Chronic Lung Disease: YES       Peripheral Arterial Disease: YES       Chronic Kidney Disease (if yes, what is GFR): No       Uncontrolled Diabetes (if yes, what is HgbA1C or FBS): No       Poorly Controlled Hypertension (if yes, what is SBP): No       Morbid Obesity (if yes, what is BMI): No       History of Recent Ventricular Arrhythmia: No       Inability to Ambulate Safely: no       Need for Therapeutic Anticoagulation: No       Antiplatelet or Contrast Allergy: No

## 2020-01-23 NOTE — H&P ADULT - PROBLEM SELECTOR PROBLEM 2
/80 (Cuff Size: Adult Regular)   Pulse 72   Temp 97.8  F (36.6  C) (Oral)   Resp 16   Wt 81.4 kg (179 lb 8 oz)   LMP 11/01/2011 (Approximate)   SpO2 98%   BMI 30.81 kg/m       Neuro: A&Ox4.   Cardiac: Afebrile, VSS.   Respiratory: RA   GI/: Voiding spontaneously. No BM this shift.   Diet/appetite:Tolerating diet. Denies nausea   Activity:Independent.  Pain: Denies   Skin: No new deficits noted. R-groin site CDI.  Lines:PIV x1  Drains: None  No new complaints.Will continue to monitor and follow plan of care.        Lactate blood increase

## 2020-03-13 ENCOUNTER — APPOINTMENT (OUTPATIENT)
Dept: RHEUMATOLOGY | Facility: CLINIC | Age: 80
End: 2020-03-13
Payer: MEDICARE

## 2020-03-13 VITALS
BODY MASS INDEX: 26.87 KG/M2 | DIASTOLIC BLOOD PRESSURE: 77 MMHG | WEIGHT: 146 LBS | HEIGHT: 62 IN | OXYGEN SATURATION: 94 % | HEART RATE: 103 BPM | SYSTOLIC BLOOD PRESSURE: 153 MMHG | TEMPERATURE: 98.2 F

## 2020-03-13 PROBLEM — R32 UNSPECIFIED URINARY INCONTINENCE: Chronic | Status: ACTIVE | Noted: 2020-01-10

## 2020-03-13 PROCEDURE — 99214 OFFICE O/P EST MOD 30 MIN: CPT | Mod: 25

## 2020-03-13 PROCEDURE — 36415 COLL VENOUS BLD VENIPUNCTURE: CPT

## 2020-03-13 RX ORDER — BRINZOLAMIDE 10 MG/ML
1 SUSPENSION/ DROPS OPHTHALMIC
Qty: 10 | Refills: 0 | Status: DISCONTINUED | COMMUNITY
Start: 2019-07-17

## 2020-03-13 RX ORDER — PNEUMOCOCCAL 23-VAL P-SAC VAC 25MCG/0.5
25 VIAL (ML) INJECTION
Qty: 1 | Refills: 0 | Status: DISCONTINUED | COMMUNITY
Start: 2019-09-20

## 2020-03-13 RX ORDER — TOFACITINIB 5 MG/1
5 TABLET, FILM COATED ORAL
Qty: 180 | Refills: 1 | Status: DISCONTINUED | COMMUNITY
Start: 2019-09-20 | End: 2020-03-13

## 2020-03-13 RX ORDER — DICLOFENAC SODIUM 10 MG/G
1 GEL TOPICAL
Qty: 5 | Refills: 6 | Status: ACTIVE | COMMUNITY
Start: 2020-03-13 | End: 1900-01-01

## 2020-03-13 RX ORDER — PREDNISONE 5 MG/1
5 TABLET ORAL
Qty: 40 | Refills: 0 | Status: DISCONTINUED | COMMUNITY
Start: 2020-03-13 | End: 2020-03-13

## 2020-03-13 RX ORDER — OXYCODONE AND ACETAMINOPHEN 5; 325 MG/1; MG/1
5-325 TABLET ORAL
Refills: 0 | Status: ACTIVE | COMMUNITY
Start: 2020-03-13

## 2020-03-13 RX ORDER — LOSARTAN POTASSIUM 50 MG/1
50 TABLET, FILM COATED ORAL
Qty: 90 | Refills: 0 | Status: DISCONTINUED | COMMUNITY
Start: 2020-01-16

## 2020-03-13 RX ORDER — BIMATOPROST 0.1 MG/ML
0.01 SOLUTION/ DROPS OPHTHALMIC
Qty: 8 | Refills: 0 | Status: DISCONTINUED | COMMUNITY
Start: 2019-07-08

## 2020-03-13 RX ORDER — DOXYCYCLINE HYCLATE 100 MG/1
100 CAPSULE ORAL
Qty: 20 | Refills: 0 | Status: DISCONTINUED | COMMUNITY
Start: 2019-12-17

## 2020-03-13 RX ORDER — DOXYCYCLINE HYCLATE 100 MG/1
100 TABLET ORAL
Qty: 20 | Refills: 0 | Status: DISCONTINUED | COMMUNITY
Start: 2019-11-12

## 2020-03-13 NOTE — ASSESSMENT
[FreeTextEntry1] : 79 year old female with a history of depression, COPD, HTN, and aortic valve replacement presents to rule out RA today. She was under the care of Dr. Saenz in the past\par \par RA-\par . IN the past she had  a sedimentation rate of 26 which is normal for her age, an BRADLEY of 1:80, with a weakly positive double-stranded DNA and negative rheumatoid serologies. Her muscle enzymes are slightly elevated too. The significance of these labs is unclear. At this time she does not fill criteria for lupus.\par \par Since she has features consistent with inflammatory arthritis affecting the second and third MCP\par She has erosive and active RA. \par -she was on xeljanz but we d/maria elena it\par - to continue SSZ at 1 .5 gms daily\par - will hold off on new med use at this time given virus outbreak\par - to use prednisone 20 mg and taper by 5 mg q 5 days till she gets to 5 mg and then use prn\par \par Steroid use-\par Risks and benefits of steroids were d/w patient including but not limited to weight gain, diabetes, HTN, avascular necrosis, osteoporosis, glaucoma, cataract, infections and immunosuppression.\par \par \par Osteoarthritis-\par -the other issue is that she does have polyarticular osteoarthritis especially in her lower back\par -She states that she is not a candidate for NSAIDs but does not recall why\par -She is seeing pain management, she has tried epidurals, she is also using medical marijuana with minimal relief.\par -I did just physical therapy as well.\par \par COPD-\par -stable\par \par Followup 2 months or sooner if there is any change in her underlying symptoms. She is aware to hold meds while on abx\par

## 2020-03-13 NOTE — HISTORY OF PRESENT ILLNESS
[FreeTextEntry1] : Followup visit\par Jovana comes in for a followup visit for her rheumatoid arthritis. \par She has been having a lot more pain, she complains of aches and pains all over. She wants to know what she can do. She wants to use some prednisone and does not want to use an immunosuppressant at this time. She rates her pain as 7/10.\par The issue is that she has been dealing with chronic back pain, it is significant and limits her daily activities. She does see pain management. Her tolerance for pain medication is high. She has tried medical marijuana as well with minimal relief. She wants to know what she can do to help with the pain related to her rheumatoid arthritis as well as the osteoarthritis.\par \par She rates her fatigue her on the same level. She feels very frustrated with her health.

## 2020-03-13 NOTE — REVIEW OF SYSTEMS
[Shortness Of Breath] : shortness of breath [Arthralgias] : arthralgias [Joint Pain] : joint pain [Fever] : no fever [Chills] : no chills [Feeling Poorly] : not feeling poorly [Feeling Tired] : not feeling tired [Eye Pain] : no eye pain [Dry Eyes] : no dryness of the eyes [Loss Of Hearing] : no hearing loss [Chest Pain] : no chest pain [Palpitations] : no palpitations [Cough] : no cough [SOB on Exertion] : no shortness of breath during exertion [Abdominal Pain] : no abdominal pain [Joint Swelling] : no joint swelling [Joint Stiffness] : no joint stiffness [Skin Lesions] : no skin lesions [Dizziness] : no dizziness [Limb Weakness] : no limb weakness [Difficulty Walking] : no difficulty walking [Anxiety] : no anxiety [Depression] : no depression [Muscle Weakness] : no muscle weakness [Easy Bruising] : no tendency for easy bruising

## 2020-03-13 NOTE — PHYSICAL EXAM
[General Appearance - Alert] : alert [General Appearance - Well Nourished] : well nourished [General Appearance - Well Developed] : well developed [Sclera] : the sclera and conjunctiva were normal [Oropharynx] : the oropharynx was normal [Neck Appearance] : the appearance of the neck was normal [Thyroid Diffuse Enlargement] : the thyroid was not enlarged [Auscultation Breath Sounds / Voice Sounds] : lungs were clear to auscultation bilaterally [Heart Sounds] : normal S1 and S2 [Full Pulse] : the pedal pulses are present [Edema] : there was no peripheral edema [Abdomen Tenderness] : non-tender [Cervical Lymph Nodes Enlarged Anterior Bilaterally] : anterior cervical [Supraclavicular Lymph Nodes Enlarged Bilaterally] : supraclavicular [No Spinal Tenderness] : no spinal tenderness [Abnormal Walk] : normal gait [Nail Clubbing] : no clubbing  or cyanosis of the fingernails [Musculoskeletal - Swelling] : no joint swelling seen [Motor Tone] : muscle strength and tone were normal [] : no rash [Deep Tendon Reflexes (DTR)] : deep tendon reflexes were 2+ and symmetric [Motor Exam] : the motor exam was normal [Oriented To Time, Place, And Person] : oriented to person, place, and time [Impaired Insight] : insight and judgment were intact [FreeTextEntry1] : FROM all joints, bony changes hands and feet c/w OA, no synovitis noted, prominent MCP 2,3 b/l,

## 2020-03-17 LAB
ALBUMIN SERPL ELPH-MCNC: 4.8 G/DL
ALP BLD-CCNC: 89 U/L
ALT SERPL-CCNC: 25 U/L
ANION GAP SERPL CALC-SCNC: 12 MMOL/L
AST SERPL-CCNC: 25 U/L
BASOPHILS # BLD AUTO: 0.08 K/UL
BASOPHILS NFR BLD AUTO: 1 %
BILIRUB SERPL-MCNC: 0.4 MG/DL
BUN SERPL-MCNC: 14 MG/DL
CALCIUM SERPL-MCNC: 9.6 MG/DL
CCP AB SER IA-ACNC: <8 UNITS
CHLORIDE SERPL-SCNC: 96 MMOL/L
CO2 SERPL-SCNC: 29 MMOL/L
CREAT SERPL-MCNC: 0.55 MG/DL
CRP SERPL-MCNC: 0.36 MG/DL
EOSINOPHIL # BLD AUTO: 0.09 K/UL
EOSINOPHIL NFR BLD AUTO: 1.2 %
ERYTHROCYTE [SEDIMENTATION RATE] IN BLOOD BY WESTERGREN METHOD: 25 MM/HR
GLUCOSE SERPL-MCNC: 77 MG/DL
HCT VFR BLD CALC: 39 %
HGB BLD-MCNC: 12.5 G/DL
IMM GRANULOCYTES NFR BLD AUTO: 0.4 %
LYMPHOCYTES # BLD AUTO: 1.57 K/UL
LYMPHOCYTES NFR BLD AUTO: 20.3 %
MAN DIFF?: NORMAL
MCHC RBC-ENTMCNC: 32.1 GM/DL
MCHC RBC-ENTMCNC: 32.8 PG
MCV RBC AUTO: 102.4 FL
MONOCYTES # BLD AUTO: 1.02 K/UL
MONOCYTES NFR BLD AUTO: 13.2 %
NEUTROPHILS # BLD AUTO: 4.96 K/UL
NEUTROPHILS NFR BLD AUTO: 63.9 %
PLATELET # BLD AUTO: 232 K/UL
POTASSIUM SERPL-SCNC: 5 MMOL/L
PROT SERPL-MCNC: 7.1 G/DL
RBC # BLD: 3.81 M/UL
RBC # FLD: 13.5 %
RF+CCP IGG SER-IMP: NEGATIVE
RHEUMATOID FACT SER QL: <10 IU/ML
SODIUM SERPL-SCNC: 137 MMOL/L
WBC # FLD AUTO: 7.75 K/UL

## 2020-06-22 ENCOUNTER — TRANSCRIPTION ENCOUNTER (OUTPATIENT)
Age: 80
End: 2020-06-22

## 2020-06-23 ENCOUNTER — APPOINTMENT (OUTPATIENT)
Dept: RHEUMATOLOGY | Facility: CLINIC | Age: 80
End: 2020-06-23
Payer: MEDICARE

## 2020-06-23 VITALS
TEMPERATURE: 97.9 F | SYSTOLIC BLOOD PRESSURE: 122 MMHG | BODY MASS INDEX: 26.89 KG/M2 | HEART RATE: 96 BPM | OXYGEN SATURATION: 94 % | DIASTOLIC BLOOD PRESSURE: 72 MMHG | WEIGHT: 147 LBS

## 2020-06-23 PROCEDURE — 99213 OFFICE O/P EST LOW 20 MIN: CPT

## 2020-06-23 NOTE — ASSESSMENT
[FreeTextEntry1] : 79 year old female with a history of depression, COPD, HTN, and aortic valve replacement presents to rule out RA today. She was under the care of Dr. Saenz in the past\par \par RA-\par . IN the past she had  a sedimentation rate of 26 which is normal for her age, an BRADLEY of 1:80, with a weakly positive double-stranded DNA and negative rheumatoid serologies. Her muscle enzymes are slightly elevated too. The significance of these labs is unclear. At this time she does not fill criteria for lupus.\par \par Since she has features consistent with inflammatory arthritis affecting the second and third MCP\par She has erosive and active RA. \par -she was on xeljanz but we d/maria elena it\par - to continue SSZ at 2 gms daily\par - will hold off on new med use at this time \par \par Steroid use-\par to use prednisone prn \par Risks and benefits of steroids were d/w patient including but not limited to weight gain, diabetes, HTN, avascular necrosis, osteoporosis, glaucoma, cataract, infections and immunosuppression.\par \par \par Osteoarthritis-\par -the other issue is that she does have polyarticular osteoarthritis especially in her lower back\par -She states that she is not a candidate for NSAIDs but does not recall why\par -She is seeing pain management, she has tried epidurals, she is also using medical marijuana with some relief.\par -to resume pT \par \par COPD-\par -stable\par \par Followup 4 months or sooner if there is any change in her underlying symptoms. She is aware to hold meds while on abx\par

## 2020-06-23 NOTE — HISTORY OF PRESENT ILLNESS
[FreeTextEntry1] : Followup visit\par Jovana comes in for a followup visit for her rheumatoid arthritis. \par She has been having a lot more pain, she complains of aches and pains all over. She continues to use SSZ at 2 gms daily She does not want to use an immunosuppressant at this time. She rates her pain as 5/10. She uses prednisone once in a while \par The issue is that she has been dealing with chronic back pain, it is significant and limits her daily activities. She does see pain management. Her tolerance for pain medication is high. She has tried medical marijuana as well with minimal relief. She wants to know what she can do to help with the pain related to her rheumatoid arthritis as well as the osteoarthritis.\par \par She rates her fatigue her on the same level.

## 2020-06-23 NOTE — PHYSICAL EXAM
[General Appearance - Alert] : alert [General Appearance - Well Developed] : well developed [General Appearance - Well Nourished] : well nourished [Sclera] : the sclera and conjunctiva were normal [Thyroid Diffuse Enlargement] : the thyroid was not enlarged [Oropharynx] : the oropharynx was normal [Neck Appearance] : the appearance of the neck was normal [Auscultation Breath Sounds / Voice Sounds] : lungs were clear to auscultation bilaterally [Heart Sounds] : normal S1 and S2 [Full Pulse] : the pedal pulses are present [Edema] : there was no peripheral edema [Cervical Lymph Nodes Enlarged Anterior Bilaterally] : anterior cervical [Abdomen Tenderness] : non-tender [No Spinal Tenderness] : no spinal tenderness [Supraclavicular Lymph Nodes Enlarged Bilaterally] : supraclavicular [Abnormal Walk] : normal gait [Nail Clubbing] : no clubbing  or cyanosis of the fingernails [Motor Tone] : muscle strength and tone were normal [FreeTextEntry1] : FROM all joints, bony changes hands and feet c/w OA, no synovitis noted, prominent MCP 2,3 b/l,  [Musculoskeletal - Swelling] : no joint swelling seen [] : no rash [Deep Tendon Reflexes (DTR)] : deep tendon reflexes were 2+ and symmetric [Oriented To Time, Place, And Person] : oriented to person, place, and time [Motor Exam] : the motor exam was normal [Impaired Insight] : insight and judgment were intact

## 2020-06-23 NOTE — REVIEW OF SYSTEMS
[Fever] : no fever [Chills] : no chills [Feeling Tired] : not feeling tired [Feeling Poorly] : not feeling poorly [Dry Eyes] : no dryness of the eyes [Eye Pain] : no eye pain [Chest Pain] : no chest pain [Loss Of Hearing] : no hearing loss [Shortness Of Breath] : shortness of breath [Cough] : no cough [Palpitations] : no palpitations [SOB on Exertion] : no shortness of breath during exertion [Abdominal Pain] : no abdominal pain [Arthralgias] : arthralgias [Joint Pain] : joint pain [Joint Swelling] : no joint swelling [Joint Stiffness] : no joint stiffness [Skin Lesions] : no skin lesions [Dizziness] : no dizziness [Difficulty Walking] : no difficulty walking [Limb Weakness] : no limb weakness [Anxiety] : no anxiety [Depression] : no depression [Muscle Weakness] : no muscle weakness [Easy Bruising] : no tendency for easy bruising

## 2020-06-25 ENCOUNTER — OUTPATIENT (OUTPATIENT)
Dept: OUTPATIENT SERVICES | Facility: HOSPITAL | Age: 80
LOS: 1 days | Discharge: HOME | End: 2020-06-25

## 2020-06-25 VITALS
WEIGHT: 147.05 LBS | SYSTOLIC BLOOD PRESSURE: 135 MMHG | TEMPERATURE: 97 F | HEIGHT: 63 IN | OXYGEN SATURATION: 93 % | HEART RATE: 95 BPM | DIASTOLIC BLOOD PRESSURE: 80 MMHG | RESPIRATION RATE: 16 BRPM

## 2020-06-25 DIAGNOSIS — Z98.89 OTHER SPECIFIED POSTPROCEDURAL STATES: Chronic | ICD-10-CM

## 2020-06-25 DIAGNOSIS — Z95.2 PRESENCE OF PROSTHETIC HEART VALVE: Chronic | ICD-10-CM

## 2020-06-25 DIAGNOSIS — K44.9 DIAPHRAGMATIC HERNIA WITHOUT OBSTRUCTION OR GANGRENE: Chronic | ICD-10-CM

## 2020-06-25 DIAGNOSIS — K57.32 DIVERTICULITIS OF LARGE INTESTINE WITHOUT PERFORATION OR ABSCESS WITHOUT BLEEDING: ICD-10-CM

## 2020-06-25 DIAGNOSIS — Z98.891 HISTORY OF UTERINE SCAR FROM PREVIOUS SURGERY: Chronic | ICD-10-CM

## 2020-06-25 DIAGNOSIS — Z98.890 OTHER SPECIFIED POSTPROCEDURAL STATES: Chronic | ICD-10-CM

## 2020-06-25 DIAGNOSIS — I73.9 PERIPHERAL VASCULAR DISEASE, UNSPECIFIED: Chronic | ICD-10-CM

## 2020-06-25 NOTE — ASU PATIENT PROFILE, ADULT - PMH
Alcoholism  last drink 1988  Anxiety    Aortic valve replaced  with bovine heart valve  Borderline diabetes  no meds  Breast cancer  right s/p lumpectomy and radiation 2008  CHF (congestive heart failure)    COPD (chronic obstructive pulmonary disease)  diagnosed 2009  inhaler and nebulizer  Depression    Diabetes mellitus    Diverticulitis  hospitalized 2013  Dry eyes    Emphysema lung    GERD (gastroesophageal reflux disease)    Glaucoma    Hiatal hernia  s/p surgical repair 2005  HTN (hypertension)    Hyperlipidemia    Incontinence    LBBB (left bundle branch block)    Meniere disease    SIMÓN (obstructive sleep apnea)  category I severe pt does not use c/pap  PVD (peripheral vascular disease)  left iliac  s/p surgical intervention 7-2013  unsuccessful  RA (rheumatoid arthritis)  diagnosed 2012  oxycodone prn  neck/ lower back  Skin cancer  not melanoma  Spinal stenosis    Thrush  treated x 4 days  1 month ago  restarted medication  3-19-14 clortramazole 5x day  TIA (transient ischemic attack)  2010  TMJ (temporomandibular joint disorder)    Valvular heart disease  aortic and mitral

## 2020-07-02 DIAGNOSIS — Z09 ENCOUNTER FOR FOLLOW-UP EXAMINATION AFTER COMPLETED TREATMENT FOR CONDITIONS OTHER THAN MALIGNANT NEOPLASM: ICD-10-CM

## 2020-07-02 DIAGNOSIS — Z85.3 PERSONAL HISTORY OF MALIGNANT NEOPLASM OF BREAST: ICD-10-CM

## 2020-07-02 DIAGNOSIS — E11.9 TYPE 2 DIABETES MELLITUS WITHOUT COMPLICATIONS: ICD-10-CM

## 2020-07-02 DIAGNOSIS — K57.30 DIVERTICULOSIS OF LARGE INTESTINE WITHOUT PERFORATION OR ABSCESS WITHOUT BLEEDING: ICD-10-CM

## 2020-07-02 DIAGNOSIS — M19.90 UNSPECIFIED OSTEOARTHRITIS, UNSPECIFIED SITE: ICD-10-CM

## 2020-07-02 DIAGNOSIS — Z88.0 ALLERGY STATUS TO PENICILLIN: ICD-10-CM

## 2020-07-02 DIAGNOSIS — F32.9 MAJOR DEPRESSIVE DISORDER, SINGLE EPISODE, UNSPECIFIED: ICD-10-CM

## 2020-07-29 ENCOUNTER — TRANSCRIPTION ENCOUNTER (OUTPATIENT)
Age: 80
End: 2020-07-29

## 2020-08-10 ENCOUNTER — EMERGENCY (EMERGENCY)
Facility: HOSPITAL | Age: 80
LOS: 0 days | Discharge: HOME | End: 2020-08-10
Attending: EMERGENCY MEDICINE
Payer: MEDICARE

## 2020-08-10 VITALS
DIASTOLIC BLOOD PRESSURE: 60 MMHG | HEART RATE: 100 BPM | SYSTOLIC BLOOD PRESSURE: 119 MMHG | OXYGEN SATURATION: 97 % | TEMPERATURE: 98 F | RESPIRATION RATE: 17 BRPM

## 2020-08-10 VITALS — HEIGHT: 62 IN | WEIGHT: 145.95 LBS

## 2020-08-10 DIAGNOSIS — H81.09 MENIERE'S DISEASE, UNSPECIFIED EAR: ICD-10-CM

## 2020-08-10 DIAGNOSIS — R10.31 RIGHT LOWER QUADRANT PAIN: ICD-10-CM

## 2020-08-10 DIAGNOSIS — K57.32 DIVERTICULITIS OF LARGE INTESTINE WITHOUT PERFORATION OR ABSCESS WITHOUT BLEEDING: ICD-10-CM

## 2020-08-10 DIAGNOSIS — Z85.828 PERSONAL HISTORY OF OTHER MALIGNANT NEOPLASM OF SKIN: ICD-10-CM

## 2020-08-10 DIAGNOSIS — Z98.89 OTHER SPECIFIED POSTPROCEDURAL STATES: Chronic | ICD-10-CM

## 2020-08-10 DIAGNOSIS — R50.9 FEVER, UNSPECIFIED: ICD-10-CM

## 2020-08-10 DIAGNOSIS — M26.609 UNSPECIFIED TEMPOROMANDIBULAR JOINT DISORDER, UNSPECIFIED SIDE: ICD-10-CM

## 2020-08-10 DIAGNOSIS — H04.123 DRY EYE SYNDROME OF BILATERAL LACRIMAL GLANDS: ICD-10-CM

## 2020-08-10 DIAGNOSIS — I73.9 PERIPHERAL VASCULAR DISEASE, UNSPECIFIED: Chronic | ICD-10-CM

## 2020-08-10 DIAGNOSIS — F32.9 MAJOR DEPRESSIVE DISORDER, SINGLE EPISODE, UNSPECIFIED: ICD-10-CM

## 2020-08-10 DIAGNOSIS — J43.9 EMPHYSEMA, UNSPECIFIED: ICD-10-CM

## 2020-08-10 DIAGNOSIS — I11.0 HYPERTENSIVE HEART DISEASE WITH HEART FAILURE: ICD-10-CM

## 2020-08-10 DIAGNOSIS — Z79.51 LONG TERM (CURRENT) USE OF INHALED STEROIDS: ICD-10-CM

## 2020-08-10 DIAGNOSIS — J44.9 CHRONIC OBSTRUCTIVE PULMONARY DISEASE, UNSPECIFIED: ICD-10-CM

## 2020-08-10 DIAGNOSIS — I44.7 LEFT BUNDLE-BRANCH BLOCK, UNSPECIFIED: ICD-10-CM

## 2020-08-10 DIAGNOSIS — Z88.8 ALLERGY STATUS TO OTHER DRUGS, MEDICAMENTS AND BIOLOGICAL SUBSTANCES STATUS: ICD-10-CM

## 2020-08-10 DIAGNOSIS — Z86.73 PERSONAL HISTORY OF TRANSIENT ISCHEMIC ATTACK (TIA), AND CEREBRAL INFARCTION WITHOUT RESIDUAL DEFICITS: ICD-10-CM

## 2020-08-10 DIAGNOSIS — M48.00 SPINAL STENOSIS, SITE UNSPECIFIED: ICD-10-CM

## 2020-08-10 DIAGNOSIS — E78.5 HYPERLIPIDEMIA, UNSPECIFIED: ICD-10-CM

## 2020-08-10 DIAGNOSIS — Z88.0 ALLERGY STATUS TO PENICILLIN: ICD-10-CM

## 2020-08-10 DIAGNOSIS — I50.9 HEART FAILURE, UNSPECIFIED: ICD-10-CM

## 2020-08-10 DIAGNOSIS — Z79.82 LONG TERM (CURRENT) USE OF ASPIRIN: ICD-10-CM

## 2020-08-10 DIAGNOSIS — Z85.3 PERSONAL HISTORY OF MALIGNANT NEOPLASM OF BREAST: ICD-10-CM

## 2020-08-10 DIAGNOSIS — Z79.84 LONG TERM (CURRENT) USE OF ORAL HYPOGLYCEMIC DRUGS: ICD-10-CM

## 2020-08-10 DIAGNOSIS — Z98.890 OTHER SPECIFIED POSTPROCEDURAL STATES: Chronic | ICD-10-CM

## 2020-08-10 DIAGNOSIS — M06.9 RHEUMATOID ARTHRITIS, UNSPECIFIED: ICD-10-CM

## 2020-08-10 DIAGNOSIS — Z98.891 HISTORY OF UTERINE SCAR FROM PREVIOUS SURGERY: Chronic | ICD-10-CM

## 2020-08-10 DIAGNOSIS — Z95.2 PRESENCE OF PROSTHETIC HEART VALVE: Chronic | ICD-10-CM

## 2020-08-10 DIAGNOSIS — I08.0 RHEUMATIC DISORDERS OF BOTH MITRAL AND AORTIC VALVES: ICD-10-CM

## 2020-08-10 DIAGNOSIS — E11.9 TYPE 2 DIABETES MELLITUS WITHOUT COMPLICATIONS: ICD-10-CM

## 2020-08-10 DIAGNOSIS — K44.9 DIAPHRAGMATIC HERNIA WITHOUT OBSTRUCTION OR GANGRENE: Chronic | ICD-10-CM

## 2020-08-10 DIAGNOSIS — I73.9 PERIPHERAL VASCULAR DISEASE, UNSPECIFIED: ICD-10-CM

## 2020-08-10 DIAGNOSIS — B37.9 CANDIDIASIS, UNSPECIFIED: ICD-10-CM

## 2020-08-10 DIAGNOSIS — H40.9 UNSPECIFIED GLAUCOMA: ICD-10-CM

## 2020-08-10 DIAGNOSIS — K21.9 GASTRO-ESOPHAGEAL REFLUX DISEASE WITHOUT ESOPHAGITIS: ICD-10-CM

## 2020-08-10 DIAGNOSIS — G47.33 OBSTRUCTIVE SLEEP APNEA (ADULT) (PEDIATRIC): ICD-10-CM

## 2020-08-10 LAB
ALBUMIN SERPL ELPH-MCNC: 3.6 G/DL — SIGNIFICANT CHANGE UP (ref 3.3–5)
ALP SERPL-CCNC: 67 U/L — SIGNIFICANT CHANGE UP (ref 40–120)
ALT FLD-CCNC: 38 U/L — SIGNIFICANT CHANGE UP (ref 12–78)
ANION GAP SERPL CALC-SCNC: 5 MMOL/L — SIGNIFICANT CHANGE UP (ref 5–17)
APPEARANCE UR: CLEAR — SIGNIFICANT CHANGE UP
AST SERPL-CCNC: 24 U/L — SIGNIFICANT CHANGE UP (ref 15–37)
BASOPHILS # BLD AUTO: 0.07 K/UL — SIGNIFICANT CHANGE UP (ref 0–0.2)
BASOPHILS NFR BLD AUTO: 0.4 % — SIGNIFICANT CHANGE UP (ref 0–2)
BILIRUB SERPL-MCNC: 0.5 MG/DL — SIGNIFICANT CHANGE UP (ref 0.2–1.2)
BILIRUB UR-MCNC: NEGATIVE — SIGNIFICANT CHANGE UP
BUN SERPL-MCNC: 8 MG/DL — SIGNIFICANT CHANGE UP (ref 7–23)
CALCIUM SERPL-MCNC: 8.7 MG/DL — SIGNIFICANT CHANGE UP (ref 8.5–10.1)
CHLORIDE SERPL-SCNC: 95 MMOL/L — LOW (ref 96–108)
CO2 SERPL-SCNC: 30 MMOL/L — SIGNIFICANT CHANGE UP (ref 22–31)
COLOR SPEC: YELLOW — SIGNIFICANT CHANGE UP
CREAT SERPL-MCNC: 0.66 MG/DL — SIGNIFICANT CHANGE UP (ref 0.5–1.3)
DIFF PNL FLD: ABNORMAL
EOSINOPHIL # BLD AUTO: 0.1 K/UL — SIGNIFICANT CHANGE UP (ref 0–0.5)
EOSINOPHIL NFR BLD AUTO: 0.5 % — SIGNIFICANT CHANGE UP (ref 0–6)
GLUCOSE SERPL-MCNC: 97 MG/DL — SIGNIFICANT CHANGE UP (ref 70–99)
GLUCOSE UR QL: NEGATIVE MG/DL — SIGNIFICANT CHANGE UP
HCT VFR BLD CALC: 35.7 % — SIGNIFICANT CHANGE UP (ref 34.5–45)
HGB BLD-MCNC: 11.7 G/DL — SIGNIFICANT CHANGE UP (ref 11.5–15.5)
IMM GRANULOCYTES NFR BLD AUTO: 0.9 % — SIGNIFICANT CHANGE UP (ref 0–1.5)
KETONES UR-MCNC: ABNORMAL
LACTATE SERPL-SCNC: 1.4 MMOL/L — SIGNIFICANT CHANGE UP (ref 0.7–2)
LEUKOCYTE ESTERASE UR-ACNC: ABNORMAL
LIDOCAIN IGE QN: 50 U/L — LOW (ref 73–393)
LYMPHOCYTES # BLD AUTO: 1.33 K/UL — SIGNIFICANT CHANGE UP (ref 1–3.3)
LYMPHOCYTES # BLD AUTO: 7.2 % — LOW (ref 13–44)
MCHC RBC-ENTMCNC: 32.7 PG — SIGNIFICANT CHANGE UP (ref 27–34)
MCHC RBC-ENTMCNC: 32.8 GM/DL — SIGNIFICANT CHANGE UP (ref 32–36)
MCV RBC AUTO: 99.7 FL — SIGNIFICANT CHANGE UP (ref 80–100)
MONOCYTES # BLD AUTO: 1.26 K/UL — HIGH (ref 0–0.9)
MONOCYTES NFR BLD AUTO: 6.8 % — SIGNIFICANT CHANGE UP (ref 2–14)
NEUTROPHILS # BLD AUTO: 15.58 K/UL — HIGH (ref 1.8–7.4)
NEUTROPHILS NFR BLD AUTO: 84.2 % — HIGH (ref 43–77)
NITRITE UR-MCNC: NEGATIVE — SIGNIFICANT CHANGE UP
PH UR: 7 — SIGNIFICANT CHANGE UP (ref 5–8)
PLATELET # BLD AUTO: 185 K/UL — SIGNIFICANT CHANGE UP (ref 150–400)
POTASSIUM SERPL-MCNC: 3.3 MMOL/L — LOW (ref 3.5–5.3)
POTASSIUM SERPL-SCNC: 3.3 MMOL/L — LOW (ref 3.5–5.3)
PROT SERPL-MCNC: 7.7 GM/DL — SIGNIFICANT CHANGE UP (ref 6–8.3)
PROT UR-MCNC: 15 MG/DL
RBC # BLD: 3.58 M/UL — LOW (ref 3.8–5.2)
RBC # FLD: 13.5 % — SIGNIFICANT CHANGE UP (ref 10.3–14.5)
SARS-COV-2 RNA SPEC QL NAA+PROBE: SIGNIFICANT CHANGE UP
SODIUM SERPL-SCNC: 130 MMOL/L — LOW (ref 135–145)
SP GR SPEC: 1 — LOW (ref 1.01–1.02)
UROBILINOGEN FLD QL: NEGATIVE MG/DL — SIGNIFICANT CHANGE UP
WBC # BLD: 18.51 K/UL — HIGH (ref 3.8–10.5)
WBC # FLD AUTO: 18.51 K/UL — HIGH (ref 3.8–10.5)

## 2020-08-10 PROCEDURE — 83690 ASSAY OF LIPASE: CPT

## 2020-08-10 PROCEDURE — 87635 SARS-COV-2 COVID-19 AMP PRB: CPT

## 2020-08-10 PROCEDURE — 87040 BLOOD CULTURE FOR BACTERIA: CPT | Mod: 59

## 2020-08-10 PROCEDURE — 83605 ASSAY OF LACTIC ACID: CPT

## 2020-08-10 PROCEDURE — 87086 URINE CULTURE/COLONY COUNT: CPT

## 2020-08-10 PROCEDURE — 93005 ELECTROCARDIOGRAM TRACING: CPT

## 2020-08-10 PROCEDURE — 36415 COLL VENOUS BLD VENIPUNCTURE: CPT

## 2020-08-10 PROCEDURE — 80053 COMPREHEN METABOLIC PANEL: CPT

## 2020-08-10 PROCEDURE — 99285 EMERGENCY DEPT VISIT HI MDM: CPT

## 2020-08-10 PROCEDURE — 71045 X-RAY EXAM CHEST 1 VIEW: CPT

## 2020-08-10 PROCEDURE — 71045 X-RAY EXAM CHEST 1 VIEW: CPT | Mod: 26

## 2020-08-10 PROCEDURE — 81001 URINALYSIS AUTO W/SCOPE: CPT

## 2020-08-10 PROCEDURE — 74177 CT ABD & PELVIS W/CONTRAST: CPT | Mod: 26

## 2020-08-10 PROCEDURE — 99284 EMERGENCY DEPT VISIT MOD MDM: CPT | Mod: 25

## 2020-08-10 PROCEDURE — 85025 COMPLETE CBC W/AUTO DIFF WBC: CPT

## 2020-08-10 PROCEDURE — 74177 CT ABD & PELVIS W/CONTRAST: CPT

## 2020-08-10 PROCEDURE — 93010 ELECTROCARDIOGRAM REPORT: CPT

## 2020-08-10 RX ORDER — METRONIDAZOLE 500 MG
500 TABLET ORAL ONCE
Refills: 0 | Status: COMPLETED | OUTPATIENT
Start: 2020-08-10 | End: 2020-08-10

## 2020-08-10 RX ORDER — AZTREONAM 2 G
1 VIAL (EA) INJECTION
Qty: 20 | Refills: 0
Start: 2020-08-10 | End: 2020-08-19

## 2020-08-10 RX ORDER — METRONIDAZOLE 500 MG
1 TABLET ORAL
Qty: 30 | Refills: 0
Start: 2020-08-10 | End: 2020-08-19

## 2020-08-10 RX ORDER — SODIUM CHLORIDE 9 MG/ML
1000 INJECTION INTRAMUSCULAR; INTRAVENOUS; SUBCUTANEOUS ONCE
Refills: 0 | Status: COMPLETED | OUTPATIENT
Start: 2020-08-10 | End: 2020-08-10

## 2020-08-10 RX ADMIN — Medication 500 MILLIGRAM(S): at 15:11

## 2020-08-10 RX ADMIN — SODIUM CHLORIDE 1000 MILLILITER(S): 9 INJECTION INTRAMUSCULAR; INTRAVENOUS; SUBCUTANEOUS at 13:09

## 2020-08-10 RX ADMIN — Medication 1 TABLET(S): at 15:11

## 2020-08-10 NOTE — ED PROVIDER NOTE - CONSTITUTIONAL, MLM
normal... Well appearing, awake, alert, oriented to person, place, time/situation and in no apparent distress. Comfortable appearing, awake, alert, oriented to person, place, time/situation and in no apparent distress.

## 2020-08-10 NOTE — ED PROVIDER NOTE - CARDIAC, MLM
Normal rate, regular rhythm.  Heart sounds S1, S2.  No murmurs, rubs or gallops. Borderline tachycardic.  Heart sounds S1, S2.  No murmurs, rubs or gallops.

## 2020-08-10 NOTE — ED POST DISCHARGE NOTE - REASON FOR FOLLOW-UP
Other Patient discharged with diagnosis of diverticulitis, received bactrim and flagyl in ED. Rx for bactrim in unsubmitted status, no Rx for flagyl sent to pharmacy. Will resubmit prescriptions. - Gurmeet Kat PA-C

## 2020-08-10 NOTE — ED PROVIDER NOTE - OBJECTIVE STATEMENT
80 y/o female with a PMHx of skin CA, aortic valve replacement, diverticulitis, DM, Meniere disease, emphysema, CHF on 2L home O2 at night, LBBB, SIMÓN, RA, COPD, HTN, PVD, breast CA, TIA, HLD, spinal stenosis s/p implanted stimulator, valvular heart disease presents to the ED sent by urgent care c/o RLQ pain today. Pt was seen at urgent care PTA for RLQ pain and found to have a high WBC. Pt sent to r/o diverticulitis. +low grade fevers. No other complaints at this time.

## 2020-08-10 NOTE — ED PROVIDER NOTE - CARE PROVIDER_API CALL
Angelica Webster  FAMILY MEDICINE  [address] [address]  Phone: [phone]  Fax: [phone]  Follow Up Time:     Margarito Thorpe (MD)  Gastroenterology; Internal Medicine  [address] [place] [id number]  Phone: [phone]  Fax: [phone]  Follow Up Time:

## 2020-08-10 NOTE — ED PROVIDER NOTE - PROGRESS NOTE DETAILS
CT with mild diverticulitis. Hungry, asking for food. Will start PO Abx (normally takes Bactrim/Flagyl d/t allergies to quinolones, pcn).  CT with ground glass opac lung bases -- will obtain COVID, CXR.  Likely d/c Feels well. COVID neg last week.  Add'l hx - took prednisone x 3d last week, could explain elevated WBC. Has good f/u and asking to go home.  Will start on PO Abx for divertic -- to f/u with PMD, return with any worsening symptoms.

## 2020-08-10 NOTE — ED STATDOCS - PROGRESS NOTE DETAILS
Bia Tamayo for attending Dr. Talavera: 78 y/o female with a PMHx of skin CA, aortic valve replacement, diverticulitis, DM, Meniere disease, emphysema, CHF on 2L home O2 at night, LBBB, SIMÓN, RA, COPD, HTN, PVD, breast CA, TIA, HLD, spinal stenosis s/p implanted stimulator, valvular heart disease presents to the ED sent by urgent care c/o RLQ pain today. Pt was seen at urgent care PTA for RLQ pain and found to have a high WBC and sent to r/o diverticulitis. No other complaints at this time. Will send pt to main ED for further evaluation.

## 2020-08-10 NOTE — ED ADULT NURSE NOTE - OBJECTIVE STATEMENT
Patient presents with RLQ pain sent in by urgent care for elevated white blood cells. Patient reports history of diverticulitis. Reports low grade fever at home

## 2020-08-10 NOTE — ED PROVIDER NOTE - NSFOLLOWUPINSTRUCTIONS_ED_ALL_ED_FT
Follow-up with your regular physician  Return with any persistent/worsening symptoms.     Diverticulitis    WHAT YOU NEED TO KNOW:    Diverticulitis is a condition that causes small pockets along your intestine called diverticula to become inflamed or infected. This is caused by hard bowel movements, food, or bacteria that get stuck in the pockets.         DISCHARGE INSTRUCTIONS:    Return to the emergency department if:     You have bowel movement or foul-smelling discharge leaking from your vagina or in your urine.      You have severe diarrhea.      You urinate less than usual or not at all.      You are not able to have a bowel movement.      You cannot stop vomiting.       You have severe abdominal pain, a fever, and your abdomen is larger than usual.       You have new or increased blood in your bowel movements.     Contact your healthcare provider if:     You have pain when you urinate.      Your symptoms get worse or do not go away.       You have questions or concerns about your condition or care.     Medicines:     Antibiotics may be given to help treat a bacterial infection.      Prescription pain medicine may be given. Ask your healthcare provider how to take this medicine safely. Some prescription pain medicines contain acetaminophen. Do not take other medicines that contain acetaminophen without talking to your healthcare provider. Too much acetaminophen may cause liver damage. Prescription pain medicine may cause constipation. Ask your healthcare provider how to prevent or treat constipation.       Take your medicine as directed. Contact your healthcare provider if you think your medicine is not helping or if you have side effects. Tell him or her if you are allergic to any medicine. Keep a list of the medicines, vitamins, and herbs you take. Include the amounts, and when and why you take them. Bring the list or the pill bottles to follow-up visits. Carry your medicine list with you in case of an emergency.    Clear liquid diet: A clear liquid diet includes any liquids that you can see through. Examples include water, ginger-viviane, cranberry or apple juice, frozen fruit ice, or broth. Stay on a clear liquid diet until your symptoms are gone, or as directed.     Follow up with your healthcare provider as directed: You may need to return for a colonoscopy. When your symptoms are gone, you may need a low-fat, high-fiber diet to prevent diverticulitis from developing again. Your healthcare provider or dietitian can help you create meal plans. Write down your questions so you remember to ask them during your visits.

## 2020-08-10 NOTE — ED PROVIDER NOTE - PATIENT PORTAL LINK FT
You can access the FollowMyHealth Patient Portal offered by Health system by registering at the following website: [email or web address]. By joining [facility]’s FollowMyHealth portal, you will also be able to view your health information using other applications (apps) compatible with our system.

## 2020-08-11 LAB
CULTURE RESULTS: SIGNIFICANT CHANGE UP
SPECIMEN SOURCE: SIGNIFICANT CHANGE UP

## 2020-09-15 ENCOUNTER — LABORATORY RESULT (OUTPATIENT)
Age: 80
End: 2020-09-15

## 2020-09-17 LAB
ALBUMIN SERPL ELPH-MCNC: 4.7 G/DL
ALP BLD-CCNC: 74 U/L
ALT SERPL-CCNC: 25 U/L
ANION GAP SERPL CALC-SCNC: 14 MMOL/L
AST SERPL-CCNC: 22 U/L
BASOPHILS # BLD AUTO: 0.1 K/UL
BASOPHILS NFR BLD AUTO: 0.9 %
BILIRUB SERPL-MCNC: 0.2 MG/DL
BUN SERPL-MCNC: 16 MG/DL
CALCIUM SERPL-MCNC: 9.7 MG/DL
CCP AB SER IA-ACNC: <8 UNITS
CHLORIDE SERPL-SCNC: 95 MMOL/L
CO2 SERPL-SCNC: 26 MMOL/L
CREAT SERPL-MCNC: 0.64 MG/DL
CRP SERPL-MCNC: 0.88 MG/DL
EOSINOPHIL # BLD AUTO: 0.1 K/UL
EOSINOPHIL NFR BLD AUTO: 0.9 %
ERYTHROCYTE [SEDIMENTATION RATE] IN BLOOD BY WESTERGREN METHOD: 36 MM/HR
GLUCOSE SERPL-MCNC: 212 MG/DL
HCT VFR BLD CALC: 40 %
HGB BLD-MCNC: 12.3 G/DL
IMM GRANULOCYTES NFR BLD AUTO: 0.5 %
LYMPHOCYTES # BLD AUTO: 1.26 K/UL
LYMPHOCYTES NFR BLD AUTO: 11.1 %
MAN DIFF?: NORMAL
MCHC RBC-ENTMCNC: 30.8 GM/DL
MCHC RBC-ENTMCNC: 32.5 PG
MCV RBC AUTO: 105.5 FL
MONOCYTES # BLD AUTO: 0.71 K/UL
MONOCYTES NFR BLD AUTO: 6.3 %
NEUTROPHILS # BLD AUTO: 9.09 K/UL
NEUTROPHILS NFR BLD AUTO: 80.3 %
PLATELET # BLD AUTO: 258 K/UL
POTASSIUM SERPL-SCNC: 4.1 MMOL/L
PROT SERPL-MCNC: 7.2 G/DL
RBC # BLD: 3.79 M/UL
RBC # FLD: 14.3 %
RF+CCP IGG SER-IMP: NEGATIVE
RHEUMATOID FACT SER QL: <10 IU/ML
SODIUM SERPL-SCNC: 134 MMOL/L
TSH SERPL-ACNC: 2.24 UIU/ML
WBC # FLD AUTO: 11.32 K/UL

## 2020-09-18 ENCOUNTER — RX RENEWAL (OUTPATIENT)
Age: 80
End: 2020-09-18

## 2020-10-06 ENCOUNTER — RX RENEWAL (OUTPATIENT)
Age: 80
End: 2020-10-06

## 2020-10-14 NOTE — ED PROVIDER NOTE - RELIEVING FACTORS
Message was left for Deepika to return call to our office. Office numbers and hours were provided.    none

## 2020-10-16 ENCOUNTER — APPOINTMENT (OUTPATIENT)
Dept: RHEUMATOLOGY | Facility: CLINIC | Age: 80
End: 2020-10-16
Payer: MEDICARE

## 2020-10-16 VITALS
DIASTOLIC BLOOD PRESSURE: 70 MMHG | WEIGHT: 149 LBS | HEIGHT: 62 IN | BODY MASS INDEX: 27.42 KG/M2 | OXYGEN SATURATION: 97 % | HEART RATE: 112 BPM | TEMPERATURE: 99 F | SYSTOLIC BLOOD PRESSURE: 140 MMHG

## 2020-10-16 DIAGNOSIS — R53.83 OTHER FATIGUE: ICD-10-CM

## 2020-10-16 PROCEDURE — 99213 OFFICE O/P EST LOW 20 MIN: CPT

## 2020-10-16 RX ORDER — METHYLPREDNISOLONE 4 MG/1
4 TABLET ORAL
Qty: 1 | Refills: 0 | Status: DISCONTINUED | COMMUNITY
Start: 2020-03-13 | End: 2020-10-16

## 2020-10-16 RX ORDER — METHYLPREDNISOLONE 4 MG/1
4 TABLET ORAL
Qty: 1 | Refills: 0 | Status: DISCONTINUED | COMMUNITY
Start: 2020-09-11 | End: 2020-10-16

## 2020-10-16 NOTE — REVIEW OF SYSTEMS
[Fever] : no fever [Chills] : no chills [Feeling Poorly] : not feeling poorly [Feeling Tired] : not feeling tired [Eye Pain] : no eye pain [Dry Eyes] : no dryness of the eyes [Loss Of Hearing] : no hearing loss [Chest Pain] : no chest pain [Palpitations] : no palpitations [Shortness Of Breath] : shortness of breath [Cough] : no cough [SOB on Exertion] : no shortness of breath during exertion [Abdominal Pain] : no abdominal pain [Arthralgias] : arthralgias [Joint Pain] : joint pain [Joint Swelling] : no joint swelling [Joint Stiffness] : no joint stiffness [Skin Lesions] : no skin lesions [Dizziness] : no dizziness [Difficulty Walking] : no difficulty walking [Limb Weakness] : no limb weakness [Anxiety] : no anxiety [Depression] : no depression [Muscle Weakness] : no muscle weakness [Easy Bruising] : no tendency for easy bruising

## 2020-10-16 NOTE — PHYSICAL EXAM
[General Appearance - Alert] : alert [General Appearance - Well Nourished] : well nourished [General Appearance - Well Developed] : well developed [Sclera] : the sclera and conjunctiva were normal [Oropharynx] : the oropharynx was normal [Neck Appearance] : the appearance of the neck was normal [Thyroid Diffuse Enlargement] : the thyroid was not enlarged [Auscultation Breath Sounds / Voice Sounds] : lungs were clear to auscultation bilaterally [Heart Sounds] : normal S1 and S2 [Full Pulse] : the pedal pulses are present [Abdomen Tenderness] : non-tender [Edema] : there was no peripheral edema [Cervical Lymph Nodes Enlarged Anterior Bilaterally] : anterior cervical [Supraclavicular Lymph Nodes Enlarged Bilaterally] : supraclavicular [No Spinal Tenderness] : no spinal tenderness [Abnormal Walk] : normal gait [Nail Clubbing] : no clubbing  or cyanosis of the fingernails [Musculoskeletal - Swelling] : no joint swelling seen [FreeTextEntry1] : FROM all joints, bony changes hands and feet c/w OA, no synovitis noted, prominent MCP 2,3 b/l,  [Motor Tone] : muscle strength and tone were normal [Deep Tendon Reflexes (DTR)] : deep tendon reflexes were 2+ and symmetric [] : no rash [Motor Exam] : the motor exam was normal [Oriented To Time, Place, And Person] : oriented to person, place, and time [Impaired Insight] : insight and judgment were intact

## 2020-10-16 NOTE — HISTORY OF PRESENT ILLNESS
[FreeTextEntry1] : Followup visit\par Jovana comes in for a followup visit for her rheumatoid arthritis. \par She has been having a lot more pain, she complains of aches and pains all over. She continues to use SSZ at 2 gms daily She does not want to use an immunosuppressant at this time. She rates her pain as 5/10. She uses prednisone once in a while \par She has many other health issues, she has difficulty breathing as well as is tachycardic, she is in the process of seeing cardiology and pulmonary. She recently saw pain management and had an epidural in her back which is helping. She continues to go for her alcoholic anonymous meetings online. She continues to use her antidepressant medications, her mood is okay.\par She rates her fatigue her on the same level.

## 2020-10-16 NOTE — ASSESSMENT
[FreeTextEntry1] : 80 year old female with a history of depression, COPD, HTN, and aortic valve replacement presents to rule out RA today. She was under the care of Dr. Saenz in the past\par \par RA-\par . IN the past she had  a sedimentation rate of 26 which is normal for her age, an BRADLEY of 1:80, with a weakly positive double-stranded DNA and negative rheumatoid serologies. Her muscle enzymes are slightly elevated too. The significance of these labs is unclear. At this time she does not fill criteria for lupus.\par \par Since she has features consistent with inflammatory arthritis affecting the second and third MCP\par She has erosive and active RA. \par -she was on xeljanz but we d/maria elena it\par - to continue SSZ at 2 gms daily\par - will hold off on new med use at this time \par - to consider PLQ use\par \par Steroid use-\par to use prednisone prn \par Risks and benefits of steroids were d/w patient including but not limited to weight gain, diabetes, HTN, avascular necrosis, osteoporosis, glaucoma, cataract, infections and immunosuppression.\par \par \par Osteoarthritis-\par -the other issue is that she does have polyarticular osteoarthritis especially in her lower back\par -She states that she is not a candidate for NSAIDs but does not recall why\par -She is seeing pain management, she has tried epidurals, she is also using medical marijuana with some relief.\par \par \par COPD-\par -stable\par \par Followup 4 months or sooner if there is any change in her underlying symptoms. She is aware to hold meds while on abx\par

## 2020-10-25 ENCOUNTER — RX RENEWAL (OUTPATIENT)
Age: 80
End: 2020-10-25

## 2020-11-16 ENCOUNTER — TRANSCRIPTION ENCOUNTER (OUTPATIENT)
Age: 80
End: 2020-11-16

## 2020-12-10 ENCOUNTER — TRANSCRIPTION ENCOUNTER (OUTPATIENT)
Age: 80
End: 2020-12-10

## 2020-12-17 ENCOUNTER — APPOINTMENT (OUTPATIENT)
Dept: COLORECTAL SURGERY | Facility: CLINIC | Age: 80
End: 2020-12-17

## 2021-01-01 ENCOUNTER — RX RENEWAL (OUTPATIENT)
Age: 81
End: 2021-01-01

## 2021-01-01 ENCOUNTER — LABORATORY RESULT (OUTPATIENT)
Age: 81
End: 2021-01-01

## 2021-01-01 ENCOUNTER — EMERGENCY (EMERGENCY)
Facility: HOSPITAL | Age: 81
LOS: 0 days | Discharge: HOME | End: 2021-06-05
Attending: EMERGENCY MEDICINE
Payer: MEDICARE

## 2021-01-01 ENCOUNTER — INPATIENT (INPATIENT)
Facility: HOSPITAL | Age: 81
LOS: 6 days | Discharge: HOME HEALTH | DRG: 871 | End: 2021-06-17
Attending: STUDENT IN AN ORGANIZED HEALTH CARE EDUCATION/TRAINING PROGRAM | Admitting: HOSPITALIST
Payer: MEDICARE

## 2021-01-01 ENCOUNTER — APPOINTMENT (OUTPATIENT)
Dept: RHEUMATOLOGY | Facility: CLINIC | Age: 81
End: 2021-01-01
Payer: MEDICARE

## 2021-01-01 ENCOUNTER — EMERGENCY (EMERGENCY)
Facility: HOSPITAL | Age: 81
LOS: 0 days | Discharge: HOME | End: 2021-07-04
Attending: FAMILY MEDICINE
Payer: MEDICARE

## 2021-01-01 ENCOUNTER — TRANSCRIPTION ENCOUNTER (OUTPATIENT)
Age: 81
End: 2021-01-01

## 2021-01-01 ENCOUNTER — NON-APPOINTMENT (OUTPATIENT)
Age: 81
End: 2021-01-01

## 2021-01-01 VITALS
SYSTOLIC BLOOD PRESSURE: 141 MMHG | RESPIRATION RATE: 17 BRPM | WEIGHT: 134.92 LBS | TEMPERATURE: 102 F | HEIGHT: 62 IN | OXYGEN SATURATION: 95 % | HEART RATE: 126 BPM | DIASTOLIC BLOOD PRESSURE: 62 MMHG

## 2021-01-01 VITALS
OXYGEN SATURATION: 98 % | DIASTOLIC BLOOD PRESSURE: 61 MMHG | SYSTOLIC BLOOD PRESSURE: 120 MMHG | TEMPERATURE: 98 F | RESPIRATION RATE: 17 BRPM | HEART RATE: 70 BPM

## 2021-01-01 VITALS
OXYGEN SATURATION: 99 % | BODY MASS INDEX: 24.33 KG/M2 | DIASTOLIC BLOOD PRESSURE: 72 MMHG | HEART RATE: 105 BPM | TEMPERATURE: 97.6 F | WEIGHT: 133 LBS | SYSTOLIC BLOOD PRESSURE: 123 MMHG

## 2021-01-01 VITALS
HEIGHT: 62 IN | HEART RATE: 86 BPM | DIASTOLIC BLOOD PRESSURE: 84 MMHG | TEMPERATURE: 98 F | RESPIRATION RATE: 18 BRPM | OXYGEN SATURATION: 100 % | SYSTOLIC BLOOD PRESSURE: 153 MMHG | WEIGHT: 179.9 LBS

## 2021-01-01 VITALS
HEART RATE: 103 BPM | WEIGHT: 135 LBS | OXYGEN SATURATION: 98 % | SYSTOLIC BLOOD PRESSURE: 136 MMHG | DIASTOLIC BLOOD PRESSURE: 74 MMHG | TEMPERATURE: 97.6 F | BODY MASS INDEX: 24.69 KG/M2

## 2021-01-01 VITALS
RESPIRATION RATE: 18 BRPM | DIASTOLIC BLOOD PRESSURE: 82 MMHG | OXYGEN SATURATION: 97 % | SYSTOLIC BLOOD PRESSURE: 144 MMHG | HEART RATE: 106 BPM

## 2021-01-01 VITALS
HEART RATE: 112 BPM | WEIGHT: 149.91 LBS | OXYGEN SATURATION: 98 % | HEIGHT: 62 IN | SYSTOLIC BLOOD PRESSURE: 133 MMHG | RESPIRATION RATE: 22 BRPM | DIASTOLIC BLOOD PRESSURE: 78 MMHG

## 2021-01-01 VITALS
SYSTOLIC BLOOD PRESSURE: 120 MMHG | DIASTOLIC BLOOD PRESSURE: 60 MMHG | HEIGHT: 62 IN | OXYGEN SATURATION: 96 % | HEART RATE: 97 BPM | TEMPERATURE: 97.8 F | BODY MASS INDEX: 25.21 KG/M2 | WEIGHT: 137 LBS

## 2021-01-01 VITALS
RESPIRATION RATE: 16 BRPM | SYSTOLIC BLOOD PRESSURE: 148 MMHG | HEART RATE: 100 BPM | DIASTOLIC BLOOD PRESSURE: 75 MMHG | OXYGEN SATURATION: 100 %

## 2021-01-01 DIAGNOSIS — Z79.84 LONG TERM (CURRENT) USE OF ORAL HYPOGLYCEMIC DRUGS: ICD-10-CM

## 2021-01-01 DIAGNOSIS — A41.9 SEPSIS, UNSPECIFIED ORGANISM: ICD-10-CM

## 2021-01-01 DIAGNOSIS — K57.32 DIVERTICULITIS OF LARGE INTESTINE WITHOUT PERFORATION OR ABSCESS WITHOUT BLEEDING: ICD-10-CM

## 2021-01-01 DIAGNOSIS — Z98.89 OTHER SPECIFIED POSTPROCEDURAL STATES: Chronic | ICD-10-CM

## 2021-01-01 DIAGNOSIS — E78.5 HYPERLIPIDEMIA, UNSPECIFIED: ICD-10-CM

## 2021-01-01 DIAGNOSIS — Z95.2 PRESENCE OF PROSTHETIC HEART VALVE: Chronic | ICD-10-CM

## 2021-01-01 DIAGNOSIS — I11.0 HYPERTENSIVE HEART DISEASE WITH HEART FAILURE: ICD-10-CM

## 2021-01-01 DIAGNOSIS — Z79.82 LONG TERM (CURRENT) USE OF ASPIRIN: ICD-10-CM

## 2021-01-01 DIAGNOSIS — Z88.0 ALLERGY STATUS TO PENICILLIN: ICD-10-CM

## 2021-01-01 DIAGNOSIS — F10.21 ALCOHOL DEPENDENCE, IN REMISSION: ICD-10-CM

## 2021-01-01 DIAGNOSIS — Z87.891 PERSONAL HISTORY OF NICOTINE DEPENDENCE: ICD-10-CM

## 2021-01-01 DIAGNOSIS — I50.9 HEART FAILURE, UNSPECIFIED: ICD-10-CM

## 2021-01-01 DIAGNOSIS — I73.9 PERIPHERAL VASCULAR DISEASE, UNSPECIFIED: Chronic | ICD-10-CM

## 2021-01-01 DIAGNOSIS — K21.9 GASTRO-ESOPHAGEAL REFLUX DISEASE WITHOUT ESOPHAGITIS: ICD-10-CM

## 2021-01-01 DIAGNOSIS — I50.33 ACUTE ON CHRONIC DIASTOLIC (CONGESTIVE) HEART FAILURE: ICD-10-CM

## 2021-01-01 DIAGNOSIS — H40.9 UNSPECIFIED GLAUCOMA: ICD-10-CM

## 2021-01-01 DIAGNOSIS — Z79.02 LONG TERM (CURRENT) USE OF ANTITHROMBOTICS/ANTIPLATELETS: ICD-10-CM

## 2021-01-01 DIAGNOSIS — Z85.828 PERSONAL HISTORY OF OTHER MALIGNANT NEOPLASM OF SKIN: ICD-10-CM

## 2021-01-01 DIAGNOSIS — M06.9 RHEUMATOID ARTHRITIS, UNSPECIFIED: ICD-10-CM

## 2021-01-01 DIAGNOSIS — J44.9 CHRONIC OBSTRUCTIVE PULMONARY DISEASE, UNSPECIFIED: ICD-10-CM

## 2021-01-01 DIAGNOSIS — Z85.3 PERSONAL HISTORY OF MALIGNANT NEOPLASM OF BREAST: ICD-10-CM

## 2021-01-01 DIAGNOSIS — F41.9 ANXIETY DISORDER, UNSPECIFIED: ICD-10-CM

## 2021-01-01 DIAGNOSIS — Z87.19 PERSONAL HISTORY OF OTHER DISEASES OF THE DIGESTIVE SYSTEM: ICD-10-CM

## 2021-01-01 DIAGNOSIS — Z86.73 PERSONAL HISTORY OF TRANSIENT ISCHEMIC ATTACK (TIA), AND CEREBRAL INFARCTION WITHOUT RESIDUAL DEFICITS: ICD-10-CM

## 2021-01-01 DIAGNOSIS — R74.02 ELEVATION OF LEVELS OF LACTIC ACID DEHYDROGENASE [LDH]: ICD-10-CM

## 2021-01-01 DIAGNOSIS — Z98.891 HISTORY OF UTERINE SCAR FROM PREVIOUS SURGERY: Chronic | ICD-10-CM

## 2021-01-01 DIAGNOSIS — B37.9 CANDIDIASIS, UNSPECIFIED: ICD-10-CM

## 2021-01-01 DIAGNOSIS — Z88.1 ALLERGY STATUS TO OTHER ANTIBIOTIC AGENTS STATUS: ICD-10-CM

## 2021-01-01 DIAGNOSIS — Z88.8 ALLERGY STATUS TO OTHER DRUGS, MEDICAMENTS AND BIOLOGICAL SUBSTANCES: ICD-10-CM

## 2021-01-01 DIAGNOSIS — M81.0 AGE-RELATED OSTEOPOROSIS WITHOUT CURRENT PATHOLOGICAL FRACTURE: ICD-10-CM

## 2021-01-01 DIAGNOSIS — M26.609 UNSPECIFIED TEMPOROMANDIBULAR JOINT DISORDER, UNSPECIFIED SIDE: ICD-10-CM

## 2021-01-01 DIAGNOSIS — K44.9 DIAPHRAGMATIC HERNIA WITHOUT OBSTRUCTION OR GANGRENE: ICD-10-CM

## 2021-01-01 DIAGNOSIS — G47.33 OBSTRUCTIVE SLEEP APNEA (ADULT) (PEDIATRIC): ICD-10-CM

## 2021-01-01 DIAGNOSIS — E44.0 MODERATE PROTEIN-CALORIE MALNUTRITION: ICD-10-CM

## 2021-01-01 DIAGNOSIS — K44.9 DIAPHRAGMATIC HERNIA WITHOUT OBSTRUCTION OR GANGRENE: Chronic | ICD-10-CM

## 2021-01-01 DIAGNOSIS — Z95.3 PRESENCE OF XENOGENIC HEART VALVE: ICD-10-CM

## 2021-01-01 DIAGNOSIS — J44.1 CHRONIC OBSTRUCTIVE PULMONARY DISEASE WITH (ACUTE) EXACERBATION: ICD-10-CM

## 2021-01-01 DIAGNOSIS — Z00.00 ENCOUNTER FOR GENERAL ADULT MEDICAL EXAMINATION W/OUT ABNORMAL FINDINGS: ICD-10-CM

## 2021-01-01 DIAGNOSIS — I10 ESSENTIAL (PRIMARY) HYPERTENSION: ICD-10-CM

## 2021-01-01 DIAGNOSIS — Z90.11 ACQUIRED ABSENCE OF RIGHT BREAST AND NIPPLE: ICD-10-CM

## 2021-01-01 DIAGNOSIS — I05.0 RHEUMATIC MITRAL STENOSIS: ICD-10-CM

## 2021-01-01 DIAGNOSIS — M48.00 SPINAL STENOSIS, SITE UNSPECIFIED: ICD-10-CM

## 2021-01-01 DIAGNOSIS — Z90.49 ACQUIRED ABSENCE OF OTHER SPECIFIED PARTS OF DIGESTIVE TRACT: ICD-10-CM

## 2021-01-01 DIAGNOSIS — J96.20 ACUTE AND CHRONIC RESPIRATORY FAILURE, UNSPECIFIED WHETHER WITH HYPOXIA OR HYPERCAPNIA: ICD-10-CM

## 2021-01-01 DIAGNOSIS — Z86.59 PERSONAL HISTORY OF OTHER MENTAL AND BEHAVIORAL DISORDERS: ICD-10-CM

## 2021-01-01 DIAGNOSIS — Z98.890 OTHER SPECIFIED POSTPROCEDURAL STATES: Chronic | ICD-10-CM

## 2021-01-01 DIAGNOSIS — Z87.39 PERSONAL HISTORY OF OTHER DISEASES OF THE MUSCULOSKELETAL SYSTEM AND CONNECTIVE TISSUE: ICD-10-CM

## 2021-01-01 DIAGNOSIS — E11.51 TYPE 2 DIABETES MELLITUS WITH DIABETIC PERIPHERAL ANGIOPATHY WITHOUT GANGRENE: ICD-10-CM

## 2021-01-01 DIAGNOSIS — H81.09 MENIERE'S DISEASE, UNSPECIFIED EAR: ICD-10-CM

## 2021-01-01 DIAGNOSIS — Z98.890 OTHER SPECIFIED POSTPROCEDURAL STATES: ICD-10-CM

## 2021-01-01 DIAGNOSIS — E11.9 TYPE 2 DIABETES MELLITUS WITHOUT COMPLICATIONS: ICD-10-CM

## 2021-01-01 DIAGNOSIS — E11.65 TYPE 2 DIABETES MELLITUS WITH HYPERGLYCEMIA: ICD-10-CM

## 2021-01-01 DIAGNOSIS — Z79.899 OTHER LONG TERM (CURRENT) DRUG THERAPY: ICD-10-CM

## 2021-01-01 DIAGNOSIS — R06.02 SHORTNESS OF BREATH: ICD-10-CM

## 2021-01-01 DIAGNOSIS — M48.061 SPINAL STENOSIS, LUMBAR REGION WITHOUT NEUROGENIC CLAUDICATION: ICD-10-CM

## 2021-01-01 DIAGNOSIS — F32.9 MAJOR DEPRESSIVE DISORDER, SINGLE EPISODE, UNSPECIFIED: ICD-10-CM

## 2021-01-01 DIAGNOSIS — Z88.8 ALLERGY STATUS TO OTHER DRUGS, MEDICAMENTS AND BIOLOGICAL SUBSTANCES STATUS: ICD-10-CM

## 2021-01-01 DIAGNOSIS — J44.0 CHRONIC OBSTRUCTIVE PULMONARY DISEASE WITH (ACUTE) LOWER RESPIRATORY INFECTION: ICD-10-CM

## 2021-01-01 DIAGNOSIS — Z86.69 PERSONAL HISTORY OF OTHER DISEASES OF THE NERVOUS SYSTEM AND SENSE ORGANS: ICD-10-CM

## 2021-01-01 DIAGNOSIS — Z86.79 PERSONAL HISTORY OF OTHER DISEASES OF THE CIRCULATORY SYSTEM: ICD-10-CM

## 2021-01-01 DIAGNOSIS — Z87.09 PERSONAL HISTORY OF OTHER DISEASES OF THE RESPIRATORY SYSTEM: ICD-10-CM

## 2021-01-01 DIAGNOSIS — R00.0 TACHYCARDIA, UNSPECIFIED: ICD-10-CM

## 2021-01-01 DIAGNOSIS — E11.39 TYPE 2 DIABETES MELLITUS WITH OTHER DIABETIC OPHTHALMIC COMPLICATION: ICD-10-CM

## 2021-01-01 DIAGNOSIS — I44.7 LEFT BUNDLE-BRANCH BLOCK, UNSPECIFIED: ICD-10-CM

## 2021-01-01 DIAGNOSIS — J18.9 PNEUMONIA, UNSPECIFIED ORGANISM: ICD-10-CM

## 2021-01-01 LAB
25(OH)D3 SERPL-MCNC: 55.3 NG/ML
25(OH)D3 SERPL-MCNC: 55.3 NG/ML
25(OH)D3 SERPL-MCNC: 70.5 NG/ML
A1C WITH ESTIMATED AVERAGE GLUCOSE RESULT: 5 % — SIGNIFICANT CHANGE UP (ref 4–5.6)
ADD ON TEST-SPECIMEN IN LAB: SIGNIFICANT CHANGE UP
ALBUMIN SERPL ELPH-MCNC: 3 G/DL — LOW (ref 3.3–5)
ALBUMIN SERPL ELPH-MCNC: 3.9 G/DL — SIGNIFICANT CHANGE UP (ref 3.3–5)
ALBUMIN SERPL ELPH-MCNC: 3.9 G/DL — SIGNIFICANT CHANGE UP (ref 3.3–5)
ALBUMIN SERPL ELPH-MCNC: 4.4 G/DL
ALBUMIN SERPL ELPH-MCNC: 4.5 G/DL
ALBUMIN SERPL ELPH-MCNC: 4.6 G/DL
ALBUMIN SERPL ELPH-MCNC: 4.7 G/DL
ALP BLD-CCNC: 71 U/L
ALP BLD-CCNC: 74 U/L
ALP BLD-CCNC: 90 U/L
ALP BLD-CCNC: 95 U/L
ALP SERPL-CCNC: 52 U/L — SIGNIFICANT CHANGE UP (ref 40–120)
ALP SERPL-CCNC: 72 U/L — SIGNIFICANT CHANGE UP (ref 40–120)
ALP SERPL-CCNC: 95 U/L — SIGNIFICANT CHANGE UP (ref 40–120)
ALT FLD-CCNC: 46 U/L — SIGNIFICANT CHANGE UP (ref 12–78)
ALT FLD-CCNC: 54 U/L — SIGNIFICANT CHANGE UP (ref 12–78)
ALT FLD-CCNC: 67 U/L — SIGNIFICANT CHANGE UP (ref 12–78)
ALT SERPL-CCNC: 21 U/L
ALT SERPL-CCNC: 27 U/L
ANION GAP SERPL CALC-SCNC: 1 MMOL/L — LOW (ref 5–17)
ANION GAP SERPL CALC-SCNC: 10 MMOL/L
ANION GAP SERPL CALC-SCNC: 12 MMOL/L
ANION GAP SERPL CALC-SCNC: 16 MMOL/L
ANION GAP SERPL CALC-SCNC: 2 MMOL/L — LOW (ref 5–17)
ANION GAP SERPL CALC-SCNC: 3 MMOL/L — LOW (ref 5–17)
ANION GAP SERPL CALC-SCNC: 3 MMOL/L — LOW (ref 5–17)
ANION GAP SERPL CALC-SCNC: 6 MMOL/L — SIGNIFICANT CHANGE UP (ref 5–17)
ANION GAP SERPL CALC-SCNC: 9 MMOL/L
APPEARANCE UR: CLEAR — SIGNIFICANT CHANGE UP
APTT BLD: 27 SEC — LOW (ref 27.5–35.5)
APTT BLD: 29.3 SEC — SIGNIFICANT CHANGE UP (ref 27.5–35.5)
AST SERPL-CCNC: 20 U/L
AST SERPL-CCNC: 22 U/L
AST SERPL-CCNC: 23 U/L
AST SERPL-CCNC: 24 U/L
AST SERPL-CCNC: 30 U/L — SIGNIFICANT CHANGE UP (ref 15–37)
AST SERPL-CCNC: 42 U/L — HIGH (ref 15–37)
AST SERPL-CCNC: 51 U/L — HIGH (ref 15–37)
BASOPHILS # BLD AUTO: 0 K/UL — SIGNIFICANT CHANGE UP (ref 0–0.2)
BASOPHILS # BLD AUTO: 0.03 K/UL — SIGNIFICANT CHANGE UP (ref 0–0.2)
BASOPHILS # BLD AUTO: 0.06 K/UL
BASOPHILS # BLD AUTO: 0.09 K/UL
BASOPHILS # BLD AUTO: 0.09 K/UL
BASOPHILS # BLD AUTO: 0.09 K/UL — SIGNIFICANT CHANGE UP (ref 0–0.2)
BASOPHILS # BLD AUTO: 0.1 K/UL
BASOPHILS NFR BLD AUTO: 0 % — SIGNIFICANT CHANGE UP (ref 0–2)
BASOPHILS NFR BLD AUTO: 0.4 % — SIGNIFICANT CHANGE UP (ref 0–2)
BASOPHILS NFR BLD AUTO: 0.7 %
BASOPHILS NFR BLD AUTO: 0.7 %
BASOPHILS NFR BLD AUTO: 0.8 % — SIGNIFICANT CHANGE UP (ref 0–2)
BASOPHILS NFR BLD AUTO: 0.9 %
BASOPHILS NFR BLD AUTO: 1 %
BILIRUB SERPL-MCNC: 0.2 MG/DL
BILIRUB SERPL-MCNC: 0.2 MG/DL
BILIRUB SERPL-MCNC: 0.3 MG/DL
BILIRUB SERPL-MCNC: 0.4 MG/DL
BILIRUB SERPL-MCNC: 0.4 MG/DL — SIGNIFICANT CHANGE UP (ref 0.2–1.2)
BILIRUB SERPL-MCNC: 0.4 MG/DL — SIGNIFICANT CHANGE UP (ref 0.2–1.2)
BILIRUB SERPL-MCNC: 1 MG/DL — SIGNIFICANT CHANGE UP (ref 0.2–1.2)
BILIRUB UR-MCNC: NEGATIVE — SIGNIFICANT CHANGE UP
BUN SERPL-MCNC: 11 MG/DL
BUN SERPL-MCNC: 12 MG/DL
BUN SERPL-MCNC: 12 MG/DL — SIGNIFICANT CHANGE UP (ref 7–23)
BUN SERPL-MCNC: 13 MG/DL
BUN SERPL-MCNC: 13 MG/DL
BUN SERPL-MCNC: 13 MG/DL — SIGNIFICANT CHANGE UP (ref 7–23)
BUN SERPL-MCNC: 14 MG/DL — SIGNIFICANT CHANGE UP (ref 7–23)
BUN SERPL-MCNC: 17 MG/DL — SIGNIFICANT CHANGE UP (ref 7–23)
BUN SERPL-MCNC: 8 MG/DL — SIGNIFICANT CHANGE UP (ref 7–23)
CALCIUM SERPL-MCNC: 7.7 MG/DL — LOW (ref 8.5–10.1)
CALCIUM SERPL-MCNC: 7.7 MG/DL — LOW (ref 8.5–10.1)
CALCIUM SERPL-MCNC: 7.9 MG/DL — LOW (ref 8.5–10.1)
CALCIUM SERPL-MCNC: 7.9 MG/DL — LOW (ref 8.5–10.1)
CALCIUM SERPL-MCNC: 8.6 MG/DL — SIGNIFICANT CHANGE UP (ref 8.5–10.1)
CALCIUM SERPL-MCNC: 8.6 MG/DL — SIGNIFICANT CHANGE UP (ref 8.5–10.1)
CALCIUM SERPL-MCNC: 8.8 MG/DL — SIGNIFICANT CHANGE UP (ref 8.5–10.1)
CALCIUM SERPL-MCNC: 9.1 MG/DL
CALCIUM SERPL-MCNC: 9.3 MG/DL
CALCIUM SERPL-MCNC: 9.4 MG/DL
CALCIUM SERPL-MCNC: 9.8 MG/DL
CHLORIDE SERPL-SCNC: 103 MMOL/L — SIGNIFICANT CHANGE UP (ref 96–108)
CHLORIDE SERPL-SCNC: 105 MMOL/L — SIGNIFICANT CHANGE UP (ref 96–108)
CHLORIDE SERPL-SCNC: 107 MMOL/L — SIGNIFICANT CHANGE UP (ref 96–108)
CHLORIDE SERPL-SCNC: 96 MMOL/L
CHLORIDE SERPL-SCNC: 98 MMOL/L — SIGNIFICANT CHANGE UP (ref 96–108)
CHLORIDE SERPL-SCNC: 98 MMOL/L — SIGNIFICANT CHANGE UP (ref 96–108)
CHLORIDE SERPL-SCNC: 99 MMOL/L
CHLORIDE SERPL-SCNC: 99 MMOL/L — SIGNIFICANT CHANGE UP (ref 96–108)
CHLORIDE SERPL-SCNC: 99 MMOL/L — SIGNIFICANT CHANGE UP (ref 96–108)
CO2 SERPL-SCNC: 21 MMOL/L — LOW (ref 22–31)
CO2 SERPL-SCNC: 25 MMOL/L
CO2 SERPL-SCNC: 26 MMOL/L
CO2 SERPL-SCNC: 26 MMOL/L — SIGNIFICANT CHANGE UP (ref 22–31)
CO2 SERPL-SCNC: 27 MMOL/L
CO2 SERPL-SCNC: 27 MMOL/L — SIGNIFICANT CHANGE UP (ref 22–31)
CO2 SERPL-SCNC: 28 MMOL/L — SIGNIFICANT CHANGE UP (ref 22–31)
CO2 SERPL-SCNC: 30 MMOL/L
CO2 SERPL-SCNC: 33 MMOL/L — HIGH (ref 22–31)
CO2 SERPL-SCNC: 36 MMOL/L — HIGH (ref 22–31)
CO2 SERPL-SCNC: 36 MMOL/L — HIGH (ref 22–31)
COLOR SPEC: YELLOW — SIGNIFICANT CHANGE UP
COVID-19 SPIKE DOMAIN AB INTERP: POSITIVE
COVID-19 SPIKE DOMAIN ANTIBODY RESULT: >250 U/ML — HIGH
CREAT SERPL-MCNC: 0.44 MG/DL — LOW (ref 0.5–1.3)
CREAT SERPL-MCNC: 0.53 MG/DL
CREAT SERPL-MCNC: 0.53 MG/DL — SIGNIFICANT CHANGE UP (ref 0.5–1.3)
CREAT SERPL-MCNC: 0.55 MG/DL — SIGNIFICANT CHANGE UP (ref 0.5–1.3)
CREAT SERPL-MCNC: 0.56 MG/DL — SIGNIFICANT CHANGE UP (ref 0.5–1.3)
CREAT SERPL-MCNC: 0.58 MG/DL
CREAT SERPL-MCNC: 0.6 MG/DL
CREAT SERPL-MCNC: 0.62 MG/DL — SIGNIFICANT CHANGE UP (ref 0.5–1.3)
CREAT SERPL-MCNC: 0.65 MG/DL
CREAT SERPL-MCNC: 0.66 MG/DL — SIGNIFICANT CHANGE UP (ref 0.5–1.3)
CREAT SERPL-MCNC: 0.8 MG/DL — SIGNIFICANT CHANGE UP (ref 0.5–1.3)
CRP SERPL-MCNC: 14 MG/L
CRP SERPL-MCNC: 4 MG/L
CRP SERPL-MCNC: <3 MG/L
CULTURE RESULTS: NO GROWTH — SIGNIFICANT CHANGE UP
CULTURE RESULTS: SIGNIFICANT CHANGE UP
DIFF PNL FLD: ABNORMAL
EOSINOPHIL # BLD AUTO: 0.01 K/UL — SIGNIFICANT CHANGE UP (ref 0–0.5)
EOSINOPHIL # BLD AUTO: 0.05 K/UL
EOSINOPHIL # BLD AUTO: 0.07 K/UL — SIGNIFICANT CHANGE UP (ref 0–0.5)
EOSINOPHIL # BLD AUTO: 0.09 K/UL — SIGNIFICANT CHANGE UP (ref 0–0.5)
EOSINOPHIL # BLD AUTO: 0.1 K/UL
EOSINOPHIL # BLD AUTO: 0.12 K/UL
EOSINOPHIL # BLD AUTO: 0.21 K/UL
EOSINOPHIL NFR BLD AUTO: 0.1 % — SIGNIFICANT CHANGE UP (ref 0–6)
EOSINOPHIL NFR BLD AUTO: 0.5 %
EOSINOPHIL NFR BLD AUTO: 0.5 % — SIGNIFICANT CHANGE UP (ref 0–6)
EOSINOPHIL NFR BLD AUTO: 0.8 % — SIGNIFICANT CHANGE UP (ref 0–6)
EOSINOPHIL NFR BLD AUTO: 1.1 %
EOSINOPHIL NFR BLD AUTO: 1.3 %
EOSINOPHIL NFR BLD AUTO: 1.6 %
ERYTHROCYTE [SEDIMENTATION RATE] IN BLOOD BY WESTERGREN METHOD: 14 MM/HR
ERYTHROCYTE [SEDIMENTATION RATE] IN BLOOD BY WESTERGREN METHOD: 7 MM/HR
ERYTHROCYTE [SEDIMENTATION RATE] IN BLOOD BY WESTERGREN METHOD: 7 MM/HR
ESTIMATED AVERAGE GLUCOSE: 97 MG/DL — SIGNIFICANT CHANGE UP (ref 68–114)
GLUCOSE SERPL-MCNC: 119 MG/DL — HIGH (ref 70–99)
GLUCOSE SERPL-MCNC: 124 MG/DL
GLUCOSE SERPL-MCNC: 126 MG/DL
GLUCOSE SERPL-MCNC: 137 MG/DL — HIGH (ref 70–99)
GLUCOSE SERPL-MCNC: 141 MG/DL — HIGH (ref 70–99)
GLUCOSE SERPL-MCNC: 148 MG/DL
GLUCOSE SERPL-MCNC: 181 MG/DL — HIGH (ref 70–99)
GLUCOSE SERPL-MCNC: 195 MG/DL — HIGH (ref 70–99)
GLUCOSE SERPL-MCNC: 84 MG/DL
GLUCOSE SERPL-MCNC: 95 MG/DL — SIGNIFICANT CHANGE UP (ref 70–99)
GLUCOSE SERPL-MCNC: 97 MG/DL — SIGNIFICANT CHANGE UP (ref 70–99)
GLUCOSE UR QL: NEGATIVE MG/DL — SIGNIFICANT CHANGE UP
HCT VFR BLD CALC: 30.8 % — LOW (ref 34.5–45)
HCT VFR BLD CALC: 33.2 % — LOW (ref 34.5–45)
HCT VFR BLD CALC: 34.3 %
HCT VFR BLD CALC: 34.4 % — LOW (ref 34.5–45)
HCT VFR BLD CALC: 38 %
HCT VFR BLD CALC: 38.1 %
HCT VFR BLD CALC: 39 %
HCT VFR BLD CALC: 39.9 % — SIGNIFICANT CHANGE UP (ref 34.5–45)
HCT VFR BLD CALC: 40.2 % — SIGNIFICANT CHANGE UP (ref 34.5–45)
HCT VFR BLD CALC: 43 % — SIGNIFICANT CHANGE UP (ref 34.5–45)
HGB BLD-MCNC: 10.4 G/DL
HGB BLD-MCNC: 10.7 G/DL — LOW (ref 11.5–15.5)
HGB BLD-MCNC: 10.9 G/DL — LOW (ref 11.5–15.5)
HGB BLD-MCNC: 11.4 G/DL
HGB BLD-MCNC: 11.7 G/DL
HGB BLD-MCNC: 12 G/DL
HGB BLD-MCNC: 12.3 G/DL — SIGNIFICANT CHANGE UP (ref 11.5–15.5)
HGB BLD-MCNC: 12.8 G/DL — SIGNIFICANT CHANGE UP (ref 11.5–15.5)
HGB BLD-MCNC: 13.5 G/DL — SIGNIFICANT CHANGE UP (ref 11.5–15.5)
HGB BLD-MCNC: 9.8 G/DL — LOW (ref 11.5–15.5)
IMM GRANULOCYTES NFR BLD AUTO: 0.3 %
IMM GRANULOCYTES NFR BLD AUTO: 0.4 %
IMM GRANULOCYTES NFR BLD AUTO: 0.4 %
IMM GRANULOCYTES NFR BLD AUTO: 0.5 %
IMM GRANULOCYTES NFR BLD AUTO: 0.8 % — SIGNIFICANT CHANGE UP (ref 0–1.5)
IMM GRANULOCYTES NFR BLD AUTO: 0.9 % — SIGNIFICANT CHANGE UP (ref 0–1.5)
INR BLD: 1.07 RATIO — SIGNIFICANT CHANGE UP (ref 0.88–1.16)
INR BLD: 1.37 RATIO — HIGH (ref 0.88–1.16)
KETONES UR-MCNC: NEGATIVE — SIGNIFICANT CHANGE UP
LACTATE SERPL-SCNC: 1.7 MMOL/L — SIGNIFICANT CHANGE UP (ref 0.7–2)
LACTATE SERPL-SCNC: 4.1 MMOL/L — CRITICAL HIGH (ref 0.7–2)
LEGIONELLA AG UR QL: NEGATIVE — SIGNIFICANT CHANGE UP
LEUKOCYTE ESTERASE UR-ACNC: ABNORMAL
LYMPHOCYTES # BLD AUTO: 0.29 K/UL — LOW (ref 1–3.3)
LYMPHOCYTES # BLD AUTO: 0.75 K/UL
LYMPHOCYTES # BLD AUTO: 0.87 K/UL — LOW (ref 1–3.3)
LYMPHOCYTES # BLD AUTO: 0.96 K/UL
LYMPHOCYTES # BLD AUTO: 1.31 K/UL
LYMPHOCYTES # BLD AUTO: 1.51 K/UL
LYMPHOCYTES # BLD AUTO: 17.8 % — SIGNIFICANT CHANGE UP (ref 13–44)
LYMPHOCYTES # BLD AUTO: 2 K/UL — SIGNIFICANT CHANGE UP (ref 1–3.3)
LYMPHOCYTES # BLD AUTO: 3.7 % — LOW (ref 13–44)
LYMPHOCYTES # BLD AUTO: 6 % — LOW (ref 13–44)
LYMPHOCYTES NFR BLD AUTO: 11.5 %
LYMPHOCYTES NFR BLD AUTO: 14.7 %
LYMPHOCYTES NFR BLD AUTO: 8.1 %
LYMPHOCYTES NFR BLD AUTO: 8.8 %
MAGNESIUM SERPL-MCNC: 2.3 MG/DL — SIGNIFICANT CHANGE UP (ref 1.6–2.6)
MAGNESIUM SERPL-MCNC: 2.3 MG/DL — SIGNIFICANT CHANGE UP (ref 1.6–2.6)
MAGNESIUM SERPL-MCNC: 2.5 MG/DL — SIGNIFICANT CHANGE UP (ref 1.6–2.6)
MAN DIFF?: NORMAL
MCHC RBC-ENTMCNC: 29.9 GM/DL
MCHC RBC-ENTMCNC: 30.3 GM/DL
MCHC RBC-ENTMCNC: 30.5 PG
MCHC RBC-ENTMCNC: 30.6 GM/DL — LOW (ref 32–36)
MCHC RBC-ENTMCNC: 30.8 GM/DL
MCHC RBC-ENTMCNC: 30.8 GM/DL
MCHC RBC-ENTMCNC: 31.4 GM/DL — LOW (ref 32–36)
MCHC RBC-ENTMCNC: 31.5 PG
MCHC RBC-ENTMCNC: 31.6 PG — SIGNIFICANT CHANGE UP (ref 27–34)
MCHC RBC-ENTMCNC: 31.7 GM/DL — LOW (ref 32–36)
MCHC RBC-ENTMCNC: 31.8 GM/DL — LOW (ref 32–36)
MCHC RBC-ENTMCNC: 31.9 PG — SIGNIFICANT CHANGE UP (ref 27–34)
MCHC RBC-ENTMCNC: 31.9 PG — SIGNIFICANT CHANGE UP (ref 27–34)
MCHC RBC-ENTMCNC: 32 PG — SIGNIFICANT CHANGE UP (ref 27–34)
MCHC RBC-ENTMCNC: 32.1 GM/DL — SIGNIFICANT CHANGE UP (ref 32–36)
MCHC RBC-ENTMCNC: 32.1 PG — SIGNIFICANT CHANGE UP (ref 27–34)
MCHC RBC-ENTMCNC: 32.2 GM/DL — SIGNIFICANT CHANGE UP (ref 32–36)
MCHC RBC-ENTMCNC: 32.5 PG — SIGNIFICANT CHANGE UP (ref 27–34)
MCHC RBC-ENTMCNC: 32.7 PG
MCHC RBC-ENTMCNC: 33.4 PG
MCV RBC AUTO: 100 FL — SIGNIFICANT CHANGE UP (ref 80–100)
MCV RBC AUTO: 100.7 FL — HIGH (ref 80–100)
MCV RBC AUTO: 103.6 FL — HIGH (ref 80–100)
MCV RBC AUTO: 103.9 FL
MCV RBC AUTO: 104.4 FL — HIGH (ref 80–100)
MCV RBC AUTO: 108.6 FL
MCV RBC AUTO: 109.2 FL
MCV RBC AUTO: 99.1 FL — SIGNIFICANT CHANGE UP (ref 80–100)
MCV RBC AUTO: 99.2 FL
MCV RBC AUTO: 99.7 FL — SIGNIFICANT CHANGE UP (ref 80–100)
MONOCYTES # BLD AUTO: 0.26 K/UL — SIGNIFICANT CHANGE UP (ref 0–0.9)
MONOCYTES # BLD AUTO: 0.7 K/UL
MONOCYTES # BLD AUTO: 0.73 K/UL — SIGNIFICANT CHANGE UP (ref 0–0.9)
MONOCYTES # BLD AUTO: 0.78 K/UL
MONOCYTES # BLD AUTO: 0.8 K/UL — SIGNIFICANT CHANGE UP (ref 0–0.9)
MONOCYTES # BLD AUTO: 0.89 K/UL
MONOCYTES # BLD AUTO: 0.89 K/UL
MONOCYTES NFR BLD AUTO: 10 %
MONOCYTES NFR BLD AUTO: 3.4 % — SIGNIFICANT CHANGE UP (ref 2–14)
MONOCYTES NFR BLD AUTO: 5 % — SIGNIFICANT CHANGE UP (ref 2–14)
MONOCYTES NFR BLD AUTO: 6.4 %
MONOCYTES NFR BLD AUTO: 6.8 %
MONOCYTES NFR BLD AUTO: 7.1 % — SIGNIFICANT CHANGE UP (ref 2–14)
MONOCYTES NFR BLD AUTO: 8.4 %
NEUTROPHILS # BLD AUTO: 10.39 K/UL
NEUTROPHILS # BLD AUTO: 12.78 K/UL — HIGH (ref 1.8–7.4)
NEUTROPHILS # BLD AUTO: 6.52 K/UL
NEUTROPHILS # BLD AUTO: 7.09 K/UL — SIGNIFICANT CHANGE UP (ref 1.8–7.4)
NEUTROPHILS # BLD AUTO: 7.51 K/UL
NEUTROPHILS # BLD AUTO: 8.18 K/UL — HIGH (ref 1.8–7.4)
NEUTROPHILS # BLD AUTO: 9.04 K/UL
NEUTROPHILS NFR BLD AUTO: 72.7 % — SIGNIFICANT CHANGE UP (ref 43–77)
NEUTROPHILS NFR BLD AUTO: 72.9 %
NEUTROPHILS NFR BLD AUTO: 78.9 %
NEUTROPHILS NFR BLD AUTO: 81.1 %
NEUTROPHILS NFR BLD AUTO: 81.5 % — HIGH (ref 43–77)
NEUTROPHILS NFR BLD AUTO: 83 %
NEUTROPHILS NFR BLD AUTO: 91.5 % — HIGH (ref 43–77)
NITRITE UR-MCNC: NEGATIVE — SIGNIFICANT CHANGE UP
NRBC # BLD: SIGNIFICANT CHANGE UP /100 WBCS (ref 0–0)
NT-PROBNP SERPL-SCNC: 343 PG/ML — SIGNIFICANT CHANGE UP (ref 0–450)
PH UR: 6 — SIGNIFICANT CHANGE UP (ref 5–8)
PHOSPHATE SERPL-MCNC: 2.4 MG/DL — LOW (ref 2.5–4.5)
PLATELET # BLD AUTO: 154 K/UL — SIGNIFICANT CHANGE UP (ref 150–400)
PLATELET # BLD AUTO: 176 K/UL — SIGNIFICANT CHANGE UP (ref 150–400)
PLATELET # BLD AUTO: 189 K/UL — SIGNIFICANT CHANGE UP (ref 150–400)
PLATELET # BLD AUTO: 191 K/UL
PLATELET # BLD AUTO: 200 K/UL — SIGNIFICANT CHANGE UP (ref 150–400)
PLATELET # BLD AUTO: 213 K/UL — SIGNIFICANT CHANGE UP (ref 150–400)
PLATELET # BLD AUTO: 232 K/UL
PLATELET # BLD AUTO: 247 K/UL
PLATELET # BLD AUTO: 271 K/UL
PLATELET # BLD AUTO: 273 K/UL — SIGNIFICANT CHANGE UP (ref 150–400)
POTASSIUM SERPL-MCNC: 3.2 MMOL/L — LOW (ref 3.5–5.3)
POTASSIUM SERPL-MCNC: 3.5 MMOL/L — SIGNIFICANT CHANGE UP (ref 3.5–5.3)
POTASSIUM SERPL-MCNC: 3.7 MMOL/L — SIGNIFICANT CHANGE UP (ref 3.5–5.3)
POTASSIUM SERPL-MCNC: 3.8 MMOL/L — SIGNIFICANT CHANGE UP (ref 3.5–5.3)
POTASSIUM SERPL-MCNC: 3.9 MMOL/L — SIGNIFICANT CHANGE UP (ref 3.5–5.3)
POTASSIUM SERPL-MCNC: 3.9 MMOL/L — SIGNIFICANT CHANGE UP (ref 3.5–5.3)
POTASSIUM SERPL-MCNC: 4 MMOL/L — SIGNIFICANT CHANGE UP (ref 3.5–5.3)
POTASSIUM SERPL-SCNC: 3.2 MMOL/L — LOW (ref 3.5–5.3)
POTASSIUM SERPL-SCNC: 3.5 MMOL/L — SIGNIFICANT CHANGE UP (ref 3.5–5.3)
POTASSIUM SERPL-SCNC: 3.7 MMOL/L — SIGNIFICANT CHANGE UP (ref 3.5–5.3)
POTASSIUM SERPL-SCNC: 3.8 MMOL/L — SIGNIFICANT CHANGE UP (ref 3.5–5.3)
POTASSIUM SERPL-SCNC: 3.9 MMOL/L — SIGNIFICANT CHANGE UP (ref 3.5–5.3)
POTASSIUM SERPL-SCNC: 3.9 MMOL/L — SIGNIFICANT CHANGE UP (ref 3.5–5.3)
POTASSIUM SERPL-SCNC: 4 MMOL/L — SIGNIFICANT CHANGE UP (ref 3.5–5.3)
POTASSIUM SERPL-SCNC: 4.3 MMOL/L
POTASSIUM SERPL-SCNC: 4.3 MMOL/L
POTASSIUM SERPL-SCNC: 4.5 MMOL/L
POTASSIUM SERPL-SCNC: 4.8 MMOL/L
PROCALCITONIN SERPL-MCNC: 1.09 NG/ML — HIGH (ref 0.02–0.1)
PROT SERPL-MCNC: 6.3 GM/DL — SIGNIFICANT CHANGE UP (ref 6–8.3)
PROT SERPL-MCNC: 6.5 G/DL
PROT SERPL-MCNC: 6.8 G/DL
PROT SERPL-MCNC: 7 G/DL
PROT SERPL-MCNC: 7.4 G/DL
PROT SERPL-MCNC: 7.8 GM/DL — SIGNIFICANT CHANGE UP (ref 6–8.3)
PROT SERPL-MCNC: 7.9 GM/DL — SIGNIFICANT CHANGE UP (ref 6–8.3)
PROT UR-MCNC: 30 MG/DL
PROTHROM AB SERPL-ACNC: 12.4 SEC — SIGNIFICANT CHANGE UP (ref 10.6–13.6)
PROTHROM AB SERPL-ACNC: 15.8 SEC — HIGH (ref 10.6–13.6)
RAPID RVP RESULT: SIGNIFICANT CHANGE UP
RBC # BLD: 3.06 M/UL — LOW (ref 3.8–5.2)
RBC # BLD: 3.3 M/UL
RBC # BLD: 3.35 M/UL — LOW (ref 3.8–5.2)
RBC # BLD: 3.45 M/UL — LOW (ref 3.8–5.2)
RBC # BLD: 3.49 M/UL
RBC # BLD: 3.59 M/UL
RBC # BLD: 3.83 M/UL
RBC # BLD: 3.85 M/UL — SIGNIFICANT CHANGE UP (ref 3.8–5.2)
RBC # BLD: 3.99 M/UL — SIGNIFICANT CHANGE UP (ref 3.8–5.2)
RBC # BLD: 4.15 M/UL — SIGNIFICANT CHANGE UP (ref 3.8–5.2)
RBC # FLD: 13.1 % — SIGNIFICANT CHANGE UP (ref 10.3–14.5)
RBC # FLD: 13.2 % — SIGNIFICANT CHANGE UP (ref 10.3–14.5)
RBC # FLD: 13.2 % — SIGNIFICANT CHANGE UP (ref 10.3–14.5)
RBC # FLD: 13.5 % — SIGNIFICANT CHANGE UP (ref 10.3–14.5)
RBC # FLD: 13.6 % — SIGNIFICANT CHANGE UP (ref 10.3–14.5)
RBC # FLD: 13.9 %
RBC # FLD: 14 %
RBC # FLD: 14.8 % — HIGH (ref 10.3–14.5)
RBC # FLD: 15.5 %
RBC # FLD: 16.9 %
S PNEUM AG UR QL: NEGATIVE — SIGNIFICANT CHANGE UP
SARS-COV-2 IGG+IGM SERPL QL IA: >250 U/ML — HIGH
SARS-COV-2 IGG+IGM SERPL QL IA: POSITIVE
SARS-COV-2 RNA SPEC QL NAA+PROBE: SIGNIFICANT CHANGE UP
SODIUM SERPL-SCNC: 130 MMOL/L — LOW (ref 135–145)
SODIUM SERPL-SCNC: 132 MMOL/L — LOW (ref 135–145)
SODIUM SERPL-SCNC: 134 MMOL/L
SODIUM SERPL-SCNC: 134 MMOL/L
SODIUM SERPL-SCNC: 134 MMOL/L — LOW (ref 135–145)
SODIUM SERPL-SCNC: 136 MMOL/L
SODIUM SERPL-SCNC: 136 MMOL/L — SIGNIFICANT CHANGE UP (ref 135–145)
SODIUM SERPL-SCNC: 138 MMOL/L
SODIUM SERPL-SCNC: 139 MMOL/L — SIGNIFICANT CHANGE UP (ref 135–145)
SP GR SPEC: 1.01 — SIGNIFICANT CHANGE UP (ref 1.01–1.02)
SPECIMEN SOURCE: SIGNIFICANT CHANGE UP
TROPONIN I SERPL-MCNC: 0.04 NG/ML — SIGNIFICANT CHANGE UP (ref 0.01–0.04)
TROPONIN I SERPL-MCNC: 0.06 NG/ML — HIGH (ref 0.01–0.04)
UROBILINOGEN FLD QL: NEGATIVE MG/DL — SIGNIFICANT CHANGE UP
WBC # BLD: 10.06 K/UL — SIGNIFICANT CHANGE UP (ref 3.8–10.5)
WBC # BLD: 10.64 K/UL — HIGH (ref 3.8–10.5)
WBC # BLD: 11.25 K/UL — HIGH (ref 3.8–10.5)
WBC # BLD: 14.52 K/UL — HIGH (ref 3.8–10.5)
WBC # BLD: 7.75 K/UL — SIGNIFICANT CHANGE UP (ref 3.8–10.5)
WBC # BLD: 8.08 K/UL — SIGNIFICANT CHANGE UP (ref 3.8–10.5)
WBC # FLD AUTO: 10.06 K/UL — SIGNIFICANT CHANGE UP (ref 3.8–10.5)
WBC # FLD AUTO: 10.64 K/UL — HIGH (ref 3.8–10.5)
WBC # FLD AUTO: 10.89 K/UL
WBC # FLD AUTO: 11.25 K/UL — HIGH (ref 3.8–10.5)
WBC # FLD AUTO: 13.15 K/UL
WBC # FLD AUTO: 14.52 K/UL — HIGH (ref 3.8–10.5)
WBC # FLD AUTO: 7.75 K/UL — SIGNIFICANT CHANGE UP (ref 3.8–10.5)
WBC # FLD AUTO: 8.08 K/UL — SIGNIFICANT CHANGE UP (ref 3.8–10.5)
WBC # FLD AUTO: 8.94 K/UL
WBC # FLD AUTO: 9.26 K/UL

## 2021-01-01 PROCEDURE — 36415 COLL VENOUS BLD VENIPUNCTURE: CPT

## 2021-01-01 PROCEDURE — 97116 GAIT TRAINING THERAPY: CPT | Mod: GP

## 2021-01-01 PROCEDURE — 84484 ASSAY OF TROPONIN QUANT: CPT

## 2021-01-01 PROCEDURE — 83036 HEMOGLOBIN GLYCOSYLATED A1C: CPT

## 2021-01-01 PROCEDURE — 99291 CRITICAL CARE FIRST HOUR: CPT

## 2021-01-01 PROCEDURE — U0005: CPT

## 2021-01-01 PROCEDURE — 87449 NOS EACH ORGANISM AG IA: CPT

## 2021-01-01 PROCEDURE — 93306 TTE W/DOPPLER COMPLETE: CPT | Mod: 26

## 2021-01-01 PROCEDURE — 71045 X-RAY EXAM CHEST 1 VIEW: CPT | Mod: 26

## 2021-01-01 PROCEDURE — 84100 ASSAY OF PHOSPHORUS: CPT

## 2021-01-01 PROCEDURE — 99214 OFFICE O/P EST MOD 30 MIN: CPT | Mod: 25

## 2021-01-01 PROCEDURE — 94760 N-INVAS EAR/PLS OXIMETRY 1: CPT

## 2021-01-01 PROCEDURE — 83605 ASSAY OF LACTIC ACID: CPT

## 2021-01-01 PROCEDURE — 99214 OFFICE O/P EST MOD 30 MIN: CPT

## 2021-01-01 PROCEDURE — 99223 1ST HOSP IP/OBS HIGH 75: CPT | Mod: GC

## 2021-01-01 PROCEDURE — U0003: CPT

## 2021-01-01 PROCEDURE — 80053 COMPREHEN METABOLIC PANEL: CPT

## 2021-01-01 PROCEDURE — 97162 PT EVAL MOD COMPLEX 30 MIN: CPT | Mod: GP

## 2021-01-01 PROCEDURE — 96374 THER/PROPH/DIAG INJ IV PUSH: CPT

## 2021-01-01 PROCEDURE — 71045 X-RAY EXAM CHEST 1 VIEW: CPT

## 2021-01-01 PROCEDURE — 0225U NFCT DS DNA&RNA 21 SARSCOV2: CPT

## 2021-01-01 PROCEDURE — 99233 SBSQ HOSP IP/OBS HIGH 50: CPT

## 2021-01-01 PROCEDURE — 99284 EMERGENCY DEPT VISIT MOD MDM: CPT | Mod: 25

## 2021-01-01 PROCEDURE — 99232 SBSQ HOSP IP/OBS MODERATE 35: CPT

## 2021-01-01 PROCEDURE — 74177 CT ABD & PELVIS W/CONTRAST: CPT | Mod: 26,MA

## 2021-01-01 PROCEDURE — 93306 TTE W/DOPPLER COMPLETE: CPT

## 2021-01-01 PROCEDURE — 83735 ASSAY OF MAGNESIUM: CPT

## 2021-01-01 PROCEDURE — 86769 SARS-COV-2 COVID-19 ANTIBODY: CPT

## 2021-01-01 PROCEDURE — 80048 BASIC METABOLIC PNL TOTAL CA: CPT

## 2021-01-01 PROCEDURE — 85025 COMPLETE CBC W/AUTO DIFF WBC: CPT

## 2021-01-01 PROCEDURE — 85027 COMPLETE CBC AUTOMATED: CPT

## 2021-01-01 PROCEDURE — 82962 GLUCOSE BLOOD TEST: CPT

## 2021-01-01 PROCEDURE — 83880 ASSAY OF NATRIURETIC PEPTIDE: CPT

## 2021-01-01 PROCEDURE — 94660 CPAP INITIATION&MGMT: CPT

## 2021-01-01 PROCEDURE — 84145 PROCALCITONIN (PCT): CPT

## 2021-01-01 PROCEDURE — 94640 AIRWAY INHALATION TREATMENT: CPT

## 2021-01-01 PROCEDURE — 93010 ELECTROCARDIOGRAM REPORT: CPT

## 2021-01-01 PROCEDURE — 85730 THROMBOPLASTIN TIME PARTIAL: CPT

## 2021-01-01 PROCEDURE — 85610 PROTHROMBIN TIME: CPT

## 2021-01-01 PROCEDURE — 99284 EMERGENCY DEPT VISIT MOD MDM: CPT

## 2021-01-01 PROCEDURE — 87040 BLOOD CULTURE FOR BACTERIA: CPT

## 2021-01-01 PROCEDURE — 99283 EMERGENCY DEPT VISIT LOW MDM: CPT

## 2021-01-01 PROCEDURE — 87899 AGENT NOS ASSAY W/OPTIC: CPT

## 2021-01-01 RX ORDER — CLOTRIMAZOLE 10 MG
1 TROCHE MUCOUS MEMBRANE
Refills: 0 | Status: DISCONTINUED | OUTPATIENT
Start: 2021-01-01 | End: 2021-01-01

## 2021-01-01 RX ORDER — GLUCAGON INJECTION, SOLUTION 0.5 MG/.1ML
1 INJECTION, SOLUTION SUBCUTANEOUS ONCE
Refills: 0 | Status: DISCONTINUED | OUTPATIENT
Start: 2021-01-01 | End: 2021-01-01

## 2021-01-01 RX ORDER — IPRATROPIUM/ALBUTEROL SULFATE 18-103MCG
3 AEROSOL WITH ADAPTER (GRAM) INHALATION
Qty: 0 | Refills: 0 | DISCHARGE

## 2021-01-01 RX ORDER — FUROSEMIDE 40 MG
40 TABLET ORAL ONCE
Refills: 0 | Status: COMPLETED | OUTPATIENT
Start: 2021-01-01 | End: 2021-01-01

## 2021-01-01 RX ORDER — ARIPIPRAZOLE 15 MG/1
2 TABLET ORAL DAILY
Refills: 0 | Status: DISCONTINUED | OUTPATIENT
Start: 2021-01-01 | End: 2021-01-01

## 2021-01-01 RX ORDER — LUBIPROSTONE 24 UG/1
1 CAPSULE, GELATIN COATED ORAL
Qty: 0 | Refills: 0 | DISCHARGE

## 2021-01-01 RX ORDER — FUROSEMIDE 40 MG
1 TABLET ORAL
Qty: 0 | Refills: 0 | DISCHARGE

## 2021-01-01 RX ORDER — SODIUM CHLORIDE 9 MG/ML
2800 INJECTION INTRAMUSCULAR; INTRAVENOUS; SUBCUTANEOUS ONCE
Refills: 0 | Status: COMPLETED | OUTPATIENT
Start: 2021-01-01 | End: 2021-01-01

## 2021-01-01 RX ORDER — AZTREONAM 2 G
2000 VIAL (EA) INJECTION EVERY 8 HOURS
Refills: 0 | Status: DISCONTINUED | OUTPATIENT
Start: 2021-01-01 | End: 2021-01-01

## 2021-01-01 RX ORDER — CEFEPIME 1 G/1
2000 INJECTION, POWDER, FOR SOLUTION INTRAMUSCULAR; INTRAVENOUS EVERY 12 HOURS
Refills: 0 | Status: DISCONTINUED | OUTPATIENT
Start: 2021-01-01 | End: 2021-01-01

## 2021-01-01 RX ORDER — AZTREONAM 2 G
VIAL (EA) INJECTION
Refills: 0 | Status: DISCONTINUED | OUTPATIENT
Start: 2021-01-01 | End: 2021-01-01

## 2021-01-01 RX ORDER — DORZOLAMIDE HYDROCHLORIDE 20 MG/ML
SOLUTION/ DROPS OPHTHALMIC
Refills: 0 | Status: DISCONTINUED | OUTPATIENT
Start: 2021-01-01 | End: 2021-01-01

## 2021-01-01 RX ORDER — LUBIPROSTONE 24 UG/1
24 CAPSULE, GELATIN COATED ORAL
Refills: 0 | Status: DISCONTINUED | OUTPATIENT
Start: 2021-01-01 | End: 2021-01-01

## 2021-01-01 RX ORDER — LINACLOTIDE 145 UG/1
1 CAPSULE, GELATIN COATED ORAL
Qty: 0 | Refills: 0 | DISCHARGE

## 2021-01-01 RX ORDER — VANCOMYCIN HCL 1 G
1000 VIAL (EA) INTRAVENOUS ONCE
Refills: 0 | Status: COMPLETED | OUTPATIENT
Start: 2021-01-01 | End: 2021-01-01

## 2021-01-01 RX ORDER — LOSARTAN POTASSIUM 100 MG/1
50 TABLET, FILM COATED ORAL DAILY
Refills: 0 | Status: DISCONTINUED | OUTPATIENT
Start: 2021-01-01 | End: 2021-01-01

## 2021-01-01 RX ORDER — ALENDRONATE SODIUM 70 MG/1
70 TABLET ORAL
Qty: 12 | Refills: 0 | Status: DISCONTINUED | COMMUNITY
Start: 2021-02-19 | End: 2021-01-01

## 2021-01-01 RX ORDER — ACETAMINOPHEN 500 MG
650 TABLET ORAL EVERY 6 HOURS
Refills: 0 | Status: DISCONTINUED | OUTPATIENT
Start: 2021-01-01 | End: 2021-01-01

## 2021-01-01 RX ORDER — ONDANSETRON 8 MG/1
4 TABLET, FILM COATED ORAL EVERY 6 HOURS
Refills: 0 | Status: DISCONTINUED | OUTPATIENT
Start: 2021-01-01 | End: 2021-01-01

## 2021-01-01 RX ORDER — OMEPRAZOLE 10 MG/1
1 CAPSULE, DELAYED RELEASE ORAL
Qty: 0 | Refills: 0 | DISCHARGE

## 2021-01-01 RX ORDER — FAMOTIDINE 10 MG/ML
1 INJECTION INTRAVENOUS
Qty: 0 | Refills: 0 | DISCHARGE

## 2021-01-01 RX ORDER — AZITHROMYCIN 500 MG/1
1 TABLET, FILM COATED ORAL
Qty: 0 | Refills: 0 | DISCHARGE

## 2021-01-01 RX ORDER — TIOTROPIUM BROMIDE 18 UG/1
1 CAPSULE ORAL; RESPIRATORY (INHALATION)
Qty: 0 | Refills: 0 | DISCHARGE

## 2021-01-01 RX ORDER — ASPIRIN/CALCIUM CARB/MAGNESIUM 324 MG
1 TABLET ORAL
Qty: 0 | Refills: 0 | DISCHARGE

## 2021-01-01 RX ORDER — LOSARTAN POTASSIUM 100 MG/1
2 TABLET, FILM COATED ORAL
Qty: 0 | Refills: 0 | DISCHARGE

## 2021-01-01 RX ORDER — MEROPENEM 1 G/30ML
1000 INJECTION INTRAVENOUS EVERY 8 HOURS
Refills: 0 | Status: DISCONTINUED | OUTPATIENT
Start: 2021-01-01 | End: 2021-01-01

## 2021-01-01 RX ORDER — ENOXAPARIN SODIUM 100 MG/ML
40 INJECTION SUBCUTANEOUS DAILY
Refills: 0 | Status: DISCONTINUED | OUTPATIENT
Start: 2021-01-01 | End: 2021-01-01

## 2021-01-01 RX ORDER — SODIUM CHLORIDE 9 MG/ML
1000 INJECTION, SOLUTION INTRAVENOUS
Refills: 0 | Status: DISCONTINUED | OUTPATIENT
Start: 2021-01-01 | End: 2021-01-01

## 2021-01-01 RX ORDER — ASPIRIN/CALCIUM CARB/MAGNESIUM 324 MG
81 TABLET ORAL DAILY
Refills: 0 | Status: DISCONTINUED | OUTPATIENT
Start: 2021-01-01 | End: 2021-01-01

## 2021-01-01 RX ORDER — VENLAFAXINE HCL 75 MG
150 CAPSULE, EXT RELEASE 24 HR ORAL DAILY
Refills: 0 | Status: DISCONTINUED | OUTPATIENT
Start: 2021-01-01 | End: 2021-01-01

## 2021-01-01 RX ORDER — BUDESONIDE, MICRONIZED 100 %
1 POWDER (GRAM) MISCELLANEOUS
Qty: 0 | Refills: 0 | DISCHARGE

## 2021-01-01 RX ORDER — SODIUM CHLORIDE 9 MG/ML
1000 INJECTION INTRAMUSCULAR; INTRAVENOUS; SUBCUTANEOUS ONCE
Refills: 0 | Status: COMPLETED | OUTPATIENT
Start: 2021-01-01 | End: 2021-01-01

## 2021-01-01 RX ORDER — METOPROLOL TARTRATE 50 MG
1 TABLET ORAL
Qty: 28 | Refills: 0
Start: 2021-01-01 | End: 2021-01-01

## 2021-01-01 RX ORDER — VERAPAMIL HCL 240 MG
1 CAPSULE, EXTENDED RELEASE PELLETS 24 HR ORAL
Qty: 0 | Refills: 0 | DISCHARGE

## 2021-01-01 RX ORDER — OXYCODONE AND ACETAMINOPHEN 5; 325 MG/1; MG/1
2 TABLET ORAL EVERY 4 HOURS
Refills: 0 | Status: DISCONTINUED | OUTPATIENT
Start: 2021-01-01 | End: 2021-01-01

## 2021-01-01 RX ORDER — ALBUTEROL 90 UG/1
2 AEROSOL, METERED ORAL
Qty: 0 | Refills: 0 | DISCHARGE

## 2021-01-01 RX ORDER — CLONAZEPAM 1 MG
0.5 TABLET ORAL
Qty: 0 | Refills: 0 | DISCHARGE

## 2021-01-01 RX ORDER — LANOLIN ALCOHOL/MO/W.PET/CERES
5 CREAM (GRAM) TOPICAL AT BEDTIME
Refills: 0 | Status: DISCONTINUED | OUTPATIENT
Start: 2021-01-01 | End: 2021-01-01

## 2021-01-01 RX ORDER — FUROSEMIDE 40 MG
20 TABLET ORAL DAILY
Refills: 0 | Status: DISCONTINUED | OUTPATIENT
Start: 2021-01-01 | End: 2021-01-01

## 2021-01-01 RX ORDER — VENLAFAXINE HCL 75 MG
1 CAPSULE, EXT RELEASE 24 HR ORAL
Qty: 0 | Refills: 0 | DISCHARGE

## 2021-01-01 RX ORDER — MIDODRINE HYDROCHLORIDE 2.5 MG/1
10 TABLET ORAL EVERY 8 HOURS
Refills: 0 | Status: DISCONTINUED | OUTPATIENT
Start: 2021-01-01 | End: 2021-01-01

## 2021-01-01 RX ORDER — ARIPIPRAZOLE 15 MG/1
1 TABLET ORAL
Qty: 0 | Refills: 0 | DISCHARGE

## 2021-01-01 RX ORDER — DEXTROSE 50 % IN WATER 50 %
25 SYRINGE (ML) INTRAVENOUS ONCE
Refills: 0 | Status: DISCONTINUED | OUTPATIENT
Start: 2021-01-01 | End: 2021-01-01

## 2021-01-01 RX ORDER — SULFASALAZINE 500 MG
500 TABLET ORAL THREE TIMES A DAY
Refills: 0 | Status: DISCONTINUED | OUTPATIENT
Start: 2021-01-01 | End: 2021-01-01

## 2021-01-01 RX ORDER — SODIUM CHLORIDE 9 MG/ML
3 INJECTION INTRAMUSCULAR; INTRAVENOUS; SUBCUTANEOUS ONCE
Refills: 0 | Status: COMPLETED | OUTPATIENT
Start: 2021-01-01 | End: 2021-01-01

## 2021-01-01 RX ORDER — POTASSIUM CHLORIDE 20 MEQ
10 PACKET (EA) ORAL
Refills: 0 | Status: COMPLETED | OUTPATIENT
Start: 2021-01-01 | End: 2021-01-01

## 2021-01-01 RX ORDER — PSYLLIUM SEED (WITH DEXTROSE)
2 POWDER (GRAM) ORAL
Qty: 0 | Refills: 0 | DISCHARGE

## 2021-01-01 RX ORDER — POLYETHYLENE GLYCOL 3350 17 G/17G
0 POWDER, FOR SOLUTION ORAL
Qty: 0 | Refills: 0 | DISCHARGE

## 2021-01-01 RX ORDER — SPIRONOLACTONE 25 MG/1
12.5 TABLET, FILM COATED ORAL DAILY
Refills: 0 | Status: DISCONTINUED | OUTPATIENT
Start: 2021-01-01 | End: 2021-01-01

## 2021-01-01 RX ORDER — DEXTROSE 50 % IN WATER 50 %
12.5 SYRINGE (ML) INTRAVENOUS ONCE
Refills: 0 | Status: DISCONTINUED | OUTPATIENT
Start: 2021-01-01 | End: 2021-01-01

## 2021-01-01 RX ORDER — POTASSIUM CHLORIDE 20 MEQ
1 PACKET (EA) ORAL
Qty: 7 | Refills: 0
Start: 2021-01-01 | End: 2021-01-01

## 2021-01-01 RX ORDER — SIMVASTATIN 20 MG/1
1 TABLET, FILM COATED ORAL
Qty: 0 | Refills: 0 | DISCHARGE

## 2021-01-01 RX ORDER — LATANOPROST 0.05 MG/ML
1 SOLUTION/ DROPS OPHTHALMIC; TOPICAL AT BEDTIME
Refills: 0 | Status: DISCONTINUED | OUTPATIENT
Start: 2021-01-01 | End: 2021-01-01

## 2021-01-01 RX ORDER — CETIRIZINE HYDROCHLORIDE 10 MG/1
1 TABLET ORAL
Qty: 0 | Refills: 0 | DISCHARGE

## 2021-01-01 RX ORDER — METOPROLOL TARTRATE 50 MG
25 TABLET ORAL
Refills: 0 | Status: DISCONTINUED | OUTPATIENT
Start: 2021-01-01 | End: 2021-01-01

## 2021-01-01 RX ORDER — DIMENHYDRINATE 50 MG
1 TABLET ORAL
Qty: 0 | Refills: 0 | DISCHARGE

## 2021-01-01 RX ORDER — BIMATOPROST 0.3 MG/ML
1 SOLUTION/ DROPS OPHTHALMIC
Qty: 0 | Refills: 0 | DISCHARGE

## 2021-01-01 RX ORDER — SULFASALAZINE 500 MG/1
500 TABLET, DELAYED RELEASE ORAL
Qty: 150 | Refills: 0 | Status: DISCONTINUED | COMMUNITY
Start: 2018-04-27 | End: 2021-01-01

## 2021-01-01 RX ORDER — ALPRAZOLAM 0.25 MG
0.5 TABLET ORAL ONCE
Refills: 0 | Status: DISCONTINUED | OUTPATIENT
Start: 2021-01-01 | End: 2021-01-01

## 2021-01-01 RX ORDER — DOCUSATE SODIUM 100 MG
1 CAPSULE ORAL
Qty: 0 | Refills: 0 | DISCHARGE

## 2021-01-01 RX ORDER — L.ACIDOPH/B.ANIMALIS/B.LONGUM 15B CELL
1 CAPSULE ORAL
Qty: 0 | Refills: 0 | DISCHARGE

## 2021-01-01 RX ORDER — LABETALOL HCL 100 MG
10 TABLET ORAL ONCE
Refills: 0 | Status: COMPLETED | OUTPATIENT
Start: 2021-01-01 | End: 2021-01-01

## 2021-01-01 RX ORDER — ALBUTEROL 90 UG/1
2 AEROSOL, METERED ORAL EVERY 6 HOURS
Refills: 0 | Status: DISCONTINUED | OUTPATIENT
Start: 2021-01-01 | End: 2021-01-01

## 2021-01-01 RX ORDER — METRONIDAZOLE 500 MG
500 TABLET ORAL ONCE
Refills: 0 | Status: COMPLETED | OUTPATIENT
Start: 2021-01-01 | End: 2021-01-01

## 2021-01-01 RX ORDER — FAMOTIDINE 10 MG/ML
20 INJECTION INTRAVENOUS DAILY
Refills: 0 | Status: DISCONTINUED | OUTPATIENT
Start: 2021-01-01 | End: 2021-01-01

## 2021-01-01 RX ORDER — FUROSEMIDE 40 MG
40 TABLET ORAL DAILY
Refills: 0 | Status: DISCONTINUED | OUTPATIENT
Start: 2021-01-01 | End: 2021-01-01

## 2021-01-01 RX ORDER — CIPROFLOXACIN LACTATE 400MG/40ML
400 VIAL (ML) INTRAVENOUS ONCE
Refills: 0 | Status: COMPLETED | OUTPATIENT
Start: 2021-01-01 | End: 2021-01-01

## 2021-01-01 RX ORDER — LACTOBACILLUS ACIDOPHILUS 100MM CELL
1 CAPSULE ORAL DAILY
Refills: 0 | Status: DISCONTINUED | OUTPATIENT
Start: 2021-01-01 | End: 2021-01-01

## 2021-01-01 RX ORDER — CLOTRIMAZOLE 10 MG
10 TROCHE MUCOUS MEMBRANE
Qty: 0 | Refills: 0 | DISCHARGE

## 2021-01-01 RX ORDER — DORZOLAMIDE HYDROCHLORIDE 20 MG/ML
1 SOLUTION/ DROPS OPHTHALMIC ONCE
Refills: 0 | Status: COMPLETED | OUTPATIENT
Start: 2021-01-01 | End: 2021-01-01

## 2021-01-01 RX ORDER — BRINZOLAMIDE 10 MG/ML
1 SUSPENSION/ DROPS OPHTHALMIC
Qty: 0 | Refills: 0 | DISCHARGE

## 2021-01-01 RX ORDER — FUROSEMIDE 40 MG
20 TABLET ORAL ONCE
Refills: 0 | Status: COMPLETED | OUTPATIENT
Start: 2021-01-01 | End: 2021-01-01

## 2021-01-01 RX ORDER — CLOTRIMAZOLE 10 MG
1 TROCHE MUCOUS MEMBRANE
Qty: 0 | Refills: 0 | DISCHARGE

## 2021-01-01 RX ORDER — HYDROCORTISONE 20 MG
50 TABLET ORAL EVERY 6 HOURS
Refills: 0 | Status: DISCONTINUED | OUTPATIENT
Start: 2021-01-01 | End: 2021-01-01

## 2021-01-01 RX ORDER — CLONAZEPAM 1 MG
0.25 TABLET ORAL
Refills: 0 | Status: COMPLETED | OUTPATIENT
Start: 2021-01-01 | End: 2021-01-01

## 2021-01-01 RX ORDER — SODIUM CHLORIDE 9 MG/ML
500 INJECTION INTRAMUSCULAR; INTRAVENOUS; SUBCUTANEOUS ONCE
Refills: 0 | Status: DISCONTINUED | OUTPATIENT
Start: 2021-01-01 | End: 2021-01-01

## 2021-01-01 RX ORDER — BUDESONIDE AND FORMOTEROL FUMARATE DIHYDRATE 160; 4.5 UG/1; UG/1
2 AEROSOL RESPIRATORY (INHALATION)
Qty: 0 | Refills: 0 | DISCHARGE

## 2021-01-01 RX ORDER — METFORMIN HYDROCHLORIDE 850 MG/1
1 TABLET ORAL
Qty: 0 | Refills: 0 | DISCHARGE

## 2021-01-01 RX ORDER — SULFASALAZINE 500 MG
1 TABLET ORAL
Qty: 0 | Refills: 0 | DISCHARGE

## 2021-01-01 RX ORDER — ACETAMINOPHEN 500 MG
975 TABLET ORAL ONCE
Refills: 0 | Status: COMPLETED | OUTPATIENT
Start: 2021-01-01 | End: 2021-01-01

## 2021-01-01 RX ORDER — ACETAMINOPHEN AND CHLORPHENIRAMINE MALEATE 325; 2 MG/1; MG/1
1 TABLET ORAL
Qty: 0 | Refills: 0 | DISCHARGE

## 2021-01-01 RX ORDER — SENNA PLUS 8.6 MG/1
1 TABLET ORAL
Qty: 0 | Refills: 0 | DISCHARGE

## 2021-01-01 RX ORDER — NOREPINEPHRINE BITARTRATE/D5W 8 MG/250ML
0.05 PLASTIC BAG, INJECTION (ML) INTRAVENOUS
Qty: 8 | Refills: 0 | Status: DISCONTINUED | OUTPATIENT
Start: 2021-01-01 | End: 2021-01-01

## 2021-01-01 RX ORDER — OXYCODONE HYDROCHLORIDE 5 MG/1
10 TABLET ORAL EVERY 6 HOURS
Refills: 0 | Status: DISCONTINUED | OUTPATIENT
Start: 2021-01-01 | End: 2021-01-01

## 2021-01-01 RX ORDER — ALBUTEROL 90 UG/1
2.5 AEROSOL, METERED ORAL ONCE
Refills: 0 | Status: DISCONTINUED | OUTPATIENT
Start: 2021-01-01 | End: 2021-01-01

## 2021-01-01 RX ORDER — SODIUM CHLORIDE 9 MG/ML
500 INJECTION INTRAMUSCULAR; INTRAVENOUS; SUBCUTANEOUS ONCE
Refills: 0 | Status: COMPLETED | OUTPATIENT
Start: 2021-01-01 | End: 2021-01-01

## 2021-01-01 RX ORDER — SIMVASTATIN 20 MG/1
20 TABLET, FILM COATED ORAL AT BEDTIME
Refills: 0 | Status: DISCONTINUED | OUTPATIENT
Start: 2021-01-01 | End: 2021-01-01

## 2021-01-01 RX ORDER — TIOTROPIUM BROMIDE 18 UG/1
1 CAPSULE ORAL; RESPIRATORY (INHALATION) DAILY
Refills: 0 | Status: DISCONTINUED | OUTPATIENT
Start: 2021-01-01 | End: 2021-01-01

## 2021-01-01 RX ORDER — TIOTROPIUM BROMIDE 18 UG/1
2 CAPSULE ORAL; RESPIRATORY (INHALATION)
Qty: 0 | Refills: 0 | DISCHARGE

## 2021-01-01 RX ORDER — INSULIN LISPRO 100/ML
VIAL (ML) SUBCUTANEOUS
Refills: 0 | Status: DISCONTINUED | OUTPATIENT
Start: 2021-01-01 | End: 2021-01-01

## 2021-01-01 RX ORDER — DORZOLAMIDE HYDROCHLORIDE 20 MG/ML
1 SOLUTION/ DROPS OPHTHALMIC THREE TIMES A DAY
Refills: 0 | Status: DISCONTINUED | OUTPATIENT
Start: 2021-01-01 | End: 2021-01-01

## 2021-01-01 RX ORDER — MIDODRINE HYDROCHLORIDE 2.5 MG/1
10 TABLET ORAL THREE TIMES A DAY
Refills: 0 | Status: DISCONTINUED | OUTPATIENT
Start: 2021-01-01 | End: 2021-01-01

## 2021-01-01 RX ORDER — PSYLLIUM SEED (WITH DEXTROSE)
1 POWDER (GRAM) ORAL DAILY
Refills: 0 | Status: DISCONTINUED | OUTPATIENT
Start: 2021-01-01 | End: 2021-01-01

## 2021-01-01 RX ORDER — AZITHROMYCIN 500 MG/1
500 TABLET, FILM COATED ORAL EVERY 24 HOURS
Refills: 0 | Status: DISCONTINUED | OUTPATIENT
Start: 2021-01-01 | End: 2021-01-01

## 2021-01-01 RX ORDER — IBANDRONATE SODIUM 150 MG/1
1 TABLET ORAL
Qty: 0 | Refills: 0 | DISCHARGE

## 2021-01-01 RX ORDER — SPIRONOLACTONE 25 MG/1
12.5 TABLET, FILM COATED ORAL
Qty: 0 | Refills: 0 | DISCHARGE

## 2021-01-01 RX ORDER — MAGNESIUM SULFATE 500 MG/ML
2 VIAL (ML) INJECTION ONCE
Refills: 0 | Status: COMPLETED | OUTPATIENT
Start: 2021-01-01 | End: 2021-01-01

## 2021-01-01 RX ORDER — FAMOTIDINE 20 MG/1
20 TABLET, FILM COATED ORAL
Qty: 30 | Refills: 0 | Status: ACTIVE | COMMUNITY
Start: 2021-03-12 | End: 1900-01-01

## 2021-01-01 RX ORDER — FUROSEMIDE 40 MG
40 TABLET ORAL
Refills: 0 | Status: DISCONTINUED | OUTPATIENT
Start: 2021-01-01 | End: 2021-01-01

## 2021-01-01 RX ORDER — SODIUM CHLORIDE 9 MG/ML
500 INJECTION INTRAMUSCULAR; INTRAVENOUS; SUBCUTANEOUS
Refills: 0 | Status: DISCONTINUED | OUTPATIENT
Start: 2021-01-01 | End: 2021-01-01

## 2021-01-01 RX ORDER — AZITHROMYCIN 500 MG/1
TABLET, FILM COATED ORAL
Refills: 0 | Status: DISCONTINUED | OUTPATIENT
Start: 2021-01-01 | End: 2021-01-01

## 2021-01-01 RX ORDER — AZTREONAM 2 G
2000 VIAL (EA) INJECTION ONCE
Refills: 0 | Status: COMPLETED | OUTPATIENT
Start: 2021-01-01 | End: 2021-01-01

## 2021-01-01 RX ORDER — AZITHROMYCIN 500 MG/1
500 TABLET, FILM COATED ORAL ONCE
Refills: 0 | Status: COMPLETED | OUTPATIENT
Start: 2021-01-01 | End: 2021-01-01

## 2021-01-01 RX ORDER — HEPARIN SODIUM 5000 [USP'U]/ML
5000 INJECTION INTRAVENOUS; SUBCUTANEOUS EVERY 12 HOURS
Refills: 0 | Status: DISCONTINUED | OUTPATIENT
Start: 2021-01-01 | End: 2021-01-01

## 2021-01-01 RX ORDER — NYSTATIN 100000 [USP'U]/ML
100000 SUSPENSION ORAL
Qty: 200 | Refills: 0 | Status: ACTIVE | COMMUNITY
Start: 2021-01-01 | End: 1900-01-01

## 2021-01-01 RX ORDER — PANTOPRAZOLE SODIUM 20 MG/1
40 TABLET, DELAYED RELEASE ORAL
Refills: 0 | Status: DISCONTINUED | OUTPATIENT
Start: 2021-01-01 | End: 2021-01-01

## 2021-01-01 RX ORDER — METOPROLOL TARTRATE 50 MG
50 TABLET ORAL
Refills: 0 | Status: DISCONTINUED | OUTPATIENT
Start: 2021-01-01 | End: 2021-01-01

## 2021-01-01 RX ORDER — DEXTROSE 50 % IN WATER 50 %
15 SYRINGE (ML) INTRAVENOUS ONCE
Refills: 0 | Status: DISCONTINUED | OUTPATIENT
Start: 2021-01-01 | End: 2021-01-01

## 2021-01-01 RX ORDER — METRONIDAZOLE 500 MG
500 TABLET ORAL EVERY 8 HOURS
Refills: 0 | Status: DISCONTINUED | OUTPATIENT
Start: 2021-01-01 | End: 2021-01-01

## 2021-01-01 RX ORDER — LINACLOTIDE 145 UG/1
145 CAPSULE, GELATIN COATED ORAL
Refills: 0 | Status: DISCONTINUED | OUTPATIENT
Start: 2021-01-01 | End: 2021-01-01

## 2021-01-01 RX ORDER — BUDESONIDE AND FORMOTEROL FUMARATE DIHYDRATE 160; 4.5 UG/1; UG/1
2 AEROSOL RESPIRATORY (INHALATION)
Refills: 0 | Status: DISCONTINUED | OUTPATIENT
Start: 2021-01-01 | End: 2021-01-01

## 2021-01-01 RX ORDER — AZELASTINE 137 UG/1
2 SPRAY, METERED NASAL
Qty: 0 | Refills: 0 | DISCHARGE

## 2021-01-01 RX ADMIN — BUDESONIDE AND FORMOTEROL FUMARATE DIHYDRATE 2 PUFF(S): 160; 4.5 AEROSOL RESPIRATORY (INHALATION) at 20:46

## 2021-01-01 RX ADMIN — ENOXAPARIN SODIUM 40 MILLIGRAM(S): 100 INJECTION SUBCUTANEOUS at 09:13

## 2021-01-01 RX ADMIN — DORZOLAMIDE HYDROCHLORIDE 1 DROP(S): 20 SOLUTION/ DROPS OPHTHALMIC at 21:11

## 2021-01-01 RX ADMIN — Medication 600 MILLIGRAM(S): at 21:50

## 2021-01-01 RX ADMIN — ALBUTEROL 2 PUFF(S): 90 AEROSOL, METERED ORAL at 14:13

## 2021-01-01 RX ADMIN — ALBUTEROL 2 PUFF(S): 90 AEROSOL, METERED ORAL at 00:20

## 2021-01-01 RX ADMIN — DORZOLAMIDE HYDROCHLORIDE 1 DROP(S): 20 SOLUTION/ DROPS OPHTHALMIC at 14:14

## 2021-01-01 RX ADMIN — TIOTROPIUM BROMIDE 1 CAPSULE(S): 18 CAPSULE ORAL; RESPIRATORY (INHALATION) at 10:37

## 2021-01-01 RX ADMIN — OXYCODONE AND ACETAMINOPHEN 2 TABLET(S): 5; 325 TABLET ORAL at 02:47

## 2021-01-01 RX ADMIN — Medication 0.25 MILLIGRAM(S): at 09:12

## 2021-01-01 RX ADMIN — Medication 40 MILLIGRAM(S): at 14:36

## 2021-01-01 RX ADMIN — Medication 0.25 MILLIGRAM(S): at 21:48

## 2021-01-01 RX ADMIN — LATANOPROST 1 DROP(S): 0.05 SOLUTION/ DROPS OPHTHALMIC; TOPICAL at 21:11

## 2021-01-01 RX ADMIN — Medication 1 TABLET(S): at 11:38

## 2021-01-01 RX ADMIN — Medication 1 PACKET(S): at 10:05

## 2021-01-01 RX ADMIN — OXYCODONE AND ACETAMINOPHEN 2 TABLET(S): 5; 325 TABLET ORAL at 06:30

## 2021-01-01 RX ADMIN — ARIPIPRAZOLE 2 MILLIGRAM(S): 15 TABLET ORAL at 09:12

## 2021-01-01 RX ADMIN — MEROPENEM 100 MILLIGRAM(S): 1 INJECTION INTRAVENOUS at 21:36

## 2021-01-01 RX ADMIN — Medication 40 MILLIGRAM(S): at 17:02

## 2021-01-01 RX ADMIN — MEROPENEM 100 MILLIGRAM(S): 1 INJECTION INTRAVENOUS at 22:18

## 2021-01-01 RX ADMIN — Medication 0.25 MILLIGRAM(S): at 22:18

## 2021-01-01 RX ADMIN — DORZOLAMIDE HYDROCHLORIDE 1 DROP(S): 20 SOLUTION/ DROPS OPHTHALMIC at 05:47

## 2021-01-01 RX ADMIN — DORZOLAMIDE HYDROCHLORIDE 1 DROP(S): 20 SOLUTION/ DROPS OPHTHALMIC at 21:49

## 2021-01-01 RX ADMIN — BUDESONIDE AND FORMOTEROL FUMARATE DIHYDRATE 2 PUFF(S): 160; 4.5 AEROSOL RESPIRATORY (INHALATION) at 08:46

## 2021-01-01 RX ADMIN — ALBUTEROL 2 PUFF(S): 90 AEROSOL, METERED ORAL at 08:54

## 2021-01-01 RX ADMIN — TIOTROPIUM BROMIDE 1 CAPSULE(S): 18 CAPSULE ORAL; RESPIRATORY (INHALATION) at 08:47

## 2021-01-01 RX ADMIN — BUDESONIDE AND FORMOTEROL FUMARATE DIHYDRATE 2 PUFF(S): 160; 4.5 AEROSOL RESPIRATORY (INHALATION) at 10:07

## 2021-01-01 RX ADMIN — Medication 1 TABLET(S): at 09:13

## 2021-01-01 RX ADMIN — Medication 1 LOZENGE: at 21:50

## 2021-01-01 RX ADMIN — Medication 40 MILLIGRAM(S): at 17:59

## 2021-01-01 RX ADMIN — DORZOLAMIDE HYDROCHLORIDE 1 DROP(S): 20 SOLUTION/ DROPS OPHTHALMIC at 22:20

## 2021-01-01 RX ADMIN — Medication 100 MILLIGRAM(S): at 15:06

## 2021-01-01 RX ADMIN — LUBIPROSTONE 24 MICROGRAM(S): 24 CAPSULE, GELATIN COATED ORAL at 10:05

## 2021-01-01 RX ADMIN — TIOTROPIUM BROMIDE 1 CAPSULE(S): 18 CAPSULE ORAL; RESPIRATORY (INHALATION) at 07:52

## 2021-01-01 RX ADMIN — OXYCODONE HYDROCHLORIDE 10 MILLIGRAM(S): 5 TABLET ORAL at 14:00

## 2021-01-01 RX ADMIN — Medication 600 MILLIGRAM(S): at 21:52

## 2021-01-01 RX ADMIN — Medication 50 GRAM(S): at 06:02

## 2021-01-01 RX ADMIN — ENOXAPARIN SODIUM 40 MILLIGRAM(S): 100 INJECTION SUBCUTANEOUS at 10:05

## 2021-01-01 RX ADMIN — Medication 975 MILLIGRAM(S): at 13:31

## 2021-01-01 RX ADMIN — Medication 5 MILLIGRAM(S): at 09:35

## 2021-01-01 RX ADMIN — Medication 975 MILLIGRAM(S): at 18:10

## 2021-01-01 RX ADMIN — Medication 25 MILLIGRAM(S): at 10:06

## 2021-01-01 RX ADMIN — MEROPENEM 100 MILLIGRAM(S): 1 INJECTION INTRAVENOUS at 05:15

## 2021-01-01 RX ADMIN — BUDESONIDE AND FORMOTEROL FUMARATE DIHYDRATE 2 PUFF(S): 160; 4.5 AEROSOL RESPIRATORY (INHALATION) at 20:38

## 2021-01-01 RX ADMIN — DORZOLAMIDE HYDROCHLORIDE 1 DROP(S): 20 SOLUTION/ DROPS OPHTHALMIC at 05:50

## 2021-01-01 RX ADMIN — Medication 600 MILLIGRAM(S): at 21:10

## 2021-01-01 RX ADMIN — MEROPENEM 100 MILLIGRAM(S): 1 INJECTION INTRAVENOUS at 13:23

## 2021-01-01 RX ADMIN — Medication 500 MILLIGRAM(S): at 06:01

## 2021-01-01 RX ADMIN — MEROPENEM 100 MILLIGRAM(S): 1 INJECTION INTRAVENOUS at 05:41

## 2021-01-01 RX ADMIN — Medication 0.5 MILLIGRAM(S): at 03:40

## 2021-01-01 RX ADMIN — Medication 5 MILLIGRAM(S): at 12:36

## 2021-01-01 RX ADMIN — Medication 150 MILLIGRAM(S): at 10:06

## 2021-01-01 RX ADMIN — Medication 500 MILLIGRAM(S): at 12:02

## 2021-01-01 RX ADMIN — BUDESONIDE AND FORMOTEROL FUMARATE DIHYDRATE 2 PUFF(S): 160; 4.5 AEROSOL RESPIRATORY (INHALATION) at 00:02

## 2021-01-01 RX ADMIN — MEROPENEM 100 MILLIGRAM(S): 1 INJECTION INTRAVENOUS at 21:50

## 2021-01-01 RX ADMIN — ENOXAPARIN SODIUM 40 MILLIGRAM(S): 100 INJECTION SUBCUTANEOUS at 11:52

## 2021-01-01 RX ADMIN — LATANOPROST 1 DROP(S): 0.05 SOLUTION/ DROPS OPHTHALMIC; TOPICAL at 21:51

## 2021-01-01 RX ADMIN — Medication 81 MILLIGRAM(S): at 09:35

## 2021-01-01 RX ADMIN — Medication 600 MILLIGRAM(S): at 11:52

## 2021-01-01 RX ADMIN — Medication 20 MILLIGRAM(S): at 06:12

## 2021-01-01 RX ADMIN — DORZOLAMIDE HYDROCHLORIDE 1 DROP(S): 20 SOLUTION/ DROPS OPHTHALMIC at 13:35

## 2021-01-01 RX ADMIN — LUBIPROSTONE 24 MICROGRAM(S): 24 CAPSULE, GELATIN COATED ORAL at 21:08

## 2021-01-01 RX ADMIN — Medication 650 MILLIGRAM(S): at 21:00

## 2021-01-01 RX ADMIN — Medication 1: at 14:35

## 2021-01-01 RX ADMIN — ALBUTEROL 2 PUFF(S): 90 AEROSOL, METERED ORAL at 05:35

## 2021-01-01 RX ADMIN — ALBUTEROL 2 PUFF(S): 90 AEROSOL, METERED ORAL at 01:54

## 2021-01-01 RX ADMIN — Medication 1: at 11:15

## 2021-01-01 RX ADMIN — Medication 400 MILLIGRAM(S): at 15:07

## 2021-01-01 RX ADMIN — Medication 1 LOZENGE: at 21:10

## 2021-01-01 RX ADMIN — DORZOLAMIDE HYDROCHLORIDE 1 DROP(S): 20 SOLUTION/ DROPS OPHTHALMIC at 05:16

## 2021-01-01 RX ADMIN — LUBIPROSTONE 24 MICROGRAM(S): 24 CAPSULE, GELATIN COATED ORAL at 21:10

## 2021-01-01 RX ADMIN — FAMOTIDINE 20 MILLIGRAM(S): 10 INJECTION INTRAVENOUS at 09:35

## 2021-01-01 RX ADMIN — Medication 500 MILLIGRAM(S): at 21:50

## 2021-01-01 RX ADMIN — Medication 1 LOZENGE: at 21:37

## 2021-01-01 RX ADMIN — LUBIPROSTONE 24 MICROGRAM(S): 24 CAPSULE, GELATIN COATED ORAL at 21:37

## 2021-01-01 RX ADMIN — Medication 650 MILLIGRAM(S): at 10:41

## 2021-01-01 RX ADMIN — TIOTROPIUM BROMIDE 1 CAPSULE(S): 18 CAPSULE ORAL; RESPIRATORY (INHALATION) at 08:00

## 2021-01-01 RX ADMIN — OXYCODONE HYDROCHLORIDE 10 MILLIGRAM(S): 5 TABLET ORAL at 20:20

## 2021-01-01 RX ADMIN — LUBIPROSTONE 24 MICROGRAM(S): 24 CAPSULE, GELATIN COATED ORAL at 10:31

## 2021-01-01 RX ADMIN — OXYCODONE AND ACETAMINOPHEN 2 TABLET(S): 5; 325 TABLET ORAL at 11:39

## 2021-01-01 RX ADMIN — OXYCODONE AND ACETAMINOPHEN 2 TABLET(S): 5; 325 TABLET ORAL at 00:19

## 2021-01-01 RX ADMIN — Medication 40 MILLIGRAM(S): at 21:48

## 2021-01-01 RX ADMIN — Medication 1 PACKET(S): at 12:03

## 2021-01-01 RX ADMIN — Medication 600 MILLIGRAM(S): at 09:12

## 2021-01-01 RX ADMIN — Medication 1 TABLET(S): at 09:35

## 2021-01-01 RX ADMIN — Medication 500 MILLIGRAM(S): at 21:07

## 2021-01-01 RX ADMIN — Medication 81 MILLIGRAM(S): at 11:52

## 2021-01-01 RX ADMIN — Medication 150 MILLIGRAM(S): at 09:34

## 2021-01-01 RX ADMIN — DORZOLAMIDE HYDROCHLORIDE 1 DROP(S): 20 SOLUTION/ DROPS OPHTHALMIC at 21:51

## 2021-01-01 RX ADMIN — Medication 150 MILLIGRAM(S): at 09:14

## 2021-01-01 RX ADMIN — SIMVASTATIN 20 MILLIGRAM(S): 20 TABLET, FILM COATED ORAL at 21:10

## 2021-01-01 RX ADMIN — MEROPENEM 100 MILLIGRAM(S): 1 INJECTION INTRAVENOUS at 06:18

## 2021-01-01 RX ADMIN — Medication 100 MILLIEQUIVALENT(S): at 14:15

## 2021-01-01 RX ADMIN — Medication 150 MILLIGRAM(S): at 09:12

## 2021-01-01 RX ADMIN — ALBUTEROL 2 PUFF(S): 90 AEROSOL, METERED ORAL at 20:36

## 2021-01-01 RX ADMIN — LUBIPROSTONE 24 MICROGRAM(S): 24 CAPSULE, GELATIN COATED ORAL at 21:49

## 2021-01-01 RX ADMIN — OXYCODONE HYDROCHLORIDE 10 MILLIGRAM(S): 5 TABLET ORAL at 11:45

## 2021-01-01 RX ADMIN — Medication 500 MILLIGRAM(S): at 21:10

## 2021-01-01 RX ADMIN — Medication 1 PACKET(S): at 11:56

## 2021-01-01 RX ADMIN — LUBIPROSTONE 24 MICROGRAM(S): 24 CAPSULE, GELATIN COATED ORAL at 09:17

## 2021-01-01 RX ADMIN — OXYCODONE AND ACETAMINOPHEN 2 TABLET(S): 5; 325 TABLET ORAL at 06:17

## 2021-01-01 RX ADMIN — Medication 1 PACKET(S): at 10:31

## 2021-01-01 RX ADMIN — Medication 25 MILLIGRAM(S): at 09:13

## 2021-01-01 RX ADMIN — LINACLOTIDE 145 MICROGRAM(S): 145 CAPSULE, GELATIN COATED ORAL at 08:21

## 2021-01-01 RX ADMIN — ENOXAPARIN SODIUM 40 MILLIGRAM(S): 100 INJECTION SUBCUTANEOUS at 09:12

## 2021-01-01 RX ADMIN — ENOXAPARIN SODIUM 40 MILLIGRAM(S): 100 INJECTION SUBCUTANEOUS at 11:38

## 2021-01-01 RX ADMIN — Medication 50 MILLIGRAM(S): at 22:22

## 2021-01-01 RX ADMIN — Medication 40 MILLIGRAM(S): at 17:32

## 2021-01-01 RX ADMIN — Medication 500 MILLIGRAM(S): at 13:35

## 2021-01-01 RX ADMIN — SIMVASTATIN 20 MILLIGRAM(S): 20 TABLET, FILM COATED ORAL at 21:36

## 2021-01-01 RX ADMIN — Medication 50 MILLIGRAM(S): at 13:06

## 2021-01-01 RX ADMIN — DORZOLAMIDE HYDROCHLORIDE 1 DROP(S): 20 SOLUTION/ DROPS OPHTHALMIC at 02:28

## 2021-01-01 RX ADMIN — OXYCODONE AND ACETAMINOPHEN 2 TABLET(S): 5; 325 TABLET ORAL at 12:39

## 2021-01-01 RX ADMIN — OXYCODONE HYDROCHLORIDE 10 MILLIGRAM(S): 5 TABLET ORAL at 06:13

## 2021-01-01 RX ADMIN — Medication 3: at 17:05

## 2021-01-01 RX ADMIN — DORZOLAMIDE HYDROCHLORIDE 1 DROP(S): 20 SOLUTION/ DROPS OPHTHALMIC at 06:03

## 2021-01-01 RX ADMIN — Medication 1 TABLET(S): at 10:31

## 2021-01-01 RX ADMIN — ALBUTEROL 2 PUFF(S): 90 AEROSOL, METERED ORAL at 08:01

## 2021-01-01 RX ADMIN — Medication 2: at 19:55

## 2021-01-01 RX ADMIN — LUBIPROSTONE 24 MICROGRAM(S): 24 CAPSULE, GELATIN COATED ORAL at 09:15

## 2021-01-01 RX ADMIN — Medication 1 PACKET(S): at 09:15

## 2021-01-01 RX ADMIN — Medication 125 MILLIGRAM(S): at 20:09

## 2021-01-01 RX ADMIN — Medication 150 MILLIGRAM(S): at 12:02

## 2021-01-01 RX ADMIN — Medication 500 MILLIGRAM(S): at 18:10

## 2021-01-01 RX ADMIN — Medication 40 MILLIGRAM(S): at 03:39

## 2021-01-01 RX ADMIN — ALBUTEROL 2 PUFF(S): 90 AEROSOL, METERED ORAL at 13:18

## 2021-01-01 RX ADMIN — Medication 1 LOZENGE: at 21:12

## 2021-01-01 RX ADMIN — PANTOPRAZOLE SODIUM 40 MILLIGRAM(S): 20 TABLET, DELAYED RELEASE ORAL at 11:53

## 2021-01-01 RX ADMIN — DORZOLAMIDE HYDROCHLORIDE 1 DROP(S): 20 SOLUTION/ DROPS OPHTHALMIC at 11:12

## 2021-01-01 RX ADMIN — Medication 0.5 MILLIGRAM(S): at 02:13

## 2021-01-01 RX ADMIN — LOSARTAN POTASSIUM 50 MILLIGRAM(S): 100 TABLET, FILM COATED ORAL at 12:10

## 2021-01-01 RX ADMIN — OXYCODONE AND ACETAMINOPHEN 2 TABLET(S): 5; 325 TABLET ORAL at 14:00

## 2021-01-01 RX ADMIN — ALBUTEROL 2 PUFF(S): 90 AEROSOL, METERED ORAL at 01:02

## 2021-01-01 RX ADMIN — Medication 600 MILLIGRAM(S): at 09:13

## 2021-01-01 RX ADMIN — MIDODRINE HYDROCHLORIDE 10 MILLIGRAM(S): 2.5 TABLET ORAL at 06:18

## 2021-01-01 RX ADMIN — OXYCODONE HYDROCHLORIDE 10 MILLIGRAM(S): 5 TABLET ORAL at 05:30

## 2021-01-01 RX ADMIN — Medication 1 TABLET(S): at 10:06

## 2021-01-01 RX ADMIN — MEROPENEM 100 MILLIGRAM(S): 1 INJECTION INTRAVENOUS at 06:03

## 2021-01-01 RX ADMIN — Medication 1 PACKET(S): at 09:13

## 2021-01-01 RX ADMIN — DORZOLAMIDE HYDROCHLORIDE 1 DROP(S): 20 SOLUTION/ DROPS OPHTHALMIC at 13:10

## 2021-01-01 RX ADMIN — Medication 600 MILLIGRAM(S): at 09:35

## 2021-01-01 RX ADMIN — Medication 0.5 MILLIGRAM(S): at 10:32

## 2021-01-01 RX ADMIN — OXYCODONE HYDROCHLORIDE 10 MILLIGRAM(S): 5 TABLET ORAL at 06:31

## 2021-01-01 RX ADMIN — OXYCODONE AND ACETAMINOPHEN 2 TABLET(S): 5; 325 TABLET ORAL at 10:29

## 2021-01-01 RX ADMIN — Medication 40 MILLIGRAM(S): at 10:07

## 2021-01-01 RX ADMIN — Medication 600 MILLIGRAM(S): at 22:20

## 2021-01-01 RX ADMIN — Medication 0.25 MILLIGRAM(S): at 21:12

## 2021-01-01 RX ADMIN — OXYCODONE HYDROCHLORIDE 10 MILLIGRAM(S): 5 TABLET ORAL at 18:50

## 2021-01-01 RX ADMIN — LINACLOTIDE 145 MICROGRAM(S): 145 CAPSULE, GELATIN COATED ORAL at 06:32

## 2021-01-01 RX ADMIN — MEROPENEM 100 MILLIGRAM(S): 1 INJECTION INTRAVENOUS at 13:10

## 2021-01-01 RX ADMIN — MEROPENEM 100 MILLIGRAM(S): 1 INJECTION INTRAVENOUS at 15:03

## 2021-01-01 RX ADMIN — ARIPIPRAZOLE 2 MILLIGRAM(S): 15 TABLET ORAL at 12:08

## 2021-01-01 RX ADMIN — OXYCODONE AND ACETAMINOPHEN 2 TABLET(S): 5; 325 TABLET ORAL at 16:45

## 2021-01-01 RX ADMIN — FAMOTIDINE 20 MILLIGRAM(S): 10 INJECTION INTRAVENOUS at 11:39

## 2021-01-01 RX ADMIN — Medication 1 LOZENGE: at 22:20

## 2021-01-01 RX ADMIN — OXYCODONE HYDROCHLORIDE 10 MILLIGRAM(S): 5 TABLET ORAL at 05:47

## 2021-01-01 RX ADMIN — MEROPENEM 100 MILLIGRAM(S): 1 INJECTION INTRAVENOUS at 13:05

## 2021-01-01 RX ADMIN — Medication 150 MILLIGRAM(S): at 12:08

## 2021-01-01 RX ADMIN — MEROPENEM 100 MILLIGRAM(S): 1 INJECTION INTRAVENOUS at 14:36

## 2021-01-01 RX ADMIN — Medication 5 MILLIGRAM(S): at 09:16

## 2021-01-01 RX ADMIN — Medication 40 MILLIGRAM(S): at 09:17

## 2021-01-01 RX ADMIN — BUDESONIDE AND FORMOTEROL FUMARATE DIHYDRATE 2 PUFF(S): 160; 4.5 AEROSOL RESPIRATORY (INHALATION) at 20:40

## 2021-01-01 RX ADMIN — DORZOLAMIDE HYDROCHLORIDE 1 DROP(S): 20 SOLUTION/ DROPS OPHTHALMIC at 13:23

## 2021-01-01 RX ADMIN — Medication 650 MILLIGRAM(S): at 19:52

## 2021-01-01 RX ADMIN — ALBUTEROL 2 PUFF(S): 90 AEROSOL, METERED ORAL at 08:15

## 2021-01-01 RX ADMIN — Medication 0.25 MILLIGRAM(S): at 09:36

## 2021-01-01 RX ADMIN — Medication 25 MILLIGRAM(S): at 21:36

## 2021-01-01 RX ADMIN — BUDESONIDE AND FORMOTEROL FUMARATE DIHYDRATE 2 PUFF(S): 160; 4.5 AEROSOL RESPIRATORY (INHALATION) at 20:26

## 2021-01-01 RX ADMIN — SODIUM CHLORIDE 1000 MILLILITER(S): 9 INJECTION INTRAMUSCULAR; INTRAVENOUS; SUBCUTANEOUS at 04:14

## 2021-01-01 RX ADMIN — SODIUM CHLORIDE 3 MILLILITER(S): 9 INJECTION INTRAMUSCULAR; INTRAVENOUS; SUBCUTANEOUS at 20:00

## 2021-01-01 RX ADMIN — Medication 20 MILLIGRAM(S): at 11:53

## 2021-01-01 RX ADMIN — TIOTROPIUM BROMIDE 1 CAPSULE(S): 18 CAPSULE ORAL; RESPIRATORY (INHALATION) at 08:54

## 2021-01-01 RX ADMIN — Medication 100 MILLIEQUIVALENT(S): at 11:40

## 2021-01-01 RX ADMIN — Medication 1 PACKET(S): at 09:37

## 2021-01-01 RX ADMIN — DORZOLAMIDE HYDROCHLORIDE 1 DROP(S): 20 SOLUTION/ DROPS OPHTHALMIC at 21:36

## 2021-01-01 RX ADMIN — FAMOTIDINE 20 MILLIGRAM(S): 10 INJECTION INTRAVENOUS at 09:13

## 2021-01-01 RX ADMIN — DORZOLAMIDE HYDROCHLORIDE 1 DROP(S): 20 SOLUTION/ DROPS OPHTHALMIC at 13:06

## 2021-01-01 RX ADMIN — Medication 40 MILLIGRAM(S): at 12:52

## 2021-01-01 RX ADMIN — ENOXAPARIN SODIUM 40 MILLIGRAM(S): 100 INJECTION SUBCUTANEOUS at 10:31

## 2021-01-01 RX ADMIN — Medication 500 MILLIGRAM(S): at 15:01

## 2021-01-01 RX ADMIN — OXYCODONE AND ACETAMINOPHEN 2 TABLET(S): 5; 325 TABLET ORAL at 11:00

## 2021-01-01 RX ADMIN — Medication 100 MILLIGRAM(S): at 05:57

## 2021-01-01 RX ADMIN — OXYCODONE AND ACETAMINOPHEN 2 TABLET(S): 5; 325 TABLET ORAL at 22:25

## 2021-01-01 RX ADMIN — LUBIPROSTONE 24 MICROGRAM(S): 24 CAPSULE, GELATIN COATED ORAL at 12:07

## 2021-01-01 RX ADMIN — Medication 500 MILLIGRAM(S): at 13:10

## 2021-01-01 RX ADMIN — Medication 0.5 MILLIGRAM(S): at 00:31

## 2021-01-01 RX ADMIN — Medication 1 TABLET(S): at 11:52

## 2021-01-01 RX ADMIN — LATANOPROST 1 DROP(S): 0.05 SOLUTION/ DROPS OPHTHALMIC; TOPICAL at 21:36

## 2021-01-01 RX ADMIN — Medication 50 MILLIGRAM(S): at 05:57

## 2021-01-01 RX ADMIN — SODIUM CHLORIDE 2800 MILLILITER(S): 9 INJECTION INTRAMUSCULAR; INTRAVENOUS; SUBCUTANEOUS at 15:07

## 2021-01-01 RX ADMIN — OXYCODONE AND ACETAMINOPHEN 2 TABLET(S): 5; 325 TABLET ORAL at 17:30

## 2021-01-01 RX ADMIN — ALBUTEROL 2 PUFF(S): 90 AEROSOL, METERED ORAL at 17:39

## 2021-01-01 RX ADMIN — Medication 50 MILLIGRAM(S): at 09:13

## 2021-01-01 RX ADMIN — Medication 1: at 18:00

## 2021-01-01 RX ADMIN — LUBIPROSTONE 24 MICROGRAM(S): 24 CAPSULE, GELATIN COATED ORAL at 11:38

## 2021-01-01 RX ADMIN — LINACLOTIDE 145 MICROGRAM(S): 145 CAPSULE, GELATIN COATED ORAL at 12:02

## 2021-01-01 RX ADMIN — LUBIPROSTONE 24 MICROGRAM(S): 24 CAPSULE, GELATIN COATED ORAL at 21:51

## 2021-01-01 RX ADMIN — OXYCODONE HYDROCHLORIDE 10 MILLIGRAM(S): 5 TABLET ORAL at 12:51

## 2021-01-01 RX ADMIN — OXYCODONE AND ACETAMINOPHEN 2 TABLET(S): 5; 325 TABLET ORAL at 02:17

## 2021-01-01 RX ADMIN — OXYCODONE AND ACETAMINOPHEN 2 TABLET(S): 5; 325 TABLET ORAL at 01:20

## 2021-01-01 RX ADMIN — OXYCODONE HYDROCHLORIDE 10 MILLIGRAM(S): 5 TABLET ORAL at 13:50

## 2021-01-01 RX ADMIN — Medication 0.5 MILLIGRAM(S): at 04:36

## 2021-01-01 RX ADMIN — Medication 500 MILLIGRAM(S): at 13:23

## 2021-01-01 RX ADMIN — Medication 50 MILLIGRAM(S): at 21:50

## 2021-01-01 RX ADMIN — Medication 100 MILLIGRAM(S): at 01:50

## 2021-01-01 RX ADMIN — BUDESONIDE AND FORMOTEROL FUMARATE DIHYDRATE 2 PUFF(S): 160; 4.5 AEROSOL RESPIRATORY (INHALATION) at 08:15

## 2021-01-01 RX ADMIN — OXYCODONE HYDROCHLORIDE 10 MILLIGRAM(S): 5 TABLET ORAL at 13:33

## 2021-01-01 RX ADMIN — CEFEPIME 100 MILLIGRAM(S): 1 INJECTION, POWDER, FOR SOLUTION INTRAMUSCULAR; INTRAVENOUS at 11:11

## 2021-01-01 RX ADMIN — OXYCODONE AND ACETAMINOPHEN 2 TABLET(S): 5; 325 TABLET ORAL at 18:03

## 2021-01-01 RX ADMIN — LUBIPROSTONE 24 MICROGRAM(S): 24 CAPSULE, GELATIN COATED ORAL at 22:21

## 2021-01-01 RX ADMIN — Medication 0.5 MILLIGRAM(S): at 20:17

## 2021-01-01 RX ADMIN — Medication 2: at 12:37

## 2021-01-01 RX ADMIN — Medication 0.5 MILLIGRAM(S): at 21:36

## 2021-01-01 RX ADMIN — ALBUTEROL 2 PUFF(S): 90 AEROSOL, METERED ORAL at 07:52

## 2021-01-01 RX ADMIN — LINACLOTIDE 145 MICROGRAM(S): 145 CAPSULE, GELATIN COATED ORAL at 06:03

## 2021-01-01 RX ADMIN — MEROPENEM 100 MILLIGRAM(S): 1 INJECTION INTRAVENOUS at 22:22

## 2021-01-01 RX ADMIN — DORZOLAMIDE HYDROCHLORIDE 1 DROP(S): 20 SOLUTION/ DROPS OPHTHALMIC at 06:20

## 2021-01-01 RX ADMIN — ALBUTEROL 2 PUFF(S): 90 AEROSOL, METERED ORAL at 14:47

## 2021-01-01 RX ADMIN — Medication 500 MILLIGRAM(S): at 13:06

## 2021-01-01 RX ADMIN — MIDODRINE HYDROCHLORIDE 10 MILLIGRAM(S): 2.5 TABLET ORAL at 21:50

## 2021-01-01 RX ADMIN — Medication 600 MILLIGRAM(S): at 10:06

## 2021-01-01 RX ADMIN — OXYCODONE HYDROCHLORIDE 10 MILLIGRAM(S): 5 TABLET ORAL at 11:15

## 2021-01-01 RX ADMIN — ALBUTEROL 2 PUFF(S): 90 AEROSOL, METERED ORAL at 12:22

## 2021-01-01 RX ADMIN — TIOTROPIUM BROMIDE 1 CAPSULE(S): 18 CAPSULE ORAL; RESPIRATORY (INHALATION) at 12:21

## 2021-01-01 RX ADMIN — Medication 40 MILLIGRAM(S): at 11:39

## 2021-01-01 RX ADMIN — SIMVASTATIN 20 MILLIGRAM(S): 20 TABLET, FILM COATED ORAL at 22:23

## 2021-01-01 RX ADMIN — Medication 0.5 MILLIGRAM(S): at 21:49

## 2021-01-01 RX ADMIN — Medication 500 MILLIGRAM(S): at 21:48

## 2021-01-01 RX ADMIN — Medication 250 MILLIGRAM(S): at 09:10

## 2021-01-01 RX ADMIN — Medication 40 MILLIGRAM(S): at 17:38

## 2021-01-01 RX ADMIN — Medication 10 MILLIGRAM(S): at 03:41

## 2021-01-01 RX ADMIN — SODIUM CHLORIDE 1000 MILLILITER(S): 9 INJECTION INTRAMUSCULAR; INTRAVENOUS; SUBCUTANEOUS at 17:10

## 2021-01-01 RX ADMIN — Medication 40 MILLIGRAM(S): at 10:31

## 2021-01-01 RX ADMIN — MEROPENEM 100 MILLIGRAM(S): 1 INJECTION INTRAVENOUS at 21:06

## 2021-01-01 RX ADMIN — ARIPIPRAZOLE 2 MILLIGRAM(S): 15 TABLET ORAL at 12:03

## 2021-01-01 RX ADMIN — Medication 0.5 MILLIGRAM(S): at 10:06

## 2021-01-01 RX ADMIN — Medication 5 MILLIGRAM(S): at 09:15

## 2021-01-01 RX ADMIN — Medication 500 MILLIGRAM(S): at 05:16

## 2021-01-01 RX ADMIN — OXYCODONE AND ACETAMINOPHEN 2 TABLET(S): 5; 325 TABLET ORAL at 23:00

## 2021-01-01 RX ADMIN — ENOXAPARIN SODIUM 40 MILLIGRAM(S): 100 INJECTION SUBCUTANEOUS at 09:37

## 2021-01-01 RX ADMIN — OXYCODONE AND ACETAMINOPHEN 2 TABLET(S): 5; 325 TABLET ORAL at 16:15

## 2021-01-01 RX ADMIN — PANTOPRAZOLE SODIUM 40 MILLIGRAM(S): 20 TABLET, DELAYED RELEASE ORAL at 06:18

## 2021-01-01 RX ADMIN — Medication 2: at 21:18

## 2021-01-01 RX ADMIN — Medication 5 MILLIGRAM(S): at 10:07

## 2021-01-01 RX ADMIN — ARIPIPRAZOLE 2 MILLIGRAM(S): 15 TABLET ORAL at 10:07

## 2021-01-01 RX ADMIN — DORZOLAMIDE HYDROCHLORIDE 1 DROP(S): 20 SOLUTION/ DROPS OPHTHALMIC at 15:03

## 2021-01-01 RX ADMIN — ARIPIPRAZOLE 2 MILLIGRAM(S): 15 TABLET ORAL at 10:31

## 2021-01-01 RX ADMIN — ALBUTEROL 2 PUFF(S): 90 AEROSOL, METERED ORAL at 20:38

## 2021-01-01 RX ADMIN — Medication 40 MILLIGRAM(S): at 05:47

## 2021-01-01 RX ADMIN — MIDODRINE HYDROCHLORIDE 10 MILLIGRAM(S): 2.5 TABLET ORAL at 14:36

## 2021-01-01 RX ADMIN — ALBUTEROL 2 PUFF(S): 90 AEROSOL, METERED ORAL at 20:25

## 2021-01-01 RX ADMIN — SIMVASTATIN 20 MILLIGRAM(S): 20 TABLET, FILM COATED ORAL at 21:50

## 2021-01-01 RX ADMIN — Medication 5 MILLIGRAM(S): at 11:39

## 2021-01-01 RX ADMIN — Medication 100 MILLIEQUIVALENT(S): at 13:07

## 2021-01-01 RX ADMIN — BUDESONIDE AND FORMOTEROL FUMARATE DIHYDRATE 2 PUFF(S): 160; 4.5 AEROSOL RESPIRATORY (INHALATION) at 08:00

## 2021-01-01 RX ADMIN — Medication 81 MILLIGRAM(S): at 11:39

## 2021-01-01 RX ADMIN — ALBUTEROL 2 PUFF(S): 90 AEROSOL, METERED ORAL at 20:44

## 2021-01-01 RX ADMIN — Medication 25 MILLIGRAM(S): at 21:10

## 2021-01-01 RX ADMIN — Medication 125 MILLIGRAM(S): at 06:07

## 2021-01-01 RX ADMIN — LATANOPROST 1 DROP(S): 0.05 SOLUTION/ DROPS OPHTHALMIC; TOPICAL at 21:49

## 2021-01-01 RX ADMIN — Medication 81 MILLIGRAM(S): at 09:12

## 2021-01-01 RX ADMIN — Medication 500 MILLIGRAM(S): at 06:03

## 2021-01-01 RX ADMIN — OXYCODONE AND ACETAMINOPHEN 2 TABLET(S): 5; 325 TABLET ORAL at 20:12

## 2021-01-01 RX ADMIN — OXYCODONE HYDROCHLORIDE 10 MILLIGRAM(S): 5 TABLET ORAL at 17:38

## 2021-01-01 RX ADMIN — LINACLOTIDE 145 MICROGRAM(S): 145 CAPSULE, GELATIN COATED ORAL at 05:49

## 2021-01-01 RX ADMIN — OXYCODONE HYDROCHLORIDE 10 MILLIGRAM(S): 5 TABLET ORAL at 01:14

## 2021-01-01 RX ADMIN — Medication 81 MILLIGRAM(S): at 10:07

## 2021-01-01 RX ADMIN — Medication 500 MILLIGRAM(S): at 14:15

## 2021-01-01 RX ADMIN — Medication 500 MILLIGRAM(S): at 06:18

## 2021-01-01 RX ADMIN — Medication 500 MILLIGRAM(S): at 05:47

## 2021-01-01 RX ADMIN — Medication 200 MILLIGRAM(S): at 13:32

## 2021-01-01 RX ADMIN — FAMOTIDINE 20 MILLIGRAM(S): 10 INJECTION INTRAVENOUS at 09:12

## 2021-01-01 RX ADMIN — ALBUTEROL 2 PUFF(S): 90 AEROSOL, METERED ORAL at 03:32

## 2021-01-01 RX ADMIN — ALBUTEROL 2 PUFF(S): 90 AEROSOL, METERED ORAL at 13:51

## 2021-01-01 RX ADMIN — MEROPENEM 100 MILLIGRAM(S): 1 INJECTION INTRAVENOUS at 06:01

## 2021-01-01 RX ADMIN — Medication 0.5 MILLIGRAM(S): at 05:54

## 2021-01-01 RX ADMIN — OXYCODONE HYDROCHLORIDE 10 MILLIGRAM(S): 5 TABLET ORAL at 00:00

## 2021-01-01 RX ADMIN — Medication 40 MILLIGRAM(S): at 06:18

## 2021-01-01 RX ADMIN — Medication 1 LOZENGE: at 21:49

## 2021-01-01 RX ADMIN — OXYCODONE HYDROCHLORIDE 10 MILLIGRAM(S): 5 TABLET ORAL at 19:32

## 2021-01-01 RX ADMIN — Medication 500 MILLIGRAM(S): at 21:37

## 2021-01-01 RX ADMIN — ALBUTEROL 2 PUFF(S): 90 AEROSOL, METERED ORAL at 08:46

## 2021-01-01 RX ADMIN — BUDESONIDE AND FORMOTEROL FUMARATE DIHYDRATE 2 PUFF(S): 160; 4.5 AEROSOL RESPIRATORY (INHALATION) at 10:37

## 2021-01-01 RX ADMIN — OXYCODONE HYDROCHLORIDE 10 MILLIGRAM(S): 5 TABLET ORAL at 05:16

## 2021-01-01 RX ADMIN — SODIUM CHLORIDE 2800 MILLILITER(S): 9 INJECTION INTRAMUSCULAR; INTRAVENOUS; SUBCUTANEOUS at 13:35

## 2021-01-01 RX ADMIN — DORZOLAMIDE HYDROCHLORIDE 1 DROP(S): 20 SOLUTION/ DROPS OPHTHALMIC at 06:01

## 2021-01-01 RX ADMIN — FAMOTIDINE 20 MILLIGRAM(S): 10 INJECTION INTRAVENOUS at 10:31

## 2021-01-01 RX ADMIN — Medication 2: at 17:32

## 2021-01-01 RX ADMIN — LINACLOTIDE 145 MICROGRAM(S): 145 CAPSULE, GELATIN COATED ORAL at 05:16

## 2021-01-01 RX ADMIN — MEROPENEM 100 MILLIGRAM(S): 1 INJECTION INTRAVENOUS at 14:14

## 2021-01-01 RX ADMIN — Medication 1: at 21:10

## 2021-01-01 RX ADMIN — Medication 500 MILLIGRAM(S): at 22:23

## 2021-01-01 RX ADMIN — Medication 81 MILLIGRAM(S): at 10:31

## 2021-01-01 RX ADMIN — BUDESONIDE AND FORMOTEROL FUMARATE DIHYDRATE 2 PUFF(S): 160; 4.5 AEROSOL RESPIRATORY (INHALATION) at 07:52

## 2021-01-01 RX ADMIN — OXYCODONE AND ACETAMINOPHEN 2 TABLET(S): 5; 325 TABLET ORAL at 20:57

## 2021-01-01 RX ADMIN — Medication 1: at 21:55

## 2021-01-01 RX ADMIN — AZITHROMYCIN 255 MILLIGRAM(S): 500 TABLET, FILM COATED ORAL at 00:01

## 2021-01-01 RX ADMIN — Medication 150 MILLIGRAM(S): at 10:32

## 2021-01-01 RX ADMIN — SIMVASTATIN 20 MILLIGRAM(S): 20 TABLET, FILM COATED ORAL at 21:49

## 2021-01-01 RX ADMIN — LATANOPROST 1 DROP(S): 0.05 SOLUTION/ DROPS OPHTHALMIC; TOPICAL at 22:21

## 2021-01-01 RX ADMIN — ARIPIPRAZOLE 2 MILLIGRAM(S): 15 TABLET ORAL at 09:36

## 2021-01-01 RX ADMIN — FAMOTIDINE 20 MILLIGRAM(S): 10 INJECTION INTRAVENOUS at 10:07

## 2021-01-06 ENCOUNTER — RX RENEWAL (OUTPATIENT)
Age: 81
End: 2021-01-06

## 2021-02-16 ENCOUNTER — APPOINTMENT (OUTPATIENT)
Dept: OTOLARYNGOLOGY | Facility: CLINIC | Age: 81
End: 2021-02-16
Payer: MEDICARE

## 2021-02-16 VITALS
BODY MASS INDEX: 25.21 KG/M2 | SYSTOLIC BLOOD PRESSURE: 150 MMHG | HEIGHT: 62 IN | TEMPERATURE: 97.1 F | HEART RATE: 105 BPM | DIASTOLIC BLOOD PRESSURE: 79 MMHG | WEIGHT: 137 LBS

## 2021-02-16 DIAGNOSIS — K14.0 GLOSSITIS: ICD-10-CM

## 2021-02-16 DIAGNOSIS — J44.9 CHRONIC OBSTRUCTIVE PULMONARY DISEASE, UNSPECIFIED: ICD-10-CM

## 2021-02-16 DIAGNOSIS — J04.0 ACUTE LARYNGITIS: ICD-10-CM

## 2021-02-16 DIAGNOSIS — J38.3 OTHER DISEASES OF VOCAL CORDS: ICD-10-CM

## 2021-02-16 PROCEDURE — 31575 DIAGNOSTIC LARYNGOSCOPY: CPT

## 2021-02-16 PROCEDURE — 99204 OFFICE O/P NEW MOD 45 MIN: CPT | Mod: 25

## 2021-02-16 NOTE — HISTORY OF PRESENT ILLNESS
[de-identified] : RECURRENT HORSNESS FOR LONG TIME\par HX OF COPD\par HEAVY SMOKING\par MEDICAL HX REVIEWED

## 2021-02-16 NOTE — ASSESSMENT
[FreeTextEntry1] : DYSPHONIA\par COPD\par GARGLING WITH SALT AND WATER\par HUMIDIFIER\par VIDEOSTROBOSCOPY\par CONTINUE CURRENT CARE FOR HER GLOSSITIS/ CANDIDIASIS\par F/U AFTER ABOVE

## 2021-02-16 NOTE — PHYSICAL EXAM
[FreeTextEntry1] : COPD FACE AND BREATHING PATTERN [Normal] : mucosa is normal [Midline] : trachea located in midline position [de-identified] : MILD GLOSSITIS

## 2021-02-16 NOTE — REVIEW OF SYSTEMS
[Seasonal Allergies] : seasonal allergies [Ear Pain] : ear pain [Ear Itch] : ear itch [Hearing Loss] : hearing loss [Dizziness] : dizziness [Vertigo] : vertigo [Lightheadedness] : lightheadedness [Sinus Pain] : sinus pain [Sinus Pressure] : sinus pressure [Eye Pain] : eye pain [Eyes Itch] : itching of the eyes [Anxiety] : anxiety [Depression] : depression [Easy Bruising] : a tendency for easy bruising [Patient Intake Form Reviewed] : Patient intake form was reviewed [Post Nasal Drip] : no post nasal drip [FreeTextEntry1] : headache,fatigue,daytime sleepiness

## 2021-02-19 ENCOUNTER — APPOINTMENT (OUTPATIENT)
Dept: RHEUMATOLOGY | Facility: CLINIC | Age: 81
End: 2021-02-19
Payer: MEDICARE

## 2021-02-19 PROCEDURE — 99442: CPT | Mod: 95

## 2021-02-19 RX ORDER — GABAPENTIN 100 MG/1
100 CAPSULE ORAL
Qty: 60 | Refills: 0 | Status: DISCONTINUED | COMMUNITY
Start: 2021-02-09

## 2021-02-19 RX ORDER — AZELASTINE HYDROCHLORIDE 137 UG/1
SPRAY, METERED NASAL
Refills: 0 | Status: DISCONTINUED | COMMUNITY

## 2021-02-19 RX ORDER — LUBIPROSTONE 24 UG/1
24 CAPSULE, GELATIN COATED ORAL
Qty: 50 | Refills: 0 | Status: DISCONTINUED | COMMUNITY
Start: 2020-12-01

## 2021-02-19 RX ORDER — TIOTROPIUM BROMIDE INHALATION SPRAY 3.12 UG/1
2.5 SPRAY, METERED RESPIRATORY (INHALATION)
Qty: 8 | Refills: 0 | Status: DISCONTINUED | COMMUNITY
Start: 2020-10-22

## 2021-02-19 RX ORDER — METFORMIN HYDROCHLORIDE 500 MG/1
500 TABLET, COATED ORAL
Refills: 0 | Status: DISCONTINUED | COMMUNITY

## 2021-02-19 RX ORDER — FEXOFENADINE HYDROCHLORIDE 180 MG/1
TABLET ORAL
Refills: 0 | Status: DISCONTINUED | COMMUNITY

## 2021-02-19 RX ORDER — LINACLOTIDE 72 UG/1
72 CAPSULE, GELATIN COATED ORAL
Qty: 30 | Refills: 0 | Status: DISCONTINUED | COMMUNITY
Start: 2020-08-16

## 2021-02-19 RX ORDER — CICLOPIROX 80 MG/ML
8 SOLUTION TOPICAL
Qty: 7 | Refills: 0 | Status: DISCONTINUED | COMMUNITY
Start: 2020-08-20

## 2021-02-19 RX ORDER — ARIPIPRAZOLE 10 MG/1
10 TABLET ORAL
Refills: 0 | Status: DISCONTINUED | COMMUNITY

## 2021-02-24 NOTE — H&P ADULT - NEGATIVE OPHTHALMOLOGIC SYMPTOMS
EMERGENCY DEPARTMENT HISTORY AND PHYSICAL EXAM      Date: 2/23/2021  Patient Name: Jatinder Desir. History of Presenting Illness     Chief Complaint   Patient presents with    Rib Pain    Hand Pain    Fall       History Provided By: Patient and Caregiver    HPI: Jatinder Desir., 81 y.o. male with a past medical history significant hypertension and Dementia, CKD, bradycardia presents to the ED with cc of Selene Chavira 2 days ago hurting left hand and left ribs as per care giver. Mechanism of fall unknown. Has pain and bruising of both sites as per caregiver. There are no other complaints, changes, or physical findings at this time. PCP: Tomy Ren MD    Current Outpatient Medications   Medication Sig Dispense Refill    fesoterodine (Toviaz) 4 mg SR tablet Take 1 Tab by mouth daily for 30 days. 30 Tab 0    ferrous sulfate 325 mg (65 mg iron) tablet TAKE ONE TABLET BY MOUTH EACH DAY WITH BREAKFAST. 30 Tab 2    simvastatin (ZOCOR) 40 mg tablet TAKE ONE TABLET BY MOUTH AT BEDTIME. 90 Tab 1    amLODIPine (NORVASC) 5 mg tablet Take 1 Tab by mouth daily for 60 days. 30 Tab 1    escitalopram oxalate (LEXAPRO) 20 mg tablet Take 1 Tab by mouth daily for 90 days. 30 Tab 2    traZODone (DESYREL) 50 mg tablet TAKE ONE TABLET BY MOUTH AT BEDTIME. 90 Tab 1    melatonin 10 mg TbER TAKE ONE TABLET BY MOUTH AT BEDTIME. 90 Tab 1    magnesium oxide (MAG-OX) 400 mg tablet TAKE ONE TABLET BY MOUTH DAILY 90 Tab 1    tamsulosin (FLOMAX) 0.4 mg capsule Take 1 Cap by mouth daily. 90 Cap 3    potassium chloride (K-DUR, KLOR-CON) 20 mEq tablet TAKE 1 TABLET BY MOUTH EACH MORNING WITH FOOD. 90 Tab 1    timolol (TIMOPTIC) 0.5 % ophthalmic solution INSTILL 1 DROPS INTO RIGHT EYE ONCE DAILY 5 mL 5    diclofenac (VOLTAREN) 1 % gel APPLY TWO GRAMS FOUR TIMES DAILY AS NEEDED FOR ELBOW PAIN 200 g 3    thiamine HCL (B-1) 100 mg tablet Take 1 Tab by mouth daily for 180 days.  90 Tab 1    folic acid (FOLVITE) 1 mg tablet Take 1 Tab by mouth daily for 180 days. 90 Tab 1    allopurinoL (ZYLOPRIM) 100 mg tablet TAKE 1 TABLET BY MOUTH EVERY DAY 90 Tab 2    terbinafine HCL (LAMISIL) 250 mg tablet Take 1 Tab by mouth daily. 90 Tab 0    ammonium lactate (AMLACTIN) 12 % topical cream Apply  to affected area two (2) times a day. rub in to affected area well 280 g 5    ferrous sulfate (IRON) 325 mg (65 mg iron) EC tablet TAKE ONE TABLET BY MOUTH EACH DAY WITH BREAKFAST. 80 Tab 1       Past History     Past Medical History:  Past Medical History:   Diagnosis Date    Adverse effect of anesthesia     nasal procedure didn't work-patient awake    Alcohol abuse     Anemia     Aortic regurgitation     Arthritis     Chronic kidney disease     CKD (chronic kidney disease), stage III 2011    D[facility] ([facility])     Depression     Episodic lightheadedness 07/28/2017    Episodic lightheadedness     Epistaxis 11/27/2016    Erectile dysfunction     Fall 02/11/2020    GERD (gastroesophageal reflux disease)     GI bleed     Gout     gout, osteoarthritis    Heart failure (HCC)     acute heart failure    Hemorrhoids     History of cardioversion 02/17/2017    Hypertension     Ill-defined condition     weakness    Multiple myeloma (HCC)     PAF (paroxysmal atrial fibrillation) ([facility] [address] 02/17/2016    eliquis    Pulmonary HTN ([facility] [address]     Right-sided epistaxis 11/27/2016    S/P AVR (aortic valve replacement) 07/26/2016    merrill gill md -     S/P cardiac cath 7/6/16    severe ai, normal coronaries     S/P colonoscopy 10-8-04    S/P colonoscopy with polypectomy 05/09/2016    Kar Pina MD    Skin lesion     Stroke [facility])        Past Surgical History:  Past Surgical History:   Procedure Laterality Date    HX HEENT      nasal procedure 20 years ago    HX KNEE REPLACEMENT Bilateral     bilateral knee replacement    HX ORTHOPAEDIC Bilateral     wrist    HX UROLOGICAL  01/05/2020    cystoscopy     [place] CARDIAC SURG PROCEDURE UNLIST cardiac cath   Lisandra Curl [place] CARDIAC SURG PROCEDURE UNLIST  07/26/2016    AVR       Family History:  Family History   Problem Relation Age of Onset    Anemia Mother     No Known Problems Father     Other Brother         drug abuse       Social History:  Social History     Tobacco Use    Smoking status: Former Smoker     Years: 20.00    Smokeless tobacco: Never Used   Substance Use Topics    Alcohol use: Not Currently     Frequency: Never     Binge frequency: Never    Drug use: Never       Allergies: Allergies   Allergen Reactions    Amiodarone Other (comments)     Bradycardia -excessive         Review of Systems     Review of Systems   Unable to perform ROS: Dementia (Caregiver )   Constitutional: Negative. HENT: Negative. Eyes: Negative. Respiratory: Negative. Cardiovascular: Negative. Gastrointestinal: Negative. Musculoskeletal:        Left hand pain   Neurological: Negative. All other systems reviewed and are negative. Physical Exam     Physical Exam  Vitals signs and nursing note reviewed. Constitutional:       Appearance: Normal appearance. Comments: Poor historian   HENT:      Head: Normocephalic and atraumatic. Eyes:      Pupils: Pupils are equal, round, and reactive to light. Neck:      Musculoskeletal: Normal range of motion and neck supple. Cardiovascular:      Rate and Rhythm: Normal rate and regular rhythm. Pulses: Normal pulses. Heart sounds: Normal heart sounds. Pulmonary:      Effort: Pulmonary effort is normal.      Breath sounds: Normal breath sounds. Abdominal:      General: Abdomen is flat. Palpations: Abdomen is soft. Musculoskeletal:         General: Swelling present. Comments: Abrasion with tender swelling right knee. NV intact   Neurological:      General: No focal deficit present. Mental Status: He is alert and oriented to person, place, and time.          Lab and Diagnostic Study Results     Labs -     Recent Results (from the past 12 hour(s))   URINALYSIS W/ RFLX MICROSCOPIC    Collection Time: 02/23/21 10:43 PM   Result Value Ref Range    Color Yellow      Appearance Hazy (A) Clear      Specific gravity 1.015 1.003 - 1.030      pH (UA) 5.0 5.0 - 8.0      Protein 30 (A) Negative mg/dL    Glucose Negative Negative mg/dL    Ketone Negative Negative mg/dL    Bilirubin Negative Negative      Blood Negative Negative      Urobilinogen 0.1 (L) 0.2 - 1.0 EU/dL    Nitrites Negative Negative      Leukocyte Esterase Negative Negative     URINE MICROSCOPIC    Collection Time: 02/23/21 10:43 PM   Result Value Ref Range    WBC 0-4 0 - 4 /hpf    RBC 0-5 0 - 5 /hpf    Epithelial cells Few Few /lpf    Bacteria 1+ (A) Negative /hpf   CBC WITH AUTOMATED DIFF    Collection Time: 02/23/21 10:45 PM   Result Value Ref Range    WBC 4.5 4.1 - 11.1 K/uL    RBC 3.10 (L) 4.10 - 5.70 M/uL    HGB 9.5 (L) 12.1 - 17.0 g/dL    HCT 30.5 (L) 36.6 - 50.3 %    MCV 98.4 80.0 - 99.0 FL    MCH 30.6 26.0 - 34.0 PG    MCHC 31.1 30.0 - 36.5 g/dL    RDW 17.9 (H) 11.5 - 14.5 %    PLATELET 936 (L) 771 - 400 K/uL    MPV 10.3 8.9 - 12.9 FL    NEUTROPHILS 81 (H) 32 - 75 %    LYMPHOCYTES 8 (L) 12 - 49 %    MONOCYTES 9 5 - 13 %    EOSINOPHILS 2 0 - 7 %    BASOPHILS 0 0 - 1 %    IMMATURE GRANULOCYTES 0 0.0 - 0.5 %    ABS. NEUTROPHILS 3.6 1.8 - 8.0 K/UL    ABS. LYMPHOCYTES 0.4 (L) 0.8 - 3.5 K/UL    ABS. MONOCYTES 0.4 0.0 - 1.0 K/UL    ABS. EOSINOPHILS 0.1 0.0 - 0.4 K/UL    ABS. BASOPHILS 0.0 0.0 - 0.1 K/UL    ABS. IMM.  GRANS. 0.0 0.00 - 0.04 K/UL    DF AUTOMATED     METABOLIC PANEL, BASIC    Collection Time: 02/23/21 10:45 PM   Result Value Ref Range    Sodium 144 136 - 145 mmol/L    Potassium 4.1 3.5 - 5.1 mmol/L    Chloride 107 97 - 108 mmol/L    CO2 28 21 - 32 mmol/L    Anion gap 9 5 - 15 mmol/L    Glucose 96 65 - 100 mg/dL    BUN 41 (H) 6 - 20 mg/dL    Creatinine 2.63 (H) 0.70 - 1.30 mg/dL    BUN/Creatinine ratio 16 12 - 20      GFR est AA 28 (L) >60 ml/min/1.73m2    GFR est non-AA 23 (L) >60 ml/min/1.73m2    Calcium 8.9 8.5 - 10.1 mg/dL   TSH 3RD GENERATION    Collection Time: 02/24/21 12:30 AM   Result Value Ref Range    TSH 1.04 0.36 - 3.74 uIU/mL   MAGNESIUM    Collection Time: 02/24/21 12:30 AM   Result Value Ref Range    Magnesium 2.2 1.6 - 2.4 mg/dL       Radiologic Studies -   [unfilled]  CT Results  (Last 48 hours)               02/23/21 2305  CT SPINE CERV WO CONT Final result    Impression:      No acute fracture. Dental and sinus disease. Follow-up as clinically indicated. Narrative:  CT cervical spine without contrast       Dose reduction: All CT scans at this facility are performed using dose reduction   optimization techniques as appropriate to a performed exam including the   following: Automated exposure control, adjustments of the mA and/or kV according   to patient size, or use of iterative reconstruction technique. Indication: Fall       COMPARISON: 8/1/2020       Findings:       No acute fracture is identified. Degenerative changes with central canal and   neural foraminal narrowing at multiple levels. Minimal retrolisthesis of C4 on   C5 again seen, probably degenerative. There is some bony demineralization. Atherosclerosis. Apparent deviation of the internal carotid medially to   posterior aspect of the pharynx again noted. Right pleural effusion. Periapical   infection/abscess involving left maxillary tooth. There may be additional dental   disease. There is some opacification of the visualized paranasal sinuses. Please   note that this is not the optimal study for evaluation of soft tissues or   contents of the spinal canal, including spinal cord. MRI is more sensitive in   this regard. 02/23/21 2301  CT HEAD WO CONT Final result    Impression:  1. No acute intracranial findings. 2. Atrophy and small vessel ischemic disease. Unchanged compared to previous. 3. Chronic pansinusitis. Narrative:  Fall.        Comparison head CT 12/15/2020. Technique: Axial images head without IV contrast, with multiplanar reformatting. Multiplanar reformatting. Dose reduction: All CT scans at this facility are performed using dose reduction   optimization techniques as appropriate to a performed exam including the   following: Automated exposure control, adjustments of the mA and/or kV according   to patient's size, or use of iterative reconstruction technique. Findings: Bilateral periventricular white matter hypodensity. . No mass effect,   extra-axial fluid collection or hemorrhage. Normal position craniocervical   junction. Atrophy. Included facial sinuses are opacified. Mastoid air cells are   unopacified. Calvarium intact. CXR Results  (Last 48 hours)    None          Medical Decision Making and ED Course   - I am the first and primary provider for this patient AND AM THE PRIMARY PROVIDER OF RECORD. - I reviewed the vital signs, available nursing notes, past medical history, past surgical history, family history and social history. - Initial assessment performed. The patients presenting problems have been discussed, and the staff are in agreement with the care plan formulated and outlined with them. I have encouraged them to ask questions as they arise throughout their visit. Vital Signs-Reviewed the patient's vital signs. Patient Vitals for the past 12 hrs:   Temp Pulse Resp BP SpO2   02/23/21 2117 97.4 °F (36.3 °C) (!) 55 16 (!) 177/92 100 %         Records Reviewed: Nursing Notes    The patient presents with     ED Course:              Provider Notes (Medical Decision Making):     MDM  Number of Diagnoses or Management Options     Amount and/or Complexity of Data Reviewed  Clinical lab tests: reviewed and ordered  Tests in the radiology section of CPT®: reviewed and ordered    Risk of Complications, Morbidity, and/or Mortality  General comments: EKG AFIB 59/min. Prolonged QT. NSSTT changes performed :19. Read :20  Discussed need for close follow up with caregiver who will take him to PCP to arrange follow up care               Consultations:       Consultations:         Procedures and Critical Care       Performed by: Kathie Duggan MD  PROCEDURES:  Procedures         HYPERTENSION COUNSELING: Education was provided to the patient today regarding their hypertension. Patient is made aware of their elevated blood pressure and is instructed to follow up this week with their Primary Care for a recheck. Patient is counseled regarding consequences of chronic, uncontrolled hypertension including kidney disease, heart disease, stroke or even death. Patient states their understanding and agrees to follow up this week. Additionally, during their visit, I discussed sodium restriction, maintaining ideal body weight and regular exercise program as physiologic means to achieve blood pressure control. The patient will strive towards this. Disposition     Disposition: DC- Adult Discharges: All of the diagnostic tests were reviewed and questions answered. Diagnosis, care plan and treatment options were discussed. The patient understands the instructions and will follow up as directed. The patients results have been reviewed with them. They have been counseled regarding their diagnosis. The patient verbally convey understanding and agreement of the signs, symptoms, diagnosis, treatment and prognosis and additionally agrees to follow up as recommended with their PCP in 24 - 48 hours. They also agree with the care-plan and convey that all of their questions have been answered. I have also put together some discharge instructions for them that include: 1) educational information regarding their diagnosis, 2) how to care for their diagnosis at home, as well a 3) list of reasons why they would want to return to the ED prior to their follow-up appointment, should their condition change.         Remove if not discharged  DISCHARGE PLAN:  1. Current Discharge Medication List      CONTINUE these medications which have NOT CHANGED    Details   fesoterodine (Toviaz) 4 mg SR tablet Take 1 Tab by mouth daily for 30 days. Qty: 30 Tab, Refills: 0    Associated Diagnoses: Nocturnal enuresis      ferrous sulfate 325 mg (65 mg iron) tablet TAKE ONE TABLET BY MOUTH EACH DAY WITH BREAKFAST. Qty: 30 Tab, Refills: 2      simvastatin (ZOCOR) 40 mg tablet TAKE ONE TABLET BY MOUTH AT BEDTIME. Qty: 90 Tab, Refills: 1      amLODIPine (NORVASC) 5 mg tablet Take 1 Tab by mouth daily for 60 days. Qty: 30 Tab, Refills: 1    Associated Diagnoses: Essential hypertension      escitalopram oxalate (LEXAPRO) 20 mg tablet Take 1 Tab by mouth daily for 90 days. 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 Qty: 90 Tab, Refills: 1    Associated Diagnoses: Insomnia, unspecified type      melatonin 10 mg TbER TAKE ONE TABLET BY MOUTH AT BEDTIME. Qty: 90 Tab, Refills: 1      magnesium oxide (MAG-OX) 400 mg tablet TAKE ONE TABLET BY MOUTH DAILY  Qty: 90 Tab, Refills: 1    Associated Diagnoses: Hypomagnesemia      tamsulosin (FLOMAX) 0.4 mg capsule Take 1 Cap by mouth daily. Qty: 90 Cap, Refills: 3      potassium chloride (K-DUR, KLOR-CON) 20 mEq tablet TAKE 1 TABLET BY MOUTH EACH MORNING WITH FOOD. Qty: 90 Tab, Refills: 1      timolol (TIMOPTIC) 0.5 % ophthalmic solution INSTILL 1 DROPS INTO RIGHT EYE ONCE DAILY  Qty: 5 mL, Refills: 5      diclofenac (VOLTAREN) 1 % gel APPLY TWO GRAMS FOUR TIMES DAILY AS NEEDED FOR ELBOW PAIN  Qty: 200 g, Refills: 3      thiamine HCL (B-1) 100 mg tablet Take 1 Tab by mouth daily for 180 days. Qty: 90 Tab, Refills: 1      folic acid (FOLVITE) 1 mg tablet Take 1 Tab by mouth daily for 180 days.   Qty: 90 Tab, Refills: 1      allopurinoL (ZYLOPRIM) 100 mg tablet TAKE 1 TABLET BY MOUTH EVERY DAY  Qty: 90 Tab, Refills: 2      terbinafine HCL (LAMISIL) 250 mg tablet Take 1 Tab by mouth daily. Qty: 90 Tab, Refills: 0    Associated Diagnoses: Onychomycosis      ammonium lactate (AMLACTIN) 12 % topical cream Apply  to affected area two (2) times a day. rub in to affected area well  Qty: 280 g, Refills: 5    Associated Diagnoses: Xerosis cutis      ferrous sulfate (IRON) 325 mg (65 mg iron) EC tablet TAKE ONE TABLET BY MOUTH EACH DAY WITH BREAKFAST. Qty: 90 Tab, Refills: 1           2. Follow-up Information     Follow up With Specialties Details Why [address], [place], [place] Internal Medicine In 1 day Needs reevaluation by Ortho, Mephrology, Cardiology [address] [facility] [facility]  [phone]          3. Return to ED if worse   4. Current Discharge Medication List          Diagnosis     Clinical Impression:   1. Closed nondisplaced fracture of distal phalanx of left middle finger, initial encounter    2. Contusion of right knee, initial encounter    3. CKD (chronic kidney disease) stage 4, GFR 15-29 ml/min (HCC)    4. Chronic pleural effusion    5. Longstanding persistent atrial fibrillation ([facility] [address]        Attestations:    Yosef Echevarria MD    Please note that this dictation was completed with [employer], the [employer] voice recognition software. Quite often unanticipated grammatical, syntax, homophones, and other interpretive errors are inadvertently transcribed by the computer software. Please disregard these errors. Please excuse any errors that have escaped final proofreading. Thank you. no diplopia/no blurred vision L/no blurred vision R

## 2021-03-12 ENCOUNTER — APPOINTMENT (OUTPATIENT)
Dept: OTOLARYNGOLOGY | Facility: CLINIC | Age: 81
End: 2021-03-12
Payer: MEDICARE

## 2021-03-12 DIAGNOSIS — K21.9 GASTRO-ESOPHAGEAL REFLUX DISEASE W/OUT ESOPHAGITIS: ICD-10-CM

## 2021-03-12 PROCEDURE — 31579 LARYNGOSCOPY TELESCOPIC: CPT

## 2021-03-12 PROCEDURE — 92524 BEHAVRAL QUALIT ANALYS VOICE: CPT | Mod: GN

## 2021-03-15 ENCOUNTER — RX RENEWAL (OUTPATIENT)
Age: 81
End: 2021-03-15

## 2021-03-18 ENCOUNTER — APPOINTMENT (OUTPATIENT)
Dept: OTOLARYNGOLOGY | Facility: CLINIC | Age: 81
End: 2021-03-18

## 2021-05-18 NOTE — ASSESSMENT
[FreeTextEntry1] : \par \par 80 year old female with a history of depression, COPD, HTN, and aortic valve replacement presents for f/u of RA.  Previously Dr. Loja's patient.  \par \par RA-\par . IN the past she had a sedimentation rate of 26 which is normal for her age, an BRADLEY of 1:80, with a weakly positive double-stranded DNA and negative rheumatoid serologies. Her muscle enzymes are slightly elevated too. The significance of these labs is unclear. At this time she does not fill criteria for lupus.\par \par Since she has features consistent with inflammatory arthritis affecting the second and third MCP\par She has erosive and active RA. \par -she was on xeljanz but we d/maria elena it\par - to continue SSZ at 1gram/500mg daily.  Advised her to try taking it 1gram in AM and PM as well.  \par - will hold off on new med use at this time \par - to consider PLQ use, though she is hesitant to start anything new.  \par \par Steroid use-\par to use prednisone prn \par Risks and benefits of steroids were d/w patient including but not limited to weight gain, diabetes, HTN, avascular necrosis, osteoporosis, glaucoma, cataract, infections and immunosuppression.\par \par Osteoporosis with fracture\par -tolerating Fosamax, but wants to try something less frequent.  Advised her to try q month ibandronate 150mg. \par -monitor Vit D\par -Followup 2-3 months\par -To call with any additional questions. \par \par \par Osteoarthritis-\par -the other issue is that she does have polyarticular osteoarthritis especially in her lower back\par -She states that she is not a candidate for NSAIDs but does not recall why\par -She is seeing pain management, she has tried epidurals, she is also using medical marijuana with some relief.\par \par f/u 3-4 months\par labs now

## 2021-05-18 NOTE — HISTORY OF PRESENT ILLNESS
[FreeTextEntry1] : \par F/u RA and Osteoporosis.  \par Forr the rheumatoid arthritis she continues to use sulfasalazine 1gram qAM/500mg qPm, unfortunately she continues to use prednisone, she is on 5 mg daily. She states that her joints do bother her but it is manageable. She worries about the long-term side effects of the medications and prefers to be on this regimen. She rates her pain as mild to moderate depending on the day and activity level.\par \par She had a recent fall, she fractured her spine, she has osteoporosis.  she preferred not to use the prolia and preferred the fosamax.  She is tolerating it well, but is wondering if she can take something less frequently.  \par

## 2021-06-05 NOTE — ED ADULT TRIAGE NOTE - CHIEF COMPLAINT QUOTE
States she took her BP at home and it was elevated. Took an extra dose of her HTN med tonight. No distress noted, VS stable at triage. Denies any CP/SOB. On Home 02, breathing even and non labored, no distress noted.

## 2021-06-05 NOTE — ED PROVIDER NOTE - ENMT, MLM
Airway patent, Nasal mucosa clear.Nasal cannula in place. Mouth with normal mucosa. Throat has no vesicles, no oropharyngeal exudates and uvula is midline.

## 2021-06-05 NOTE — ED ADULT NURSE NOTE - OBJECTIVE STATEMENT
pt presents to the ED with hypertension at home. pt denies headache, dizziness. denies chest pain, SOB. states that she called her PCP who didn't call her back so she came to the hospital.

## 2021-06-05 NOTE — ED PROVIDER NOTE - PATIENT PORTAL LINK FT
You can access the FollowMyHealth Patient Portal offered by [facility] by registering at the following website: [email or web address]. By joining [email or web address]’s FollowMyHealth portal, you will also be able to view your health information using other applications (apps) compatible with our system.

## 2021-06-05 NOTE — ED PROVIDER NOTE - CLINICAL SUMMARY MEDICAL DECISION MAKING FREE TEXT BOX
Elevated home blood pressure readings; asymptomatic.  In ED blood pressure is controlled.   Will discharge as patient has hx of HTN and has no symptoms and already took PM meds.

## 2021-06-05 NOTE — ED PROVIDER NOTE - OBJECTIVE STATEMENT
Pt. is a 79 yo F with a hx of COPD on home O2, CHF, HTN, anxiety, glaucoma, DM type 2 presenting with elevated blood pressure readings at home.  Patient states she took her blood pressure medication differently than she usually does today (last dose was verapamil 2 hours ago) and took her blood pressure at night which was elevated.  She tried calling her doctor who she couldn't reach and was worried the blood pressure would continue to get higher and higher.  Patient denies headache, dizziness, chest pain, shortness of breath, fever, trouble urinating, back pain. On arrival, blood pressure is controlled and at baseline. Patient still asymptomatic. PMD: Cy

## 2021-06-05 NOTE — ED PROVIDER NOTE - NSFOLLOWUPCLINICS_GEN_ALL_ED_FT
A Cardiologist  Cardiology  .  [place]   Phone:   Fax:     A Family Medicine Doctor  Family Medicine  .  [place]   Phone:   Fax:

## 2021-06-10 NOTE — ED ADULT NURSE NOTE - OBJECTIVE STATEMENT
Patient is a 80 year old female comes from home for chills for 6 weeks. Patient reports generalized weakness and fatigue. Patient was placed on a Zpack for COPD and finished today. Patient reports being on home O2. did not take any medication for fever

## 2021-06-10 NOTE — H&P ADULT - NSICDXFAMILYHX_GEN_ALL_CORE_FT
FAMILY HISTORY:  Family history of colon cancer in father, also prostate ca, skin ca,    Father  Still living? Unknown  FH: HTN (hypertension), Age at diagnosis: Age Unknown    Mother  Still living? Unknown  FH: HTN (hypertension), Age at diagnosis: Age Unknown    Aunt  Still living? Unknown  Family history of breast cancer, Age at diagnosis: Age Unknown

## 2021-06-10 NOTE — ED PROVIDER NOTE - PHYSICAL EXAMINATION
Constitutional: NAD AAOx3  Eyes: PERRLA EOMI  Head: Normocephalic atraumatic  Mouth: MMM  Cardiac: regular rate   Resp: +diffuse wheezing  GI: Abd soft, +TTP LLQ, no distension  Neuro: CN2-12 intact  Skin: No rashes Constitutional: mild distress AAOx3  Eyes: PERRLA EOMI  Head: Normocephalic atraumatic  Mouth: MMM  Cardiac:tachycardic  Resp: +diffuse wheezing  GI: Abd soft, +TTP LLQ, no distension  Neuro: CN2-12 intact  Skin: No rashes

## 2021-06-10 NOTE — ED ADULT NURSE REASSESSMENT NOTE - NS ED NURSE REASSESS COMMENT FT1
received pt from morning RN, PT is alert and oriented, lives at home by herself pt states she ambulates fine on her own, v/s are stable at this time. pt is requesting food says " I haven't eaten in hours."

## 2021-06-10 NOTE — H&P ADULT - ATTENDING COMMENTS
79 y/o F PMHx as noted above presents to  for further evaluation and management of subjective fevers, chills, increased cough x 6 weeks admitted for Sepsis due to PNA.     #Sepsis 2/2 to Right Lower Lobe PNA  ~admit to SICU  ~cont. IV fluids   ~strict I/Os  ~as noted above due to this patient's known antibiotic allergy history, patient was given Vancomycin 1g IVPB x1, started on Aztreonam 2g IVPB q8, and Azithromycin 500mg IVPB x 1  ~f/u w/ ID consultation   ~f/u PAN C+S  ~vitals as above   ~CCM consultation appreciated  ~as above will hold Furosemide 40mg po daily and Verapamil 360mg po daily for now; plea reassess in the am    #Acute on Chronic Diverticulitis  ~cont. IV abx as outlined above; Ciprofloxacin was held due to the patient's known allergy profile  ~cont. management as above     #COPD on home O2  ~cont. home medications as abovew - Not in acute exacerbation  - Continue albuterol inhaler q4h PRN,    #HFrEF  ~as above paient not in acute exacerbation.  ~as above cont. Spironolactone 12.5mg po daily  ~Lasix 40mg and verapamil 360mg held due to hypotension/sepsis    #Steroid induced Hyperglycemia  ~FS qAC/HS  ~cont. ISS per protocol    #GERD  ~cont. Pantoprazole 40mg po daily    #PVD  ~cont. ASA 81mg po daily    #Hyperlipidemia  ~cont. statin therapy    #Depression/Anxiety  ~cont. Abilify 2mg at bedtime  ~cont. Effexor 150mg daily  ~cont. Clonazepam 0.25mg BID    #Vte ppx  ~cont. Heparin q

## 2021-06-10 NOTE — H&P ADULT - ASSESSMENT
80F with PMH of COPD on home O2, systolic CHF (EF: 40%), RA, Lumbar stenosis, Chronic Diverticulitis, Meniere disease, GERD, TMJ, SIMÓN, Thrush, Skin CA, PVD, Hiatal Hernia, Breast CA s/p lumpectomy and radiation, HLD, Valvular heart disease presented to ED with complaint of fever, chills, increased cough x 6weeks and admitted for Sepsis due to PNA.     #ADMIT TO SICU    #Sepsis 2/2 to Right Lung PNA  - CXR reviewed as above  - Lactate wnl  - Fluid resuscitation as per sepsis criteria  - Due to antibiotic allergy history of pt, will put pt on Vancomycin 1G x1, Aztreonam 2g q8, Azithromycin 500mg QD.   - F/u ID consult   - F/u blood cultures  - Q4h vitals check  - Monitor clinically.     #Hypotension likely due to underlying infection  - S/p ICU evaluation  - Pt improved with ~4L NS  - Bp borderline at the moment  - Will hold on further fluid in the setting of CHF  - Hold Lasix 40mg and verapamil 360mg. Reassess in morning need to resume.   - Monitor fluid status  - Not requiring pressors at this moment  - Q4h vital check    #Acute on Chronic Diverticulitis  - S/p Cipro and flagyl in ED  - Will continue pt on Azithromycin 500mg daily and Flagyl 500mg q8h  - GI consult  - Monitor clinically    #COPD on home O2  - Not in acute exacerbation  - Continue albuterol inhaler q4h PRN, Spiriva daily, Symbicort BID and Prednisone 5mg daily  - Continue mucinex BID for cough  - Continue oxygen supplement. Re-evaluate need during discharge.  - Monitor clinically    #HFrEF  - EF of 40% on echo done April 2019  - Not in acute exacerbation currently so will hold off on repeat echo  - Lasix 40mg and verapamil 360mg due to hypotension related to sepsis/infection. Reassess in morning for need to resume.   - Continue spironolactone 12.5mg daily  - Monitor clinically     #RA  - Continue ibandronate 15omg daily    #Lumbar stenosis  - Continue Percocet 10/325mg at bedtime and PRN daily    #GERD  - Continue protonix 40mg before breakfast    #Chronic Thrush  - Continue clotrimazole 10mg oral lozenge    #SIMÓN  - On home O2  - Continue oxygen supplement    #PVD  - Continue Asa 81mg    #Valvular heart disease  - Mitral and Aortic    #HLD  - Continue Simvastatin     #Depression/Anxiety  - Denies suicidal or  homicidal ideation  - Continue Abilify 2mg at bedtime  - Continue Effexor 150mg daily  - Continue Clonazepam 0.25mg BID    #AD  - Full code    #DVT Ppx  - hep sq    IMPROVE VTE Individual Risk Assessment    RISK                                                                Points    [  ] Previous VTE                                                  3  [  ] Thrombophilia                                               2  [  ] Lower limb paralysis                                      2        (unable to hold up >15 seconds)    [  ] Current Cancer                                              2         (within 6 months)  [  ] Immobilization > 24 hrs                                1  [  ] ICU/CCU stay > 24 hours                              1  [x  ] Age > 60                                                      1    IMPROVE VTE Score __1_______    IMPROVE Score 0-1: Low Risk, No VTE prophylaxis required for most patients, encourage ambulation.   IMPROVE Score 2-3: At risk, pharmacologic VTE prophylaxis is indicated for most patients (in the absence of a contraindication)  IMPROVE Score > or = 4: High Risk, pharmacologic VTE prophylaxis is indicated for most patients (in the absence of a contraindication) 80F with PMH of COPD on home O2, systolic CHF (EF: 40%), RA, Lumbar stenosis, Chronic Diverticulitis, Meniere disease, GERD, TMJ, SIMÓN, Thrush, Skin CA, PVD, Hiatal Hernia, Breast CA s/p lumpectomy and radiation, HLD, Valvular heart disease presented to ED with complaint of fever, chills, increased cough x 6weeks and admitted for Sepsis due to PNA.     #ADMIT TO SICU    #Sepsis 2/2 to Right Lung PNA  - CXR reviewed as above  - Lactate wnl  - Fluid resuscitation as per sepsis criteria  - Due to antibiotic allergy history of pt, will put pt on Vancomycin 1G x1, Aztreonam 2g q8, Azithromycin 500mg QD.   - F/u ID consult   - F/u blood cultures  - Q2h vitals check  - Monitor clinically.     #Hypotension likely due to underlying infection  - S/p ICU evaluation  - Pt improved with ~4L NS  - Bp borderline at the moment  - Will hold on further fluid in the setting of CHF  - Hold Lasix 40mg and verapamil 360mg. Reassess in morning need to resume.   - Monitor fluid status  - Not requiring pressors at this moment  - Q2h vital check    #Acute on Chronic Diverticulitis  - S/p Cipro and flagyl in ED  - Will continue pt on Azithromycin 500mg daily and Flagyl 500mg q8h  - GI consult  - Monitor clinically    #COPD on home O2  - Not in acute exacerbation  - Continue albuterol inhaler q4h PRN, Spiriva daily, Symbicort BID and Prednisone 5mg daily  - Continue mucinex BID for cough  - Continue oxygen supplement. Re-evaluate need during discharge.  - Monitor clinically    #HFrEF  - EF of 40% on echo done April 2019  - Not in acute exacerbation currently so will hold off on repeat echo  - Lasix 40mg and verapamil 360mg due to hypotension related to sepsis/infection. Reassess in morning for need to resume.   - Continue spironolactone 12.5mg daily  - Monitor clinically     #Steroid induced Hyperglycemia  - Pt on metformin at home. Will hold oral hyperglycemics  - BGM before meals and at bedtime  - ISS  - Monitor     #RA  - Continue ibandronate 15omg daily    #Lumbar stenosis  - Continue Percocet 10/325mg at bedtime and PRN daily    #GERD  - Continue protonix 40mg before breakfast    #Chronic Thrush  - Continue clotrimazole 10mg oral lozenge    #SIMÓN  - On home O2  - Continue oxygen supplement    #PVD  - Continue Asa 81mg    #Valvular heart disease  - Mitral and Aortic    #HLD  - Continue Simvastatin     #Depression/Anxiety  - Denies suicidal or  homicidal ideation  - Continue Abilify 2mg at bedtime  - Continue Effexor 150mg daily  - Continue Clonazepam 0.25mg BID    #AD  - Full code    #DVT Ppx  - hep sq    IMPROVE VTE Individual Risk Assessment    RISK                                                                Points    [  ] Previous VTE                                                  3  [  ] Thrombophilia                                               2  [  ] Lower limb paralysis                                      2        (unable to hold up >15 seconds)    [  ] Current Cancer                                              2         (within 6 months)  [  ] Immobilization > 24 hrs                                1  [  ] ICU/CCU stay > 24 hours                              1  [x  ] Age > 60                                                      1    IMPROVE VTE Score __1_______    IMPROVE Score 0-1: Low Risk, No VTE prophylaxis required for most patients, encourage ambulation.   IMPROVE Score 2-3: At risk, pharmacologic VTE prophylaxis is indicated for most patients (in the absence of a contraindication)  IMPROVE Score > or = 4: High Risk, pharmacologic VTE prophylaxis is indicated for most patients (in the absence of a contraindication)

## 2021-06-10 NOTE — ED PROVIDER NOTE - OBJECTIVE STATEMENT
79 y/o female with PMHx of COPD, CHF, RA, stenosis, Diverticulitis, alcoholism (last drink 23 years ago), Meniere disease, GERD, TMJ, SIMÓN, Thrush, Skin CA, Diverticulitis, PVD, Hiatal Hernia, Breast CA, s/p TIA, HLD, Valvular heart disease presents to the ED c/o chills x 6 weeks. Pt also reports generalized malaise. Pt reports abd pain today with no n/v. Pt reports history of diverticulitis and spinal stenosis, reports recent non-compliance with medications. Pt states she was placed on Z-pack for COPD, last dose today. Pt states she is on home O2. Pt states she did not take any medication for fever today. Denies n/v. No other complaints at this time.

## 2021-06-10 NOTE — H&P ADULT - NSICDXPASTMEDICALHX_GEN_ALL_CORE_FT
PAST MEDICAL HISTORY:  Alcoholism last drink 1988    Anxiety     Aortic valve replaced with bovine heart valve    Borderline diabetes no meds    Breast cancer right s/p lumpectomy and radiation 2008    CHF (congestive heart failure)     COPD (chronic obstructive pulmonary disease) diagnosed 2009  inhaler and nebulizer    Depression     Diabetes mellitus     Diverticulitis hospitalized 2013    Dry eyes     Emphysema lung     GERD (gastroesophageal reflux disease)     Glaucoma     Hiatal hernia s/p surgical repair 2005    HTN (hypertension)     Hyperlipidemia     Incontinence     LBBB (left bundle branch block)     Meniere disease     SIMÓN (obstructive sleep apnea) category I severe pt does not use c/pap    PVD (peripheral vascular disease) left iliac  s/p surgical intervention 7-2013  unsuccessful    RA (rheumatoid arthritis) diagnosed 2012  oxycodone prn  neck/ lower back    Skin cancer not melanoma    Spinal stenosis     Thrush treated x 4 days  1 month ago  restarted medication  3-19-14 clortramazole 5x day    TIA (transient ischemic attack) 2010    TMJ (temporomandibular joint disorder)     Valvular heart disease aortic and mitral

## 2021-06-10 NOTE — ED ADULT NURSE REASSESSMENT NOTE - NS ED NURSE REASSESS COMMENT FT1
Hold levophed per Md Hamilton at this time. awaiting ICU consult. Patient resting in bed no complaints,AxOx4.

## 2021-06-10 NOTE — H&P ADULT - NSHPREVIEWOFSYSTEMS_GEN_ALL_CORE
CONSTITUTIONAL: +weakness, fevers or chills  EYES/ENT: No visual changes;  No vertigo or throat pain   NECK: No pain or stiffness  RESPIRATORY: + cough; + shortness of breath  CARDIOVASCULAR: No chest pain or palpitations  GASTROINTESTINAL: + abdominal pain. No nausea, vomiting, or hematemesis; No diarrhea or constipation. No melena or hematochezia.  GENITOURINARY: No dysuria, frequency or hematuria  NEUROLOGICAL: No numbness or weakness  SKIN: No itching, burning, rashes, or lesions

## 2021-06-10 NOTE — CONSULT NOTE ADULT - ASSESSMENT
ASSESSMENT   1. severe sepsis   2. Acute Diverticulitis   3. R Lobar PNA     PLAN   -pt not requiring pressors at this time   -Continue IVF hydration and ABX   -Lactate normal        CODE STATUS: Full Code      Care discussed with bedside provider     Time Spent: 35 min  ASSESSMENT   1. severe sepsis   2. Acute Diverticulitis   3. R Lobar PNA     PLAN   -pt not requiring pressors at this time -90  -Continue IVF hydration and BS ABX   -Lactate normal   -WBC normal at this time   -tylenol for fever      CODE STATUS: Full Code      Care discussed with bedside provider     Time Spent: 35 min

## 2021-06-10 NOTE — ED PROVIDER NOTE - CARE PLAN
Principal Discharge DX:	Sepsis   Principal Discharge DX:	Sepsis  Secondary Diagnosis:	Diverticulitis

## 2021-06-10 NOTE — ED PROVIDER NOTE - PROGRESS NOTE DETAILS
pt persistently hypotensive after 31cc/kg bolus of fluids. starting pressors. ICU consulted and will see patient shortly. pending CT. Edward Ruiz M.D., Attending Physician S/O at shift change -- elderly female with fever to 105, hypotension, weakness/malaise.  CXR with ?small infiltrate (doubt source of fever), some LLQ pain (hx tics).  Rec'd fluids 31 cc/kg with recurrent hypotension and ICU consulted.  Repeat -105, pressors on hold. Feels "much better".  UA not yet obtained.  Will order f/u CT reading, order add'l fluids, straight cath UA; ICU consult pending.  If recurrent/persistent hypotension, would start periph pressors.  Plan discussed with patient and daughter at bedside. SBP holding 100+. Eval by ICU and not presently a candidate for ICU level care. Will admit Medicine, ICU will follow as well.  CT with diverticulitis, no abscess/perf.  UA pending.

## 2021-06-10 NOTE — CONSULT NOTE ADULT - SUBJECTIVE AND OBJECTIVE BOX
CC:  Patient is a 80y old  Female who presents with a chief complaint of fever fatigue chills and sweats     HPI/BRIEF HOSPITAL COURSE:   79 y/o female with PMHx of COPD, CHF, RA, stenosis, Diverticulitis, alcoholism (last drink 23 years ago), Meniere disease, GERD, TMJ, SIMÓN, Thrush, Skin CA, Diverticulitis, PVD, Hiatal Hernia, Breast CA, s/p TIA, HLD, Valvular heart disease presents to the ED c/o chills x 6 weeks. Pt also reports generalized malaise. Pt reports abd pain today with no n/v. Pt reports history of diverticulitis and spinal stenosis, reports recent non-compliance with medications. Pt states she was placed on Z-pack for COPD, last dose today. Pt states she is on home O2    ICU c/s called for hypotension after sepsis fluid received 30 cc/kg. pt was examined and assessed at the bedside and was noted to be in NAD, awake alert and oriented. Able to provide a reliable ROS. At bedside pt was noted to have SBP in 100's with MAP >67. Pt with belly distention, but soft.     CT abdomen with, "Acute diverticulitis involving the mid sigmoid colon. No evidence of diverticular abscess.   Cardiomegaly and CHF at the lung bases."   CXR with R lobar PNA"     Pt meeting sepsis criteria with source, ordered for ABX and fluids. No indication for pressors at this time, pt maintaining adequate MAP with fluid resuscitation  Pt can be admitted to step down services at this time      PAST MEDICAL & SURGICAL HISTORY:  Valvular heart disease  aortic and mitral    Hyperlipidemia    TIA (transient ischemic attack)  2010    Breast cancer  right s/p lumpectomy and radiation     Hiatal hernia  s/p surgical repair     PVD (peripheral vascular disease)  left iliac  s/p surgical intervention -  unsuccessful    HTN (hypertension)    COPD (chronic obstructive pulmonary disease)  diagnosed   inhaler and nebulizer    Borderline diabetes  no meds    Depression    RA (rheumatoid arthritis)  diagnosed   oxycodone prn  neck/ lower back    Anxiety    Thrush  treated x 4 days  1 month ago  restarted medication  3-19-14 clortramazole 5x day    SIMÓN (obstructive sleep apnea)  category I severe pt does not use c/pap    Diverticulitis  hospitalized     TMJ (temporomandibular joint disorder)    LBBB (left bundle branch block)    CHF (congestive heart failure)    Spinal stenosis    Emphysema lung    GERD (gastroesophageal reflux disease)    Dry eyes    Glaucoma    Diabetes mellitus    Meniere disease    Aortic valve replaced  with bovine heart valve    Skin cancer  not melanoma    Alcoholism  last drink     Incontinence    S/P  section  x2 1965/     S/P appendectomy      S/P rotator cuff surgery  left 2004    S/P hernia surgery  left inguinal age 12    Hiatal hernia  s/p surgical repair per pt 2005    S/P lumpectomy, right breast  2008    S/P carpal tunnel release  right 2002    PVD (peripheral vascular disease)  s/p left iliac bypass which was unsuccessful - open repair    S/P AVR (aortic valve replacement)  about 5 yrs ago (2013?)    H/O sinus surgery    H/O:  section  2x      Allergies    penicillins (Hives)  Plavix (Short breath)  quinolines (Other)    Intolerances      FAMILY HISTORY:  Family history of colon cancer in father  also prostate ca, skin ca,    Family history of breast cancer (Aunt)  aunt        Review of Systems:  CONSTITUTIONAL: +fever, +chills, + fatigue   EYES: No  visual disturbances  ENMT:  ; No sinus or throat pain  RESPIRATORY: No cough, wheezing, chills or hemoptysis; No shortness of breath  CARDIOVASCULAR: No chest pain, palpitations, dizziness, or leg swelling  GASTROINTESTINAL: No abdominal  pain. No nausea, vomiting, or hematemesis; No diarrhea +constipation  GENITOURINARY: No dysuria, frequency, hematuria, or incontinence  NEUROLOGICAL: + headaches, loss of strength, numbness, or tremors  SKIN: No itching, burning, rashes, or lesions   MUSCULOSKELETAL: No muscle, back, or extremity pain  PSYCHIATRIC: No  difficulty sleeping      Medications:    norepinephrine Infusion 0.05 MICROgram(s)/kG/Min IV Continuous <Continuous>                                ICU Vital Signs Last 24 Hrs  T(C): 37.8 (10 Duran 2021 18:16), Max: 40.7 (10 Duran 2021 13:10)  T(F): 100.1 (10 Duran 2021 18:16), Max: 105.3 (10 Duran 2021 13:10)  HR: 97 (10 Duran 2021 17:45) (96 - 126)  BP: 96/51 (10 Duran 2021 17:45) (74/39 - 141/62)  BP(mean): 62 (10 Duran 2021 17:45) (53 - 67)  ABP: --  ABP(mean): --  RR: 22 (10 Duran 2021 17:45) (16 - 24)  SpO2: 96% (10 Duran 2021 17:45) (95% - 99%)    Vital Signs Last 24 Hrs  T(C): 37.8 (10 Duran 2021 18:16), Max: 40.7 (10 Duran 2021 13:10)  T(F): 100.1 (10 Duran 2021 18:16), Max: 105.3 (10 Duran 2021 13:10)  HR: 97 (10 Duran 2021 17:45) (96 - 126)  BP: 96/51 (10 Duran 2021 17:45) (74/39 - 141/62)  BP(mean): 62 (10 Duran 2021 17:45) (53 - 67)  RR: 22 (10 Duran 2021 17:45) (16 - 24)  SpO2: 96% (10 Duran 2021 17:45) (95% - 99%)        I&O's Detail    10 Duran 2021 07:01  -  10 Duran 2021 18:29  --------------------------------------------------------  IN:  Total IN: 0 mL    OUT:    Voided (mL): 300 mL  Total OUT: 300 mL    Total NET: -300 mL            LABS:                        12.8   7.75  )-----------( 200      ( 10 Duran 2021 13:22 )             39.9     06-10    132<L>  |  99  |  13  ----------------------------<  137<H>  3.7   |  27  |  0.66    Ca    8.6      10 Duran 2021 13:22    TPro  7.8  /  Alb  3.9  /  TBili  1.0  /  DBili  x   /  AST  30  /  ALT  46  /  AlkPhos  72  06-10          CAPILLARY BLOOD GLUCOSE        PT/INR - ( 10 Duran 2021 13:22 )   PT: 15.8 sec;   INR: 1.37 ratio         PTT - ( 10 Duran 2021 13:22 )  PTT:27.0 sec  Urinalysis Basic - ( 10 Duran 2021 17:07 )    Color: Yellow / Appearance: Clear / S.015 / pH: x  Gluc: x / Ketone: Negative  / Bili: Negative / Urobili: Negative mg/dL   Blood: x / Protein: 30 mg/dL / Nitrite: Negative   Leuk Esterase: Trace / RBC: 0-2 /HPF / WBC 0-2   Sq Epi: x / Non Sq Epi: Occasional / Bacteria: Occasional      CULTURES:        Physical Examination:  General: No acute distress.  Alert, oriented, interactive, nonfocal  NEURO: A&O X3, motor function 5/5 BL UE/LE  HEENT: Pupils equal, reactive to light.  Symmetric. + sternal scar   PULM: CTA BL, no significant sputum production, dimineshed at bases   CVS: Regular rate and rhythm, no murmurs, rubs, or gallops  ABD: soft, +distended, nontender, normoactive bowel sounds, no masses  EXT: No edema, nontender  SKIN: Warm and well perfused, no rashes noted      EKG: NSR     RADIOLOGY:   < from: CT Abdomen and Pelvis w/ IV Cont (06.10.21 @ 16:34) >  IMPRESSION:  *  Acute diverticulitis involving the mid sigmoid colon. No evidence of diverticular abscess.  *  Cardiomegaly and CHF at the lung bases.      < end of copied text >

## 2021-06-10 NOTE — ED PROVIDER NOTE - NS ED ROS FT
Constitutional: +fevers, +chills, +generalized weakness  Eyes: No visual changes  HEENT: No throat pain  CV: No chest pain  Resp: No SOB no cough  GI: no nausea or vomiting, +abd pain  : No dysuria  MSK: No musculoskeletal pain  Skin: No rash  Neuro: No headache

## 2021-06-10 NOTE — H&P ADULT - HISTORY OF PRESENT ILLNESS
80F with PMH of COPD on home O2, systolic CHF (EF: 40%), RA, Lumbar stenosis, Chronic Diverticulitis, Meniere disease, GERD, TMJ, SIMÓN, Thrush, Skin CA, PVD, Hiatal Hernia, Breast CA, HLD, Valvular heart disease presented to ED with complaint of fever, chills, increased cough x 6weeks. Cough is productive of clear sputum. Reports symptoms initially were intermittent and but became constant. Reports associated mild abdominal pain. States abdominal pain is nonradiating, intermittent. Also reports associated progressive generalized weakness which acutely worsened today, she was unable to ambulate. Pt has not seen a physician about symptoms. She took azithromycin prescribed by her PCP with no improvement. Denies CP, SOB, headaches, N/V.   In ED, pt met sepsis criteria, and pt is s/p cipro, flagyl and ~4L NS. Pt was hypotensive after fluid resuscitation. Pressors was ordered. Pt was evaluated by ICU. Bp improved with fluids.

## 2021-06-10 NOTE — ED PROVIDER NOTE - NS_ ATTENDINGSCRIBEDETAILS _ED_A_ED_FT
I, Edward Ruiz MD,  performed the initial face to face bedside interview with this patient regarding history of present illness, review of symptoms and relevant past medical, social and family history.  I completed an independent physical examination.  I was the initial provider who evaluated this patient.  The history, relevant review of systems, past medical and surgical history, medical decision making, and physical examination was documented by the scribe in my presence and I attest to the accuracy of the documentation.

## 2021-06-10 NOTE — H&P ADULT - NSHPPHYSICALEXAM_GEN_ALL_CORE
Vital Signs Last 24 Hrs  T(C): 37.6 (10 Duran 2021 19:45), Max: 40.7 (10 Duran 2021 13:10)  T(F): 99.6 (10 Duran 2021 19:45), Max: 105.3 (10 Duran 2021 13:10)  HR: 94 (10 Duran 2021 19:45) (94 - 126)  BP: 97/61 (10 Duran 2021 19:45) (74/39 - 141/62)  BP(mean): 70 (10 Duran 2021 19:45) (53 - 70)  RR: 18 (10 Duran 2021 19:45) (16 - 24)  SpO2: 98% (10 Duran 2021 19:45) (95% - 99%)    PHYSICAL EXAM:  Constitutional: NAD, awake and alert, well-developed  HEENT: PERR, EOMI, Normal Hearing, MMM  Neck: Soft and supple, No LAD, No JVD  Respiratory: Decrease lung sounds at lung base.   Cardiovascular: S1 and S2, regular rate and rhythm  Gastrointestinal: Bowel Sounds present, soft, + diffuse tenderness, nondistended, no guarding, no rebound  Extremities: No peripheral edema  Vascular: 2+ peripheral pulses  Neurological: A/O x 3, no focal deficits  Musculoskeletal: 5/5 strength b/l upper and lower extremities  Skin: Ecchymosis on upper extremities.

## 2021-06-10 NOTE — ED ADULT NURSE NOTE - NSIMPLEMENTINTERV_GEN_ALL_ED
Implemented All Fall with Harm Risk Interventions:  [place] to call system. Call bell, personal items and telephone within reach. Instruct patient to call for assistance. Room bathroom lighting operational. Non-slip footwear when patient is off stretcher. Physically safe environment: no spills, clutter or unnecessary equipment. Stretcher in lowest position, wheels locked, appropriate side rails in place. Provide visual cue, wrist band, yellow gown, etc. Monitor gait and stability. Monitor for mental status changes and reorient to person, place, and time. Review medications for side effects contributing to fall risk. Reinforce activity limits and safety measures with patient and family. Provide visual clues: red socks.

## 2021-06-11 NOTE — DIETITIAN INITIAL EVALUATION ADULT. - PERTINENT LABORATORY DATA
06-11    134<L>  |  107  |  8   ----------------------------<  195<H>  3.8   |  21<L>  |  0.62    Ca    7.7<L>      11 Jun 2021 06:56    TPro  7.8  /  Alb  3.9  /  TBili  1.0  /  DBili  x   /  AST  30  /  ALT  46  /  AlkPhos  72  06-10  BMI: BMI (kg/m2): 24.7 (06-11-21 @ 10:42)  HbA1c: A1C with Estimated Average Glucose Result: 5.0 % (06-11-21 @ 06:56)    Glucose: POCT Blood Glucose.: 153 mg/dL (06-11-21 @ 14:07)

## 2021-06-11 NOTE — DIETITIAN INITIAL EVALUATION ADULT. - OTHER INFO
79yo female with PMH significant for COPD on home O2, CHF p/w fever, chills, cough, and abd pain.  imaging revealed a RML infiltrate and diverticulitis.  Pt with PNA and likely CHF exacerbation.  Pt on continuous bipap.

## 2021-06-11 NOTE — CONSULT NOTE ADULT - SUBJECTIVE AND OBJECTIVE BOX
HPI:  80F with PMH of COPD on home O2, systolic CHF (EF: 40%), RA, Lumbar stenosis, Chronic Diverticulitis, Meniere disease, GERD, TMJ, SIMÓN, Thrush, Skin CA, PVD, Hiatal Hernia, Breast CA, HLD, Valvular heart disease presented to ED with complaint of fever, chills, increased cough x 6weeks. Cough is productive of clear sputum. Reports symptoms initially were intermittent and but became constant. Reports associated mild abdominal pain. States abdominal pain is nonradiating, intermittent. Also reports associated progressive generalized weakness which acutely worsened today, she was unable to ambulate. Pt has not seen a physician about symptoms. She took azithromycin prescribed by her PCP with no improvement. Denies CP, SOB, headaches, N/V.   In ED, pt met sepsis criteria, and pt is s/p cipro, flagyl and ~4L NS. Pt was hypotensive after fluid resuscitation. Pressors was ordered. Pt was evaluated by ICU. Bp improved with fluids.  (10 Duran 2021 22:47)  -------------------------  CT did reveal uncomplicated sigmoid diverticulitis    PAST MEDICAL & SURGICAL HISTORY:  Valvular heart disease  aortic and mitral    Hyperlipidemia    TIA (transient ischemic attack)  2010    Breast cancer  right s/p lumpectomy and radiation     Hiatal hernia  s/p surgical repair     PVD (peripheral vascular disease)  left iliac  s/p surgical intervention   unsuccessful    HTN (hypertension)    COPD (chronic obstructive pulmonary disease)  diagnosed   inhaler and nebulizer    Borderline diabetes  no meds    Depression    RA (rheumatoid arthritis)  diagnosed   oxycodone prn  neck/ lower back    Anxiety    Thrush  treated x 4 days  1 month ago  restarted medication  3-19-14 clortramazole 5x day    SIMÓN (obstructive sleep apnea)  category I severe pt does not use c/pap    Diverticulitis  hospitalized     TMJ (temporomandibular joint disorder)    LBBB (left bundle branch block)    CHF (congestive heart failure)    Spinal stenosis    Emphysema lung    GERD (gastroesophageal reflux disease)    Dry eyes    Glaucoma    Diabetes mellitus    Meniere disease    Aortic valve replaced  with bovine heart valve    Skin cancer  not melanoma    Alcoholism  last drink     Incontinence    S/P  section  x2 1965/     S/P appendectomy  2001    S/P rotator cuff surgery  left 2004    S/P hernia surgery  left inguinal age 12    Hiatal hernia  s/p surgical repair per pt     S/P lumpectomy, right breast  2008    S/P carpal tunnel release  right     PVD (peripheral vascular disease)  s/p left iliac bypass which was unsuccessful - open repair    S/P AVR (aortic valve replacement)  about 5 yrs ago (2013?)    H/O sinus surgery    H/O:  section  2x        Home Medications:  Abilify 2 mg oral tablet: 1 tab(s) orally once a day (at bedtime) (2021 15:12)  aspirin 81 mg oral tablet: 1 tab(s) orally once a day HOME/  follow preop instructions as per surgeon and cardiologist (2021 15:12)  azelastine 137 mcg/inh (0.1%) nasal spray: 2 spray(s) nasal 2 times a day (2021 15:12)  azithromycin 250 mg oral tablet: 1 tab(s) orally once a month (2021 15:12)  Azopt 1% ophthalmic suspension: 1 drop(s) to each affected eye 3 times a day (2021 15:12)  budesonide 0.5 mg/2 mL inhalation suspension: 1 dose(s) inhaled once a day (2021 15:12)  clobetasol 0.05% topical cream: Apply topically to affected area once a day  ****Scalp*** (2021 15:12)  clonazePAM 0.5 mg oral tablet: 0.5 tab(s) orally 2 times a day, As Needed (2021 15:12)  clotrimazole 10 mg oral lozenge: 1 lozenge orally once a day (at bedtime) (2021 15:12)  Coricidin 325 mg-2 mg oral tablet: 1 tab(s) orally once a day (at bedtime) (2021 15:12)  Dramamine 50 mg oral tablet, chewable: 1 tab(s) orally once a day, As Needed (2021 15:12)  DuoNeb 0.5 mg-2.5 mg/3 mL inhalation solution: 3 milliliter(s) inhaled once a day (at bedtime) (2021 15:12)  Effexor  mg oral capsule, extended release: 1 cap(s) orally once a day (2021 15:12)  famotidine 20 mg oral tablet: 1 tab(s) orally once a day (at bedtime) (2021 15:12)  furosemide 40 mg oral tablet: 1 tab(s) orally once a day, As Needed - for swelling (2021 15:12)  furosemide 40 mg oral tablet: 1 tab(s) orally once a day (2021 15:12)  ibandronate 150 mg oral tablet: 1 tab(s) orally once a month (2021 15:12)  Linzess 145 mcg oral capsule: 1 cap(s) orally once a day (2021 15:12)  lubiprostone 24 mcg oral capsule: 1 cap(s) orally 2 times a day (2021 15:12)  Lumigan 0.01% ophthalmic solution: 1 drop(s) to each affected eye once a day (in the evening) (2021 15:12)  metFORMIN 500 mg oral tablet: 1 tab(s) orally once a day (2021 15:12)  Mucinex 600 mg oral tablet, extended release: 1 tab(s) orally every 12 hours (2021 15:12)  ocular lubricant ophthalmic solution: 1 drop(s) to each affected eye 4 times a day, As needed, Dry Eyes (2021 15:12)  omeprazole 20 mg oral delayed release capsule: 1 cap(s) orally once a day (2021 15:12)  Percocet 10/325 oral tablet: 1 tab(s) orally every 6 hours (2021 15:12)  predniSONE 5 mg oral tablet: 1 tab(s) orally once a day (2021 15:12)  ProAir HFA 90 mcg/inh inhalation aerosol: 2 puff(s) inhaled 4 times a day, As Needed (2021 15:12)  Probiotic Formula oral capsule: 1 cap(s) orally once a day (2021 15:12)  psyllium 2.5 g oral wafer: 2 wafer(s) orally once a day (2021 15:12)  simvastatin 20 mg oral tablet: 1 tab(s) orally once a day (at bedtime) (2021 15:12)  Spiriva 18 mcg inhalation capsule: 1 cap(s) inhaled once a day (2021 15:12)  spironolactone: 12.5 milligram(s) orally once a day (2021 15:12)  sulfaSALAzine 500 mg oral tablet: 1 tab(s) orally 3 times a day (2021 15:12)  Symbicort 160 mcg-4.5 mcg/inh inhalation aerosol: 2 puff(s) inhaled 2 times a day (2021 15:12)  verapamil 360 mg/24 hours oral capsule, extended release: 1 cap(s) orally once a day (2021 15:12)  ZyrTEC 10 mg oral tablet: 1 tab(s) orally once a day (2021 15:12)      MEDICATIONS  (STANDING):  ARIPiprazole 2 milliGRAM(s) Oral daily  aspirin  chewable 81 milliGRAM(s) Oral daily  budesonide 160 MICROgram(s)/formoterol 4.5 MICROgram(s) Inhaler 2 Puff(s) Inhalation two times a day  clonazePAM  Tablet 0.5 milliGRAM(s) Oral two times a day  clotrimazole Lozenge 1 Lozenge Oral <User Schedule>  dextrose 40% Gel 15 Gram(s) Oral once  dextrose 5%. 1000 milliLiter(s) (50 mL/Hr) IV Continuous <Continuous>  dextrose 5%. 1000 milliLiter(s) (100 mL/Hr) IV Continuous <Continuous>  dextrose 50% Injectable 25 Gram(s) IV Push once  dextrose 50% Injectable 12.5 Gram(s) IV Push once  dextrose 50% Injectable 25 Gram(s) IV Push once  dorzolamide 2% Ophthalmic Solution      dorzolamide 2% Ophthalmic Solution 1 Drop(s) Both EYES three times a day  enoxaparin Injectable 40 milliGRAM(s) SubCutaneous daily  furosemide   Injectable 20 milliGRAM(s) IV Push daily  glucagon  Injectable 1 milliGRAM(s) IntraMuscular once  insulin lispro (ADMELOG) corrective regimen sliding scale   SubCutaneous Before meals and at bedtime  lactobacillus acidophilus 1 Tablet(s) Oral daily  latanoprost 0.005% Ophthalmic Solution 1 Drop(s) Both EYES at bedtime  linaclotide 145 MICROGram(s) Oral before breakfast  lubiprostone 24 MICROGram(s) Oral two times a day  meropenem  IVPB 1000 milliGRAM(s) IV Intermittent every 8 hours  methylPREDNISolone sodium succinate Injectable 40 milliGRAM(s) IV Push every 8 hours  midodrine. 10 milliGRAM(s) Oral every 8 hours  pantoprazole    Tablet 40 milliGRAM(s) Oral before breakfast  psyllium Powder 1 Packet(s) Oral daily  simvastatin 20 milliGRAM(s) Oral at bedtime  sulfaSALAzine 500 milliGRAM(s) Oral three times a day  tiotropium 18 MICROgram(s) Capsule 1 Capsule(s) Inhalation daily  venlafaxine XR. 150 milliGRAM(s) Oral daily    MEDICATIONS  (PRN):  ALBUTerol    90 MICROgram(s) HFA Inhaler 2 Puff(s) Inhalation every 6 hours PRN Shortness of Breath and/or Wheezing  artificial  tears Solution 1 Drop(s) Both EYES four times a day PRN Dry Eyes  guaiFENesin  milliGRAM(s) Oral every 12 hours PRN Cough  ondansetron Injectable 4 milliGRAM(s) IV Push every 6 hours PRN Nausea  oxycodone    5 mG/acetaminophen 325 mG 2 Tablet(s) Oral every 4 hours PRN Moderate Pain (4 - 6)      Allergies    ciprofloxacin (Other)  penicillins (Hives)  Plavix (Short breath)    Intolerances        SOCIAL HISTORY:    FAMILY HISTORY:  Family history of colon cancer in father  also prostate ca, skin ca,    Family history of breast cancer (Aunt)  aunt    FH: HTN (hypertension) (Father, Mother)        ROS  As above  Otherwise unremarkable    Vital Signs Last 24 Hrs  T(C): 37.7 (2021 14:00), Max: 37.9 (2021 00:15)  T(F): 99.9 (2021 14:00), Max: 100.3 (2021 00:15)  HR: 87 (2021 21:00) (87 - 137)  BP: 95/58 (2021 21:00) (82/43 - 176/96)  BP(mean): 67 (2021 21:00) (42 - 113)  RR: 25 (2021 21:00) (14 - 33)  SpO2: 98% (2021 21:00) (88% - 100%)    Constitutional: NAD, well-developed  Respiratory: CTAB  Cardiovascular: S1 and S2, RRR  Gastrointestinal: BS+, soft, minimal LLQ tenderness  Extremities: No peripheral edema  Psychiatric: Normal mood, normal affect  Skin: No rashes    LABS:                        13.5   14.52 )-----------( 213      ( 2021 06:56 )             43.0     06-11    134<L>  |  107  |  8   ----------------------------<  195<H>  3.8   |  21<L>  |  0.62    Ca    7.7<L>      2021 06:56    TPro  7.8  /  Alb  3.9  /  TBili  1.0  /  DBili  x   /  AST  30  /  ALT  46  /  AlkPhos  72  06-10    PT/INR - ( 10 Duran 2021 13:22 )   PT: 15.8 sec;   INR: 1.37 ratio         PTT - ( 10 Duran 2021 13:22 )  PTT:27.0 sec  LIVER FUNCTIONS - ( 10 Duran 2021 13:22 )  Alb: 3.9 g/dL / Pro: 7.8 gm/dL / ALK PHOS: 72 U/L / ALT: 46 U/L / AST: 30 U/L / GGT: x             RADIOLOGY & ADDITIONAL STUDIES:

## 2021-06-11 NOTE — DIETITIAN INITIAL EVALUATION ADULT. - ADD RECOMMEND
1) if pt not able to resume PO diet 2/2 bipap, consider nutr support and re-consult RD for rec's 2) if pt able to resume PO diet safely, add ensure max once daily and glucerna BID to optimize PO intake 3) monitor NPO length, advancement/tolerance nutr source 4) daily wt checks to track/trend changes 5) consider checking vitamin D level and supplement prn

## 2021-06-11 NOTE — CONSULT NOTE ADULT - SUBJECTIVE AND OBJECTIVE BOX
HPI:  80F with PMH of COPD on home O2, systolic CHF (EF: 40%), RA, Lumbar stenosis, Chronic Diverticulitis, Meniere disease, GERD, TMJ, SIMÓN, Thrush, Skin CA, PVD, Hiatal Hernia, Breast CA, HLD, Valvular heart disease admitted on 6/10 for evaluation of fever, chills and cough productive of clear sputum over preceding  6 weeks. Noted with abdominal pain that is intermittent. Prior to admission she took a course of zithromax without improvement. Upon admission was hypotensive improved with fluids, became more tachypneic and placed on bipap. History per medical record as patient unable to provide history.         PMH: as above  PSH: as above  Meds: per reconciliation sheet, noted below  MEDICATIONS  (STANDING):  ARIPiprazole 2 milliGRAM(s) Oral daily  aspirin  chewable 81 milliGRAM(s) Oral daily  azithromycin  IVPB 500 milliGRAM(s) IV Intermittent every 24 hours  azithromycin  IVPB      budesonide 160 MICROgram(s)/formoterol 4.5 MICROgram(s) Inhaler 2 Puff(s) Inhalation two times a day  cefepime   IVPB 2000 milliGRAM(s) IV Intermittent every 12 hours  clonazePAM  Tablet 0.5 milliGRAM(s) Oral two times a day  clotrimazole Lozenge 1 Lozenge Oral <User Schedule>  dextrose 40% Gel 15 Gram(s) Oral once  dextrose 5%. 1000 milliLiter(s) (50 mL/Hr) IV Continuous <Continuous>  dextrose 5%. 1000 milliLiter(s) (100 mL/Hr) IV Continuous <Continuous>  dextrose 50% Injectable 25 Gram(s) IV Push once  dextrose 50% Injectable 12.5 Gram(s) IV Push once  dextrose 50% Injectable 25 Gram(s) IV Push once  dorzolamide 2% Ophthalmic Solution      dorzolamide 2% Ophthalmic Solution 1 Drop(s) Both EYES three times a day  glucagon  Injectable 1 milliGRAM(s) IntraMuscular once  heparin   Injectable 5000 Unit(s) SubCutaneous every 12 hours  insulin lispro (ADMELOG) corrective regimen sliding scale   SubCutaneous Before meals and at bedtime  lactobacillus acidophilus 1 Tablet(s) Oral daily  latanoprost 0.005% Ophthalmic Solution 1 Drop(s) Both EYES at bedtime  linaclotide 145 MICROGram(s) Oral before breakfast  lubiprostone 24 MICROGram(s) Oral two times a day  methylPREDNISolone sodium succinate Injectable 40 milliGRAM(s) IV Push every 8 hours  metroNIDAZOLE  IVPB 500 milliGRAM(s) IV Intermittent every 8 hours  midodrine. 10 milliGRAM(s) Oral every 8 hours  pantoprazole    Tablet 40 milliGRAM(s) Oral before breakfast  psyllium Powder 1 Packet(s) Oral daily  simvastatin 20 milliGRAM(s) Oral at bedtime  sodium chloride 0.9% Bolus 500 milliLiter(s) IV Bolus once  sulfaSALAzine 500 milliGRAM(s) Oral three times a day  tiotropium 18 MICROgram(s) Capsule 1 Capsule(s) Inhalation daily  venlafaxine XR. 150 milliGRAM(s) Oral daily    MEDICATIONS  (PRN):  ALBUTerol    90 MICROgram(s) HFA Inhaler 2 Puff(s) Inhalation every 6 hours PRN Shortness of Breath and/or Wheezing  artificial  tears Solution 1 Drop(s) Both EYES four times a day PRN Dry Eyes  guaiFENesin  milliGRAM(s) Oral every 12 hours PRN Cough  ondansetron Injectable 4 milliGRAM(s) IV Push every 6 hours PRN Nausea  oxycodone    5 mG/acetaminophen 325 mG 2 Tablet(s) Oral every 4 hours PRN Moderate Pain (4 - 6)    Allergies    ciprofloxacin (Other)  penicillins (Hives)  Plavix (Short breath)    Intolerances      Social: no smoking, no alcohol, no illegal drugs; no recent travel, no exposure to TB  FAMILY HISTORY:  Family history of colon cancer in father  also prostate ca, skin ca,    Family history of breast cancer (Aunt)  aunt    FH: HTN (hypertension) (Father, Mother)       ROS unable to obtain secondary to patient medical condition     Vital Signs Last 24 Hrs  T(C): 36.9 (2021 06:25), Max: 40.7 (10 Duran 2021 13:10)  T(F): 98.4 (2021 06:25), Max: 105.3 (10 Duran 2021 13:10)  HR: 103 (2021 10:00) (90 - 137)  BP: 113/53 (2021 10:00) (74/39 - 176/96)  BP(mean): 67 (2021 10:00) (42 - 113)  RR: 23 (2021 10:00) (14 - 33)  SpO2: 99% (2021 10:00) (88% - 100%)  Daily Height in cm: 157.48 (10 Duran 2021 13:05)    Daily Weight in k.6 (2021 06:25)    PE:    Constitutional: frail looking  HEENT: NC/AT, bipap mask in place  Neck: supple; thyroid not palpable  Back: no tenderness  Respiratory: respiratory effort normal; diminished breath sounds  Cardiovascular: S1S2 regular, no murmurs  Abdomen: soft, not tender, mildly distended, positive BS; no liver or spleen organomegaly  Genitourinary: no suprapubic tenderness  Musculoskeletal: no muscle tenderness, no joint swelling or tenderness  Neurological/ Psychiatric:   moving all extremities  Skin: no rashes; no palpable lesions    Labs: all available labs reviewed                         )-----------( 213      ( 2021 06:56 )             43.0     06-11    134<L>  |  107  |  8   ----------------------------<  195<H>  3.8   |  21<L>  |  0.62    Ca    7.7<L>      2021 06:56    TPro  7.8  /  Alb  3.9  /  TBili  1.0  /  DBili  x   /  AST  30  /  ALT  46  /  AlkPhos  72  06-10     LIVER FUNCTIONS - ( 10 Duran 2021 13:22 )  Alb: 3.9 g/dL / Pro: 7.8 gm/dL / ALK PHOS: 72 U/L / ALT: 46 U/L / AST: 30 U/L / GGT: x           Urinalysis Basic - ( 10 Duran 2021 17:07 )    Color: Yellow / Appearance: Clear / S.015 / pH: x  Gluc: x / Ketone: Negative  / Bili: Negative / Urobili: Negative mg/dL   Blood: x / Protein: 30 mg/dL / Nitrite: Negative   Leuk Esterase: Trace / RBC: 0-2 /HPF / WBC 0-2   Sq Epi: x / Non Sq Epi: Occasional / Bacteria: Occasional    < from: CT Abdomen and Pelvis w/ IV Cont (06.10.21 @ 16:34) >    EXAM:  CT ABDOMEN AND PELVIS IC                            PROCEDURE DATE:  06/10/2021          INTERPRETATION:  CLINICAL INFORMATION: Left lower quadrant abdominal pain. Evaluate for acute diverticulitis.    COMPARISON: 8/10/2020    PROCEDURE:  CTof the Abdomen and Pelvis was performed with intravenous contrast.  Intravenous contrast: 90 ml Omnipaque 350. 10 ml discarded.  Oral contrast: None.  Sagittal and coronal reformats were performed.    FINDINGS:    LOWER CHEST: Cardiomegaly. Interlobular septal thickening, consistent with changes of CHF. Bibasilar atelectasis.    LIVER: Within normal limits.  BILE DUCTS: Normal caliber.  GALLBLADDER: Within normal limits.  SPLEEN: Within normal limits.  PANCREAS: Within normal limits.  ADRENALS: Within normal limits.  KIDNEYS/URETERS: Within normal limits.    BLADDER: Within normal limits.  REPRODUCTIVE ORGANS: Uterus and bilateral adnexa within normal limits.    BOWEL: No bowel obstruction. Appendix is not visualized. Diverticulosis with changes of acute diverticulitis at the level of the mid sigmoid colon.  PERITONEUM: No ascites. No evidence of diverticular abscess. No pneumoperitoneum.  VESSELS:  Atherosclerotic calcifications.  RETROPERITONEUM: No lymphadenopathy.  ABDOMINAL WALL: Within normal limits.  BONES: Degenerative changes and scoliosis. Diffuse osteopenia. Spinal stimulator.    IMPRESSION:  *  Acute diverticulitis involving the mid sigmoid colon. No evidence of diverticular abscess.  *  Cardiomegaly and CHF at the lung bases.      < end of copied text >        Radiology: all available radiological tests reviewed    Advanced directives addressed: full resuscitation

## 2021-06-11 NOTE — PROVIDER CONTACT NOTE (CHANGE IN STATUS NOTIFICATION) - BACKGROUND
-Stroke risk factor.  Patient is currently on Xarelto.  Endorses missing one dose today.  -Continue Xarelto   At 0400, patient stated, "I feel so anxious and I can't breathe." Patient desaturated to 84%. Patient was dyspneic, tachycardic, diaphoretic, and restless. Nonrebreather mask was placed on patient. ICU PA remained at bedside. Albuterol nebulizer treatment was administered as ordered. Patient's HOB was elevated. Solucortef and Solumedrol IVP were administered. Lasix 20 mg was given. Magnesium Sulfate was given. Klonopin 0.5 mg PO administered. Patient was started on Bipap treatment and transferred to ICU.

## 2021-06-11 NOTE — CONSULT NOTE ADULT - ASSESSMENT
Impression:  1. sepsis  2. RML PNA  3. acute diverticulitis    Plan:  -pt does not meet ICU criteria at this time, please reconsult if status changes  - would give an additional IVF challenge with balanced fluids of 500ml x 2 with evaluation in between each 500ml to assess volume status  - broad spectrum IV abx with ID consult for stream lining current regimen  - start PO midodrine   - given her use of chronic steroids would start stress dose steroids for CIRCI in face of infection  - trend WBC, check procal, f/u cultures, RVP negative, agree with legionella Ag   Impression:  1. sepsis  2. RML PNA  3. acute diverticulitis    Plan:  - pt does not meet ICU criteria at this time, please reconsult if status changes  - would give an additional IVF challenge with balanced fluids of 500ml x 2 with evaluation in between each 500ml to assess volume status  - broad spectrum IV abx with ID consult for stream lining current regimen  - start low dose PO midodrine   - given her use of chronic steroids would start stress dose steroids for CIRCI in face of infection  - trend WBC, check procal, f/u cultures, RVP negative, agree with legionella Ag

## 2021-06-11 NOTE — CONSULT NOTE ADULT - ASSESSMENT
Imp:  Sigmoid diverticulitis  There does seem to be a disconnect between the relatively benign exam/mild changes on CT and the relatively severe clinical presentation but she is obviously improving regardless    Rec:  Cont antibiotics per ID  OK by me to advance diet as tolerated

## 2021-06-11 NOTE — PROVIDER CONTACT NOTE (CHANGE IN STATUS NOTIFICATION) - SITUATION
Received patient from ED at approximately 0330. Patient was received hypotensive. Made ICU PA aware. ICU team came to bedside with Sonosite. Examined patient.  mL bolus was ordered to be infused at 250/hr. Noted and carried out.

## 2021-06-11 NOTE — CHART NOTE - NSCHARTNOTEFT_GEN_A_CORE
Called by nursing to evaluate pt seen earlier for new onset SOB, wheezing, desats.    Brief Hx:  80F with PMHx of COPD on home O2/steroids, CHF, RA, spinal stenosis, diverticulitis, alcoholism (quit 23yrs ago), Meniere's disease, GERD, SIMÓN, skin CA, HH, PVD, breast CA sp lumpectomy, TIA, HLD, sp AVR who presented to ED with fever, chills, and cough. Pt admitted to recent treatment with PO abx as outpt. Subsequently with abdominal pain. Pt with sepsis criteria. Was treated with IV abx and fluids. Imaging revealed a RML infiltrate and diverticulitis. Pt with low BP in ED after initial IVF. ICU was consulted and following repeat fluids pts BP improved. Pt was admitted to SICU. Following admit to SICU with soft BP. Pt was given an additional 500ml fluids over 30mins. Pt with coughing and subsequently developed wheezing and SOB following the coughing event with desat to high 80s. Placed on NRB by nursing staff. Denies CP.     Vital Signs Last 24 Hrs  T(C): 37.2 (11 Jun 2021 02:30), Max: 40.7 (10 Duran 2021 13:10)  T(F): 98.9 (11 Jun 2021 02:30), Max: 105.3 (10 Duran 2021 13:10)  HR: 97 (11 Jun 2021 02:30) (94 - 126)  BP: 95/50 (11 Jun 2021 02:30) (74/39 - 141/62)  BP(mean): 62 (11 Jun 2021 02:30) (53 - 75)  RR: 20 (11 Jun 2021 02:30) (16 - 24)  SpO2: 98% (11 Jun 2021 02:30) (95% - 100%)    PE:  on %  alert, awake, with mod resp distress, anxious  CV: tachycardia 130s, tele with afib  Pulm: diminished at bases, + expiratory wheeze throughout  Abd: softly distended, still with some tenderness in LLQ  Ext: SCDs in place, no edema    Impression:  1. acute COPD exacerbation versus acute CHF  2. sepsis  3. RML PNA  4. acute diverticulitis  5. acute on chronic respiratory failure/COPD    Plan:  1. Solumedrol 125mg x 1 stat given, change to solumedrol 40mg IV [id number]  2. stat neb treatment, pt is COVID/RVP negative  3. NIPPV support for work of breathing  4. IV lasix now  5. IV Mg 2gm empirically  6. transfer to ICU  7. give dose of her anxiolytics  8. may need rate control for tachycardia   9. repeat CXR    Critical Care time: 35 mins assessing presenting problems of acute illness that poses high probability of life threatening deterioration or end organ damage/dysfunction.  Medical decision making including Initiating plan of care, reviewing data, reviewing radiology, direct patient bedside evaluation and interpretation of vital signs, any necessary ventilator management , discussion with multidisciplinary team, all non inclusive of procedures.

## 2021-06-11 NOTE — DIETITIAN INITIAL EVALUATION ADULT. - MALNUTRITION
moderate malnutrition in chronic illness moderate malnutrition in chronic illness r/t decreased PO intake 2/2 CHF/COPD AEB moderate muscle/fat wasting

## 2021-06-11 NOTE — PROGRESS NOTE ADULT - ASSESSMENT
Patient with history of COPD, CHF  admitted with fever to 105.  diverticulitis  Pneumonia  developed likely chf and respiratory failure  cxr c/w pneumonia and chf    Plan     at risk of intubation, on bipap , will trial off    diuresis    antibiotics, meropenem for pneumonia and diverticultis    no peritoneal signs    npo for today    oob later    will get echo    steroids for hypoxic respiratory failure copd    sliding scale for diabetes.

## 2021-06-11 NOTE — DIETITIAN INITIAL EVALUATION ADULT. - PERTINENT MEDS FT
MEDICATIONS  (STANDING):  ARIPiprazole 2 milliGRAM(s) Oral daily  aspirin  chewable 81 milliGRAM(s) Oral daily  budesonide 160 MICROgram(s)/formoterol 4.5 MICROgram(s) Inhaler 2 Puff(s) Inhalation two times a day  clonazePAM  Tablet 0.5 milliGRAM(s) Oral two times a day  clotrimazole Lozenge 1 Lozenge Oral <User Schedule>  dextrose 40% Gel 15 Gram(s) Oral once  dextrose 5%. 1000 milliLiter(s) (50 mL/Hr) IV Continuous <Continuous>  dextrose 5%. 1000 milliLiter(s) (100 mL/Hr) IV Continuous <Continuous>  dextrose 50% Injectable 25 Gram(s) IV Push once  dextrose 50% Injectable 12.5 Gram(s) IV Push once  dextrose 50% Injectable 25 Gram(s) IV Push once  dorzolamide 2% Ophthalmic Solution      dorzolamide 2% Ophthalmic Solution 1 Drop(s) Both EYES three times a day  enoxaparin Injectable 40 milliGRAM(s) SubCutaneous daily  furosemide   Injectable 20 milliGRAM(s) IV Push daily  glucagon  Injectable 1 milliGRAM(s) IntraMuscular once  insulin lispro (ADMELOG) corrective regimen sliding scale   SubCutaneous Before meals and at bedtime  lactobacillus acidophilus 1 Tablet(s) Oral daily  latanoprost 0.005% Ophthalmic Solution 1 Drop(s) Both EYES at bedtime  linaclotide 145 MICROGram(s) Oral before breakfast  lubiprostone 24 MICROGram(s) Oral two times a day  meropenem  IVPB 1000 milliGRAM(s) IV Intermittent every 8 hours  methylPREDNISolone sodium succinate Injectable 40 milliGRAM(s) IV Push every 8 hours  midodrine. 10 milliGRAM(s) Oral every 8 hours  pantoprazole    Tablet 40 milliGRAM(s) Oral before breakfast  psyllium Powder 1 Packet(s) Oral daily  simvastatin 20 milliGRAM(s) Oral at bedtime  sulfaSALAzine 500 milliGRAM(s) Oral three times a day  tiotropium 18 MICROgram(s) Capsule 1 Capsule(s) Inhalation daily  venlafaxine XR. 150 milliGRAM(s) Oral daily    MEDICATIONS  (PRN):  ALBUTerol    90 MICROgram(s) HFA Inhaler 2 Puff(s) Inhalation every 6 hours PRN Shortness of Breath and/or Wheezing  artificial  tears Solution 1 Drop(s) Both EYES four times a day PRN Dry Eyes  guaiFENesin  milliGRAM(s) Oral every 12 hours PRN Cough  ondansetron Injectable 4 milliGRAM(s) IV Push every 6 hours PRN Nausea  oxycodone    5 mG/acetaminophen 325 mG 2 Tablet(s) Oral every 4 hours PRN Moderate Pain (4 - 6)

## 2021-06-11 NOTE — PROGRESS NOTE ADULT - SUBJECTIVE AND OBJECTIVE BOX
Events Overnight:  Patient got more short of breathe, transferred to ICU and placed on bipap, cxr pneumnia vs. chf    HPI:       80F with PMHx of COPD on home O2/steroids, CHF,  ,   sp AVR who presented to ED with fever, chills, and cough. Initially in ED had temp to 105  Subsequently with abdominal pain  Was treated with IV abx and fluids. Imaging revealed a RML infiltrate and diverticulitis.  Patient received IV fluid  and BP improved.  Overnight she developed increased sob after fluid resuscitation, bp is better  cxr pneumonia, vs. chf  was placed on bipap  no fevers this am    ROS - difficulty to obtain as patient is on bipap,     PAST MEDICAL & SURGICAL HISTORY:  Valvular heart disease  aortic and mitral  Hyperlipidemia      Physical Exam    general - obese    HEENT nc/at    lungs - bilateral crackles    cv rrr    abdomen protuberant, non tender    ext trace edema     no cva tenderness       MEDICATIONS  (STANDING):  ARIPiprazole 2 milliGRAM(s) Oral daily  aspirin  chewable 81 milliGRAM(s) Oral daily  budesonide 160 MICROgram(s)/formoterol 4.5 MICROgram(s) Inhaler 2 Puff(s) Inhalation two times a day  cefepime   IVPB 2000 milliGRAM(s) IV Intermittent every 12 hours  clonazePAM  Tablet 0.5 milliGRAM(s) Oral two times a day  clotrimazole Lozenge 1 Lozenge Oral <User Schedule>  dextrose 40% Gel 15 Gram(s) Oral once  dextrose 5%. 1000 milliLiter(s) (50 mL/Hr) IV Continuous <Continuous>  dextrose 5%. 1000 milliLiter(s) (100 mL/Hr) IV Continuous <Continuous>  dextrose 50% Injectable 25 Gram(s) IV Push once  dextrose 50% Injectable 12.5 Gram(s) IV Push once  dextrose 50% Injectable 25 Gram(s) IV Push once  dorzolamide 2% Ophthalmic Solution      dorzolamide 2% Ophthalmic Solution 1 Drop(s) Both EYES three times a day  enoxaparin Injectable 40 milliGRAM(s) SubCutaneous daily  furosemide   Injectable 20 milliGRAM(s) IV Push daily  glucagon  Injectable 1 milliGRAM(s) IntraMuscular once  insulin lispro (ADMELOG) corrective regimen sliding scale   SubCutaneous Before meals and at bedtime  lactobacillus acidophilus 1 Tablet(s) Oral daily  latanoprost 0.005% Ophthalmic Solution 1 Drop(s) Both EYES at bedtime  linaclotide 145 MICROGram(s) Oral before breakfast  lubiprostone 24 MICROGram(s) Oral two times a day  methylPREDNISolone sodium succinate Injectable 40 milliGRAM(s) IV Push every 8 hours  metroNIDAZOLE  IVPB 500 milliGRAM(s) IV Intermittent every 8 hours  midodrine. 10 milliGRAM(s) Oral every 8 hours  pantoprazole    Tablet 40 milliGRAM(s) Oral before breakfast  psyllium Powder 1 Packet(s) Oral daily  simvastatin 20 milliGRAM(s) Oral at bedtime  sulfaSALAzine 500 milliGRAM(s) Oral three times a day  tiotropium 18 MICROgram(s) Capsule 1 Capsule(s) Inhalation daily  venlafaxine XR. 150 milliGRAM(s) Oral daily    MEDICATIONS  (PRN):  ALBUTerol    90 MICROgram(s) HFA Inhaler 2 Puff(s) Inhalation every 6 hours PRN Shortness of Breath and/or Wheezing  artificial  tears Solution 1 Drop(s) Both EYES four times a day PRN Dry Eyes  guaiFENesin  milliGRAM(s) Oral every 12 hours PRN Cough  ondansetron Injectable 4 milliGRAM(s) IV Push every 6 hours PRN Nausea  oxycodone    5 mG/acetaminophen 325 mG 2 Tablet(s) Oral every 4 hours PRN Moderate Pain (4 - 6)      Height (cm): 157.5 ( @ 10:42)  Weight (kg): 61.2 ( @ 10:42)  BMI (kg/m2): 24.7 ( @ 10:42)    ICU Vital Signs Last 24 Hrs  T(C): 36.9 (2021 06:25), Max: 40.7 (10 Duran 2021 13:10)  T(F): 98.4 (2021 06:25), Max: 105.3 (10 Duran 2021 13:10)  HR: 103 (2021 10:00) (90 - 137)  BP: 113/53 (2021 10:00) (74/39 - 176/96)  BP(mean): 67 (2021 10:00) (42 - 113)  ABP: --  ABP(mean): --  RR: 23 (2021 10:00) (14 - 33)  SpO2: 99% (2021 10:00) (88% - 100%)    I&O's Summary    10 Duran 2021 07:01  -  2021 07:00  --------------------------------------------------------  IN: 0 mL / OUT: 300 mL / NET: -300 mL                        13.5   14.52 )-----------( 213      ( 2021 06:56 )             43.0       06-11    134<L>  |  107  |  8   ----------------------------<  195<H>  3.8   |  21<L>  |  0.62    Ca    7.7<L>      2021 06:56    TPro  7.8  /  Alb  3.9  /  TBili  1.0  /  DBili  x   /  AST  30  /  ALT  46  /  AlkPhos  72  06-10      Urinalysis Basic - ( 10 Duran 2021 17:07 )    Color: Yellow / Appearance: Clear / S.015 / pH: x  Gluc: x / Ketone: Negative  / Bili: Negative / Urobili: Negative mg/dL   Blood: x / Protein: 30 mg/dL / Nitrite: Negative   Leuk Esterase: Trace / RBC: 0-2 /HPF / WBC 0-2   Sq Epi: x / Non Sq Epi: Occasional / Bacteria: Occasional    DVT Prophylaxis:  Heparin subq                                                               Advanced Directives: Full Code

## 2021-06-12 NOTE — PROGRESS NOTE ADULT - ASSESSMENT
80F with PMH of COPD on home O2, systolic CHF (EF: 40%), RA, Lumbar stenosis, Chronic Diverticulitis, Meniere disease, GERD, TMJ, SIMÓN, Thrush, Skin CA, PVD, Hiatal Hernia, Breast CA, HLD, Valvular heart disease admitted on 6/10 for evaluation of fever, chills and cough productive of clear sputum over preceding  6 weeks. Noted with abdominal pain that is intermittent. Prior to admission she took a course of zithromax without improvement. Upon admission was hypotensive improved with fluids, became more tachypneic and placed on bipap. History per medical record as patient unable to provide history.   1. Patient admitted with sepsis secondary to pneumonia versus diverticulitis; also noted with leukocytosis most likely reactive to infection  - follow up cultures   - serial cbc and monitor temperature   - oxygen and nebs as needed   - bipap per icu  - iv hydration and supportive care   - reviewed prior medical records to evaluate for resistant or atypical pathogens   - day #2 meropenem  - tolerating antibiotics without rashes or side effects   2. other issues: per medicine

## 2021-06-12 NOTE — PROGRESS NOTE ADULT - SUBJECTIVE AND OBJECTIVE BOX
Events Overnight: Patient breathing is improved, on 3 liters, no chest pain, no abdominal pain    HPI:     80F with PMHx of COPD on home O2/steroids, CHF,  Type II Diabetes   sp AVR who presented to ED with fever, chills, and cough. Initially in ED had temp to 105  Subsequently with abdominal pain  Was treated with IV abx and fluids. Imaging revealed a RML infiltrate and diverticulitis.  Patient received IV fluid  and BP improved.  Overnight on 6/10  she developed increased sob after fluid resuscitation, requiring bipap  cxr  worsening. chf, minimally elevated troponin , Had cath in   echo shoes moderate to severe mitral stenosis, ef 55%  was placed on bipap improved feeling better  no fevers this am  On meropenem for diverticulitis and pneumonia    ROS -  abdominal pain better, sob improved, no chest pain, no headache, no fever, no chills, no palpitations ros otherwise negative    PAST MEDICAL & SURGICAL HISTORY:  Valvular heart disease  aortic and mitral  Hyperlipidemia      Physical Exam    general - obese    HEENT nc/at    lungs - bilateral basal crackles improved    cv rrr    abdomen protuberant, non tender    ext trace edema     no cva tenderness     MEDICATIONS  (STANDING):  ARIPiprazole 2 milliGRAM(s) Oral daily  aspirin  chewable 81 milliGRAM(s) Oral daily  budesonide 160 MICROgram(s)/formoterol 4.5 MICROgram(s) Inhaler 2 Puff(s) Inhalation two times a day  clonazePAM  Tablet 0.5 milliGRAM(s) Oral two times a day  clotrimazole Lozenge 1 Lozenge Oral <User Schedule>  dextrose 40% Gel 15 Gram(s) Oral once  dextrose 5%. 1000 milliLiter(s) (50 mL/Hr) IV Continuous <Continuous>  dextrose 5%. 1000 milliLiter(s) (100 mL/Hr) IV Continuous <Continuous>  dextrose 50% Injectable 25 Gram(s) IV Push once  dextrose 50% Injectable 25 Gram(s) IV Push once  dextrose 50% Injectable 12.5 Gram(s) IV Push once  dorzolamide 2% Ophthalmic Solution      dorzolamide 2% Ophthalmic Solution 1 Drop(s) Both EYES three times a day  enoxaparin Injectable 40 milliGRAM(s) SubCutaneous daily  famotidine    Tablet 20 milliGRAM(s) Oral daily  furosemide    Tablet 40 milliGRAM(s) Oral daily  glucagon  Injectable 1 milliGRAM(s) IntraMuscular once  insulin lispro (ADMELOG) corrective regimen sliding scale   SubCutaneous Before meals and at bedtime  lactobacillus acidophilus 1 Tablet(s) Oral daily  latanoprost 0.005% Ophthalmic Solution 1 Drop(s) Both EYES at bedtime  linaclotide 145 MICROGram(s) Oral before breakfast  losartan 50 milliGRAM(s) Oral daily  lubiprostone 24 MICROGram(s) Oral two times a day  meropenem  IVPB 1000 milliGRAM(s) IV Intermittent every 8 hours  predniSONE   Tablet 5 milliGRAM(s) Oral daily  psyllium Powder 1 Packet(s) Oral daily  simvastatin 20 milliGRAM(s) Oral at bedtime  sulfaSALAzine 500 milliGRAM(s) Oral three times a day  tiotropium 18 MICROgram(s) Capsule 1 Capsule(s) Inhalation daily  venlafaxine XR. 150 milliGRAM(s) Oral daily    MEDICATIONS  (PRN):  ALBUTerol    90 MICROgram(s) HFA Inhaler 2 Puff(s) Inhalation every 6 hours PRN Shortness of Breath and/or Wheezing  artificial  tears Solution 1 Drop(s) Both EYES four times a day PRN Dry Eyes  guaiFENesin  milliGRAM(s) Oral every 12 hours PRN Cough  ondansetron Injectable 4 milliGRAM(s) IV Push every 6 hours PRN Nausea  oxycodone    5 mG/acetaminophen 325 mG 2 Tablet(s) Oral every 4 hours PRN Moderate Pain (4 - 6)      Height (cm): 157.5 ( @ 10:42)  Weight (kg): 61.2 ( @ 10:42)  BMI (kg/m2): 24.7 ( @ 10:42)    ICU Vital Signs Last 24 Hrs  T(C): 36.6 (2021 08:00), Max: 37.7 (2021 14:00)  T(F): 97.8 (2021 08:00), Max: 99.9 (2021 14:00)  HR: 99 (2021 09:00) (80 - 103)  BP: 109/61 (2021 09:00) (95/58 - 151/75)  BP(mean): 66 (2021 09:00) (55 - 94)  ABP: --  ABP(mean): --  RR: 21 (2021 09:00) (13 - 25)  SpO2: 98% (2021 09:00) (95% - 100%)          I&O's Summary    2021 07:01  -  2021 07:00  --------------------------------------------------------  IN: 370 mL / OUT: 1400 mL / NET: -1030 mL                          13.5   14.52 )-----------( 213      ( 2021 06:56 )             43.0       06-12    136  |  105  |  12  ----------------------------<  141<H>  3.9   |  28  |  0.53    Ca    7.9<L>      2021 06:45    TPro  6.3  /  Alb  3.0<L>  /  TBili  0.4  /  DBili  x   /  AST  42<H>  /  ALT  67  /  AlkPhos  52  06-12      CARDIAC MARKERS ( 2021 06:45 )  0.060 ng/mL / x     / x     / x     / x      CARDIAC MARKERS ( 2021 06:56 )  0.161 ng/mL / x     / x     / x     / x        Urinalysis Basic - ( 10 Duran 2021 17:07 )    Color: Yellow / Appearance: Clear / S.015 / pH: x  Gluc: x / Ketone: Negative  / Bili: Negative / Urobili: Negative mg/dL   Blood: x / Protein: 30 mg/dL / Nitrite: Negative   Leuk Esterase: Trace / RBC: 0-2 /HPF / WBC 0-2   Sq Epi: x / Non Sq Epi: Occasional / Bacteria: Occasional    DVT Prophylaxis:    Lovenox                                                            Advanced Directives: Full COde

## 2021-06-12 NOTE — PROGRESS NOTE ADULT - ASSESSMENT
80 year old woman admitted with sepsis secondary to PNA and diverticulitis.     Diverticulitis seems to be mild and the main  of her illness is the PNA. Agree with abx/ID eval and advance to a low residue diet as tolerated.   She can follow up with Dr. Thorpe as an outpatient.

## 2021-06-12 NOTE — PROGRESS NOTE ADULT - SUBJECTIVE AND OBJECTIVE BOX
Patient is a 80y old  Female who presents with a chief complaint of Sepsis, PNA (12 Jun 2021 10:51). Follow up for diverticulitis.     Pt asleep but arousable today. Tolerated her breakfast. No acute complaints.       MEDICATIONS  (STANDING):  ARIPiprazole 2 milliGRAM(s) Oral daily  aspirin  chewable 81 milliGRAM(s) Oral daily  budesonide 160 MICROgram(s)/formoterol 4.5 MICROgram(s) Inhaler 2 Puff(s) Inhalation two times a day  clonazePAM  Tablet 0.5 milliGRAM(s) Oral two times a day  clotrimazole Lozenge 1 Lozenge Oral <User Schedule>  dextrose 40% Gel 15 Gram(s) Oral once  dextrose 5%. 1000 milliLiter(s) (50 mL/Hr) IV Continuous <Continuous>  dextrose 5%. 1000 milliLiter(s) (100 mL/Hr) IV Continuous <Continuous>  dextrose 50% Injectable 25 Gram(s) IV Push once  dextrose 50% Injectable 12.5 Gram(s) IV Push once  dextrose 50% Injectable 25 Gram(s) IV Push once  dorzolamide 2% Ophthalmic Solution      dorzolamide 2% Ophthalmic Solution 1 Drop(s) Both EYES three times a day  enoxaparin Injectable 40 milliGRAM(s) SubCutaneous daily  famotidine    Tablet 20 milliGRAM(s) Oral daily  furosemide    Tablet 40 milliGRAM(s) Oral daily  glucagon  Injectable 1 milliGRAM(s) IntraMuscular once  insulin lispro (ADMELOG) corrective regimen sliding scale   SubCutaneous Before meals and at bedtime  lactobacillus acidophilus 1 Tablet(s) Oral daily  latanoprost 0.005% Ophthalmic Solution 1 Drop(s) Both EYES at bedtime  linaclotide 145 MICROGram(s) Oral before breakfast  losartan 50 milliGRAM(s) Oral daily  lubiprostone 24 MICROGram(s) Oral two times a day  meropenem  IVPB 1000 milliGRAM(s) IV Intermittent every 8 hours  predniSONE   Tablet 5 milliGRAM(s) Oral daily  psyllium Powder 1 Packet(s) Oral daily  simvastatin 20 milliGRAM(s) Oral at bedtime  sulfaSALAzine 500 milliGRAM(s) Oral three times a day  tiotropium 18 MICROgram(s) Capsule 1 Capsule(s) Inhalation daily  venlafaxine XR. 150 milliGRAM(s) Oral daily    MEDICATIONS  (PRN):  ALBUTerol    90 MICROgram(s) HFA Inhaler 2 Puff(s) Inhalation every 6 hours PRN Shortness of Breath and/or Wheezing  artificial  tears Solution 1 Drop(s) Both EYES four times a day PRN Dry Eyes  guaiFENesin  milliGRAM(s) Oral every 12 hours PRN Cough  ondansetron Injectable 4 milliGRAM(s) IV Push every 6 hours PRN Nausea  oxycodone    5 mG/acetaminophen 325 mG 2 Tablet(s) Oral every 4 hours PRN Moderate Pain (4 - 6)      Vital Signs Last 24 Hrs  T(C): 36.8 (12 Jun 2021 12:00), Max: 36.8 (12 Jun 2021 04:00)  T(F): 98.3 (12 Jun 2021 12:00), Max: 98.3 (12 Jun 2021 12:00)  HR: 97 (12 Jun 2021 14:00) (80 - 106)  BP: 126/64 (12 Jun 2021 14:00) (94/53 - 151/75)  BP(mean): 80 (12 Jun 2021 14:00) (55 - 94)  RR: 20 (12 Jun 2021 14:00) (13 - 25)  SpO2: 99% (12 Jun 2021 14:00) (95% - 100%)    PHYSICAL EXAM:  Gen: critically ill in ICU but stable, not on bipap currently, obese  Abd: distended per body habitus, soft    LABS:                        13.5   14.52 )-----------( 213      ( 11 Jun 2021 06:56 )             43.0     06-12    136  |  105  |  12  ----------------------------<  141<H>  3.9   |  28  |  0.53    Ca    7.9<L>      12 Jun 2021 06:45    TPro  6.3  /  Alb  3.0<L>  /  TBili  0.4  /  DBili  x   /  AST  42<H>  /  ALT  67  /  AlkPhos  52  06-12      LIVER FUNCTIONS - ( 12 Jun 2021 06:45 )  Alb: 3.0 g/dL / Pro: 6.3 gm/dL / ALK PHOS: 52 U/L / ALT: 67 U/L / AST: 42 U/L / GGT: x             RADIOLOGY & ADDITIONAL STUDIES: reviewed CT

## 2021-06-12 NOTE — PROGRESS NOTE ADULT - ASSESSMENT
atient with history of COPD, CHF  Patient admitted with fever to 105.  diverticulitis  Pneumonia  developed likely chf and respiratory failure, echo mitral stenosis  cxr c/w pneumonia and chf    Plan     Acute chf, and pneumonia, oxygenation improved, on nasal cannula    diuresis for chf, will restart losartan and d/c midodrine as bp is better    antibiotics, meropenem for pneumonia and diverticultis    no peritoneal signs    advance diet    oob later      steroids for hypoxic respiratory failure copd, will decrease to patietn baseline doses    sliding scale for diabetes.    can transfer to floor

## 2021-06-13 NOTE — PROGRESS NOTE ADULT - ASSESSMENT
81 y/o F with a h/o COPD (on home O2), CHFpEF, s/p bioprosthetic AVR, HTN, HLD, with acute hypoxemic respiratory failure, acute decompensated diastolic heart failure, hypertensive emergency, pulmonary edema, lobar pneumonia, anxiety.    Transfer back to ICU for further management.    * sympathetic-induced flash pulmonary edema secondary to hypertensive crisis in the setting of moderate-severe mitral stenosis and underlying advanced emphysematous lung disease    - emergently started on BiPAP, FiO2 increased to 70% in order to maintain SpO2 > 88%, high risk for requiring endotracheal intubation given work of breathing  - stat CXR with worsened diffuse pulmonic congestion  - 10mg IV labetalol x2, needs aggressive afterload control, goal SBP < 150  - 40mg IV furosemide x1, goal 1-2L net neg fluid balance, will change routine diuretic regimen to IV  - stat albuterol treatment, change to Q 6 hours ATC for now to help optimize respiratory status  - 0.5mg IV lorazepam x1  - continue empiric meropenem, still with low grade fever, procal 1.09, blood and urine cultures neg for growth

## 2021-06-13 NOTE — CONSULT NOTE ADULT - ASSESSMENT
PNa/ Diverticulitis  On ABx  Two episodes of CHF after iv fluid hydration  Dehydrated by physical exam today  Moderate to severe MS by echo  Will benefit from Chronotrop negatives to increase diastolic filling time  When more stable may need to consider DORIS for better evaluation of MS  Will start on lopressor today 25 mg PO BID   Will follow

## 2021-06-13 NOTE — PROVIDER CONTACT NOTE (MEDICATION) - SITUATION
pt having severe anxiety. Hx of COPD on 3L nc. Requesting anti anxiety meds early.
pt having severe anxiety attack requesting meds

## 2021-06-13 NOTE — PROGRESS NOTE ADULT - ASSESSMENT
80F with PMH of COPD on home O2, systolic CHF (EF: 40%), RA, Lumbar stenosis, Chronic Diverticulitis, Meniere disease, GERD, TMJ, SIMÓN, Thrush, Skin CA, PVD, Hiatal Hernia, Breast CA, HLD, Valvular heart disease admitted on 6/10 for evaluation of fever, chills and cough productive of clear sputum over preceding  6 weeks. Noted with abdominal pain that is intermittent. Prior to admission she took a course of zithromax without improvement. Upon admission was hypotensive improved with fluids, became more tachypneic and placed on bipap. History per medical record as patient unable to provide history.   1. Patient admitted with sepsis secondary to pneumonia versus diverticulitis; also noted with leukocytosis most likely reactive to infection  - follow up cultures   - serial cbc and monitor temperature   - oxygen and nebs as needed   - bipap per icu  - iv hydration and supportive care   - reviewed prior medical records to evaluate for resistant or atypical pathogens   - day #3 meropenem; will cover for lung and abdominal sources of infection  - tolerating antibiotics without rashes or side effects   2. other issues: per medicine

## 2021-06-13 NOTE — CONSULT NOTE ADULT - SUBJECTIVE AND OBJECTIVE BOX
Patient is a 80y old  Female who presents with a chief complaint of Sepsis, PNA (2021 09:34)      HPI:  80F with PMH of COPD on home O2, systolic CHF (EF: 40%), RA, Lumbar stenosis, Chronic Diverticulitis, Meniere disease, GERD, TMJ, SIMÓN, Thrush, Skin CA, PVD, Hiatal Hernia, Breast CA, HLD, Valvular heart disease presented to ED with complaint of fever, chills, increased cough x 6weeks. Cough is productive of clear sputum. Reports symptoms initially were intermittent and but became constant. Reports associated mild abdominal pain. States abdominal pain is nonradiating, intermittent. Also reports associated progressive generalized weakness which acutely worsened today, she was unable to ambulate. Pt has not seen a physician about symptoms. She took azithromycin prescribed by her PCP with no improvement. Denies CP, SOB, headaches, N/V.   In ED, pt met sepsis criteria, and pt is s/p cipro, flagyl and ~4L NS. Pt was hypotensive after fluid resuscitation. Pressors was ordered. Pt was evaluated by ICU. Bp improved with fluids.  (10 Duran 2021 22:47)  Stable at present, complaints of SOB but better than prior  No CP  On ABX  Echo consistent with moderate to severe MS      PAST MEDICAL & SURGICAL HISTORY:  Valvular heart disease  aortic and mitral    Hyperlipidemia    TIA (transient ischemic attack)  2010    Breast cancer  right s/p lumpectomy and radiation 2008    Hiatal hernia  s/p surgical repair     PVD (peripheral vascular disease)  left iliac  s/p surgical intervention -  unsuccessful    HTN (hypertension)    COPD (chronic obstructive pulmonary disease)  diagnosed   inhaler and nebulizer    Borderline diabetes  no meds    Depression    RA (rheumatoid arthritis)  diagnosed   oxycodone prn  neck/ lower back    Anxiety    Thrush  treated x 4 days  1 month ago  restarted medication  3-19-14 clortramazole 5x day    SIMÓN (obstructive sleep apnea)  category I severe pt does not use c/pap    Diverticulitis  hospitalized     TMJ (temporomandibular joint disorder)    LBBB (left bundle branch block)    CHF (congestive heart failure)    Spinal stenosis    Emphysema lung    GERD (gastroesophageal reflux disease)    Dry eyes    Glaucoma    Diabetes mellitus    Meniere disease    Aortic valve replaced  with bovine heart valve    Skin cancer  not melanoma    Alcoholism  last drink     Incontinence    S/P  section  x2 1965/     S/P appendectomy      S/P rotator cuff surgery  left 2004    S/P hernia surgery  left inguinal age 12    Hiatal hernia  s/p surgical repair per pt 2005    S/P lumpectomy, right breast      S/P carpal tunnel release  right     PVD (peripheral vascular disease)  s/p left iliac bypass which was unsuccessful  open repair    S/P AVR (aortic valve replacement)  about 5 yrs ago (2013?)    H/O sinus surgery    H/O:  section  2x        HPI:                PREVIOUS DIAGNOSTIC TESTING:      ECHO  FINDINGS:    STRESS  FINDINGS:    CATHETERIZATION  FINDINGS:    MEDICATIONS  (STANDING):  ALBUTerol    90 MICROgram(s) HFA Inhaler 2 Puff(s) Inhalation every 6 hours  ARIPiprazole 2 milliGRAM(s) Oral daily  aspirin  chewable 81 milliGRAM(s) Oral daily  budesonide 160 MICROgram(s)/formoterol 4.5 MICROgram(s) Inhaler 2 Puff(s) Inhalation two times a day  clonazePAM  Tablet 0.5 milliGRAM(s) Oral two times a day  clotrimazole Lozenge 1 Lozenge Oral <User Schedule>  dextrose 40% Gel 15 Gram(s) Oral once  dextrose 5%. 1000 milliLiter(s) (50 mL/Hr) IV Continuous <Continuous>  dextrose 5%. 1000 milliLiter(s) (100 mL/Hr) IV Continuous <Continuous>  dextrose 50% Injectable 25 Gram(s) IV Push once  dextrose 50% Injectable 12.5 Gram(s) IV Push once  dextrose 50% Injectable 25 Gram(s) IV Push once  dorzolamide 2% Ophthalmic Solution 1 Drop(s) Both EYES three times a day  dorzolamide 2% Ophthalmic Solution      enoxaparin Injectable 40 milliGRAM(s) SubCutaneous daily  famotidine    Tablet 20 milliGRAM(s) Oral daily  furosemide   Injectable 40 milliGRAM(s) IV Push two times a day  glucagon  Injectable 1 milliGRAM(s) IntraMuscular once  insulin lispro (ADMELOG) corrective regimen sliding scale   SubCutaneous Before meals and at bedtime  lactobacillus acidophilus 1 Tablet(s) Oral daily  latanoprost 0.005% Ophthalmic Solution 1 Drop(s) Both EYES at bedtime  linaclotide 145 MICROGram(s) Oral before breakfast  losartan 50 milliGRAM(s) Oral daily  lubiprostone 24 MICROGram(s) Oral two times a day  meropenem  IVPB 1000 milliGRAM(s) IV Intermittent every 8 hours  potassium chloride  10 mEq/100 mL IVPB 10 milliEquivalent(s) IV Intermittent every 1 hour  predniSONE   Tablet 5 milliGRAM(s) Oral daily  psyllium Powder 1 Packet(s) Oral daily  simvastatin 20 milliGRAM(s) Oral at bedtime  sulfaSALAzine 500 milliGRAM(s) Oral three times a day  tiotropium 18 MICROgram(s) Capsule 1 Capsule(s) Inhalation daily  venlafaxine XR. 150 milliGRAM(s) Oral daily    MEDICATIONS  (PRN):  artificial  tears Solution 1 Drop(s) Both EYES four times a day PRN Dry Eyes  guaiFENesin  milliGRAM(s) Oral every 12 hours PRN Cough  ondansetron Injectable 4 milliGRAM(s) IV Push every 6 hours PRN Nausea  oxycodone    5 mG/acetaminophen 325 mG 2 Tablet(s) Oral every 4 hours PRN Moderate Pain (4 - 6)      FAMILY HISTORY:  Family history of colon cancer in father  also prostate ca, skin ca,    Family history of breast cancer (Aunt)  aunt    FH: HTN (hypertension) (Father, Mother)        SOCIAL HISTORY:    CIGARETTES:    ALCOHOL:    REVIEW OF SYSTEMS:  CONSTITUTIONAL:  No night sweats.  No fatigue, malaise, lethargy.  No fever or chills.  HEENT:  Eyes:  No visual changes.  No eye pain.  No eye discharge.    ENT:  No runny nose.  No epistaxis.  No sinus pain.  No sore throat.  No odynophagia.  No ear pain.  No congestion.  RESPIRATORY:  No cough.  No wheeze.  No hemoptysis.  No shortness of breath.  CARDIOVASCULAR:  No chest pains.  No palpitations. No shortness of breath, orthopnea or PND.  GASTROINTESTINAL:  No abdominal pain.  No nausea or vomiting.  No diarrhea or constipation.  No hematemesis.  No hematochezia.  No melena.  GENITOURINARY:  No urgency.  No frequency.  No dysuria.  No hematuria.  No obstructive symptoms.  No discharge.  No pain.  No significant abnormal bleeding.  MUSCULOSKELETAL:  No musculoskeletal pain.  No joint swelling.  No arthritis.  NEUROLOGICAL:  No tingling or numbness or weakness.  PSYCHIATRIC:  No confusion  SKIN:  No rashes.  No lesions.  No wounds.  ENDOCRINE:  No unexplained weight loss.  No polydipsia.  No polyuria.  No polyphagia.  HEMATOLOGIC:  No anemia.  No purpura.  No petechiae.  No prolonged or excessive bleeding.   ALLERGIC AND IMMUNOLOGIC:  No pruritus.  No swelling.         Vital Signs Last 24 Hrs  T(C): 36.3 (2021 08:00), Max: 38 (2021 03:15)  T(F): 97.4 (2021 08:00), Max: 100.4 (2021 03:15)  HR: 97 (2021 10:00) (94 - 122)  BP: 97/55 (2021 10:00) (92/52 - 163/80)  BP(mean): 66 (2021 10:00) (62 - 101)  RR: 23 (2021 10:00) (18 - 38)  SpO2: 99% (2021 10:00) (94% - 100%)    PHYSICAL EXAM-    Constitutional:  The patient does  appear acutely ill. The patient is alert.     Head: Head is normocephalic and atraumatic.      Neck: The patient's neck is supple without enlargement, has no palpable thyromegaly nor thyroid nodules and has no jugular venous distention. No audible carotid bruits. There are strong carotid pulses bilaterally. No JVD.     Cardiovascular: Regular rate and rhythm without S3, S4. there is a II/VI systolic and diastolic murmur heard.      Respiratory: B/L rhonchi    Abdomen: Soft, nontender, nondistended with positive bowel sounds.      Extremity: No tenderness. There is no pitting edema, skin discoloration, clubbing and cyanosis.     N    INTERPRETATION OF TELEMETRY:    ECG:    I&O's Detail    2021 07:01  -  2021 07:00  --------------------------------------------------------  IN:    IV PiggyBack: 100 mL  Total IN: 100 mL    OUT:    Incontinent per Collection Bag (mL): 1300 mL    Voided (mL): 300 mL  Total OUT: 1600 mL    Total NET: -1500 mL          LABS:                        10.7   10.64 )-----------( 176      ( 2021 08:55 )             33.2     06-13    139  |  103  |  17  ----------------------------<  119<H>  3.2<L>   |  33<H>  |  0.56    Ca    7.7<L>      2021 08:55  Mg     2.3         TPro  6.3  /  Alb  3.0<L>  /  TBili  0.4  /  DBili  x   /  AST  42<H>  /  ALT  67  /  AlkPhos  52  06-12    CARDIAC MARKERS ( 2021 06:45 )  0.060 ng/mL / x     / x     / x     / x              I&O's Summary    2021 07:01  -  2021 07:00  --------------------------------------------------------  IN: 100 mL / OUT: 1600 mL / NET: -1500 mL      BNP  RADIOLOGY & ADDITIONAL STUDIES:

## 2021-06-13 NOTE — PROGRESS NOTE ADULT - SUBJECTIVE AND OBJECTIVE BOX
Patient is a 80y old  Female who presents with a chief complaint of Sepsis, PNA (2021 15:00)      BRIEF HOSPITAL COURSE: 81 y/o F with a h/o COPD (on home O2), CHFpEF, s/p bioprosthetic AVR, HTN, HLD, admitted on 6/10 with fever of 105'F, lobar pneumonia, and diverticulitis. Hospital course complicated by acute CHF/volume overload requiring NIPPV and aggressive diuresis.     Events last 24 hours: Responded urgently to overhead RRT as patient developed acute onset respiratory distress and hypoxemia. Diffuse rales auscultated bilaterally. Tachycardic (HR 140s), hypertensive (+), and hypoxemic (SpO2 70s). Stat CXR shows worsened diffuse pulmonic congestion. Patient started on BiPAP emergently. HR and BP controlled. Diuresed. Transferred back to ICU.        PAST MEDICAL & SURGICAL HISTORY:  Valvular heart disease  aortic and mitral    Hyperlipidemia    TIA (transient ischemic attack)  2010    Breast cancer  right s/p lumpectomy and radiation     Hiatal hernia  s/p surgical repair     PVD (peripheral vascular disease)  left iliac  s/p surgical intervention -  unsuccessful    HTN (hypertension)    COPD (chronic obstructive pulmonary disease)  diagnosed   inhaler and nebulizer    Borderline diabetes  no meds    Depression    RA (rheumatoid arthritis)  diagnosed   oxycodone prn  neck/ lower back    Anxiety    Thrush  treated x 4 days  1 month ago  restarted medication  3-19-14 clortramazole 5x day    SIMÓN (obstructive sleep apnea)  category I severe pt does not use c/pap    Diverticulitis  hospitalized     TMJ (temporomandibular joint disorder)    LBBB (left bundle branch block)    CHF (congestive heart failure)    Spinal stenosis    Emphysema lung    GERD (gastroesophageal reflux disease)    Dry eyes    Glaucoma    Diabetes mellitus    Meniere disease    Aortic valve replaced  with bovine heart valve    Skin cancer  not melanoma    Alcoholism  last drink     Incontinence    S/P  section  x2 1965/     S/P appendectomy  2001    S/P rotator cuff surgery  left 2004    S/P hernia surgery  left inguinal age 12    Hiatal hernia  s/p surgical repair per pt 2005    S/P lumpectomy, right breast  2008    S/P carpal tunnel release  right     PVD (peripheral vascular disease)  s/p left iliac bypass which was unsuccessful - open repair    S/P AVR (aortic valve replacement)  about 5 yrs ago (2013?)    H/O sinus surgery    H/O:  section  2x        Review of Systems:  CONSTITUTIONAL: No fever, chills, or fatigue  EYES: No eye pain, visual disturbances, or discharge  ENMT:  No difficulty hearing, tinnitus, vertigo; No sinus or throat pain  NECK: No pain or stiffness  RESPIRATORY: No cough, wheezing, chills or hemoptysis; (+) shortness of breath  CARDIOVASCULAR: No chest pain, palpitations, dizziness, or leg swelling  GASTROINTESTINAL: No abdominal or epigastric pain. No nausea, vomiting, or hematemesis; No diarrhea or constipation. No melena or hematochezia.  GENITOURINARY: No dysuria, frequency, hematuria, or incontinence  NEUROLOGICAL: No headaches, memory loss, loss of strength, numbness, or tremors  SKIN: No itching, burning, rashes, or lesions   MUSCULOSKELETAL: No joint pain or swelling; No muscle, back, or extremity pain  PSYCHIATRIC: No depression, (+)anxiety, no mood swings, or difficulty sleeping      Medications:  clotrimazole Lozenge 1 Lozenge Oral <User Schedule>  meropenem  IVPB 1000 milliGRAM(s) IV Intermittent every 8 hours  furosemide   Injectable 40 milliGRAM(s) IV Push daily  labetalol Injectable 10 milliGRAM(s) IV Push once  losartan 50 milliGRAM(s) Oral daily  ALBUTerol    90 MICROgram(s) HFA Inhaler 2 Puff(s) Inhalation every 6 hours  budesonide 160 MICROgram(s)/formoterol 4.5 MICROgram(s) Inhaler 2 Puff(s) Inhalation two times a day  guaiFENesin  milliGRAM(s) Oral every 12 hours PRN  tiotropium 18 MICROgram(s) Capsule 1 Capsule(s) Inhalation daily  ARIPiprazole 2 milliGRAM(s) Oral daily  clonazePAM  Tablet 0.5 milliGRAM(s) Oral two times a day  ondansetron Injectable 4 milliGRAM(s) IV Push every 6 hours PRN  oxycodone    5 mG/acetaminophen 325 mG 2 Tablet(s) Oral every 4 hours PRN  venlafaxine XR. 150 milliGRAM(s) Oral daily  aspirin  chewable 81 milliGRAM(s) Oral daily  enoxaparin Injectable 40 milliGRAM(s) SubCutaneous daily  famotidine    Tablet 20 milliGRAM(s) Oral daily  linaclotide 145 MICROGram(s) Oral before breakfast  lubiprostone 24 MICROGram(s) Oral two times a day  psyllium Powder 1 Packet(s) Oral daily  sulfaSALAzine 500 milliGRAM(s) Oral three times a day  dextrose 40% Gel 15 Gram(s) Oral once  dextrose 50% Injectable 25 Gram(s) IV Push once  dextrose 50% Injectable 12.5 Gram(s) IV Push once  dextrose 50% Injectable 25 Gram(s) IV Push once  glucagon  Injectable 1 milliGRAM(s) IntraMuscular once  insulin lispro (ADMELOG) corrective regimen sliding scale   SubCutaneous Before meals and at bedtime  predniSONE   Tablet 5 milliGRAM(s) Oral daily  simvastatin 20 milliGRAM(s) Oral at bedtime  dextrose 5%. 1000 milliLiter(s) IV Continuous <Continuous>  dextrose 5%. 1000 milliLiter(s) IV Continuous <Continuous>  artificial  tears Solution 1 Drop(s) Both EYES four times a day PRN  dorzolamide 2% Ophthalmic Solution      dorzolamide 2% Ophthalmic Solution 1 Drop(s) Both EYES three times a day  latanoprost 0.005% Ophthalmic Solution 1 Drop(s) Both EYES at bedtime  lactobacillus acidophilus 1 Tablet(s) Oral daily        ICU Vital Signs Last 24 Hrs  T(C): 36.4 (2021 20:17), Max: 36.8 (2021 04:00)  T(F): 97.6 (2021 20:17), Max: 98.3 (2021 12:00)  HR: 94 (2021 20:17) (81 - 106)  BP: 120/75 (2021 20:17) (94/53 - 151/75)  BP(mean): 82 (2021 16:00) (63 - 94)  ABP: --  ABP(mean): --  RR: 24 (2021 20:17) (16 - 30)  SpO2: 95% (2021 20:17) (95% - 100%)          I&O's Detail    2021 07:01  -  2021 07:00  --------------------------------------------------------  IN:    IV PiggyBack: 370 mL  Total IN: 370 mL    OUT:    Incontinent per Collection Bag (mL): 1400 mL  Total OUT: 1400 mL    Total NET: -1030 mL      2021 07:01  -  2021 03:42  --------------------------------------------------------  IN:    IV PiggyBack: 100 mL  Total IN: 100 mL    OUT:    Incontinent per Collection Bag (mL): 300 mL    Voided (mL): 300 mL  Total OUT: 600 mL    Total NET: -500 mL            LABS:                        13.5   14.52 )-----------( 213      ( 2021 06:56 )             43.0     0612    136  |  105  |  12  ----------------------------<  141<H>  3.9   |  28  |  0.53    Ca    7.9<L>      2021 06:45    TPro  6.3  /  Alb  3.0<L>  /  TBili  0.4  /  DBili  x   /  AST  42<H>  /  ALT  67  /  AlkPhos  52  06-12      CARDIAC MARKERS ( 2021 06:45 )  0.060 ng/mL / x     / x     / x     / x      CARDIAC MARKERS ( 2021 06:56 )  0.161 ng/mL / x     / x     / x     / x          CAPILLARY BLOOD GLUCOSE      POCT Blood Glucose.: 215 mg/dL (2021 21:15)        CULTURES:  Rapid RVP Result: NotDetec (06-10-21 @ 23:32)  Culture Results:   No growth (06-10-21 @ 17:07)  Culture Results:   No growth to date. (06-10-21 @ 13:22)  Culture Results:   No growth to date. (06-10-21 @ 13:22)        Physical Examination:    General: No acute distress.  Alert, oriented, interactive, nonfocal    HEENT: Pupils equal, reactive to light.  Symmetric.    PULM: Clear to auscultation bilaterally, no significant sputum production    CVS: Regular rate and rhythm, no murmurs, rubs, or gallops    ABD: Soft, nondistended, nontender, normoactive bowel sounds, no masses    EXT: No edema, nontender    SKIN: Warm and well perfused, no rashes noted.    NEURO: A&Ox3, strength 5/5 all extremities, cranial nerves grossly intact, no focal deficits        RADIOLOGY: ***        CRITICAL CARE TIME SPENT: ***  Time spent evaluating/treating patient with medical issues that pose a high risk for life threatening deterioration and/or end-organ damage, reviewing data/labs/imaging, discussing case with multidisciplinary team, discussing plan/goals of care with patient/family. Non-inclusive of procedure time.   Patient is a 80y old  Female who presents with a chief complaint of Sepsis, PNA (2021 15:00)      BRIEF HOSPITAL COURSE: 79 y/o F with a h/o COPD (on home O2), CHFpEF, s/p bioprosthetic AVR, HTN, HLD, admitted on 6/10 with fever of 105'F, lobar pneumonia, and diverticulitis. Hospital course complicated by acute CHF/volume overload requiring NIPPV and aggressive diuresis. TTE from  reveals LVEF of >55%, moderate-severe MS.    Events last 24 hours: Responded urgently to overhead RRT as patient developed acute onset respiratory distress and hypoxemia. Diffuse rales auscultated bilaterally. Tachycardic (HR 140s), hypertensive (+), and hypoxemic (SpO2 70s). Stat CXR shows worsened diffuse pulmonic congestion. Patient started on BiPAP emergently. HR and BP controlled. Diuresed. Transferred back to ICU.        PAST MEDICAL & SURGICAL HISTORY:  Valvular heart disease  aortic and mitral    Hyperlipidemia    TIA (transient ischemic attack)  2010    Breast cancer  right s/p lumpectomy and radiation 2008    Hiatal hernia  s/p surgical repair     PVD (peripheral vascular disease)  left iliac  s/p surgical intervention   unsuccessful    HTN (hypertension)    COPD (chronic obstructive pulmonary disease)  diagnosed   inhaler and nebulizer    Borderline diabetes  no meds    Depression    RA (rheumatoid arthritis)  diagnosed   oxycodone prn  neck/ lower back    Anxiety    Thrush  treated x 4 days  1 month ago  restarted medication  3-19-14 clortramazole 5x day    SIMÓN (obstructive sleep apnea)  category I severe pt does not use c/pap    Diverticulitis  hospitalized     TMJ (temporomandibular joint disorder)    LBBB (left bundle branch block)    CHF (congestive heart failure)    Spinal stenosis    Emphysema lung    GERD (gastroesophageal reflux disease)    Dry eyes    Glaucoma    Diabetes mellitus    Meniere disease    Aortic valve replaced  with bovine heart valve    Skin cancer  not melanoma    Alcoholism  last drink     Incontinence    S/P  section  x2 1965/     S/P appendectomy      S/P rotator cuff surgery  left 2004    S/P hernia surgery  left inguinal age 12    Hiatal hernia  s/p surgical repair per pt 2005    S/P lumpectomy, right breast  2008    S/P carpal tunnel release  right     PVD (peripheral vascular disease)  s/p left iliac bypass which was unsuccessful  open repair    S/P AVR (aortic valve replacement)  about 5 yrs ago (2013?)    H/O sinus surgery    H/O:  section  2x        Review of Systems:  CONSTITUTIONAL: No fever, chills, or fatigue  EYES: No eye pain, visual disturbances, or discharge  ENMT:  No difficulty hearing, tinnitus, vertigo; No sinus or throat pain  NECK: No pain or stiffness  RESPIRATORY: No cough, wheezing, chills or hemoptysis; (+) shortness of breath  CARDIOVASCULAR: No chest pain, palpitations, dizziness, or leg swelling  GASTROINTESTINAL: No abdominal or epigastric pain. No nausea, vomiting, or hematemesis; No diarrhea or constipation. No melena or hematochezia.  GENITOURINARY: No dysuria, frequency, hematuria, or incontinence  NEUROLOGICAL: No headaches, memory loss, loss of strength, numbness, or tremors  SKIN: No itching, burning, rashes, or lesions   MUSCULOSKELETAL: No joint pain or swelling; No muscle, back, or extremity pain  PSYCHIATRIC: No depression, (+)anxiety, no mood swings, or difficulty sleeping      Medications:  clotrimazole Lozenge 1 Lozenge Oral <User Schedule>  meropenem  IVPB 1000 milliGRAM(s) IV Intermittent every 8 hours  furosemide   Injectable 40 milliGRAM(s) IV Push daily  labetalol Injectable 10 milliGRAM(s) IV Push once  losartan 50 milliGRAM(s) Oral daily  ALBUTerol    90 MICROgram(s) HFA Inhaler 2 Puff(s) Inhalation every 6 hours  budesonide 160 MICROgram(s)/formoterol 4.5 MICROgram(s) Inhaler 2 Puff(s) Inhalation two times a day  guaiFENesin  milliGRAM(s) Oral every 12 hours PRN  tiotropium 18 MICROgram(s) Capsule 1 Capsule(s) Inhalation daily  ARIPiprazole 2 milliGRAM(s) Oral daily  clonazePAM  Tablet 0.5 milliGRAM(s) Oral two times a day  ondansetron Injectable 4 milliGRAM(s) IV Push every 6 hours PRN  oxycodone    5 mG/acetaminophen 325 mG 2 Tablet(s) Oral every 4 hours PRN  venlafaxine XR. 150 milliGRAM(s) Oral daily  aspirin  chewable 81 milliGRAM(s) Oral daily  enoxaparin Injectable 40 milliGRAM(s) SubCutaneous daily  famotidine    Tablet 20 milliGRAM(s) Oral daily  linaclotide 145 MICROGram(s) Oral before breakfast  lubiprostone 24 MICROGram(s) Oral two times a day  psyllium Powder 1 Packet(s) Oral daily  sulfaSALAzine 500 milliGRAM(s) Oral three times a day  dextrose 40% Gel 15 Gram(s) Oral once  dextrose 50% Injectable 25 Gram(s) IV Push once  dextrose 50% Injectable 12.5 Gram(s) IV Push once  dextrose 50% Injectable 25 Gram(s) IV Push once  glucagon  Injectable 1 milliGRAM(s) IntraMuscular once  insulin lispro (ADMELOG) corrective regimen sliding scale   SubCutaneous Before meals and at bedtime  predniSONE   Tablet 5 milliGRAM(s) Oral daily  simvastatin 20 milliGRAM(s) Oral at bedtime  dextrose 5%. 1000 milliLiter(s) IV Continuous <Continuous>  dextrose 5%. 1000 milliLiter(s) IV Continuous <Continuous>  artificial  tears Solution 1 Drop(s) Both EYES four times a day PRN  dorzolamide 2% Ophthalmic Solution      dorzolamide 2% Ophthalmic Solution 1 Drop(s) Both EYES three times a day  latanoprost 0.005% Ophthalmic Solution 1 Drop(s) Both EYES at bedtime  lactobacillus acidophilus 1 Tablet(s) Oral daily        ICU Vital Signs Last 24 Hrs  T(C): 36.4 (2021 20:17), Max: 36.8 (2021 04:00)  T(F): 97.6 (2021 20:17), Max: 98.3 (2021 12:00)  HR: 94 (2021 20:17) (81 - 106)  BP: 120/75 (2021 20:17) (94/53 - 151/75)  BP(mean): 82 (2021 16:00) (63 - 94)  ABP: --  ABP(mean): --  RR: 24 (2021 20:17) (16 - 30)  SpO2: 95% (2021 20:17) (95% - 100%)          I&O's Detail    2021 07:01  -  2021 07:00  --------------------------------------------------------  IN:    IV PiggyBack: 370 mL  Total IN: 370 mL    OUT:    Incontinent per Collection Bag (mL): 1400 mL  Total OUT: 1400 mL    Total NET: -1030 mL      2021 07:01  -  2021 03:42  --------------------------------------------------------  IN:    IV PiggyBack: 100 mL  Total IN: 100 mL    OUT:    Incontinent per Collection Bag (mL): 300 mL    Voided (mL): 300 mL  Total OUT: 600 mL    Total NET: -500 mL            LABS:                        13.5   14.52 )-----------( 213      ( 2021 06:56 )             43.0     06-12    136  |  105  |  12  ----------------------------<  141<H>  3.9   |  28  |  0.53    Ca    7.9<L>      2021 06:45    TPro  6.3  /  Alb  3.0<L>  /  TBili  0.4  /  DBili  x   /  AST  42<H>  /  ALT  67  /  AlkPhos  52  06-12      CARDIAC MARKERS ( 2021 06:45 )  0.060 ng/mL / x     / x     / x     / x      CARDIAC MARKERS ( 2021 06:56 )  0.161 ng/mL / x     / x     / x     / x          CAPILLARY BLOOD GLUCOSE      POCT Blood Glucose.: 215 mg/dL (2021 21:15)        CULTURES:  Rapid RVP Result: NotDetec (06-10-21 @ 23:32)  Culture Results:   No growth (06-10-21 @ 17:07)  Culture Results:   No growth to date. (06-10-21 @ 13:22)  Culture Results:   No growth to date. (06-10-21 @ 13:22)        Physical Examination:    General: severe acute resp distress.  Alert, on BiPAP, restless, anxious    HEENT: Pupils equal, reactive to light.  Symmetric.    PULM: diffuse rales bilaterally (more pronounced at bases), no significant sputum production    CVS: tachycardic, reg rhythm, no murmurs, rubs, or gallops    ABD: Soft, distended, nontender, normoactive bowel sounds, no masses    EXT: No edema, nontender    SKIN: Warm and well perfused, no rashes noted.    NEURO: very anxious and restless, follows commands, difficulty verbalizing given SOB, moves all extremities spontaneously        RADIOLOGY:     < from: Xray Chest 1 View- PORTABLE-Urgent (Xray Chest 1 View- PORTABLE-Urgent .) (21 @ 06:51) >  Heart could be enlarged. Spinal stimulator from below and sternotomy again noted. Prosthetic heart valve again seen.    On Polina 10 there was a density in the anterior right upper lobe likely infiltrate related. This is again seen.    Presently there is now a diffuse interstitial congestive picture which is fairlyadvanced.    IMPRESSION: Diffuse fairly advanced interstitial CHF has appeared in one day's time.        < from: CT Abdomen and Pelvis w/ IV Cont (06.10.21 @ 16:34) >  FINDINGS:    LOWER CHEST: Cardiomegaly. Interlobular septal thickening, consistent with changes of CHF. Bibasilar atelectasis.    LIVER: Within normal limits.  BILE DUCTS: Normal caliber.  GALLBLADDER: Within normal limits.  SPLEEN: Within normal limits.  PANCREAS: Within normal limits.  ADRENALS: Within normal limits.  KIDNEYS/URETERS: Within normal limits.    BLADDER: Within normal limits.  REPRODUCTIVE ORGANS: Uterus and bilateral adnexa within normal limits.    BOWEL: No bowel obstruction. Appendix is not visualized. Diverticulosis with changes of acute diverticulitis at the level of the mid sigmoid colon.  PERITONEUM: No ascites. No evidence of diverticular abscess. No pneumoperitoneum.  VESSELS:  Atherosclerotic calcifications.  RETROPERITONEUM: No lymphadenopathy.  ABDOMINAL WALL: Within normal limits.  BONES: Degenerative changes and scoliosis. Diffuse osteopenia. Spinal stimulator.    IMPRESSION:  *  Acute diverticulitis involving the mid sigmoid colon. No evidence of diverticular abscess.  *  Cardiomegaly and CHF at the lung bases.          CRITICAL CARE TIME SPENT: 70 mins  Time spent evaluating/treating patient with medical issues that pose a high risk for life threatening deterioration and/or end-organ damage, reviewing data/labs/imaging, discussing case with multidisciplinary team, discussing plan/goals of care with patient/family. Non-inclusive of procedure time.

## 2021-06-13 NOTE — PROGRESS NOTE ADULT - SUBJECTIVE AND OBJECTIVE BOX
Events Overnight: Patient again developed severe respiratory distress, with hypoxia, requiring bipap,  on bipap    HPI:   80F with PMHx of COPD on home O2/steroids, CHF,  Type II Diabetes   sp AVR who presented to ED with fever, chills, and cough. Initially in ED had temp to 105  Subsequently with abdominal pain  Was treated with IV abx and fluids. Imaging revealed a RML infiltrate and diverticulitis.  Patient received IV fluid  and BP improved.  Overnight on 6/10  she developed increased sob after fluid resuscitation, requiring bipap  cxr  showed worsening. chf, minimally elevated troponin , Had cath in 2020,   echo shoes moderate to severe mitral stenosis, ef 55%  improved had no fever, was transferred to floor when she had another episode again showing, chf,  On meropenem for diverticulitis and pneumonia    ROS -  abdominal pain better, dyapneiac,  improved, no chest pain, no headache, no fever, no chills,     PAST MEDICAL & SURGICAL HISTORY:  Valvular heart disease  aortic and mitral  Hyperlipidemia    Physical Exam:        MEDICATIONS  (STANDING):  ALBUTerol    90 MICROgram(s) HFA Inhaler 2 Puff(s) Inhalation every 6 hours  ARIPiprazole 2 milliGRAM(s) Oral daily  aspirin  chewable 81 milliGRAM(s) Oral daily  budesonide 160 MICROgram(s)/formoterol 4.5 MICROgram(s) Inhaler 2 Puff(s) Inhalation two times a day  clonazePAM  Tablet 0.5 milliGRAM(s) Oral two times a day  clotrimazole Lozenge 1 Lozenge Oral <User Schedule>  dextrose 40% Gel 15 Gram(s) Oral once  dextrose 5%. 1000 milliLiter(s) (50 mL/Hr) IV Continuous <Continuous>  dextrose 5%. 1000 milliLiter(s) (100 mL/Hr) IV Continuous <Continuous>  dextrose 50% Injectable 25 Gram(s) IV Push once  dextrose 50% Injectable 12.5 Gram(s) IV Push once  dextrose 50% Injectable 25 Gram(s) IV Push once  dorzolamide 2% Ophthalmic Solution 1 Drop(s) Both EYES three times a day  dorzolamide 2% Ophthalmic Solution      enoxaparin Injectable 40 milliGRAM(s) SubCutaneous daily  famotidine    Tablet 20 milliGRAM(s) Oral daily  furosemide   Injectable 40 milliGRAM(s) IV Push daily  glucagon  Injectable 1 milliGRAM(s) IntraMuscular once  insulin lispro (ADMELOG) corrective regimen sliding scale   SubCutaneous Before meals and at bedtime  lactobacillus acidophilus 1 Tablet(s) Oral daily  latanoprost 0.005% Ophthalmic Solution 1 Drop(s) Both EYES at bedtime  linaclotide 145 MICROGram(s) Oral before breakfast  losartan 50 milliGRAM(s) Oral daily  lubiprostone 24 MICROGram(s) Oral two times a day  meropenem  IVPB 1000 milliGRAM(s) IV Intermittent every 8 hours  predniSONE   Tablet 5 milliGRAM(s) Oral daily  psyllium Powder 1 Packet(s) Oral daily  simvastatin 20 milliGRAM(s) Oral at bedtime  sulfaSALAzine 500 milliGRAM(s) Oral three times a day  tiotropium 18 MICROgram(s) Capsule 1 Capsule(s) Inhalation daily  venlafaxine XR. 150 milliGRAM(s) Oral daily    MEDICATIONS  (PRN):  artificial  tears Solution 1 Drop(s) Both EYES four times a day PRN Dry Eyes  guaiFENesin  milliGRAM(s) Oral every 12 hours PRN Cough  ondansetron Injectable 4 milliGRAM(s) IV Push every 6 hours PRN Nausea  oxycodone    5 mG/acetaminophen 325 mG 2 Tablet(s) Oral every 4 hours PRN Moderate Pain (4 - 6)    Weight (kg): 65.1 (06-13 @ 03:15)    ICU Vital Signs Last 24 Hrs  T(C): 36.3 (13 Jun 2021 08:00), Max: 38 (13 Jun 2021 03:15)  T(F): 97.4 (13 Jun 2021 08:00), Max: 100.4 (13 Jun 2021 03:15)  HR: 96 (13 Jun 2021 09:00) (94 - 122)  BP: 102/57 (13 Jun 2021 09:00) (92/52 - 163/80)  BP(mean): 67 (13 Jun 2021 09:00) (62 - 101)  ABP: --  ABP(mean): --  RR: 23 (13 Jun 2021 09:00) (18 - 38)  SpO2: 98% (13 Jun 2021 09:00) (94% - 100%)          I&O's Summary    12 Jun 2021 07:01  -  13 Jun 2021 07:00  --------------------------------------------------------  IN: 100 mL / OUT: 1600 mL / NET: -1500 mL                        10.7   10.64 )-----------( 176      ( 13 Jun 2021 08:55 )             33.2       06-13    139  |  103  |  17  ----------------------------<  119<H>  3.2<L>   |  33<H>  |  0.56    Ca    7.7<L>      13 Jun 2021 08:55    TPro  6.3  /  Alb  3.0<L>  /  TBili  0.4  /  DBili  x   /  AST  42<H>  /  ALT  67  /  AlkPhos  52  06-12      CARDIAC MARKERS ( 12 Jun 2021 06:45 )  0.060 ng/mL / x     / x     / x     / x                    DVT Prophylaxis:                                                                 Advanced Directives:

## 2021-06-13 NOTE — PROGRESS NOTE ADULT - SUBJECTIVE AND OBJECTIVE BOX
Date of service: 06-13-21 @ 09:30    On bipap  Still with intermittent fevers, somnolent        ROS unable to obtain secondary to patient medical condition     MEDICATIONS  (STANDING):  ALBUTerol    90 MICROgram(s) HFA Inhaler 2 Puff(s) Inhalation every 6 hours  ARIPiprazole 2 milliGRAM(s) Oral daily  aspirin  chewable 81 milliGRAM(s) Oral daily  budesonide 160 MICROgram(s)/formoterol 4.5 MICROgram(s) Inhaler 2 Puff(s) Inhalation two times a day  clonazePAM  Tablet 0.5 milliGRAM(s) Oral two times a day  clotrimazole Lozenge 1 Lozenge Oral <User Schedule>  dextrose 40% Gel 15 Gram(s) Oral once  dextrose 5%. 1000 milliLiter(s) (50 mL/Hr) IV Continuous <Continuous>  dextrose 5%. 1000 milliLiter(s) (100 mL/Hr) IV Continuous <Continuous>  dextrose 50% Injectable 25 Gram(s) IV Push once  dextrose 50% Injectable 12.5 Gram(s) IV Push once  dextrose 50% Injectable 25 Gram(s) IV Push once  dorzolamide 2% Ophthalmic Solution 1 Drop(s) Both EYES three times a day  dorzolamide 2% Ophthalmic Solution      enoxaparin Injectable 40 milliGRAM(s) SubCutaneous daily  famotidine    Tablet 20 milliGRAM(s) Oral daily  furosemide   Injectable 40 milliGRAM(s) IV Push daily  glucagon  Injectable 1 milliGRAM(s) IntraMuscular once  insulin lispro (ADMELOG) corrective regimen sliding scale   SubCutaneous Before meals and at bedtime  lactobacillus acidophilus 1 Tablet(s) Oral daily  latanoprost 0.005% Ophthalmic Solution 1 Drop(s) Both EYES at bedtime  linaclotide 145 MICROGram(s) Oral before breakfast  losartan 50 milliGRAM(s) Oral daily  lubiprostone 24 MICROGram(s) Oral two times a day  meropenem  IVPB 1000 milliGRAM(s) IV Intermittent every 8 hours  predniSONE   Tablet 5 milliGRAM(s) Oral daily  psyllium Powder 1 Packet(s) Oral daily  simvastatin 20 milliGRAM(s) Oral at bedtime  sulfaSALAzine 500 milliGRAM(s) Oral three times a day  tiotropium 18 MICROgram(s) Capsule 1 Capsule(s) Inhalation daily  venlafaxine XR. 150 milliGRAM(s) Oral daily    MEDICATIONS  (PRN):  artificial  tears Solution 1 Drop(s) Both EYES four times a day PRN Dry Eyes  guaiFENesin  milliGRAM(s) Oral every 12 hours PRN Cough  ondansetron Injectable 4 milliGRAM(s) IV Push every 6 hours PRN Nausea  oxycodone    5 mG/acetaminophen 325 mG 2 Tablet(s) Oral every 4 hours PRN Moderate Pain (4 - 6)      Vital Signs Last 24 Hrs  T(C): 36.3 (13 Jun 2021 08:00), Max: 38 (13 Jun 2021 03:15)  T(F): 97.4 (13 Jun 2021 08:00), Max: 100.4 (13 Jun 2021 03:15)  HR: 96 (13 Jun 2021 09:00) (94 - 122)  BP: 102/57 (13 Jun 2021 09:00) (92/52 - 163/80)  BP(mean): 67 (13 Jun 2021 09:00) (62 - 101)  RR: 23 (13 Jun 2021 09:00) (18 - 38)  SpO2: 98% (13 Jun 2021 09:00) (94% - 100%)        Physical Exam:          PE:    Constitutional: frail looking  HEENT: NC/AT  Neck: supple; thyroid not palpable  Back: no tenderness  Respiratory: respiratory effort normal; diminished breath sounds  Cardiovascular: S1S2 regular, no murmurs  Abdomen: soft, not tender, mildly distended, positive BS; no liver or spleen organomegaly  Genitourinary: no suprapubic tenderness  Musculoskeletal: no muscle tenderness, no joint swelling or tenderness  Neurological/ Psychiatric:   moving all extremities  Skin: no rashes; no palpable lesions    Labs: all available labs reviewed                            Labs:                          Labs:                        10.7   10.64 )-----------( 176      ( 13 Jun 2021 08:55 )             33.2     06-13    139  |  103  |  17  ----------------------------<  119<H>  3.2<L>   |  33<H>  |  0.56    Ca    7.7<L>      13 Jun 2021 08:55    TPro  6.3  /  Alb  3.0<L>  /  TBili  0.4  /  DBili  x   /  AST  42<H>  /  ALT  67  /  AlkPhos  52  06-12           Cultures:       Culture - Urine (collected 06-10-21 @ 17:07)  Source: .Urine None  Final Report (06-11-21 @ 14:50):    No growth    Culture - Blood (collected 06-10-21 @ 13:22)  Source: .Blood None  Preliminary Report (06-11-21 @ 19:02):    No growth to date.    Culture - Blood (collected 06-10-21 @ 13:22)  Source: .Blood None  Preliminary Report (06-11-21 @ 19:02):    No growth to date.            < from: CT Abdomen and Pelvis w/ IV Cont (06.10.21 @ 16:34) >    EXAM:  CT ABDOMEN AND PELVIS IC                            PROCEDURE DATE:  06/10/2021          INTERPRETATION:  CLINICAL INFORMATION: Left lower quadrant abdominal pain. Evaluate for acute diverticulitis.    COMPARISON: 8/10/2020    PROCEDURE:  CTof the Abdomen and Pelvis was performed with intravenous contrast.  Intravenous contrast: 90 ml Omnipaque 350. 10 ml discarded.  Oral contrast: None.  Sagittal and coronal reformats were performed.    FINDINGS:    LOWER CHEST: Cardiomegaly. Interlobular septal thickening, consistent with changes of CHF. Bibasilar atelectasis.    LIVER: Within normal limits.  BILE DUCTS: Normal caliber.  GALLBLADDER: Within normal limits.  SPLEEN: Within normal limits.  PANCREAS: Within normal limits.  ADRENALS: Within normal limits.  KIDNEYS/URETERS: Within normal limits.    BLADDER: Within normal limits.  REPRODUCTIVE ORGANS: Uterus and bilateral adnexa within normal limits.    BOWEL: No bowel obstruction. Appendix is not visualized. Diverticulosis with changes of acute diverticulitis at the level of the mid sigmoid colon.  PERITONEUM: No ascites. No evidence of diverticular abscess. No pneumoperitoneum.  VESSELS:  Atherosclerotic calcifications.  RETROPERITONEUM: No lymphadenopathy.  ABDOMINAL WALL: Within normal limits.  BONES: Degenerative changes and scoliosis. Diffuse osteopenia. Spinal stimulator.    IMPRESSION:  *  Acute diverticulitis involving the mid sigmoid colon. No evidence of diverticular abscess.  *  Cardiomegaly and CHF at the lung bases.      < end of copied text >        Radiology: all available radiological tests reviewed    Advanced directives addressed: full resuscitation

## 2021-06-14 NOTE — PROGRESS NOTE ADULT - ASSESSMENT
80F with PMH of COPD on home O2, systolic CHF (EF: 40%), RA, Lumbar stenosis, Chronic Diverticulitis, Meniere disease, GERD, TMJ, SIMÓN, Thrush, Skin CA, PVD, Hiatal Hernia, Breast CA, HLD, Valvular heart disease admitted on 6/10 for evaluation of fever, chills and cough productive of clear sputum over preceding  6 weeks. Noted with abdominal pain that is intermittent. Prior to admission she took a course of zithromax without improvement. Upon admission was hypotensive improved with fluids, became more tachypneic and placed on bipap. History per medical record as patient unable to provide history.   1. Patient admitted with sepsis secondary to pneumonia versus diverticulitis; also noted with leukocytosis most likely reactive to infection  - follow up cultures   - serial cbc and monitor temperature   - oxygen and nebs as needed   - bipap per icu  - iv hydration and supportive care   - reviewed prior medical records to evaluate for resistant or atypical pathogens   - day #4 meropenem; will cover for lung and abdominal sources of infection  - tolerating antibiotics without rashes or side effects   2. other issues: per medicine

## 2021-06-14 NOTE — PROGRESS NOTE ADULT - SUBJECTIVE AND OBJECTIVE BOX
Hospital D # 4  ICU # 2    CC:  Sepsis     HPI:    81 y/o female with COPD on home O2, HFpEF (55%)--Mod to severe MS, RA, Lumbar stenosis s/p stimulator, Chronic Diverticulitis, Meniere disease, GERD, TMJ, SIMÓN, Thrush, Skin CA, PVD, Hiatal Hernia, Breast CA and HL presented to ED with complaint of fever, chills, increased cough x 6weeks.  Pt admitted with PNA and diverticulitis   In ED, pt met sepsis criteria, and pt is s/p cipro, flagyl and ~4L NS. Pt was hypotensive after fluid resuscitation. Pressors was ordered. Pt was evaluated by ICU. Bp improved with fluids. Pt developed pulm edema and admitted to ICU for further care    6/14:  Pt seen and examined in ICU. Sitting in chair on NC.  NAD.  Awake and alert.  Stable for tele tx    PMH:  As above.     PSH:  As above.     FH: Non Contributory other than those listed in HPI    Social History:  Former smoker    MEDICATIONS  (STANDING):  ALBUTerol    90 MICROgram(s) HFA Inhaler 2 Puff(s) Inhalation every 6 hours  ARIPiprazole 2 milliGRAM(s) Oral daily  aspirin  chewable 81 milliGRAM(s) Oral daily  budesonide 160 MICROgram(s)/formoterol 4.5 MICROgram(s) Inhaler 2 Puff(s) Inhalation two times a day  clonazePAM  Tablet 0.5 milliGRAM(s) Oral two times a day  clotrimazole Lozenge 1 Lozenge Oral <User Schedule>  dextrose 40% Gel 15 Gram(s) Oral once  dextrose 5%. 1000 milliLiter(s) (50 mL/Hr) IV Continuous <Continuous>  dextrose 5%. 1000 milliLiter(s) (100 mL/Hr) IV Continuous <Continuous>  dextrose 50% Injectable 25 Gram(s) IV Push once  dextrose 50% Injectable 12.5 Gram(s) IV Push once  dextrose 50% Injectable 25 Gram(s) IV Push once  dorzolamide 2% Ophthalmic Solution      dorzolamide 2% Ophthalmic Solution 1 Drop(s) Both EYES three times a day  enoxaparin Injectable 40 milliGRAM(s) SubCutaneous daily  famotidine    Tablet 20 milliGRAM(s) Oral daily  furosemide   Injectable 40 milliGRAM(s) IV Push two times a day  glucagon  Injectable 1 milliGRAM(s) IntraMuscular once  guaiFENesin  milliGRAM(s) Oral every 12 hours  insulin lispro (ADMELOG) corrective regimen sliding scale   SubCutaneous Before meals and at bedtime  lactobacillus acidophilus 1 Tablet(s) Oral daily  latanoprost 0.005% Ophthalmic Solution 1 Drop(s) Both EYES at bedtime  linaclotide 145 MICROGram(s) Oral before breakfast  lubiprostone 24 MICROGram(s) Oral two times a day  meropenem  IVPB 1000 milliGRAM(s) IV Intermittent every 8 hours  metoprolol tartrate 25 milliGRAM(s) Oral two times a day  predniSONE   Tablet 5 milliGRAM(s) Oral daily  psyllium Powder 1 Packet(s) Oral daily  simvastatin 20 milliGRAM(s) Oral at bedtime  sulfaSALAzine 500 milliGRAM(s) Oral three times a day  tiotropium 18 MICROgram(s) Capsule 1 Capsule(s) Inhalation daily  venlafaxine XR. 150 milliGRAM(s) Oral daily    MEDICATIONS  (PRN):  acetaminophen   Tablet .. 650 milliGRAM(s) Oral every 6 hours PRN Temp greater or equal to 38.5C (101.3F), Mild Pain (1 - 3)  artificial  tears Solution 1 Drop(s) Both EYES four times a day PRN Dry Eyes  ondansetron Injectable 4 milliGRAM(s) IV Push every 6 hours PRN Nausea  oxycodone    5 mG/acetaminophen 325 mG 2 Tablet(s) Oral every 4 hours PRN Moderate Pain (4 - 6)      Allergies: NKDA    ROS:  SEE BELOW        ICU Vital Signs Last 24 Hrs  T(C): 36.9 (14 Jun 2021 06:00), Max: 38.2 (13 Jun 2021 12:00)  T(F): 98.5 (14 Jun 2021 06:00), Max: 100.8 (13 Jun 2021 12:00)  HR: 87 (14 Jun 2021 10:40) (77 - 115)  BP: 109/49 (14 Jun 2021 09:00) (90/50 - 157/69)  BP(mean): 64 (14 Jun 2021 09:00) (58 - 92)  ABP: --  ABP(mean): --  RR: 20 (14 Jun 2021 09:00) (14 - 30)  SpO2: 98% (14 Jun 2021 10:40) (95% - 100%)          I&O's Summary    13 Jun 2021 07:01  -  14 Jun 2021 07:00  --------------------------------------------------------  IN: 500 mL / OUT: 2500 mL / NET: -2000 mL        Physical Exam:  SEE BELOW                          9.8    8.08  )-----------( 154      ( 14 Jun 2021 06:51 )             30.8       06-14    136  |  98  |  17  ----------------------------<  95  3.5   |  36<H>  |  0.55    Ca    7.9<L>      14 Jun 2021 06:51  Mg     2.3     06-13                      DVT Prophylaxis:                                                            Contraindication:     Advanced Directives:    Discussed with:    Visit Information:  Time spent excluding procedure:      ** Time is exclusive of billed procedures and/or teaching and/or routine family updates.

## 2021-06-14 NOTE — PROGRESS NOTE ADULT - ASSESSMENT
IMP:    81 y/o female with COPD on home O2, HFpEF (55%)--Mod to severe MS, RA, Lumbar stenosis s/p stimulator, Chronic Diverticulitis, Meniere disease, GERD, TMJ, SIMÓN, Thrush, Skin CA, PVD, Hiatal Hernia, Breast CA and HL admitted with PNA sepsis POA and diverticulitis.  Acute pulm edema related to fluid resuscitation and MS    High risk for acute decompensation and deterioration     Plan:    NC.  No longer need NIV  Cont with furosemide 40 IV q12  BBx  Cards follow up  Jena (4)  Prednisone 5 daily  OOB  PO diet  DVT prophy--SCD and LMWH    Stable for tele tx-- d/w ICU staff on multi disciplinary rounds and pt-- All concerns addressed including but not limited to diagnosis, treatment plan and overall prognosis   Pt signed out and care tx to hospitalist service at 1030--d/w Dr Espinoza via phone

## 2021-06-14 NOTE — PROGRESS NOTE ADULT - ASSESSMENT
Acute decompensated HFPEF- hypervolemic   continue iv lasix today for diuresis.    Diuresis with close monitoring of the renal function and electrolytes.  Goal potassium of 4 and magnesium of 2.   Strict I/O and daily wt checks. Low sodium diet. Nutrition education.     Mitral stenosis- Goal HR 50-60/min.  Mild fever.  BP optimal.  DC losartan.  Can increased metoprolol to decrease HR for better hemodynamics.  Later in the course can reasses gradient across the mitral valve with transthoracic echo.  Peak gradient 5 with high heart rate.    Sepsis- Management per primary team.     Other medical issues- Management per primary team.   Thank you for allowing me to participate in the care of this patient. Please feel free to contact me with any questions.

## 2021-06-14 NOTE — PROGRESS NOTE ADULT - SUBJECTIVE AND OBJECTIVE BOX
Date of service: 06-14-21 @ 15:32      Patient lying in bed; still with intermittent fevers  Mildly short of breath, at times desaturates to high 80%    ROS: unable to obtain secondary to patient medical condition     MEDICATIONS  (STANDING):  ALBUTerol    90 MICROgram(s) HFA Inhaler 2 Puff(s) Inhalation every 6 hours  ARIPiprazole 2 milliGRAM(s) Oral daily  aspirin  chewable 81 milliGRAM(s) Oral daily  budesonide 160 MICROgram(s)/formoterol 4.5 MICROgram(s) Inhaler 2 Puff(s) Inhalation two times a day  clonazePAM  Tablet 0.25 milliGRAM(s) Oral two times a day  clotrimazole Lozenge 1 Lozenge Oral <User Schedule>  dextrose 40% Gel 15 Gram(s) Oral once  dextrose 5%. 1000 milliLiter(s) (50 mL/Hr) IV Continuous <Continuous>  dextrose 5%. 1000 milliLiter(s) (100 mL/Hr) IV Continuous <Continuous>  dextrose 50% Injectable 25 Gram(s) IV Push once  dextrose 50% Injectable 12.5 Gram(s) IV Push once  dextrose 50% Injectable 25 Gram(s) IV Push once  dorzolamide 2% Ophthalmic Solution      dorzolamide 2% Ophthalmic Solution 1 Drop(s) Both EYES three times a day  enoxaparin Injectable 40 milliGRAM(s) SubCutaneous daily  famotidine    Tablet 20 milliGRAM(s) Oral daily  furosemide   Injectable 40 milliGRAM(s) IV Push two times a day  glucagon  Injectable 1 milliGRAM(s) IntraMuscular once  guaiFENesin  milliGRAM(s) Oral every 12 hours  insulin lispro (ADMELOG) corrective regimen sliding scale   SubCutaneous Before meals and at bedtime  lactobacillus acidophilus 1 Tablet(s) Oral daily  latanoprost 0.005% Ophthalmic Solution 1 Drop(s) Both EYES at bedtime  linaclotide 145 MICROGram(s) Oral before breakfast  lubiprostone 24 MICROGram(s) Oral two times a day  meropenem  IVPB 1000 milliGRAM(s) IV Intermittent every 8 hours  metoprolol tartrate 25 milliGRAM(s) Oral two times a day  predniSONE   Tablet 5 milliGRAM(s) Oral daily  psyllium Powder 1 Packet(s) Oral daily  simvastatin 20 milliGRAM(s) Oral at bedtime  sulfaSALAzine 500 milliGRAM(s) Oral three times a day  tiotropium 18 MICROgram(s) Capsule 1 Capsule(s) Inhalation daily  venlafaxine XR. 150 milliGRAM(s) Oral daily    MEDICATIONS  (PRN):  acetaminophen   Tablet .. 650 milliGRAM(s) Oral every 6 hours PRN Temp greater or equal to 38.5C (101.3F), Mild Pain (1 - 3)  artificial  tears Solution 1 Drop(s) Both EYES four times a day PRN Dry Eyes  ondansetron Injectable 4 milliGRAM(s) IV Push every 6 hours PRN Nausea  oxyCODONE    IR 10 milliGRAM(s) Oral every 6 hours PRN Moderate Pain (4 - 6)      Vital Signs Last 24 Hrs  T(C): 36.9 (14 Jun 2021 10:00), Max: 37.8 (13 Jun 2021 16:00)  T(F): 98.5 (14 Jun 2021 10:00), Max: 100.1 (13 Jun 2021 16:00)  HR: 90 (14 Jun 2021 14:00) (77 - 115)  BP: 128/77 (14 Jun 2021 14:00) (90/50 - 128/77)  BP(mean): 90 (14 Jun 2021 14:00) (58 - 90)  RR: 19 (14 Jun 2021 14:00) (14 - 30)  SpO2: 100% (14 Jun 2021 14:00) (81% - 100%)        Physical Exam:          PE:    Constitutional: frail looking  HEENT: NC/AT  Neck: supple; thyroid not palpable  Back: no tenderness  Respiratory: respiratory effort normal; diminished breath sounds  Cardiovascular: S1S2 regular, no murmurs  Abdomen: soft, not tender, mildly distended, positive BS; no liver or spleen organomegaly  Genitourinary: no suprapubic tenderness  Musculoskeletal: no muscle tenderness, no joint swelling or tenderness  Neurological/ Psychiatric:   moving all extremities  Skin: no rashes; no palpable lesions    Labs: all available labs reviewed                            Labs:                          Labs:                         Labs:                        9.8    8.08  )-----------( 154      ( 14 Jun 2021 06:51 )             30.8     06-14    136  |  98  |  17  ----------------------------<  95  3.5   |  36<H>  |  0.55    Ca    7.9<L>      14 Jun 2021 06:51  Mg     2.3     06-13             Cultures:       Culture - Urine (collected 06-10-21 @ 17:07)  Source: .Urine None  Final Report (06-11-21 @ 14:50):    No growth    Culture - Blood (collected 06-10-21 @ 13:22)  Source: .Blood None  Preliminary Report (06-11-21 @ 19:02):    No growth to date.    Culture - Blood (collected 06-10-21 @ 13:22)  Source: .Blood None  Preliminary Report (06-11-21 @ 19:02):    No growth to date.              < from: CT Abdomen and Pelvis w/ IV Cont (06.10.21 @ 16:34) >    EXAM:  CT ABDOMEN AND PELVIS IC                            PROCEDURE DATE:  06/10/2021          INTERPRETATION:  CLINICAL INFORMATION: Left lower quadrant abdominal pain. Evaluate for acute diverticulitis.    COMPARISON: 8/10/2020    PROCEDURE:  CTof the Abdomen and Pelvis was performed with intravenous contrast.  Intravenous contrast: 90 ml Omnipaque 350. 10 ml discarded.  Oral contrast: None.  Sagittal and coronal reformats were performed.    FINDINGS:    LOWER CHEST: Cardiomegaly. Interlobular septal thickening, consistent with changes of CHF. Bibasilar atelectasis.    LIVER: Within normal limits.  BILE DUCTS: Normal caliber.  GALLBLADDER: Within normal limits.  SPLEEN: Within normal limits.  PANCREAS: Within normal limits.  ADRENALS: Within normal limits.  KIDNEYS/URETERS: Within normal limits.    BLADDER: Within normal limits.  REPRODUCTIVE ORGANS: Uterus and bilateral adnexa within normal limits.    BOWEL: No bowel obstruction. Appendix is not visualized. Diverticulosis with changes of acute diverticulitis at the level of the mid sigmoid colon.  PERITONEUM: No ascites. No evidence of diverticular abscess. No pneumoperitoneum.  VESSELS:  Atherosclerotic calcifications.  RETROPERITONEUM: No lymphadenopathy.  ABDOMINAL WALL: Within normal limits.  BONES: Degenerative changes and scoliosis. Diffuse osteopenia. Spinal stimulator.    IMPRESSION:  *  Acute diverticulitis involving the mid sigmoid colon. No evidence of diverticular abscess.  *  Cardiomegaly and CHF at the lung bases.      < end of copied text >        Radiology: all available radiological tests reviewed    Advanced directives addressed: full resuscitation

## 2021-06-14 NOTE — PROGRESS NOTE ADULT - SUBJECTIVE AND OBJECTIVE BOX
Patient is a 80y old  Female who presents with a chief complaint of Sepsis, PNA.       HPI:  80F with PMH of COPD on home O2, systolic CHF (EF: 40%), RA, Lumbar stenosis, Chronic Diverticulitis, Meniere disease, GERD, TMJ, SIMÓN, Thrush, Skin CA, PVD, Hiatal Hernia, Breast CA, HLD, Valvular heart disease presented to ED with complaint of fever, chills, increased cough x 6weeks.    She was diagnosed with sepsis and PNa.  She received IVF for hydration and later had decompensated HF.  She denies any symptoms at rest now sitting in chair.     PAST MEDICAL & SURGICAL HISTORY:  Valvular heart disease  aortic and mitral    Hyperlipidemia    TIA (transient ischemic attack)  2010    Breast cancer  right s/p lumpectomy and radiation     Hiatal hernia  s/p surgical repair     PVD (peripheral vascular disease)  left iliac  s/p surgical intervention -  unsuccessful    HTN (hypertension)    COPD (chronic obstructive pulmonary disease)  diagnosed   inhaler and nebulizer    Borderline diabetes  no meds    Depression    RA (rheumatoid arthritis)  diagnosed   oxycodone prn  neck/ lower back    Anxiety    Thrush  treated x 4 days  1 month ago  restarted medication  3-19-14 clortramazole 5x day    SIMÓN (obstructive sleep apnea)  category I severe pt does not use c/pap    Diverticulitis  hospitalized     TMJ (temporomandibular joint disorder)    LBBB (left bundle branch block)    CHF (congestive heart failure)    Spinal stenosis    Emphysema lung    GERD (gastroesophageal reflux disease)    Dry eyes    Glaucoma    Diabetes mellitus    Meniere disease    Aortic valve replaced  with bovine heart valve    Skin cancer  not melanoma    Alcoholism  last drink     Incontinence    S/P  section  x2 1965/     S/P appendectomy      S/P rotator cuff surgery  left 2004    S/P hernia surgery  left inguinal age 12    Hiatal hernia  s/p surgical repair per pt 2005    S/P lumpectomy, right breast  2008    S/P carpal tunnel release  right     PVD (peripheral vascular disease)  s/p left iliac bypass which was unsuccessful - open repair    S/P AVR (aortic valve replacement)  about 5 yrs ago (2013?)    H/O sinus surgery    H/O:  section  2x        MEDICATIONS  (STANDING):  ALBUTerol    90 MICROgram(s) HFA Inhaler 2 Puff(s) Inhalation every 6 hours  ARIPiprazole 2 milliGRAM(s) Oral daily  aspirin  chewable 81 milliGRAM(s) Oral daily  budesonide 160 MICROgram(s)/formoterol 4.5 MICROgram(s) Inhaler 2 Puff(s) Inhalation two times a day  clonazePAM  Tablet 0.5 milliGRAM(s) Oral two times a day  clotrimazole Lozenge 1 Lozenge Oral <User Schedule>  dextrose 40% Gel 15 Gram(s) Oral once  dextrose 5%. 1000 milliLiter(s) (50 mL/Hr) IV Continuous <Continuous>  dextrose 5%. 1000 milliLiter(s) (100 mL/Hr) IV Continuous <Continuous>  dextrose 50% Injectable 25 Gram(s) IV Push once  dextrose 50% Injectable 12.5 Gram(s) IV Push once  dextrose 50% Injectable 25 Gram(s) IV Push once  dorzolamide 2% Ophthalmic Solution      dorzolamide 2% Ophthalmic Solution 1 Drop(s) Both EYES three times a day  enoxaparin Injectable 40 milliGRAM(s) SubCutaneous daily  famotidine    Tablet 20 milliGRAM(s) Oral daily  furosemide   Injectable 40 milliGRAM(s) IV Push two times a day  glucagon  Injectable 1 milliGRAM(s) IntraMuscular once  insulin lispro (ADMELOG) corrective regimen sliding scale   SubCutaneous Before meals and at bedtime  lactobacillus acidophilus 1 Tablet(s) Oral daily  latanoprost 0.005% Ophthalmic Solution 1 Drop(s) Both EYES at bedtime  linaclotide 145 MICROGram(s) Oral before breakfast  lubiprostone 24 MICROGram(s) Oral two times a day  meropenem  IVPB 1000 milliGRAM(s) IV Intermittent every 8 hours  metoprolol tartrate 25 milliGRAM(s) Oral two times a day  predniSONE   Tablet 5 milliGRAM(s) Oral daily  psyllium Powder 1 Packet(s) Oral daily  simvastatin 20 milliGRAM(s) Oral at bedtime  sulfaSALAzine 500 milliGRAM(s) Oral three times a day  tiotropium 18 MICROgram(s) Capsule 1 Capsule(s) Inhalation daily  venlafaxine XR. 150 milliGRAM(s) Oral daily    MEDICATIONS  (PRN):  artificial  tears Solution 1 Drop(s) Both EYES four times a day PRN Dry Eyes  guaiFENesin  milliGRAM(s) Oral every 12 hours PRN Cough  ondansetron Injectable 4 milliGRAM(s) IV Push every 6 hours PRN Nausea  oxycodone    5 mG/acetaminophen 325 mG 2 Tablet(s) Oral every 4 hours PRN Moderate Pain (4 - 6)      FAMILY HISTORY:  Family history of colon cancer in father  also prostate ca, skin ca,    Family history of breast cancer (Aunt)  aunt    FH: HTN (hypertension) (Father, Mother)        SOCIAL HISTORY: no recent smoking     REVIEW OF SYSTEMS:  CONSTITUTIONAL:    No fatigue, malaise, lethargy.  No fever or chills.  RESPIRATORY:  No cough.  No wheeze.  No hemoptysis.  No shortness of breath.  CARDIOVASCULAR:  No chest pains.  No palpitations. No shortness of breath, No orthopnea or PND.  GASTROINTESTINAL:  No abdominal pain.  No nausea or vomiting.    GENITOURINARY:    No hematuria.    MUSCULOSKELETAL:  No musculoskeletal pain.  No joint swelling.  No arthritis.  NEUROLOGICAL:  No tingling or numbness or weakness.  PSYCHIATRIC:  No confusion  SKIN:  No rashes.          Vital Signs Last 24 Hrs  T(C): 36.9 (2021 06:00), Max: 38.2 (2021 12:00)  T(F): 98.5 (2021 06:00), Max: 100.8 (2021 12:00)  HR: 80 (2021 07:00) (77 - 115)  BP: 105/60 (2021 07:00) (90/50 - 157/69)  BP(mean): 72 (2021 07:00) (58 - 92)  RR: 16 (2021 07:00) (14 - 30)  SpO2: 98% (2021 07:00) (95% - 99%)    PHYSICAL EXAM-    Constitutional: The patient appears to be normal, well developed, well nourished and alert and oriented to time, place and person. The patient does not appear acutely ill.     Head: Head is normocephalic and atraumatic.      Neck: No jugular venous distention. No audible carotid bruits. There are strong carotid pulses bilaterally. No JVD.     Cardiovascular: Regular rate and rhythm without S3, S4. systolic murmur 2/6     Respiratory: B/l rales. No wheezing.    Abdomen: Soft, nontender, nondistended with positive bowel sounds.      Extremity: No tenderness. No  pitting edema     Neurologic: The patient is alert and oriented.      Skin: No rash, no obvious lesions noted.      Psychiatric: The patient appears to be emotionally stable.      INTERPRETATION OF TELEMETRY: SR 90/min     ECG:     I&O's Detail    2021 07:01  -  2021 07:00  --------------------------------------------------------  IN:    IV PiggyBack: 500 mL  Total IN: 500 mL    OUT:    Incontinent per Collection Bag (mL): 2500 mL  Total OUT: 2500 mL    Total NET: -2000 mL          LABS:                        9.8    8.08  )-----------( 154      ( 2021 06:51 )             30.8         136  |  98  |  17  ----------------------------<  95  3.5   |  36<H>  |  0.55    Ca    7.9<L>      2021 06:51  Mg     2.3     13              I&O's Summary    2021 07:01  -  2021 07:00  --------------------------------------------------------  IN: 500 mL / OUT: 2500 mL / NET: -2000 mL      BNP  RADIOLOGY & ADDITIONAL STUDIES:  < from: TTE Echo Complete w/o Contrast w/ Doppler (21 @ 14:48) >     Summary     Mild concentric left ventricular hypertrophy is present.   Left ventricle systolic function appears preserved based on difficult   trans thoracic views;   segmental wall motion abnormalities can not be ruled out.   Normal LV size.   Visual estimation of left ventricle ejection fraction is >55%.   The left atrium is mildly dilated.   Well seated prosthetic valve in the aortic position.   Peak trans-prosthetic gradient is within normal limitations for this type   of prosthesis.   Severe mitral annular calcification.   Moderate to severe mitral stenosis.   Mild (1+) tricuspid valve regurgitation.     Signature     ----------------------------------------------------------------   Electronically signed by Alcon Mcdowell MD([facility]   physician) on 2021 08:24 PM   ----------------------------------------------------------------    < end of copied text >

## 2021-06-15 NOTE — PROGRESS NOTE ADULT - SUBJECTIVE AND OBJECTIVE BOX
Patient is a 80y old  Female who presents with a chief complaint of Sepsis, PNA.       HPI:  80F with PMH of COPD on home O2, systolic CHF (EF: 40%), RA, Lumbar stenosis, Chronic Diverticulitis, Meniere disease, GERD, TMJ, SIMÓN, Thrush, Skin CA, PVD, Hiatal Hernia, Breast CA, HLD, Valvular heart disease presented to ED with complaint of fever, chills, increased cough x 6weeks.    She was diagnosed with sepsis and PNa.  She received IVF for hydration and later had decompensated HF.  She denies any symptoms at rest now sitting in chair.     6/15- pt seen this am. Pt sitting in bed eating breakfast and denies any symptoms.     PAST MEDICAL & SURGICAL HISTORY:  Valvular heart disease  aortic and mitral    Hyperlipidemia    TIA (transient ischemic attack)  2010    Breast cancer  right s/p lumpectomy and radiation 2008    Hiatal hernia  s/p surgical repair 2005    PVD (peripheral vascular disease)  left iliac  s/p surgical intervention -  unsuccessful    HTN (hypertension)    COPD (chronic obstructive pulmonary disease)  diagnosed   inhaler and nebulizer    Borderline diabetes  no meds    Depression    RA (rheumatoid arthritis)  diagnosed   oxycodone prn  neck/ lower back    Anxiety    Thrush  treated x 4 days  1 month ago  restarted medication  3-19-14 clortramazole 5x day    SIMÓN (obstructive sleep apnea)  category I severe pt does not use c/pap    Diverticulitis  hospitalized     TMJ (temporomandibular joint disorder)    LBBB (left bundle branch block)    CHF (congestive heart failure)    Spinal stenosis    Emphysema lung    GERD (gastroesophageal reflux disease)    Dry eyes    Glaucoma    Diabetes mellitus    Meniere disease    Aortic valve replaced  with bovine heart valve    Skin cancer  not melanoma    Alcoholism  last drink     Incontinence    S/P  section  x2 1965/     S/P appendectomy      S/P rotator cuff surgery  left 2004    S/P hernia surgery  left inguinal age 12    Hiatal hernia  s/p surgical repair per pt 2005    S/P lumpectomy, right breast  2008    S/P carpal tunnel release  right     PVD (peripheral vascular disease)  s/p left iliac bypass which was unsuccessful - open repair    S/P AVR (aortic valve replacement)  about 5 yrs ago (2013?)    H/O sinus surgery    H/O:  section  2x        MEDICATIONS  (STANDING):  ALBUTerol    90 MICROgram(s) HFA Inhaler 2 Puff(s) Inhalation every 6 hours  ARIPiprazole 2 milliGRAM(s) Oral daily  aspirin  chewable 81 milliGRAM(s) Oral daily  budesonide 160 MICROgram(s)/formoterol 4.5 MICROgram(s) Inhaler 2 Puff(s) Inhalation two times a day  clonazePAM  Tablet 0.5 milliGRAM(s) Oral two times a day  clotrimazole Lozenge 1 Lozenge Oral <User Schedule>  dextrose 40% Gel 15 Gram(s) Oral once  dextrose 5%. 1000 milliLiter(s) (50 mL/Hr) IV Continuous <Continuous>  dextrose 5%. 1000 milliLiter(s) (100 mL/Hr) IV Continuous <Continuous>  dextrose 50% Injectable 25 Gram(s) IV Push once  dextrose 50% Injectable 12.5 Gram(s) IV Push once  dextrose 50% Injectable 25 Gram(s) IV Push once  dorzolamide 2% Ophthalmic Solution      dorzolamide 2% Ophthalmic Solution 1 Drop(s) Both EYES three times a day  enoxaparin Injectable 40 milliGRAM(s) SubCutaneous daily  famotidine    Tablet 20 milliGRAM(s) Oral daily  furosemide   Injectable 40 milliGRAM(s) IV Push two times a day  glucagon  Injectable 1 milliGRAM(s) IntraMuscular once  insulin lispro (ADMELOG) corrective regimen sliding scale   SubCutaneous Before meals and at bedtime  lactobacillus acidophilus 1 Tablet(s) Oral daily  latanoprost 0.005% Ophthalmic Solution 1 Drop(s) Both EYES at bedtime  linaclotide 145 MICROGram(s) Oral before breakfast  lubiprostone 24 MICROGram(s) Oral two times a day  meropenem  IVPB 1000 milliGRAM(s) IV Intermittent every 8 hours  metoprolol tartrate 25 milliGRAM(s) Oral two times a day  predniSONE   Tablet 5 milliGRAM(s) Oral daily  psyllium Powder 1 Packet(s) Oral daily  simvastatin 20 milliGRAM(s) Oral at bedtime  sulfaSALAzine 500 milliGRAM(s) Oral three times a day  tiotropium 18 MICROgram(s) Capsule 1 Capsule(s) Inhalation daily  venlafaxine XR. 150 milliGRAM(s) Oral daily    MEDICATIONS  (PRN):  artificial  tears Solution 1 Drop(s) Both EYES four times a day PRN Dry Eyes  guaiFENesin  milliGRAM(s) Oral every 12 hours PRN Cough  ondansetron Injectable 4 milliGRAM(s) IV Push every 6 hours PRN Nausea  oxycodone    5 mG/acetaminophen 325 mG 2 Tablet(s) Oral every 4 hours PRN Moderate Pain (4 - 6)      FAMILY HISTORY:  Family history of colon cancer in father  also prostate ca, skin ca,    Family history of breast cancer (Aunt)  aunt    FH: HTN (hypertension) (Father, Mother)        SOCIAL HISTORY: no recent smoking     REVIEW OF SYSTEMS:  CONSTITUTIONAL:    No fatigue, malaise, lethargy.  No fever or chills.  RESPIRATORY:  No cough.  No wheeze.  No hemoptysis.  No shortness of breath.  CARDIOVASCULAR:  No chest pains.  No palpitations. No shortness of breath, No orthopnea or PND.  GASTROINTESTINAL:  No abdominal pain.  No nausea or vomiting.    GENITOURINARY:    No hematuria.    MUSCULOSKELETAL:  No musculoskeletal pain.  No joint swelling.  No arthritis.  NEUROLOGICAL:  No tingling or numbness or weakness.  PSYCHIATRIC:  No confusion  SKIN:  No rashes.          ICU Vital Signs Last 24 Hrs  T(C): 36.8 (15 Duran 2021 06:00), Max: 37.3 (2021 22:00)  T(F): 98.2 (15 Duran 2021 06:00), Max: 99.2 (2021 22:00)  HR: 95 (15 Duran 2021 08:00) (80 - 98)  BP: 128/68 (15 Duran 2021 08:00) (81/57 - 145/72)  BP(mean): 83 (15 Duran 2021 08:00) (63 - 91)  ABP: --  ABP(mean): --  RR: 18 (15 Duran 2021 08:00) (18 - 25)  SpO2: 93% (15 Duran 2021 08:00) (81% - 100%)      PHYSICAL EXAM-    Constitutional: no acute distress     Head: Head is normocephalic and atraumatic.      Neck: No jugular venous distention. No audible carotid bruits. There are strong carotid pulses bilaterally. No JVD.     Cardiovascular: Regular rate and rhythm without S3, S4. systolic murmur 2/6     Respiratory: B/l rales. No wheezing.    Abdomen: Soft, nontender, nondistended with positive bowel sounds.      Extremity: No tenderness. No  pitting edema     Neurologic: The patient is alert and oriented.      Skin: No rash, no obvious lesions noted.      Psychiatric: The patient appears to be emotionally stable.      INTERPRETATION OF TELEMETRY: SR 90/min     ECG:     I&O's Detail    2021 07:01  -  2021 07:00  --------------------------------------------------------  IN:    IV PiggyBack: 500 mL  Total IN: 500 mL    OUT:    Incontinent per Collection Bag (mL): 2500 mL  Total OUT: 2500 mL    Total NET: -2000 mL          LABS:                                   10.9   10.06 )-----------( 189      ( 15 Duran 2021 06:30 )             34.4     06-15    136  |  99  |  17  ----------------------------<  97  3.9   |  36<H>  |  0.44<L>    Ca    8.8      15 Duran 2021 06:30  Phos  2.4     06-15  Mg     2.5     06-15                      I&O's Summary    2021 07:01  -  2021 07:00  --------------------------------------------------------  IN: 500 mL / OUT: 2500 mL / NET: -2000 mL      BNP  RADIOLOGY & ADDITIONAL STUDIES:  < from: TTE Echo Complete w/o Contrast w/ Doppler (21 @ 14:48) >     Summary     Mild concentric left ventricular hypertrophy is present.   Left ventricle systolic function appears preserved based on difficult   trans thoracic views;   segmental wall motion abnormalities can not be ruled out.   Normal LV size.   Visual estimation of left ventricle ejection fraction is >55%.   The left atrium is mildly dilated.   Well seated prosthetic valve in the aortic position.   Peak trans-prosthetic gradient is within normal limitations for this type   of prosthesis.   Severe mitral annular calcification.   Moderate to severe mitral stenosis.   Mild (1+) tricuspid valve regurgitation.     Signature     ----------------------------------------------------------------   Electronically signed by Alcon Mcdowell MD([facility]   physician) on 2021 08:24 PM   ----------------------------------------------------------------    < end of copied text >

## 2021-06-15 NOTE — PROGRESS NOTE ADULT - SUBJECTIVE AND OBJECTIVE BOX
CC:  Patient is a 80y old  Female who presents with a chief complaint of Sepsis, PNA (15 Duran 2021 09:34)    SUBJECTIVE:     -no new complaints or issues at current time.    ROS:  all other review of systems are negative unless indicated above.    acetaminophen   Tablet .. 650 milliGRAM(s) Oral every 6 hours PRN  ALBUTerol    90 MICROgram(s) HFA Inhaler 2 Puff(s) Inhalation every 6 hours  ARIPiprazole 2 milliGRAM(s) Oral daily  artificial  tears Solution 1 Drop(s) Both EYES four times a day PRN  aspirin  chewable 81 milliGRAM(s) Oral daily  budesonide 160 MICROgram(s)/formoterol 4.5 MICROgram(s) Inhaler 2 Puff(s) Inhalation two times a day  clonazePAM  Tablet 0.25 milliGRAM(s) Oral two times a day  clotrimazole Lozenge 1 Lozenge Oral <User Schedule>  dorzolamide 2% Ophthalmic Solution      dorzolamide 2% Ophthalmic Solution 1 Drop(s) Both EYES three times a day  enoxaparin Injectable 40 milliGRAM(s) SubCutaneous daily  famotidine    Tablet 20 milliGRAM(s) Oral daily  furosemide   Injectable 40 milliGRAM(s) IV Push two times a day  guaiFENesin  milliGRAM(s) Oral every 12 hours  insulin lispro (ADMELOG) corrective regimen sliding scale   SubCutaneous Before meals and at bedtime  lactobacillus acidophilus 1 Tablet(s) Oral daily  latanoprost 0.005% Ophthalmic Solution 1 Drop(s) Both EYES at bedtime  linaclotide 145 MICROGram(s) Oral before breakfast  lubiprostone 24 MICROGram(s) Oral two times a day  meropenem  IVPB 1000 milliGRAM(s) IV Intermittent every 8 hours  metoprolol tartrate 50 milliGRAM(s) Oral two times a day  ondansetron Injectable 4 milliGRAM(s) IV Push every 6 hours PRN  oxyCODONE    IR 10 milliGRAM(s) Oral every 6 hours PRN  predniSONE   Tablet 5 milliGRAM(s) Oral daily  psyllium Powder 1 Packet(s) Oral daily  simvastatin 20 milliGRAM(s) Oral at bedtime  sulfaSALAzine 500 milliGRAM(s) Oral three times a day  tiotropium 18 MICROgram(s) Capsule 1 Capsule(s) Inhalation daily  venlafaxine XR. 150 milliGRAM(s) Oral daily    T(C): 36.9 (06-15-21 @ 10:00), Max: 37.3 (06-14-21 @ 22:00)  HR: 96 (06-15-21 @ 12:00) (80 - 100)  BP: 128/58 (06-15-21 @ 12:00) (81/57 - 145/72)  RR: 20 (06-15-21 @ 12:00) (15 - 23)  SpO2: 96% (06-15-21 @ 12:00) (92% - 100%)    Constitutional: NAD.   HEENT: PERRL, EOMI, MMM.  Neck: Soft and supple, No carotid bruit, No JVD  Respiratory: Breath sounds are clear bilaterally, No wheezing, rales or rhonchi  Cardiovascular: S1 and S2, regular rate and rhythm, no murmur, rub or gallop.  Gastrointestinal: Bowel Sounds present, soft, nontender, nondistended, no guarding, no rebound, no mass.  Extremities: No peripheral edema  Vascular: 2+ peripheral pulses  Neurological: A/O x , no focal deficits  Musculoskeletal: 5/5 strength b/l upper and lower extremities  Skin:  no visible rashes.                         10.9   10.06 )-----------( 189      ( 15 Duran 2021 06:30 )             34.4       06-15    136  |  99  |  17  ----------------------------<  97  3.9   |  36<H>  |  0.44<L>    Ca    8.8      15 Duran 2021 06:30  Phos  2.4     06-15  Mg     2.5     06-15    Culture - Urine (06.10.21 @ 17:07)   Specimen Source: .Urine None   Culture Results:   No growth   Culture - Blood (06.10.21 @ 13:22)   Specimen Source: .Blood None   Culture Results:   No growth to date.   Culture - Blood (06.10.21 @ 13:22)   Specimen Source: .Blood None   Culture Results:   No growth to date.     < from: Xray Chest 1 View- PORTABLE-Urgent (Xray Chest 1 View- PORTABLE-Urgent .) (06.13.21 @ 03:47) >  IMPRESSION:  Congestion and right infiltrate.    < end of copied text >    < from: CT Abdomen and Pelvis w/ IV Cont (06.10.21 @ 16:34) >  IMPRESSION:  *  Acute diverticulitis involving the mid sigmoid colon. No evidence of diverticular abscess.  *  Cardiomegaly and CHF at the lung bases.    < end of copied text >    < from: 12 Lead ECG (06.10.21 @ 13:12) >  Diagnosis Line Sinus tachycardia  Non-specific intra-ventricular conduction block  T wave abnormality, consider lateral ischemia  Abnormal ECG    < end of copied text >  < from: TTE Echo Complete w/o Contrast w/ Doppler (06.11.21 @ 14:48) >  Impression     Summary     Mild concentric left ventricular hypertrophy is present.   Left ventricle systolic function appears preserved based on difficult   trans thoracic views;   segmental wall motion abnormalities can not be ruled out.   Normal LV size.   Visual estimation of left ventricle ejection fraction is >55%.   The left atrium is mildly dilated.   Well seated prosthetic valve in the aortic position.   Peak trans-prosthetic gradient is within normal limitations for this type   of prosthesis.   Severe mitral annular calcification.   Moderate to severe mitral stenosis.   Mild (1+) tricuspid valve regurgitation.    < end of copied text >

## 2021-06-15 NOTE — PROGRESS NOTE ADULT - ASSESSMENT
Acute decompensated HFPEF- hypervolemic   continue iv lasix today for diuresis.    Diuresis with close monitoring of the renal function and electrolytes.  Goal potassium of 4 and magnesium of 2.   Strict I/O and daily wt checks. Low sodium diet. Nutrition education.     Mitral stenosis- Goal HR 50-60/min.  Mild fever.  BP optimal.  Increased metoprolol to 50mg BID.   Later in the course can reasses gradient across the mitral valve with transthoracic echo.  Peak gradient 5 with high heart rate.    Sepsis- Management per primary team.     Other medical issues- Management per primary team.   Thank you for allowing me to participate in the care of this patient. Please feel free to contact me with any questions.

## 2021-06-15 NOTE — PROGRESS NOTE ADULT - ASSESSMENT
Imp:  Resolving diverticulitis    Rec:  Cont IV abx with transition to course of PO per ID team  Reconsult prn

## 2021-06-15 NOTE — PROGRESS NOTE ADULT - SUBJECTIVE AND OBJECTIVE BOX
Patient is a 80y old  Female who presents with a chief complaint of Sepsis, PNA (2021 15:31)      Subective:  Feels good. Breathing improved. No abdominal pain. +BMs.    PAST MEDICAL & SURGICAL HISTORY:  Valvular heart disease  aortic and mitral    Hyperlipidemia    TIA (transient ischemic attack)  2010    Breast cancer  right s/p lumpectomy and radiation 2008    Hiatal hernia  s/p surgical repair 2005    PVD (peripheral vascular disease)  left iliac  s/p surgical intervention   unsuccessful    HTN (hypertension)    COPD (chronic obstructive pulmonary disease)  diagnosed   inhaler and nebulizer    Borderline diabetes  no meds    Depression    RA (rheumatoid arthritis)  diagnosed   oxycodone prn  neck/ lower back    Anxiety    Thrush  treated x 4 days  1 month ago  restarted medication  3-19-14 clortramazole 5x day    SIMÓN (obstructive sleep apnea)  category I severe pt does not use c/pap    Diverticulitis  hospitalized     TMJ (temporomandibular joint disorder)    LBBB (left bundle branch block)    CHF (congestive heart failure)    Spinal stenosis    Emphysema lung    GERD (gastroesophageal reflux disease)    Dry eyes    Glaucoma    Diabetes mellitus    Meniere disease    Aortic valve replaced  with bovine heart valve    Skin cancer  not melanoma    Alcoholism  last drink     Incontinence    S/P  section  x2 /     S/P appendectomy  2001    S/P rotator cuff surgery  left 2004    S/P hernia surgery  left inguinal age 12    Hiatal hernia  s/p surgical repair per pt 2005    S/P lumpectomy, right breast  2008    S/P carpal tunnel release  right 2002    PVD (peripheral vascular disease)  s/p left iliac bypass which was unsuccessful - open repair    S/P AVR (aortic valve replacement)  about 5 yrs ago (2013?)    H/O sinus surgery    H/O:  section  2x        MEDICATIONS  (STANDING):  ALBUTerol    90 MICROgram(s) HFA Inhaler 2 Puff(s) Inhalation every 6 hours  ARIPiprazole 2 milliGRAM(s) Oral daily  aspirin  chewable 81 milliGRAM(s) Oral daily  budesonide 160 MICROgram(s)/formoterol 4.5 MICROgram(s) Inhaler 2 Puff(s) Inhalation two times a day  clonazePAM  Tablet 0.25 milliGRAM(s) Oral two times a day  clotrimazole Lozenge 1 Lozenge Oral <User Schedule>  dextrose 40% Gel 15 Gram(s) Oral once  dextrose 5%. 1000 milliLiter(s) (50 mL/Hr) IV Continuous <Continuous>  dextrose 5%. 1000 milliLiter(s) (100 mL/Hr) IV Continuous <Continuous>  dextrose 50% Injectable 25 Gram(s) IV Push once  dextrose 50% Injectable 12.5 Gram(s) IV Push once  dextrose 50% Injectable 25 Gram(s) IV Push once  dorzolamide 2% Ophthalmic Solution 1 Drop(s) Both EYES three times a day  dorzolamide 2% Ophthalmic Solution      enoxaparin Injectable 40 milliGRAM(s) SubCutaneous daily  famotidine    Tablet 20 milliGRAM(s) Oral daily  furosemide   Injectable 40 milliGRAM(s) IV Push two times a day  glucagon  Injectable 1 milliGRAM(s) IntraMuscular once  guaiFENesin  milliGRAM(s) Oral every 12 hours  insulin lispro (ADMELOG) corrective regimen sliding scale   SubCutaneous Before meals and at bedtime  lactobacillus acidophilus 1 Tablet(s) Oral daily  latanoprost 0.005% Ophthalmic Solution 1 Drop(s) Both EYES at bedtime  linaclotide 145 MICROGram(s) Oral before breakfast  lubiprostone 24 MICROGram(s) Oral two times a day  meropenem  IVPB 1000 milliGRAM(s) IV Intermittent every 8 hours  metoprolol tartrate 25 milliGRAM(s) Oral two times a day  predniSONE   Tablet 5 milliGRAM(s) Oral daily  psyllium Powder 1 Packet(s) Oral daily  simvastatin 20 milliGRAM(s) Oral at bedtime  sulfaSALAzine 500 milliGRAM(s) Oral three times a day  tiotropium 18 MICROgram(s) Capsule 1 Capsule(s) Inhalation daily  venlafaxine XR. 150 milliGRAM(s) Oral daily    MEDICATIONS  (PRN):  acetaminophen   Tablet .. 650 milliGRAM(s) Oral every 6 hours PRN Temp greater or equal to 38.5C (101.3F), Mild Pain (1 - 3)  artificial  tears Solution 1 Drop(s) Both EYES four times a day PRN Dry Eyes  ondansetron Injectable 4 milliGRAM(s) IV Push every 6 hours PRN Nausea  oxyCODONE    IR 10 milliGRAM(s) Oral every 6 hours PRN Moderate Pain (4 - 6)      REVIEW OF SYSTEMS:    RESPIRATORY: No shortness of breath  CARDIOVASCULAR: No chest pain  All other review of systems is negative unless indicated above.    Vital Signs Last 24 Hrs  T(C): 36.8 (15 Duran 2021 06:00), Max: 37.3 (2021 22:00)  T(F): 98.2 (15 Duran 2021 06:00), Max: 99.2 (2021 22:00)  HR: 95 (15 Duran 2021 08:00) (80 - 98)  BP: 136/58 (15 Duran 2021 07:00) (81/57 - 145/72)  BP(mean): 79 (15 Duran 2021 07:00) (63 - 91)  RR: 18 (15 Duran 2021 08:00) (18 - 25)  SpO2: 93% (15 Duran 2021 08:00) (81% - 100%)    PHYSICAL EXAM:    Constitutional: NAD  Respiratory: CTAB  Cardiovascular: S1 and S2  Gastrointestinal: BS+, soft, NT/ND  Extremities: No peripheral edema  Psychiatric: Normal mood, normal affect    LABS:                        10.9   10.06 )-----------( 189      ( 15 Duran 2021 06:30 )             34.4     06-15    136  |  99  |  17  ----------------------------<  97  3.9   |  36<H>  |  0.44<L>    Ca    8.8      15 Duran 2021 06:30  Phos  2.4     06-15  Mg     2.5     06-15            RADIOLOGY & ADDITIONAL STUDIES:

## 2021-06-15 NOTE — PROGRESS NOTE ADULT - ASSESSMENT
80F.  admitted 06/10/2021.  patient presented to ED c/o chills.  onset 6 weeks prior to admission.  a/w generalized malaise and abdominal pain.  in ED, temp 105F and hypotensive.  sepsis protocol was started and ICU consulted.    PMHx:  HFpEF;  LBBB;  PAD;  TIA;  valvular heart disease-AVR (biovine);  IGT;  HTN;  HLD;  SIMÓN;  COPD;  RA;  GERD;  diverticulitis;  hiatal hernia;  breast CD;  RA;  Meniere disease;  spinal stenosis;  TMJ;  glaucoma;  EtOH (last drink over 2 decades ago);  anxiety/depression.    sepsis-PN v. diverticulitis.  - BCx:  no growth.  - meropenem.  - ID.    resolving diverticulitis.  - IV ABx.  - GI input noted.    acute upon chronic HFpEF.  - Lasix 40mg iv bid.  - Cardiology.    DVT prophylaxis.  - LMWH.    disposition.  - ICU awaiting transfer to the [facility].    communication.  - RN.

## 2021-06-16 NOTE — PHYSICAL THERAPY INITIAL EVALUATION ADULT - DIAGNOSIS, PT EVAL
sepsis ,Pneumonia, acute decompensated HFpEF(=55%) /+hypervolemia (resolved) +diverticulitis (resolving)

## 2021-06-16 NOTE — PROGRESS NOTE ADULT - SUBJECTIVE AND OBJECTIVE BOX
CC:  Patient is a 80y old  Female who presents with a chief complaint of Sepsis, PNA (16 Jun 2021 12:13)    SUBJECTIVE:     -no new complaints or issues at current time.    ROS:  all other review of systems are negative unless indicated above.    acetaminophen   Tablet .. 650 milliGRAM(s) Oral every 6 hours PRN  ALBUTerol    90 MICROgram(s) HFA Inhaler 2 Puff(s) Inhalation every 6 hours  ARIPiprazole 2 milliGRAM(s) Oral daily  artificial  tears Solution 1 Drop(s) Both EYES four times a day PRN  aspirin  chewable 81 milliGRAM(s) Oral daily  budesonide 160 MICROgram(s)/formoterol 4.5 MICROgram(s) Inhaler 2 Puff(s) Inhalation two times a day  clonazePAM  Tablet 0.25 milliGRAM(s) Oral two times a day  clotrimazole Lozenge 1 Lozenge Oral <User Schedule>  dorzolamide 2% Ophthalmic Solution 1 Drop(s) Both EYES three times a day  dorzolamide 2% Ophthalmic Solution      enoxaparin Injectable 40 milliGRAM(s) SubCutaneous daily  famotidine    Tablet 20 milliGRAM(s) Oral daily  furosemide   Injectable 40 milliGRAM(s) IV Push two times a day  glucagon  Injectable 1 milliGRAM(s) IntraMuscular once  guaiFENesin  milliGRAM(s) Oral every 12 hours  insulin lispro (ADMELOG) corrective regimen sliding scale   SubCutaneous Before meals and at bedtime  lactobacillus acidophilus 1 Tablet(s) Oral daily  latanoprost 0.005% Ophthalmic Solution 1 Drop(s) Both EYES at bedtime  linaclotide 145 MICROGram(s) Oral before breakfast  lubiprostone 24 MICROGram(s) Oral two times a day  meropenem  IVPB 1000 milliGRAM(s) IV Intermittent every 8 hours  metoprolol tartrate 50 milliGRAM(s) Oral two times a day  ondansetron Injectable 4 milliGRAM(s) IV Push every 6 hours PRN  oxyCODONE    IR 10 milliGRAM(s) Oral every 6 hours PRN  predniSONE   Tablet 5 milliGRAM(s) Oral daily  psyllium Powder 1 Packet(s) Oral daily  simvastatin 20 milliGRAM(s) Oral at bedtime  sulfaSALAzine 500 milliGRAM(s) Oral three times a day  tiotropium 18 MICROgram(s) Capsule 1 Capsule(s) Inhalation daily  venlafaxine XR. 150 milliGRAM(s) Oral daily    T(C): 36.6 (06-16-21 @ 08:00), Max: 36.6 (06-16-21 @ 08:00)  HR: 72 (06-16-21 @ 13:19) (72 - 97)  BP: 110/62 (06-16-21 @ 12:45) (102/64 - 129/56)  RR: 18 (06-16-21 @ 08:00) (18 - 21)  SpO2: 96% (06-16-21 @ 13:19) (95% - 100%)    Constitutional: NAD.   HEENT: PERRL, EOMI, MMM.  Neck: Soft and supple, No carotid bruit, No JVD  Respiratory: Breath sounds are clear bilaterally, No wheezing, rales or rhonchi  Cardiovascular: S1 and S2, regular rate and rhythm, no murmur, rub or gallop.  Gastrointestinal: Bowel Sounds present, soft, nontender, nondistended, no guarding, no rebound, no mass.  Extremities: No peripheral edema  Vascular: 2+ peripheral pulses  Neurological: A/O x , no focal deficits  Musculoskeletal: 5/5 strength b/l upper and lower extremities  Skin:  no visible rashes.                         10.9   10.06 )-----------( 189      ( 15 Duran 2021 06:30 )             34.4       06-15    136  |  99  |  17  ----------------------------<  97  3.9   |  36<H>  |  0.44<L>    Ca    8.8      15 Duran 2021 06:30  Phos  2.4     06-15  Mg     2.5     06-15

## 2021-06-16 NOTE — DISCHARGE NOTE PROVIDER - CARE PROVIDER_API CALL
Paul,   Phone: (   )    -  Fax: (   )    -  Follow Up Time:    Paul,   Phone: (   )    -  Fax: (   )    -  Follow Up Time:     Eben Lim)  Cardiovascular Disease; Nuclear Cardiology  [address] [place] [address]  Phone: [phone]  Fax: [phone]  Follow Up Time: 1 week

## 2021-06-16 NOTE — PHYSICAL THERAPY INITIAL EVALUATION ADULT - GENERAL OBSERVATIONS, REHAB EVAL
rec'd sitting up in chair ,awake,alert,Ox3 ,c/o chronic back & neck pain associated with longstanding spinal stenosis ,goes for massage at [employer] in [facility] once every 1-2 weeks

## 2021-06-16 NOTE — DISCHARGE NOTE PROVIDER - PROVIDER TOKENS
FREE:[LAST:[Maryi-Benoit],PHONE:[(   )    -],FAX:[(   )    -]] FREE:[LAST:[Paul],PHONE:[(   )    -],FAX:[(   )    -]],PROVIDER:[TOKEN:[[id number]],FOLLOWUP:[1 week]]

## 2021-06-16 NOTE — DISCHARGE NOTE PROVIDER - NSDCMRMEDTOKEN_GEN_ALL_CORE_FT
Abilify 2 mg oral tablet: 1 tab(s) orally once a day (at bedtime)  aspirin 81 mg oral tablet: 1 tab(s) orally once a day HOME/  follow preop instructions as per surgeon and cardiologist  azelastine 137 mcg/inh (0.1%) nasal spray: 2 spray(s) nasal 2 times a day  Azopt 1% ophthalmic suspension: 1 drop(s) to each affected eye 3 times a day  budesonide 0.5 mg/2 mL inhalation suspension: 1 dose(s) inhaled once a day  clobetasol 0.05% topical cream: Apply topically to affected area once a day  ****Scalp***  clonazePAM 0.5 mg oral tablet: 0.5 tab(s) orally 2 times a day, As Needed  clotrimazole 10 mg oral lozenge: 1 lozenge orally once a day (at bedtime)  Coricidin 325 mg-2 mg oral tablet: 1 tab(s) orally once a day (at bedtime)  DuoNeb 0.5 mg-2.5 mg/3 mL inhalation solution: 3 milliliter(s) inhaled once a day (at bedtime)  Effexor  mg oral capsule, extended release: 1 cap(s) orally once a day  famotidine 20 mg oral tablet: 1 tab(s) orally once a day (at bedtime)  furosemide 40 mg oral tablet: 1 tab(s) orally 2 times a day  ibandronate 150 mg oral tablet: 1 tab(s) orally once a month  Linzess 145 mcg oral capsule: 1 cap(s) orally once a day  lubiprostone 24 mcg oral capsule: 1 cap(s) orally 2 times a day  Lumigan 0.01% ophthalmic solution: 1 drop(s) to each affected eye once a day (in the evening)  metFORMIN 500 mg oral tablet: 1 tab(s) orally once a day  metoprolol tartrate 50 mg oral tablet: 1 tab(s) orally 2 times a day  Mucinex 600 mg oral tablet, extended release: 1 tab(s) orally every 12 hours  ocular lubricant ophthalmic solution: 1 drop(s) to each affected eye 4 times a day, As needed, Dry Eyes  omeprazole 20 mg oral delayed release capsule: 1 cap(s) orally once a day  Percocet 10/325 oral tablet: 1 tab(s) orally every 6 hours  predniSONE 5 mg oral tablet: 1 tab(s) orally once a day  ProAir HFA 90 mcg/inh inhalation aerosol: 2 puff(s) inhaled 4 times a day, As Needed  Probiotic Formula oral capsule: 1 cap(s) orally once a day  psyllium 2.5 g oral wafer: 2 wafer(s) orally once a day  simvastatin 20 mg oral tablet: 1 tab(s) orally once a day (at bedtime)  Spiriva 18 mcg inhalation capsule: 1 cap(s) inhaled once a day  sulfaSALAzine 500 mg oral tablet: 1 tab(s) orally 3 times a day  Symbicort 160 mcg-4.5 mcg/inh inhalation aerosol: 2 puff(s) inhaled 2 times a day  ZyrTEC 10 mg oral tablet: 1 tab(s) orally once a day   Abilify 2 mg oral tablet: 1 tab(s) orally once a day (at bedtime)  aspirin 81 mg oral tablet: 1 tab(s) orally once a day HOME/  follow preop instructions as per surgeon and cardiologist  azelastine 137 mcg/inh (0.1%) nasal spray: 2 spray(s) nasal 2 times a day  Azopt 1% ophthalmic suspension: 1 drop(s) to each affected eye 3 times a day  budesonide 0.5 mg/2 mL inhalation suspension: 1 dose(s) inhaled once a day  clobetasol 0.05% topical cream: Apply topically to affected area once a day  ****Scalp***  clonazePAM 0.5 mg oral tablet: 0.5 tab(s) orally 2 times a day, As Needed  clotrimazole 10 mg oral lozenge: 1 lozenge orally once a day (at bedtime)  DuoNeb 0.5 mg-2.5 mg/3 mL inhalation solution: 3 milliliter(s) inhaled once a day (at bedtime)  Effexor  mg oral capsule, extended release: 1 cap(s) orally once a day  famotidine 20 mg oral tablet: 1 tab(s) orally once a day (at bedtime)  furosemide 40 mg oral tablet: 1 tab(s) orally 2 times a day  ibandronate 150 mg oral tablet: 1 tab(s) orally once a month  K-Tab 10 mEq oral tablet, extended release: 1 tab(s) orally once a day   Linzess 145 mcg oral capsule: 1 cap(s) orally once a day  lubiprostone 24 mcg oral capsule: 1 cap(s) orally 2 times a day  Lumigan 0.01% ophthalmic solution: 1 drop(s) to each affected eye once a day (in the evening)  metFORMIN 500 mg oral tablet: 1 tab(s) orally once a day  metoprolol tartrate 50 mg oral tablet: 1 tab(s) orally 2 times a day  Mucinex 600 mg oral tablet, extended release: 1 tab(s) orally every 12 hours  ocular lubricant ophthalmic solution: 1 drop(s) to each affected eye 4 times a day, As needed, Dry Eyes  omeprazole 20 mg oral delayed release capsule: 1 cap(s) orally once a day  Percocet 10/325 oral tablet: 1 tab(s) orally every 6 hours  predniSONE 5 mg oral tablet: 1 tab(s) orally once a day  ProAir HFA 90 mcg/inh inhalation aerosol: 2 puff(s) inhaled 4 times a day, As Needed  Probiotic Formula oral capsule: 1 cap(s) orally once a day  psyllium 2.5 g oral wafer: 2 wafer(s) orally once a day  simvastatin 20 mg oral tablet: 1 tab(s) orally once a day (at bedtime)  Spiriva 18 mcg inhalation capsule: 1 cap(s) inhaled once a day  sulfaSALAzine 500 mg oral tablet: 1 tab(s) orally 3 times a day  Symbicort 160 mcg-4.5 mcg/inh inhalation aerosol: 2 puff(s) inhaled 2 times a day  ZyrTEC 10 mg oral tablet: 1 tab(s) orally once a day

## 2021-06-16 NOTE — PHYSICAL THERAPY INITIAL EVALUATION ADULT - PERTINENT HX OF CURRENT PROBLEM, REHAB EVAL
c/o chills x 6 weeks ,abdominal pain,generalized malaise ,fever (C=783U in ED, +hypotension, sepsis protocol initiated)

## 2021-06-16 NOTE — DISCHARGE NOTE PROVIDER - NSDCCPCAREPLAN_GEN_ALL_CORE_FT
PRINCIPAL DISCHARGE DIAGNOSIS  Diagnosis: Sepsis  Assessment and Plan of Treatment:       SECONDARY DISCHARGE DIAGNOSES  Diagnosis: Diverticulitis  Assessment and Plan of Treatment: hospitalized 2013     PRINCIPAL DISCHARGE DIAGNOSIS  Diagnosis: Sepsis  Assessment and Plan of Treatment: - resolved      SECONDARY DISCHARGE DIAGNOSES  Diagnosis: Diverticulitis  Assessment and Plan of Treatment: - s/p full course of antibiotics.

## 2021-06-16 NOTE — CONSULT NOTE ADULT - ASSESSMENT
80F with PMH of COPD on home O2, systolic CHF (EF: 40%), RA, Lumbar stenosis, Chronic Diverticulitis, Meniere disease, GERD, TMJ, SIMÓN, Thrush, Skin CA, PVD, Hiatal Hernia, Breast CA, HLD, Valvular heart disease presented to ED with complaint of fever, chills, increased cough x 6weeks. Cough is productive of clear sputum. Reports symptoms initially were intermittent and but became constant. Reports associated mild abdominal pain. States abdominal pain is nonradiating, intermittent. Also reports associated progressive generalized weakness which acutely worsened today, she was unable to ambulate. Pt has not seen a physician about symptoms. She took azithromycin prescribed by her PCP with no improvement. Denies CP, SOB, headaches, N/V.   In ED, pt met sepsis criteria, and pt is s/p cipro, flagyl and ~4L NS. Pt was hypotensive after fluid resuscitation. Pressors was ordered. Pt was evaluated by ICU. Bp improved with fluids.  (10 Duran 2021 22:47)    events noted  pt is well known to me and has been asking for consult / visit since admission  I was just notified to see her today  breathing is better since ICU admit /no cp  all recent data rev and discussed with her today    Assessment / plan:  Sepsis due to Diverticulitis  s/p ICU admission and clinically improved  Advanced COPD with recurrent bronchospasm  CHF appears stable  Chronic hypoxemic resp failure      complete course of antibiotics  bronchodilator rx  incentive spirometry  mobilize OOB  ABG only if any deterioration  will fu with you  Thank you

## 2021-06-16 NOTE — PHYSICAL THERAPY INITIAL EVALUATION ADULT - ADDITIONAL COMMENTS
has 3 steps to enter home and 1 flight of stairs to basement; pt has home O2 in place past 5 years has 3 steps to enter home and 1 flight of stairs to basement; pt has home O2 in place past 5 years on 4L/min with activity

## 2021-06-16 NOTE — DISCHARGE NOTE NURSING/CASE MANAGEMENT/SOCIAL WORK - PATIENT PORTAL LINK FT
You can access the FollowMyHealth Patient Portal offered by [facility] by registering at the following website: [email or web address]. By joining [facility]’s FollowMyHealth portal, you will also be able to view your health information using other applications (apps) compatible with our system.

## 2021-06-16 NOTE — PROVIDER CONTACT NOTE (OTHER) - ACTION/TREATMENT ORDERED:
Called answering service.
Called office and spoke with Kirsten
Dr. Singh saw patient at bedside. Continuing cardiac monitoring. Patient received scheduled medications. No new interventions at this time.

## 2021-06-16 NOTE — PROVIDER CONTACT NOTE (OTHER) - SITUATION
CHF
Dr. Shah's answering service is aware of consult.
CHF
Tele tech reported the patient went into a junctional rhythm at a rate of 40 then went tachy 115 and returned to baseline SR.

## 2021-06-16 NOTE — PROGRESS NOTE ADULT - SUBJECTIVE AND OBJECTIVE BOX
Date of service: 06-16-21 @ 12:14      Patient sitting in chair; afebrile, wants to go home, no abdominal pain or cough or shortness of breath      ROS: no fever or chills; denies dizziness, no HA, no SOB or cough, no abdominal pain, no diarrhea or constipation; no dysuria, no urinary frequency, no legs pain, no rashes    MEDICATIONS  (STANDING):  ALBUTerol    90 MICROgram(s) HFA Inhaler 2 Puff(s) Inhalation every 6 hours  ARIPiprazole 2 milliGRAM(s) Oral daily  aspirin  chewable 81 milliGRAM(s) Oral daily  budesonide 160 MICROgram(s)/formoterol 4.5 MICROgram(s) Inhaler 2 Puff(s) Inhalation two times a day  clonazePAM  Tablet 0.25 milliGRAM(s) Oral two times a day  clotrimazole Lozenge 1 Lozenge Oral <User Schedule>  dextrose 40% Gel 15 Gram(s) Oral once  dextrose 5%. 1000 milliLiter(s) (50 mL/Hr) IV Continuous <Continuous>  dextrose 5%. 1000 milliLiter(s) (100 mL/Hr) IV Continuous <Continuous>  dextrose 50% Injectable 25 Gram(s) IV Push once  dextrose 50% Injectable 12.5 Gram(s) IV Push once  dextrose 50% Injectable 25 Gram(s) IV Push once  dorzolamide 2% Ophthalmic Solution      dorzolamide 2% Ophthalmic Solution 1 Drop(s) Both EYES three times a day  enoxaparin Injectable 40 milliGRAM(s) SubCutaneous daily  famotidine    Tablet 20 milliGRAM(s) Oral daily  furosemide   Injectable 40 milliGRAM(s) IV Push two times a day  glucagon  Injectable 1 milliGRAM(s) IntraMuscular once  guaiFENesin  milliGRAM(s) Oral every 12 hours  insulin lispro (ADMELOG) corrective regimen sliding scale   SubCutaneous Before meals and at bedtime  lactobacillus acidophilus 1 Tablet(s) Oral daily  latanoprost 0.005% Ophthalmic Solution 1 Drop(s) Both EYES at bedtime  linaclotide 145 MICROGram(s) Oral before breakfast  lubiprostone 24 MICROGram(s) Oral two times a day  meropenem  IVPB 1000 milliGRAM(s) IV Intermittent every 8 hours  metoprolol tartrate 50 milliGRAM(s) Oral two times a day  predniSONE   Tablet 5 milliGRAM(s) Oral daily  psyllium Powder 1 Packet(s) Oral daily  simvastatin 20 milliGRAM(s) Oral at bedtime  sulfaSALAzine 500 milliGRAM(s) Oral three times a day  tiotropium 18 MICROgram(s) Capsule 1 Capsule(s) Inhalation daily  venlafaxine XR. 150 milliGRAM(s) Oral daily    MEDICATIONS  (PRN):  acetaminophen   Tablet .. 650 milliGRAM(s) Oral every 6 hours PRN Temp greater or equal to 38.5C (101.3F), Mild Pain (1 - 3)  artificial  tears Solution 1 Drop(s) Both EYES four times a day PRN Dry Eyes  ondansetron Injectable 4 milliGRAM(s) IV Push every 6 hours PRN Nausea  oxyCODONE    IR 10 milliGRAM(s) Oral every 6 hours PRN Moderate Pain (4 - 6)      Vital Signs Last 24 Hrs  T(C): 36.6 (16 Jun 2021 08:00), Max: 36.7 (15 Duran 2021 14:00)  T(F): 97.9 (16 Jun 2021 08:00), Max: 98 (15 Duran 2021 14:00)  HR: 86 (16 Jun 2021 08:47) (84 - 97)  BP: 102/64 (16 Jun 2021 08:00) (100/55 - 129/56)  BP(mean): 75 (16 Jun 2021 08:00) (67 - 79)  RR: 18 (16 Jun 2021 08:00) (17 - 21)  SpO2: 98% (16 Jun 2021 08:47) (95% - 100%)        Physical Exam:          PE:    Constitutional: frail looking  HEENT: NC/AT  Neck: supple; thyroid not palpable  Back: no tenderness  Respiratory: respiratory effort normal; diminished breath sounds  Cardiovascular: S1S2 regular, no murmurs  Abdomen: soft, not tender, mildly distended, positive BS; no liver or spleen organomegaly  Genitourinary: no suprapubic tenderness  Musculoskeletal: no muscle tenderness, no joint swelling or tenderness  Neurological/ Psychiatric:   moving all extremities  Skin: no rashes; no palpable lesions    Labs: all available labs reviewed                 Labs:                        10.9   10.06 )-----------( 189      ( 15 Duran 2021 06:30 )             34.4     06-15    136  |  99  |  17  ----------------------------<  97  3.9   |  36<H>  |  0.44<L>    Ca    8.8      15 Duran 2021 06:30  Phos  2.4     06-15  Mg     2.5     06-15             Cultures:       Culture - Urine (collected 06-10-21 @ 17:07)  Source: .Urine None  Final Report (06-11-21 @ 14:50):    No growth    Culture - Blood (collected 06-10-21 @ 13:22)  Source: .Blood None  Final Report (06-15-21 @ 19:01):    No Growth Final    Culture - Blood (collected 06-10-21 @ 13:22)  Source: .Blood None  Final Report (06-15-21 @ 19:01):    No Growth Final                < from: CT Abdomen and Pelvis w/ IV Cont (06.10.21 @ 16:34) >    EXAM:  CT ABDOMEN AND PELVIS IC                            PROCEDURE DATE:  06/10/2021          INTERPRETATION:  CLINICAL INFORMATION: Left lower quadrant abdominal pain. Evaluate for acute diverticulitis.    COMPARISON: 8/10/2020    PROCEDURE:  CTof the Abdomen and Pelvis was performed with intravenous contrast.  Intravenous contrast: 90 ml Omnipaque 350. 10 ml discarded.  Oral contrast: None.  Sagittal and coronal reformats were performed.    FINDINGS:    LOWER CHEST: Cardiomegaly. Interlobular septal thickening, consistent with changes of CHF. Bibasilar atelectasis.    LIVER: Within normal limits.  BILE DUCTS: Normal caliber.  GALLBLADDER: Within normal limits.  SPLEEN: Within normal limits.  PANCREAS: Within normal limits.  ADRENALS: Within normal limits.  KIDNEYS/URETERS: Within normal limits.    BLADDER: Within normal limits.  REPRODUCTIVE ORGANS: Uterus and bilateral adnexa within normal limits.    BOWEL: No bowel obstruction. Appendix is not visualized. Diverticulosis with changes of acute diverticulitis at the level of the mid sigmoid colon.  PERITONEUM: No ascites. No evidence of diverticular abscess. No pneumoperitoneum.  VESSELS:  Atherosclerotic calcifications.  RETROPERITONEUM: No lymphadenopathy.  ABDOMINAL WALL: Within normal limits.  BONES: Degenerative changes and scoliosis. Diffuse osteopenia. Spinal stimulator.    IMPRESSION:  *  Acute diverticulitis involving the mid sigmoid colon. No evidence of diverticular abscess.  *  Cardiomegaly and CHF at the lung bases.      < end of copied text >        Radiology: all available radiological tests reviewed    Advanced directives addressed: full resuscitation

## 2021-06-16 NOTE — CONSULT NOTE ADULT - CONSULT REQUESTED DATE/TIME
11-Jun-2021 04:18
10-Duran-2021 18:29
13-Jun-2021 11:00
11-Jun-2021 22:58
16-Jun-2021 08:03
11-Jun-2021 10:42

## 2021-06-16 NOTE — PROGRESS NOTE ADULT - SUBJECTIVE AND OBJECTIVE BOX
Patient is a 80y old  Female who presents with a chief complaint of Sepsis, PNA (16 Jun 2021 08:02)  6/16- " I feel fine , I want to go home tomorrow"      MEDICATIONS  (STANDING):  ALBUTerol    90 MICROgram(s) HFA Inhaler 2 Puff(s) Inhalation every 6 hours  ARIPiprazole 2 milliGRAM(s) Oral daily  aspirin  chewable 81 milliGRAM(s) Oral daily  budesonide 160 MICROgram(s)/formoterol 4.5 MICROgram(s) Inhaler 2 Puff(s) Inhalation two times a day  clonazePAM  Tablet 0.25 milliGRAM(s) Oral two times a day  clotrimazole Lozenge 1 Lozenge Oral <User Schedule>  dextrose 40% Gel 15 Gram(s) Oral once  dextrose 5%. 1000 milliLiter(s) (50 mL/Hr) IV Continuous <Continuous>  dextrose 5%. 1000 milliLiter(s) (100 mL/Hr) IV Continuous <Continuous>  dextrose 50% Injectable 25 Gram(s) IV Push once  dextrose 50% Injectable 12.5 Gram(s) IV Push once  dextrose 50% Injectable 25 Gram(s) IV Push once  dorzolamide 2% Ophthalmic Solution 1 Drop(s) Both EYES three times a day  dorzolamide 2% Ophthalmic Solution      enoxaparin Injectable 40 milliGRAM(s) SubCutaneous daily  famotidine    Tablet 20 milliGRAM(s) Oral daily  furosemide   Injectable 40 milliGRAM(s) IV Push two times a day  glucagon  Injectable 1 milliGRAM(s) IntraMuscular once  guaiFENesin  milliGRAM(s) Oral every 12 hours  insulin lispro (ADMELOG) corrective regimen sliding scale   SubCutaneous Before meals and at bedtime  lactobacillus acidophilus 1 Tablet(s) Oral daily  latanoprost 0.005% Ophthalmic Solution 1 Drop(s) Both EYES at bedtime  linaclotide 145 MICROGram(s) Oral before breakfast  lubiprostone 24 MICROGram(s) Oral two times a day  meropenem  IVPB 1000 milliGRAM(s) IV Intermittent every 8 hours  metoprolol tartrate 50 milliGRAM(s) Oral two times a day  predniSONE   Tablet 5 milliGRAM(s) Oral daily  psyllium Powder 1 Packet(s) Oral daily  simvastatin 20 milliGRAM(s) Oral at bedtime  sulfaSALAzine 500 milliGRAM(s) Oral three times a day  tiotropium 18 MICROgram(s) Capsule 1 Capsule(s) Inhalation daily  venlafaxine XR. 150 milliGRAM(s) Oral daily    MEDICATIONS  (PRN):  acetaminophen   Tablet .. 650 milliGRAM(s) Oral every 6 hours PRN Temp greater or equal to 38.5C (101.3F), Mild Pain (1 - 3)  artificial  tears Solution 1 Drop(s) Both EYES four times a day PRN Dry Eyes  ondansetron Injectable 4 milliGRAM(s) IV Push every 6 hours PRN Nausea  oxyCODONE    IR 10 milliGRAM(s) Oral every 6 hours PRN Moderate Pain (4 - 6)            Vital Signs Last 24 Hrs  T(C): 36.6 (16 Jun 2021 08:00), Max: 36.9 (15 Duran 2021 10:00)  T(F): 97.9 (16 Jun 2021 08:00), Max: 98.4 (15 Duran 2021 10:00)  HR: 86 (16 Jun 2021 08:47) (84 - 100)  BP: 102/64 (16 Jun 2021 08:00) (100/55 - 135/71)  BP(mean): 75 (16 Jun 2021 08:00) (67 - 98)  RR: 18 (16 Jun 2021 08:00) (15 - 23)  SpO2: 98% (16 Jun 2021 08:47) (94% - 100%)            INTERPRETATION OF TELEMETRY:    ECG:        LABS:                        10.9   10.06 )-----------( 189      ( 15 Duran 2021 06:30 )             34.4     06-15    136  |  99  |  17  ----------------------------<  97  3.9   |  36<H>  |  0.44<L>    Ca    8.8      15 Duran 2021 06:30  Phos  2.4     06-15  Mg     2.5     06-15              I&O's Summary    15 Duran 2021 07:01  -  16 Jun 2021 07:00  --------------------------------------------------------  IN: 650 mL / OUT: 1000 mL / NET: -350 mL      BNP  RADIOLOGY & ADDITIONAL STUDIES:

## 2021-06-16 NOTE — DISCHARGE NOTE PROVIDER - HOSPITAL COURSE
80F.  admitted 06/10/2021.  patient presented to ED c/o chills.  onset 6 weeks prior to admission.  a/w generalized malaise and abdominal pain.  in ED, temp 105F and hypotensive.  sepsis protocol was started and ICU consulted.    PMHx:  HFpEF;  LBBB;  PAD;  TIA;  valvular heart disease-AVR (biovine);  IGT;  HTN;  HLD;  SIMÓN;  COPD;  RA;  GERD;  diverticulitis;  hiatal hernia;  breast CD;  RA;  Meniere disease;  spinal stenosis;  TMJ;  glaucoma;  EtOH (last drink over 2 decades ago);  anxiety/depression.    sepsis-PN v. diverticulitis.  - BCx:  no growth.  - completed meropenem.  - ID.    resolving diverticulitis.  - IV ABx.  - GI input noted.    acute upon chronic HFpEF.  - change to Lasix 40mg po bid and supplement potassium.  - Cardiology.    DVT prophylaxis.  - LMWH.    disposition.  - 3N.  - PT evaluation:  need RW PRN.  ADLs independent.  - DC home w/ home care.    communication.  - RN.  - MAIA. 80F.  admitted 06/10/2021.  patient presented to ED c/o chills.  onset 6 weeks prior to admission.  a/w generalized malaise and abdominal pain.  in ED, temp 105F and hypotensive.  sepsis protocol was started and ICU consulted.    PMHx:  HFpEF;  LBBB;  PAD;  TIA;  valvular heart disease-AVR (biovine);  IGT;  HTN;  HLD;  SIMÓN;  COPD;  RA;  GERD;  diverticulitis;  hiatal hernia;  breast CD;  RA;  Meniere disease;  spinal stenosis;  TMJ;  glaucoma;  EtOH (last drink over 2 decades ago);  anxiety/depression.    sepsis-PN v. diverticulitis.  - BCx:  no growth.  - completed meropenem.  - ID.    resolving diverticulitis.  - IV ABx.  - GI input noted.    acute upon chronic HFpEF.  - change to Lasix 40mg po bid and supplement potassium.  repeat BMP 4-5 days.  - Cardiology f/u outpatient in 1 week.    hx HTN.  - increased metoprolol 50mg bid.    DVT prophylaxis.  - LMWH.    disposition.  - 3N.  - PT evaluation:  need RW PRN.  ADLs independent.  - DC home w/ home care.    communication.  - RN.  - MAIA.  - 06/16 patient's daughter:  update given and DC plan reviewed. Pt is a 80F.  admitted 06/10/2021.  patient presented to ED c/o chills.  onset 6 weeks prior to admission.  a/w generalized malaise and abdominal pain.  in ED, temp 105F and hypotensive.  sepsis protocol was started and ICU consulted.    PMHx:  HFpEF;  LBBB;  PAD;  TIA;  valvular heart disease-AVR (biov[place]);  IGT;  HTN;  HLD;  SIMÓN;  COPD;  RA;  GERD;  diverticulitis;  hiatal hernia;  breast CD;  RA;  Meniere disease;  spinal stenosis;  TMJ;  glaucoma;  EtOH (last drink over 2 decades ago);  anxiety/depression.    sepsis-PN v. diverticulitis.  - BCx:  no growth.  - completed meropenem.  - ID.    resolving diverticulitis.  - IV ABx.  - GI input noted.    acute upon chronic HFpEF.  - change to Lasix 40mg po bid and supplement potassium.  repeat BMP 4-5 days.  - Cardiology f/u outpatient in 1 week.    hx HTN.  - increased metoprolol 50mg bid.    DVT prophylaxis.  - LMWH.   Pt is a 80F.  admitted 06/10/2021.  patient presented to ED c/o chills.  onset 6 weeks prior to admission.  a/w generalized malaise and abdominal pain.  in ED, temp 105F and hypotensive.  sepsis protocol was started and ICU consulted.    PMHx:  HFpEF;  LBBB;  PAD;  TIA;  valvular heart disease-AVR (biovine);  IGT;  HTN;  HLD;  SIMÓN;  COPD;  RA;  GERD;  diverticulitis;  hiatal hernia;  breast CD;  RA;  Meniere disease;  spinal stenosis;  TMJ;  glaucoma;  EtOH (last drink over 2 decades ago);  anxiety/depression.      Medical progress: Doing great, no new complaints. Denies any HA, CP, SOB. No fevers, chills or shakes. Care discussed with Dr. Abel. Care discussed with patient and patient's daughter.   Complaints: no new complaints  State of mind: maintains normal conversation    sepsis-PN v. diverticulitis.  - BCx:  no growth.  - completed meropenem.  - ID.    resolving diverticulitis.  - IV ABx.  - GI input noted.    # acute upon chronic HFpEF.  - change to Lasix 40mg po bid and supplement potassium.  repeat BMP 4-5 days.  - Cardiology f/u outpatient in 1 week.    hx HTN.  - increased metoprolol 50mg bid.    DVT prophylaxis.  - LMWH.   Pt is a 80F.  admitted 06/10/2021.  patient presented to ED c/o chills.  onset 6 weeks prior to admission.  a/w generalized malaise and abdominal pain.  in ED, temp 105F and hypotensive.  sepsis protocol was started and ICU consulted.    PMHx:  HFpEF;  LBBB;  PAD;  TIA;  valvular heart disease-AVR (biov[place]);  IGT;  HTN;  HLD;  SIMÓN;  COPD;  RA;  GERD;  diverticulitis;  hiatal hernia;  breast CD;  RA;  Meniere disease;  spinal stenosis;  TMJ;  glaucoma;  EtOH (last drink over 2 decades ago);  anxiety/depression.      Medical progress: Doing great, no new complaints. Denies any HA, CP, SOB. No fevers, chills or shakes. Care discussed with Dr. Abel. Care discussed with patient and patient's daughter. Patient will continue to take same meds -  as she came in with and will decide if she will be changing meds only with cardiology follow up as patient has many allergies to BB /  plavix.   Complaints: no new complaints  State of mind: maintains normal conversation    sepsis-PN v. diverticulitis.  - BCx:  no growth.  - completed meropenem.  - ID.    resolving diverticulitis.  - IV ABx.  - GI input noted.    # acute upon chronic HFpEF.  - change to Lasix 40mg po bid and supplement potassium.  repeat BMP 4-5 days.  - Close follow up with Dr. Wilson sign    DVT prophylaxis.  - LMWH.

## 2021-06-16 NOTE — PROGRESS NOTE ADULT - NUTRITIONAL ASSESSMENT
This patient has been assessed with a concern for Malnutrition and has been determined to have a diagnosis/diagnoses of Moderate protein-calorie malnutrition.    This patient is being managed with:   Diet Consistent Carbohydrate w/Evening Snack-  Entered: Jun 12 2021  9:28AM    
This patient has been assessed with a concern for Malnutrition and has been determined to have a diagnosis/diagnoses of Moderate protein-calorie malnutrition.    This patient is being managed with:   Diet DASH/TLC-  Sodium & Cholesterol Restricted  Consistent Carbohydrate {Evening Snack} (CSTCHOSN)  Entered: Jun 14 2021  4:59PM    
This patient has been assessed with a concern for Malnutrition and has been determined to have a diagnosis/diagnoses of Moderate protein-calorie malnutrition.    This patient is being managed with:   Diet DASH/TLC-  Sodium & Cholesterol Restricted  Consistent Carbohydrate {Evening Snack} (CSTCHOSN)  Entered: Jun 14 2021  4:59PM    
This patient has been assessed with a concern for Malnutrition and has been determined to have a diagnosis/diagnoses of Moderate protein-calorie malnutrition.    This patient is being managed with:   Diet Consistent Carbohydrate w/Evening Snack-  Entered: Jun 12 2021  9:28AM

## 2021-06-16 NOTE — PHYSICAL THERAPY INITIAL EVALUATION ADULT - ACTIVE RANGE OF MOTION EXAMINATION, REHAB EVAL
pt with L shoulder DJD and limited elevation /ER states she needs a shoulder replacement but is unable to have any surgery due to h/o COPD/respiratory issues/bilateral upper extremity Active ROM was WFL (within functional limits)/bilateral  lower extremity Active ROM was WFL (within functional limits)/deficits as listed below

## 2021-06-16 NOTE — PROGRESS NOTE ADULT - ASSESSMENT
80F.  admitted 06/10/2021.  patient presented to ED c/o chills.  onset 6 weeks prior to admission.  a/w generalized malaise and abdominal pain.  in ED, temp 105F and hypotensive.  sepsis protocol was started and ICU consulted.    PMHx:  HFpEF;  LBBB;  PAD;  TIA;  valvular heart disease-AVR (biov[place]);  IGT;  HTN;  HLD;  SIMÓN;  COPD;  RA;  GERD;  diverticulitis;  hiatal hernia;  breast CD;  RA;  Meniere disease;  spinal stenosis;  TMJ;  glaucoma;  EtOH (last drink over 2 decades ago);  anxiety/depression.    sepsis-PN v. diverticulitis.  - BCx:  no growth.  - completed meropenem.  - ID.    resolving diverticulitis.  - IV ABx.  - GI input noted.    acute upon chronic HFpEF.  - change to Lasix 40mg po bid and supplement potassium.  - Cardiology.    DVT prophylaxis.  - LMWH.    disposition.  - 3N.  - PT evaluation:  need RW PRN.  ADLs independent.  - DC home w/ home care.    communication.  - RN.  - MAIA. 80F.  admitted 06/10/2021.  patient presented to ED c/o chills.  onset 6 weeks prior to admission.  a/w generalized malaise and abdominal pain.  in ED, temp 105F and hypotensive.  sepsis protocol was started and ICU consulted.    PMHx:  HFpEF;  LBBB;  PAD;  TIA;  valvular heart disease-AVR (biov[place]);  IGT;  HTN;  HLD;  SIMÓN;  COPD;  RA;  GERD;  diverticulitis;  hiatal hernia;  breast CD;  RA;  Meniere disease;  spinal stenosis;  TMJ;  glaucoma;  EtOH (last drink over 2 decades ago);  anxiety/depression.    sepsis-PN v. diverticulitis.  - BCx:  no growth.  - completed meropenem.  - ID.    resolving diverticulitis.  - IV ABx.  - GI input noted.    acute upon chronic HFpEF.  - change to Lasix 40mg po bid and supplement potassium.  - Cardiology.    DVT prophylaxis.  - LMWH.    disposition.  - 3N.  - PT evaluation:  need RW PRN.  ADLs independent.  - DC home w/ home care.    communication.  - RN.  - MAIA.  - 06/16 patient's daughter:  update given and DC plan reviewed. 80F.  admitted 06/10/2021.  patient presented to ED c/o chills.  onset 6 weeks prior to admission.  a/w generalized malaise and abdominal pain.  in ED, temp 105F and hypotensive.  sepsis protocol was started and ICU consulted.    PMHx:  HFpEF;  LBBB;  PAD;  TIA;  valvular heart disease-AVR (biov[place]);  IGT;  HTN;  HLD;  SIMÓN;  COPD;  RA;  GERD;  diverticulitis;  hiatal hernia;  breast CD;  RA;  Meniere disease;  spinal stenosis;  TMJ;  glaucoma;  EtOH (last drink over 2 decades ago);  anxiety/depression.    sepsis-PN v. diverticulitis.  - BCx:  no growth.  - completed meropenem.  - ID.    resolving diverticulitis.  - IV ABx.  - GI input noted.    acute upon chronic HFpEF.  - change to Lasix 40mg po bid and supplement potassium.  repeat BMP 4-5 days.  - Cardiology f/u outpatient in 1 week.    hx HTN.  - increased metoprolol 50mg bid.    DVT prophylaxis.  - LMWH.    disposition.  - 3N.  - PT evaluation:  need RW PRN.  ADLs independent.  - DC home w/ home care.    communication.  - RN.  - MAIA.  - 06/16 patient's daughter:  update given and DC plan reviewed.

## 2021-06-16 NOTE — CONSULT NOTE ADULT - SUBJECTIVE AND OBJECTIVE BOX
Patient is a 80y old  Female who presents with a chief complaint of Sepsis, PNA (15 Duran 2021 12:27)      HPI:  80F with PMH of COPD on home O2, systolic CHF (EF: 40%), RA, Lumbar stenosis, Chronic Diverticulitis, Meniere disease, GERD, TMJ, SIMÓN, Thrush, Skin CA, PVD, Hiatal Hernia, Breast CA, HLD, Valvular heart disease presented to ED with complaint of fever, chills, increased cough x 6weeks. Cough is productive of clear sputum. Reports symptoms initially were intermittent and but became constant. Reports associated mild abdominal pain. States abdominal pain is nonradiating, intermittent. Also reports associated progressive generalized weakness which acutely worsened today, she was unable to ambulate. Pt has not seen a physician about symptoms. She took azithromycin prescribed by her PCP with no improvement. Denies CP, SOB, headaches, N/V.   In ED, pt met sepsis criteria, and pt is s/p cipro, flagyl and ~4L NS. Pt was hypotensive after fluid resuscitation. Pressors was ordered. Pt was evaluated by ICU. Bp improved with fluids.  (10 Duran 2021 22:47)    events noted  pt is well known to me and has been asking for consult / visit since admission  I was just notified to see her today  breathing is better since ICU admit /no cp  all recent data rev and discussed with her today    PAST MEDICAL & SURGICAL HISTORY:  Valvular heart disease  aortic and mitral    Hyperlipidemia    TIA (transient ischemic attack)  2010    Breast cancer  right s/p lumpectomy and radiation     Hiatal hernia  s/p surgical repair     PVD (peripheral vascular disease)  left iliac  s/p surgical intervention -  unsuccessful    HTN (hypertension)    COPD (chronic obstructive pulmonary disease)  diagnosed   inhaler and nebulizer    Borderline diabetes  no meds    Depression    RA (rheumatoid arthritis)  diagnosed   oxycodone prn  neck/ lower back    Anxiety    Thrush  treated x 4 days  1 month ago  restarted medication  3-19-14 clortramazole 5x day    SIMÓN (obstructive sleep apnea)  category I severe pt does not use c/pap    Diverticulitis  hospitalized     TMJ (temporomandibular joint disorder)    LBBB (left bundle branch block)    CHF (congestive heart failure)    Spinal stenosis    Emphysema lung    GERD (gastroesophageal reflux disease)    Dry eyes    Glaucoma    Diabetes mellitus    Meniere disease    Aortic valve replaced  with bovine heart valve    Skin cancer  not melanoma    Alcoholism  last drink     Incontinence    S/P  section  x2 1965/     S/P appendectomy      S/P rotator cuff surgery  left 2004    S/P hernia surgery  left inguinal age 12    Hiatal hernia  s/p surgical repair per pt 2005    S/P lumpectomy, right breast  2008    S/P carpal tunnel release  right     PVD (peripheral vascular disease)  s/p left iliac bypass which was unsuccessful - open repair    S/P AVR (aortic valve replacement)  about 5 yrs ago (2013?)    H/O sinus surgery    H/O:  section  2x        PREVIOUS DIAGNOSTIC TESTING:      MEDICATIONS  (STANDING):  ALBUTerol    90 MICROgram(s) HFA Inhaler 2 Puff(s) Inhalation every 6 hours  ARIPiprazole 2 milliGRAM(s) Oral daily  aspirin  chewable 81 milliGRAM(s) Oral daily  budesonide 160 MICROgram(s)/formoterol 4.5 MICROgram(s) Inhaler 2 Puff(s) Inhalation two times a day  clonazePAM  Tablet 0.25 milliGRAM(s) Oral two times a day  clotrimazole Lozenge 1 Lozenge Oral <User Schedule>  dextrose 40% Gel 15 Gram(s) Oral once  dextrose 5%. 1000 milliLiter(s) (50 mL/Hr) IV Continuous <Continuous>  dextrose 5%. 1000 milliLiter(s) (100 mL/Hr) IV Continuous <Continuous>  dextrose 50% Injectable 25 Gram(s) IV Push once  dextrose 50% Injectable 12.5 Gram(s) IV Push once  dextrose 50% Injectable 25 Gram(s) IV Push once  dorzolamide 2% Ophthalmic Solution      dorzolamide 2% Ophthalmic Solution 1 Drop(s) Both EYES three times a day  enoxaparin Injectable 40 milliGRAM(s) SubCutaneous daily  famotidine    Tablet 20 milliGRAM(s) Oral daily  furosemide   Injectable 40 milliGRAM(s) IV Push two times a day  glucagon  Injectable 1 milliGRAM(s) IntraMuscular once  guaiFENesin  milliGRAM(s) Oral every 12 hours  insulin lispro (ADMELOG) corrective regimen sliding scale   SubCutaneous Before meals and at bedtime  lactobacillus acidophilus 1 Tablet(s) Oral daily  latanoprost 0.005% Ophthalmic Solution 1 Drop(s) Both EYES at bedtime  linaclotide 145 MICROGram(s) Oral before breakfast  lubiprostone 24 MICROGram(s) Oral two times a day  meropenem  IVPB 1000 milliGRAM(s) IV Intermittent every 8 hours  metoprolol tartrate 50 milliGRAM(s) Oral two times a day  predniSONE   Tablet 5 milliGRAM(s) Oral daily  psyllium Powder 1 Packet(s) Oral daily  simvastatin 20 milliGRAM(s) Oral at bedtime  sulfaSALAzine 500 milliGRAM(s) Oral three times a day  tiotropium 18 MICROgram(s) Capsule 1 Capsule(s) Inhalation daily  venlafaxine XR. 150 milliGRAM(s) Oral daily    MEDICATIONS  (PRN):  acetaminophen   Tablet .. 650 milliGRAM(s) Oral every 6 hours PRN Temp greater or equal to 38.5C (101.3F), Mild Pain (1 - 3)  artificial  tears Solution 1 Drop(s) Both EYES four times a day PRN Dry Eyes  ondansetron Injectable 4 milliGRAM(s) IV Push every 6 hours PRN Nausea  oxyCODONE    IR 10 milliGRAM(s) Oral every 6 hours PRN Moderate Pain (4 - 6)      FAMILY HISTORY:  Family history of colon cancer in father  also prostate ca, skin ca,    Family history of breast cancer (Aunt)  aunt    FH: HTN (hypertension) (Father, Mother)        SOCIAL HISTORY:  ***    REVIEW OF SYSTEM:  Pertinent items are noted in HPI.      Vital Signs Last 24 Hrs  T(C): 36.2 (15 Duran 2021 22:13), Max: 36.9 (15 Duran 2021 10:00)  T(F): 97.2 (15 Duran 2021 22:13), Max: 98.4 (15 Duran 2021 10:00)  HR: 92 (2021 01:03) (84 - 100)  BP: 129/56 (15 Durna 2021 22:13) (100/55 - 135/71)  BP(mean): 79 (15 Duran 2021 22:13) (67 - 98)  RR: 18 (15 Duran 2021 22:13) (15 - 23)  SpO2: 96% (2021 01:03) (94% - 100%)    I&O's Summary    15 Duran 2021 07:01  -  2021 07:00  --------------------------------------------------------  IN: 650 mL / OUT: 1000 mL / NET: -350 mL      PHYSICAL EXAM  General Appearance: cooperative, no acute distress,   HEENT: PERRL, conjunctiva clear,  Neck: Supple, , no adenopathy  Lungs: hyper resonant , mild end exp wheeze, few scattered rhonchi  Heart: Regular rate and rhythm, S1, S2 normal, no murmur, rub or gallop  Abdomen: Soft, non-tender, bowel sounds active  Extremities: no cyanosis or edema, no joint swelling  Skin: Skin color, texture normal, no rashes   Neurologic: Alert and oriented X3 , cranial nerves intact, sensory and motor normal,    ECG:    LABS:                          10.9   10.06 )-----------( 189      ( 15 Duran 2021 06:30 )             34.4     06-15    136  |  99  |  17  ----------------------------<  97  3.9   |  36<H>  |  0.44<L>    Ca    8.8      15 Duran 2021 06:30  Phos  2.4     06-15  Mg     2.5     06-15              < from: Xray Chest 1 View- PORTABLE-Urgent (Xray Chest 1 View- PORTABLE-Urgent .) (21 @ 03:47) >    PROCEDURE DATE:  2021          INTERPRETATION:  Chest one view    HISTORY: CHF    COMPARISON STUDY: 2021    Frontal expiratory view of the chest shows the heart to be similar in size. Thoracic spine cement, aortic valve ring and spinal electrodes are again noted.    The lungs show mild pulmonary congestion with progression of right upper lobe infiltrate and there is no evidence of pneumothorax nor pleural effusion.    IMPRESSION:  Congestion and right infiltrate.    Thank you for the courtesy of this referral.    < end of copied text >  < from: CT Abdomen and Pelvis w/ IV Cont (06.10.21 @ 16:34) >  COMPARISON: 8/10/2020    PROCEDURE:  CTof the Abdomen and Pelvis was performed with intravenous contrast.  Intravenous contrast: 90 ml Omnipaque 350. 10 ml discarded.  Oral contrast: None.  Sagittal and coronal reformats were performed.    FINDINGS:    LOWER CHEST: Cardiomegaly. Interlobular septal thickening, consistent with changes of CHF. Bibasilar atelectasis.    LIVER: Within normal limits.  BILE DUCTS: Normal caliber.  GALLBLADDER: Within normal limits.  SPLEEN: Within normal limits.  PANCREAS: Within normal limits.  ADRENALS: Within normal limits.  KIDNEYS/URETERS: Within normal limits.    BLADDER: Within normal limits.  REPRODUCTIVE ORGANS: Uterus and bilateral adnexa within normal limits.    BOWEL: No bowel obstruction. Appendix is not visualized. Diverticulosis with changes of acute diverticulitis at the level of the mid sigmoid colon.  PERITONEUM: No ascites. No evidence of diverticular abscess. No pneumoperitoneum.  VESSELS:  Atherosclerotic calcifications.  RETROPERITONEUM: No lymphadenopathy.  ABDOMINAL WALL: Within normal limits.  BONES: Degenerative changes and scoliosis. Diffuse osteopenia. Spinal stimulator.    IMPRESSION:  *  Acute diverticulitis involving the mid sigmoid colon. No evidence of diverticular abscess.  *  Cardiomegaly and CHF at the lung bases.    < end of copied text >            RADIOLOGY & ADDITIONAL STUDIES:

## 2021-06-16 NOTE — DISCHARGE NOTE PROVIDER - DETAILS OF MALNUTRITION DIAGNOSIS/DIAGNOSES
This patient has been assessed with a concern for Malnutrition and was treated during this hospitalization for the following Nutrition diagnosis/diagnoses:     -  06/11/2021: Moderate protein-calorie malnutrition

## 2021-06-16 NOTE — PROGRESS NOTE ADULT - ASSESSMENT
Acute decompensated HFPEF- hypervolemic   Seems euvolemic can change lasix to PO BID         Mitral stenosis- Goal HR 50-60/min.  Mild fever.  BP optimal.  Increased metoprolol to 50mg BID.   Peak gradient 5 with high heart rate.    Sepsis- Management per primary team.     otherwise seems stable , will sign off call us for further questions

## 2021-06-16 NOTE — PROGRESS NOTE ADULT - ASSESSMENT
80F with PMH of COPD on home O2, systolic CHF (EF: 40%), RA, Lumbar stenosis, Chronic Diverticulitis, Meniere disease, GERD, TMJ, SIMÓN, Thrush, Skin CA, PVD, Hiatal Hernia, Breast CA, HLD, Valvular heart disease admitted on 6/10 for evaluation of fever, chills and cough productive of clear sputum over preceding  6 weeks. Noted with abdominal pain that is intermittent. Prior to admission she took a course of zithromax without improvement. Upon admission was hypotensive improved with fluids, became more tachypneic and placed on bipap. History per medical record as patient unable to provide history.   1. Patient admitted with sepsis secondary to pneumonia versus diverticulitis; also noted with leukocytosis most likely reactive to infection  - follow up cultures   - serial cbc and monitor temperature   - oxygen and nebs as needed   - iv hydration and supportive care   - reviewed prior medical records to evaluate for resistant or atypical pathogens   - day #6 meropenem; will cover for lung and abdominal sources of infection  - when ready for discharge okay to discharge on no antibiotics as all rx was iv  - tolerating antibiotics without rashes or side effects   2. other issues: per medicine

## 2021-06-17 NOTE — PROGRESS NOTE ADULT - ASSESSMENT
80F with PMH of COPD on home O2, systolic CHF (EF: 40%), RA, Lumbar stenosis, Chronic Diverticulitis, Meniere disease, GERD, TMJ, SIMÓN, Thrush, Skin CA, PVD, Hiatal Hernia, Breast CA, HLD, Valvular heart disease presented to ED with complaint of fever, chills, increased cough x 6weeks. Cough is productive of clear sputum. Reports symptoms initially were intermittent and but became constant. Reports associated mild abdominal pain. States abdominal pain is nonradiating, intermittent. Also reports associated progressive generalized weakness which acutely worsened today, she was unable to ambulate. Pt has not seen a physician about symptoms. She took azithromycin prescribed by her PCP with no improvement. Denies CP, SOB, headaches, N/V.   In ED, pt met sepsis criteria, and pt is s/p cipro, flagyl and ~4L NS. Pt was hypotensive after fluid resuscitation. Pressors was ordered. Pt was evaluated by ICU. Bp improved with fluids.  (10 Duran 2021 22:47)    events noted  pt is well known to me and has been asking for consult / visit since admission  I was just notified to see her today  breathing is better since ICU admit /no cp  all recent data rev and discussed with her today    Assessment / plan:  Sepsis due to Diverticulitis  s/p ICU admission and clinically improved  Advanced COPD with recurrent bronchospasm  CHF appears stable  Chronic hypoxemic resp failure  increased cough and bronchospasm      complete course of antibiotics  bronchodilator rx  incentive spirometry  mobilize OOB  ABG only if any deterioration  repeat cxr this am- requested    will fu with you  Thank you

## 2021-06-17 NOTE — CHART NOTE - NSCHARTNOTEFT_GEN_A_CORE
Contacted by RN.  Patient went into junctional rhythm with a rate of 40 then went into sinus tach 115 and returned to baseline.    Patient seen and examined at bedside.  During episode BP was 178/78,   Repeated BP was 103/72 without any medical intervention.  She mentioned she was feeling anxious about taking her meds.     Vitals  T(F): 97.5 (06-17-21 @ 05:28), Max: 97.9 (06-16-21 @ 08:00)  HR: 73 (06-17-21 @ 05:28) (72 - 103)  BP: 104/59 (06-17-21 @ 05:28) (100/54 - 178/78)  RR: 18 (06-17-21 @ 05:28) (18 - 18)  SpO2: 97% (06-17-21 @ 05:28) (95% - 98%)    Physical Exam   Gen: agitated  CV: RRR, nl s1/s2, no M/R/G  Pulm: nl respiratory effort, CTAB, no wheezes/crackles/rhonchi  Abd: normoactive bowel sounds in all 4 quadrants, soft, nontender, nondistended, no rebound, no guarding, no masses  Extremities: no pedal edema, pedal pulses palpable   Neuro: A&Ox2    #Junctional Rhytm on tele monitor  - Back to baseline without medical intervention  - Will continue to monitor on tele  - f/u AM labs

## 2021-06-17 NOTE — PROGRESS NOTE ADULT - PROVIDER SPECIALTY LIST ADULT
Cardiology
Critical Care
Infectious Disease
Cardiology
Cardiology
Critical Care
Gastroenterology
Hospitalist
Infectious Disease
Critical Care
Gastroenterology
Infectious Disease
Pulmonology
Infectious Disease
Hospitalist

## 2021-06-17 NOTE — PROGRESS NOTE ADULT - REASON FOR ADMISSION
Sepsis, PNA

## 2021-06-17 NOTE — PROGRESS NOTE ADULT - NSICDXPILOT_GEN_ALL_CORE
[place]

## 2021-06-17 NOTE — PROGRESS NOTE ADULT - SUBJECTIVE AND OBJECTIVE BOX
Patient is a 80y old  Female who presents with a chief complaint of Sepsis, PNA (15 Duran 2021 12:27)      HPI:  80F with PMH of COPD on home O2, systolic CHF (EF: 40%), RA, Lumbar stenosis, Chronic Diverticulitis, Meniere disease, GERD, TMJ, SIMÓN, Thrush, Skin CA, PVD, Hiatal Hernia, Breast CA, HLD, Valvular heart disease presented to ED with complaint of fever, chills, increased cough x 6weeks. Cough is productive of clear sputum. Reports symptoms initially were intermittent and but became constant. Reports associated mild abdominal pain. States abdominal pain is nonradiating, intermittent. Also reports associated progressive generalized weakness which acutely worsened today, she was unable to ambulate. Pt has not seen a physician about symptoms. She took azithromycin prescribed by her PCP with no improvement. Denies CP, SOB, headaches, N/V.   In ED, pt met sepsis criteria, and pt is s/p cipro, flagyl and ~4L NS. Pt was hypotensive after fluid resuscitation. Pressors was ordered. Pt was evaluated by ICU. Bp improved with fluids.  (10 Duran 2021 22:47)    events noted  pt is well known to me and has been asking for consult / visit since admission  I was just notified to see her today  breathing is better since ICU admit /no cp  all recent data rev and discussed with her today      increased cough and congestion last night  no resp distress now  she wants to leave today    PAST MEDICAL & SURGICAL HISTORY:  Valvular heart disease  aortic and mitral    Hyperlipidemia    TIA (transient ischemic attack)  2010    Breast cancer  right s/p lumpectomy and radiation     Hiatal hernia  s/p surgical repair     PVD (peripheral vascular disease)  left iliac  s/p surgical intervention -  unsuccessful    HTN (hypertension)    COPD (chronic obstructive pulmonary disease)  diagnosed   inhaler and nebulizer    Borderline diabetes  no meds    Depression    RA (rheumatoid arthritis)  diagnosed   oxycodone prn  neck/ lower back    Anxiety    Thrush  treated x 4 days  1 month ago  restarted medication  3-19-14 clortramazole 5x day    SIMÓN (obstructive sleep apnea)  category I severe pt does not use c/pap    Diverticulitis  hospitalized     TMJ (temporomandibular joint disorder)    LBBB (left bundle branch block)    CHF (congestive heart failure)    Spinal stenosis    Emphysema lung    GERD (gastroesophageal reflux disease)    Dry eyes    Glaucoma    Diabetes mellitus    Meniere disease    Aortic valve replaced  with bovine heart valve    Skin cancer  not melanoma    Alcoholism  last drink     Incontinence    S/P  section  x2 1965/     S/P appendectomy      S/P rotator cuff surgery  left 2004    S/P hernia surgery  left inguinal age 12    Hiatal hernia  s/p surgical repair per pt 2005    S/P lumpectomy, right breast  2008    S/P carpal tunnel release  right 2002    PVD (peripheral vascular disease)  s/p left iliac bypass which was unsuccessful - open repair    S/P AVR (aortic valve replacement)  about 5 yrs ago (2013?)    H/O sinus surgery    H/O:  section  2x        PREVIOUS DIAGNOSTIC TESTING:      MEDICATIONS  (STANDING):  ALBUTerol    90 MICROgram(s) HFA Inhaler 2 Puff(s) Inhalation every 6 hours  ARIPiprazole 2 milliGRAM(s) Oral daily  aspirin  chewable 81 milliGRAM(s) Oral daily  budesonide 160 MICROgram(s)/formoterol 4.5 MICROgram(s) Inhaler 2 Puff(s) Inhalation two times a day  clonazePAM  Tablet 0.25 milliGRAM(s) Oral two times a day  clotrimazole Lozenge 1 Lozenge Oral <User Schedule>  dextrose 40% Gel 15 Gram(s) Oral once  dextrose 5%. 1000 milliLiter(s) (50 mL/Hr) IV Continuous <Continuous>  dextrose 5%. 1000 milliLiter(s) (100 mL/Hr) IV Continuous <Continuous>  dextrose 50% Injectable 25 Gram(s) IV Push once  dextrose 50% Injectable 12.5 Gram(s) IV Push once  dextrose 50% Injectable 25 Gram(s) IV Push once  dorzolamide 2% Ophthalmic Solution      dorzolamide 2% Ophthalmic Solution 1 Drop(s) Both EYES three times a day  enoxaparin Injectable 40 milliGRAM(s) SubCutaneous daily  famotidine    Tablet 20 milliGRAM(s) Oral daily  furosemide   Injectable 40 milliGRAM(s) IV Push two times a day  glucagon  Injectable 1 milliGRAM(s) IntraMuscular once  guaiFENesin  milliGRAM(s) Oral every 12 hours  insulin lispro (ADMELOG) corrective regimen sliding scale   SubCutaneous Before meals and at bedtime  lactobacillus acidophilus 1 Tablet(s) Oral daily  latanoprost 0.005% Ophthalmic Solution 1 Drop(s) Both EYES at bedtime  linaclotide 145 MICROGram(s) Oral before breakfast  lubiprostone 24 MICROGram(s) Oral two times a day  meropenem  IVPB 1000 milliGRAM(s) IV Intermittent every 8 hours  metoprolol tartrate 50 milliGRAM(s) Oral two times a day  predniSONE   Tablet 5 milliGRAM(s) Oral daily  psyllium Powder 1 Packet(s) Oral daily  simvastatin 20 milliGRAM(s) Oral at bedtime  sulfaSALAzine 500 milliGRAM(s) Oral three times a day  tiotropium 18 MICROgram(s) Capsule 1 Capsule(s) Inhalation daily  venlafaxine XR. 150 milliGRAM(s) Oral daily    MEDICATIONS  (PRN):  acetaminophen   Tablet .. 650 milliGRAM(s) Oral every 6 hours PRN Temp greater or equal to 38.5C (101.3F), Mild Pain (1 - 3)  artificial  tears Solution 1 Drop(s) Both EYES four times a day PRN Dry Eyes  ondansetron Injectable 4 milliGRAM(s) IV Push every 6 hours PRN Nausea  oxyCODONE    IR 10 milliGRAM(s) Oral every 6 hours PRN Moderate Pain (4 - 6)      FAMILY HISTORY:  Family history of colon cancer in father  also prostate ca, skin ca,    Family history of breast cancer (Aunt)  aunt    FH: HTN (hypertension) (Father, Mother)        SOCIAL HISTORY:  ***    REVIEW OF SYSTEM:  Pertinent items are noted in HPI.      Vital Signs Last 24 Hrs  T(C): 36.4 (2021 05:28), Max: 36.4 (2021 05:28)  T(F): 97.5 (2021 05:28), Max: 97.5 (2021 05:28)  HR: 73 (2021 05:28) (72 - 103)  BP: 104/59 (2021 05:28) (100/54 - 178/78)  BP(mean): 73 (2021 05:28) (73 - 106)  RR: 18 (2021 05:28) (18 - 18)  SpO2: 97% (2021 05:28) (95% - 98%)    I&O's Detail    2021 07:01  -  2021 07:00  --------------------------------------------------------  IN:  Total IN: 0 mL    OUT:    Incontinent per Collection Bag (mL): 900 mL  Total OUT: 900 mL    Total NET: -900 mL          PHYSICAL EXAM  General Appearance: cooperative, no acute distress,   HEENT: PERRL, conjunctiva clear,  Neck: Supple, , no adenopathy  Lungs: hyper resonant , mild end exp wheeze, few scattered rhonchi  Heart: Regular rate and rhythm, S1, S2 normal, no murmur, rub or gallop  Abdomen: Soft, non-tender, bowel sounds active  Extremities: no cyanosis or edema, no joint swelling  Skin: Skin color, texture normal, no rashes   Neurologic: Alert and oriented X3 , cranial nerves intact, sensory and motor normal,    ECG:    LABS:                          10.9   10.06 )-----------( 189      ( 15 Duran 2021 06:30 )             34.4     06-15    136  |  99  |  17  ----------------------------<  97  3.9   |  36<H>  |  0.44<L>    Ca    8.8      15 Duran 2021 06:30  Phos  2.4     06-15  Mg     2.5     06-15              < from: Xray Chest 1 View- PORTABLE-Urgent (Xray Chest 1 View- PORTABLE-Urgent .) (21 @ 03:47) >    PROCEDURE DATE:  2021          INTERPRETATION:  Chest one view    HISTORY: CHF    COMPARISON STUDY: 2021    Frontal expiratory view of the chest shows the heart to be similar in size. Thoracic spine cement, aortic valve ring and spinal electrodes are again noted.    The lungs show mild pulmonary congestion with progression of right upper lobe infiltrate and there is no evidence of pneumothorax nor pleural effusion.    IMPRESSION:  Congestion and right infiltrate.    Thank you for the courtesy of this referral.    < end of copied text >  < from: CT Abdomen and Pelvis w/ IV Cont (06.10.21 @ 16:34) >  COMPARISON: 8/10/2020    PROCEDURE:  CTof the Abdomen and Pelvis was performed with intravenous contrast.  Intravenous contrast: 90 ml Omnipaque 350. 10 ml discarded.  Oral contrast: None.  Sagittal and coronal reformats were performed.    FINDINGS:    LOWER CHEST: Cardiomegaly. Interlobular septal thickening, consistent with changes of CHF. Bibasilar atelectasis.    LIVER: Within normal limits.  BILE DUCTS: Normal caliber.  GALLBLADDER: Within normal limits.  SPLEEN: Within normal limits.  PANCREAS: Within normal limits.  ADRENALS: Within normal limits.  KIDNEYS/URETERS: Within normal limits.    BLADDER: Within normal limits.  REPRODUCTIVE ORGANS: Uterus and bilateral adnexa within normal limits.    BOWEL: No bowel obstruction. Appendix is not visualized. Diverticulosis with changes of acute diverticulitis at the level of the mid sigmoid colon.  PERITONEUM: No ascites. No evidence of diverticular abscess. No pneumoperitoneum.  VESSELS:  Atherosclerotic calcifications.  RETROPERITONEUM: No lymphadenopathy.  ABDOMINAL WALL: Within normal limits.  BONES: Degenerative changes and scoliosis. Diffuse osteopenia. Spinal stimulator.    IMPRESSION:  *  Acute diverticulitis involving the mid sigmoid colon. No evidence of diverticular abscess.  *  Cardiomegaly and CHF at the lung bases.    < end of copied text >            RADIOLOGY & ADDITIONAL STUDIES:

## 2021-07-04 NOTE — ED ADULT TRIAGE NOTE - CHIEF COMPLAINT QUOTE
Patient c/o COPD exacerbation.  On EMS arrival O2 sat on RA was 82%.  Hx of COPD and CHF, on 4 L home O2.  EMS started bipap, O2 sat up to 97%.

## 2021-07-04 NOTE — ED PROVIDER NOTE - CLINICAL SUMMARY MEDICAL DECISION MAKING FREE TEXT BOX
Pt with h/o CHF, COPD, HTN, HLD, TIA, O2 dependent at home. Here after sitting outside for 4 hours and having increased congestion and SOB. Pt panicked when her O2 did not work in the car, but has been feeling SOB for about 2 days. Labs, CXR, EKG, steroids.

## 2021-07-04 NOTE — ED PROVIDER NOTE - NSFOLLOWUPINSTRUCTIONS_ED_ALL_ED_FT
You are signing against medical advice and are taking responsibility for yourself. You have an abnormal lactate which can indicate infection and have not obtained a second cardiac enzyme result which would test for cardiac damage. You are free to return to ER at any time. Please follow up with your doctor as soon as possible.

## 2021-07-04 NOTE — ED ADULT NURSE NOTE - EXPLANATION OF PATIENT'S REASON FOR LEAVING
Pt states it's getting late and she feels much better and daughter needs to get to work early tomorrow

## 2021-07-04 NOTE — ED PROVIDER NOTE - OBJECTIVE STATEMENT
79 y/o female with a PMHx of aortic valve replacememnt, breast cancer, CHF, COPD, DM, HTN, HLD, LBBB, PVD, TIA presents to the ED c/o increased SOB. Pt reports she sat outside for 4 hours today, felt like her allergies were acting up. +increased nasal congestion. Pt got in her car and O2 compressor stopped working. EMS reported O2 sat was87% on room air, started pt on bipap PTA. Pt used inhaler PTA without improvement. Pt uses 3L O2 at home. Cardio- Dr. Jackson PMD- Dr. Benoit. Pulm- Dr. Adam.

## 2021-07-04 NOTE — ED ADULT NURSE REASSESSMENT NOTE - NS ED NURSE REASSESS COMMENT FT1
Verbal order by MD Kim to d/c bipap and place pt on NC 2liters. Pt tolerating well. Daughter at bedside.

## 2021-07-04 NOTE — ED PROVIDER NOTE - CARE PLAN
Principal Discharge DX:	Dyspnea, unspecified type   Principal Discharge DX:	Dyspnea, unspecified type  Secondary Diagnosis:	Lactate blood increased

## 2021-07-04 NOTE — ED PROVIDER NOTE - PATIENT PORTAL LINK FT
You can access the FollowMyHealth Patient Portal offered by [facility] by registering at the following website: [email or web address]. By joining [facility]’s FollowMyHealth portal, you will also be able to view your health information using other applications (apps) compatible with our system.

## 2021-07-04 NOTE — ED PROVIDER NOTE - PROGRESS NOTE DETAILS
Pt wishes to leave prior to second lactate and cardiac enzyme. Wishes to sign ama. Understands risks. Kim DUARTE

## 2021-07-04 NOTE — ED ADULT NURSE REASSESSMENT NOTE - NS ED NURSE REASSESS COMMENT FT1
Pt states she is feeling better and requesting to leave against medical advice. Dr. Alvarez notified of pt's wishes.

## 2021-07-15 NOTE — PROGRESS NOTE ADULT - ASSESSMENT
My chart message sent   Patient with history of COPD, CHF  Patient admitted with fever to 105. all cultures negative, ct showed diverticulitis and pneumonia  developed likely chf and respiratory failure, echo mitral stenosis  cxr c/w pneumonia and chf    Plan     Back on bipap, will continue diuresis, at risk of intubation      antibiotics, meropenem for pneumonia and diverticultis     no peritoneal signs, tolerating diet     on her baseline steroids for copd     will have cardiology see slightly inc troponin, worsened mitral stenosis      sliding scale for diabetes.       Patient with history of COPD, CHF  Patient admitted with fever to 105. all cultures negative, ct showed diverticulitis and pneumonia  developed likely chf and respiratory failure, echo mitral stenosis  cxr c/w pneumonia and chf    Plan     Back on bipap, will continue diuresis, at risk of intubation      antibiotics, meropenem for pneumonia and diverticultis     no peritoneal signs, tolerating diet     on her baseline steroids for copd     will have cardiology see slightly inc troponin, worsened mitral stenosis       interstitial infiltrates on ct althoug vaccinated with antibodies will reswab for covid     sliding scale for diabetes.

## 2021-08-09 NOTE — ED PROVIDER NOTE - NORMAL, MLM
What Type Of Note Output Would You Prefer (Optional)?: Standard Output
Have Your Spot(S) Been Treated In The Past?: has not been treated
Hpi Title: Evaluation of Skin Lesions
kp all pertinent systems normal

## 2021-08-12 NOTE — HISTORY OF PRESENT ILLNESS
[FreeTextEntry1] : \par F/u RA and Osteoporosis. \par Forr the rheumatoid arthritis she continues to use sulfasalazine 1gram qAM/500mg qPm, unfortunately she continues to use prednisone, she is on 5 mg daily.  Her worse joints are hands, knees, feet.  She feels her shoulders are bad, but she was told she can't do much about that.  . She worries about the long-term side effects of the medications and prefers to be on this regimen. She rates her pain as mild to moderate depending on the day and activity level.\par \par She does not have thrush right now, but states she sometimes gets it and has been given lozenges.  She would like the swish and spit medication in case it returns.  \par \par She had a recent fall, she fractured her spine, she has osteoporosis. she preferred not to use the prolia and preferred the bisphosphonate.  Now, she doesn't want to take anything at all for her osteoporosis citing that her daughter told her to get of it.  She is aware it is for prevention of fracture.

## 2021-08-12 NOTE — ASSESSMENT
[FreeTextEntry1] : \par 80 year old female with a history of depression, COPD, HTN, and aortic valve replacement presents to rule out RA today. She was under the care of Dr. Saenz in the past\par \par RA-\par . IN the past she had a sedimentation rate of 26 which is normal for her age, an BRADLEY of 1:80, with a weakly positive double-stranded DNA and negative rheumatoid serologies. Her muscle enzymes are slightly elevated too. The significance of these labs is unclear. At this time she does not fill criteria for lupus.\par \par Since she has features consistent with inflammatory arthritis affecting the second and third MCP\par She has erosive and active RA. \par -she was on xeljanz but we d/maria elena it\par - to inc SSZ  1.5 gms daily and 1gram nightly . \par - will hold off on new med use at this time given virus outbreak\par - Consider switching pred 5mg daily to 5mg prn next visit once SSZ increased.  \par \par Steroid use-\par Risks and benefits of steroids were d/w patient including but not limited to weight gain, diabetes, HTN, avascular necrosis, osteoporosis, glaucoma, cataract, infections and immunosuppression.\par -Rx nystatin swish and spit for if the thrush recurs.  \par \par Osteoarthritis-\par -the other issue is that she does have polyarticular osteoarthritis especially in her lower back\par -She states that she is not a candidate for NSAIDs but does not recall why\par -She is seeing pain management, she has tried epidurals, she is also using medical marijuana with minimal relief.\par -I did just physical therapy as well.\par \par Osteoporosis-\par -no longer wants to take any osteoporosis medication\par -Is aware it may inc her chance of getting a fracture\par \par COPD-\par -stable\par \par Followup 3 months or sooner if there is any change in her underlying symptoms. She is aware to hold meds while on abx\par Labs now, and labs 1 month on new dose of SSZ.

## 2021-09-24 NOTE — ASU PREOP CHECKLIST - ALLERGIES REVIEWED
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 She was transferred to PACU SURGE following the procedure in stable condition. Please see the electronic medical record for the remainder of her hospital course.     Consultations:  Internal Medicine  Interventions:  IV Hydration    On date of discharge she was ambulating, voiding, tolerating regular diet, and admitting to adequate pain control through oral pain medication. She denied fever, chills, chest pain, and/or shortness of breath. Her vital signs were stable and she was ready for discharge to home in stable condition.     Condition on discharge: Stable   Discharge disposition: Home    Discharge Medications:   Per electronic medication reconcilliation record as well as:   Oxycodone IR, 10mg every 4 hours prn for pain and Lovenox 60 mg q12 h x 7 days    Follow up:   Patient has been instructed to follow up with Dr. Danisha Gleason and has an appointment set in the clinic.     GYN Oncology Surgical Discharge Instructions:     1. No driving for 2 weeks or until no longer taking narcotic  medications, whichever occurs later.  2. No heavy lifting. Do not lift anything heavier than 10 pounds (or a gallon of milk) for 6 weeks.   3. No sexual activity and nothing in the vagina for 12 weeks.   4. You may shower.  No soaking in a bath tub or swimming for 8 weeks.  5. You may remove the bandages 48 hours after surgery.  Steri-strips will fall off on their own or should be removed 14 days after surgery.  6. Anticipate vaginal spotting following surgery. This may not begin until 2 weeks following surgery and may wax and wane for a duration of 6-8 weeks following surgery.   7. If you develop fever, chills, or increased vaginal bleeding call office at [phone].   8. If you develop chest pain, shortness of breath, lower extremity edema or pain, present to your local emergency room.   9.  You may take over the counter tylenol and ibuprofen as per label instructions in addition to the oxycodone.  If taking the oxycodone, please also take colace or senna as needed for constipation.    Kiersten Cerda PA-C  9/24/2021  Discharge Physician:  Dr. Allan Cleaning   done

## 2021-09-29 NOTE — ASU PREOP CHECKLIST - HAIR REMOVAL
Patient read and responded.  Last read by Eusebia Carney at 10:41 AM on 9/29/2021.     hair removal not indicated

## 2021-11-23 NOTE — ED PROVIDER NOTE - EYES, MLM
Clear bilaterally, pupils equal, round and reactive to light.
PAST MEDICAL HISTORY:  No pertinent past medical history

## 2021-11-28 NOTE — H&P PST ADULT - NS PRO ABUSE SCREEN AFRAID ANYONE YN
As provider-in-triage, I performed a medical screening history and physical exam on this patient. HISTORY OF PRESENT ILLNESS  Avani Day is a 67 y.o. male who present to the emergency department today with lower abdominal pain in the setting of rectal cancer. States he had decreased oral intake, feeling nauseated. Has had some \"pus\" coming from his rectum. No active bleeding. Denies bright red blood per the rectum or melanotic stools. No fevers or chills. Denies chest pain or shortness of breath. He is receiving chemo and radiation. Had an outpatient order for C. difficile that he had not completed. .. PHYSICAL EXAM  BP (!) 123/59   Pulse 84   Temp 97.6 °F (36.4 °C) (Oral)   Resp 15   Ht 5' 10.5\" (1.791 m)   Wt 195 lb (88.5 kg)   SpO2 98%   BMI 27.58 kg/m²     On exam, the patient appears in no acute distress. Speech is clear. Breathing is unlabored. Moves all extremities    Comment: Please note this report has been produced using speech recognition software and may contain errors related to that system including errors in grammar, punctuation, and spelling, as well as words and phrases that may be inappropriate. If there are any questions or concerns please feel free to contact the dictating provider for clarification.         Mari Panchal, PA  11/28/21 2939 no

## 2021-12-02 NOTE — ASSESSMENT
[FreeTextEntry1] : 81 year old female with a history of depression, COPD, HTN, and aortic valve replacement presents to rule out RA today. She was under the care of Dr. Saenz in the past\par \par RA-\par . IN the past she had a sedimentation rate of 26 which is normal for her age, an BRADLEY of 1:80, with a weakly positive double-stranded DNA and negative rheumatoid serologies. Her muscle enzymes are slightly elevated too. The significance of these labs is unclear. At this time she does not fill criteria for lupus.\par \par Since she has features consistent with inflammatory arthritis affecting the second and third MCP\par She has erosive and active RA. \par -she was on xeljanz but we d/maria elena it\par - to inc SSZ 1.5 gms BID, check labs now. \par - will hold off on new med use at this time given virus outbreak and also she does not want a new medication.  \par - Consider switching pred 5mg daily to 5mg prn next visit once SSZ increased. \par \par Steroid use-\par Risks and benefits of steroids were d/w patient including but not limited to weight gain, diabetes, HTN, avascular necrosis, osteoporosis, glaucoma, cataract, infections and immunosuppression.\par -Rx nystatin swish and spit for if the thrush recurs. \par \par Osteoarthritis-\par -the other issue is that she does have polyarticular osteoarthritis especially in her lower back\par -She states that she is not a candidate for NSAIDs but does not recall why.  Does not want voltaren gel. \par -She is not interested in potential injection therapy to knees.  \par -Advised her to try Capsaicin for the knees.  \par \par -She is seeing pain management, she has tried epidurals, she is also using medical marijuana with minimal relief.\par -I did just physical therapy as well.\par \par Osteoporosis-\par -no longer wants to take any osteoporosis medication\par -Is aware it may inc her chance of getting a fracture\par \par COPD- \par -she follows with Dr. Davis\par \par Followup 3-4 months or sooner if there is any change in her underlying symptoms. \par \par \par

## 2021-12-02 NOTE — HISTORY OF PRESENT ILLNESS
[FreeTextEntry1] : F/u RA and Osteoporosis. \par For the rheumatoid arthritis she continues to use sulfasalazine 1gram qAM/500mg qPm, unfortunately she continues to use prednisone, she is on 5 mg daily. Her worse joints are hands, knees, feet. She feels her shoulders are bad, but she was told she can't do much about that.  She worries about the long-term side effects of the medications and prefers to be on this regimen. She rates her pain as mild to moderate depending on the day and activity level.\par \par Thrush- she recently took nystatin swish and spit for thrush.  \par \par She sometimes gets pain in hands, around knuckles, also her knees.  She has pain in her neck and back, has known spinal stenosis.  Currently does PT.  \par \par She was told she needs a left shoulder replacement.  \par \par She had a recent fall, she fractured her spine, she has osteoporosis. she preferred not to use the prolia and preferred the bisphosphonate. Now, she doesn't want to take anything at all for her osteoporosis citing that her daughter told her to get of it. She is aware it is for prevention of fracture. \par  \par

## 2021-12-07 NOTE — ED PROVIDER NOTE - TEMPLATE
Problem: Adult Inpatient Plan of Care  Goal: Plan of Care Review  Outcome: Met  Goal: Patient-Specific Goal (Individualized)  Outcome: Met  Goal: Absence of Hospital-Acquired Illness or Injury  Outcome: Met  Goal: Optimal Comfort and Wellbeing  Outcome: Met  Goal: Readiness for Transition of Care  Outcome: Met     Problem: Pain Acute  Goal: Optimal Pain Control  Outcome: Met     Problem: Fall Injury Risk  Goal: Absence of Fall and Fall-Related Injury  Outcome: Met   Goal Outcome Evaluation:                  Cardiac

## 2021-12-22 NOTE — PROGRESS NOTE ADULT - PROBLEM/PLAN-6
Medication:   Requested Prescriptions     Pending Prescriptions Disp Refills    amphetamine-dextroamphetamine (ADDERALL XR) 10 MG extended release capsule 30 capsule 0     Sig: Take one capsule by mouth in the morning after breakfast      Last Filled: 11/1/21      Patient Phone Number: [phone] (home)      Patient seen in office on 9/27/2021 for [facility]. Immunizations: Due for Covid-19 vaccine, UTD on all other vaccinations. Pharmacy: Information updated in chart. DISPLAY PLAN FREE TEXT

## 2022-01-01 ENCOUNTER — NON-APPOINTMENT (OUTPATIENT)
Age: 82
End: 2022-01-01

## 2022-01-01 ENCOUNTER — RX RENEWAL (OUTPATIENT)
Age: 82
End: 2022-01-01

## 2022-01-01 ENCOUNTER — APPOINTMENT (OUTPATIENT)
Dept: RHEUMATOLOGY | Facility: CLINIC | Age: 82
End: 2022-01-01
Payer: MEDICARE

## 2022-01-01 ENCOUNTER — APPOINTMENT (OUTPATIENT)
Dept: COLORECTAL SURGERY | Facility: CLINIC | Age: 82
End: 2022-01-01
Payer: MEDICARE

## 2022-01-01 VITALS
WEIGHT: 138 LBS | DIASTOLIC BLOOD PRESSURE: 66 MMHG | SYSTOLIC BLOOD PRESSURE: 152 MMHG | HEIGHT: 62 IN | RESPIRATION RATE: 14 BRPM | BODY MASS INDEX: 25.4 KG/M2 | OXYGEN SATURATION: 95 % | TEMPERATURE: 97.6 F | HEART RATE: 102 BPM

## 2022-01-01 VITALS
OXYGEN SATURATION: 98 % | WEIGHT: 142 LBS | BODY MASS INDEX: 25.97 KG/M2 | SYSTOLIC BLOOD PRESSURE: 145 MMHG | DIASTOLIC BLOOD PRESSURE: 65 MMHG | HEART RATE: 88 BPM | TEMPERATURE: 97.6 F

## 2022-01-01 DIAGNOSIS — K64.5 PERIANAL VENOUS THROMBOSIS: ICD-10-CM

## 2022-01-01 DIAGNOSIS — M06.9 RHEUMATOID ARTHRITIS, UNSPECIFIED: ICD-10-CM

## 2022-01-01 DIAGNOSIS — M80.80XA OTHER OSTEOPOROSIS WITH CURRENT PATHOLOGICAL FRACTURE, UNSPECIFIED SITE, INITIAL ENCOUNTER FOR FRACTURE: ICD-10-CM

## 2022-01-01 DIAGNOSIS — M15.9 POLYOSTEOARTHRITIS, UNSPECIFIED: ICD-10-CM

## 2022-01-01 DIAGNOSIS — B37.0 CANDIDAL STOMATITIS: ICD-10-CM

## 2022-01-01 LAB
25(OH)D3 SERPL-MCNC: 63.5 NG/ML
ALBUMIN SERPL ELPH-MCNC: 4 G/DL
ALP BLD-CCNC: 72 U/L
ALT SERPL-CCNC: 20 U/L
ANION GAP SERPL CALC-SCNC: 12 MMOL/L
AST SERPL-CCNC: 25 U/L
BASOPHILS # BLD AUTO: 0.08 K/UL
BASOPHILS NFR BLD AUTO: 1.1 %
BILIRUB SERPL-MCNC: 0.2 MG/DL
BUN SERPL-MCNC: 10 MG/DL
CALCIUM SERPL-MCNC: 8.8 MG/DL
CHLORIDE SERPL-SCNC: 97 MMOL/L
CO2 SERPL-SCNC: 27 MMOL/L
CREAT SERPL-MCNC: 0.48 MG/DL
CRP SERPL-MCNC: 18 MG/L
EGFR: 96 ML/MIN/1.73M2
EOSINOPHIL # BLD AUTO: 0.12 K/UL
EOSINOPHIL NFR BLD AUTO: 1.7 %
ERYTHROCYTE [SEDIMENTATION RATE] IN BLOOD BY WESTERGREN METHOD: 48 MM/HR
GLUCOSE SERPL-MCNC: 154 MG/DL
HCT VFR BLD CALC: 33.2 %
HGB BLD-MCNC: 10.1 G/DL
IMM GRANULOCYTES NFR BLD AUTO: 0.4 %
LYMPHOCYTES # BLD AUTO: 0.93 K/UL
LYMPHOCYTES NFR BLD AUTO: 13.2 %
MAN DIFF?: NORMAL
MCHC RBC-ENTMCNC: 29.6 PG
MCHC RBC-ENTMCNC: 30.4 GM/DL
MCV RBC AUTO: 97.4 FL
MONOCYTES # BLD AUTO: 0.91 K/UL
MONOCYTES NFR BLD AUTO: 13 %
NEUTROPHILS # BLD AUTO: 4.95 K/UL
NEUTROPHILS NFR BLD AUTO: 70.6 %
PLATELET # BLD AUTO: 224 K/UL
POTASSIUM SERPL-SCNC: 4.2 MMOL/L
PROT SERPL-MCNC: 6.5 G/DL
RBC # BLD: 3.41 M/UL
RBC # FLD: 15.2 %
SODIUM SERPL-SCNC: 136 MMOL/L
WBC # FLD AUTO: 7.02 K/UL

## 2022-01-01 PROCEDURE — 99202 OFFICE O/P NEW SF 15 MIN: CPT

## 2022-01-01 PROCEDURE — 46600 DIAGNOSTIC ANOSCOPY SPX: CPT

## 2022-01-01 PROCEDURE — 99214 OFFICE O/P EST MOD 30 MIN: CPT | Mod: 25

## 2022-01-01 PROCEDURE — 36415 COLL VENOUS BLD VENIPUNCTURE: CPT

## 2022-01-01 RX ORDER — BIMATOPROST 0.1 MG/ML
SOLUTION/ DROPS OPHTHALMIC
Refills: 0 | Status: ACTIVE | COMMUNITY

## 2022-01-01 RX ORDER — IBANDRONATE SODIUM 150 MG/1
150 TABLET ORAL
Qty: 3 | Refills: 0 | Status: DISCONTINUED | COMMUNITY
Start: 2021-01-01 | End: 2022-01-01

## 2022-01-01 RX ORDER — LINACLOTIDE 145 UG/1
145 CAPSULE, GELATIN COATED ORAL
Refills: 0 | Status: ACTIVE | COMMUNITY

## 2022-01-01 RX ORDER — BRINZOLAMIDE 10 MG/ML
SUSPENSION/ DROPS OPHTHALMIC
Refills: 0 | Status: ACTIVE | COMMUNITY

## 2022-01-01 RX ORDER — FEXOFENADINE HYDROCHLORIDE 180 MG/1
TABLET ORAL
Refills: 0 | Status: ACTIVE | COMMUNITY

## 2022-01-01 RX ORDER — METFORMIN HYDROCHLORIDE 500 MG/1
500 TABLET, COATED ORAL
Refills: 0 | Status: ACTIVE | COMMUNITY

## 2022-01-01 RX ORDER — LORATADINE 5 MG
TABLET,CHEWABLE ORAL
Refills: 0 | Status: ACTIVE | COMMUNITY

## 2022-01-01 RX ORDER — DEXTROMETHORPHN/ACETAMINOPH/CP 10-325-2MG
TABLET ORAL
Refills: 0 | Status: ACTIVE | COMMUNITY

## 2022-01-01 RX ORDER — PREDNISONE 5 MG/1
5 TABLET ORAL DAILY
Qty: 30 | Refills: 2 | Status: DISCONTINUED | COMMUNITY
Start: 2019-09-27 | End: 2022-01-01

## 2022-01-01 RX ORDER — SULFASALAZINE 500 MG/1
500 TABLET ORAL
Qty: 450 | Refills: 0 | Status: ACTIVE | COMMUNITY
Start: 2021-01-01 | End: 1900-01-01

## 2022-01-01 RX ORDER — BRIMONIDINE TARTRATE 1 MG/ML
SOLUTION/ DROPS OPHTHALMIC
Refills: 0 | Status: ACTIVE | COMMUNITY

## 2022-01-01 RX ORDER — OXYCODONE HYDROCHLORIDE AND ACETAMINOPHEN 10; 325 MG/1; MG/1
TABLET ORAL
Refills: 0 | Status: ACTIVE | COMMUNITY

## 2022-01-01 RX ORDER — LUBIPROSTONE 24 UG/1
24 CAPSULE ORAL
Refills: 0 | Status: ACTIVE | COMMUNITY

## 2022-01-01 RX ORDER — GINGER ROOT EXTRACT 50 MG
TABLET ORAL
Refills: 0 | Status: ACTIVE | COMMUNITY

## 2022-01-01 RX ORDER — CETIRIZINE HCL 10 MG
TABLET ORAL
Refills: 0 | Status: ACTIVE | COMMUNITY

## 2022-01-01 RX ORDER — AZELASTINE HYDROCHLORIDE 137 UG/1
SPRAY, METERED NASAL
Refills: 0 | Status: ACTIVE | COMMUNITY

## 2022-01-01 RX ORDER — FAMOTIDINE 20 MG/1
20 TABLET, FILM COATED ORAL
Refills: 0 | Status: ACTIVE | COMMUNITY

## 2022-01-03 PROBLEM — K64.5 THROMBOSED EXTERNAL HEMORRHOID: Status: ACTIVE | Noted: 2022-01-01

## 2022-01-03 NOTE — ASSESSMENT
[FreeTextEntry1] : 81yoF with multiple comorbidities, symptoms most consistent with a thrombosed external hemorrhoid though this appears to be resolving at this point

## 2022-01-03 NOTE — PHYSICAL EXAM
[Respiratory Effort] : normal respiratory effort [Normal Rate and Rhythm] : normal rate and rhythm [No Rash or Lesion] : No rash or lesion [Alert] : alert [Oriented to Person] : oriented to person [Oriented to Place] : oriented to place [Oriented to Time] : oriented to time [Calm] : calm [JVD] : no jugular venous distention  [de-identified] : Soft, nontender, nondistended [de-identified] : External exam with small mobild cyst anterior to anus, no erythema, tenderness, or discharge.  At the right lateral aspect there is evidence of small thrombosed vessels in the subcutaneous space, but no large thrombosed hemorrhoid seen today.  Digital rectal exam without masses or tenderness.  Anoscopy performed, grade I internal hemorrhoids noted.  No fissures, fistulas, excoriations, or condyloma noted. [de-identified] : sitting up, no acute distress [de-identified] : Normocephalic, atraumatic, wearing nasal canula

## 2022-01-03 NOTE — HISTORY OF PRESENT ILLNESS
[FreeTextEntry1] : 81yoF patient with multiple comorbidities including aortic and mitral stenosis, rheumatoid arthritis, DM, HTN, HLD, PVD, COPD on oxygen, who presents with a chief complaint of perianal pain and swelling that began four days ago.  She states that she felt a sizable lump on the right side of the anus that was very tender.  She feels that it has gone down in size and is less painful today.  No bleeding or discharge, no fevers/chills.\par \par She has a history of constipation and is on fiber and Miralax regularly.  She also takes Amatiza.  She has a history of colonic polyps and is closely followed by Dr. Thorpe for colonoscopy.\par \par She does have issues with urinary incontinence but denies issues with fecal incontinence.

## 2022-03-24 PROBLEM — B37.0 ORAL THRUSH: Status: ACTIVE | Noted: 2021-01-01

## 2022-03-24 PROBLEM — M80.80XA OSTEOPOROSIS WITH FRACTURE: Status: ACTIVE | Noted: 2021-02-19

## 2022-03-24 PROBLEM — M15.9 GENERALIZED OSTEOARTHRITIS: Status: ACTIVE | Noted: 2017-05-01

## 2022-03-25 NOTE — ASSESSMENT
[FreeTextEntry1] : 81 year old female with a history of depression, COPD, HTN, and aortic valve replacement presents to rule out RA today. She was under the care of Dr. Galloway in the past\par \par RA-\par . IN the past she had a sedimentation rate of 26 which is normal for her age, an BRADLEY of 1:80, with a weakly positive double-stranded DNA and negative rheumatoid serologies. Her muscle enzymes are slightly elevated too. The significance of these labs is unclear. At this time she does not fill criteria for lupus.\par \par Since she has features consistent with inflammatory arthritis affecting the second and third MCP\par She has erosive and active RA. \par -she was on xeljanz but we d/maria elena it\par - cont SSZ 1.5 gms BID, (states sometimes she only takes 2 pills in evening).  Check labs now. \par - will hold off on new med use at this time given virus outbreak and also she does not want a new medication. \par - Off prednisone 5mg daily.  Has had issues with her cataracts/glaucoma.  To use prednisone prn going forward from an RA perspective.  \par \par Steroid use-\par Risks and benefits of steroids were d/w patient including but not limited to weight gain, diabetes, HTN, avascular necrosis, osteoporosis, glaucoma, cataract, infections and immunosuppression.\par -Rx nystatin swish and spit for if the thrush recurs. \par \par Osteoarthritis-\par -the other issue is that she does have polyarticular osteoarthritis especially in her lower back\par -She states that she is not a candidate for NSAIDs but does not recall why. Does not want voltaren gel. Was told bad for her heart.  \par -She is not interested in potential injection therapy to knees. \par -Advised her to try Capsaicin for the knees. \par \par -She is seeing pain management, she has tried epidurals, she is also using medical marijuana with minimal relief.\par -I did just physical therapy as well.\par \par Osteoporosis-\par -no longer wants to take any osteoporosis medication\par -Is aware it may inc her chance of getting a fracture\par \par COPD- \par -she follows with Dr. Davis\par \par Followup 4 months or sooner if there is any change in her underlying symptoms. \par \par \par

## 2022-03-25 NOTE — HISTORY OF PRESENT ILLNESS
[FreeTextEntry1] : F/u RA and Osteoporosis. \par For the rheumatoid arthritis she continues to use sulfasalazine 1gram qAM/500mg qPm.  She is off prednisone 5 mg daily. Her worse joints are hands, knees, feet. She feels her shoulders are bad, but she was told she can't do much about that. She worries about the long-term side effects of the medications and prefers to be on this regimen. She rates her pain as mild to moderate depending on the day and activity level.\par \par 6/10 in intensity.\par left hand knuckles and knees\par \par She has glaucoma and cataracts.  \par

## 2022-04-07 NOTE — H&P ADULT - NSHPSOCIALHISTORY_GEN_ALL_CORE
See scanned remote ICD check in chart. Chargeable visit. 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

## 2022-05-11 NOTE — ED ADULT TRIAGE NOTE - AS O2 DELIVERY
Subjective:      Patient ID: Tonia Madden is a 39 y.o. female.    Chief Complaint: Follow-up (ADD) and Diabetes    Disclaimer:  This note is prepared using voice recognition software and as such is likely to have errors and has not been proof read. Please contact me for questions.     The patient location is: car  The chief complaint leading to consultation is: mychart request     Visit type: video and audio simultaneous    Face to Face time with patient: 1pm -126pm     30   minutes of total time spent on the encounter, which includes face to face time and non-face to face time preparing to see the patient (eg, review of tests), Obtaining and/or reviewing separately obtained history, Documenting clinical information in the electronic or other health record, Independently interpreting results (not separately reported) and communicating results to the patient/family/caregiver, or Care coordination (not separately reported).     Each patient to whom he or she provides medical services by telemedicine is:  (1) informed of the relationship between the physician and patient and the respective role of any other health care provider with respect to management of the patient; and (2) notified that he or she may decline to receive medical services by telemedicine and may withdraw from such care at any time.    Tonia Madden is a 39 y.o. female who presents today for followup of DM.   a1c is now at 11 in 6 weeks. Fasting sugar was 300s still.   Is doing the ozempic 1mg, desires to go up to 2mg.   Did drink a celsius zero sugar drink that am.   cpeptide is > 2, fructosamine is pending.     Didn't start crestor 10mg yet.   Can tell tops are getting smaller.   Now doing 1.5mg of ozempic, still trying to do intermittent fasting, but not sure if it is helping or not.   Feeling a bit better. Curbing her appetite.   Has been easier to do the intermittent fasting.   Does report that her moods been better too she is now doing the  Wellbutrin and the lower dose of the Effexor.    Has a small son elizabeth, 5 yo, and older son.    Will hit 250lb and won't go above it, usually gets to 230lb and then will stop  Has been on metformin, glimepiride, Jardiance, varies other oral medications still with not much effect.  Currently on nothing at this time.  Wanted to have a clean Slate.      Past Medical History:   Diagnosis Date    Anxiety 10/8/2020    Diabetes mellitus (HCC)    Gestational diabetes    Hyperlipidemia     Past Surgical History:   Procedure Laterality Date     SECTION 2011/2017   x 2    CHOLECYSTECTOMY    CHOLECYSTECTOMY LAPAROSCOPY N/A 6/15/2018   Performed by Abdulkadir Rangel MD at [facility] MAIN OR     Social History     Tobacco Use    Smoking status: Never Smoker    Smokeless tobacco: Never Used   Substance Use Topics    Alcohol use: No    Drug use: No     Family History   Problem Relation Age of Onset    Diabetes Mother    Heart attack Maternal Grandmother    Heart attack Paternal Grandfather         Diabetes  She presents for her follow-up diabetic visit. She has type 2 diabetes mellitus. No MedicAlert identification noted. The initial diagnosis of diabetes was made 3 years ago. Pertinent negatives for hypoglycemia include no confusion, dizziness, headaches, hunger, mood changes, nervousness/anxiousness, pallor, seizures, sleepiness, speech difficulty, sweats or tremors. Associated symptoms include polydipsia and polyuria. Pertinent negatives for diabetes include no blurred vision, no chest pain, no fatigue, no foot paresthesias, no foot ulcerations, no polyphagia, no visual change, no weakness and no weight loss. Pertinent negatives for hypoglycemia complications include no blackouts, no hospitalization, no nocturnal hypoglycemia, no required assistance and no required glucagon injection. Symptoms are stable. Pertinent negatives for diabetic complications include no autonomic neuropathy, CVA, heart disease,  nephropathy, peripheral neuropathy, PVD or retinopathy. Risk factors for coronary artery disease include obesity. When asked about current treatments, none were reported. She is compliant with treatment most of the time.     Lab Results   Component Value Date    HGBA1C 11.0 (H) 05/05/2022    HGBA1C 13.6 (H) 03/16/2022      Lab Results   Component Value Date    CHOL 176 05/05/2022    CHOL 224 (H) 03/16/2022     Lab Results   Component Value Date    LDLCALC 116.4 05/05/2022    LDLCALC 139.6 03/16/2022       Wt Readings from Last 10 Encounters:   03/16/22 108.4 kg (238 lb 15.7 oz)       The ASCVD Risk score (Falmouthpriyanka GAMEZ Jr., et al., 2013) failed to calculate for the following reasons:    The 2013 ASCVD risk score is only valid for ages 40 to 79        Lab Results   Component Value Date    WBC 10.08 03/16/2022    HGB 15.1 03/16/2022    HCT 43.7 03/16/2022     03/16/2022    CHOL 176 05/05/2022    TRIG 138 05/05/2022    HDL 32 (L) 05/05/2022    ALT 23 05/05/2022    AST 19 05/05/2022     05/05/2022    K 4.2 05/05/2022     05/05/2022    CREATININE 0.7 05/05/2022    BUN 10 05/05/2022    CO2 23 05/05/2022    TSH 1.028 03/16/2022    HGBA1C 11.0 (H) 05/05/2022       No image results found.        Review of Systems   Constitutional: Negative for activity change, fatigue, unexpected weight change and weight loss.   HENT: Negative for hearing loss, rhinorrhea and trouble swallowing.    Eyes: Negative for blurred vision, discharge and visual disturbance.   Respiratory: Negative for chest tightness and wheezing.    Cardiovascular: Negative for chest pain and palpitations.   Gastrointestinal: Negative for blood in stool, constipation, diarrhea and vomiting.   Endocrine: Positive for polydipsia and polyuria. Negative for polyphagia.   Genitourinary: Negative for difficulty urinating, dysuria, hematuria and menstrual problem.   Musculoskeletal: Negative for arthralgias, joint swelling and neck pain.   Skin: Negative for  pallor.   Neurological: Negative for dizziness, tremors, seizures, speech difficulty, weakness and headaches.   Psychiatric/Behavioral: Negative for confusion and dysphoric mood. The patient is not nervous/anxious.      Objective:   There were no vitals filed for this visit.  Physical Exam  Vitals reviewed.   Constitutional:       General: She is awake. She is not in acute distress.     Appearance: Normal appearance. She is well-developed and well-groomed. She is obese. She is not ill-appearing.   HENT:      Head: Normocephalic and atraumatic.      Right Ear: External ear normal.      Left Ear: External ear normal.      Nose: Nose normal.      Mouth/Throat:      Lips: Pink.   Eyes:      Conjunctiva/sclera: Conjunctivae normal.   Pulmonary:      Effort: Pulmonary effort is normal.   Neurological:      Mental Status: She is alert.   Psychiatric:         Attention and Perception: Attention and perception normal. She is attentive.         Mood and Affect: Mood and affect normal. Mood is not anxious or depressed. Affect is not labile, blunt, angry or inappropriate.         Speech: Speech normal. She is communicative. Speech is not rapid and pressured, delayed, slurred or tangential.         Behavior: Behavior normal. Behavior is not agitated, slowed, aggressive, withdrawn, hyperactive or combative. Behavior is cooperative.         Thought Content: Thought content normal. Thought content is not paranoid or delusional. Thought content does not include homicidal or suicidal ideation. Thought content does not include homicidal or suicidal plan.         Cognition and Memory: Cognition and memory normal. Memory is not impaired. She does not exhibit impaired recent memory or impaired remote memory.         Judgment: Judgment normal. Judgment is not impulsive or inappropriate.       Assessment:     1. Type 2 diabetes mellitus with hyperglycemia, without long-term current use of insulin    2. Hyperlipidemia associated with type 2  room air diabetes mellitus    3. Elevated liver enzymes    4. Mood disorder    5. Nausea      Plan:   Tonia was seen today for follow-up and diabetes.    Diagnoses and all orders for this visit:    Type 2 diabetes mellitus with hyperglycemia, without long-term current use of insulin  Comments:  doing well, improving with sugars, wants to see nutritionist, trying to do intermittent fasting, on ozempic at 1.5mg weekly moving up to 2mg.   Orders:  -     Hemoglobin A1C; Future  -     Comprehensive Metabolic Panel; Future  -     Lipid Panel; Future    Hyperlipidemia associated with type 2 diabetes mellitus  Comments:  reviewed labs, will send in crestor 10mg. repeat labs in 3 months.   Orders:  -     Hemoglobin A1C; Future  -     Comprehensive Metabolic Panel; Future  -     Lipid Panel; Future    Elevated liver enzymes- resolved.   -     Hemoglobin A1C; Future  -     Comprehensive Metabolic Panel; Future  -     Lipid Panel; Future    Mood disorder- improved with wellbutrin and effexor.     Nausea  Comments:  refilled meds for zofran with higher dose for ozempic     Other orders  -     MyChart Patient Entered Glucose  -     rosuvastatin (CRESTOR) 10 MG tablet; Take 1 tablet (10 mg total) by mouth once daily.  -     ondansetron (ZOFRAN-ODT) 4 MG TbDL; Take 1-2 tablets (4-8 mg total) by mouth every 6 (six) hours as needed (nausea).            Follow up in about 7 weeks (around 6/29/2022) for f/u Telemed Dr Howard dm, chol, wt .    There are no Patient Instructions on file for this visit.

## 2022-05-31 NOTE — DIETITIAN INITIAL EVALUATION ADULT. - 25 CAL
Abdomen , soft, nontender, nondistended , no guarding or rigidity , no masses palpable , normal bowel sounds , Liver and Spleen,  no hepatosplenomegaly , liver nontender
175

## 2022-06-07 NOTE — ED ADULT NURSE NOTE - TIMING
Yeison [place]
Spoke to patient, no longer seeing Dr Zenaida Habermann, he has moved to [place] and does not have a PCP as of yet 
sudden onset

## 2022-06-19 NOTE — PATIENT PROFILE ADULT. - NSCAFFEINETYPE_GEN_ALL_CORE_SD
Problem: Risk for Spread of Infection  Goal: Prevent transmission of infectious organism to others  Description: Prevent the transmission of infectious organisms to other patients, staff members, and visitors. Outcome: Progressing Towards Goal     Problem: Patient Education:  Go to Education Activity  Goal: Patient/Family Education  Outcome: Progressing Towards Goal     Problem: Pressure Injury - Risk of  Goal: *Prevention of pressure injury  Description: Document Jordin Scale and appropriate interventions in the flowsheet. Outcome: Progressing Towards Goal  Note: Pressure Injury Interventions:  Sensory Interventions: Assess need for specialty bed    Moisture Interventions: Apply protective barrier, creams and emollients,Assess need for specialty bed    Activity Interventions: Pressure redistribution bed/mattress(bed type)    Mobility Interventions: HOB 30 degrees or less,Pressure redistribution bed/mattress (bed type),PT/OT evaluation    Nutrition Interventions: Document food/fluid/supplement intake    Friction and Shear Interventions: Apply protective barrier, creams and emollients                Problem: Pressure Injury - Risk of  Goal: *Prevention of pressure injury  Description: Document Jordin Scale and appropriate interventions in the flowsheet.   Outcome: Progressing Towards Goal  Note: Pressure Injury Interventions:  Sensory Interventions: Assess need for specialty bed    Moisture Interventions: Apply protective barrier, creams and emollients,Assess need for specialty bed    Activity Interventions: Pressure redistribution bed/mattress(bed type)    Mobility Interventions: HOB 30 degrees or less,Pressure redistribution bed/mattress (bed type),PT/OT evaluation    Nutrition Interventions: Document food/fluid/supplement intake    Friction and Shear Interventions: Apply protective barrier, creams and emollients                Problem: Patient Education: Go to Patient Education Activity  Goal: Patient/Family Education  Outcome: Progressing Towards Goal     Problem: Falls - Risk of  Goal: *Absence of Falls  Description: Document Darrell Anne Fall Risk and appropriate interventions in the flowsheet.   Outcome: Progressing Towards Goal  Note: Fall Risk Interventions:            Medication Interventions: Bed/chair exit alarm    Elimination Interventions: Call light in reach              Problem: Patient Education: Go to Patient Education Activity  Goal: Patient/Family Education  Outcome: Progressing Towards Goal     Problem: Nutrition Deficit  Goal: *Optimize nutritional status  Outcome: Progressing Towards Goal     Problem: Patient Education: Go to Patient Education Activity  Goal: Patient/Family Education  Outcome: Progressing Towards Goal     Problem: Pain  Goal: *Control of Pain  Outcome: Progressing Towards Goal     Problem: Patient Education: Go to Patient Education Activity  Goal: Patient/Family Education  Outcome: Progressing Towards Goal coffee

## 2022-07-19 ENCOUNTER — APPOINTMENT (OUTPATIENT)
Dept: RHEUMATOLOGY | Facility: CLINIC | Age: 82
End: 2022-07-19

## 2022-07-29 NOTE — ED ADULT NURSE NOTE - NS ED NURSE RECORD ANOTHER VITAL SIGN
Patient is traveling and having tingling os the skin. She has had this before  She thinks she is starting again and would like a script sent to the pharmacy in [place]. She is traveling and does not have the medicine with her. Can you sent it to meets. No

## 2022-09-19 NOTE — PHYSICAL THERAPY INITIAL EVALUATION ADULT - PERTINENT HX OF CURRENT PROBLEM, REHAB EVAL
presents to ED via EMS from home with chief complaint of difficulty breathing admitted for acute on chronic COPD exacerbation with Ac hypercapnic, mild hypoxic respiratory failure, elevated lactate likely 2 to respiratory failure. Leukocytosis ?reactive ?/PNA.on IV Abx- Rotation Flap Text: The defect edges were debeveled with a #15 scalpel blade.  Given the location of the defect, shape of the defect and the proximity to free margins a rotation flap was deemed most appropriate.  Using a sterile surgical marker, an appropriate rotation flap was drawn incorporating the defect and placing the expected incisions within the relaxed skin tension lines where possible.    The area thus outlined was incised deep to adipose tissue with a #15 scalpel blade.  The skin margins were undermined to an appropriate distance in all directions utilizing iris scissors.

## 2022-12-27 NOTE — ED PROVIDER NOTE - DATE/TIME 1

## 2023-06-15 NOTE — H&P ADULT - MS EXT PE MLT D E PC
[Pain is well-controlled] : pain is well-controlled [Healed] : healed [None] : no vaginal bleeding [Pathology reviewed] : pathology reviewed [de-identified] : Doing well. Tolerating diet and ambulation without difficulties. No postop pain or vb.  no cyanosis/no clubbing/no pedal edema normal/no cyanosis/no pedal edema/no clubbing

## 2023-07-18 NOTE — H&P PST ADULT - GASTROINTESTINAL
Refill Encounter    PCP Visits: Recent Visits  Date Type Provider Dept   04/10/23 Office Visit Thomas Khan MD [facility] Internal Medicine   03/09/23 Office Visit Thomas Khan MD [facility] Internal Medicine   Showing recent visits within past 360 days and meeting all other requirements  Future Appointments  No visits were found meeting these conditions.  Showing future appointments within next 720 days and meeting all other requirements     Last 3 Blood Pressure:   BP Readings from Last 3 Encounters:   04/10/23 110/70   03/09/23 (!) 153/88   01/27/23 117/65     Preferred Pharmacy:   [facility]/pharmacy #[id number] - [place] - [address]  [address] [place] [address]  Phone: [phone] Fax: [phone]    Requested RX:  Requested Prescriptions     Pending Prescriptions Disp Refills    methocarbamoL (ROBAXIN) 500 MG Tab [Pharmacy Med Name: METHOCARBAMOL 500 MG TABLET] 30 tablet 0     Sig: TAKE 1 TABLET BY MOUTH EVERY DAY      RX Route: Normal     negative Soft, non-tender, no hepatosplenomegaly, normal bowel sounds

## 2023-07-21 NOTE — ED ADULT NURSE NOTE - NSFALLRSKASSESSTYPE_ED_ALL_ED
Hgba1c is 6.7%. this is excellent. The pump shows some highs and lows. Let's adjust the bolus carb ratios to: 7 am to 3 pm 1 unit per 13 grams carb, 3 pm to 6 pm 1 unit per 11 grams carb, and 6 pm to 19 pm 1 unit per 11 grams carb and 9 pm to 12 am 1 unit per 14 grams carb. Basal rate from 4 pm to 6 pm 0.375 units per hour.     Follow up in  3 months with blood work Initial (On Arrival)

## 2023-08-22 NOTE — CONSULT NOTE ADULT - CONSULT REASON
No protocol for us to refill, last refill was given on 05/04/2022  
Sheri Pat no longer follows at this practice since 2021. Should contact their pcp for refills  
COPD exac versus PNA
SOB cough COPD ex
exacerbation of the patient's COPD. History of aortic valve replacement

## 2023-09-23 NOTE — DIETITIAN INITIAL EVALUATION ADULT. - ENTER FROM (G/KG)
Problem: At Risk for Falls  Goal: # Patient does not fall  Outcome: Outcome Met, Complete Goal  Goal: # Takes action to control fall-related risks  Outcome: Outcome Met, Complete Goal  Goal: # Verbalizes understanding of fall risk/precautions  Description: Document education using the patient education activity  Outcome: Outcome Met, Complete Goal     Problem: VTE, Risk for  Goal: # No s/s of VTE  Outcome: Outcome Met, Complete Goal  Goal: # Verbalizes understanding of VTE risk factors and prevention  Description: Document education using the patient education activity.   Outcome: Outcome Met, Complete Goal  Goal: Demonstrates ability to administer injectable anticoagulants if ordered for d/c  Description: Document education using the patient education activity.  Outcome: Outcome Met, Complete Goal     Problem: VTE (Actual)  Goal: # Verbalizes understanding of VTE and treatment plan  Description: Document education using the patient education activity.   Outcome: Outcome Met, Complete Goal     Problem: Impaired Physical Mobility  Goal: # Bed mobility, ambulation, and ADLs are maintained or returned to baseline during hospitalization  Outcome: Outcome Met, Complete Goal     Problem: Pressure Injury, Risk for  Goal: # Skin remains intact  Outcome: Outcome Met, Complete Goal  Goal: No new pressure injury (PI) development  Outcome: Outcome Met, Complete Goal  Goal: # Verbalizes understanding of PI risk factors and prevention strategies  Description: Document education using the patient education activity.   Outcome: Outcome Met, Complete Goal  Goal: Comfort optimized with pressure injury prevention strategies guided by patient/family preference. (Hospice)  Outcome: Outcome Met, Complete Goal     Problem: Pressure Injury Actual  Goal: # No deterioration in pressure injury (PI)  Outcome: Outcome Met, Complete Goal  Goal: # Verbalizes pressure injury management  Description: Document education using the patient education  activity.  Outcome: Outcome Met, Complete Goal  Goal: Wound care provided to promote comfort needs (Hospice)  Outcome: Outcome Met, Complete Goal     Problem: Non-Pressure Injury Wound  Goal: # No deterioration in wound  Outcome: Outcome Met, Complete Goal  Goal: # Verbalizes understanding of wound and wound care  Description: If abnormality is a skin tear, avoid using tape on skin including transparent dressings. Document education using the patient education activity.  Outcome: Outcome Met, Complete Goal  Goal: Participates in wound care activities  Outcome: Outcome Met, Complete Goal  Goal: Wound care provided to promote comfort needs (Hospice)  Outcome: Outcome Met, Complete Goal     Problem: Cellulitis  Goal: Cellulitis improved as evidenced by decreased redness, swelling and pain  Description: Girth measurement may be used to evaluate edema.  Outcome: Outcome Met, Complete Goal  Goal: Care provided to promote comfort needs (Hospice)  Outcome: Outcome Met, Complete Goal     Problem: At Risk for Injury Due to Fall  Goal: # Patient does not fall  Outcome: Outcome Met, Complete Goal  Goal: # Takes action to control condition specific risks  Outcome: Outcome Met, Complete Goal  Goal: # Verbalizes understanding of fall-related injury personal risks  Description: Document education using the patient education activity  Outcome: Outcome Met, Complete Goal      1.5

## 2023-11-14 NOTE — PHYSICAL THERAPY INITIAL EVALUATION ADULT - MANUAL MUSCLE TESTING RESULTS, REHAB EVAL
grossly assessed due to/Sternal/cardiac precautions BUE/BLE grossly 3/5 throughout
dietitian/nutrition services

## 2023-12-19 NOTE — ED ADULT NURSE NOTE - NS ED NURSE LEVEL OF CONSCIOUSNESS SPEECH
CONSULT PROGRESS NOTES    SERVICE DATE: 12/19/2023   SERVICE TIME: 11:09 AM    CONSULTING SERVICE: Nephrology    ASSESSMENT AND PLAN   72-year-old with numerous medical problems including end-stage renal disease admitted for hypotension.  1.  End-stage renal disease  2.  Hypotension  3.  Fluid overload  4.  Anemia of chronic renal disease     I am obliging a thrice weekly hemodialysis schedule.  She now has an arterial line giving at least more seemingly accurate blood pressure readings and is on norepinephrine.  She received IV albumin with transient improvement.  I would wean the pressors for a systolic blood pressure above 70, as she is not uncommonly in the 70 systolic on outpatient dialysis.  There is no role for hemodialysis today.  Anticipate hemodialysis tomorrow.  I am not inclined to use CRRT.  She seems to be mentating about her baseline today, but concerned about the recent thoracentesis, recurrence of effusion versus worsening infiltrate, and multiple antibiotics for various types of infections.  Appreciate help from infectious disease.    Continue midodrine, continue low temperature dialysate, attempt 1 L volume removal tomorrow.  No growth as yet on blood cultures.  Case discussed with Dr. Munoz and Dr. Scott.    SUBJECTIVE  INTERVAL HPI: She is unaware if she is having diarrhea or not.  She admits to a cough today.  Tolerated dialysis yesterday without remark.  Remains on pressors.    MEDICATIONS:  apixaban, 2.5 mg, oral, BID  calcium acetate, 667 mg, oral, TID with meals  collagenase, , Topical, Daily  doxycycline, 100 mg, oral, Daily  epoetin di or biosimilar, 10,000 Units, intravenous, Every Mon/Wed/Fri  escitalopram, 10 mg, oral, Daily  febuxostat, 40 mg, oral, Every other day  fenofibrate, 160 mg, oral, Daily  fidaxomicin, 200 mg, oral, BID  gabapentin, 100 mg, oral, BID  heparin, 2,000 Units, intra-catheter, After Dialysis  heparin, 2,000 Units, intra-catheter, After Dialysis  heparin,  2,000 Units, intra-catheter, After Dialysis  heparin, 2,000 Units, intra-catheter, After Dialysis  insulin lispro, 0-5 Units, subcutaneous, TID with meals  ipratropium-albuteroL, 3 mL, nebulization, TID  metoprolol succinate XL, 12.5 mg, oral, Daily  midodrine, 15 mg, oral, TID with meals  nystatin, 1 Application, Topical, BID  piperacillin-tazobactam, 2.25 g, intravenous, q6h  simvastatin, 40 mg, oral, Nightly       norepinephrine, 0.01-3 mcg/kg/min       PRN medications: acetaminophen, acetaminophen, dextrose 10 % in water (D10W), dextrose, glucagon, oxygen, polyethylene glycol, zinc oxide     OBJECTIVE  PHYSICAL EXAM:   Heart Rate:  []   Temp:  [35.9 °C (96.6 °F)-37.2 °C (99 °F)]   Resp:  [11-38]   BP: ()/(40-53)   Weight:  [71.9 kg (158 lb 8.2 oz)]   SpO2:  [84 %-100 %]   Body mass index is 28.98 kg/m².  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 See Gram stain findings. Recollect if clinically indicated.    Gram Stain (A)      Gram stain indicates specimen contains significant salivary contamination.   Lactate Dehydrogenase, Fluid   Result Value Ref Range    LD, Fluid 47 Not established. U/L   Glucose, Fluid    Result Value Ref Range    Glucose, Fluid 128 Not established mg/dL   Protein, Total Fluid   Result Value Ref Range    Protein, Total Fluid 1.5 Not established g/dL   POCT GLUCOSE   Result Value Ref Range    POCT Glucose 142 (H) 74 - 99 mg/dL   POCT GLUCOSE   Result Value Ref Range    POCT Glucose 122 (H) 74 - 99 mg/dL   Basic metabolic panel   Result Value Ref Range    Glucose 117 (H) 65 - 99 mg/dL    Sodium 134 133 - 145 mmol/L    Potassium 3.2 (L) 3.4 - 5.1 mmol/L    Chloride 100 97 - 107 mmol/L    Bicarbonate 24 24 - 31 mmol/L    Urea Nitrogen 14 8 - 25 mg/dL    Creatinine 2.10 (H) 0.40 - 1.60 mg/dL    eGFR 25 (L) >60 mL/min/1.73m*2    Calcium 8.2 (L) 8.5 - 10.4 mg/dL    Anion Gap 10 <=19 mmol/L   CBC   Result Value Ref Range    WBC 9.0 4.4 - 11.3 x10*3/uL    nRBC 0.0 0.0 - 0.0 /100 WBCs    RBC 3.79 (L) 4.00 - 5.20 x10*6/uL    Hemoglobin 10.8 (L) 12.0 - 16.0 g/dL    Hematocrit 35.1 (L) 36.0 - 46.0 %    MCV 93 80 - 100 fL    MCH 28.5 26.0 - 34.0 pg    MCHC 30.8 (L) 32.0 - 36.0 g/dL    RDW 21.8 (H) 11.5 - 14.5 %    Platelets 97 (L) 150 - 450 x10*3/uL   Albumin   Result Value Ref Range    Albumin 2.2 (L) 3.5 - 5.0 g/dL   Magnesium   Result Value Ref Range    Magnesium 1.90 1.60 - 3.10 mg/dL   POCT GLUCOSE   Result Value Ref Range    POCT Glucose 93 74 - 99 mg/dL   POCT GLUCOSE   Result Value Ref Range    POCT Glucose 105 (H) 74 - 99 mg/dL   POCT GLUCOSE   Result Value Ref Range    POCT Glucose 136 (H) 74 - 99 mg/dL   POCT GLUCOSE   Result Value Ref Range    POCT Glucose 145 (H) 74 - 99 mg/dL   Blood Culture    Specimen: Peripheral Venipuncture; Blood culture   Result Value Ref Range    Blood Culture No growth at 2 days    Blood Culture    Specimen: Arterial Line; Blood culture   Result Value Ref Range    Blood Culture No growth at 2 days    Blood Gas Lactic Acid, Venous   Result Value Ref Range    POCT Lactate, Venous 3.3 (H) 0.4 - 2.0 mmol/L   POCT GLUCOSE   Result Value Ref Range    POCT Glucose 127 (H) 74 - 99  mg/dL   Blood Gas Lactic Acid, Venous   Result Value Ref Range    POCT Lactate, Venous 2.1 (H) 0.4 - 2.0 mmol/L   Cortisol AM   Result Value Ref Range    Cortisol  A.M. 17.4 5.0 - 20.0 ug/dL   Renal function panel   Result Value Ref Range    Glucose 193 (H) 65 - 99 mg/dL    Sodium 136 133 - 145 mmol/L    Potassium 3.0 (L) 3.4 - 5.1 mmol/L    Chloride 98 97 - 107 mmol/L    Bicarbonate 25 24 - 31 mmol/L    Urea Nitrogen 18 8 - 25 mg/dL    Creatinine 2.70 (H) 0.40 - 1.60 mg/dL    eGFR 18 (L) >60 mL/min/1.73m*2    Calcium 8.5 8.5 - 10.4 mg/dL    Phosphorus 0.9 (L) 2.5 - 4.5 mg/dL    Albumin 3.2 (L) 3.5 - 5.0 g/dL    Anion Gap 13 <=19 mmol/L   Cortisol   Result Value Ref Range    Cortisol 17.7 2.5 - 20.0 ug/dL   POCT GLUCOSE   Result Value Ref Range    POCT Glucose 142 (H) 74 - 99 mg/dL   POCT GLUCOSE   Result Value Ref Range    POCT Glucose 158 (H) 74 - 99 mg/dL   CBC   Result Value Ref Range    WBC 12.0 (H) 4.4 - 11.3 x10*3/uL    nRBC 0.0 0.0 - 0.0 /100 WBCs    RBC 3.52 (L) 4.00 - 5.20 x10*6/uL    Hemoglobin 9.9 (L) 12.0 - 16.0 g/dL    Hematocrit 30.7 (L) 36.0 - 46.0 %    MCV 87 80 - 100 fL    MCH 28.1 26.0 - 34.0 pg    MCHC 32.2 32.0 - 36.0 g/dL    RDW 21.9 (H) 11.5 - 14.5 %    Platelets 113 (L) 150 - 450 x10*3/uL   POCT GLUCOSE   Result Value Ref Range    POCT Glucose 179 (H) 74 - 99 mg/dL   CBC   Result Value Ref Range    WBC 12.7 (H) 4.4 - 11.3 x10*3/uL    nRBC 0.0 0.0 - 0.0 /100 WBCs    RBC 3.72 (L) 4.00 - 5.20 x10*6/uL    Hemoglobin 10.7 (L) 12.0 - 16.0 g/dL    Hematocrit 31.9 (L) 36.0 - 46.0 %    MCV 86 80 - 100 fL    MCH 28.8 26.0 - 34.0 pg    MCHC 33.5 32.0 - 36.0 g/dL    RDW 22.1 (H) 11.5 - 14.5 %    Platelets 116 (L) 150 - 450 x10*3/uL   POCT GLUCOSE   Result Value Ref Range    POCT Glucose 130 (H) 74 - 99 mg/dL   Renal function panel   Result Value Ref Range    Glucose 140 (H) 65 - 99 mg/dL    Sodium 135 133 - 145 mmol/L    Potassium 3.3 (L) 3.4 - 5.1 mmol/L    Chloride 99 97 - 107 mmol/L    Bicarbonate 23  (L) 24 - 31 mmol/L    Urea Nitrogen 25 8 - 25 mg/dL    Creatinine 3.60 (H) 0.40 - 1.60 mg/dL    eGFR 13 (L) >60 mL/min/1.73m*2    Calcium 9.0 8.5 - 10.4 mg/dL    Phosphorus 0.9 (L) 2.5 - 4.5 mg/dL    Albumin 3.1 (L) 3.5 - 5.0 g/dL    Anion Gap 13 <=19 mmol/L   POCT GLUCOSE   Result Value Ref Range    POCT Glucose 109 (H) 74 - 99 mg/dL   PST Top   Result Value Ref Range    Extra Tube Hold for add-ons.    Hepatitis B surface antigen   Result Value Ref Range    Hepatitis B Surface AG Nonreactive Nonreactive   Hepatitis B surface antibody   Result Value Ref Range    Hepatitis B Surface AB 5.1 <10.0 mIU/mL   POCT GLUCOSE   Result Value Ref Range    POCT Glucose 262 (H) 74 - 99 mg/dL   POCT GLUCOSE   Result Value Ref Range    POCT Glucose 86 74 - 99 mg/dL   Renal Function Panel   Result Value Ref Range    Glucose 126 (H) 65 - 99 mg/dL    Sodium 134 133 - 145 mmol/L    Potassium 3.1 (L) 3.4 - 5.1 mmol/L    Chloride 97 97 - 107 mmol/L    Bicarbonate 24 24 - 31 mmol/L    Urea Nitrogen 10 8 - 25 mg/dL    Creatinine 2.20 (H) 0.40 - 1.60 mg/dL    eGFR 23 (L) >60 mL/min/1.73m*2    Calcium 8.5 8.5 - 10.4 mg/dL    Phosphorus 0.5 (L) 2.5 - 4.5 mg/dL    Albumin 3.0 (L) 3.5 - 5.0 g/dL    Anion Gap 13 <=19 mmol/L   CBC and Auto Differential   Result Value Ref Range    WBC 11.8 (H) 4.4 - 11.3 x10*3/uL    nRBC 0.2 (H) 0.0 - 0.0 /100 WBCs    RBC 3.54 (L) 4.00 - 5.20 x10*6/uL    Hemoglobin 9.9 (L) 12.0 - 16.0 g/dL    Hematocrit 30.3 (L) 36.0 - 46.0 %    MCV 86 80 - 100 fL    MCH 28.0 26.0 - 34.0 pg    MCHC 32.7 32.0 - 36.0 g/dL    RDW 21.7 (H) 11.5 - 14.5 %    Platelets 114 (L) 150 - 450 x10*3/uL    Neutrophils % 60.9 40.0 - 80.0 %    Immature Granulocytes %, Automated 1.8 (H) 0.0 - 0.9 %    Lymphocytes % 23.3 13.0 - 44.0 %    Monocytes % 9.7 2.0 - 10.0 %    Eosinophils % 3.5 0.0 - 6.0 %    Basophils % 0.8 0.0 - 2.0 %    Neutrophils Absolute 7.20 (H) 1.60 - 5.50 x10*3/uL    Immature Granulocytes Absolute, Automated 0.21 0.00 - 0.50  x10*3/uL    Lymphocytes Absolute 2.76 0.80 - 3.00 x10*3/uL    Monocytes Absolute 1.15 (H) 0.05 - 0.80 x10*3/uL    Eosinophils Absolute 0.41 (H) 0.00 - 0.40 x10*3/uL    Basophils Absolute 0.10 0.00 - 0.10 x10*3/uL   Morphology   Result Value Ref Range    RBC Morphology See Below     Target Cells Few    POCT GLUCOSE   Result Value Ref Range    POCT Glucose 109 (H) 74 - 99 mg/dL         SIGNATURE: Saeed Mondragon MD PATIENT NAME: Ayanna Gross   DATE: December 19, 2023 MRN: [id number]   TIME: 11:09 AM PAGER: [phone]        Speaking Coherently/Unable to speak

## 2024-01-29 NOTE — PROGRESS NOTE ADULT - SUBJECTIVE AND OBJECTIVE BOX
Date of service: 06-12-21 @ 10:52    Patient awake, alert; on oxygen via nasal cannula  Afebrile        ROS: unable to obtain secondary to patient medical condition     MEDICATIONS  (STANDING):  ARIPiprazole 2 milliGRAM(s) Oral daily  aspirin  chewable 81 milliGRAM(s) Oral daily  budesonide 160 MICROgram(s)/formoterol 4.5 MICROgram(s) Inhaler 2 Puff(s) Inhalation two times a day  clonazePAM  Tablet 0.5 milliGRAM(s) Oral two times a day  clotrimazole Lozenge 1 Lozenge Oral <User Schedule>  dextrose 40% Gel 15 Gram(s) Oral once  dextrose 5%. 1000 milliLiter(s) (50 mL/Hr) IV Continuous <Continuous>  dextrose 5%. 1000 milliLiter(s) (100 mL/Hr) IV Continuous <Continuous>  dextrose 50% Injectable 25 Gram(s) IV Push once  dextrose 50% Injectable 12.5 Gram(s) IV Push once  dextrose 50% Injectable 25 Gram(s) IV Push once  dorzolamide 2% Ophthalmic Solution      dorzolamide 2% Ophthalmic Solution 1 Drop(s) Both EYES three times a day  enoxaparin Injectable 40 milliGRAM(s) SubCutaneous daily  famotidine    Tablet 20 milliGRAM(s) Oral daily  furosemide    Tablet 40 milliGRAM(s) Oral daily  glucagon  Injectable 1 milliGRAM(s) IntraMuscular once  insulin lispro (ADMELOG) corrective regimen sliding scale   SubCutaneous Before meals and at bedtime  lactobacillus acidophilus 1 Tablet(s) Oral daily  latanoprost 0.005% Ophthalmic Solution 1 Drop(s) Both EYES at bedtime  linaclotide 145 MICROGram(s) Oral before breakfast  losartan 50 milliGRAM(s) Oral daily  lubiprostone 24 MICROGram(s) Oral two times a day  meropenem  IVPB 1000 milliGRAM(s) IV Intermittent every 8 hours  predniSONE   Tablet 5 milliGRAM(s) Oral daily  psyllium Powder 1 Packet(s) Oral daily  simvastatin 20 milliGRAM(s) Oral at bedtime  sulfaSALAzine 500 milliGRAM(s) Oral three times a day  tiotropium 18 MICROgram(s) Capsule 1 Capsule(s) Inhalation daily  venlafaxine XR. 150 milliGRAM(s) Oral daily    MEDICATIONS  (PRN):  ALBUTerol    90 MICROgram(s) HFA Inhaler 2 Puff(s) Inhalation every 6 hours PRN Shortness of Breath and/or Wheezing  artificial  tears Solution 1 Drop(s) Both EYES four times a day PRN Dry Eyes  guaiFENesin  milliGRAM(s) Oral every 12 hours PRN Cough  ondansetron Injectable 4 milliGRAM(s) IV Push every 6 hours PRN Nausea  oxycodone    5 mG/acetaminophen 325 mG 2 Tablet(s) Oral every 4 hours PRN Moderate Pain (4 - 6)      Vital Signs Last 24 Hrs  T(C): 36.6 (12 Jun 2021 08:00), Max: 37.7 (11 Jun 2021 14:00)  T(F): 97.8 (12 Jun 2021 08:00), Max: 99.9 (11 Jun 2021 14:00)  HR: 100 (12 Jun 2021 10:00) (80 - 102)  BP: 117/55 (12 Jun 2021 10:00) (95/58 - 151/75)  BP(mean): 68 (12 Jun 2021 10:00) (55 - 94)  RR: 21 (12 Jun 2021 10:00) (13 - 25)  SpO2: 98% (12 Jun 2021 10:00) (95% - 100%)        Physical Exam:            PE:    Constitutional: frail looking  HEENT: NC/AT  Neck: supple; thyroid not palpable  Back: no tenderness  Respiratory: respiratory effort normal; diminished breath sounds  Cardiovascular: S1S2 regular, no murmurs  Abdomen: soft, not tender, mildly distended, positive BS; no liver or spleen organomegaly  Genitourinary: no suprapubic tenderness  Musculoskeletal: no muscle tenderness, no joint swelling or tenderness  Neurological/ Psychiatric:   moving all extremities  Skin: no rashes; no palpable lesions    Labs: all available labs reviewed                            Labs:                        13.5   14.52 )-----------( 213      ( 11 Jun 2021 06:56 )             43.0     06-12    136  |  105  |  12  ----------------------------<  141<H>  3.9   |  28  |  0.53    Ca    7.9<L>      12 Jun 2021 06:45    TPro  6.3  /  Alb  3.0<L>  /  TBili  0.4  /  DBili  x   /  AST  42<H>  /  ALT  67  /  AlkPhos  52  06-12           Cultures:       Culture - Urine (collected 06-10-21 @ 17:07)  Source: .Urine None  Final Report (06-11-21 @ 14:50):    No growth    Culture - Blood (collected 06-10-21 @ 13:22)  Source: .Blood None  Preliminary Report (06-11-21 @ 19:02):    No growth to date.    Culture - Blood (collected 06-10-21 @ 13:22)  Source: .Blood None  Preliminary Report (06-11-21 @ 19:02):    No growth to date.              < from: CT Abdomen and Pelvis w/ IV Cont (06.10.21 @ 16:34) >    EXAM:  CT ABDOMEN AND PELVIS IC                            PROCEDURE DATE:  06/10/2021          INTERPRETATION:  CLINICAL INFORMATION: Left lower quadrant abdominal pain. Evaluate for acute diverticulitis.    COMPARISON: 8/10/2020    PROCEDURE:  CTof the Abdomen and Pelvis was performed with intravenous contrast.  Intravenous contrast: 90 ml Omnipaque 350. 10 ml discarded.  Oral contrast: None.  Sagittal and coronal reformats were performed.    FINDINGS:    LOWER CHEST: Cardiomegaly. Interlobular septal thickening, consistent with changes of CHF. Bibasilar atelectasis.    LIVER: Within normal limits.  BILE DUCTS: Normal caliber.  GALLBLADDER: Within normal limits.  SPLEEN: Within normal limits.  PANCREAS: Within normal limits.  ADRENALS: Within normal limits.  KIDNEYS/URETERS: Within normal limits.    BLADDER: Within normal limits.  REPRODUCTIVE ORGANS: Uterus and bilateral adnexa within normal limits.    BOWEL: No bowel obstruction. Appendix is not visualized. Diverticulosis with changes of acute diverticulitis at the level of the mid sigmoid colon.  PERITONEUM: No ascites. No evidence of diverticular abscess. No pneumoperitoneum.  VESSELS:  Atherosclerotic calcifications.  RETROPERITONEUM: No lymphadenopathy.  ABDOMINAL WALL: Within normal limits.  BONES: Degenerative changes and scoliosis. Diffuse osteopenia. Spinal stimulator.    IMPRESSION:  *  Acute diverticulitis involving the mid sigmoid colon. No evidence of diverticular abscess.  *  Cardiomegaly and CHF at the lung bases.      < end of copied text >        Radiology: all available radiological tests reviewed    Advanced directives addressed: full resuscitation     [facility]  [facility]  [address] [place] [address], [address], [place] [address]  Main: [phone], Fax: [phone]    Orders to [facility]:    Patient; Lanie Neil  MRN: [id number]  : 1988  Date of this order written: 2024    Diagnosis:  History of DVT  Chronic anticoagulation therapy.   Menorrhagia  Iron deficiency anemia.      ORDERS FOR [facility]:  Please proceed with IV iron infusion with Venofer in the next 1-2 weeks. See order set attached for specific orders.  Please obtain CBC, iron level, iron binding capacity and ferritin level, 8 weeks following her last Venofer infusion. Please fax result to [phone] Attention: Jt Mir PA-C.      This order along with a copy of the Venofer order set is faxed to [facility] at [phone]      Jt Mir PA-C, MPAS  Physician Assistant  [facility].

## 2024-03-09 NOTE — PROVIDER CONTACT NOTE (OTHER) - RECOMMENDATIONS
[facility]  EMERGENCY DEPARTMENT ENCOUNTER        Pt Name: Nerissa Spears  MRN: [id number]  Birthdate 1963  Date of evaluation: 3/9/2024  Provider: Cody Bravo MD  PCP: Jackie Vasques APRN  Note Started: 1:52 AM EST 3/9/24    CHIEF COMPLAINT       Chief Complaint   Patient presents with    Back Pain       HISTORY OF PRESENT ILLNESS: 1 or more Elements     History from : Patient    Limitations to history : None    Nerissa Spears is a 60 y.o. female who presents to the emergency department complaining of right low back pain has been going on for the past few months.  Patient denies any trauma or heavy lifting no saddle anesthesia no bowel or bladder incontinence weakness able to ambulate without assistance.  Patient states the pain is a 10 out of 10 intensity.  Patient was only tried some leftover Neurontin she had previously.  Patient is trying to get into a new PCP.  Denies any dysuria hematuria flank pain no fevers no chills no nausea no vomiting.    Nursing Notes were all reviewed and agreed with or any disagreements were addressed in the HPI.    REVIEW OF SYSTEMS :      Review of Systems    All systems reviewed and negative except as in HPI/MDM    SURGICAL HISTORY     Past Surgical History:   Procedure Laterality Date    CHOLECYSTECTOMY         CURRENTMEDICATIONS       Previous Medications    GABAPENTIN (NEURONTIN) 300 MG CAPSULE    Take 1 capsule by mouth 3 times daily for 3 days.    IBUPROFEN (ADVIL;MOTRIN) 800 MG TABLET    Take 1 tablet by mouth 4 times daily as needed for Pain    LOSARTAN POTASSIUM PO    Take by mouth daily       ALLERGIES     Patient has no known allergies.    FAMILYHISTORY     No family history on file.     SOCIAL HISTORY       Social History     Tobacco Use    Smoking status: Every Day     Current packs/day: 1.50     Types: Cigarettes    Smokeless tobacco: Never   Substance Use Topics    Alcohol use: Yes     Comment: occasionally  Requested by Kwame Perez significant soft tissue swelling or radiopaque foreign body.     1.  No acute fracture or dislocation.    XR KNEE RIGHT (3 VIEWS)    Result Date: 3/5/2024  EXAM: XR KNEE LEFT (3 VIEWS), XR KNEE RIGHT (3 VIEWS) HISTORY: Pain in unspecified joint COMPARISON: None FINDINGS: RIGHT: Bones: The osseous structures are well mineralized. No acute fracture or dislocation. Joints: The joint space is well maintained. No significant arthritic changes. No effusion. Soft tissues: No significant soft tissue swelling or radiopaque foreign body. LEFT: Bones: The osseous structures are well mineralized. No acute fracture or dislocation. Joints: The joint space is well maintained. No significant arthritic changes. No effusion. Soft tissues: No significant soft tissue swelling or radiopaque foreign body.     1.  No acute fracture or dislocation.    XR HIP 3-4 VW W PELVIS BILATERAL    Result Date: 3/5/2024  Exam Type: XR HIP 3-4 VW W PELVIS BILATERAL Indication: Pain in right hip; Pain in left hip Comparison: None Findings: Bones: The osseous structures are well mineralized. No acute fracture or dislocation. Joint: Normal anatomic alignment. Mild degenerative changes are present in the bilateral hips. Soft tissues: No significant soft tissue swelling.     1.  No acute fracture or dislocation.      No results found.    PROCEDURES   Unless otherwise noted below, none     Procedures    CRITICAL CARE TIME       EMERGENCY DEPARTMENT COURSE and DIFFERENTIAL DIAGNOSIS/MDM:   Vitals:    Vitals:    03/09/24 0150 03/09/24 0159 03/09/24 0200 03/09/24 0210   BP:   (!) 158/136    Pulse:  92     Resp:  20     Temp: 98.6 °F (37 °C)      TempSrc: Oral      SpO2:  95% 98% 96%   Weight: 113.4 kg (250 lb)      Height: 1.702 m (5' 7\")          Patient was given the following medications:  Medications   ketorolac (TORADOL) injection 30 mg (30 mg IntraMUSCular Given 3/9/24 0156)   orphenadrine (NORFLEX) injection 60 mg (60 mg IntraMUSCular Given 3/9/24 0155)

## 2024-04-02 NOTE — ED ADULT NURSE NOTE - NSSEPSISSUSPECTED_ED_A_ED
Detail Level: Zone Render In Strict Bullet Format?: No Initiate Treatment: Ketoconazole shampoo as directed Initiate Treatment: TAC 0.1% cream as directed abdominal pain No

## 2024-04-10 NOTE — H&P ADULT - CONSTITUTIONAL
Pt was transferred over by RN assessment to have the pt rescheduled as they would not have a  for their colonoscopy on 04/24 in . Call with pt disconnected and writer was unable to immediately reach out. Writer called pt and left VM to call back to schedule.   detailed exam

## 2024-04-13 NOTE — ASU PATIENT PROFILE, ADULT - ANESTHESIA, PREVIOUS REACTION, PROFILE
Improving  - Hypotensive to MAP 50s on admission s/p 3L Bolus and Albumin   - HR persistently low in 50s, not feasible to start midodrine     Plan:   - Albumin for hypotension and decreased renal perfusion, limit fluid boluses iso decompensated cirrhosis, Avoid midodrine 2/2 bradycardia  - Hold Lasix and Spironolactone  - Low threshold for micu consult Improving   - Hyponatremic to 125 w/ Urine sodium <20; likely iso very poor PO intake   - Red < 20, Uosm >330; Patient appears floridly overloaded; likely iso Cirrhosis      Plan:  restrict fluids  Low sodium diet due to cirrhosis  cmp Q12 none

## 2024-06-24 NOTE — INPATIENT CERTIFICATION FOR MEDICARE PATIENTS - CURRENT MEDICAL NEEDS AND CARE PLANS
"Subjective   Patient ID: Shavon De Jesus is a 65 y.o. female who presents for Follow-up (3 month follow up ) and medication (Stopped taking Wellbutrin -  May 2024 - has been feeling ok/).    HPI   She is here for follow-up visit.  Reports he is doing well, no acute illness.  States he stopped taking Wellbutrin about 6 weeks ago and feels okay, no more dry mouth.  Denies any withdrawal symptoms.  Her depression been stable on Lexapro 20 mg daily.  She continues to have bilateral knee pain, saw Ortho who gave her a knee injection with minimal help.  She is taking meloxicam 50 mg daily as needed.  Reports he is trying to get a knee replacement after she gets into [facility] in November 24.  Has osteoporosis and improved to osteopenia based on DEXA 1/16/2024, on Prolia every 6 month, request refills.  Recently had blood work, 6/18/2024 showing CMP/CBC WNL; TSH 4.34 and T4 normal at 1.02, remains asymptomatic; lipid slight worsening with LDL at 132, HDL 60, takes Zetia 10 mg, unable to tolerate statin; reviewed with the patient.    Review of Systems   Constitutional:  Negative for chills, fatigue, fever and unexpected weight change.   HENT: Negative.     Respiratory:  Negative for cough, shortness of breath and wheezing.    Cardiovascular:  Negative for chest pain, palpitations and leg swelling.   Gastrointestinal:  Negative for abdominal pain, constipation, diarrhea, nausea and vomiting.   Musculoskeletal: Negative.    Skin:  Negative for color change and rash.   Neurological:  Negative for dizziness and headaches.   Psychiatric/Behavioral:  Negative for behavioral problems and confusion.        Objective   /78 (BP Location: Left arm, Patient Position: Sitting, BP Cuff Size: Adult)   Pulse 78   Temp 35.9 °C (96.6 °F)   Resp 20   Ht 1.6 m (5' 3\")   Wt 79.7 kg (175 lb 9.6 oz)   SpO2 96%   BMI 31.11 kg/m²     Physical Exam  Vitals and nursing note reviewed.   Constitutional:       Appearance: Normal appearance. She is " obese.   Cardiovascular:      Rate and Rhythm: Normal rate and regular rhythm.      Pulses: Normal pulses.      Heart sounds: Normal heart sounds.   Pulmonary:      Effort: Pulmonary effort is normal.      Breath sounds: Normal breath sounds.   Abdominal:      General: Abdomen is flat. Bowel sounds are normal.      Palpations: Abdomen is soft.   Musculoskeletal:         General: Normal range of motion.   Neurological:      General: No focal deficit present.      Mental Status: She is alert.   Psychiatric:         Mood and Affect: Mood normal.         Behavior: Behavior normal.       Assessment/Plan   She is here for follow-up visit.  Overall she is doing okay, no acute illness and is clinically & vitally stable.  Chronic problems are stable, continue all meds as listed below.  Will continue levothyroxine 75 mcg daily as usual.  Will put refill for Prolia as requested.  Continue calcium and vitamin D as usual along with weightbearing exercise.  Has slightly worsening lipid, highly encouraged to follow heart healthy diet, and work on optimizing weight.  Problem List Items Addressed This Visit             ICD-10-CM    Arthralgia of multiple sites M25.50    Relevant Medications    meloxicam (Mobic) 15 mg tablet    Asthma (HHS-HCC) J45.909    Relevant Medications    montelukast (Singulair) 10 mg tablet    Chronic obstructive pulmonary disease (Multi) J44.9    Relevant Medications    fluticasone-umeclidin-vilanter (Trelegy Ellipta) 100-62.5-25 mcg blister with device    montelukast (Singulair) 10 mg tablet    Depression - Primary F32.A    Relevant Medications    escitalopram (Lexapro) 20 mg tablet    GERD (gastroesophageal reflux disease) K21.9    Relevant Medications    omeprazole (PriLOSEC) 20 mg DR capsule    Hypothyroidism E03.9    Relevant Medications    levothyroxine (Synthroid, Levoxyl) 75 mcg tablet    OAB (overactive bladder) N32.81    Relevant Medications    trospium (Sanctura XR) 60 mg 24 hour capsule     Osteoporosis of lumbar spine M81.0    Hyperlipidemia, unspecified E78.5    Relevant Medications    ezetimibe (Zetia) 10 mg tablet     Other Visit Diagnoses         Codes    Chronic pain of both knees     M25.561, M25.562, G89.29          Rtc 3 mo for DARIANA Francis MD   [facility]     Possible Home

## 2025-01-08 NOTE — PHYSICAL THERAPY INITIAL EVALUATION ADULT - WEIGHT-BEARING RESTRICTIONS: GAIT, REHAB EVAL
Copied from CRM #[id number]. Topic: MW Messaging - MW Patient Request  >> Jan 8, 2025  2:58 PM Shaila ABEBE wrote:  Zach Green called during clinic working hours to follow up on previous message from the clinic.        weight-bearing as tolerated

## 2025-04-07 NOTE — PROVIDER CONTACT NOTE (CRITICAL VALUE NOTIFICATION) - NS PROVIDER READ BACK TO LAB
yes Feet Incubation Time: 2 Hours Face And Scalp Incubation Time: 1 Hour for the face and 2 Hours for the scalp Face Incubation Time: 0 minutes Occlusion: No Photosensitizer: Levulan Face, Ears And  Scalp Incubation Time: 1 Hour Consent: The procedure and risks were reviewed with the patient including but not limited to: burning, pigmentary changes, pain, blistering, scabbing, redness, and the possibility of needing numerous treatments. Strict photoprotection after the procedure was also discussed. Detail Level: Zone Location: Face Pdt Type: HERRERA-U Frequency Of Pdt: Single Treatment

## 2025-04-11 NOTE — H&P ADULT - PROBLEM SELECTOR PROBLEM 7
Referral for procedure from  Workqueue referral (see Appts tab)      Spoke to patient to schedule procedure(s) Colonoscopy       Physician to perform procedure(s) Dr. CAROLYN English  Date of Procedure (s) 5/17/25  Arrival Time 9:00 AM  Time of Procedure(s) 10:00 AM   Location of Procedure(s) [facility] [address] [facility]  Type of Rx Prep sent to patient: PEG  Instructions provided to patient via [facility]    Patient was informed on the following information and verbalized understanding. Screening questionnaire reviewed with patient and complete. If procedure requires anesthesia, a responsible adult needs to be present to accompany the patient home, patient cannot drive after receiving anesthesia. Appointment details are tentative, especially check-in time. Patient will receive a prep-op call 7 days prior to confirm check-in time for procedure. If applicable the patient should contact their pharmacy to verify Rx for procedure prep is ready for pick-up. Patient was advised to call the scheduling department at [phone] if pharmacy states no Rx is available. Patient was advised to call the endoscopy scheduling department if any questions or concerns arise.      [facility] Scheduling Department       PVD (peripheral vascular disease) RA (rheumatoid arthritis)

## 2025-06-24 NOTE — PATIENT PROFILE ADULT. - TOBACCO USE
RECEIVED REFERRAL FROM [facility] 08/16/21. INCLUDES DEMOGRAPHICS, OV NOTE 08/04/21. INDEXED IN CHART. THE DIAGNOSIS IS UNSPECIFIED THORACIC, THORACOLUMBAR AND LUMBOSACRAL INTEVERTEBRAL DISC DISORDER. THIS IS AN ESTABLISHED PATIENT LAST SEEN BY SVITLANA WOLF PA-C ON 01/18/21 DATE FOR  BACK PAIN. REFERRAL NOTES STATE PATIENT IS REQUEST DR ZAYAS. PRIOR PT OF DR BARRETT & DR ROMANO. NO NEW IMAGING RECEIVED.  
Former smoker
I do not need any legal help

## 2025-07-10 NOTE — H&P CARDIOLOGY - BP NONINVASIVE SYSTOLIC (MM HG)
Refill Request    LAST GFR: 05/07/2025; 53. PROTOCOL FAILED.     Medication request: TRAMADOL 50 MG Oral Tab. TAKE 1.5 TABLETS (75 MG TOTAL) BY MOUTH NIGHTLY AS NEEDED FOR PAIN.     LOV: 10/3/2024 Terrence Mcgregor DO   Due back to clinic per last office note: Per Dr. Mcgregor: \"F/u in 4-6 weeks.\"  NOV: Visit date not found      [place]: 06/11/2025 #45    Urine drug screen (if applicable): n/a  Pain contract: n/a    LOV plan (if weaning or changing medications): Per Dr. Mcgregor: \"May increase tramadol to 75mg at bedtime.\"   128